# Patient Record
Sex: FEMALE | Race: WHITE | NOT HISPANIC OR LATINO | Employment: OTHER | URBAN - METROPOLITAN AREA
[De-identification: names, ages, dates, MRNs, and addresses within clinical notes are randomized per-mention and may not be internally consistent; named-entity substitution may affect disease eponyms.]

---

## 2018-10-18 ENCOUNTER — TELEPHONE (OUTPATIENT)
Dept: SURGERY | Facility: CLINIC | Age: 38
End: 2018-10-18

## 2018-10-18 NOTE — TELEPHONE ENCOUNTER
New patient referral for right breast mass,   Scheduled with Dr. Maddy Guillermo 11/5/18 8:30  Had to leave patient a message RE: appointment and reason for consult, Asked that she call back.     lml

## 2018-11-02 PROBLEM — N63.10 BREAST MASS, RIGHT: Status: ACTIVE | Noted: 2018-11-02

## 2018-11-05 ENCOUNTER — HOSPITAL ENCOUNTER (OUTPATIENT)
Dept: ULTRASOUND IMAGING | Facility: HOSPITAL | Age: 38
Discharge: HOME | End: 2018-11-05
Attending: SURGERY

## 2018-11-05 ENCOUNTER — OFFICE VISIT (OUTPATIENT)
Dept: SURGERY | Facility: CLINIC | Age: 38
End: 2018-11-05

## 2018-11-05 ENCOUNTER — HOSPITAL ENCOUNTER (OUTPATIENT)
Dept: MAMMOGRAPHY | Facility: HOSPITAL | Age: 38
Discharge: HOME | End: 2018-11-05
Attending: SURGERY | Admitting: SURGERY

## 2018-11-05 VITALS
HEIGHT: 64 IN | SYSTOLIC BLOOD PRESSURE: 129 MMHG | DIASTOLIC BLOOD PRESSURE: 81 MMHG | HEART RATE: 88 BPM | TEMPERATURE: 98.3 F | BODY MASS INDEX: 19.33 KG/M2 | WEIGHT: 113.2 LBS

## 2018-11-05 DIAGNOSIS — N60.81 SEBACEOUS CYST OF BREAST, RIGHT: ICD-10-CM

## 2018-11-05 DIAGNOSIS — N63.10 BREAST MASS, RIGHT: ICD-10-CM

## 2018-11-05 DIAGNOSIS — N63.10 BREAST MASS, RIGHT: Primary | ICD-10-CM

## 2018-11-05 PROCEDURE — 76642 ULTRASOUND BREAST LIMITED: CPT

## 2018-11-05 PROCEDURE — 77066 DX MAMMO INCL CAD BI: CPT

## 2018-11-05 PROCEDURE — 99204 OFFICE O/P NEW MOD 45 MIN: CPT | Performed by: SURGERY

## 2018-11-05 PROCEDURE — G0279 TOMOSYNTHESIS, MAMMO: HCPCS

## 2018-11-05 RX ORDER — LACOSAMIDE 200 MG/1
TABLET, FILM COATED ORAL
Refills: 5 | COMMUNITY
Start: 2018-10-04 | End: 2019-12-20

## 2018-11-06 PROBLEM — N60.81 SEBACEOUS CYST OF BREAST, RIGHT: Status: ACTIVE | Noted: 2018-11-06

## 2018-12-03 ENCOUNTER — TELEPHONE (OUTPATIENT)
Dept: SURGERY | Facility: CLINIC | Age: 38
End: 2018-12-03

## 2018-12-03 NOTE — TELEPHONE ENCOUNTER
F/u appt. With Dr. Guillermo on 2/5/2019 needs to be moved bc Dr. Guillermo will be in surgery that morning    Left message with pt to move appt.    Pt to call back to reschedule

## 2018-12-05 ENCOUNTER — TELEPHONE (OUTPATIENT)
Dept: SURGERY | Facility: CLINIC | Age: 38
End: 2018-12-05

## 2018-12-05 NOTE — TELEPHONE ENCOUNTER
Ms. Xiao's appointment has been moved from 2/5/2019 to arrive @ 10:15 to 2/6/2019 to arrive @ 10:15 due Dr. Guillermo being in surgery.    Left message with patient to inform her of this change.    Patient to call back and confirm.

## 2019-02-08 ENCOUNTER — TELEPHONE (OUTPATIENT)
Dept: SURGERY | Facility: CLINIC | Age: 39
End: 2019-02-08

## 2019-02-08 NOTE — TELEPHONE ENCOUNTER
Called and left message with patient, to see if she wants to reschedule 3 mo f/u with Dr. Guillermo.

## 2019-12-20 ENCOUNTER — OFFICE VISIT (OUTPATIENT)
Dept: NEUROLOGY | Facility: CLINIC | Age: 39
End: 2019-12-20

## 2019-12-20 VITALS
HEART RATE: 86 BPM | HEIGHT: 64 IN | SYSTOLIC BLOOD PRESSURE: 114 MMHG | WEIGHT: 107 LBS | BODY MASS INDEX: 18.27 KG/M2 | DIASTOLIC BLOOD PRESSURE: 72 MMHG | OXYGEN SATURATION: 98 %

## 2019-12-20 DIAGNOSIS — G40.309 GENERALIZED NONCONVULSIVE EPILEPSY (HCC): Primary | ICD-10-CM

## 2019-12-20 PROCEDURE — 95976 ALYS SMPL CN NPGT PRGRMG: CPT | Performed by: PSYCHIATRY & NEUROLOGY

## 2019-12-20 PROCEDURE — 99213 OFFICE O/P EST LOW 20 MIN: CPT | Performed by: PSYCHIATRY & NEUROLOGY

## 2019-12-20 RX ORDER — LACOSAMIDE 200 MG/1
200 TABLET ORAL EVERY 12 HOURS SCHEDULED
Qty: 60 TABLET | Refills: 3 | Status: SHIPPED | OUTPATIENT
Start: 2019-12-20 | End: 2020-06-17

## 2019-12-20 RX ORDER — NAPROXEN 500 MG/1
500 TABLET ORAL
COMMUNITY
End: 2021-02-24

## 2019-12-20 RX ORDER — LACOSAMIDE 100 MG/1
100 TABLET ORAL EVERY 12 HOURS SCHEDULED
COMMUNITY
End: 2019-12-20

## 2019-12-20 RX ORDER — LACOSAMIDE 50 MG/1
50 TABLET ORAL EVERY 12 HOURS SCHEDULED
COMMUNITY
End: 2019-12-20

## 2019-12-20 NOTE — PROGRESS NOTES
Procedure   Procedures      vagus nerve stimulator interrogation and diagnostics.    Normal mode setting is as follows: Output current is set to 1.5 mA, frequency is 30 Hz, pulse width is 250 µs, on x30 seconds and off time is 5 minutes.  Duty cycle is 10%.  Auto stimulation mode settings are as follows: Output current to set to 1.75 mA, pulse width is 250 µs and on time of 60 seconds.  Magnet mode settings are as follows: Output currently set to 2.75 mA, pulse width is 250 µs and on time is 60 seconds.    The setting changes today were 1.  We change the magnet output from 2.5 mA to 2.75 mA.  All other settings were remained unchanged.  Diagnostics were performed.  Battery life is 50 to 75%.  Lead impedance is okay at 2770 ohms.

## 2019-12-20 NOTE — PROGRESS NOTES
Chief Complaint   Patient presents with   • Seizures   • VNS       Patient ID: Alexandra Xiao is a 39 y.o. female.    HPI: I had the pleasure of seeing your patient today.  As you may know she is a 39-year-old female with a history of epilepsy.  She does have the vagus nerve stimulator.  She also takes Vimpat 150 mg twice daily.  She has not had any side effects of the Vimpat.  She did have 3 breakthrough seizures since last follow-up.  They were the typical generalized tonic-clonic seizure like spells.  She does turn blue.  They last anywhere from several seconds to 1 to 2 minutes.  She is postictal for several minutes afterwards with some confusion and lethargy.  During these 3 breakthrough seizures she acknowledges some excitement from the day.  She is not missing any doses of her Vimpat.  She is very compliant.  With respect to her vagus nerve stimulator the magnet does appear to pull her out of the seizures much quicker than prior to having this device.  The normal mode and auto mode stimulations have been turned down due to significant irritation.  We have tried titrating her very slowly however she is unable to tolerate it.  Even at the low levels she has currently she still will have significant irritation however is able to tolerate it better.    The following portions of the patient's history were reviewed and updated as appropriate: allergies, current medications, past family history, past medical history, past social history, past surgical history and problem list.    Review of Systems   Constitutional: Positive for appetite change and fatigue. Negative for activity change.   HENT: Positive for tinnitus. Negative for facial swelling and hearing loss.    Eyes: Positive for photophobia. Negative for redness and visual disturbance.   Musculoskeletal: Positive for back pain. Negative for gait problem and neck pain.   Neurological: Positive for dizziness, seizures (LAST SEIZURE WAS IN 10/06/2019), facial  asymmetry, weakness, light-headedness, numbness and headaches (PT STATES SHE GETS MIGRAINES FREQUENTLY. ). Negative for tremors, syncope and speech difficulty.   Hematological: Negative for adenopathy. Does not bruise/bleed easily.   Psychiatric/Behavioral: Negative for agitation, behavioral problems, confusion, decreased concentration, dysphoric mood, hallucinations, self-injury, sleep disturbance and suicidal ideas. The patient is not nervous/anxious and is not hyperactive.       I reviewed and agree with the above review of systems completed by the medical assistant.    Vitals:    19 1309   BP: 114/72   Pulse: 86   SpO2: 98%       Neurologic Exam     Mental Status   Oriented to person, place, and time.   Concentration: normal.   Level of consciousness: alert  Knowledge: consistent with education (No deficits found.).     Cranial Nerves     CN II   Visual fields full to confrontation.     CN III, IV, VI   Pupils are equal, round, and reactive to light.  Extraocular motions are normal.   CN III: no CN III palsy  CN VI: no CN VI palsy    CN V   Facial sensation intact.     CN VII   Facial expression full, symmetric.     CN VIII   CN VIII normal.     CN IX, X   CN IX normal.   CN X normal.     CN XI   CN XI normal.     CN XII   CN XII normal.     Motor Exam     Strength   Right neck flexion: 5/5  Left neck flexion: 5/5  Right neck extension: 5/5  Left neck extension: 5/5  Right deltoid: 5/5  Left deltoid: 5/5  Right biceps: 5/5  Left biceps: 5/5  Right triceps: 5/5  Left triceps: 5/5  Right wrist flexion: 5/5  Left wrist flexion: 5/5  Right wrist extension: 5/5  Left wrist extension: 5/5  Right interossei: 5/5  Left interossei: 5/5  Right abdominals: 5/5  Left abdominals: 5/5  Right iliopsoas: 5/5  Left iliopsoas: 5/5  Right quadriceps: 5/5  Left quadriceps: 5/5  Right hamstrin/5  Left hamstrin/5  Right glutei: 5/5  Left glutei: 5/5  Right anterior tibial: 5/5  Left anterior tibial: 5/5  Right posterior  tibial: 5/5  Left posterior tibial: 5/5  Right peroneal: 5/5  Left peroneal: 5/5  Right gastroc: 5/5  Left gastroc: 5/5    Sensory Exam   Light touch normal.   Vibration normal.     Gait, Coordination, and Reflexes     Gait  Gait: normal    Reflexes   Right brachioradialis: 2+  Left brachioradialis: 2+  Right biceps: 2+  Left biceps: 2+  Right triceps: 2+  Left triceps: 2+  Right patellar: 2+  Left patellar: 2+  Right achilles: 2+  Left achilles: 2+  Right : 2+  Left : 2+Station is normal.       Physical Exam   Constitutional: She is oriented to person, place, and time. She appears well-developed and well-nourished.   HENT:   Head: Normocephalic and atraumatic.   Eyes: Pupils are equal, round, and reactive to light. EOM are normal.   Cardiovascular: Normal rate and regular rhythm.   Pulmonary/Chest: Breath sounds normal.   Musculoskeletal: Normal range of motion.   Neurological: She is oriented to person, place, and time. Gait normal.   Reflex Scores:       Tricep reflexes are 2+ on the right side and 2+ on the left side.       Bicep reflexes are 2+ on the right side and 2+ on the left side.       Brachioradialis reflexes are 2+ on the right side and 2+ on the left side.       Patellar reflexes are 2+ on the right side and 2+ on the left side.       Achilles reflexes are 2+ on the right side and 2+ on the left side.  Skin: Skin is warm.   Vitals reviewed.      Procedures    Assessment/Plan: Vagus nerve stimulator interrogation and diagnostics were performed today.  We change the magnet mode from 2.5mA to 2.75 mA.  All other settings were kept the same.  We will increase her Vimpat to 200 mg twice daily.  She will see us back in 3 months.       Alexandra was seen today for seizures and vns.    Diagnoses and all orders for this visit:    Generalized nonconvulsive epilepsy (CMS/HCC)  -     lacosamide (VIMPAT) 200 MG tablet; Take 1 tablet by mouth Every 12 (Twelve) Hours for 30 days.           Cb James II,  MD

## 2020-03-19 ENCOUNTER — TELEPHONE (OUTPATIENT)
Dept: NEUROLOGY | Facility: CLINIC | Age: 40
End: 2020-03-19

## 2020-03-19 NOTE — TELEPHONE ENCOUNTER
Called pt to reschedule or confirm a telephone call apt. Unable to leave message due to the screeching sound when calling the number listed. The pt does not need to come in office due to COVID-19 pt can do a telephone call if they can not be available for that time on Monday the pt will have to reschedule sometime after 05/18/2020 due to protocol right now. Please advise if pt calls back. Thank you

## 2020-06-16 DIAGNOSIS — G40.309 GENERALIZED NONCONVULSIVE EPILEPSY (HCC): ICD-10-CM

## 2020-06-17 NOTE — TELEPHONE ENCOUNTER
Pt requesting refill on vimapt 200 mg take 1 tablet po bid. Pt was last seen on 12/20/2019 and has a f/u on 06/29/2020. josé miguel in chart.       I called in medication to pharmacy just need to sign for documentation in chart.    Please advise thank you

## 2020-06-22 RX ORDER — LACOSAMIDE 200 MG/1
TABLET, FILM COATED ORAL
Qty: 60 TABLET | Refills: 3 | OUTPATIENT
Start: 2020-06-22 | End: 2020-06-29

## 2020-06-29 ENCOUNTER — OFFICE VISIT (OUTPATIENT)
Dept: NEUROLOGY | Facility: CLINIC | Age: 40
End: 2020-06-29

## 2020-06-29 VITALS — HEART RATE: 103 BPM | OXYGEN SATURATION: 98 % | HEIGHT: 64 IN | BODY MASS INDEX: 18.78 KG/M2 | WEIGHT: 110 LBS

## 2020-06-29 DIAGNOSIS — G40.309 GENERALIZED NONCONVULSIVE EPILEPSY (HCC): Primary | ICD-10-CM

## 2020-06-29 PROCEDURE — 99214 OFFICE O/P EST MOD 30 MIN: CPT | Performed by: PSYCHIATRY & NEUROLOGY

## 2020-06-29 PROCEDURE — 95970 ALYS NPGT W/O PRGRMG: CPT | Performed by: PSYCHIATRY & NEUROLOGY

## 2020-06-29 RX ORDER — LACOSAMIDE 200 MG/1
200 TABLET ORAL EVERY 12 HOURS SCHEDULED
Qty: 60 TABLET | Refills: 4 | Status: SHIPPED | OUTPATIENT
Start: 2020-06-29 | End: 2020-07-16

## 2020-06-29 NOTE — PROGRESS NOTES
Procedure   Procedures        VNS interrogation and diagnostics.    Indication: History of seizures, breakthrough seizure activity.    Report: Vagus nerve stimulator was interrogated.    Normal mode setting is as follows: Output current is set to 1.5 mA, frequency is 30 Hz, pulse width is 250 µs, on x30 seconds and off time is 5 minutes.  Duty cycle is 10%.  Auto stimulation mode settings are as follows: Output current to set to 1.75 mA, pulse width is 250 µs and on time of 60 seconds.  Magnet mode settings are as follows: Output currently set to 2.75 mA, pulse width is 250 µs and on time is 60 seconds.    Diagnostics were performed.  Battery life is 50 to 75%, lead impedance is good.  No setting changes were made.

## 2020-06-29 NOTE — PROGRESS NOTES
Chief Complaint   Patient presents with   • Seizures   • VNS       Patient ID: Alexandra Xiao is a 39 y.o. female.    HPI: I had the pleasure of seeing your patient today.  As you may know she is a 39-year-old female with a history of seizures.  She states that she has had 5 seizures within the past 2 months.  The last one was approximately a week and a half ago.  She has had generalized tonic-clonic seizures.  She will usually have mild clonus prior to the onset.  She did not have loss of bowel or bladder with these seizures.  She does acknowledge a significant amount of stress in her life and states that that may be a trigger for her.  Seizures last anywhere from several seconds to a few minutes.  She will have postictal confusion for several minutes also.  She does have the vagus nerve stimulator.  She is also taking Vimpat 200 mg twice daily.  We have been unable to titrate the vagus nerve stimulator successfully due to significant discomfort.  We have titrated her magnet mode settings for abortive measures.  She does also try and swipe several times if she feels like a seizure could be coming on.  She denies any new auras.  No focal weakness or numbness of her arms or legs.  No vision changes.  No recent head injuries.  She does state compliance with her medication.    The following portions of the patient's history were reviewed and updated as appropriate: allergies, current medications, past family history, past medical history, past social history, past surgical history and problem list.    Review of Systems   Constitutional: Negative for activity change, appetite change and fatigue.   HENT: Negative for hearing loss, mouth sores and trouble swallowing.    Eyes: Negative for photophobia, redness and visual disturbance.   Respiratory: Negative for chest tightness, shortness of breath and wheezing.    Gastrointestinal: Negative for abdominal pain, nausea and vomiting.   Endocrine: Negative for cold intolerance,  heat intolerance and polydipsia.   Musculoskeletal: Negative for back pain, gait problem and neck pain.   Skin: Negative for color change, rash and wound.   Allergic/Immunologic: Negative for environmental allergies, food allergies and immunocompromised state.   Neurological: Positive for seizures (pt states 5 seizues in last 2 months. ). Negative for dizziness, tremors, syncope, facial asymmetry, speech difficulty, weakness, light-headedness, numbness and headaches.   Hematological: Negative for adenopathy. Does not bruise/bleed easily.   Psychiatric/Behavioral: Negative for agitation, behavioral problems, confusion, decreased concentration, dysphoric mood, hallucinations, self-injury, sleep disturbance and suicidal ideas. The patient is not nervous/anxious and is not hyperactive.       I have reviewed the review of systems above performed by my medical assistant.      Vitals:    06/29/20 1538   Pulse: 103   SpO2: 98%       Neurologic Exam     Mental Status   Oriented to person, place, and time.   Concentration: normal.   Level of consciousness: alert  Knowledge: consistent with education (No deficits found.).     Cranial Nerves     CN II   Visual fields full to confrontation.     CN III, IV, VI   Pupils are equal, round, and reactive to light.  Extraocular motions are normal.   CN III: no CN III palsy  CN VI: no CN VI palsy    CN V   Facial sensation intact.     CN VII   Facial expression full, symmetric.     CN VIII   CN VIII normal.     CN IX, X   CN IX normal.   CN X normal.     CN XI   CN XI normal.     CN XII   CN XII normal.     Motor Exam     Strength   Right neck flexion: 5/5  Left neck flexion: 5/5  Right neck extension: 5/5  Left neck extension: 5/5  Right deltoid: 5/5  Left deltoid: 5/5  Right biceps: 5/5  Left biceps: 5/5  Right triceps: 5/5  Left triceps: 5/5  Right wrist flexion: 5/5  Left wrist flexion: 5/5  Right wrist extension: 5/5  Left wrist extension: 5/5  Right interossei: 5/5  Left  interossei: 5/5  Right abdominals: 5/5  Left abdominals: 5/5  Right iliopsoas: 5/5  Left iliopsoas: 5/5  Right quadriceps: 5/5  Left quadriceps: 5/5  Right hamstrin/5  Left hamstrin/5  Right glutei: 5/5  Left glutei: 5/5  Right anterior tibial: 5/5  Left anterior tibial: 5/5  Right posterior tibial: 5/5  Left posterior tibial: 5/5  Right peroneal: 5/5  Left peroneal: 5/5  Right gastroc: 5/5  Left gastroc: 5/5    Sensory Exam   Light touch normal.   Vibration normal.     Gait, Coordination, and Reflexes     Gait  Gait: normal    Reflexes   Right brachioradialis: 2+  Left brachioradialis: 2+  Right biceps: 2+  Left biceps: 2+  Right triceps: 2+  Left triceps: 2+  Right patellar: 2+  Left patellar: 2+  Right achilles: 2+  Left achilles: 2+  Right : 2+  Left : 2+Station is normal.       Physical Exam   Constitutional: She is oriented to person, place, and time. She appears well-developed and well-nourished.   HENT:   Head: Normocephalic and atraumatic.   Eyes: Pupils are equal, round, and reactive to light. EOM are normal.   Cardiovascular: Normal rate and regular rhythm.   Pulmonary/Chest: Breath sounds normal.   Musculoskeletal: Normal range of motion.   Neurological: She is oriented to person, place, and time. Gait normal.   Reflex Scores:       Tricep reflexes are 2+ on the right side and 2+ on the left side.       Bicep reflexes are 2+ on the right side and 2+ on the left side.       Brachioradialis reflexes are 2+ on the right side and 2+ on the left side.       Patellar reflexes are 2+ on the right side and 2+ on the left side.       Achilles reflexes are 2+ on the right side and 2+ on the left side.  Skin: Skin is warm.   Vitals reviewed.      Procedures    Assessment/Plan: Plan is to start her on Aptiom 400 mg daily.  She will continue the Vimpat as is.  The vagus nerve stimulator was interrogated.  All settings appear as they were at previous visit.  May consider titration once again in the  near future if we are unable to get better control of her seizures with the Aptiom addition.       Alexandra was seen today for seizures and vns.    Diagnoses and all orders for this visit:    Generalized nonconvulsive epilepsy (CMS/HCC)  -     Discontinue: Eslicarbazepine Acetate (Aptiom) 600 MG tablet; Take 1 tablet by mouth Daily for 30 days.  -     lacosamide (VIMPAT) 200 MG tablet; Take 1 tablet by mouth Every 12 (Twelve) Hours for 30 days.  -     Eslicarbazepine Acetate 400 MG tablet; Take 1 tablet by mouth Daily for 30 days.           Cb James II, MD

## 2020-07-16 DIAGNOSIS — G40.309 GENERALIZED NONCONVULSIVE EPILEPSY (HCC): ICD-10-CM

## 2020-07-16 NOTE — TELEPHONE ENCOUNTER
Please advise and approve. Pt lst filled medication on 06/17/2020 pt was last seen on 06/29/2020 and has a f/u 10/02/2020. Please advise and approve mediation thank you josé miguel in chart.

## 2020-07-17 RX ORDER — LACOSAMIDE 200 MG/1
TABLET, FILM COATED ORAL
Qty: 60 TABLET | Refills: 5 | Status: SHIPPED | OUTPATIENT
Start: 2020-07-17 | End: 2020-12-28 | Stop reason: SDUPTHER

## 2020-11-23 DIAGNOSIS — G40.309 GENERALIZED NONCONVULSIVE EPILEPSY (HCC): ICD-10-CM

## 2020-11-24 RX ORDER — ESLICARBAZEPINE ACETATE 400 MG/1
TABLET ORAL
Qty: 30 TABLET | Refills: 0 | Status: SHIPPED | OUTPATIENT
Start: 2020-11-24 | End: 2020-12-28

## 2020-11-24 NOTE — TELEPHONE ENCOUNTER
No refills until pt has an apt scheduled. Please advise.     Please approve 30 day supply. Thank you.

## 2020-12-26 DIAGNOSIS — G40.309 GENERALIZED NONCONVULSIVE EPILEPSY (HCC): ICD-10-CM

## 2020-12-28 DIAGNOSIS — G40.309 GENERALIZED NONCONVULSIVE EPILEPSY (HCC): ICD-10-CM

## 2020-12-28 RX ORDER — ESLICARBAZEPINE ACETATE 400 MG/1
TABLET ORAL
Qty: 30 TABLET | Refills: 0 | OUTPATIENT
Start: 2020-12-28

## 2020-12-28 NOTE — TELEPHONE ENCOUNTER
Pt was given 30 days last month and told to schedule an apt. Pt needs an apt scheduled for refills. Once Pt is scheduled we can get her enough refills until apt. If pt no shows at that apts scheduled. No refills will be given until pt is seen in office.

## 2020-12-28 NOTE — TELEPHONE ENCOUNTER
PATIENT HAS BEEN SCHEDULED FOR F/U IN April. SHE WOULD LIKE TO SPEAK TO SOMEONE ABOUT THE RX APTIOM SINCE F/U HAS BEEN SCHEDULED.     PLEASE ADVISE.    PH: [phone]

## 2020-12-28 NOTE — TELEPHONE ENCOUNTER
Pt made an apt in April. Please advise. DR. James is out of office until Wednesday. I called in medication to pharmacy requested. Please advise and approve for documentation thank you.

## 2020-12-31 RX ORDER — ESLICARBAZEPINE ACETATE 400 MG/1
TABLET ORAL
Qty: 30 TABLET | Refills: 0 | OUTPATIENT
Start: 2020-12-31 | End: 2021-06-29

## 2020-12-31 RX ORDER — LACOSAMIDE 200 MG/1
1 TABLET, FILM COATED ORAL 2 TIMES DAILY
Qty: 60 TABLET | Refills: 5 | OUTPATIENT
Start: 2020-12-31 | End: 2021-01-18

## 2021-01-16 DIAGNOSIS — G40.309 GENERALIZED NONCONVULSIVE EPILEPSY (HCC): ICD-10-CM

## 2021-01-18 RX ORDER — LACOSAMIDE 200 MG/1
TABLET, FILM COATED ORAL
Qty: 60 TABLET | Refills: 5 | Status: SHIPPED | OUTPATIENT
Start: 2021-01-18 | End: 2021-08-11

## 2021-01-18 NOTE — TELEPHONE ENCOUNTER
Please advise. Pt requesting vimpat. josé miguel in chart. Pt has a f/u on 04/09/2021. Please advise and approve thank you.

## 2021-02-24 DIAGNOSIS — G40.309 GENERALIZED NONCONVULSIVE EPILEPSY (HCC): Primary | ICD-10-CM

## 2021-02-24 RX ORDER — NAPROXEN 500 MG/1
TABLET ORAL
Qty: 90 TABLET | Refills: 0 | Status: SHIPPED | OUTPATIENT
Start: 2021-02-24 | End: 2021-07-30

## 2021-03-24 DIAGNOSIS — G40.309 GENERALIZED NONCONVULSIVE EPILEPSY (HCC): ICD-10-CM

## 2021-03-24 RX ORDER — NAPROXEN 500 MG/1
TABLET ORAL
Qty: 90 TABLET | Refills: 0 | OUTPATIENT
Start: 2021-03-24

## 2021-04-14 NOTE — TELEPHONE ENCOUNTER
Please advise. Pt has an apt on 04/09/2021. Please advise and approve thank you.    Surgeon (Optional): Mohamud Medrano PA-C

## 2021-05-14 ENCOUNTER — OFFICE VISIT (OUTPATIENT)
Dept: NEUROLOGY | Facility: CLINIC | Age: 41
End: 2021-05-14

## 2021-05-14 VITALS
DIASTOLIC BLOOD PRESSURE: 70 MMHG | WEIGHT: 141 LBS | HEART RATE: 104 BPM | HEIGHT: 64 IN | OXYGEN SATURATION: 98 % | BODY MASS INDEX: 24.07 KG/M2 | SYSTOLIC BLOOD PRESSURE: 122 MMHG

## 2021-05-14 DIAGNOSIS — Z96.89 STATUS POST VNS (VAGUS NERVE STIMULATOR) PLACEMENT: ICD-10-CM

## 2021-05-14 DIAGNOSIS — G40.309 GENERALIZED NONCONVULSIVE EPILEPSY (HCC): Primary | ICD-10-CM

## 2021-05-14 PROCEDURE — 99213 OFFICE O/P EST LOW 20 MIN: CPT | Performed by: PSYCHIATRY & NEUROLOGY

## 2021-05-14 PROCEDURE — 95970 ALYS NPGT W/O PRGRMG: CPT | Performed by: PSYCHIATRY & NEUROLOGY

## 2021-05-14 NOTE — PROGRESS NOTES
Chief Complaint   Patient presents with   • Seizures       Patient ID: Alexandra Xiao is a 40 y.o. female.    HPI: I had the pleasure of seeing your patient today.  As you may know she is a 40-year-old female with a history of seizures.  She is currently taking Vimpat 200 mg twice daily as well as Aptiom 400 mg twice daily.  She does report one seizure since last follow-up.  It was a typical generalized tonic-clonic seizure.  She did lose her urine.  She is edentulous therefore no tongue issues.  She does have a vagus nerve stimulator.  The magnet was used and this has been very helpful for her and breaking seizures much sooner than before.  She is tolerating stimulation quite well.  She says that she was under significant amount of stress prior to the seizure as well she can sometimes have seizures around her menstrual cycle and that was about to start as well.    Vagus nerve stimulator interrogation  Indication: History of seizures  Procedure:  Normal mode settings are as follows: Output current is set to 1.5 mA, signal frequency is 30 Hz, pulse width is 250 µs, on time is 30 seconds and off time is 5 minutes.  Duty cycle is 10%.  Auto stimulation mode settings are as follows: Output current is set to 1.75 mA, pulse width is 250 µs and on time is 60 seconds.  Magnet mode settings are as follows: Output current is set to 2.75 mA, pulse width is 250 µs and on time is 60 seconds.    Diagnostics:  Generator battery is okay at 50 to 75%.  Lead impedance is 2824 ohms.    Setting changes: There were no setting changes today.                                                                                                            The following portions of the patient's history were reviewed and updated as appropriate: allergies, current medications, past family history, past medical history, past social history, past surgical history and problem list.    Review of Systems   Musculoskeletal: Negative for back pain, gait  problem and neck pain.   Allergic/Immunologic: Negative for environmental allergies, food allergies and immunocompromised state.   Neurological: Positive for seizures (last seizure 01/24/2021. ). Negative for dizziness, tremors, syncope, facial asymmetry, speech difficulty, weakness, light-headedness, numbness and headaches.   Hematological: Negative for adenopathy. Does not bruise/bleed easily.   Psychiatric/Behavioral: Negative for agitation, behavioral problems, confusion, decreased concentration, dysphoric mood, hallucinations, self-injury, sleep disturbance and suicidal ideas. The patient is not nervous/anxious and is not hyperactive.       I have reviewed the review of systems above performed by my medical assistant.      Vitals:    05/14/21 1048   BP: 122/70   Pulse: 104   SpO2: 98%       Neurologic Exam     Mental Status   Oriented to person, place, and time.   Concentration: normal.   Level of consciousness: alert  Knowledge: consistent with education (No deficits found.).     Cranial Nerves     CN II   Visual fields full to confrontation.     CN III, IV, VI   Pupils are equal, round, and reactive to light.  Extraocular motions are normal.   CN III: no CN III palsy  CN VI: no CN VI palsy    CN V   Facial sensation intact.     CN VII   Facial expression full, symmetric.     CN VIII   CN VIII normal.     CN IX, X   CN IX normal.   CN X normal.     CN XI   CN XI normal.     CN XII   CN XII normal.     Motor Exam     Strength   Right neck flexion: 5/5  Left neck flexion: 5/5  Right neck extension: 5/5  Left neck extension: 5/5  Right deltoid: 5/5  Left deltoid: 5/5  Right biceps: 5/5  Left biceps: 5/5  Right triceps: 5/5  Left triceps: 5/5  Right wrist flexion: 5/5  Left wrist flexion: 5/5  Right wrist extension: 5/5  Left wrist extension: 5/5  Right interossei: 5/5  Left interossei: 5/5  Right abdominals: 5/5  Left abdominals: 5/5  Right iliopsoas: 5/5  Left iliopsoas: 5/5  Right quadriceps: 5/5  Left  quadriceps: 5/5  Right hamstrin/5  Left hamstrin/5  Right glutei: 5/5  Left glutei: 5/5  Right anterior tibial: 5/5  Left anterior tibial: 5/5  Right posterior tibial: 5/5  Left posterior tibial: 5/5  Right peroneal: 5/5  Left peroneal: 5/5  Right gastroc: 5/5  Left gastroc: 5/5    Sensory Exam   Light touch normal.   Vibration normal.     Gait, Coordination, and Reflexes     Gait  Gait: normal    Reflexes   Right brachioradialis: 2+  Left brachioradialis: 2+  Right biceps: 2+  Left biceps: 2+  Right triceps: 2+  Left triceps: 2+  Right patellar: 2+  Left patellar: 2+  Right achilles: 2+  Left achilles: 2+  Right : 2+  Left : 2+Station is normal.       Physical Exam  Vitals reviewed.   Constitutional:       Appearance: She is well-developed.   HENT:      Head: Normocephalic and atraumatic.   Eyes:      Extraocular Movements: EOM normal.      Pupils: Pupils are equal, round, and reactive to light.   Cardiovascular:      Rate and Rhythm: Normal rate and regular rhythm.   Pulmonary:      Breath sounds: Normal breath sounds.   Musculoskeletal:         General: Normal range of motion.   Skin:     General: Skin is warm.   Neurological:      Mental Status: She is oriented to person, place, and time.      Gait: Gait is intact.      Deep Tendon Reflexes:      Reflex Scores:       Tricep reflexes are 2+ on the right side and 2+ on the left side.       Bicep reflexes are 2+ on the right side and 2+ on the left side.       Brachioradialis reflexes are 2+ on the right side and 2+ on the left side.       Patellar reflexes are 2+ on the right side and 2+ on the left side.       Achilles reflexes are 2+ on the right side and 2+ on the left side.        Procedures    Assessment/Plan: We have discussed titrating the vagus nerve stimulator some however she would like to hold off on that for now until her next visit.  We will continue her current dose of Vimpat and Aptiom.  We will see her back in 6 months or sooner if  needed.  Vagus nerve stimulator was interrogated however no setting changes were made today.  Diagnostics were also performed today.  A total of 25 minutes was spent face-to-face with the patient today.  Of that greater than 50% of this time was spent discussing signs and symptoms of seizures, patient education, plan of care and prognosis.       Diagnoses and all orders for this visit:    1. Generalized nonconvulsive epilepsy (CMS/HCC) (Primary)    2. Status post VNS (vagus nerve stimulator) placement           Cb James II, MD

## 2021-06-29 ENCOUNTER — TELEPHONE (OUTPATIENT)
Dept: NEUROLOGY | Facility: CLINIC | Age: 41
End: 2021-06-29

## 2021-06-29 DIAGNOSIS — G40.309 GENERALIZED NONCONVULSIVE EPILEPSY (HCC): ICD-10-CM

## 2021-06-29 RX ORDER — ESLICARBAZEPINE ACETATE 400 MG/1
TABLET ORAL
Qty: 30 TABLET | Refills: 5 | Status: SHIPPED | OUTPATIENT
Start: 2021-06-29 | End: 2021-12-23

## 2021-06-29 NOTE — TELEPHONE ENCOUNTER
Caller: Alexandra Xiao    Relationship: Self    Best call back number:[phone]    What medications are you currently taking:   Current Outpatient Medications on File Prior to Visit   Medication Sig Dispense Refill   • Aptiom 400 MG tablet TAKE 1 TABLET BY MOUTH DAILY 30 tablet 0   • naproxen (NAPROSYN) 500 MG tablet TAKE 1 TABLET BY MOUTH THREE TIMES DAILY AS NEEDED FOR HEADACHE 90 tablet 0   • Vimpat 200 MG tablet TAKE 1 TABLET BY MOUTH TWICE DAILY 60 tablet 5     No current facility-administered medications on file prior to visit.        When did you start taking these medications: NA    Which medication are you concerned about: APTIOM    Who prescribed you this medication:     What are your concerns: PATIENT STATES SHE IS OUT OF MEDICATION AND THERE WAS A REORDER PLACED TODAY. PLEASE ADVISE.     LAST FILLED: 12-31-20  LAST SEEN: 5-14-21  NEXT VISIT: 11-19-21    How long have you been taking these medications: NA    How long have you had these concerns: NA

## 2021-06-29 NOTE — TELEPHONE ENCOUNTER
Request have been sent to dr. Samuels. We have up to 72 hrs from the time we receive medication request to fill.

## 2021-06-29 NOTE — TELEPHONE ENCOUNTER
Please advise. Pt last seen on 5/14/2021 and has a f/u on 11/19/2021. Please advise and approve thank you.

## 2021-07-29 DIAGNOSIS — G40.309 GENERALIZED NONCONVULSIVE EPILEPSY (HCC): ICD-10-CM

## 2021-07-30 RX ORDER — NAPROXEN 500 MG/1
TABLET ORAL
Qty: 90 TABLET | Refills: 5 | Status: SHIPPED | OUTPATIENT
Start: 2021-07-30 | End: 2022-01-31

## 2021-08-11 DIAGNOSIS — G40.309 GENERALIZED NONCONVULSIVE EPILEPSY (HCC): ICD-10-CM

## 2021-08-11 RX ORDER — LACOSAMIDE 200 MG/1
TABLET, FILM COATED ORAL
Qty: 60 TABLET | Refills: 5 | Status: SHIPPED | OUTPATIENT
Start: 2021-08-11 | End: 2022-01-05 | Stop reason: SDUPTHER

## 2021-08-11 NOTE — TELEPHONE ENCOUNTER
Caller: Alexandra Xiao    Relationship: Self    Best call back number: [phone]    What was the call regarding: PT CALLED TO CHECK THAT REFILL REQUEST WAS RECEIVED FROM THE PHARMACY. INFORMED PT THAT WE DID RECEIVED, WE HAVE TO WAIT FOR DR. MORENO'S APPROVAL. PT STATES SHE TOOK HER LAST TABLET OF THE VIMPAT MEDICATION THIS MORNING AND HAS NONE FOR THIS EVENING.     LAST APPT: 5/14/21  FUTURE APPT: 11/19/21    PLEASE REVIEW AND ADVISE.

## 2021-08-11 NOTE — TELEPHONE ENCOUNTER
Please advise. Pt has an apt on 11/19/2021. Please adivse and approve thank you. josé miguel in chart.

## 2021-12-23 DIAGNOSIS — G40.309 GENERALIZED NONCONVULSIVE EPILEPSY (HCC): ICD-10-CM

## 2021-12-23 RX ORDER — ESLICARBAZEPINE ACETATE 400 MG/1
TABLET ORAL
Qty: 30 TABLET | Refills: 5 | Status: SHIPPED | OUTPATIENT
Start: 2021-12-23 | End: 2022-01-05 | Stop reason: SDUPTHER

## 2022-01-05 DIAGNOSIS — G40.309 GENERALIZED NONCONVULSIVE EPILEPSY: ICD-10-CM

## 2022-01-05 NOTE — TELEPHONE ENCOUNTER
Provider: DR MORENO ---SHARIFA TAVAREZ   Caller:  ELIE   Relationship to Patient:  PT     Phone Number: [phone]  Reason for Call:  PT CALLING IN TO RESCHED APPT FOR 01/06/2022 WITH DR MORENO  TESTED POSITIVE FOR COVID SHE IS IN QUARANTINE   ATTEMPTED TO RESCHED NO TEMPLATE  DAYSI WITH A PT .. PT NEEDS APPT FOR VNS  PLATE     PLEASE ADVISE

## 2022-01-05 NOTE — TELEPHONE ENCOUNTER
Caller: Lopez Xiaoy    Relationship: Self    Best call back number: [phone]    Requested Prescriptions:   Requested Prescriptions     Pending Prescriptions Disp Refills   • Eslicarbazepine Acetate (Aptiom) 400 MG tablet 30 tablet 5     Sig: Take 1 tablet by mouth Daily.   • lacosamide (Vimpat) 200 MG tablet 60 tablet 5     Sig: Take 1 tablet by mouth 2 (Two) Times a Day.        Pharmacy where request should be sent:  [facility] [id number] LISTED      Additional details provided by patient:  PT IS OUT OF MEDICATION      Does the patient have less than a 3 day supply:  [] Yes  [x] No    Anthony Ramos Rep   01/05/22 11:26 EST

## 2022-01-06 RX ORDER — LACOSAMIDE 200 MG/1
1 TABLET, FILM COATED ORAL 2 TIMES DAILY
Qty: 60 TABLET | Refills: 5 | Status: SHIPPED | OUTPATIENT
Start: 2022-01-06 | End: 2022-05-18

## 2022-01-06 RX ORDER — ESLICARBAZEPINE ACETATE 400 MG/1
1 TABLET ORAL DAILY
Qty: 30 TABLET | Refills: 5 | Status: SHIPPED | OUTPATIENT
Start: 2022-01-06 | End: 2022-06-30

## 2022-01-30 DIAGNOSIS — G40.309 GENERALIZED NONCONVULSIVE EPILEPSY: ICD-10-CM

## 2022-01-31 RX ORDER — NAPROXEN 500 MG/1
TABLET ORAL
Qty: 90 TABLET | Refills: 5 | Status: SHIPPED | OUTPATIENT
Start: 2022-01-31 | End: 2022-05-01 | Stop reason: HOSPADM

## 2022-02-23 ENCOUNTER — OFFICE VISIT (OUTPATIENT)
Dept: OBSTETRICS AND GYNECOLOGY | Facility: CLINIC | Age: 42
End: 2022-02-23

## 2022-02-23 VITALS
BODY MASS INDEX: 26.46 KG/M2 | DIASTOLIC BLOOD PRESSURE: 94 MMHG | SYSTOLIC BLOOD PRESSURE: 145 MMHG | WEIGHT: 155 LBS | HEIGHT: 64 IN | HEART RATE: 113 BPM

## 2022-02-23 DIAGNOSIS — R10.2 PELVIC PAIN: ICD-10-CM

## 2022-02-23 DIAGNOSIS — Z01.419 ENCOUNTER FOR GYNECOLOGICAL EXAMINATION WITHOUT ABNORMAL FINDING: Primary | ICD-10-CM

## 2022-02-23 DIAGNOSIS — N89.8 VAGINAL DISCHARGE: ICD-10-CM

## 2022-02-23 DIAGNOSIS — R53.83 OTHER FATIGUE: ICD-10-CM

## 2022-02-23 DIAGNOSIS — Z80.3 FAMILY HISTORY OF BREAST CANCER IN MOTHER: ICD-10-CM

## 2022-02-23 DIAGNOSIS — N89.8 OTHER SPECIFIED NONINFLAMMATORY DISORDERS OF VAGINA: ICD-10-CM

## 2022-02-23 DIAGNOSIS — R79.89 ELEVATED PROLACTIN LEVEL: ICD-10-CM

## 2022-02-23 DIAGNOSIS — N93.9 ABNORMAL UTERINE BLEEDING (AUB): ICD-10-CM

## 2022-02-23 PROCEDURE — 3015F CERV CANCER SCREEN DOCD: CPT | Performed by: OBSTETRICS & GYNECOLOGY

## 2022-02-23 PROCEDURE — G0101 CA SCREEN;PELVIC/BREAST EXAM: HCPCS | Performed by: OBSTETRICS & GYNECOLOGY

## 2022-02-23 PROCEDURE — 99213 OFFICE O/P EST LOW 20 MIN: CPT | Performed by: OBSTETRICS & GYNECOLOGY

## 2022-02-23 NOTE — PROGRESS NOTES
GYN Annual Exam     CC- Here for annual exam.     Alexandra Xiao is a 41 y.o. female who presents for annual well woman exam. Periods are regular every 28-30 days, lasting 4 days. Dysmenorrhea:severe, occurring premenstrually. Cyclic symptoms include none. No intermenstrual bleeding, spotting, or discharge.  Pt c.o heavy periods for the past 6 mths. Passing clots and using a pack of super plus pads on her first day. Pt dose having 1 day of cramping with her period, but is having cramping when she is not on her period.    Menarche 13 years of age   28-30 x 4-5 days of bleeding   Heavy recently   + clots, one pack of 30 pad a cycle   Can miss social functions due to this  On disability       OB History        2    Para   2    Term   2            AB        Living           SAB        IAB        Ectopic        Molar        Multiple        Live Births                    Current contraception: tubal ligation  History of abnormal Pap smear: no  Family history of uterine, colon or ovarian cancer: no  History of abnormal mammogram: no  Family history of breast cancer: yes - Mother with breast cancer   Last Pap : years ago  Last Mammo: 3 yrs ago    Past Medical History:   Diagnosis Date   • At high risk for seizures    • Depression    • Epilepsy (HCC)    • Headache, tension-type    • Memory loss    • Migraine        Past Surgical History:   Procedure Laterality Date   • BREAST BIOPSY     • FOOT SURGERY      pins in left foot    • OTHER SURGICAL HISTORY      VNS plate 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   • TUBAL ABDOMINAL LIGATION     • VAGUS NERVE STIMULATOR IMPLANTATION           Current Outpatient Medications:   •  Eslicarbazepine Acetate (Aptiom) 400 MG tablet, Take 1 tablet by mouth Daily., Disp: 30 tablet, Rfl: 5  •  lacosamide (Vimpat) 200 MG tablet, Take 1 tablet by mouth 2 (Two) Times a Day., Disp: 60 tablet, Rfl: 5  •  naproxen (NAPROSYN) 500 MG tablet, TAKE 1 TABLET BY MOUTH THREE TIMES DAILY  "AS NEEDED FOR HEADACHE, Disp: 90 tablet, Rfl: 5    No Known Allergies    Social History     Tobacco Use   • Smoking status: Current Every Day Smoker     Packs/day: 1.00     Types: Cigarettes   • Smokeless tobacco: Never Used   Vaping Use   • Vaping Use: Never used   Substance Use Topics   • Alcohol use: No     Comment: caffeine 3 8 oZ rebulls daily    • Drug use: No       Family History   Problem Relation Age of Onset   • Breast cancer Mother 50         age 60 METS   • Arthritis Mother    • Arthritis Father    • Diabetes Father    • Heart disease Father    • Ovarian cancer Neg Hx    • Uterine cancer Neg Hx    • Colon cancer Neg Hx    • Deep vein thrombosis Neg Hx    • Pulmonary embolism Neg Hx        Review of Systems   Constitutional: Negative for chills and fever.   Gastrointestinal: Negative for abdominal pain, constipation and diarrhea.   Genitourinary: Positive for menstrual problem, pelvic pain and vaginal discharge. Negative for vaginal bleeding.   All other systems reviewed and are negative.      /94   Pulse 113   Ht 162.6 cm (64\")   Wt 70.3 kg (155 lb)   LMP 2022 (Exact Date)   Breastfeeding No   BMI 26.61 kg/m²     Physical Exam  Constitutional:       General: She is not in acute distress.     Appearance: She is well-developed and normal weight.   Genitourinary:      Vulva normal.      Right Labia: No lesions or Bartholin's cyst.     Left Labia: No lesions or Bartholin's cyst.     No inguinal adenopathy present in the right or left side.     No vaginal discharge or bleeding.        Right Adnexa: not tender, not full and no mass present.     Left Adnexa: not tender, not full and no mass present.     No cervical motion tenderness or friability.      Uterus is irregular.      Uterus is not enlarged or tender.      No uterine mass detected.     Uterus is retroverted.   Breasts:      Right: No inverted nipple, mass or nipple discharge.      Left: No inverted nipple, mass or nipple " discharge.       HENT:      Head: Normocephalic and atraumatic.      Nose: Nose normal.   Eyes:      Conjunctiva/sclera: Conjunctivae normal.      Pupils: Pupils are equal, round, and reactive to light.   Neck:      Thyroid: No thyromegaly.   Cardiovascular:      Rate and Rhythm: Normal rate and regular rhythm.      Heart sounds: Normal heart sounds. No murmur heard.      Pulmonary:      Effort: Pulmonary effort is normal. No respiratory distress.      Breath sounds: Normal breath sounds.   Abdominal:      General: Abdomen is flat. There is no distension.      Palpations: Abdomen is soft.      Tenderness: There is no abdominal tenderness.   Musculoskeletal:         General: No deformity. Normal range of motion.      Cervical back: Normal range of motion and neck supple.      Right lower leg: No edema.      Left lower leg: No edema.   Lymphadenopathy:      Lower Body: No right inguinal adenopathy. No left inguinal adenopathy.   Neurological:      Mental Status: She is alert and oriented to person, place, and time.   Skin:     General: Skin is warm and dry.      Findings: No erythema.   Psychiatric:         Behavior: Behavior normal.         Thought Content: Thought content normal.         Judgment: Judgment normal.   Vitals reviewed. Exam conducted with a chaperone present.               Assessment     Diagnoses and all orders for this visit:    1. Encounter for gynecological examination without abnormal finding (Primary)  -     IGP, Rfx Aptima HPV ASCU    2. Abnormal uterine bleeding (AUB)  -     Chlamydia trachomatis, Neisseria gonorrhoeae, Trichomonas vaginalis, PCR - Swab, Vagina  -     Urine Culture - , Urine, Clean Catch  -     HCG, B-subunit, Quantitative  -     TSH Rfx On Abnormal To Free T4  -     Prolactin  -     CBC & Differential  -     US Non-ob Transvaginal    3. Family history of breast cancer in mother    4. Vaginal discharge  -     Chlamydia trachomatis, Neisseria gonorrhoeae, Trichomonas vaginalis,  PCR - Swab, Vagina    5. Pelvic pain  -     Chlamydia trachomatis, Neisseria gonorrhoeae, Trichomonas vaginalis, PCR - Swab, Vagina  -     Urine Culture - , Urine, Clean Catch  -     US Non-ob Transvaginal    6. Other specified noninflammatory disorders of vagina   -     HCG, B-subunit, Quantitative    7. Other fatigue   -     TSH Rfx On Abnormal To Free T4    1) GYN exam   Expectations reviewed,   Update pap, update MMG (mother with hx of cancer)  2) AUB/Pelvic pain   Work up for causes   Return with ultrasound to review work up and determine treatment plan   Options discussed  OCP  - can not do with smoker > 35 years of age  IUD - doubt Medicare with cover  Ablation - if reasonable?   Treat from there      Plan     1) Breast Health - Clinical breast exam yearly, Discussed American cancer society recommendations for breast cancer screening, and Self breast awareness monthly  2) Pap - updated today   3) Smoking status- cessation encouraged   4) Encouraged to be wary of information obtained via social media and internet based on source and search.  5) Follow up prn and one year.       Ernesto Mendosa MD   2/23/2022  15:04 EST

## 2022-02-24 ENCOUNTER — TELEPHONE (OUTPATIENT)
Dept: OBSTETRICS AND GYNECOLOGY | Facility: CLINIC | Age: 42
End: 2022-02-24

## 2022-02-24 LAB
BASOPHILS # BLD AUTO: 0.1 X10E3/UL (ref 0–0.2)
BASOPHILS NFR BLD AUTO: 0 %
EOSINOPHIL # BLD AUTO: 0.1 X10E3/UL (ref 0–0.4)
EOSINOPHIL NFR BLD AUTO: 0 %
ERYTHROCYTE [DISTWIDTH] IN BLOOD BY AUTOMATED COUNT: 13.1 % (ref 11.7–15.4)
HCG INTACT+B SERPL-ACNC: <1 MIU/ML
HCT VFR BLD AUTO: 34.4 % (ref 34–46.6)
HGB BLD-MCNC: 11.5 G/DL (ref 11.1–15.9)
IMM GRANULOCYTES # BLD AUTO: 0 X10E3/UL (ref 0–0.1)
IMM GRANULOCYTES NFR BLD AUTO: 0 %
LYMPHOCYTES # BLD AUTO: 2.7 X10E3/UL (ref 0.7–3.1)
LYMPHOCYTES NFR BLD AUTO: 16 %
MCH RBC QN AUTO: 28.3 PG (ref 26.6–33)
MCHC RBC AUTO-ENTMCNC: 33.4 G/DL (ref 31.5–35.7)
MCV RBC AUTO: 85 FL (ref 79–97)
MONOCYTES # BLD AUTO: 0.9 X10E3/UL (ref 0.1–0.9)
MONOCYTES NFR BLD AUTO: 5 %
NEUTROPHILS # BLD AUTO: 13 X10E3/UL (ref 1.4–7)
NEUTROPHILS NFR BLD AUTO: 79 %
PLATELET # BLD AUTO: 404 X10E3/UL (ref 150–450)
PROLACTIN SERPL-MCNC: 25.5 NG/ML (ref 4.8–23.3)
RBC # BLD AUTO: 4.06 X10E6/UL (ref 3.77–5.28)
TSH SERPL DL<=0.005 MIU/L-ACNC: 1.91 UIU/ML (ref 0.45–4.5)
WBC # BLD AUTO: 16.8 X10E3/UL (ref 3.4–10.8)

## 2022-02-24 NOTE — TELEPHONE ENCOUNTER
----- Message from Ernesto Mendosa MD sent at 2/24/2022 11:07 AM EST -----  Marilin, her blood work for abnormal uterine bleeding is negative. Prolacin level - very mildly elevated at 25.5 with normal being up to 23.3. Need to repeat fasting in AM to see if this is real. Otherwise Not anemic or pregnant, her thyroid function and platelets are normal. Please let her know. Thanks, Dr. Mendosa

## 2022-02-24 NOTE — PROGRESS NOTES
Marilin, her blood work for abnormal uterine bleeding is negative. Prolacin level - very mildly elevated at 25.5 with normal being up to 23.3. Need to repeat fasting in AM to see if this is real. Otherwise Not anemic or pregnant, her thyroid function and platelets are normal. Please let her know. Thanks, Dr. Mendosa

## 2022-02-25 ENCOUNTER — TELEPHONE (OUTPATIENT)
Dept: OBSTETRICS AND GYNECOLOGY | Facility: CLINIC | Age: 42
End: 2022-02-25

## 2022-02-25 LAB
BACTERIA UR CULT: NORMAL
BACTERIA UR CULT: NORMAL
C TRACH RRNA SPEC QL NAA+PROBE: NEGATIVE
N GONORRHOEA RRNA SPEC QL NAA+PROBE: NEGATIVE
T VAGINALIS DNA SPEC QL NAA+PROBE: NEGATIVE

## 2022-02-25 NOTE — TELEPHONE ENCOUNTER
----- Message from Ernesto Mendosa MD sent at 2/25/2022  9:40 AM EST -----  Marilin, please let her know the STD screen for Chlamydia, Gonorrhea and trichomoniasis is negative. Urine culture also negative. Thanks, Dr. Mendosa

## 2022-02-26 LAB — PROLACTIN SERPL-MCNC: 31.4 NG/ML (ref 4.8–23.3)

## 2022-02-28 LAB
CONV .: NORMAL
CYTOLOGIST CVX/VAG CYTO: NORMAL
CYTOLOGY CVX/VAG DOC CYTO: NORMAL
CYTOLOGY CVX/VAG DOC THIN PREP: NORMAL
DX ICD CODE: NORMAL
HIV 1 & 2 AB SER-IMP: NORMAL
OTHER STN SPEC: NORMAL
STAT OF ADQ CVX/VAG CYTO-IMP: NORMAL

## 2022-02-28 NOTE — PROGRESS NOTES
Marilin, message left, very mild persistent elevation in prolactin. Need to follow up about medications to see if those can do it and consider next steps (MRI of pituitary). Encouraged to call to review. Thanks, Dr. eMndosa

## 2022-03-01 ENCOUNTER — PROCEDURE VISIT (OUTPATIENT)
Dept: OBSTETRICS AND GYNECOLOGY | Facility: CLINIC | Age: 42
End: 2022-03-01

## 2022-03-01 ENCOUNTER — APPOINTMENT (OUTPATIENT)
Dept: WOMENS IMAGING | Facility: HOSPITAL | Age: 42
End: 2022-03-01

## 2022-03-01 ENCOUNTER — TELEPHONE (OUTPATIENT)
Dept: OBSTETRICS AND GYNECOLOGY | Facility: CLINIC | Age: 42
End: 2022-03-01

## 2022-03-01 DIAGNOSIS — Z12.31 VISIT FOR SCREENING MAMMOGRAM: Primary | ICD-10-CM

## 2022-03-01 PROCEDURE — 77067 SCR MAMMO BI INCL CAD: CPT | Performed by: OBSTETRICS & GYNECOLOGY

## 2022-03-01 PROCEDURE — 77063 BREAST TOMOSYNTHESIS BI: CPT | Performed by: OBSTETRICS & GYNECOLOGY

## 2022-03-01 PROCEDURE — 77063 BREAST TOMOSYNTHESIS BI: CPT | Performed by: RADIOLOGY

## 2022-03-01 PROCEDURE — 77067 SCR MAMMO BI INCL CAD: CPT | Performed by: RADIOLOGY

## 2022-03-01 NOTE — TELEPHONE ENCOUNTER
"Claudio Gaston,  Pt aware of the following message, but would like to speak to you or Dr. Mendosa regarding the note: \"Marilin, message left, very mild persistent elevation in prolactin. Need to follow up about medications to see if those can do it and consider next steps (MRI of pituitary). Encouraged to call to review. Thanks, Dr. Mendosa\"  "

## 2022-03-10 ENCOUNTER — PROCEDURE VISIT (OUTPATIENT)
Dept: NEUROLOGY | Facility: CLINIC | Age: 42
End: 2022-03-10

## 2022-03-10 DIAGNOSIS — G40.309 GENERALIZED NONCONVULSIVE EPILEPSY: Primary | ICD-10-CM

## 2022-03-10 DIAGNOSIS — E22.1 HYPERPROLACTINEMIA: ICD-10-CM

## 2022-03-10 PROCEDURE — 95970 ALYS NPGT W/O PRGRMG: CPT | Performed by: PSYCHIATRY & NEUROLOGY

## 2022-03-10 NOTE — PROGRESS NOTES
Procedure   Procedures: Vagus nerve stimulator interrogation and diagnostics.  Indication: History of seizures.  Interval history: Patient has been doing well.  No seizures since 2020.  She is still taking Aptiom and Vimpat.  This combination has worked well for her along with the VNS.  Recent labs indicate slight prolactin elevation.  Report:  Normal mode settings are as follows: Output current is set to 1.5 mA, frequency is 30 Hz, pulse width is 250 µs, on time is 30 seconds, off time is 5 minutes and duty cycle is 10%.  Auto stimulation mode settings are as follows: Output current is set to 1.75 mA, pulse width is 250 µs and on time is 60 seconds.  Magnet mode settings are as follows: Output current is set to 2.75 mA, pulse width is 250 µs and on time is 60 seconds.  Diagnostics: Output current is 1.5 mA, lead impedance is okay at 2540 ohms and the generator battery is okay at 25 to 50%.  Setting changes: There were no setting changes made today.

## 2022-03-23 ENCOUNTER — TELEPHONE (OUTPATIENT)
Dept: OBSTETRICS AND GYNECOLOGY | Facility: CLINIC | Age: 42
End: 2022-03-23

## 2022-03-23 ENCOUNTER — OFFICE VISIT (OUTPATIENT)
Dept: OBSTETRICS AND GYNECOLOGY | Facility: CLINIC | Age: 42
End: 2022-03-23

## 2022-03-23 VITALS
HEIGHT: 64 IN | SYSTOLIC BLOOD PRESSURE: 145 MMHG | BODY MASS INDEX: 26.8 KG/M2 | DIASTOLIC BLOOD PRESSURE: 90 MMHG | WEIGHT: 157 LBS | HEART RATE: 105 BPM

## 2022-03-23 DIAGNOSIS — N93.9 ABNORMAL UTERINE BLEEDING (AUB): Primary | ICD-10-CM

## 2022-03-23 DIAGNOSIS — E22.1 HYPERPROLACTINEMIA: ICD-10-CM

## 2022-03-23 PROCEDURE — 99213 OFFICE O/P EST LOW 20 MIN: CPT | Performed by: OBSTETRICS & GYNECOLOGY

## 2022-03-23 NOTE — PROGRESS NOTES
"Subjective    is a 41 y.o. female following up from [place] for AUB.   Pt saw her Neurologist, he does not want to change her medications. He does not believe these have caused an increase in her PRL. She is scheduled for an MRI on 2022.     Chief Complaint   Patient presents with   • Follow-up        HPI     41 y.o.    Menarche 13 years of age   28-30 x 4-5 days of bleeding   Heavy recently   + clots, one pack of 30 pad a cycle   Can miss social functions due to this  On disability   Work up done and here to discuss work up treatment options.     Review of Systems   Gastrointestinal: Negative for abdominal pain, constipation and diarrhea.   Genitourinary: Positive for menstrual problem, pelvic pain and vaginal discharge. Negative for vaginal bleeding.        Objective   /90   Pulse 105   Ht 162.6 cm (64\")   Wt 71.2 kg (157 lb)   Breastfeeding No   BMI 26.95 kg/m²   Physical Exam  Constitutional:       General: She is not in acute distress.     Appearance: Normal appearance. She is well-developed and normal weight.   Neck:      Thyroid: No thyromegaly.   Pulmonary:      Effort: No respiratory distress.   Abdominal:      General: Abdomen is flat. There is no distension.      Palpations: Abdomen is soft.      Tenderness: There is no abdominal tenderness.   Musculoskeletal:      Right lower leg: No edema.      Left lower leg: No edema.   Neurological:      Mental Status: She is alert and oriented to person, place, and time.   Skin:     General: Skin is warm and dry.   Psychiatric:         Behavior: Behavior normal.         Thought Content: Thought content normal.         Judgment: Judgment normal.   Vitals reviewed.        GYN ultrasound   Uterus 8 cm   EL 0.3 cm   No major cysts or masses.     Lab Results   Component Value Date    WBC 16.8 (H) 2022    HGB 11.5 2022    HCT 34.4 2022    MCV 85 2022     2022     PRL mild elevation   Thyroid normal  Not pregnant "   Urine and vaginal culture negative     Assessment/Plan   Diagnoses and all orders for this visit:    1. Abnormal uterine bleeding (AUB) (Primary)  -     Case Request; Standing  -     COVID PRE-OP / PRE-PROCEDURE SCREENING ORDER (NO ISOLATION) - Swab, Nasopharynx; Future  -     CBC and Differential; Future  -     Case Request    2. Hyperprolactinemia (HCC)    Other orders  -     Follow Anesthesia Guidelines / Standing Orders; Future  -     Chlorhexidine Skin Prep; Future    1) AUB - worsening   Overall no source seen   Treatment options reviewed.   - observation - not anemic, nothing life threatening  - OCP - not option in smoker, could do POP  - IUD - unlikely to get approved with medicare   - ablation   Wants to proceed with ablation     Management options discussed at length today including expectant, medical, and surgical. Patient elects for an endometrial ablation. Discussed high rates of satisfaction (around 90 %) but also the potential for failure and persistent heavy or painful periods with potential need for future surgery, including hysterectomy. Patient verbalized understanding. Discussed risks of surgery including bleeding, transfusion, infection, scaring, dyspareunia, irregular periods post op, post op pain, uterine perforation and potential need for surgical correction, inadvertent injury to GI/ structures and potential need for surgical correction, DVT, anesthesia complications, organ failure, nerve damage, and death. Discussed preop process and typicaly post op/recovery. Will plan hysteroscopy, D&C, ablation. Date planned 4/29/22. Patient uses tubal as her means of contraception.    2) Mild hyperprolactinemia   Per patient neurology does NOT think this is related to medication.   They ordered pituitary MRI for April   Will follow up from there.       Ernesto Mendosa MD   3/23/2022  15:45 EDT

## 2022-03-23 NOTE — TELEPHONE ENCOUNTER
----- Message from Ernesto Mendosa MD sent at 3/1/2022 12:40 PM EST -----  Marilin, she is aware of very mild prolactin elevation and recommendation to consider Pituitary MRI - On two seizure meds that are new to me and could be partially to blame as well. So she is checking with neurology to see about their thoughts on further evaluation and will get back with us. Thanks, Dr. Mendosa

## 2022-03-28 ENCOUNTER — TELEPHONE (OUTPATIENT)
Dept: NEUROLOGY | Facility: CLINIC | Age: 42
End: 2022-03-28

## 2022-03-29 ENCOUNTER — TELEPHONE (OUTPATIENT)
Dept: NEUROLOGY | Facility: CLINIC | Age: 42
End: 2022-03-29

## 2022-04-05 ENCOUNTER — TELEPHONE (OUTPATIENT)
Dept: NEUROLOGY | Facility: CLINIC | Age: 42
End: 2022-04-05

## 2022-04-05 NOTE — TELEPHONE ENCOUNTER
Caller: Alexandra Xiao    Relationship to patient: Self    Best call back number: [phone]    New or established patient?  [] New  [x] Established    Date of discharge: 04/01/22    Facility discharged from: [facility]    Diagnosis/Symptoms: STROKE    Length of stay (If applicable): 6 DAYS    Specialty Only: Did you see a [facility] provider?    [] Yes  [x] No  If so, who? PT WAS AT [facility]          PT STATES PER HER DISCHARGE FROM [facility], SHE IS TO F/U [facility]/ DR. MORENO IN 2-4 WEEKS.

## 2022-04-14 ENCOUNTER — TELEPHONE (OUTPATIENT)
Dept: NEUROLOGY | Facility: CLINIC | Age: 42
End: 2022-04-14

## 2022-04-14 NOTE — TELEPHONE ENCOUNTER
Caller: Alexandra Xiao    Relationship: Self    Best call back number: [phone]    What was the call regarding: PT CALLED STATING SHE WAS RECENTLY ADMITTED TO [facility] ON 3/26/22 FOR A STROKE. PT HAD MRI BRAIN SCAN COMPLETED DURING HER ADMISSION, HOWEVER, PT IS SCHEDULED FOR MRI BRAIN WITH [facility] ON 4/22/22.    PT IS WONDERING IF DR. MORENO WOULD STILL LIKE FOR HER TO HAVE MRI COMPLETED OR OKAY TO CANCEL? I WAS ABLE TO PULL HOSPITAL RECORDS VIA [employer]. PLEASE REVIEW.    Do you require a callback: YES, PLEASE.    PLEASE REVIEW AND ADVISE.

## 2022-04-19 ENCOUNTER — TELEPHONE (OUTPATIENT)
Dept: NEUROLOGY | Facility: CLINIC | Age: 42
End: 2022-04-19

## 2022-04-19 NOTE — TELEPHONE ENCOUNTER
Caller: ELIE     Relationship:     Best call back number: [phone]      What was the call regarding: PT WENT TO [facility] /TRANS TO  [facility] FOR SIGNS OF STROKE AND PT HAD VNS AND  THEY NEEDED TO DO MRI.  IF YOU LOOK AT CARE EVERYWHERE , IT LOOKS LIKEJUST HAD CT. BUT IF YOU SCROLL DOWN PAST THE 3 CT'S AND THE  MRI REPORT IS THERE  ELIE NEEDS TO KNOW IF DR MORENO STILL WANTS MRI   HE ORDERED OR NOT?     Do you require a callback: YES PLEASE CALL HER  TODAY REGARDING MRI DR MORENO ORDERED SINCE ALREADY HAD ONE SHE WANTS TO KNOW IF HE NEED ANOTHER.    SHE  NEEDS TO KNOW ASAP TO CANCEL OR KEEP     PLEASE ADVISE.

## 2022-04-22 ENCOUNTER — HOSPITAL ENCOUNTER (OUTPATIENT)
Dept: MRI IMAGING | Facility: HOSPITAL | Age: 42
Discharge: HOME | End: 2022-04-22
Admitting: PSYCHIATRY & NEUROLOGY

## 2022-04-22 DIAGNOSIS — E22.1 HYPERPROLACTINEMIA: ICD-10-CM

## 2022-04-22 DIAGNOSIS — G40.309 GENERALIZED NONCONVULSIVE EPILEPSY: ICD-10-CM

## 2022-04-22 PROCEDURE — 70553 MRI BRAIN STEM W/O & W/DYE: CPT

## 2022-04-22 PROCEDURE — A9577 INJ MULTIHANCE: HCPCS | Performed by: PSYCHIATRY & NEUROLOGY

## 2022-04-22 PROCEDURE — 0 GADOBENATE DIMEGLUMINE 529 MG/ML SOLUTION: Performed by: PSYCHIATRY & NEUROLOGY

## 2022-04-22 RX ADMIN — GADOBENATE DIMEGLUMINE 14 ML: 529 INJECTION, SOLUTION INTRAVENOUS at 16:36

## 2022-04-26 ENCOUNTER — HOSPITAL ENCOUNTER (INPATIENT)
Facility: HOSPITAL | Age: 42
LOS: 5 days | Discharge: HOME | End: 2022-05-01
Attending: EMERGENCY MEDICINE | Admitting: INTERNAL MEDICINE

## 2022-04-26 DIAGNOSIS — Z86.73 HISTORY OF RECENT STROKE: ICD-10-CM

## 2022-04-26 DIAGNOSIS — R93.0 ABNORMAL MRI OF HEAD: ICD-10-CM

## 2022-04-26 DIAGNOSIS — I63.9 ACUTE CVA (CEREBROVASCULAR ACCIDENT): Primary | ICD-10-CM

## 2022-04-26 LAB
ALBUMIN SERPL-MCNC: 4.1 G/DL (ref 3.5–5.2)
ALBUMIN/GLOB SERPL: 1.2 G/DL
ALP SERPL-CCNC: 170 U/L (ref 39–117)
ALT SERPL W P-5'-P-CCNC: 95 U/L (ref 1–33)
ANION GAP SERPL CALCULATED.3IONS-SCNC: 11.8 MMOL/L (ref 5–15)
APTT PPP: 27.4 SECONDS (ref 22.7–35.4)
AST SERPL-CCNC: 46 U/L (ref 1–32)
BASOPHILS # BLD AUTO: 0.07 10*3/MM3 (ref 0–0.2)
BASOPHILS NFR BLD AUTO: 0.5 % (ref 0–1.5)
BILIRUB SERPL-MCNC: 0.2 MG/DL (ref 0–1.2)
BUN SERPL-MCNC: 5 MG/DL (ref 6–20)
BUN/CREAT SERPL: 7 (ref 7–25)
CALCIUM SPEC-SCNC: 9.3 MG/DL (ref 8.6–10.5)
CHLORIDE SERPL-SCNC: 101 MMOL/L (ref 98–107)
CO2 SERPL-SCNC: 23.2 MMOL/L (ref 22–29)
CREAT SERPL-MCNC: 0.71 MG/DL (ref 0.57–1)
DEPRECATED RDW RBC AUTO: 38.1 FL (ref 37–54)
EGFRCR SERPLBLD CKD-EPI 2021: 109 ML/MIN/1.73
EOSINOPHIL # BLD AUTO: 0.3 10*3/MM3 (ref 0–0.4)
EOSINOPHIL NFR BLD AUTO: 2.3 % (ref 0.3–6.2)
ERYTHROCYTE [DISTWIDTH] IN BLOOD BY AUTOMATED COUNT: 13.8 % (ref 12.3–15.4)
GLOBULIN UR ELPH-MCNC: 3.4 GM/DL
GLUCOSE SERPL-MCNC: 111 MG/DL (ref 65–99)
HCG SERPL QL: NEGATIVE
HCT VFR BLD AUTO: 33.3 % (ref 34–46.6)
HGB BLD-MCNC: 10.9 G/DL (ref 12–15.9)
IMM GRANULOCYTES # BLD AUTO: 0.04 10*3/MM3 (ref 0–0.05)
IMM GRANULOCYTES NFR BLD AUTO: 0.3 % (ref 0–0.5)
INR PPP: 0.98 (ref 0.9–1.1)
LYMPHOCYTES # BLD AUTO: 2.58 10*3/MM3 (ref 0.7–3.1)
LYMPHOCYTES NFR BLD AUTO: 19.8 % (ref 19.6–45.3)
MCH RBC QN AUTO: 25.4 PG (ref 26.6–33)
MCHC RBC AUTO-ENTMCNC: 32.7 G/DL (ref 31.5–35.7)
MCV RBC AUTO: 77.6 FL (ref 79–97)
MONOCYTES # BLD AUTO: 0.72 10*3/MM3 (ref 0.1–0.9)
MONOCYTES NFR BLD AUTO: 5.5 % (ref 5–12)
NEUTROPHILS NFR BLD AUTO: 71.6 % (ref 42.7–76)
NEUTROPHILS NFR BLD AUTO: 9.29 10*3/MM3 (ref 1.7–7)
NRBC BLD AUTO-RTO: 0 /100 WBC (ref 0–0.2)
PLATELET # BLD AUTO: 324 10*3/MM3 (ref 140–450)
PMV BLD AUTO: 10.5 FL (ref 6–12)
POTASSIUM SERPL-SCNC: 3.7 MMOL/L (ref 3.5–5.2)
PROT SERPL-MCNC: 7.5 G/DL (ref 6–8.5)
PROTHROMBIN TIME: 12.9 SECONDS (ref 11.7–14.2)
QT INTERVAL: 342 MS
RBC # BLD AUTO: 4.29 10*6/MM3 (ref 3.77–5.28)
SARS-COV-2 ORF1AB RESP QL NAA+PROBE: NOT DETECTED
SODIUM SERPL-SCNC: 136 MMOL/L (ref 136–145)
TROPONIN T SERPL-MCNC: <0.01 NG/ML (ref 0–0.03)
WBC NRBC COR # BLD: 13 10*3/MM3 (ref 3.4–10.8)

## 2022-04-26 PROCEDURE — 84484 ASSAY OF TROPONIN QUANT: CPT | Performed by: EMERGENCY MEDICINE

## 2022-04-26 PROCEDURE — 93005 ELECTROCARDIOGRAM TRACING: CPT | Performed by: EMERGENCY MEDICINE

## 2022-04-26 PROCEDURE — U0005 INFEC AGEN DETEC AMPLI PROBE: HCPCS | Performed by: EMERGENCY MEDICINE

## 2022-04-26 PROCEDURE — 85610 PROTHROMBIN TIME: CPT | Performed by: EMERGENCY MEDICINE

## 2022-04-26 PROCEDURE — U0004 COV-19 TEST NON-CDC HGH THRU: HCPCS | Performed by: EMERGENCY MEDICINE

## 2022-04-26 PROCEDURE — 80053 COMPREHEN METABOLIC PANEL: CPT | Performed by: EMERGENCY MEDICINE

## 2022-04-26 PROCEDURE — 85025 COMPLETE CBC W/AUTO DIFF WBC: CPT | Performed by: EMERGENCY MEDICINE

## 2022-04-26 PROCEDURE — 93010 ELECTROCARDIOGRAM REPORT: CPT | Performed by: INTERNAL MEDICINE

## 2022-04-26 PROCEDURE — 84703 CHORIONIC GONADOTROPIN ASSAY: CPT | Performed by: EMERGENCY MEDICINE

## 2022-04-26 PROCEDURE — 85730 THROMBOPLASTIN TIME PARTIAL: CPT | Performed by: EMERGENCY MEDICINE

## 2022-04-26 PROCEDURE — 99284 EMERGENCY DEPT VISIT MOD MDM: CPT

## 2022-04-26 RX ORDER — ACETAMINOPHEN 650 MG/1
650 SUPPOSITORY RECTAL EVERY 4 HOURS PRN
Status: DISCONTINUED | OUTPATIENT
Start: 2022-04-26 | End: 2022-05-01 | Stop reason: HOSPADM

## 2022-04-26 RX ORDER — ATORVASTATIN CALCIUM 40 MG/1
40 TABLET, FILM COATED ORAL NIGHTLY
COMMUNITY
End: 2022-05-02 | Stop reason: SDUPTHER

## 2022-04-26 RX ORDER — ASPIRIN 81 MG/1
81 TABLET, CHEWABLE ORAL DAILY
Status: DISCONTINUED | OUTPATIENT
Start: 2022-04-27 | End: 2022-05-01

## 2022-04-26 RX ORDER — ACETAMINOPHEN 325 MG/1
650 TABLET ORAL EVERY 4 HOURS PRN
Status: DISCONTINUED | OUTPATIENT
Start: 2022-04-26 | End: 2022-05-01 | Stop reason: HOSPADM

## 2022-04-26 RX ORDER — ATORVASTATIN CALCIUM 20 MG/1
40 TABLET, FILM COATED ORAL NIGHTLY
Status: DISCONTINUED | OUTPATIENT
Start: 2022-04-26 | End: 2022-05-01 | Stop reason: HOSPADM

## 2022-04-26 RX ORDER — NITROGLYCERIN 0.4 MG/1
0.4 TABLET SUBLINGUAL
Status: DISCONTINUED | OUTPATIENT
Start: 2022-04-26 | End: 2022-05-01 | Stop reason: HOSPADM

## 2022-04-26 RX ORDER — ASPIRIN 325 MG
325 TABLET ORAL ONCE
Status: COMPLETED | OUTPATIENT
Start: 2022-04-26 | End: 2022-04-26

## 2022-04-26 RX ORDER — ASPIRIN 300 MG/1
300 SUPPOSITORY RECTAL DAILY
Status: DISCONTINUED | OUTPATIENT
Start: 2022-04-27 | End: 2022-05-01

## 2022-04-26 RX ORDER — ONDANSETRON 2 MG/ML
4 INJECTION INTRAMUSCULAR; INTRAVENOUS EVERY 6 HOURS PRN
Status: DISCONTINUED | OUTPATIENT
Start: 2022-04-26 | End: 2022-05-01 | Stop reason: HOSPADM

## 2022-04-26 RX ORDER — SODIUM CHLORIDE 0.9 % (FLUSH) 0.9 %
10 SYRINGE (ML) INJECTION AS NEEDED
Status: DISCONTINUED | OUTPATIENT
Start: 2022-04-26 | End: 2022-05-01 | Stop reason: HOSPADM

## 2022-04-26 RX ORDER — LACOSAMIDE 100 MG/1
200 TABLET ORAL EVERY 12 HOURS SCHEDULED
Status: DISCONTINUED | OUTPATIENT
Start: 2022-04-26 | End: 2022-05-01 | Stop reason: HOSPADM

## 2022-04-26 RX ORDER — SODIUM CHLORIDE, SODIUM LACTATE, POTASSIUM CHLORIDE, CALCIUM CHLORIDE 600; 310; 30; 20 MG/100ML; MG/100ML; MG/100ML; MG/100ML
75 INJECTION, SOLUTION INTRAVENOUS CONTINUOUS
Status: DISCONTINUED | OUTPATIENT
Start: 2022-04-26 | End: 2022-04-27

## 2022-04-26 RX ORDER — ONDANSETRON 4 MG/1
4 TABLET, FILM COATED ORAL EVERY 6 HOURS PRN
Status: DISCONTINUED | OUTPATIENT
Start: 2022-04-26 | End: 2022-05-01 | Stop reason: HOSPADM

## 2022-04-26 RX ORDER — HYDROCODONE BITARTRATE AND ACETAMINOPHEN 5; 325 MG/1; MG/1
1 TABLET ORAL EVERY 4 HOURS PRN
Status: DISCONTINUED | OUTPATIENT
Start: 2022-04-26 | End: 2022-05-01 | Stop reason: HOSPADM

## 2022-04-26 RX ADMIN — SODIUM CHLORIDE, POTASSIUM CHLORIDE, SODIUM LACTATE AND CALCIUM CHLORIDE 75 ML/HR: 600; 310; 30; 20 INJECTION, SOLUTION INTRAVENOUS at 21:47

## 2022-04-26 RX ADMIN — LACOSAMIDE 200 MG: 100 TABLET, FILM COATED ORAL at 22:57

## 2022-04-26 RX ADMIN — ATORVASTATIN CALCIUM 40 MG: 20 TABLET, FILM COATED ORAL at 22:57

## 2022-04-26 RX ADMIN — ASPIRIN 325 MG: 325 TABLET ORAL at 18:57

## 2022-04-27 PROBLEM — G40.909 SEIZURE DISORDER: Status: ACTIVE | Noted: 2022-04-27

## 2022-04-27 PROBLEM — Z72.0 TOBACCO ABUSE: Status: ACTIVE | Noted: 2022-04-27

## 2022-04-27 PROBLEM — R74.01 TRANSAMINITIS: Status: ACTIVE | Noted: 2022-04-27

## 2022-04-27 PROBLEM — D50.9 MICROCYTIC ANEMIA: Status: ACTIVE | Noted: 2022-04-27

## 2022-04-27 LAB
ANION GAP SERPL CALCULATED.3IONS-SCNC: 12.5 MMOL/L (ref 5–15)
BUN SERPL-MCNC: 4 MG/DL (ref 6–20)
BUN/CREAT SERPL: 7.1 (ref 7–25)
CALCIUM SPEC-SCNC: 8.9 MG/DL (ref 8.6–10.5)
CHLORIDE SERPL-SCNC: 103 MMOL/L (ref 98–107)
CHOLEST SERPL-MCNC: 110 MG/DL (ref 0–200)
CO2 SERPL-SCNC: 22.5 MMOL/L (ref 22–29)
CREAT SERPL-MCNC: 0.56 MG/DL (ref 0.57–1)
DEPRECATED RDW RBC AUTO: 40.9 FL (ref 37–54)
EGFRCR SERPLBLD CKD-EPI 2021: 117 ML/MIN/1.73
ERYTHROCYTE [DISTWIDTH] IN BLOOD BY AUTOMATED COUNT: 14.2 % (ref 12.3–15.4)
F5 GENE MUT ANL BLD/T: NORMAL
FACTOR II, DNA ANALYSIS: NORMAL
GLUCOSE BLDC GLUCOMTR-MCNC: 113 MG/DL (ref 70–130)
GLUCOSE BLDC GLUCOMTR-MCNC: 95 MG/DL (ref 70–130)
GLUCOSE SERPL-MCNC: 85 MG/DL (ref 65–99)
HAV IGM SERPL QL IA: NORMAL
HBA1C MFR BLD: 4.8 % (ref 4.8–5.6)
HBV CORE IGM SERPL QL IA: NORMAL
HBV SURFACE AG SERPL QL IA: NORMAL
HCT VFR BLD AUTO: 32.4 % (ref 34–46.6)
HCV AB SER DONR QL: NORMAL
HDLC SERPL-MCNC: 54 MG/DL (ref 40–60)
HGB BLD-MCNC: 10.4 G/DL (ref 12–15.9)
LDLC SERPL CALC-MCNC: 44 MG/DL (ref 0–100)
LDLC/HDLC SERPL: 0.86 {RATIO}
MAGNESIUM SERPL-MCNC: 2 MG/DL (ref 1.6–2.6)
MCH RBC QN AUTO: 25.4 PG (ref 26.6–33)
MCHC RBC AUTO-ENTMCNC: 32.1 G/DL (ref 31.5–35.7)
MCV RBC AUTO: 79.2 FL (ref 79–97)
PHOSPHATE SERPL-MCNC: 3.7 MG/DL (ref 2.5–4.5)
PLATELET # BLD AUTO: 330 10*3/MM3 (ref 140–450)
PMV BLD AUTO: 10.7 FL (ref 6–12)
POTASSIUM SERPL-SCNC: 3.8 MMOL/L (ref 3.5–5.2)
RBC # BLD AUTO: 4.09 10*6/MM3 (ref 3.77–5.28)
SODIUM SERPL-SCNC: 138 MMOL/L (ref 136–145)
TRIGL SERPL-MCNC: 48 MG/DL (ref 0–150)
TSH SERPL DL<=0.05 MIU/L-ACNC: 3.09 UIU/ML (ref 0.27–4.2)
VLDLC SERPL-MCNC: 12 MG/DL (ref 5–40)
WBC NRBC COR # BLD: 9.56 10*3/MM3 (ref 3.4–10.8)

## 2022-04-27 PROCEDURE — 80061 LIPID PANEL: CPT | Performed by: HOSPITALIST

## 2022-04-27 PROCEDURE — 36415 COLL VENOUS BLD VENIPUNCTURE: CPT | Performed by: HOSPITALIST

## 2022-04-27 PROCEDURE — 81241 F5 GENE: CPT | Performed by: HOSPITALIST

## 2022-04-27 PROCEDURE — 86147 CARDIOLIPIN ANTIBODY EA IG: CPT | Performed by: HOSPITALIST

## 2022-04-27 PROCEDURE — 80074 ACUTE HEPATITIS PANEL: CPT | Performed by: NURSE PRACTITIONER

## 2022-04-27 PROCEDURE — 82962 GLUCOSE BLOOD TEST: CPT

## 2022-04-27 PROCEDURE — 99222 1ST HOSP IP/OBS MODERATE 55: CPT | Performed by: NURSE PRACTITIONER

## 2022-04-27 PROCEDURE — 86038 ANTINUCLEAR ANTIBODIES: CPT | Performed by: HOSPITALIST

## 2022-04-27 PROCEDURE — 85670 THROMBIN TIME PLASMA: CPT | Performed by: HOSPITALIST

## 2022-04-27 PROCEDURE — 85730 THROMBOPLASTIN TIME PARTIAL: CPT | Performed by: HOSPITALIST

## 2022-04-27 PROCEDURE — 97110 THERAPEUTIC EXERCISES: CPT

## 2022-04-27 PROCEDURE — 81240 F2 GENE: CPT | Performed by: HOSPITALIST

## 2022-04-27 PROCEDURE — 85305 CLOT INHIBIT PROT S TOTAL: CPT | Performed by: HOSPITALIST

## 2022-04-27 PROCEDURE — 80048 BASIC METABOLIC PNL TOTAL CA: CPT | Performed by: HOSPITALIST

## 2022-04-27 PROCEDURE — 85598 HEXAGNAL PHOSPH PLTLT NEUTRL: CPT | Performed by: HOSPITALIST

## 2022-04-27 PROCEDURE — 86146 BETA-2 GLYCOPROTEIN ANTIBODY: CPT | Performed by: INTERNAL MEDICINE

## 2022-04-27 PROCEDURE — 85300 ANTITHROMBIN III ACTIVITY: CPT | Performed by: HOSPITALIST

## 2022-04-27 PROCEDURE — 97161 PT EVAL LOW COMPLEX 20 MIN: CPT

## 2022-04-27 PROCEDURE — 83036 HEMOGLOBIN GLYCOSYLATED A1C: CPT | Performed by: HOSPITALIST

## 2022-04-27 PROCEDURE — 92610 EVALUATE SWALLOWING FUNCTION: CPT

## 2022-04-27 PROCEDURE — 84100 ASSAY OF PHOSPHORUS: CPT | Performed by: HOSPITALIST

## 2022-04-27 PROCEDURE — 85303 CLOT INHIBIT PROT C ACTIVITY: CPT | Performed by: HOSPITALIST

## 2022-04-27 PROCEDURE — 85220 BLOOC CLOT FACTOR V TEST: CPT | Performed by: HOSPITALIST

## 2022-04-27 PROCEDURE — 83735 ASSAY OF MAGNESIUM: CPT | Performed by: HOSPITALIST

## 2022-04-27 PROCEDURE — 97165 OT EVAL LOW COMPLEX 30 MIN: CPT

## 2022-04-27 PROCEDURE — 85732 THROMBOPLASTIN TIME PARTIAL: CPT | Performed by: HOSPITALIST

## 2022-04-27 PROCEDURE — 85027 COMPLETE CBC AUTOMATED: CPT | Performed by: HOSPITALIST

## 2022-04-27 PROCEDURE — 85306 CLOT INHIBIT PROT S FREE: CPT | Performed by: HOSPITALIST

## 2022-04-27 PROCEDURE — 99222 1ST HOSP IP/OBS MODERATE 55: CPT | Performed by: INTERNAL MEDICINE

## 2022-04-27 PROCEDURE — 85613 RUSSELL VIPER VENOM DILUTED: CPT | Performed by: HOSPITALIST

## 2022-04-27 PROCEDURE — 84443 ASSAY THYROID STIM HORMONE: CPT | Performed by: NURSE PRACTITIONER

## 2022-04-27 PROCEDURE — 86146 BETA-2 GLYCOPROTEIN ANTIBODY: CPT | Performed by: HOSPITALIST

## 2022-04-27 PROCEDURE — 85302 CLOT INHIBIT PROT C ANTIGEN: CPT | Performed by: HOSPITALIST

## 2022-04-27 PROCEDURE — 85610 PROTHROMBIN TIME: CPT | Performed by: HOSPITALIST

## 2022-04-27 RX ORDER — NICOTINE 21 MG/24HR
1 PATCH, TRANSDERMAL 24 HOURS TRANSDERMAL
Status: DISCONTINUED | OUTPATIENT
Start: 2022-04-27 | End: 2022-05-01 | Stop reason: HOSPADM

## 2022-04-27 RX ADMIN — ATORVASTATIN CALCIUM 40 MG: 20 TABLET, FILM COATED ORAL at 21:03

## 2022-04-27 RX ADMIN — ESLICARBAZEPINE ACETATE 400 MG: 400 TABLET ORAL at 21:04

## 2022-04-27 RX ADMIN — LACOSAMIDE 200 MG: 100 TABLET, FILM COATED ORAL at 21:03

## 2022-04-27 RX ADMIN — ASPIRIN 81 MG: 81 TABLET, CHEWABLE ORAL at 08:39

## 2022-04-27 RX ADMIN — LACOSAMIDE 200 MG: 100 TABLET, FILM COATED ORAL at 08:39

## 2022-04-28 LAB
ALBUMIN SERPL-MCNC: 3.7 G/DL (ref 3.5–5.2)
ALBUMIN/GLOB SERPL: 1.2 G/DL
ALP SERPL-CCNC: 144 U/L (ref 39–117)
ALT SERPL W P-5'-P-CCNC: 69 U/L (ref 1–33)
ANA SER QL: NEGATIVE
ANION GAP SERPL CALCULATED.3IONS-SCNC: 12.1 MMOL/L (ref 5–15)
AST SERPL-CCNC: 31 U/L (ref 1–32)
BILIRUB SERPL-MCNC: <0.2 MG/DL (ref 0–1.2)
BUN SERPL-MCNC: 10 MG/DL (ref 6–20)
BUN/CREAT SERPL: 17.2 (ref 7–25)
CALCIUM SPEC-SCNC: 9 MG/DL (ref 8.6–10.5)
CHLORIDE SERPL-SCNC: 102 MMOL/L (ref 98–107)
CO2 SERPL-SCNC: 20.9 MMOL/L (ref 22–29)
CREAT SERPL-MCNC: 0.58 MG/DL (ref 0.57–1)
DEPRECATED RDW RBC AUTO: 40.4 FL (ref 37–54)
EGFRCR SERPLBLD CKD-EPI 2021: 116 ML/MIN/1.73
ERYTHROCYTE [DISTWIDTH] IN BLOOD BY AUTOMATED COUNT: 14 % (ref 12.3–15.4)
FACT V ACT/NOR PPP: 129 % (ref 70–150)
FERRITIN SERPL-MCNC: 7.74 NG/ML (ref 13–150)
FOLATE SERPL-MCNC: 4.54 NG/ML (ref 4.78–24.2)
GLOBULIN UR ELPH-MCNC: 3.1 GM/DL
GLUCOSE SERPL-MCNC: 83 MG/DL (ref 65–99)
HCT VFR BLD AUTO: 30.9 % (ref 34–46.6)
HGB BLD-MCNC: 9.9 G/DL (ref 12–15.9)
IRON 24H UR-MRATE: 18 MCG/DL (ref 37–145)
IRON SATN MFR SERPL: 4 % (ref 20–50)
MCH RBC QN AUTO: 25.4 PG (ref 26.6–33)
MCHC RBC AUTO-ENTMCNC: 32 G/DL (ref 31.5–35.7)
MCV RBC AUTO: 79.2 FL (ref 79–97)
PLATELET # BLD AUTO: 327 10*3/MM3 (ref 140–450)
PMV BLD AUTO: 10.8 FL (ref 6–12)
POTASSIUM SERPL-SCNC: 4.2 MMOL/L (ref 3.5–5.2)
PROT S AG ACT/NOR PPP IA: 89 % (ref 60–150)
PROT SERPL-MCNC: 6.8 G/DL (ref 6–8.5)
RBC # BLD AUTO: 3.9 10*6/MM3 (ref 3.77–5.28)
SODIUM SERPL-SCNC: 135 MMOL/L (ref 136–145)
TIBC SERPL-MCNC: 459 MCG/DL (ref 298–536)
TRANSFERRIN SERPL-MCNC: 308 MG/DL (ref 200–360)
VIT B12 BLD-MCNC: 864 PG/ML (ref 211–946)
WBC NRBC COR # BLD: 11.52 10*3/MM3 (ref 3.4–10.8)

## 2022-04-28 PROCEDURE — 85027 COMPLETE CBC AUTOMATED: CPT | Performed by: NURSE PRACTITIONER

## 2022-04-28 PROCEDURE — 82746 ASSAY OF FOLIC ACID SERUM: CPT | Performed by: NURSE PRACTITIONER

## 2022-04-28 PROCEDURE — 82607 VITAMIN B-12: CPT | Performed by: NURSE PRACTITIONER

## 2022-04-28 PROCEDURE — 99232 SBSQ HOSP IP/OBS MODERATE 35: CPT | Performed by: NURSE PRACTITIONER

## 2022-04-28 PROCEDURE — 83540 ASSAY OF IRON: CPT | Performed by: NURSE PRACTITIONER

## 2022-04-28 PROCEDURE — 80053 COMPREHEN METABOLIC PANEL: CPT | Performed by: NURSE PRACTITIONER

## 2022-04-28 PROCEDURE — 82728 ASSAY OF FERRITIN: CPT | Performed by: NURSE PRACTITIONER

## 2022-04-28 PROCEDURE — 84466 ASSAY OF TRANSFERRIN: CPT | Performed by: NURSE PRACTITIONER

## 2022-04-28 PROCEDURE — 99232 SBSQ HOSP IP/OBS MODERATE 35: CPT | Performed by: INTERNAL MEDICINE

## 2022-04-28 RX ADMIN — LACOSAMIDE 200 MG: 100 TABLET, FILM COATED ORAL at 21:09

## 2022-04-28 RX ADMIN — ASPIRIN 81 MG: 81 TABLET, CHEWABLE ORAL at 08:03

## 2022-04-28 RX ADMIN — LACOSAMIDE 200 MG: 100 TABLET, FILM COATED ORAL at 08:03

## 2022-04-28 RX ADMIN — ATORVASTATIN CALCIUM 40 MG: 20 TABLET, FILM COATED ORAL at 21:09

## 2022-04-28 RX ADMIN — ESLICARBAZEPINE ACETATE 400 MG: 400 TABLET ORAL at 21:09

## 2022-04-29 ENCOUNTER — APPOINTMENT (OUTPATIENT)
Dept: CARDIOLOGY | Facility: HOSPITAL | Age: 42
End: 2022-04-29

## 2022-04-29 LAB
ALBUMIN SERPL-MCNC: 4 G/DL (ref 3.5–5.2)
ALBUMIN/GLOB SERPL: 1.2 G/DL
ALP SERPL-CCNC: 145 U/L (ref 39–117)
ALT SERPL W P-5'-P-CCNC: 60 U/L (ref 1–33)
ANION GAP SERPL CALCULATED.3IONS-SCNC: 12.8 MMOL/L (ref 5–15)
AST SERPL-CCNC: 27 U/L (ref 1–32)
AT III PPP CHRO-ACNC: 125 % (ref 90–134)
B2 GLYCOPROT1 IGA SER-ACNC: <9 GPI IGA UNITS (ref 0–25)
B2 GLYCOPROT1 IGG SER-ACNC: <9 GPI IGG UNITS (ref 0–20)
B2 GLYCOPROT1 IGM SER-ACNC: <9 GPI IGM UNITS (ref 0–32)
BASOPHILS # BLD AUTO: 0.07 10*3/MM3 (ref 0–0.2)
BASOPHILS NFR BLD AUTO: 0.6 % (ref 0–1.5)
BILIRUB SERPL-MCNC: <0.2 MG/DL (ref 0–1.2)
BUN SERPL-MCNC: 11 MG/DL (ref 6–20)
BUN/CREAT SERPL: 19.6 (ref 7–25)
CALCIUM SPEC-SCNC: 9 MG/DL (ref 8.6–10.5)
CHLORIDE SERPL-SCNC: 102 MMOL/L (ref 98–107)
CO2 SERPL-SCNC: 21.2 MMOL/L (ref 22–29)
CREAT SERPL-MCNC: 0.56 MG/DL (ref 0.57–1)
CRP SERPL-MCNC: <0.3 MG/DL (ref 0–0.5)
DEPRECATED RDW RBC AUTO: 39.1 FL (ref 37–54)
EGFRCR SERPLBLD CKD-EPI 2021: 117 ML/MIN/1.73
EOSINOPHIL # BLD AUTO: 0.21 10*3/MM3 (ref 0–0.4)
EOSINOPHIL NFR BLD AUTO: 1.8 % (ref 0.3–6.2)
ERYTHROCYTE [DISTWIDTH] IN BLOOD BY AUTOMATED COUNT: 13.9 % (ref 12.3–15.4)
ERYTHROCYTE [SEDIMENTATION RATE] IN BLOOD: 26 MM/HR (ref 0–20)
GLOBULIN UR ELPH-MCNC: 3.3 GM/DL
GLUCOSE SERPL-MCNC: 86 MG/DL (ref 65–99)
HCT VFR BLD AUTO: 31.2 % (ref 34–46.6)
HGB BLD-MCNC: 10.1 G/DL (ref 12–15.9)
IMM GRANULOCYTES # BLD AUTO: 0.03 10*3/MM3 (ref 0–0.05)
IMM GRANULOCYTES NFR BLD AUTO: 0.3 % (ref 0–0.5)
LYMPHOCYTES # BLD AUTO: 3.18 10*3/MM3 (ref 0.7–3.1)
LYMPHOCYTES NFR BLD AUTO: 27.5 % (ref 19.6–45.3)
MCH RBC QN AUTO: 25.1 PG (ref 26.6–33)
MCHC RBC AUTO-ENTMCNC: 32.4 G/DL (ref 31.5–35.7)
MCV RBC AUTO: 77.6 FL (ref 79–97)
MONOCYTES # BLD AUTO: 0.77 10*3/MM3 (ref 0.1–0.9)
MONOCYTES NFR BLD AUTO: 6.7 % (ref 5–12)
NEUTROPHILS NFR BLD AUTO: 63.1 % (ref 42.7–76)
NEUTROPHILS NFR BLD AUTO: 7.3 10*3/MM3 (ref 1.7–7)
NRBC BLD AUTO-RTO: 0 /100 WBC (ref 0–0.2)
PLATELET # BLD AUTO: 349 10*3/MM3 (ref 140–450)
PMV BLD AUTO: 10.9 FL (ref 6–12)
POTASSIUM SERPL-SCNC: 3.9 MMOL/L (ref 3.5–5.2)
PROT C ACT/NOR PPP: 115 % (ref 86–163)
PROT C AG ACT/NOR PPP IA: 123 % (ref 60–150)
PROT S ACT/NOR PPP: 144 % (ref 70–127)
PROT S FREE PPP-ACNC: 119 % (ref 49–138)
PROT SERPL-MCNC: 7.3 G/DL (ref 6–8.5)
RBC # BLD AUTO: 4.02 10*6/MM3 (ref 3.77–5.28)
SODIUM SERPL-SCNC: 136 MMOL/L (ref 136–145)
WBC NRBC COR # BLD: 11.56 10*3/MM3 (ref 3.4–10.8)

## 2022-04-29 PROCEDURE — 93325 DOPPLER ECHO COLOR FLOW MAPG: CPT

## 2022-04-29 PROCEDURE — 76376 3D RENDER W/INTRP POSTPROCES: CPT

## 2022-04-29 PROCEDURE — 99232 SBSQ HOSP IP/OBS MODERATE 35: CPT | Performed by: INTERNAL MEDICINE

## 2022-04-29 PROCEDURE — 86140 C-REACTIVE PROTEIN: CPT | Performed by: NURSE PRACTITIONER

## 2022-04-29 PROCEDURE — 85652 RBC SED RATE AUTOMATED: CPT | Performed by: NURSE PRACTITIONER

## 2022-04-29 PROCEDURE — 93320 DOPPLER ECHO COMPLETE: CPT

## 2022-04-29 PROCEDURE — 85025 COMPLETE CBC W/AUTO DIFF WBC: CPT | Performed by: INTERNAL MEDICINE

## 2022-04-29 PROCEDURE — 99153 MOD SED SAME PHYS/QHP EA: CPT

## 2022-04-29 PROCEDURE — B246ZZ4 ULTRASONOGRAPHY OF RIGHT AND LEFT HEART, TRANSESOPHAGEAL: ICD-10-PCS | Performed by: INTERNAL MEDICINE

## 2022-04-29 PROCEDURE — 25010000002 FENTANYL CITRATE (PF) 50 MCG/ML SOLUTION: Performed by: INTERNAL MEDICINE

## 2022-04-29 PROCEDURE — 99233 SBSQ HOSP IP/OBS HIGH 50: CPT | Performed by: NURSE PRACTITIONER

## 2022-04-29 PROCEDURE — 25010000002 MIDAZOLAM PER 1 MG: Performed by: INTERNAL MEDICINE

## 2022-04-29 PROCEDURE — 93312 ECHO TRANSESOPHAGEAL: CPT

## 2022-04-29 PROCEDURE — 80053 COMPREHEN METABOLIC PANEL: CPT | Performed by: INTERNAL MEDICINE

## 2022-04-29 PROCEDURE — 99152 MOD SED SAME PHYS/QHP 5/>YRS: CPT

## 2022-04-29 RX ORDER — SODIUM CHLORIDE 9 MG/ML
INJECTION, SOLUTION INTRAVENOUS
Status: COMPLETED | OUTPATIENT
Start: 2022-04-29 | End: 2022-04-29

## 2022-04-29 RX ORDER — LIDOCAINE HYDROCHLORIDE 20 MG/ML
SOLUTION OROPHARYNGEAL
Status: COMPLETED | OUTPATIENT
Start: 2022-04-29 | End: 2022-04-29

## 2022-04-29 RX ORDER — FENTANYL CITRATE 50 UG/ML
INJECTION, SOLUTION INTRAMUSCULAR; INTRAVENOUS
Status: COMPLETED | OUTPATIENT
Start: 2022-04-29 | End: 2022-04-29

## 2022-04-29 RX ORDER — FOLIC ACID 1 MG/1
1 TABLET ORAL DAILY
Status: DISCONTINUED | OUTPATIENT
Start: 2022-04-29 | End: 2022-05-01 | Stop reason: HOSPADM

## 2022-04-29 RX ORDER — MIDAZOLAM HYDROCHLORIDE 1 MG/ML
INJECTION INTRAMUSCULAR; INTRAVENOUS
Status: COMPLETED | OUTPATIENT
Start: 2022-04-29 | End: 2022-04-29

## 2022-04-29 RX ORDER — FERROUS SULFATE 325(65) MG
325 TABLET ORAL
Status: DISCONTINUED | OUTPATIENT
Start: 2022-04-29 | End: 2022-05-01 | Stop reason: HOSPADM

## 2022-04-29 RX ADMIN — ESLICARBAZEPINE ACETATE 400 MG: 400 TABLET ORAL at 20:04

## 2022-04-29 RX ADMIN — SODIUM CHLORIDE 50 ML/HR: 9 INJECTION, SOLUTION INTRAVENOUS at 09:11

## 2022-04-29 RX ADMIN — BENZOCAINE, BUTAMBEN, AND TETRACAINE HYDROCHLORIDE 1 SPRAY: .028; .004; .004 AEROSOL, SPRAY TOPICAL at 09:07

## 2022-04-29 RX ADMIN — FOLIC ACID 1 MG: 1 TABLET ORAL at 13:22

## 2022-04-29 RX ADMIN — SODIUM CHLORIDE 50 ML/HR: 9 INJECTION, SOLUTION INTRAVENOUS at 10:06

## 2022-04-29 RX ADMIN — MIDAZOLAM 2 MG: 1 INJECTION INTRAMUSCULAR; INTRAVENOUS at 09:25

## 2022-04-29 RX ADMIN — MIDAZOLAM 2 MG: 1 INJECTION INTRAMUSCULAR; INTRAVENOUS at 09:19

## 2022-04-29 RX ADMIN — ATORVASTATIN CALCIUM 40 MG: 20 TABLET, FILM COATED ORAL at 20:09

## 2022-04-29 RX ADMIN — MIDAZOLAM 2 MG: 1 INJECTION INTRAMUSCULAR; INTRAVENOUS at 09:20

## 2022-04-29 RX ADMIN — LIDOCAINE HYDROCHLORIDE 10 ML: 20 SOLUTION ORAL; TOPICAL at 08:59

## 2022-04-29 RX ADMIN — ASPIRIN 81 MG: 81 TABLET, CHEWABLE ORAL at 10:43

## 2022-04-29 RX ADMIN — FERROUS SULFATE TAB 325 MG (65 MG ELEMENTAL FE) 325 MG: 325 (65 FE) TAB at 20:05

## 2022-04-29 RX ADMIN — FENTANYL CITRATE 50 MCG: 50 INJECTION INTRAMUSCULAR; INTRAVENOUS at 09:36

## 2022-04-29 RX ADMIN — FENTANYL CITRATE 50 MCG: 50 INJECTION INTRAMUSCULAR; INTRAVENOUS at 09:19

## 2022-04-29 RX ADMIN — FERROUS SULFATE TAB 325 MG (65 MG ELEMENTAL FE) 325 MG: 325 (65 FE) TAB at 13:22

## 2022-04-29 RX ADMIN — LACOSAMIDE 200 MG: 100 TABLET, FILM COATED ORAL at 10:43

## 2022-04-29 RX ADMIN — LACOSAMIDE 200 MG: 100 TABLET, FILM COATED ORAL at 20:09

## 2022-04-30 ENCOUNTER — APPOINTMENT (OUTPATIENT)
Dept: CT IMAGING | Facility: HOSPITAL | Age: 42
End: 2022-04-30

## 2022-04-30 LAB
ALBUMIN SERPL-MCNC: 4 G/DL (ref 3.5–5.2)
ALBUMIN/GLOB SERPL: 1.4 G/DL
ALP SERPL-CCNC: 138 U/L (ref 39–117)
ALT SERPL W P-5'-P-CCNC: 53 U/L (ref 1–33)
ANION GAP SERPL CALCULATED.3IONS-SCNC: 12 MMOL/L (ref 5–15)
AST SERPL-CCNC: 24 U/L (ref 1–32)
BASOPHILS # BLD AUTO: 0.07 10*3/MM3 (ref 0–0.2)
BASOPHILS NFR BLD AUTO: 0.6 % (ref 0–1.5)
BH CV ECHO MEAS - AO ROOT DIAM: 2.7 CM
BH CV ECHO MEAS - LA DIMENSION: 4.4 CM
BH CV ECHO MEAS - MR MAX PG: 95.1 MMHG
BH CV ECHO MEAS - MR MAX VEL: 487.7 CM/SEC
BH CV ECHO MEAS - MV A MAX VEL: 102.9 CM/SEC
BH CV ECHO MEAS - MV DEC SLOPE: 1221 CM/SEC2
BH CV ECHO MEAS - MV DEC TIME: 0.14 MSEC
BH CV ECHO MEAS - MV E MAX VEL: 98 CM/SEC
BH CV ECHO MEAS - MV E/A: 0.95
BH CV ECHO MEAS - MV MAX PG: 4.9 MMHG
BH CV ECHO MEAS - MV MEAN PG: 3 MMHG
BH CV ECHO MEAS - MV P1/2T: 27.4 MSEC
BH CV ECHO MEAS - MV V2 VTI: 28 CM
BH CV ECHO MEAS - MVA(P1/2T): 8 CM2
BILIRUB SERPL-MCNC: <0.2 MG/DL (ref 0–1.2)
BUN SERPL-MCNC: 10 MG/DL (ref 6–20)
BUN/CREAT SERPL: 19.2 (ref 7–25)
CALCIUM SPEC-SCNC: 8.8 MG/DL (ref 8.6–10.5)
CHLORIDE SERPL-SCNC: 107 MMOL/L (ref 98–107)
CO2 SERPL-SCNC: 20 MMOL/L (ref 22–29)
CREAT SERPL-MCNC: 0.52 MG/DL (ref 0.57–1)
DEPRECATED RDW RBC AUTO: 41.8 FL (ref 37–54)
EGFRCR SERPLBLD CKD-EPI 2021: 119.1 ML/MIN/1.73
EOSINOPHIL # BLD AUTO: 0.18 10*3/MM3 (ref 0–0.4)
EOSINOPHIL NFR BLD AUTO: 1.6 % (ref 0.3–6.2)
ERYTHROCYTE [DISTWIDTH] IN BLOOD BY AUTOMATED COUNT: 14.2 % (ref 12.3–15.4)
GLOBULIN UR ELPH-MCNC: 2.9 GM/DL
GLUCOSE SERPL-MCNC: 89 MG/DL (ref 65–99)
HCT VFR BLD AUTO: 33 % (ref 34–46.6)
HGB BLD-MCNC: 10.3 G/DL (ref 12–15.9)
IMM GRANULOCYTES # BLD AUTO: 0.04 10*3/MM3 (ref 0–0.05)
IMM GRANULOCYTES NFR BLD AUTO: 0.4 % (ref 0–0.5)
LV EF 2D ECHO EST: 60 %
LYMPHOCYTES # BLD AUTO: 3.07 10*3/MM3 (ref 0.7–3.1)
LYMPHOCYTES NFR BLD AUTO: 27.8 % (ref 19.6–45.3)
MAXIMAL PREDICTED HEART RATE: 178 BPM
MCH RBC QN AUTO: 25.3 PG (ref 26.6–33)
MCHC RBC AUTO-ENTMCNC: 31.2 G/DL (ref 31.5–35.7)
MCV RBC AUTO: 81.1 FL (ref 79–97)
MONOCYTES # BLD AUTO: 0.84 10*3/MM3 (ref 0.1–0.9)
MONOCYTES NFR BLD AUTO: 7.6 % (ref 5–12)
NEUTROPHILS NFR BLD AUTO: 6.86 10*3/MM3 (ref 1.7–7)
NEUTROPHILS NFR BLD AUTO: 62 % (ref 42.7–76)
NRBC BLD AUTO-RTO: 0 /100 WBC (ref 0–0.2)
PLATELET # BLD AUTO: 364 10*3/MM3 (ref 140–450)
PMV BLD AUTO: 10.6 FL (ref 6–12)
POTASSIUM SERPL-SCNC: 4 MMOL/L (ref 3.5–5.2)
PROT SERPL-MCNC: 6.9 G/DL (ref 6–8.5)
RBC # BLD AUTO: 4.07 10*6/MM3 (ref 3.77–5.28)
SODIUM SERPL-SCNC: 139 MMOL/L (ref 136–145)
STRESS TARGET HR: 151 BPM
WBC NRBC COR # BLD: 11.06 10*3/MM3 (ref 3.4–10.8)

## 2022-04-30 PROCEDURE — 86037 ANCA TITER EACH ANTIBODY: CPT | Performed by: PSYCHIATRY & NEUROLOGY

## 2022-04-30 PROCEDURE — 99232 SBSQ HOSP IP/OBS MODERATE 35: CPT | Performed by: INTERNAL MEDICINE

## 2022-04-30 PROCEDURE — 83520 IMMUNOASSAY QUANT NOS NONAB: CPT | Performed by: PSYCHIATRY & NEUROLOGY

## 2022-04-30 PROCEDURE — 85025 COMPLETE CBC W/AUTO DIFF WBC: CPT | Performed by: INTERNAL MEDICINE

## 2022-04-30 PROCEDURE — 0 IOPAMIDOL PER 1 ML: Performed by: INTERNAL MEDICINE

## 2022-04-30 PROCEDURE — 99233 SBSQ HOSP IP/OBS HIGH 50: CPT | Performed by: NURSE PRACTITIONER

## 2022-04-30 PROCEDURE — 80053 COMPREHEN METABOLIC PANEL: CPT | Performed by: INTERNAL MEDICINE

## 2022-04-30 PROCEDURE — 71275 CT ANGIOGRAPHY CHEST: CPT

## 2022-04-30 RX ADMIN — FERROUS SULFATE TAB 325 MG (65 MG ELEMENTAL FE) 325 MG: 325 (65 FE) TAB at 16:07

## 2022-04-30 RX ADMIN — LACOSAMIDE 200 MG: 100 TABLET, FILM COATED ORAL at 09:24

## 2022-04-30 RX ADMIN — FOLIC ACID 1 MG: 1 TABLET ORAL at 09:24

## 2022-04-30 RX ADMIN — ATORVASTATIN CALCIUM 40 MG: 20 TABLET, FILM COATED ORAL at 20:20

## 2022-04-30 RX ADMIN — ASPIRIN 81 MG: 81 TABLET, CHEWABLE ORAL at 09:24

## 2022-04-30 RX ADMIN — IOPAMIDOL 95 ML: 755 INJECTION, SOLUTION INTRAVENOUS at 09:52

## 2022-04-30 RX ADMIN — LACOSAMIDE 200 MG: 100 TABLET, FILM COATED ORAL at 20:20

## 2022-04-30 RX ADMIN — ESLICARBAZEPINE ACETATE 400 MG: 400 TABLET ORAL at 20:20

## 2022-04-30 RX ADMIN — ACETAMINOPHEN 650 MG: 325 TABLET ORAL at 09:33

## 2022-04-30 RX ADMIN — FERROUS SULFATE TAB 325 MG (65 MG ELEMENTAL FE) 325 MG: 325 (65 FE) TAB at 09:24

## 2022-04-30 RX ADMIN — ACETAMINOPHEN 650 MG: 325 TABLET ORAL at 16:07

## 2022-05-01 ENCOUNTER — READMISSION MANAGEMENT (OUTPATIENT)
Dept: CALL CENTER | Facility: HOSPITAL | Age: 42
End: 2022-05-01

## 2022-05-01 VITALS
RESPIRATION RATE: 18 BRPM | TEMPERATURE: 98.5 F | DIASTOLIC BLOOD PRESSURE: 81 MMHG | BODY MASS INDEX: 26.64 KG/M2 | SYSTOLIC BLOOD PRESSURE: 134 MMHG | HEIGHT: 64 IN | OXYGEN SATURATION: 92 % | WEIGHT: 156.02 LBS | HEART RATE: 110 BPM

## 2022-05-01 LAB
ALBUMIN SERPL-MCNC: 3.9 G/DL (ref 3.5–5.2)
ALBUMIN/GLOB SERPL: 1.4 G/DL
ALP SERPL-CCNC: 125 U/L (ref 39–117)
ALT SERPL W P-5'-P-CCNC: 50 U/L (ref 1–33)
ANION GAP SERPL CALCULATED.3IONS-SCNC: 10 MMOL/L (ref 5–15)
AST SERPL-CCNC: 28 U/L (ref 1–32)
BASOPHILS # BLD AUTO: 0.06 10*3/MM3 (ref 0–0.2)
BASOPHILS NFR BLD AUTO: 0.6 % (ref 0–1.5)
BILIRUB SERPL-MCNC: <0.2 MG/DL (ref 0–1.2)
BUN SERPL-MCNC: 7 MG/DL (ref 6–20)
BUN/CREAT SERPL: 12.7 (ref 7–25)
CALCIUM SPEC-SCNC: 8.7 MG/DL (ref 8.6–10.5)
CHLORIDE SERPL-SCNC: 102 MMOL/L (ref 98–107)
CO2 SERPL-SCNC: 22 MMOL/L (ref 22–29)
CREAT SERPL-MCNC: 0.55 MG/DL (ref 0.57–1)
DEPRECATED RDW RBC AUTO: 42.5 FL (ref 37–54)
EGFRCR SERPLBLD CKD-EPI 2021: 117.5 ML/MIN/1.73
EOSINOPHIL # BLD AUTO: 0.2 10*3/MM3 (ref 0–0.4)
EOSINOPHIL NFR BLD AUTO: 2 % (ref 0.3–6.2)
ERYTHROCYTE [DISTWIDTH] IN BLOOD BY AUTOMATED COUNT: 14.3 % (ref 12.3–15.4)
GLOBULIN UR ELPH-MCNC: 2.8 GM/DL
GLUCOSE SERPL-MCNC: 90 MG/DL (ref 65–99)
HCT VFR BLD AUTO: 31.3 % (ref 34–46.6)
HGB BLD-MCNC: 9.8 G/DL (ref 12–15.9)
IMM GRANULOCYTES # BLD AUTO: 0.04 10*3/MM3 (ref 0–0.05)
IMM GRANULOCYTES NFR BLD AUTO: 0.4 % (ref 0–0.5)
LYMPHOCYTES # BLD AUTO: 2.79 10*3/MM3 (ref 0.7–3.1)
LYMPHOCYTES NFR BLD AUTO: 27.5 % (ref 19.6–45.3)
MCH RBC QN AUTO: 25.6 PG (ref 26.6–33)
MCHC RBC AUTO-ENTMCNC: 31.3 G/DL (ref 31.5–35.7)
MCV RBC AUTO: 81.7 FL (ref 79–97)
MONOCYTES # BLD AUTO: 0.75 10*3/MM3 (ref 0.1–0.9)
MONOCYTES NFR BLD AUTO: 7.4 % (ref 5–12)
NEUTROPHILS NFR BLD AUTO: 6.32 10*3/MM3 (ref 1.7–7)
NEUTROPHILS NFR BLD AUTO: 62.1 % (ref 42.7–76)
NRBC BLD AUTO-RTO: 0 /100 WBC (ref 0–0.2)
PLATELET # BLD AUTO: 347 10*3/MM3 (ref 140–450)
PMV BLD AUTO: 10.6 FL (ref 6–12)
POTASSIUM SERPL-SCNC: 3.9 MMOL/L (ref 3.5–5.2)
PROT SERPL-MCNC: 6.7 G/DL (ref 6–8.5)
RBC # BLD AUTO: 3.83 10*6/MM3 (ref 3.77–5.28)
SODIUM SERPL-SCNC: 134 MMOL/L (ref 136–145)
WBC NRBC COR # BLD: 10.16 10*3/MM3 (ref 3.4–10.8)

## 2022-05-01 PROCEDURE — 99232 SBSQ HOSP IP/OBS MODERATE 35: CPT | Performed by: NURSE PRACTITIONER

## 2022-05-01 PROCEDURE — 80053 COMPREHEN METABOLIC PANEL: CPT | Performed by: INTERNAL MEDICINE

## 2022-05-01 PROCEDURE — 85025 COMPLETE CBC W/AUTO DIFF WBC: CPT | Performed by: INTERNAL MEDICINE

## 2022-05-01 RX ORDER — ASPIRIN 325 MG
325 TABLET ORAL DAILY
Status: DISCONTINUED | OUTPATIENT
Start: 2022-05-02 | End: 2022-05-01 | Stop reason: HOSPADM

## 2022-05-01 RX ORDER — ASPIRIN 325 MG
325 TABLET ORAL DAILY
Qty: 30 TABLET | Refills: 0 | Status: SHIPPED | OUTPATIENT
Start: 2022-05-02 | End: 2022-06-14 | Stop reason: SDUPTHER

## 2022-05-01 RX ADMIN — ACETAMINOPHEN 650 MG: 325 TABLET ORAL at 03:00

## 2022-05-01 RX ADMIN — ACETAMINOPHEN 650 MG: 325 TABLET ORAL at 08:21

## 2022-05-01 RX ADMIN — MAGNESIUM OXIDE 400 MG (241.3 MG MAGNESIUM) TABLET 400 MG: TABLET at 13:57

## 2022-05-01 RX ADMIN — ASPIRIN 81 MG: 81 TABLET, CHEWABLE ORAL at 08:16

## 2022-05-01 RX ADMIN — LACOSAMIDE 200 MG: 100 TABLET, FILM COATED ORAL at 08:16

## 2022-05-01 RX ADMIN — FERROUS SULFATE TAB 325 MG (65 MG ELEMENTAL FE) 325 MG: 325 (65 FE) TAB at 08:16

## 2022-05-01 RX ADMIN — FOLIC ACID 1 MG: 1 TABLET ORAL at 08:16

## 2022-05-01 RX ADMIN — Medication 100 MG: at 13:57

## 2022-05-01 NOTE — PROGRESS NOTES
"DOS: 2022  NAME: Alexandra Xiao   : 1980  PCP: sEau Veloz MD  Chief Complaint   Patient presents with   • Abnormal Lab     Stroke    Subjective: She still has some numbness in her right fingertips.  No new symptoms overnight, no further episodes of loss of vision.  Her  is at the bedside.    Objective:  Vital signs: /79 (BP Location: Left arm, Patient Position: Lying)   Pulse 89   Temp 98.2 °F (36.8 °C) (Oral)   Resp 16   Ht 162.6 cm (64\")   Wt 70.8 kg (156 lb 0.3 oz)   LMP 2022   SpO2 92%   BMI 26.78 kg/m²       General appearance: Well developed, well nourished,alert and cooperative.   HEENT: Normocephalic.    Neck: Supple.  Cardiac: Regular rate and rhythm.   Peripheral Vasculature: Radial pulses are equal and symmetric.  Chest Exam: Clear to auscultation bilaterally, no wheezes, no rhonchi.  Extremities: Normal, no edema.   Skin: No rashes or birthmarks.      Higher integrative function: Oriented to time, place, person, normal language. Spontaneous speech, fund of vocabulary are normal.  No neglect.  CN II: Visual fields intact.  CN III IV VI: Extraocular movements are full without nystagmus. Pupils are equal, round, and reactive to light.   CN V: Normal facial sensation.  CN VII: Facial movements are symmetric, no weakness.   CN VIII: Auditory acuity is normal.   CN IX & X: Symmetric palatal movement.   CN XI: Sternocleidomastoid and trapezius are normal. No weakness.   CN XII: The tongue is midline. No atrophy or fasciculations.   Motor: Normal muscle strength, bulk, and tone in upper and lower extremities. No fasciculations, rigidity, spasticity or abnormal movements.   Sensation: Normal light touch in arms and legs, subjective numbness in the fingertips on her right hand  Station and gait: Deferred.  Coordination: Finger to nose test showed no dysmetria.   Patient was reexamined, changes noted.    Scheduled Meds:[START ON 2022] aspirin, 325 mg, Oral, " Daily  atorvastatin, 40 mg, Oral, Nightly  Eslicarbazepine Acetate, 400 mg, Oral, Daily  ferrous sulfate, 325 mg, Oral, TID With Meals  folic acid, 1 mg, Oral, Daily  lacosamide, 200 mg, Oral, Q12H  magnesium oxide, 400 mg, Oral, Daily  nicotine, 1 patch, Transdermal, Q24H  Vitamin B-2, 100 mg, Oral, Daily      Continuous Infusions:   PRN Meds:.•  acetaminophen **OR** acetaminophen  •  HYDROcodone-acetaminophen  •  nitroglycerin  •  ondansetron **OR** ondansetron  •  [COMPLETED] Insert peripheral IV **AND** sodium chloride    Laboratory results:  Lab Results   Component Value Date    GLUCOSE 90 05/01/2022    CALCIUM 8.7 05/01/2022     (L) 05/01/2022    K 3.9 05/01/2022    CO2 22.0 05/01/2022     05/01/2022    BUN 7 05/01/2022    CREATININE 0.55 (L) 05/01/2022    BCR 12.7 05/01/2022    ANIONGAP 10.0 05/01/2022     Lab Results   Component Value Date    WBC 10.16 05/01/2022    HGB 9.8 (L) 05/01/2022    HCT 31.3 (L) 05/01/2022    MCV 81.7 05/01/2022     05/01/2022     Lab Results   Component Value Date    CHOL 110 04/27/2022     Lab Results   Component Value Date    HDL 54 04/27/2022     Lab Results   Component Value Date    LDL 44 04/27/2022     Lab Results   Component Value Date    TRIG 48 04/27/2022         Lab 04/27/22  0748   HEMOGLOBIN A1C 4.80      Review and interpretation of imaging:  CT Angiogram Chest    Result Date: 4/30/2022  CT ANGIOGRAM CHEST-  Radiation dose reduction techniques were utilized, including automated exposure control and exposure modulation based on body size.  CLINICAL INFORMATION: Takayasu's arteritis, questionable aortic thickening on NOÉ.  TECHNIQUE: Angiogram protocol CT of the chest performed with coronal, sagittal and three-dimensional reconstruction.  FINDINGS: Along the lateral aspect of the proximal third of the descending thoracic aorta there is a suggestion of nominal, eccentric wall thickening measuring 6 cm long and 3 mm wide. This could also represent subtle  "contour abnormality. The ascending thoracic aorta is normal in diameter measuring 2.7 cm, no wall thickening seen. The arch is normal in diameter measuring 1.9 cm, no wall thickening is demonstrated. The descending thoracic aorta is normal in diameter measuring 1.9 cm.  No pericardial abnormality is seen. Cardiac size within normal limits. The esophagus is satisfactory in course and caliber. Subcentimeter-sized hypodense right thyroid nodule. Small mediastinal and hilar lymph nodes, no adenopathy. Implanted electrical device within the left chest with leads extending into the left neck.  Apical bleb formation. No pleural effusion, acute airspace disease or suspicious pulmonary lesion is demonstrated.  CONCLUSION: There appears to be perhaps subtle eccentric wall thickening of the descending thoracic aorta as described above. This could also represent nominal contour abnormality. The remainder of the aorta is satisfactory in appearance. No acute pulmonary process identified.     This report was finalized on 4/30/2022 2:48 PM by Dr. Juwan Adames M.D.        Impression:  41-year-old female, former smoker, with history of epilepsy status post VNS, on Vimpat and Aptiom, migraine, significant menorrhagia, and depression who was admitted for a stroke work-up.  She had a recent admission to [facility] after she presented with complaints of right-sided numbness.  At that time she was found to have subacute left caudate infarct so she was transferred to [facility] for further work-up where she underwent a CTA head and neck which was normal and NOÉ which was \"negative for cardioembolic source.\"  She was started on aspirin 81 mg and Lipitor for secondary stroke prevention.  Following discharge she had a loop recorder placed.  She quit smoking at the time of her stroke but is vaping.  She was not on any birth control/estrogen.     She is followed by Dr. Cb James in our office for epilepsy who ordered " MRI of the pituitary to assess for pituitary mass as she recently had elevated prolactin levels.  this did not show any evidence of pituitary abnormality but did show new strokes.  Based on MRI findings she was admitted for further evaluation.  Upon presentation here she reported several episodes of vision loss in the right upper quadrant of her vision.  Due to her menorrhagia she had previously been scheduled for an ablation which was to be completed4/29 however this has been put on hold due to her strokes.     Work-up:  CTA head/neck 3/27/2022: No hemodynamically significant stenosis, focal aneurysm, or discrete vascular cutoff.  MRI brain with and without contrast 4/22/2022: 245 mm acute to subacute infarcts in the right frontal lobe within the right MCA distribution  NOÉ 4/30: EF 60%, mildly dilated left atrial cavity, no left atrial thrombus or spontaneous echo contrast, moderate mitral valve prolapse, moderate to severe mitral valve regurgitation, thickened aorta which could be related to atherosclerosis but vasculitis should also be excluded.  No mention of PFO.  Loop interrogation 4/29: No significant events  CTA chest 4/30:Perhaps subtle eccentric wall thickening in the descending thoracic aorta.  Labs: Hemoglobin A1c4.80%, TSH 3.0, folate 4.5, B12 864, hCG negative, YOUSIF panel negative.  Sedimentation rate 26, CRP less than 0.30.     Diagnoses:  Recurrent/cryptogenic strokes  Aortic thickening  Seizure disorder s/p VNS  Tobacco abuse  Menorrhagia  History of migraine  The above impression statement reviewed and changes noted.    Plan:  Increase aspirin to 325 mg daily  Follow-up hypercoagulable panel including lupus anticoagulant, and ANCA panel.  Hematology following.  Lipitor 40 mg daily continued  Continue home AEDs, Vimpat and Aptiom  Recommend no smoking or vaping, nicotine patch  Neurochecks  She does report chronic headaches even prior to her strokes.  She was taking naproxen.  Recommend no naproxen  due to risk of rebound headache/medication overuse headache.  We will start magnesium oxide and riboflavin for prophylaxis.  Discussed with Dr. Dickson today.  Neurology will sign off but please call if further questions or concerns.  I will arrange outpatient follow-up with Dr. Dickson in 4 to 6 weeks.  Please call with questions.

## 2022-05-01 NOTE — PLAN OF CARE
Problem: Adult Inpatient Plan of Care  Goal: Plan of Care Review  Outcome: Ongoing, Progressing  Flowsheets (Taken 5/1/2022 0416)  Plan of Care Reviewed With: patient  Goal: Patient-Specific Goal (Individualized)  Outcome: Ongoing, Progressing  Goal: Absence of Hospital-Acquired Illness or Injury  Outcome: Ongoing, Progressing  Intervention: Identify and Manage Fall Risk  Recent Flowsheet Documentation  Taken 5/1/2022 0400 by Ciara Wise RN  Safety Promotion/Fall Prevention: safety round/check completed  Taken 5/1/2022 0200 by Ciara Wise RN  Safety Promotion/Fall Prevention: safety round/check completed  Taken 5/1/2022 0000 by Ciara Wise RN  Safety Promotion/Fall Prevention: safety round/check completed  Taken 4/30/2022 2200 by Ciara Wise RN  Safety Promotion/Fall Prevention: safety round/check completed  Taken 4/30/2022 2000 by Ciara Wise RN  Safety Promotion/Fall Prevention: safety round/check completed  Intervention: Prevent Skin Injury  Recent Flowsheet Documentation  Taken 5/1/2022 0400 by Ciara Wise RN  Body Position: position changed independently  Taken 5/1/2022 0200 by Ciara Wise RN  Body Position: position changed independently  Taken 5/1/2022 0000 by Ciara Wise RN  Body Position: position changed independently  Taken 4/30/2022 2200 by Ciara Wise RN  Body Position: position changed independently  Taken 4/30/2022 2000 by Ciara Wise RN  Body Position: position changed independently  Skin Protection: adhesive use limited  Intervention: Prevent and Manage VTE (Venous Thromboembolism) Risk  Recent Flowsheet Documentation  Taken 4/30/2022 2000 by Ciara Wise RN  VTE Prevention/Management:   bilateral   dorsiflexion/plantar flexion performed   sequential compression devices on  Range of Motion: active ROM (range of motion) encouraged  Intervention: Prevent Infection  Recent Flowsheet Documentation  Taken 4/30/2022 2000 by Ciara Wise RN  Infection  Prevention: single patient room provided  Goal: Optimal Comfort and Wellbeing  Outcome: Ongoing, Progressing  Intervention: Provide Person-Centered Care  Recent Flowsheet Documentation  Taken 4/30/2022 2000 by Ciara Wise, RN  Trust Relationship/Rapport:   choices provided   care explained  Goal: Readiness for Transition of Care  Outcome: Ongoing, Progressing     Problem: Adjustment to Illness (Stroke, Ischemic/Transient Ischemic Attack)  Goal: Optimal Coping  Outcome: Ongoing, Progressing  Intervention: Support Psychosocial Response to Stroke  Recent Flowsheet Documentation  Taken 4/30/2022 2000 by Ciara Wise, RN  Family/Support System Care: self-care encouraged   Goal Outcome Evaluation:  Plan of Care Reviewed With: patient

## 2022-05-01 NOTE — DISCHARGE SUMMARY
[facility]    Date of Discharge:  5/1/2022    PCP: Esau Veloz MD    Discharge Diagnosis:   Active Hospital Problems    Diagnosis  POA   • **Acute CVA (cerebrovascular accident) (HCC) [I63.9]  Yes   • Microcytic anemia [D50.9]  Yes   • Transaminitis [R74.01]  Yes   • Seizure disorder (HCC) [G40.909]  Yes   • Tobacco abuse [Z72.0]  Yes      Resolved Hospital Problems   No resolved problems to display.       04/29 0946 TRANSESOPHAGEAL ECHOCARDIOGRAM (NOÉ)  Consults     Date and Time Order Name Status Description    4/27/2022 12:34 AM Inpatient Cardiology Consult      4/26/2022  9:21 PM Inpatient Hematology & Oncology Consult Completed     4/26/2022  9:21 PM Inpatient Cardiology Consult      4/26/2022  9:21 PM Inpatient Neurology Consult Stroke Completed     4/26/2022  6:18 PM [facility] (on-call MD unless specified) Details          Hospital Course  41 y.o. female with past medical history significant for seizure disorder, presented to the hospital, she has been diagnosed to have acute CVA.  These findings were confirmed on MRI of the brain.  Neurology had been following the patient.  Previous CTA of the neck, showed unremarkable bilateral carotids.  Patient already has a loop recorder, and she will follow-up with PCP and cardiology on outpatient basis.  She did undergo NOÉ study, it was done by cardiology service.  Patient also had CTA of the chest, it shows mild aortic thickening.  Again patient will follow-up with PCP for further evaluation.  Patient does have history of seizure disorder, we continued her home antiseizure medication regimen.  She also has history of anemia, her hemoglobin has remained stable during lab review here in the hospital.  Otherwise patient is clinically stable and cleared by neurology for discharge.  Total time spent on discharge management is more than 30 minutes.          Condition on Discharge: Improved.     Temp:  [97.3 °F  (36.3 °C)-98.5 °F (36.9 °C)] 98.5 °F (36.9 °C)  Heart Rate:  [] 110  Resp:  [16-18] 18  BP: (111-142)/(63-94) 134/81  Body mass index is 26.78 kg/m².    Physical Exam   General, awake and alert.  Head and ENT, normocephalic and atraumatic.  Lungs, symmetric expansion, equal air entry bilaterally.  Heart, regular rate and rhythm.  Abdomen, soft and nontender.  Extremities, no clubbing or cyanosis.  Neuro, no focal deficits.  Skin: Warm and no rash.  Psych, normal mood and affect.  Musculoskeletal, joint examination is grossly normal.     Disposition: Home or Self Care       Discharge Medications      New Medications      Instructions Start Date   aspirin 325 MG tablet   325 mg, Oral, Daily   Start Date: May 2, 2022        Continue These Medications      Instructions Start Date   Aptiom 400 MG tablet  Generic drug: Eslicarbazepine Acetate   400 mg, Oral, Daily      atorvastatin 40 MG tablet  Commonly known as: LIPITOR   40 mg, Oral, Nightly      Vimpat 200 MG tablet  Generic drug: lacosamide   200 mg, Oral, 2 Times Daily         Stop These Medications    naproxen 500 MG tablet  Commonly known as: NAPROSYN             Additional Instructions for the Follow-ups that You Need to Schedule     Discharge Follow-up with PCP   As directed       Currently Documented PCP:    Esau Veloz MD    PCP Phone Number:    [phone]     Follow Up Details: Follow-up with PCP upon discharge.         Discharge Follow-up with Specialty: Follow-up with neurology and cardiology, hematology/oncology in 2 to 3 weeks.   As directed      Specialty: Follow-up with neurology and cardiology, hematology/oncology in 2 to 3 weeks.            Follow-up Information     Esau Veloz MD .    Specialty: Family Medicine  Why: Follow-up with PCP upon discharge.  Contact information:  [address] [address]  [phone]             Priscilla Grissom MD Follow up in 3 week(s).    Specialties: Hematology and  Oncology, Internal Medicine, Oncology  Contact information:  [address] [place] [address]  [phone]                        Pending Labs     Order Current Status    ANCA Panel In process    Lupus Anticoag / Cardiolipin Ab In process         Amor Sneed MD  05/01/22  15:03 EDT    Discharge time spent greater than 30 minutes.

## 2022-05-02 RX ORDER — ATORVASTATIN CALCIUM 40 MG/1
40 TABLET, FILM COATED ORAL NIGHTLY
Qty: 30 TABLET | Refills: 4 | Status: SHIPPED | OUTPATIENT
Start: 2022-05-02 | End: 2022-06-01

## 2022-05-02 NOTE — CASE MANAGEMENT/SOCIAL WORK
Case Management Discharge Note      Final Note: home         Selected Continued Care - Discharged on 5/1/2022 Admission date: 4/26/2022 - Discharge disposition: Home or Self Care    Destination    No services have been selected for the patient.              Durable Medical Equipment    No services have been selected for the patient.              Dialysis/Infusion    No services have been selected for the patient.              Home Medical Care    No services have been selected for the patient.              Therapy    No services have been selected for the patient.              Community Resources    No services have been selected for the patient.              Community & DME    No services have been selected for the patient.                  Transportation Services  Private: Car    Final Discharge Disposition Code: 01 - home or self-care

## 2022-05-02 NOTE — OUTREACH NOTE
Prep Survey    Flowsheet Row Responses   [facility] facility patient discharged from? [facility]   Is LACE score < 7 ? No   Emergency Room discharge w/ pulse ox? No   Eligibility Readm Mgmt   Discharge diagnosis Acute CVA    Does the patient have one of the following disease processes/diagnoses(primary or secondary)? Stroke (TIA)   Does the patient have Home health ordered? No   Is there a DME ordered? No   Prep survey completed? Yes          ABHIJEET JENSEN - Registered Nurse

## 2022-05-04 ENCOUNTER — READMISSION MANAGEMENT (OUTPATIENT)
Dept: CALL CENTER | Facility: HOSPITAL | Age: 42
End: 2022-05-04

## 2022-05-04 LAB
C-ANCA TITR SER IF: NORMAL TITER
MYELOPEROXIDASE AB SER IA-ACNC: <9 U/ML (ref 0–9)
P-ANCA ATYPICAL TITR SER IF: NORMAL TITER
P-ANCA TITR SER IF: NORMAL TITER
PROTEINASE3 AB SER IA-ACNC: <3.5 U/ML (ref 0–3.5)

## 2022-05-06 ENCOUNTER — OFFICE VISIT (OUTPATIENT)
Dept: NEUROLOGY | Facility: CLINIC | Age: 42
End: 2022-05-06

## 2022-05-06 VITALS
HEART RATE: 107 BPM | HEIGHT: 64 IN | WEIGHT: 161 LBS | DIASTOLIC BLOOD PRESSURE: 96 MMHG | SYSTOLIC BLOOD PRESSURE: 146 MMHG | OXYGEN SATURATION: 93 % | BODY MASS INDEX: 27.49 KG/M2

## 2022-05-06 DIAGNOSIS — Z96.89 STATUS POST VNS (VAGUS NERVE STIMULATOR) PLACEMENT: ICD-10-CM

## 2022-05-06 DIAGNOSIS — I63.9 ACUTE CVA (CEREBROVASCULAR ACCIDENT): Primary | ICD-10-CM

## 2022-05-06 DIAGNOSIS — G40.309 GENERALIZED NONCONVULSIVE EPILEPSY: ICD-10-CM

## 2022-05-06 PROCEDURE — 99215 OFFICE O/P EST HI 40 MIN: CPT | Performed by: PSYCHIATRY & NEUROLOGY

## 2022-05-06 RX ORDER — ASPIRIN 81 MG/1
81 TABLET, COATED ORAL DAILY
COMMUNITY
Start: 2022-04-01 | End: 2022-06-08

## 2022-05-06 RX ORDER — ATORVASTATIN CALCIUM 40 MG/1
40 TABLET, FILM COATED ORAL DAILY
COMMUNITY
Start: 2022-03-28 | End: 2022-09-23

## 2022-05-06 RX ORDER — ASPIRIN 325 MG
325 TABLET, DELAYED RELEASE (ENTERIC COATED) ORAL DAILY
COMMUNITY
Start: 2022-05-01 | End: 2022-06-08

## 2022-05-06 NOTE — PROGRESS NOTES
Chief Complaint   Patient presents with   • Stroke       Patient ID: Alexandra Xiao is a 41 y.o. female.    HPI: I have had the pleasure of seeing your patient today.  As you may know she is a 41-year-old female here for the management of stroke and seizures.  She does have the vagus nerve stimulator.  She is also taking Vimpat and Aptiom.  She has not had any breakthrough seizures.  She is status post discharge from [facility] for acute stroke.  I had actually received a phone call from the neuroradiologist about her outpatient MRI image showing acute infarcts.  I did refer her for admission.  I have reviewed all reports from the hospitalization.  No particular positives with respect to her neurodiagnostic work-up.  Thus far the hyper coag labs have been negative.  She has slight transaminitis.  She is also being worked up for anemia.  No new stroke symptoms since discharge.  Her last seizure was in January 2021.    The following portions of the patient's history were reviewed and updated as appropriate: allergies, current medications, past family history, past medical history, past social history, past surgical history and problem list.    Review of Systems   Constitutional: Positive for fatigue.   HENT: Negative for dental problem, facial swelling, sinus pressure and sinus pain.    Respiratory: Negative for chest tightness and shortness of breath.    Cardiovascular: Negative for chest pain, palpitations and leg swelling.   Gastrointestinal: Negative for nausea.   Genitourinary: Positive for frequency, vaginal bleeding and vaginal pain. Negative for urgency and vaginal discharge.   Musculoskeletal: Positive for back pain and joint swelling. Negative for gait problem, neck pain and neck stiffness.   Neurological: Positive for seizures, weakness, numbness and headaches. Negative for dizziness, tremors, syncope, speech difficulty and light-headedness.   Hematological: Does not bruise/bleed easily.    Psychiatric/Behavioral: Positive for confusion. Negative for agitation, behavioral problems, decreased concentration, hallucinations, self-injury and suicidal ideas. The patient is nervous/anxious.       I have reviewed the review of systems above performed by my medical assistant.      Vitals:    22 1234   BP: 146/96   Pulse: 107   SpO2: 93%       Neurologic Exam     Mental Status   Oriented to person, place, and time.   Concentration: normal.   Level of consciousness: alert  Knowledge: consistent with education (No deficits found.).     Cranial Nerves     CN II   Visual fields full to confrontation.     CN III, IV, VI   Pupils are equal, round, and reactive to light.  Extraocular motions are normal.   CN III: no CN III palsy  CN VI: no CN VI palsy    CN V   Facial sensation intact.     CN VII   Facial expression full, symmetric.     CN VIII   CN VIII normal.     CN IX, X   CN IX normal.   CN X normal.     CN XI   CN XI normal.     CN XII   CN XII normal.     Motor Exam     Strength   Right neck flexion: 5/5  Left neck flexion: 5/5  Right neck extension: 5/5  Left neck extension: 5/5  Right deltoid: 5/5  Left deltoid: 5/5  Right biceps: 5/5  Left biceps: 5/5  Right triceps: 5/5  Left triceps: 5/5  Right wrist flexion: 5/5  Left wrist flexion: 5/5  Right wrist extension: 5/5  Left wrist extension: 5/5  Right interossei: 5/5  Left interossei: 5/5  Right abdominals: 5/5  Left abdominals: 5/5  Right iliopsoas: 5/5  Left iliopsoas: 5/5  Right quadriceps: 5/5  Left quadriceps: 5/5  Right hamstrin/5  Left hamstrin/5  Right glutei: 5/5  Left glutei: 5/5  Right anterior tibial: 5/5  Left anterior tibial: 5/5  Right posterior tibial: 5/5  Left posterior tibial: 5/5  Right peroneal: 5/5  Left peroneal: 5/5  Right gastroc: 5/5  Left gastroc: 5/5    Sensory Exam   Light touch normal.   Vibration normal.     Gait, Coordination, and Reflexes     Gait  Gait: normal    Reflexes   Right brachioradialis: 2+  Left  brachioradialis: 2+  Right biceps: 2+  Left biceps: 2+  Right triceps: 2+  Left triceps: 2+  Right patellar: 2+  Left patellar: 2+  Right achilles: 2+  Left achilles: 2+  Right : 2+  Left : 2+Station is normal.       Physical Exam  Vitals reviewed.   Constitutional:       Appearance: She is well-developed.   HENT:      Head: Normocephalic and atraumatic.   Eyes:      Extraocular Movements: EOM normal.      Pupils: Pupils are equal, round, and reactive to light.   Cardiovascular:      Rate and Rhythm: Normal rate and regular rhythm.   Pulmonary:      Breath sounds: Normal breath sounds.   Musculoskeletal:         General: Normal range of motion.   Skin:     General: Skin is warm.   Neurological:      Mental Status: She is oriented to person, place, and time.      Gait: Gait is intact.      Deep Tendon Reflexes:      Reflex Scores:       Tricep reflexes are 2+ on the right side and 2+ on the left side.       Bicep reflexes are 2+ on the right side and 2+ on the left side.       Brachioradialis reflexes are 2+ on the right side and 2+ on the left side.       Patellar reflexes are 2+ on the right side and 2+ on the left side.       Achilles reflexes are 2+ on the right side and 2+ on the left side.        Procedures    Assessment/Plan: We will continue her current antiepileptic drug therapy.  She will continue with aspirin 325 mg daily.  Continue the follow-up with cardiology for the loop recorder that has been placed.  Continue atorvastatin daily as prescribed.  We will see her back in 4 months or sooner if needed.  A total of 60 minutes was spent face-to-face with patient today.  Of that greater than 50% of this time was spent discussing signs and symptoms of stroke, seizures, patient education, plan of care and prognosis.       Diagnoses and all orders for this visit:    1. Acute CVA (cerebrovascular accident) (HCC) (Primary)    2. Generalized nonconvulsive epilepsy (HCC)    3. Status post VNS (vagus nerve  stimulator) placement           Cb James II, MD

## 2022-05-11 ENCOUNTER — TELEPHONE (OUTPATIENT)
Dept: NEUROLOGY | Facility: CLINIC | Age: 42
End: 2022-05-11

## 2022-05-11 ENCOUNTER — READMISSION MANAGEMENT (OUTPATIENT)
Dept: CALL CENTER | Facility: HOSPITAL | Age: 42
End: 2022-05-11

## 2022-05-11 NOTE — TELEPHONE ENCOUNTER
Per Dr. Dickson, this patient still needs to follow up with him for a hospital follow up for stroke.  Attempted to reach patient to schedule.  No answer.

## 2022-05-17 ENCOUNTER — TELEPHONE (OUTPATIENT)
Dept: NEUROLOGY | Facility: CLINIC | Age: 42
End: 2022-05-17

## 2022-05-17 DIAGNOSIS — G40.309 GENERALIZED NONCONVULSIVE EPILEPSY: ICD-10-CM

## 2022-05-17 NOTE — TELEPHONE ENCOUNTER
Provider: JOSH  Caller: PATIENT  Relationship to Patient: SELF  Pharmacy: [id number]  Phone Number: [phone]  Reason for Call: PATIENT TELEPHONED; SAID SHE TRIED TO GET HER VIMPAT REFILLED & PHARMACY ADVISES THAT HER INSURANCE WILL ONLY PAY FOR THE GENERIC.    PATIENT IS NOT WANTING THE GENERIC.    CAN PROVIDER RE-WRITE RX TO STATE MEDICATION IS TO BE SPECIFICALLY FOR VIMPAT & NOT GENERIC?    PATIENT ONLY HAS 2 DAYS OF MEDICATION LEFT.    PLEASE ADVISE.    THANK YOU.

## 2022-05-18 RX ORDER — LACOSAMIDE 200 MG/1
200 TABLET, FILM COATED ORAL EVERY 12 HOURS SCHEDULED
Qty: 60 TABLET | Refills: 1 | Status: SHIPPED | OUTPATIENT
Start: 2022-05-18 | End: 2022-08-15

## 2022-05-18 NOTE — TELEPHONE ENCOUNTER
Reached out to patient today.  Handly message.  Mention that I would send a prescription to her pharmacy per her request for brand-name Vimpat.

## 2022-05-19 LAB
APTT HEX PL PPP: 0 SEC
APTT IMM NP PPP: NORMAL S
APTT PPP 1:1 SALINE: NORMAL S
APTT PPP: 24.8 SEC
B2 GLYCOPROT1 IGA SER-ACNC: <10 SAU
B2 GLYCOPROT1 IGG SER-ACNC: <10 SGU
B2 GLYCOPROT1 IGM SER-ACNC: <10 SMU
CARDIOLIPIN IGG SER IA-ACNC: <10 GPL
CARDIOLIPIN IGM SER IA-ACNC: <10 MPL
CONFIRM DRVVT: NORMAL S
DRVVT SCREEN TO CONFIRM RATIO: NORMAL {RATIO}
INR PPP: 1 RATIO
LABORATORY COMMENT REPORT: NORMAL
PROTHROMBIN TIME: 10.5 SEC
SCREEN DRVVT: 34.3 SEC
THROMBIN TIME: 15.6 SEC

## 2022-05-25 ENCOUNTER — HOSPITAL ENCOUNTER (EMERGENCY)
Facility: HOSPITAL | Age: 42
Discharge: HOME | End: 2022-05-26
Attending: EMERGENCY MEDICINE | Admitting: EMERGENCY MEDICINE

## 2022-05-25 ENCOUNTER — APPOINTMENT (OUTPATIENT)
Dept: GENERAL RADIOLOGY | Facility: HOSPITAL | Age: 42
End: 2022-05-25

## 2022-05-25 DIAGNOSIS — R07.89 ATYPICAL CHEST PAIN: Primary | ICD-10-CM

## 2022-05-25 DIAGNOSIS — Z86.73 HISTORY OF CVA (CEREBROVASCULAR ACCIDENT): ICD-10-CM

## 2022-05-25 DIAGNOSIS — Z72.0 TOBACCO ABUSE: ICD-10-CM

## 2022-05-25 LAB
ALBUMIN SERPL-MCNC: 3.8 G/DL (ref 3.5–5.2)
ALBUMIN/GLOB SERPL: 1.1 G/DL
ALP SERPL-CCNC: 132 U/L (ref 39–117)
ALT SERPL W P-5'-P-CCNC: 30 U/L (ref 1–33)
ANION GAP SERPL CALCULATED.3IONS-SCNC: 8.9 MMOL/L (ref 5–15)
AST SERPL-CCNC: 21 U/L (ref 1–32)
BASOPHILS # BLD AUTO: 0.05 10*3/MM3 (ref 0–0.2)
BASOPHILS NFR BLD AUTO: 0.4 % (ref 0–1.5)
BILIRUB SERPL-MCNC: <0.2 MG/DL (ref 0–1.2)
BUN SERPL-MCNC: 5 MG/DL (ref 6–20)
BUN/CREAT SERPL: 8.8 (ref 7–25)
CALCIUM SPEC-SCNC: 9 MG/DL (ref 8.6–10.5)
CHLORIDE SERPL-SCNC: 106 MMOL/L (ref 98–107)
CO2 SERPL-SCNC: 22.1 MMOL/L (ref 22–29)
CREAT SERPL-MCNC: 0.57 MG/DL (ref 0.57–1)
D DIMER PPP FEU-MCNC: 0.43 MCGFEU/ML (ref 0–0.49)
DEPRECATED RDW RBC AUTO: 41.2 FL (ref 37–54)
EGFRCR SERPLBLD CKD-EPI 2021: 116.5 ML/MIN/1.73
EOSINOPHIL # BLD AUTO: 0.11 10*3/MM3 (ref 0–0.4)
EOSINOPHIL NFR BLD AUTO: 0.8 % (ref 0.3–6.2)
ERYTHROCYTE [DISTWIDTH] IN BLOOD BY AUTOMATED COUNT: 15.1 % (ref 12.3–15.4)
GLOBULIN UR ELPH-MCNC: 3.5 GM/DL
GLUCOSE SERPL-MCNC: 109 MG/DL (ref 65–99)
HCG SERPL QL: NEGATIVE
HCT VFR BLD AUTO: 30.6 % (ref 34–46.6)
HGB BLD-MCNC: 10 G/DL (ref 12–15.9)
IMM GRANULOCYTES # BLD AUTO: 0.04 10*3/MM3 (ref 0–0.05)
IMM GRANULOCYTES NFR BLD AUTO: 0.3 % (ref 0–0.5)
LYMPHOCYTES # BLD AUTO: 3.27 10*3/MM3 (ref 0.7–3.1)
LYMPHOCYTES NFR BLD AUTO: 24.7 % (ref 19.6–45.3)
MCH RBC QN AUTO: 24.8 PG (ref 26.6–33)
MCHC RBC AUTO-ENTMCNC: 32.7 G/DL (ref 31.5–35.7)
MCV RBC AUTO: 75.9 FL (ref 79–97)
MONOCYTES # BLD AUTO: 1 10*3/MM3 (ref 0.1–0.9)
MONOCYTES NFR BLD AUTO: 7.6 % (ref 5–12)
NEUTROPHILS NFR BLD AUTO: 66.2 % (ref 42.7–76)
NEUTROPHILS NFR BLD AUTO: 8.76 10*3/MM3 (ref 1.7–7)
NRBC BLD AUTO-RTO: 0 /100 WBC (ref 0–0.2)
NT-PROBNP SERPL-MCNC: 25.9 PG/ML (ref 0–450)
PLATELET # BLD AUTO: 399 10*3/MM3 (ref 140–450)
PMV BLD AUTO: 10.1 FL (ref 6–12)
POTASSIUM SERPL-SCNC: 3.5 MMOL/L (ref 3.5–5.2)
PROT SERPL-MCNC: 7.3 G/DL (ref 6–8.5)
RBC # BLD AUTO: 4.03 10*6/MM3 (ref 3.77–5.28)
SODIUM SERPL-SCNC: 137 MMOL/L (ref 136–145)
TROPONIN T SERPL-MCNC: <0.01 NG/ML (ref 0–0.03)
WBC NRBC COR # BLD: 13.23 10*3/MM3 (ref 3.4–10.8)

## 2022-05-25 PROCEDURE — 25010000002 KETOROLAC TROMETHAMINE PER 15 MG: Performed by: EMERGENCY MEDICINE

## 2022-05-25 PROCEDURE — 93005 ELECTROCARDIOGRAM TRACING: CPT | Performed by: EMERGENCY MEDICINE

## 2022-05-25 PROCEDURE — 71046 X-RAY EXAM CHEST 2 VIEWS: CPT

## 2022-05-25 PROCEDURE — 36415 COLL VENOUS BLD VENIPUNCTURE: CPT

## 2022-05-25 PROCEDURE — 85379 FIBRIN DEGRADATION QUANT: CPT | Performed by: EMERGENCY MEDICINE

## 2022-05-25 PROCEDURE — 93005 ELECTROCARDIOGRAM TRACING: CPT

## 2022-05-25 PROCEDURE — 93010 ELECTROCARDIOGRAM REPORT: CPT | Performed by: INTERNAL MEDICINE

## 2022-05-25 PROCEDURE — 96374 THER/PROPH/DIAG INJ IV PUSH: CPT

## 2022-05-25 PROCEDURE — 84703 CHORIONIC GONADOTROPIN ASSAY: CPT | Performed by: EMERGENCY MEDICINE

## 2022-05-25 PROCEDURE — 99284 EMERGENCY DEPT VISIT MOD MDM: CPT

## 2022-05-25 PROCEDURE — 83880 ASSAY OF NATRIURETIC PEPTIDE: CPT | Performed by: EMERGENCY MEDICINE

## 2022-05-25 PROCEDURE — 85025 COMPLETE CBC W/AUTO DIFF WBC: CPT | Performed by: EMERGENCY MEDICINE

## 2022-05-25 PROCEDURE — 84484 ASSAY OF TROPONIN QUANT: CPT | Performed by: EMERGENCY MEDICINE

## 2022-05-25 PROCEDURE — 80053 COMPREHEN METABOLIC PANEL: CPT | Performed by: EMERGENCY MEDICINE

## 2022-05-25 RX ORDER — SODIUM CHLORIDE 0.9 % (FLUSH) 0.9 %
10 SYRINGE (ML) INJECTION AS NEEDED
Status: DISCONTINUED | OUTPATIENT
Start: 2022-05-25 | End: 2022-05-26 | Stop reason: HOSPADM

## 2022-05-25 RX ORDER — KETOROLAC TROMETHAMINE 15 MG/ML
15 INJECTION, SOLUTION INTRAMUSCULAR; INTRAVENOUS ONCE
Status: COMPLETED | OUTPATIENT
Start: 2022-05-25 | End: 2022-05-25

## 2022-05-25 RX ADMIN — SODIUM CHLORIDE, POTASSIUM CHLORIDE, SODIUM LACTATE AND CALCIUM CHLORIDE 500 ML: 600; 310; 30; 20 INJECTION, SOLUTION INTRAVENOUS at 22:34

## 2022-05-25 RX ADMIN — KETOROLAC TROMETHAMINE 15 MG: 15 INJECTION, SOLUTION INTRAMUSCULAR; INTRAVENOUS at 22:26

## 2022-05-26 VITALS
DIASTOLIC BLOOD PRESSURE: 87 MMHG | OXYGEN SATURATION: 97 % | HEART RATE: 87 BPM | BODY MASS INDEX: 26.16 KG/M2 | RESPIRATION RATE: 18 BRPM | SYSTOLIC BLOOD PRESSURE: 144 MMHG | WEIGHT: 157 LBS | HEIGHT: 65 IN | TEMPERATURE: 98.7 F

## 2022-05-26 LAB
QT INTERVAL: 333 MS
TROPONIN T SERPL-MCNC: <0.01 NG/ML (ref 0–0.03)

## 2022-05-26 NOTE — ED TRIAGE NOTES
Pt to ED from home with c/o R sided CP starting this AM.  Pt also reports SOA.  Family reports hx of 2 CVAs recently, with loop recorder placed.    Pt wearing mask, staff wearing appropriate PPE.

## 2022-05-26 NOTE — ED PROVIDER NOTES
EMERGENCY DEPARTMENT ENCOUNTER    Room Number:  17/17  Date of encounter:  5/26/2022  PCP: Esau Veloz MD  Historian: Patient     I used full protective equipment while examining this patient.  This includes face mask, gloves and protective eyewear.  I washed my hands before entering the room and immediately upon leaving the room.  Patient was wearing a surgical mask.      HPI:  Chief Complaint: Chest pain  A complete HPI/ROS/PMH/PSH/SH/FH are unobtainable due to: None    Context: Alexandra Xiao is a 41 y.o. female who presents to the ED c/o constant right-sided chest pain since she woke up at 9 AM today.  Pain is described as pressure-like.  It does not radiate.  Nothing makes it better or worse.  Pain is currently moderate in intensity.  She reports mild shortness of breath and sweating.  Denies nausea, vomiting, palpitations, cough, dizziness, syncope, leg pain, leg swelling, abdominal pain, or fever.  Patient has a history of CVA and family history of CAD.      PAST MEDICAL HISTORY  Active Ambulatory Problems     Diagnosis Date Noted   • Sebaceous cyst of breast, right 11/06/2018   • Abnormal uterine bleeding 03/23/2022   • Acute CVA (cerebrovascular accident) (HCC) 04/26/2022   • Microcytic anemia 04/27/2022   • Transaminitis 04/27/2022   • Epilepsy ([facility]) 04/27/2022   • Tobacco abuse 04/27/2022   • Major depressive disorder 01/01/2014     Resolved Ambulatory Problems     Diagnosis Date Noted   • No Resolved Ambulatory Problems     Past Medical History:   Diagnosis Date   • At high risk for seizures    • Depression    • Headache, tension-type    • Memory loss    • Migraine          PAST SURGICAL HISTORY  Past Surgical History:   Procedure Laterality Date   • BREAST BIOPSY     • FOOT SURGERY      pins in left foot    • OTHER SURGICAL HISTORY      VNS plate 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   • NOÉ      04/2022   • TUBAL ABDOMINAL LIGATION     • VAGUS NERVE STIMULATOR IMPLANTATION            FAMILY HISTORY  Family History   Problem Relation Age of Onset   • Breast cancer Mother 50         age 60 METS   • Arthritis Mother    • Arthritis Father    • Diabetes Father    • Heart disease Father    • Ovarian cancer Neg Hx    • Uterine cancer Neg Hx    • Colon cancer Neg Hx    • Deep vein thrombosis Neg Hx    • Pulmonary embolism Neg Hx          SOCIAL HISTORY  Social History     Socioeconomic History   • Marital status: Single   • Number of children: 2   Tobacco Use   • Smoking status: Former Smoker     Packs/day: 1.00     Types: Cigarettes   • Smokeless tobacco: Never Used   • Tobacco comment: Quit 2022   Vaping Use   • Vaping Use: Every day   • Substances: Nicotine   • Devices: Disposable   Substance and Sexual Activity   • Alcohol use: No     Comment: caffeine 3 8 oZ rebulls daily    • Drug use: No   • Sexual activity: Yes     Partners: Male     Birth control/protection: Tubal ligation         ALLERGIES  Patient has no known allergies.       REVIEW OF SYSTEMS  Review of Systems      All systems have been reviewed and are negative except as as discussed in the HPI    PHYSICAL EXAM    I have reviewed the triage vital signs and nursing notes.    ED Triage Vitals   Temp Heart Rate Resp BP SpO2   22   98.7 °F (37.1 °C) (!) 135 18 141/85 98 %      Temp src Heart Rate Source Patient Position BP Location FiO2 (%)   22 -- 22 --   Tympanic  Sitting Right arm        Physical Exam  GENERAL: Awake, alert, oriented x3.  Well-developed, well-nourished female who appears older than stated age.  Resting comfortably no acute distress.  HENT: NCAT, nares patent, moist mucous membranes  NECK: supple  EYES: Extraocular muscles intact, no scleral icterus  CV: regular rhythm, tachycardic, equal radial pulses bilaterally  RESPIRATORY: normal effort, clear to auscultation bilaterally  ABDOMEN: soft,  nontender  MUSCULOSKELETAL: Chest is nontender.  Extremities are nontender and without obvious deformity.  There is no calf tenderness or pedal edema  NEURO: Speech is normal.  No facial droop.  Follows commands.  SKIN: warm, dry, no rash  PSYCH: Normal mood and affect      LAB RESULTS  Recent Results (from the past 24 hour(s))   ECG 12 Lead    Collection Time: 05/25/22  9:13 PM   Result Value Ref Range    QT Interval 333 ms   Comprehensive Metabolic Panel    Collection Time: 05/25/22 10:03 PM    Specimen: Blood   Result Value Ref Range    Glucose 109 (H) 65 - 99 mg/dL    BUN 5 (L) 6 - 20 mg/dL    Creatinine 0.57 0.57 - 1.00 mg/dL    Sodium 137 136 - 145 mmol/L    Potassium 3.5 3.5 - 5.2 mmol/L    Chloride 106 98 - 107 mmol/L    CO2 22.1 22.0 - 29.0 mmol/L    Calcium 9.0 8.6 - 10.5 mg/dL    Total Protein 7.3 6.0 - 8.5 g/dL    Albumin 3.80 3.50 - 5.20 g/dL    ALT (SGPT) 30 1 - 33 U/L    AST (SGOT) 21 1 - 32 U/L    Alkaline Phosphatase 132 (H) 39 - 117 U/L    Total Bilirubin <0.2 0.0 - 1.2 mg/dL    Globulin 3.5 gm/dL    A/G Ratio 1.1 g/dL    BUN/Creatinine Ratio 8.8 7.0 - 25.0    Anion Gap 8.9 5.0 - 15.0 mmol/L    eGFR 116.5 >60.0 mL/min/1.73   hCG, Serum, Qualitative    Collection Time: 05/25/22 10:03 PM    Specimen: Blood   Result Value Ref Range    HCG Qualitative Negative Negative   BNP    Collection Time: 05/25/22 10:03 PM    Specimen: Blood   Result Value Ref Range    proBNP 25.9 0.0 - 450.0 pg/mL   D-dimer, Quantitative    Collection Time: 05/25/22 10:03 PM    Specimen: Blood   Result Value Ref Range    D-Dimer, Quantitative 0.43 0.00 - 0.49 MCGFEU/mL   Troponin    Collection Time: 05/25/22 10:03 PM    Specimen: Blood   Result Value Ref Range    Troponin T <0.010 0.000 - 0.030 ng/mL   CBC Auto Differential    Collection Time: 05/25/22 10:03 PM    Specimen: Blood   Result Value Ref Range    WBC 13.23 (H) 3.40 - 10.80 10*3/mm3    RBC 4.03 3.77 - 5.28 10*6/mm3    Hemoglobin 10.0 (L) 12.0 - 15.9 g/dL    Hematocrit  30.6 (L) 34.0 - 46.6 %    MCV 75.9 (L) 79.0 - 97.0 fL    MCH 24.8 (L) 26.6 - 33.0 pg    MCHC 32.7 31.5 - 35.7 g/dL    RDW 15.1 12.3 - 15.4 %    RDW-SD 41.2 37.0 - 54.0 fl    MPV 10.1 6.0 - 12.0 fL    Platelets 399 140 - 450 10*3/mm3    Neutrophil % 66.2 42.7 - 76.0 %    Lymphocyte % 24.7 19.6 - 45.3 %    Monocyte % 7.6 5.0 - 12.0 %    Eosinophil % 0.8 0.3 - 6.2 %    Basophil % 0.4 0.0 - 1.5 %    Immature Grans % 0.3 0.0 - 0.5 %    Neutrophils, Absolute 8.76 (H) 1.70 - 7.00 10*3/mm3    Lymphocytes, Absolute 3.27 (H) 0.70 - 3.10 10*3/mm3    Monocytes, Absolute 1.00 (H) 0.10 - 0.90 10*3/mm3    Eosinophils, Absolute 0.11 0.00 - 0.40 10*3/mm3    Basophils, Absolute 0.05 0.00 - 0.20 10*3/mm3    Immature Grans, Absolute 0.04 0.00 - 0.05 10*3/mm3    nRBC 0.0 0.0 - 0.2 /100 WBC   Troponin    Collection Time: 05/25/22 11:58 PM    Specimen: Blood   Result Value Ref Range    Troponin T <0.010 0.000 - 0.030 ng/mL       Ordered the above labs and independently reviewed the results.      RADIOLOGY  XR Chest 2 View    Result Date: 5/25/2022  PA AND LATERAL CHEST  CLINICAL HISTORY: Chest pain  The lungs are well-expanded and appear free of infiltrates. There are no pleural effusions. There is no pneumothorax. The cardiomediastinal silhouette is unremarkable.  IMPRESSIONS: No evidence of active disease within the chest.  This report was finalized on 5/25/2022 10:24 PM by Dr. Abebe Foreman M.D.        I ordered the above noted radiological studies. Reviewed by me and discussed with radiologist.  See dictation for official radiology interpretation.      PROCEDURES  Procedures      MEDICATIONS GIVEN IN ER    Medications   sodium chloride 0.9 % flush 10 mL (has no administration in time range)   ketorolac (TORADOL) injection 15 mg (15 mg Intravenous Given 5/25/22 2226)   lactated ringers bolus 500 mL (0 mL Intravenous Stopped 5/25/22 2330)         PROGRESS, DATA ANALYSIS, CONSULTS, AND MEDICAL DECISION MAKING    All labs have been  independently reviewed by me.  All radiology studies have been reviewed by me and discussed with radiologist dictating the report.   EKG's independently viewed and interpreted by me.  I have reviewed the nurse's notes, vital signs, past medical history, and medication list.  Discussion below represents my analysis of pertinent findings related to patient's condition, differential diagnosis, treatment plan and final disposition.      ED Course as of 05/26/22 0158   Wed May 25, 2022   2144 Old records reviewed.  Patient was admitted here earlier this month for acute CVA.  CTA of the chest showed mild aortic thickening.  She has a loop recorder.  NOÉ showed an LVEF of 60%.  There was moderate mitral valve prolapse and severe mitral valve regurgitation.  Patient was discharged on aspirin 325 mg daily. [WH]   2150 EKG          EKG time: 2113  Rhythm/Rate: Sinus tachycardia, rate 111  P waves and CO: LAE, normal  QRS, axis: Normal  ST and T waves: Normal    Interpreted Contemporaneously by me at 2117, independently viewed  EKG is not significantly changed compared to prior EKG done on 4/26/2022   [WH]   2151 HEART SCORE:    History #1  (Highly suspicious 2, Moderately suspicious 1, Slightly or non-suspicious 0)    ECG #0  (Significant ST depression 2,  Nonspecific repol disturbance 1, Normal 0)    Age #0  (> or = 65 2, 46-65 1,  < or = 45 0)    Risk factors #2  (hypercholesterolemia, HTN, DM, smoking, pos fam hx, obesity)  (> or = to 3 RF 2, 1 or 2 1, No risk factors 0)    Troponin #0  (> or = 3x normal limit 2, 1-3x normal limit 1, < or = Normal limit 0)    HEART Score is 3 (assuming troponin is negative) [WH]   2152 Patient's chest pain is somewhat atypical.  EKG is unchanged.  Will obtain labs and chest x-ray for further evaluation.    DDx includes but is not limited to acute coronary syndrome, pulmonary embolism, thoracic aortic dissection, pneumonia, pneumothorax, musculoskeletal pain, GERD or esophageal spasm,  anxiety, myocarditis/pericarditis, esophageal rupture, pancreatitis.  []   2235 Hemoglobin(!): 10.0  Stable [WH]   2312 D-Dimer, Quant: 0.43 [WH]   2312 Troponin T: <0.010 [WH]   2312 Chest x-ray is negative acute. []   Thu May 26, 2022   0031 Troponin T: <0.010  Repeat troponin is negative []   0041 Test results were discussed with the patient.  She is resting comfortably and reports that her chest pain has improved.  Heart rate is in the 80s.  O2 sats are 97% on room air.  Patient's work-up is unremarkable.  Troponin is negative x2.  Heart score is 3.  EKG is unchanged.  Chest pain is atypical.  Dimer is negative.  Patient will be discharged.  Return precautions were discussed. []      ED Course User Index  [] eDnnis Rahman MD       AS OF 01:58 EDT VITALS:    BP - 144/87  HR - 87  TEMP - 98.7 °F (37.1 °C) (Tympanic)  O2 SATS - 97%      DIAGNOSIS  Final diagnoses:   Atypical chest pain   Tobacco abuse   History of CVA (cerebrovascular accident)         DISPOSITION  DISCHARGE    Patient discharged in stable condition.    Reviewed implications of results, diagnosis, meds, responsibility to follow up, warning signs and symptoms of possible worsening, potential complications and reasons to return to ER, including worsening or persistent pain, shortness of breath, pain radiating to neck/jaw/arm, sweating, palpitations, dizziness, fever, or other concern..    Patient/Family voiced understanding of above instructions.    Discussed plan for discharge, as there is no emergent indication for admission. Patient referred to primary care provider for BP management due to today's BP. Pt/family is agreeable and understands need for follow up and repeat testing.  Pt is aware that discharge does not mean that nothing is wrong but it indicates no emergency is present that requires admission and they must continue care with follow-up as given below or physician of their choice.     FOLLOW-UP  Esau Veloz.,  MD  [address] [id number]  [phone]    Schedule an appointment as soon as possible for a visit            Medication List      No changes were made to your prescriptions during this visit.           Dictated utilizing Dragon dictation     Dennis Rahman MD  05/26/22 0159

## 2022-05-26 NOTE — DISCHARGE INSTRUCTIONS
Follow-up with your primary care doctor soon as possible.  Return to emergency department for worsening or persistent pain, shortness of breath, nausea, vomiting, sweating, dizziness, fainting, or other concern.

## 2022-06-08 ENCOUNTER — PRE-ADMISSION TESTING (OUTPATIENT)
Dept: PREADMISSION TESTING | Facility: HOSPITAL | Age: 42
End: 2022-06-08

## 2022-06-08 VITALS
HEIGHT: 64 IN | HEART RATE: 106 BPM | SYSTOLIC BLOOD PRESSURE: 140 MMHG | TEMPERATURE: 98 F | RESPIRATION RATE: 18 BRPM | OXYGEN SATURATION: 99 % | BODY MASS INDEX: 27.37 KG/M2 | DIASTOLIC BLOOD PRESSURE: 94 MMHG | WEIGHT: 160.3 LBS

## 2022-06-08 DIAGNOSIS — N93.9 ABNORMAL UTERINE BLEEDING (AUB): ICD-10-CM

## 2022-06-08 LAB
HCG SERPL QL: NEGATIVE
SARS-COV-2 ORF1AB RESP QL NAA+PROBE: NOT DETECTED

## 2022-06-08 PROCEDURE — U0004 COV-19 TEST NON-CDC HGH THRU: HCPCS

## 2022-06-08 PROCEDURE — C9803 HOPD COVID-19 SPEC COLLECT: HCPCS

## 2022-06-08 PROCEDURE — 36415 COLL VENOUS BLD VENIPUNCTURE: CPT

## 2022-06-08 PROCEDURE — 84703 CHORIONIC GONADOTROPIN ASSAY: CPT

## 2022-06-08 PROCEDURE — U0005 INFEC AGEN DETEC AMPLI PROBE: HCPCS

## 2022-06-08 NOTE — DISCHARGE INSTRUCTIONS
Arrive to hospital on your day of surgery at 11AM    Take the following medications the morning of surgery:  VIMPAT AND APTIOM      If you are on prescription narcotic pain medication to control your pain you may also take that medication the morning of surgery.    General Instructions:  Do not eat solid food after midnight the night before surgery.  You may drink clear liquids day of surgery but must stop at least one hour before your hospital arrival time.  It is beneficial for you to have a clear drink that contains carbohydrates the day of surgery.  We suggest a 12 to 20 ounce bottle of Gatorade or Powerade for non-diabetic patients or a 12 to 20 ounce bottle of G2 or Powerade Zero for diabetic patients. (Pediatric patients, are not advised to drink a 12 to 20 ounce carbohydrate drink)    Clear liquids are liquids you can see through.  Nothing red in color.     Plain water                               Sports drinks  Sodas                                   Gelatin (Jell-O)  Fruit juices without pulp such as white grape juice and apple juice  Popsicles that contain no fruit or yogurt  Tea or coffee (no cream or milk added)  Gatorade / Powerade  G2 / Powerade Zero    Infants may have breast milk up to four hours before surgery.  Infants drinking formula may drink formula up to six hours before surgery.   Patients who avoid smoking, chewing tobacco and alcohol for 4 weeks prior to surgery have a reduced risk of post-operative complications.  Quit smoking as many days before surgery as you can.  Do not smoke, use chewing tobacco or drink alcohol the day of surgery.   If applicable bring your C-PAP/ BI-PAP machine.  Bring any papers given to you in the doctor’s office.  Wear clean comfortable clothes.  Do not wear contact lenses, false eyelashes or make-up.  Bring a case for your glasses.   Bring crutches or walker if applicable.  Remove all piercings.  Leave jewelry and any other valuables at home.  Hair extensions  with metal clips must be removed prior to surgery.  The Pre-Admission Testing nurse will instruct you to bring medications if unable to obtain an accurate list in Pre-Admission Testing.        If you were given a blood bank ID arm band remember to bring it with you the day of surgery.    Preventing a Surgical Site Infection:  For 2 to 3 days before surgery, avoid shaving with a razor because the razor can irritate skin and make it easier to develop an infection.    Any areas of open skin can increase the risk of a post-operative wound infection by allowing bacteria to enter and travel throughout the body.  Notify your surgeon if you have any skin wounds / rashes even if it is not near the expected surgical site.  The area will need assessed to determine if surgery should be delayed until it is healed.  The night prior to surgery shower using a fresh bar of anti-bacterial soap (such as Dial) and clean washcloth.  Sleep in a clean bed with clean clothing.  Do not allow pets to sleep with you.  Shower on the morning of surgery using a fresh bar of anti-bacterial soap (such as Dial) and clean washcloth.  Dry with a clean towel and dress in clean clothing.  Ask your surgeon if you will be receiving antibiotics prior to surgery.  Make sure you, your family, and all healthcare providers clean their hands with soap and water or an alcohol based hand  before caring for you or your wound.    Day of surgery:  Your arrival time is approximately two hours before your scheduled surgery time.  Upon arrival, a Pre-op nurse and Anesthesiologist will review your health history, obtain vital signs, and answer questions you may have.  The only belongings needed at this time will be a list of your home medications and if applicable your C-PAP/BI-PAP machine.  A Pre-op nurse will start an IV and you may receive medication in preparation for surgery, including something to help you relax.     Please be aware that surgery does  come with discomfort.  We want to make every effort to control your discomfort so please discuss any uncontrolled symptoms with your nurse.   Your doctor will most likely have prescribed pain medications.      If you are going home after surgery you will receive individualized written care instructions before being discharged.  A responsible adult must drive you to and from the hospital on the day of your surgery and stay with you for 24 hours.  Discharge prescriptions can be filled by the hospital pharmacy during regular pharmacy hours.  If you are having surgery late in the day/evening your prescription may be e-prescribed to your pharmacy.  Please verify your pharmacy hours or chose a 24 hour pharmacy to avoid not having access to your prescription because your pharmacy has closed for the day.    If you are staying overnight following surgery, you will be transported to your hospital room following the recovery period.  [facility] has all private rooms.    If you have any questions please call Pre-Admission Testing at [phone].  Deductibles and co-payments are collected on the day of service. Please be prepared to pay the required co-pay, deductible or deposit on the day of service as defined by your plan.    Patient Education for Self-Quarantine Process    Following your COVID testing, we strongly recommend that you wear a mask when you are with other people and practice social distancing.   Limit your activities to only required outings.  Wash your hands with soap and water frequently for at least 20 seconds.   Avoid touching your eyes, nose and mouth with unwashed hands.  Do not share anything - utensils, drinking glasses, food from the same bowl.   Sanitize household surfaces daily. Include all high touch areas (door handles, light switches, phones, countertops, etc.)    Call your surgeon immediately if you experience any of the following symptoms:  Sore Throat  Shortness of Breath or  difficulty breathing  Cough  Chills  Body soreness or muscle pain  Headache  Fever  New loss of taste or smell  Do not arrive for your surgery ill.  Your procedure will need to be rescheduled to another time.  You will need to call your physician before the day of surgery to avoid any unnecessary exposure to hospital staff as well as other patients.     CHLORHEXIDINE CLOTH INSTRUCTIONS  The morning of surgery follow these instructions using the Chlorhexidine cloths you've been given.  These steps reduce bacteria on the body.  Do not use the cloths near your eyes, ears mouth, genitalia or on open wounds.  Throw the cloths away after use but do not try to flush them down a toilet.      Open and remove one cloth at a time from the package.    Leave the cloth unfolded and begin the bathing.  Massage the skin with the cloths using gentle pressure to remove bacteria.  Do not scrub harshly.   Follow the steps below with one 2% CHG cloth per area (6 total cloths).  One cloth for neck, shoulders and chest.  One cloth for both arms, hands, fingers and underarms (do underarms last).  One cloth for the abdomen followed by groin.  One cloth for right leg and foot including between the toes.  One cloth for left leg and foot including between the toes.  The last cloth is to be used for the back of the neck, back and buttocks.    Allow the CHG to air dry 3 minutes on the skin which will give it time to work and decrease the chance of irritation.  The skin may feel sticky until it is dry.  Do not rinse with water or any other liquid or you will lose the beneficial effects of the CHG.  If mild skin irritation occurs, do rinse the skin to remove the CHG.  Report this to the nurse at time of admission.  Do not apply lotions, creams, ointments, deodorants or perfumes after using the clothes. Dress in clean clothes before coming to the hospital.

## 2022-06-10 ENCOUNTER — ANESTHESIA EVENT (OUTPATIENT)
Dept: PERIOP | Facility: HOSPITAL | Age: 42
End: 2022-06-10

## 2022-06-10 ENCOUNTER — ANESTHESIA (OUTPATIENT)
Dept: PERIOP | Facility: HOSPITAL | Age: 42
End: 2022-06-10

## 2022-06-10 ENCOUNTER — HOSPITAL ENCOUNTER (OUTPATIENT)
Facility: HOSPITAL | Age: 42
Setting detail: HOSPITAL OUTPATIENT SURGERY
Discharge: HOME | End: 2022-06-10
Attending: OBSTETRICS & GYNECOLOGY | Admitting: OBSTETRICS & GYNECOLOGY

## 2022-06-10 VITALS
HEART RATE: 89 BPM | HEIGHT: 65 IN | OXYGEN SATURATION: 97 % | BODY MASS INDEX: 27.03 KG/M2 | RESPIRATION RATE: 16 BRPM | WEIGHT: 162.26 LBS | DIASTOLIC BLOOD PRESSURE: 81 MMHG | TEMPERATURE: 98 F | SYSTOLIC BLOOD PRESSURE: 134 MMHG

## 2022-06-10 DIAGNOSIS — N93.9 ABNORMAL UTERINE BLEEDING (AUB): ICD-10-CM

## 2022-06-10 PROCEDURE — 25010000002 FENTANYL CITRATE (PF) 50 MCG/ML SOLUTION: Performed by: NURSE ANESTHETIST, CERTIFIED REGISTERED

## 2022-06-10 PROCEDURE — 58563 HYSTEROSCOPY ABLATION: CPT | Performed by: OBSTETRICS & GYNECOLOGY

## 2022-06-10 PROCEDURE — 25010000002 MIDAZOLAM PER 1 MG: Performed by: ANESTHESIOLOGY

## 2022-06-10 PROCEDURE — 25010000002 ONDANSETRON PER 1 MG: Performed by: NURSE ANESTHETIST, CERTIFIED REGISTERED

## 2022-06-10 PROCEDURE — 25010000002 PROPOFOL 10 MG/ML EMULSION: Performed by: NURSE ANESTHETIST, CERTIFIED REGISTERED

## 2022-06-10 PROCEDURE — 25010000002 DEXAMETHASONE PER 1 MG: Performed by: NURSE ANESTHETIST, CERTIFIED REGISTERED

## 2022-06-10 PROCEDURE — 88305 TISSUE EXAM BY PATHOLOGIST: CPT | Performed by: OBSTETRICS & GYNECOLOGY

## 2022-06-10 RX ORDER — DIPHENHYDRAMINE HYDROCHLORIDE 50 MG/ML
12.5 INJECTION INTRAMUSCULAR; INTRAVENOUS
Status: DISCONTINUED | OUTPATIENT
Start: 2022-06-10 | End: 2022-06-10 | Stop reason: HOSPADM

## 2022-06-10 RX ORDER — SODIUM CHLORIDE 9 MG/ML
INJECTION, SOLUTION INTRAVENOUS AS NEEDED
Status: DISCONTINUED | OUTPATIENT
Start: 2022-06-10 | End: 2022-06-10 | Stop reason: HOSPADM

## 2022-06-10 RX ORDER — MAGNESIUM HYDROXIDE 1200 MG/15ML
LIQUID ORAL AS NEEDED
Status: DISCONTINUED | OUTPATIENT
Start: 2022-06-10 | End: 2022-06-10 | Stop reason: HOSPADM

## 2022-06-10 RX ORDER — FENTANYL CITRATE 50 UG/ML
INJECTION, SOLUTION INTRAMUSCULAR; INTRAVENOUS AS NEEDED
Status: DISCONTINUED | OUTPATIENT
Start: 2022-06-10 | End: 2022-06-10 | Stop reason: SURG

## 2022-06-10 RX ORDER — NALOXONE HCL 0.4 MG/ML
0.2 VIAL (ML) INJECTION AS NEEDED
Status: DISCONTINUED | OUTPATIENT
Start: 2022-06-10 | End: 2022-06-10 | Stop reason: HOSPADM

## 2022-06-10 RX ORDER — OXYCODONE HYDROCHLORIDE AND ACETAMINOPHEN 5; 325 MG/1; MG/1
1 TABLET ORAL EVERY 6 HOURS PRN
Qty: 6 TABLET | Refills: 0 | Status: SHIPPED | OUTPATIENT
Start: 2022-06-10 | End: 2022-07-06

## 2022-06-10 RX ORDER — EPHEDRINE SULFATE 50 MG/ML
5 INJECTION, SOLUTION INTRAVENOUS ONCE AS NEEDED
Status: DISCONTINUED | OUTPATIENT
Start: 2022-06-10 | End: 2022-06-10 | Stop reason: HOSPADM

## 2022-06-10 RX ORDER — PROMETHAZINE HYDROCHLORIDE 25 MG/1
25 SUPPOSITORY RECTAL ONCE AS NEEDED
Status: DISCONTINUED | OUTPATIENT
Start: 2022-06-10 | End: 2022-06-10 | Stop reason: HOSPADM

## 2022-06-10 RX ORDER — MIDAZOLAM HYDROCHLORIDE 1 MG/ML
1 INJECTION INTRAMUSCULAR; INTRAVENOUS
Status: DISCONTINUED | OUTPATIENT
Start: 2022-06-10 | End: 2022-06-10 | Stop reason: HOSPADM

## 2022-06-10 RX ORDER — SODIUM CHLORIDE 0.9 % (FLUSH) 0.9 %
10 SYRINGE (ML) INJECTION AS NEEDED
Status: DISCONTINUED | OUTPATIENT
Start: 2022-06-10 | End: 2022-06-10 | Stop reason: HOSPADM

## 2022-06-10 RX ORDER — SODIUM CHLORIDE 0.9 % (FLUSH) 0.9 %
10 SYRINGE (ML) INJECTION EVERY 12 HOURS SCHEDULED
Status: DISCONTINUED | OUTPATIENT
Start: 2022-06-10 | End: 2022-06-10 | Stop reason: HOSPADM

## 2022-06-10 RX ORDER — HYDRALAZINE HYDROCHLORIDE 20 MG/ML
5 INJECTION INTRAMUSCULAR; INTRAVENOUS
Status: DISCONTINUED | OUTPATIENT
Start: 2022-06-10 | End: 2022-06-10 | Stop reason: HOSPADM

## 2022-06-10 RX ORDER — DIPHENHYDRAMINE HCL 25 MG
25 CAPSULE ORAL
Status: DISCONTINUED | OUTPATIENT
Start: 2022-06-10 | End: 2022-06-10 | Stop reason: HOSPADM

## 2022-06-10 RX ORDER — FLUMAZENIL 0.1 MG/ML
0.2 INJECTION INTRAVENOUS AS NEEDED
Status: DISCONTINUED | OUTPATIENT
Start: 2022-06-10 | End: 2022-06-10 | Stop reason: HOSPADM

## 2022-06-10 RX ORDER — DEXAMETHASONE SODIUM PHOSPHATE 10 MG/ML
INJECTION INTRAMUSCULAR; INTRAVENOUS AS NEEDED
Status: DISCONTINUED | OUTPATIENT
Start: 2022-06-10 | End: 2022-06-10 | Stop reason: SURG

## 2022-06-10 RX ORDER — FAMOTIDINE 10 MG/ML
20 INJECTION, SOLUTION INTRAVENOUS
Status: COMPLETED | OUTPATIENT
Start: 2022-06-10 | End: 2022-06-10

## 2022-06-10 RX ORDER — OXYCODONE AND ACETAMINOPHEN 7.5; 325 MG/1; MG/1
1 TABLET ORAL EVERY 4 HOURS PRN
Status: DISCONTINUED | OUTPATIENT
Start: 2022-06-10 | End: 2022-06-10 | Stop reason: HOSPADM

## 2022-06-10 RX ORDER — ONDANSETRON 2 MG/ML
INJECTION INTRAMUSCULAR; INTRAVENOUS AS NEEDED
Status: DISCONTINUED | OUTPATIENT
Start: 2022-06-10 | End: 2022-06-10 | Stop reason: SURG

## 2022-06-10 RX ORDER — PROMETHAZINE HYDROCHLORIDE 25 MG/1
25 TABLET ORAL ONCE AS NEEDED
Status: DISCONTINUED | OUTPATIENT
Start: 2022-06-10 | End: 2022-06-10 | Stop reason: HOSPADM

## 2022-06-10 RX ORDER — LABETALOL HYDROCHLORIDE 5 MG/ML
5 INJECTION, SOLUTION INTRAVENOUS
Status: DISCONTINUED | OUTPATIENT
Start: 2022-06-10 | End: 2022-06-10 | Stop reason: HOSPADM

## 2022-06-10 RX ORDER — SODIUM CHLORIDE, SODIUM LACTATE, POTASSIUM CHLORIDE, CALCIUM CHLORIDE 600; 310; 30; 20 MG/100ML; MG/100ML; MG/100ML; MG/100ML
9 INJECTION, SOLUTION INTRAVENOUS CONTINUOUS PRN
Status: DISCONTINUED | OUTPATIENT
Start: 2022-06-10 | End: 2022-06-10 | Stop reason: HOSPADM

## 2022-06-10 RX ORDER — PROPOFOL 10 MG/ML
VIAL (ML) INTRAVENOUS AS NEEDED
Status: DISCONTINUED | OUTPATIENT
Start: 2022-06-10 | End: 2022-06-10 | Stop reason: SURG

## 2022-06-10 RX ORDER — IBUPROFEN 600 MG/1
600 TABLET ORAL ONCE AS NEEDED
Status: DISCONTINUED | OUTPATIENT
Start: 2022-06-10 | End: 2022-06-10 | Stop reason: HOSPADM

## 2022-06-10 RX ORDER — ONDANSETRON 2 MG/ML
4 INJECTION INTRAMUSCULAR; INTRAVENOUS ONCE AS NEEDED
Status: DISCONTINUED | OUTPATIENT
Start: 2022-06-10 | End: 2022-06-10 | Stop reason: HOSPADM

## 2022-06-10 RX ORDER — FENTANYL CITRATE 50 UG/ML
50 INJECTION, SOLUTION INTRAMUSCULAR; INTRAVENOUS
Status: DISCONTINUED | OUTPATIENT
Start: 2022-06-10 | End: 2022-06-10 | Stop reason: HOSPADM

## 2022-06-10 RX ORDER — HYDROCODONE BITARTRATE AND ACETAMINOPHEN 7.5; 325 MG/1; MG/1
1 TABLET ORAL ONCE AS NEEDED
Status: DISCONTINUED | OUTPATIENT
Start: 2022-06-10 | End: 2022-06-10 | Stop reason: HOSPADM

## 2022-06-10 RX ORDER — LIDOCAINE HYDROCHLORIDE 20 MG/ML
INJECTION, SOLUTION INFILTRATION; PERINEURAL AS NEEDED
Status: DISCONTINUED | OUTPATIENT
Start: 2022-06-10 | End: 2022-06-10 | Stop reason: SURG

## 2022-06-10 RX ADMIN — ONDANSETRON 4 MG: 2 INJECTION INTRAMUSCULAR; INTRAVENOUS at 13:20

## 2022-06-10 RX ADMIN — DEXAMETHASONE SODIUM PHOSPHATE 8 MG: 10 INJECTION INTRAMUSCULAR; INTRAVENOUS at 13:20

## 2022-06-10 RX ADMIN — FENTANYL CITRATE 50 MCG: 50 INJECTION INTRAMUSCULAR; INTRAVENOUS at 14:05

## 2022-06-10 RX ADMIN — FENTANYL CITRATE 25 MCG: 0.05 INJECTION, SOLUTION INTRAMUSCULAR; INTRAVENOUS at 13:20

## 2022-06-10 RX ADMIN — FENTANYL CITRATE 50 MCG: 0.05 INJECTION, SOLUTION INTRAMUSCULAR; INTRAVENOUS at 13:36

## 2022-06-10 RX ADMIN — FAMOTIDINE 20 MG: 10 INJECTION INTRAVENOUS at 12:52

## 2022-06-10 RX ADMIN — MIDAZOLAM 1 MG: 1 INJECTION INTRAMUSCULAR; INTRAVENOUS at 12:53

## 2022-06-10 RX ADMIN — OXYCODONE HYDROCHLORIDE AND ACETAMINOPHEN 1 TABLET: 7.5; 325 TABLET ORAL at 14:33

## 2022-06-10 RX ADMIN — PROPOFOL 150 MG: 10 INJECTION, EMULSION INTRAVENOUS at 13:13

## 2022-06-10 RX ADMIN — LIDOCAINE HYDROCHLORIDE 60 MG: 20 INJECTION, SOLUTION INFILTRATION; PERINEURAL at 13:13

## 2022-06-10 RX ADMIN — FENTANYL CITRATE 25 MCG: 0.05 INJECTION, SOLUTION INTRAMUSCULAR; INTRAVENOUS at 13:25

## 2022-06-10 RX ADMIN — SODIUM CHLORIDE, POTASSIUM CHLORIDE, SODIUM LACTATE AND CALCIUM CHLORIDE 9 ML/HR: 600; 310; 30; 20 INJECTION, SOLUTION INTRAVENOUS at 12:30

## 2022-06-10 RX ADMIN — PROPOFOL 50 MG: 10 INJECTION, EMULSION INTRAVENOUS at 13:17

## 2022-06-10 NOTE — OP NOTE
OP Note    Hysteroscopic Ablation     Date of Service:  06/10/22  Time of Service:  13:41 EDT    Surgical Staff: Surgeon(s) and Role:     * Ernesto Mendosa MD - Primary   Additional Staff: none     Pre-operative diagnosis(es):   Pre-Op Diagnosis Codes:     * Abnormal uterine bleeding (AUB) [N93.9]     Post-operative diagnosis(es):   Post-Op Diagnosis Codes:     * Abnormal uterine bleeding (AUB) [N93.9]   Procedure(s): Procedure(s):  DILATATION AND CURETTAGE HYSTEROSCOPY NOVASURE ENDOMETRIAL ABLATION           Anesthesia: Type: General  ASA:  III          Operative findings: Normal uterine cavity   Length 7.5 cm overall  Cavity length - 4.5 cm, width 4.9 cm  Power 121 tovar, burn 62 seconds  Global charring noted.      Specimens removed: ID Type Source Tests Collected by Time   A (Not marked as sent) : Endometrial curettings - fixed in formalin Tissue Endometrial Curettings TISSUE PATHOLOGY EXAM Ernesto Mendosa MD 6/10/2022 1332                  Drains: * No LDAs found *       Complications: None        Condition: stable   Disposition: to PACU and then home            Patient was taken to operating room.  She was placed under general anesthesia with LMA and placed in the dorsal supine lithotomy position in candy cane stirrups.  Time out was performed and she was prepped and draped in normal sterile fashion with in and out catheterization of her bladder.     I then placed a weighted speculum in her vagina and grasped the anterior cervix.  A paracervical block was then done with 5 cc of 0.5% Naropin injected at the reflection of her cervix and fornix at 4 and 8 o'clock.  The uterus and cervix were sounded with the following measurements - Cx 3.0 cm and full uterine length 7.5 cm.   The cervix was then opened with graduated Green[place]ic cervical dilators to approximately 22.  The hysteroscope was then inserted into her uterine cavity with no abnormalities noted.  Both tubal ostia were seen an the cavity was noted  to have not projections or defects.   At this point curettings were obtained from all four quadrants and sent for permanent study.     The Novasure device was then placed into the cavity with a length of 4.5 cm and determined width of 4.9 cm and power of 121 tovar.  Cavity assessment was passed on first attempt the device was then enabled and the ablation performed.  It lasted for 62 seconds.       After removal of the novasure device, I reinserted the hysteroscope and noted good global charring throughout the cavity.      The tenaculum was removed and ensured that this site was also hemostatic.      The patient tolerated the procedure well, is on her way to the recovery room and will make be discharged later today with follow up in my office.              Ernesto Mendosa MD  06/10/22  13:41 EDT

## 2022-06-10 NOTE — ANESTHESIA PROCEDURE NOTES
Airway  Urgency: elective    Date/Time: 6/10/2022 1:14 PM  Airway not difficult    General Information and Staff    Patient location during procedure: OR  CRNA/CAA: Nyla Reed CRNA    Indications and Patient Condition  Indications for airway management: airway protection    Preoxygenated: yes  Mask difficulty assessment: 1 - vent by mask    Final Airway Details  Final airway type: supraglottic airway      Successful airway: classic  Size 4    Number of attempts at approach: 1  Assessment: lips, teeth, and gum same as pre-op    Additional Comments  Smooth IV induction. LMA inserted with ease. Cuff up. LMA secured BEBS

## 2022-06-10 NOTE — H&P
[facility]   HISTORY AND PHYSICAL    Patient Name:Alexandra Xiao  : 1980  MRN: [id number]  Primary Care Physician: Esau Veloz MD  Date of admission: 6/10/2022    Subjective   Subjective     Chief Complaint: bad periods     History of Present Illness   Alexandra Xiao is a 41 y.o. female   Menarche 13 years of age   28-30 x 4-5 days of bleeding   Heavy recently   + clots, one pack of 30 pad a cycle   Can miss social functions due to this  On disability   Wants to use ablation to treat her AUB     Review of Systems   Constitutional: Negative for chills and fever.   Gastrointestinal: Negative for abdominal pain.   Genitourinary: Positive for menstrual problem. Negative for dysuria, pelvic pain, vaginal bleeding and vaginal discharge.   All other systems reviewed and are negative.        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   • Headache, tension-type    • Heavy menstrual bleeding     WITH CLOTS   • Memory loss    • Migraine    • Mitral valve regurgitation    • Status post placement of implantable loop recorder    • Stroke (HCC)     MARCH AND        Past Surgical History:   Procedure Laterality Date   • BREAST BIOPSY     • FOOT SURGERY      pins in left foot    • OTHER SURGICAL HISTORY      VNS plate 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   • NOÉ      2022   • TUBAL ABDOMINAL LIGATION     • VAGUS NERVE STIMULATOR IMPLANTATION         Family History: Her family history includes Arthritis in her father and mother; Breast cancer (age of onset: 50) in her mother; Diabetes in her father; Heart disease in her father.     Social History: She  reports that she has quit smoking. Her smoking use included cigarettes. She has a 29.00 pack-year smoking history. She has never used  smokeless tobacco. She reports that she does not drink alcohol and does not use drugs.    Home Medications:  Chlorhexidine Gluconate, Eslicarbazepine Acetate, aspirin, atorvastatin, and lacosamide    Allergies:  She has No Known Allergies.    Objective    Objective     Vitals:    Temp:  [98.6 °F (37 °C)] 98.6 °F (37 °C)  Heart Rate:  [93] 93  Resp:  [18] 18  BP: (132)/(87) 132/87    Physical Exam  Constitutional:       General: She is not in acute distress.     Appearance: She is well-developed.   HENT:      Head: Normocephalic and atraumatic.   Eyes:      General:         Right eye: No discharge.         Left eye: No discharge.      Conjunctiva/sclera: Conjunctivae normal.   Neck:      Thyroid: No thyromegaly.   Cardiovascular:      Rate and Rhythm: Normal rate and regular rhythm.      Heart sounds: Normal heart sounds. No murmur heard.  Pulmonary:      Effort: Pulmonary effort is normal. No respiratory distress.      Breath sounds: Normal breath sounds.   Abdominal:      General: Bowel sounds are normal. There is no distension.      Palpations: Abdomen is soft.      Tenderness: There is no abdominal tenderness.   Musculoskeletal:         General: Normal range of motion.      Cervical back: Normal range of motion and neck supple.   Lymphadenopathy:      Cervical: No cervical adenopathy.   Skin:     General: Skin is warm and dry.      Findings: No rash.   Neurological:      Mental Status: She is alert and oriented to person, place, and time.   Psychiatric:         Behavior: Behavior normal.         Thought Content: Thought content normal.         Judgment: Judgment normal.          Result Review    Result Review:    Lab Results   Component Value Date    WBC 10.26 06/08/2022    HGB 10.2 (L) 06/08/2022    HCT 33.0 (L) 06/08/2022    MCV 74.8 (L) 06/08/2022     (H) 06/08/2022     HCG and COVID ND on 6/8/22      Assessment & Plan   Assessment / Plan     Brief Patient Summary:  Alexandra Xiao is a 41 y.o. female    1) AUB     Active Hospital Problems:  Active Hospital Problems    Diagnosis    • **Abnormal uterine bleeding      Added automatically from request for surgery [id number]       Plan:   HSC, D&C, Novasure endometrial ablation   Management options discussed at length today including expectant, medical, and surgical. Patient elects for an endometrial ablation. Discussed high rates of satisfaction (around 90 %) but also the potential for failure and persistent heavy or painful periods with potential need for future surgery, including hysterectomy. Patient verbalized understanding. Discussed risks of surgery including bleeding, transfusion, infection, scaring, dyspareunia, irregular periods post op, post op pain, uterine perforation and potential need for surgical correction, inadvertent injury to GI/ structures and potential need for surgical correction, DVT, anesthesia complications, organ failure, nerve damage, and death. Discussed preop process and typicaly post op/recovery. Will plan hysteroscopy, D&C, ablation.  Patient uses tubal as her means of contraception.      DVT prophylaxis:  No DVT prophylaxis order currently exists.    Ernesto Mendosa MD  6/10/2022  12:58 EDT

## 2022-06-10 NOTE — DISCHARGE INSTRUCTIONS
Pelvic rest 2 weeks         Dilatation and Curettage, Care After  Refer to this sheet for the next few weeks. These instructions provide you with information on caring for yourself after your procedure. Your health care provider may also give you more specific instructions.  Your treatment has been planned according to current medical practices, but problems sometimes occur.  Call your health care provider if you have any problems or questions after your care.  HOME CARE INSTRUCTIONS  It is normal to have vaginal bleeding/abdominal cramping for the next 2 weeks.  Wait 1 week before returning to strenuous activity.  Drink enough fluids to keep your urine clear or pale yellow.  You may shower tomorrow.  Do not take a bath, go swimming or use a hot tub until your health care provider approves.  Only take over-the-counter or prescription medicines as directed by your health care provider.  Follow up with your provider as directed.    YOU WILL BE ON PELVIC REST FOR THE NEXT 2 WEEKS OR UNTIL SPECIFIED BY YOUR PHYSICIAN. PELVIC REST INCLUDES:  Avoiding long periods of standing.  Avoiding heavy lifting, pushing or pulling.  DO NOT lift anything heavier than 10 pounds (4.5 kg)  DO NOT douche, use tampons, or have sex (intercourse) for 2 weeks after the procedure.    SEEK MEDICAL CARE IF:    You have increasing cramps or pain that is not relieved with medicine.  You have bad smelling vaginal discharge.  You are having problems with any medicine.  You have bleeding that is heavier than a normal menstrual period (greater than 1 pad/hour) or you notice large clots.  You have a fever > 101.  You have chest pain or shortness of breath.                HOW DO I REST MY PELVIS?  For as long as told by your health care provider:  Do not have sex, sexual stimulation, or an orgasm.  Do not use tampons. Do not douche. Do not put anything in your vagina.  Avoid activities that take a lot of effort (are strenuous).  Avoid any activity in  which your pelvic muscles could become strained.

## 2022-06-10 NOTE — ANESTHESIA POSTPROCEDURE EVALUATION
"Patient: Alexandra Xiao    Procedure Summary     Date: 06/10/22 Room / Location: [facility] OR [address] [place] [address] MAIN OR    Anesthesia Start: 1301 Anesthesia Stop: 1353    Procedure: DILATATION AND CURETTAGE HYSTEROSCOPY NOVASURE ENDOMETRIAL ABLATION (N/A Vagina) Diagnosis:       Abnormal uterine bleeding (AUB)      (Abnormal uterine bleeding (AUB) [N93.9])    Surgeons: Ernesto Mendosa MD Provider: Gabriel James MD    Anesthesia Type: general ASA Status: 3          Anesthesia Type: general    Vitals  Vitals Value Taken Time   /75 06/10/22 1446   Temp 36.7 °C (98 °F) 06/10/22 1445   Pulse 80 06/10/22 1449   Resp 16 06/10/22 1445   SpO2 96 % 06/10/22 1449   Vitals shown include unvalidated device data.        Post Anesthesia Care and Evaluation    Patient location during evaluation: bedside  Patient participation: complete - patient participated  Level of consciousness: awake and alert  Pain management: adequate    Airway patency: patent  Anesthetic complications: No anesthetic complications    Cardiovascular status: acceptable  Respiratory status: acceptable  Hydration status: acceptable    Comments: /83 (BP Location: Left arm, Patient Position: Lying)   Pulse 89   Temp 36.7 °C (98 °F) (Oral)   Resp 16   Ht 165.1 cm (65\")   Wt 73.6 kg (162 lb 4.1 oz)   LMP 06/04/2022 (Exact Date)   SpO2 97%   BMI 27.00 kg/m²       "

## 2022-06-13 LAB
LAB AP CASE REPORT: NORMAL
LAB AP CLINICAL INFORMATION: NORMAL
PATH REPORT.FINAL DX SPEC: NORMAL
PATH REPORT.GROSS SPEC: NORMAL

## 2022-06-14 ENCOUNTER — TELEPHONE (OUTPATIENT)
Dept: NEUROLOGY | Facility: CLINIC | Age: 42
End: 2022-06-14

## 2022-06-14 NOTE — TELEPHONE ENCOUNTER
Please advise- pt requesting ASA refill. Saw the ok per Dr. Lu last note, however there was a hard stop d/t patient holding it. Any help is appreciated

## 2022-06-14 NOTE — TELEPHONE ENCOUNTER
Patient called back and stated per her surgeon she has been cleared to take this again. Please advise.

## 2022-06-14 NOTE — TELEPHONE ENCOUNTER
Provider: DR. MORENO   Caller: ELIE  Relationship to Patient: PT   Phone Number: [phone]  Reason for Call: PT CALLING TO SEE ABOUT GETTING A REFILL ON HER ASPRIN, THIS WAS PRESCRIBED IN HOSP FOR STROKE. PLEASE ADVISE PT ON THIS.     JOANN CONFIRMED.

## 2022-06-15 RX ORDER — ASPIRIN 325 MG
325 TABLET ORAL DAILY
Qty: 30 TABLET | Refills: 0 | Status: SHIPPED | OUTPATIENT
Start: 2022-06-15

## 2022-06-30 DIAGNOSIS — G40.309 GENERALIZED NONCONVULSIVE EPILEPSY: ICD-10-CM

## 2022-06-30 RX ORDER — ESLICARBAZEPINE ACETATE 400 MG/1
1 TABLET ORAL DAILY
Qty: 30 TABLET | Refills: 5 | Status: SHIPPED | OUTPATIENT
Start: 2022-06-30 | End: 2022-12-28

## 2022-07-06 ENCOUNTER — OFFICE VISIT (OUTPATIENT)
Dept: OBSTETRICS AND GYNECOLOGY | Facility: CLINIC | Age: 42
End: 2022-07-06

## 2022-07-06 VITALS
DIASTOLIC BLOOD PRESSURE: 91 MMHG | HEIGHT: 65 IN | BODY MASS INDEX: 27.16 KG/M2 | HEART RATE: 98 BPM | WEIGHT: 163 LBS | SYSTOLIC BLOOD PRESSURE: 139 MMHG

## 2022-07-06 DIAGNOSIS — N89.8 VAGINAL DISCHARGE: ICD-10-CM

## 2022-07-06 DIAGNOSIS — N93.9 ABNORMAL UTERINE BLEEDING: Primary | ICD-10-CM

## 2022-07-06 DIAGNOSIS — Z98.890 POST-OPERATIVE STATE: ICD-10-CM

## 2022-07-06 PROCEDURE — 99213 OFFICE O/P EST LOW 20 MIN: CPT | Performed by: OBSTETRICS & GYNECOLOGY

## 2022-07-06 RX ORDER — METRONIDAZOLE 500 MG/1
500 TABLET ORAL 2 TIMES DAILY
Qty: 14 TABLET | Refills: 0 | Status: SHIPPED | OUTPATIENT
Start: 2022-07-06 | End: 2022-07-13

## 2022-07-06 NOTE — PROGRESS NOTES
"Subjective    is a 41 y.o. female here for Post op exam. Pt is s/p Novasure with D&C on 06/10/2022. Pt c/o vaginal odor.     Chief Complaint   Patient presents with   • Post-op        HPI     41 y.o.   Doing well after Novasure for AUB.   No complaints.       Review of Systems   Constitutional: Negative for chills and fever.   Gastrointestinal: Negative for abdominal pain, constipation and diarrhea.   Genitourinary: Positive for vaginal discharge. Negative for pelvic pain and vaginal bleeding.        Objective   /91   Pulse 98   Ht 165.1 cm (65\")   Wt 73.9 kg (163 lb)   BMI 27.12 kg/m²   Physical Exam  Constitutional:       General: She is not in acute distress.     Appearance: Normal appearance. She is well-developed and normal weight.   Genitourinary:      Right Labia: No lesions or Bartholin's cyst.     Left Labia: No lesions or Bartholin's cyst.     No inguinal adenopathy present in the right or left side.     Vaginal discharge (yellow/thin ) present.      No vaginal bleeding.        Right Adnexa: not tender, not full and no mass present.     Left Adnexa: not tender, not full and no mass present.     No cervical motion tenderness or friability.      Uterus is not enlarged or tender.      No uterine mass detected.     Uterus is retroverted.   Abdominal:      General: Abdomen is flat. There is no distension.      Palpations: Abdomen is soft.      Tenderness: There is no abdominal tenderness.   Lymphadenopathy:      Lower Body: No right inguinal adenopathy. No left inguinal adenopathy.   Neurological:      Mental Status: She is alert.   Vitals reviewed. Exam conducted with a chaperone present.          Assessment/Plan   Diagnoses and all orders for this visit:    1. Abnormal uterine bleeding (Primary)    2. Post-operative state    3. Vaginal discharge    Other orders  -     metroNIDAZOLE (Flagyl) 500 MG tablet; Take 1 tablet by mouth 2 (Two) Times a Day for 7 days.  Dispense: 14 tablet; Refill: 0    1) " AUB  Treated with ablation   Expectations and timeline reviewed.    2) Post op  Released from restrictions     3) Discharge from endometrial damage.   Flagyl to counter for now.       Ernesto Mendosa MD   7/6/2022  14:59 EDT

## 2022-07-13 ENCOUNTER — TELEPHONE (OUTPATIENT)
Dept: NEUROLOGY | Facility: CLINIC | Age: 42
End: 2022-07-13

## 2022-07-13 NOTE — TELEPHONE ENCOUNTER
Spoke to patient put on wait list and informed her that if need Dr James will call, if not will discuss at next office visit

## 2022-07-13 NOTE — TELEPHONE ENCOUNTER
Caller: Alexandra Xiao     Relationship: PATIENT    Best call back number: [phone]    What is your medical concern? PATIENT HAD FOLLOW UP WITH CARDIOLOGIST YESTERDAY, PER THE CARDIOLOGIST IT DOES NOT LOOK LIKE THE STROKE WAS CAUSED BY HER HEART, SHE IS WANTING TO KNOW IF SHE CAN GET A SOONER APPT THEN September    PLEASE CALL AND ADVISE    THANK YOU    How long has this issue been going on? N/A    Is your provider already aware of this issue? N/A    Have you been treated for this issue? N/A

## 2022-08-11 ENCOUNTER — TELEPHONE (OUTPATIENT)
Dept: NEUROLOGY | Facility: CLINIC | Age: 42
End: 2022-08-11

## 2022-08-11 DIAGNOSIS — G40.309 GENERALIZED NONCONVULSIVE EPILEPSY: ICD-10-CM

## 2022-08-13 DIAGNOSIS — G40.309 GENERALIZED NONCONVULSIVE EPILEPSY: ICD-10-CM

## 2022-08-13 RX ORDER — LACOSAMIDE 200 MG/1
TABLET, FILM COATED ORAL
Qty: 60 TABLET | Status: CANCELLED | OUTPATIENT
Start: 2022-08-13

## 2022-08-15 NOTE — TELEPHONE ENCOUNTER
PT HAS TWO DAYS LEFT ON HER RX VIMPAT , SHE HAS TO TAKE THE BRAND NAME . CAN DR MORENO SEND RX OVER ASAP FOR     BRAND NAME VIMPAT 200 MG   2X DAY     NO SUBSTITUTIONS       PLEASE CALL PT TO LET HER KNOW WHEN CALLED [phone]    [id number] [place], [place] - [address] AT [facility] - [phone] PH - [phone] FX  [phone]    PLEASE ADVISE

## 2022-08-16 RX ORDER — LACOSAMIDE 200 MG/1
TABLET ORAL
Qty: 60 TABLET | Refills: 3 | Status: SHIPPED | OUTPATIENT
Start: 2022-08-16 | End: 2022-09-06

## 2022-09-06 ENCOUNTER — OFFICE VISIT (OUTPATIENT)
Dept: NEUROLOGY | Facility: CLINIC | Age: 42
End: 2022-09-06

## 2022-09-06 VITALS
SYSTOLIC BLOOD PRESSURE: 142 MMHG | OXYGEN SATURATION: 97 % | HEART RATE: 102 BPM | DIASTOLIC BLOOD PRESSURE: 98 MMHG | WEIGHT: 164 LBS | HEIGHT: 64 IN | BODY MASS INDEX: 28 KG/M2

## 2022-09-06 DIAGNOSIS — Z96.89 STATUS POST VNS (VAGUS NERVE STIMULATOR) PLACEMENT: ICD-10-CM

## 2022-09-06 DIAGNOSIS — G40.309 GENERALIZED NONCONVULSIVE EPILEPSY: Primary | ICD-10-CM

## 2022-09-06 DIAGNOSIS — Z86.73 HISTORY OF RECENT STROKE: ICD-10-CM

## 2022-09-06 PROCEDURE — 99215 OFFICE O/P EST HI 40 MIN: CPT | Performed by: PSYCHIATRY & NEUROLOGY

## 2022-09-06 RX ORDER — NAPROXEN 500 MG/1
500 TABLET ORAL
Status: ON HOLD | COMMUNITY
End: 2023-03-05

## 2022-09-06 RX ORDER — ASPIRIN 325 MG
325 TABLET, DELAYED RELEASE (ENTERIC COATED) ORAL DAILY
COMMUNITY
End: 2023-03-07 | Stop reason: HOSPADM

## 2022-09-06 RX ORDER — LACOSAMIDE 200 MG/1
200 TABLET, FILM COATED ORAL EVERY 12 HOURS SCHEDULED
Qty: 60 TABLET | Refills: 3 | Status: SHIPPED | OUTPATIENT
Start: 2022-09-06 | End: 2022-09-07 | Stop reason: SDUPTHER

## 2022-09-06 NOTE — PROGRESS NOTES
Chief Complaint   Patient presents with   • Seizures   • Stroke       Patient ID: Alexandra Xiao is a 41 y.o. female.    HPI: I had the pleasure of seeing your patient today.  As you may know she is a 41-year-old female seen for the management of seizures and stroke.  She has not had any new strokelike symptoms since last follow-up.  She does mention having 2 breakthrough seizures since last visit.  Both of them occurring within the same day.  Essentially they occur several minutes apart.  The first 1 lasted 1 to 2 minutes.  The second lasted about.  Both of them were typical generalized tonic type seizures.  She does acknowledge experiencing social stressors and around the time of these breakthrough seizures.  Her family members who are there were able to use the magnet for her VNS which did help abort the seizure.  Her  acknowledges that she typically has magnet to her chest keeping stimulation of the vagus nerve stimulator off.  She does mention being switched from brand name Vimpat to generic just prior to the breakthrough seizures.    The following portions of the patient's history were reviewed and updated as appropriate: allergies, current medications, past family history, past medical history, past social history, past surgical history and problem list.    Review of Systems   Neurological: Positive for seizures, weakness, numbness and headaches. Negative for dizziness, tremors, syncope, speech difficulty and light-headedness.   Hematological: Does not bruise/bleed easily.   Psychiatric/Behavioral: Positive for confusion and decreased concentration. Negative for agitation, behavioral problems, hallucinations, self-injury, sleep disturbance and suicidal ideas. The patient is not nervous/anxious.       I have reviewed the review of systems above performed by my medical assistant.      Vitals:    09/06/22 1502   BP: 142/98   Pulse: 102   SpO2: 97%       Neurologic Exam     Mental Status   Oriented to  person, place, and time.   Concentration: normal.   Level of consciousness: alert  Knowledge: consistent with education (No deficits found.).     Cranial Nerves     CN II   Visual fields full to confrontation.     CN III, IV, VI   Pupils are equal, round, and reactive to light.  Extraocular motions are normal.   CN III: no CN III palsy  CN VI: no CN VI palsy    CN V   Facial sensation intact.     CN VII   Facial expression full, symmetric.     CN VIII   CN VIII normal.     CN IX, X   CN IX normal.   CN X normal.     CN XI   CN XI normal.     CN XII   CN XII normal.     Motor Exam     Strength   Right neck flexion: 5/5  Left neck flexion: 5/5  Right neck extension: 5/5  Left neck extension: 5/5  Right deltoid: 5/5  Left deltoid: 5/5  Right biceps: 5/5  Left biceps: 5/5  Right triceps: 5/5  Left triceps: 5/5  Right wrist flexion: 5/5  Left wrist flexion: 5/5  Right wrist extension: 5/5  Left wrist extension: 5/5  Right interossei: 5/5  Left interossei: 5/5  Right abdominals: 5/5  Left abdominals: 5/5  Right iliopsoas: 5/5  Left iliopsoas: 5/5  Right quadriceps: 5/5  Left quadriceps: 5/5  Right hamstrin/5  Left hamstrin/5  Right glutei: 5/5  Left glutei: 5/5  Right anterior tibial: 5/5  Left anterior tibial: 5/5  Right posterior tibial: 5/5  Left posterior tibial: 5/5  Right peroneal: 5/5  Left peroneal: 5/5  Right gastroc: 5/5  Left gastroc: 5/5    Sensory Exam   Light touch normal.   Vibration normal.     Gait, Coordination, and Reflexes     Gait  Gait: normal    Reflexes   Right brachioradialis: 2+  Left brachioradialis: 2+  Right biceps: 2+  Left biceps: 2+  Right triceps: 2+  Left triceps: 2+  Right patellar: 2+  Left patellar: 2+  Right achilles: 2+  Left achilles: 2+  Right : 2+  Left : 2+Station is normal.       Physical Exam  Vitals reviewed.   Constitutional:       Appearance: She is well-developed.   HENT:      Head: Normocephalic and atraumatic.   Eyes:      Extraocular Movements: EOM  normal.      Pupils: Pupils are equal, round, and reactive to light.   Cardiovascular:      Rate and Rhythm: Normal rate and regular rhythm.   Pulmonary:      Breath sounds: Normal breath sounds.   Musculoskeletal:         General: Normal range of motion.   Skin:     General: Skin is warm.   Neurological:      Mental Status: She is oriented to person, place, and time.      Gait: Gait is intact.      Deep Tendon Reflexes:      Reflex Scores:       Tricep reflexes are 2+ on the right side and 2+ on the left side.       Bicep reflexes are 2+ on the right side and 2+ on the left side.       Brachioradialis reflexes are 2+ on the right side and 2+ on the left side.       Patellar reflexes are 2+ on the right side and 2+ on the left side.       Achilles reflexes are 2+ on the right side and 2+ on the left side.        Procedures:     Assessment/Plan: We did interrogate the vagus nerve stimulator today.  No setting changes were made.  She will try to keep the magnet off of the generator to allow normal auto stimulation mode settings function to prevent seizure activity.  We will continue the Vimpat and Aptiom as scheduled.  It is my opinion that she will require brand-name only with respect to Vimpat at this time.  We will see her back in 4 months or sooner if needed.  A total of 45 minutes was spent face-to-face with the patient today.  Of that greater than 50% of this time was spent discussing signs and symptoms of seizures, patient education, plan of care and prognosis.       Diagnoses and all orders for this visit:    1. Generalized nonconvulsive epilepsy (HCC) (Primary)  -     Discontinue: Vimpat 200 MG tablet; Take 1 tablet by mouth Every 12 (Twelve) Hours for 30 days. BRAND NAME ONLY NO SUBSTITUITONS     DISPENSE AS WRITTEN  Dispense: 60 tablet; Refill: 3    2. Status post VNS (vagus nerve stimulator) placement    3. History of recent stroke           Cb James II, MD

## 2022-09-07 DIAGNOSIS — G40.309 GENERALIZED NONCONVULSIVE EPILEPSY: ICD-10-CM

## 2022-09-07 RX ORDER — LACOSAMIDE 200 MG/1
200 TABLET, FILM COATED ORAL EVERY 12 HOURS SCHEDULED
Qty: 60 TABLET | Refills: 3 | Status: SHIPPED | OUTPATIENT
Start: 2022-09-07 | End: 2022-09-07

## 2022-09-07 RX ORDER — LACOSAMIDE 200 MG/1
200 TABLET, FILM COATED ORAL EVERY 12 HOURS SCHEDULED
Qty: 60 TABLET | Refills: 2 | Status: SHIPPED | OUTPATIENT
Start: 2022-09-07 | End: 2023-02-10 | Stop reason: SDUPTHER

## 2022-09-12 RX ORDER — LACOSAMIDE 200 MG/1
200 TABLET, FILM COATED ORAL EVERY 12 HOURS SCHEDULED
Qty: 60 TABLET | Refills: 1 | Status: SHIPPED | OUTPATIENT
Start: 2022-09-12 | End: 2023-02-10 | Stop reason: SDUPTHER

## 2022-09-23 RX ORDER — ATORVASTATIN CALCIUM 40 MG/1
TABLET, FILM COATED ORAL
Qty: 30 TABLET | Refills: 5 | Status: SHIPPED | OUTPATIENT
Start: 2022-09-23 | End: 2022-12-27

## 2022-12-16 ENCOUNTER — OFFICE VISIT (OUTPATIENT)
Dept: NEUROLOGY | Facility: CLINIC | Age: 42
End: 2022-12-16

## 2022-12-16 VITALS
SYSTOLIC BLOOD PRESSURE: 120 MMHG | BODY MASS INDEX: 28.68 KG/M2 | WEIGHT: 168 LBS | HEIGHT: 64 IN | HEART RATE: 119 BPM | OXYGEN SATURATION: 96 % | DIASTOLIC BLOOD PRESSURE: 84 MMHG

## 2022-12-16 DIAGNOSIS — G40.309 GENERALIZED NONCONVULSIVE EPILEPSY: Primary | ICD-10-CM

## 2022-12-16 DIAGNOSIS — R51.9 CHRONIC DAILY HEADACHE: ICD-10-CM

## 2022-12-16 DIAGNOSIS — Z96.89 STATUS POST VNS (VAGUS NERVE STIMULATOR) PLACEMENT: ICD-10-CM

## 2022-12-16 PROCEDURE — 95970 ALYS NPGT W/O PRGRMG: CPT | Performed by: PSYCHIATRY & NEUROLOGY

## 2022-12-16 PROCEDURE — 99214 OFFICE O/P EST MOD 30 MIN: CPT | Performed by: PSYCHIATRY & NEUROLOGY

## 2022-12-16 RX ORDER — AMITRIPTYLINE HYDROCHLORIDE 25 MG/1
25 TABLET, FILM COATED ORAL NIGHTLY
Qty: 30 TABLET | Refills: 5 | Status: SHIPPED | OUTPATIENT
Start: 2022-12-16 | End: 2023-01-15

## 2022-12-16 NOTE — PROGRESS NOTES
Chief Complaint   Patient presents with   • Seizures       Patient ID: Alexandra Xiao is a 42 y.o. female.    HPI: I have had the pleasure of seeing your patient again today.  As you may know she is a 42-year-old female here for the management of epilepsy.  She has also been having migraines.  She says that she has had at least 15 days of headaches within the last 30 days.  Typically these headaches are not associated with significant sensitivity to light or sound.  Perhaps she will experience some mild nausea.  She denies any new onset focal weakness or numbness with her headaches.  No double vision or loss of vision.  She does report 1 breakthrough seizure since last follow-up.  She is almost certain that it is due to a missed dose of Vimpat.  She is also taking Aptiom.  No significant side effects.  She has the vagus nerve stimulator.  She is tolerating the stimulation well.  The magnet typically does work in pulling her out or breaking the seizure.    The following portions of the patient's history were reviewed and updated as appropriate: allergies, current medications, past family history, past medical history, past social history, past surgical history and problem list.    Review of Systems   Musculoskeletal: Positive for gait problem.   Neurological: Positive for seizures and headaches. Negative for dizziness, tremors, syncope, speech difficulty, weakness, light-headedness and numbness.   Hematological: Does not bruise/bleed easily.   Psychiatric/Behavioral: Negative for agitation, behavioral problems, confusion, decreased concentration, hallucinations, self-injury, sleep disturbance and suicidal ideas. The patient is not nervous/anxious.       I have reviewed the review of systems above performed by my medical assistant.      Vitals:    12/16/22 1624   BP: 120/84   Pulse: 119   SpO2: 96%       Neurologic Exam     Mental Status   Oriented to person, place, and time.   Concentration: normal.   Level of  consciousness: alert  Knowledge: consistent with education (No deficits found.).     Cranial Nerves     CN II   Visual fields full to confrontation.     CN III, IV, VI   Pupils are equal, round, and reactive to light.  Extraocular motions are normal.   CN III: no CN III palsy  CN VI: no CN VI palsy    CN V   Facial sensation intact.     CN VII   Facial expression full, symmetric.     CN VIII   CN VIII normal.     CN IX, X   CN IX normal.   CN X normal.     CN XI   CN XI normal.     CN XII   CN XII normal.     Motor Exam     Strength   Right neck flexion: 5/5  Left neck flexion: 5/5  Right neck extension: 5/5  Left neck extension: 5/5  Right deltoid: 5/5  Left deltoid: 5/5  Right biceps: 5/5  Left biceps: 5/5  Right triceps: 5/5  Left triceps: 5/5  Right wrist flexion: 5/5  Left wrist flexion: 5/5  Right wrist extension: 5/5  Left wrist extension: 5/5  Right interossei: 5/5  Left interossei: 5/5  Right abdominals: 5/5  Left abdominals: 5/5  Right iliopsoas: 5/5  Left iliopsoas: 5/5  Right quadriceps: 5/5  Left quadriceps: 5/5  Right hamstrin/5  Left hamstrin/5  Right glutei: 5/5  Left glutei: 5/5  Right anterior tibial: 5/5  Left anterior tibial: 5/5  Right posterior tibial: 5/5  Left posterior tibial: 5/5  Right peroneal: 5/5  Left peroneal: 5/5  Right gastroc: 5/5  Left gastroc: 5/5    Sensory Exam   Light touch normal.   Vibration normal.     Gait, Coordination, and Reflexes     Gait  Gait: normal    Reflexes   Right brachioradialis: 2+  Left brachioradialis: 2+  Right biceps: 2+  Left biceps: 2+  Right triceps: 2+  Left triceps: 2+  Right patellar: 2+  Left patellar: 2+  Right achilles: 2+  Left achilles: 2+  Right : 2+  Left : 2+Station is normal.       Physical Exam  Vitals reviewed.   Constitutional:       Appearance: She is well-developed.   HENT:      Head: Normocephalic and atraumatic.   Eyes:      Extraocular Movements: EOM normal.      Pupils: Pupils are equal, round, and reactive to  light.   Cardiovascular:      Rate and Rhythm: Normal rate and regular rhythm.   Pulmonary:      Breath sounds: Normal breath sounds.   Musculoskeletal:         General: Normal range of motion.   Skin:     General: Skin is warm.   Neurological:      Mental Status: She is oriented to person, place, and time.      Gait: Gait is intact.      Deep Tendon Reflexes:      Reflex Scores:       Tricep reflexes are 2+ on the right side and 2+ on the left side.       Bicep reflexes are 2+ on the right side and 2+ on the left side.       Brachioradialis reflexes are 2+ on the right side and 2+ on the left side.       Patellar reflexes are 2+ on the right side and 2+ on the left side.       Achilles reflexes are 2+ on the right side and 2+ on the left side.        Procedures: Vagus nerve stimulator interrogation and diagnostics  Indication: History of seizures  Report:  Normal mode settings are as follows: Output current is set to 1.5 mA, frequency is 30 Hz, pulse width is 250 µs, on time is 30 seconds, off time is 5 minutes and duty cycle is 10%.  Auto stimulation mode settings are as follows: Output current is set to 1.75 mA, pulse width is 250 µs and on time is 60 seconds.  Magnet mode settings are as follows: Output current set to 2.75 mA, pulse width is 250 µs and on time is 60 seconds.  Setting changes: Output current and normal mode, auto stimulation mode and magnet mode were increased to 1.65, 1.875 and 2.75 mA respectively.  Diagnostics: Output current is 1.5 mA, lead impedance is okay at 2570 mA, generator is okay at 25 to 50%.    Assessment/Plan: We did increase the output of the normal and auto stimulation mode settings today.  We will continue with the Aptiom and Vimpat as scheduled.  For her headaches we will start her on amitriptyline 25 mg nightly.  We will see her back in 4 months or sooner if needed.  A total of 30 minutes was spent face-to-face with patient today.  Of that greater than 50% of this time was  spent discussing signs and symptoms of seizures, headaches, patient education, plan of care and prognosis.       Diagnoses and all orders for this visit:    1. Generalized nonconvulsive epilepsy (HCC) (Primary)    2. Status post VNS (vagus nerve stimulator) placement    3. Chronic daily headache  -     amitriptyline (ELAVIL) 25 MG tablet; Take 1 tablet by mouth Every Night for 30 days.  Dispense: 30 tablet; Refill: 5           Cb James II, MD

## 2022-12-27 RX ORDER — ATORVASTATIN CALCIUM 40 MG/1
TABLET, FILM COATED ORAL
Qty: 30 TABLET | Refills: 3 | Status: SHIPPED | OUTPATIENT
Start: 2022-12-27

## 2022-12-28 DIAGNOSIS — G40.309 GENERALIZED NONCONVULSIVE EPILEPSY: ICD-10-CM

## 2022-12-28 RX ORDER — ESLICARBAZEPINE ACETATE 400 MG/1
1 TABLET ORAL DAILY
Qty: 30 TABLET | Refills: 5 | Status: SHIPPED | OUTPATIENT
Start: 2022-12-28 | End: 2023-01-17

## 2023-01-17 ENCOUNTER — TELEPHONE (OUTPATIENT)
Dept: NEUROLOGY | Facility: CLINIC | Age: 43
End: 2023-01-17
Payer: MEDICARE

## 2023-01-17 DIAGNOSIS — G40.309 GENERALIZED NONCONVULSIVE EPILEPSY: Primary | ICD-10-CM

## 2023-01-17 RX ORDER — ESLICARBAZEPINE ACETATE 800 MG/1
800 TABLET ORAL DAILY
Qty: 30 TABLET | Refills: 4 | Status: ON HOLD | OUTPATIENT
Start: 2023-01-17 | End: 2023-03-05

## 2023-01-17 NOTE — TELEPHONE ENCOUNTER
Caller: Dameon Alexandra M    Relationship: Self    Best call back number: [phone]    What was the call regarding: PT CALLED STATING SHE HAS HAD 3 SEIZURES WITHIN THE LAST WEEK; EACH LASTING APPROXIMATELY 1-2 MINUTES. PT STATES THESE SEIZURES HAVE COME ON MUCH QUICKER THAN HER USUAL SEIZURES. PT STATES SHE DID FALL FOLLOWING HER 1ST SEIZURE AND HIT HER ELBOW- MODERATE BRUISING ON HER ELBOW AND SHINS. PT DOES ALSO REPORTS INCONTINENCE DURING THIS SEIZURE AS WELL.    1ST SEIZURE ON 1/9/23; 2ND & 3RD SEIZURE OCCURRED BACK TO BACK LAST NIGHT, 1/17/23.    PT DENIES ANY MISSED MEDICATION DOSAGES.    ATTEMPTED TO WARM-TRANSFER CALL TO CLINICAL STAFF. PER DAVID ANDRES ROOMING AT THE TIME OF PT'S CALL.    PLEASE REVIEW AND ADVISE.

## 2023-02-10 DIAGNOSIS — G40.309 GENERALIZED NONCONVULSIVE EPILEPSY: ICD-10-CM

## 2023-02-10 RX ORDER — LACOSAMIDE 200 MG/1
200 TABLET, FILM COATED ORAL EVERY 12 HOURS SCHEDULED
Qty: 60 TABLET | Refills: 1 | Status: SHIPPED | OUTPATIENT
Start: 2023-02-10 | End: 2023-03-14

## 2023-02-10 RX ORDER — LACOSAMIDE 200 MG/1
200 TABLET, FILM COATED ORAL EVERY 12 HOURS SCHEDULED
Qty: 60 TABLET | Refills: 2 | Status: SHIPPED | OUTPATIENT
Start: 2023-02-10 | End: 2023-03-07 | Stop reason: HOSPADM

## 2023-02-10 NOTE — TELEPHONE ENCOUNTER
Caller: ELIE    Relationship: PATIENT    Best call back number: [phone]    Requested Prescriptions:   Requested Prescriptions     Pending Prescriptions Disp Refills   • Vimpat 200 MG tablet 60 tablet 1     Sig: Take 1 tablet by mouth Every 12 (Twelve) Hours for 30 days. Brand-name only please   • Vimpat 200 MG tablet 60 tablet 2     Sig: Take 1 tablet by mouth Every 12 (Twelve) Hours for 30 days. BRAND NAME ONLY NO SUBSTITUITONS DISPENSE AS WRITTEN        Pharmacy where request should be sent: [id number] [address] AT [facility] & [address] - [phone] [facility] [phone] FX     Additional details provided by patient: PHARMACY TOLD PATIENT THAT A PA IS NEEDED. PATIENT HAS A 5 DAY SUPPLY ON HAND.    Does the patient have less than a 3 day supply:  [] Yes  [x] No    Would you like a call back once the refill request has been completed: [] Yes [x] No    If the office needs to give you a call back, can they leave a voicemail: [] Yes [x] No    Anthony Gonzales Rep   02/10/23 13:55 EST

## 2023-02-20 ENCOUNTER — HOSPITAL ENCOUNTER (EMERGENCY)
Facility: HOSPITAL | Age: 43
Discharge: HOME | End: 2023-02-20
Attending: EMERGENCY MEDICINE | Admitting: EMERGENCY MEDICINE
Payer: MEDICARE

## 2023-02-20 ENCOUNTER — APPOINTMENT (OUTPATIENT)
Dept: GENERAL RADIOLOGY | Facility: HOSPITAL | Age: 43
End: 2023-02-20
Payer: MEDICARE

## 2023-02-20 ENCOUNTER — TELEPHONE (OUTPATIENT)
Dept: NEUROLOGY | Facility: CLINIC | Age: 43
End: 2023-02-20
Payer: MEDICARE

## 2023-02-20 ENCOUNTER — APPOINTMENT (OUTPATIENT)
Dept: CT IMAGING | Facility: HOSPITAL | Age: 43
End: 2023-02-20
Payer: MEDICARE

## 2023-02-20 VITALS
DIASTOLIC BLOOD PRESSURE: 89 MMHG | OXYGEN SATURATION: 99 % | HEIGHT: 64 IN | BODY MASS INDEX: 26.98 KG/M2 | TEMPERATURE: 97 F | HEART RATE: 103 BPM | WEIGHT: 158 LBS | SYSTOLIC BLOOD PRESSURE: 148 MMHG | RESPIRATION RATE: 18 BRPM

## 2023-02-20 DIAGNOSIS — S06.0XAA CONCUSSION WITH UNKNOWN LOSS OF CONSCIOUSNESS STATUS, INITIAL ENCOUNTER: Primary | ICD-10-CM

## 2023-02-20 DIAGNOSIS — G40.909 SEIZURE DISORDER: ICD-10-CM

## 2023-02-20 LAB
ALBUMIN SERPL-MCNC: 3.9 G/DL (ref 3.5–5.2)
ALBUMIN/GLOB SERPL: 1.3 G/DL
ALP SERPL-CCNC: 147 U/L (ref 39–117)
ALT SERPL W P-5'-P-CCNC: 22 U/L (ref 1–33)
ANION GAP SERPL CALCULATED.3IONS-SCNC: 10 MMOL/L (ref 5–15)
AST SERPL-CCNC: 18 U/L (ref 1–32)
BACTERIA UR QL AUTO: ABNORMAL /HPF
BASOPHILS # BLD AUTO: 0.02 10*3/MM3 (ref 0–0.2)
BASOPHILS NFR BLD AUTO: 0.2 % (ref 0–1.5)
BILIRUB SERPL-MCNC: 0.2 MG/DL (ref 0–1.2)
BILIRUB UR QL STRIP: NEGATIVE
BUN SERPL-MCNC: 4 MG/DL (ref 6–20)
BUN/CREAT SERPL: 7 (ref 7–25)
CALCIUM SPEC-SCNC: 9.1 MG/DL (ref 8.6–10.5)
CHLORIDE SERPL-SCNC: 103 MMOL/L (ref 98–107)
CLARITY UR: ABNORMAL
CO2 SERPL-SCNC: 23 MMOL/L (ref 22–29)
COLOR UR: YELLOW
CREAT SERPL-MCNC: 0.57 MG/DL (ref 0.57–1)
DEPRECATED RDW RBC AUTO: 42.6 FL (ref 37–54)
EGFRCR SERPLBLD CKD-EPI 2021: 115.8 ML/MIN/1.73
EOSINOPHIL # BLD AUTO: 0.04 10*3/MM3 (ref 0–0.4)
EOSINOPHIL NFR BLD AUTO: 0.3 % (ref 0.3–6.2)
ERYTHROCYTE [DISTWIDTH] IN BLOOD BY AUTOMATED COUNT: 15.3 % (ref 12.3–15.4)
GLOBULIN UR ELPH-MCNC: 2.9 GM/DL
GLUCOSE SERPL-MCNC: 105 MG/DL (ref 65–99)
GLUCOSE UR STRIP-MCNC: NEGATIVE MG/DL
HCT VFR BLD AUTO: 34.5 % (ref 34–46.6)
HGB BLD-MCNC: 11.3 G/DL (ref 12–15.9)
HGB UR QL STRIP.AUTO: NEGATIVE
HOLD SPECIMEN: NORMAL
HOLD SPECIMEN: NORMAL
HYALINE CASTS UR QL AUTO: ABNORMAL /LPF
IMM GRANULOCYTES # BLD AUTO: 0.03 10*3/MM3 (ref 0–0.05)
IMM GRANULOCYTES NFR BLD AUTO: 0.3 % (ref 0–0.5)
KETONES UR QL STRIP: NEGATIVE
LEUKOCYTE ESTERASE UR QL STRIP.AUTO: ABNORMAL
LYMPHOCYTES # BLD AUTO: 1.91 10*3/MM3 (ref 0.7–3.1)
LYMPHOCYTES NFR BLD AUTO: 15.9 % (ref 19.6–45.3)
MAGNESIUM SERPL-MCNC: 1.9 MG/DL (ref 1.6–2.6)
MCH RBC QN AUTO: 25.4 PG (ref 26.6–33)
MCHC RBC AUTO-ENTMCNC: 32.8 G/DL (ref 31.5–35.7)
MCV RBC AUTO: 77.5 FL (ref 79–97)
MONOCYTES # BLD AUTO: 0.59 10*3/MM3 (ref 0.1–0.9)
MONOCYTES NFR BLD AUTO: 4.9 % (ref 5–12)
NEUTROPHILS NFR BLD AUTO: 78.4 % (ref 42.7–76)
NEUTROPHILS NFR BLD AUTO: 9.39 10*3/MM3 (ref 1.7–7)
NITRITE UR QL STRIP: NEGATIVE
NRBC BLD AUTO-RTO: 0 /100 WBC (ref 0–0.2)
PH UR STRIP.AUTO: 6 [PH] (ref 5–8)
PLATELET # BLD AUTO: 298 10*3/MM3 (ref 140–450)
PMV BLD AUTO: 10.4 FL (ref 6–12)
POTASSIUM SERPL-SCNC: 4 MMOL/L (ref 3.5–5.2)
PROT SERPL-MCNC: 6.8 G/DL (ref 6–8.5)
PROT UR QL STRIP: NEGATIVE
QT INTERVAL: 362 MS
RBC # BLD AUTO: 4.45 10*6/MM3 (ref 3.77–5.28)
RBC # UR STRIP: ABNORMAL /HPF
REF LAB TEST METHOD: ABNORMAL
SODIUM SERPL-SCNC: 136 MMOL/L (ref 136–145)
SP GR UR STRIP: 1.02 (ref 1–1.03)
SQUAMOUS #/AREA URNS HPF: ABNORMAL /HPF
TROPONIN T SERPL HS-MCNC: <6 NG/L
UROBILINOGEN UR QL STRIP: ABNORMAL
WBC # UR STRIP: ABNORMAL /HPF
WBC NRBC COR # BLD: 11.98 10*3/MM3 (ref 3.4–10.8)
WHOLE BLOOD HOLD SPECIMEN: NORMAL

## 2023-02-20 PROCEDURE — 96375 TX/PRO/DX INJ NEW DRUG ADDON: CPT

## 2023-02-20 PROCEDURE — 93010 ELECTROCARDIOGRAM REPORT: CPT | Performed by: INTERNAL MEDICINE

## 2023-02-20 PROCEDURE — 25010000002 ONDANSETRON PER 1 MG: Performed by: EMERGENCY MEDICINE

## 2023-02-20 PROCEDURE — 80053 COMPREHEN METABOLIC PANEL: CPT | Performed by: EMERGENCY MEDICINE

## 2023-02-20 PROCEDURE — 93005 ELECTROCARDIOGRAM TRACING: CPT

## 2023-02-20 PROCEDURE — 83735 ASSAY OF MAGNESIUM: CPT | Performed by: EMERGENCY MEDICINE

## 2023-02-20 PROCEDURE — 71045 X-RAY EXAM CHEST 1 VIEW: CPT

## 2023-02-20 PROCEDURE — 99284 EMERGENCY DEPT VISIT MOD MDM: CPT

## 2023-02-20 PROCEDURE — 93005 ELECTROCARDIOGRAM TRACING: CPT | Performed by: EMERGENCY MEDICINE

## 2023-02-20 PROCEDURE — 25010000002 KETOROLAC TROMETHAMINE PER 15 MG: Performed by: EMERGENCY MEDICINE

## 2023-02-20 PROCEDURE — 96374 THER/PROPH/DIAG INJ IV PUSH: CPT

## 2023-02-20 PROCEDURE — 84484 ASSAY OF TROPONIN QUANT: CPT | Performed by: EMERGENCY MEDICINE

## 2023-02-20 PROCEDURE — 81001 URINALYSIS AUTO W/SCOPE: CPT | Performed by: EMERGENCY MEDICINE

## 2023-02-20 PROCEDURE — 70450 CT HEAD/BRAIN W/O DYE: CPT

## 2023-02-20 PROCEDURE — 85025 COMPLETE CBC W/AUTO DIFF WBC: CPT | Performed by: EMERGENCY MEDICINE

## 2023-02-20 RX ORDER — SODIUM CHLORIDE 0.9 % (FLUSH) 0.9 %
10 SYRINGE (ML) INJECTION AS NEEDED
Status: DISCONTINUED | OUTPATIENT
Start: 2023-02-20 | End: 2023-02-20 | Stop reason: HOSPADM

## 2023-02-20 RX ORDER — ONDANSETRON 2 MG/ML
4 INJECTION INTRAMUSCULAR; INTRAVENOUS ONCE
Status: COMPLETED | OUTPATIENT
Start: 2023-02-20 | End: 2023-02-20

## 2023-02-20 RX ORDER — KETOROLAC TROMETHAMINE 30 MG/ML
30 INJECTION, SOLUTION INTRAMUSCULAR; INTRAVENOUS ONCE
Status: COMPLETED | OUTPATIENT
Start: 2023-02-20 | End: 2023-02-20

## 2023-02-20 RX ADMIN — KETOROLAC TROMETHAMINE 30 MG: 30 INJECTION, SOLUTION INTRAMUSCULAR; INTRAVENOUS at 19:39

## 2023-02-20 RX ADMIN — ONDANSETRON 4 MG: 2 INJECTION INTRAMUSCULAR; INTRAVENOUS at 19:39

## 2023-02-20 NOTE — ED TRIAGE NOTES
Patient reports having a possible seizure yesterday, she states that she fell down the steps. She states she thinks she might of hit her head. She states that she has nausea now, she states that her whole body hurts from the fall also. Patient states that the left side of her body hurts. Patient has on mask, nurse has on proper ppe.

## 2023-02-20 NOTE — ED TRIAGE NOTES
Patient had a fall yesterday when she had a seizures. She states that she has history of seizures. Patient here because she hit her head when she fell and states that she would like to be seen.

## 2023-02-20 NOTE — TELEPHONE ENCOUNTER
Provider: JOSH  Caller: PATIENT  Relationship to Patient: SELF  Pharmacy: [facility]  Phone Number: [phone]  Reason for Call: PATIENT HAS HAD TWO SEIZURES SINCE THE LAST TIME SHE CALLED (1/17/23) AND YESTERDAY HAD AN EPISODE SHE IS NOT SURE IF IT WAS A SEIZURE OR NOT.  STATES IT WAS NOT LIKE HER USUAL GRAND MAL SEIZURES, - STATES SHE DID LOSE CONSCIOUSNESS AND FELL OFF HER PORCH.  PATIENT STATES TODAY SHE HAS A HEADACHE, FEELS NAUSEOUS, LOOPY, AND DOES NOT FEEL RIGHT. PATIENT DID TRY CALLING THE OFFICE YESTERDAY BUT GOT A RECORDING THAT THE # WAS NOT IN SERVICE.  PATIENT WOULD LIKE A CALL BACK PLEASE TO ADVISE     THANK YOU

## 2023-02-20 NOTE — TELEPHONE ENCOUNTER
PT CALLED IN TO GET STATUS UPDATE AND IF IT WAS SENT TO THE DR. ADVISE IT WAS SENT AND RECEIVED. ADVISED PT TO ALLOW 48 HOURS FOR THE  CLINIC TO RESPOND. PT JUST WANTS TO KNOW IF SHE SHOULD GO TO THE ER. ADVISED TO GO TO THE NEAREST ER TO  MAKE SURE THAT ITS NOT A SEIZURE. PT WOULD LIKE A CALL BACK ASAP.     PLEASE REVIEW AND ADVISE

## 2023-02-21 NOTE — ED PROVIDER NOTES
EMERGENCY DEPARTMENT ENCOUNTER    Room Number:  40/40  Date seen:  2/20/2023  PCP: Provider, No Known  Historian: Patient      HPI:  Chief Complaint: Seizure, headache  A complete HPI/ROS/PMH/PSH/SH/FH are unobtainable due to: Nothing  Context: Alexandra Xiao is a 42 y.o. female who presents to the ED c/o seizure yesterday.  Patient reports that she fell down a couple steps and landed on grass.  She does have a history of seizures.  She has had 8 seizures in the last month.  She has already been in touch with her neurologist regarding the increased seizure frequency.  She reports compliance with her home medications.  She told her neurologist that she was having a really bad headache today and she had also gotten sick a couple of times they recommended she come be evaluated.  She denies fever or chills.  She denies neck pain.            PAST MEDICAL HISTORY  Active Ambulatory Problems     Diagnosis Date Noted   • Sebaceous cyst of breast, right 11/06/2018   • Abnormal uterine bleeding 03/23/2022   • Acute CVA (cerebrovascular accident) (HCC) 04/26/2022   • Microcytic anemia 04/27/2022   • Transaminitis 04/27/2022   • Epilepsy ([facility]) 04/27/2022   • Tobacco abuse 04/27/2022   • Major depressive disorder 01/01/2014     Resolved Ambulatory Problems     Diagnosis Date Noted   • No Resolved Ambulatory Problems     Past Medical History:   Diagnosis Date   • Abnormal bleeding in menstrual cycle    • Atrial fibrillation (HCC)    • Chest pain    • Depression    • Headache, tension-type    • Heavy menstrual bleeding    • Memory loss    • Migraine    • Mitral valve regurgitation    • Status post placement of implantable loop recorder    • Stroke (HCC)          PAST SURGICAL HISTORY  Past Surgical History:   Procedure Laterality Date   • BREAST BIOPSY     • D & C HYSTEROSCOPY ENDOMETRIAL ABLATION N/A 6/10/2022    Procedure: DILATATION AND CURETTAGE HYSTEROSCOPY NOVASURE ENDOMETRIAL ABLATION;  Surgeon: Ernesto Mendosa MD;   Location: [facility] MAIN OR;  Service: Obstetrics/Gynecology;  Laterality: N/A;   • FOOT SURGERY      pins in left foot    • OTHER SURGICAL HISTORY      VNS plate 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   • NOÉ      2022   • TUBAL ABDOMINAL LIGATION     • VAGUS NERVE STIMULATOR IMPLANTATION           FAMILY HISTORY  Family History   Problem Relation Age of Onset   • Breast cancer Mother 50         age 60 METS   • Arthritis Mother    • Arthritis Father    • Diabetes Father    • Heart disease Father    • Ovarian cancer Neg Hx    • Uterine cancer Neg Hx    • Colon cancer Neg Hx    • Deep vein thrombosis Neg Hx    • Pulmonary embolism Neg Hx    • Malig Hyperthermia Neg Hx          SOCIAL HISTORY  Social History     Socioeconomic History   • Marital status: Single   • Number of children: 2   Tobacco Use   • Smoking status: Former     Packs/day: 1.00     Years: 29.00     Pack years: 29.00     Types: Cigarettes   • Smokeless tobacco: Never   • Tobacco comments:     Quit 2022   Vaping Use   • Vaping Use: Every day   • Substances: Nicotine, Flavoring   • Devices: Disposable   Substance and Sexual Activity   • Alcohol use: Not Currently     Comment: caffeine 3 8 oZ rebulls daily    • Drug use: No   • Sexual activity: Yes     Partners: Male     Birth control/protection: Tubal ligation         ALLERGIES  Patient has no known allergies.        REVIEW OF SYSTEMS  Review of Systems   Review of all 14 systems is negative other than stated in the HPI above.      PHYSICAL EXAM  ED Triage Vitals   Temp Heart Rate Resp BP SpO2   23 1632 23 1611 23 1611 23 1630 23 1611   97 °F (36.1 °C) 120 18 159/97 99 %      Temp src Heart Rate Source Patient Position BP Location FiO2 (%)   -- -- -- -- --              Physical Exam      GENERAL: Awake and alert, no acute distress  HENT: nares patent, no tongue trauma, no scalp hematoma  EYES: no scleral icterus, pupils 3 mm reactive bilaterally  CV:  regular rhythm, normal rate  RESPIRATORY: normal effort  ABDOMEN: soft, nondistended, nontender throughout  MUSCULOSKELETAL: no deformity  NEURO: alert, moves all extremities, follows commands, cranial nerves II through XII are grossly intact, speech fluent and clear  PSYCH:  calm, cooperative  SKIN: warm, dry    Vital signs and nursing notes reviewed.          LAB RESULTS  Recent Results (from the past 24 hour(s))   ECG 12 Lead ED Triage Standing Order; Weak / Dizzy / AMS    Collection Time: 02/20/23  5:16 PM   Result Value Ref Range    QT Interval 362 ms   Comprehensive Metabolic Panel    Collection Time: 02/20/23  6:06 PM    Specimen: Blood   Result Value Ref Range    Glucose 105 (H) 65 - 99 mg/dL    BUN 4 (L) 6 - 20 mg/dL    Creatinine 0.57 0.57 - 1.00 mg/dL    Sodium 136 136 - 145 mmol/L    Potassium 4.0 3.5 - 5.2 mmol/L    Chloride 103 98 - 107 mmol/L    CO2 23.0 22.0 - 29.0 mmol/L    Calcium 9.1 8.6 - 10.5 mg/dL    Total Protein 6.8 6.0 - 8.5 g/dL    Albumin 3.9 3.5 - 5.2 g/dL    ALT (SGPT) 22 1 - 33 U/L    AST (SGOT) 18 1 - 32 U/L    Alkaline Phosphatase 147 (H) 39 - 117 U/L    Total Bilirubin 0.2 0.0 - 1.2 mg/dL    Globulin 2.9 gm/dL    A/G Ratio 1.3 g/dL    BUN/Creatinine Ratio 7.0 7.0 - 25.0    Anion Gap 10.0 5.0 - 15.0 mmol/L    eGFR 115.8 >60.0 mL/min/1.73   High Sensitivity Troponin T    Collection Time: 02/20/23  6:06 PM    Specimen: Blood   Result Value Ref Range    HS Troponin T <6 <10 ng/L   Magnesium    Collection Time: 02/20/23  6:06 PM    Specimen: Blood   Result Value Ref Range    Magnesium 1.9 1.6 - 2.6 mg/dL   Green Top (Gel)    Collection Time: 02/20/23  6:06 PM   Result Value Ref Range    Extra Tube Hold for add-ons.    Lavender Top    Collection Time: 02/20/23  6:06 PM   Result Value Ref Range    Extra Tube hold for add-on    Gold Top - SST    Collection Time: 02/20/23  6:06 PM   Result Value Ref Range    Extra Tube Hold for add-ons.    CBC Auto Differential    Collection Time: 02/20/23   6:06 PM    Specimen: Blood   Result Value Ref Range    WBC 11.98 (H) 3.40 - 10.80 10*3/mm3    RBC 4.45 3.77 - 5.28 10*6/mm3    Hemoglobin 11.3 (L) 12.0 - 15.9 g/dL    Hematocrit 34.5 34.0 - 46.6 %    MCV 77.5 (L) 79.0 - 97.0 fL    MCH 25.4 (L) 26.6 - 33.0 pg    MCHC 32.8 31.5 - 35.7 g/dL    RDW 15.3 12.3 - 15.4 %    RDW-SD 42.6 37.0 - 54.0 fl    MPV 10.4 6.0 - 12.0 fL    Platelets 298 140 - 450 10*3/mm3    Neutrophil % 78.4 (H) 42.7 - 76.0 %    Lymphocyte % 15.9 (L) 19.6 - 45.3 %    Monocyte % 4.9 (L) 5.0 - 12.0 %    Eosinophil % 0.3 0.3 - 6.2 %    Basophil % 0.2 0.0 - 1.5 %    Immature Grans % 0.3 0.0 - 0.5 %    Neutrophils, Absolute 9.39 (H) 1.70 - 7.00 10*3/mm3    Lymphocytes, Absolute 1.91 0.70 - 3.10 10*3/mm3    Monocytes, Absolute 0.59 0.10 - 0.90 10*3/mm3    Eosinophils, Absolute 0.04 0.00 - 0.40 10*3/mm3    Basophils, Absolute 0.02 0.00 - 0.20 10*3/mm3    Immature Grans, Absolute 0.03 0.00 - 0.05 10*3/mm3    nRBC 0.0 0.0 - 0.2 /100 WBC   Urinalysis With Microscopic If Indicated (No Culture) - Urine, Clean Catch    Collection Time: 02/20/23  7:43 PM    Specimen: Urine, Clean Catch   Result Value Ref Range    Color, UA Yellow Yellow, Straw    Appearance, UA Cloudy (A) Clear    pH, UA 6.0 5.0 - 8.0    Specific Gravity, UA 1.016 1.005 - 1.030    Glucose, UA Negative Negative    Ketones, UA Negative Negative    Bilirubin, UA Negative Negative    Blood, UA Negative Negative    Protein, UA Negative Negative    Leuk Esterase, UA Small (1+) (A) Negative    Nitrite, UA Negative Negative    Urobilinogen, UA 0.2 E.U./dL 0.2 - 1.0 E.U./dL   Urinalysis, Microscopic Only - Urine, Clean Catch    Collection Time: 02/20/23  7:43 PM    Specimen: Urine, Clean Catch   Result Value Ref Range    RBC, UA None Seen None Seen, 0-2 /HPF    WBC, UA 0-2 None Seen, 0-2 /HPF    Bacteria, UA Trace (A) None Seen /HPF    Squamous Epithelial Cells, UA 3-6 (A) None Seen, 0-2 /HPF    Hyaline Casts, UA None Seen None Seen /LPF    Methodology  Manual Light Microscopy        Ordered the above labs and reviewed the results.        RADIOLOGY  CT Head Without Contrast    Result Date: 2/20/2023  CT HEAD WO CONTRAST-  INDICATIONS: Seizure, headache  TECHNIQUE: Radiation dose reduction techniques were utilized, including automated exposure control and exposure modulation based on body size. Noncontrast head CT  COMPARISON: None available  FINDINGS:    No acute intracranial hemorrhage, midline shift or mass effect. No acute territorial infarct is identified.  Ventricles, cisterns, cerebral sulci are unremarkable for patient age.  The visualized paranasal sinuses, orbits, mastoid air cells are unremarkable.        No acute intracranial hemorrhage or hydrocephalus. If there is further clinical concern, MRI could be considered for further evaluation.  This report was finalized on 2/20/2023 7:11 PM by Dr. Cb Butts M.D.      XR Chest 1 View    Result Date: 2/20/2023  CHEST SINGLE VIEW  HISTORY: Fall, weakness, dizziness  COMPARISON: Two-view chest 05/25/2022, CT angiogram chest 04/30/2022  FINDINGS: The heart and the mediastinal structures are normal. There is a vagal nerve stimulator device overlying the left chest with lead extending to the left neck. Lungs appear clear and there is no evidence for pulmonary or pleural effusion or infiltrate      No interval change or evidence for active disease in the chest.  This report was finalized on 2/20/2023 6:12 PM by Dr. Alcides Thornton M.D.        Ordered the above noted radiological studies. Reviewed by me in PACS.            PROCEDURES  Procedures            MEDICATIONS GIVEN IN ER  Medications   ketorolac (TORADOL) injection 30 mg (30 mg Intravenous Given 2/20/23 1939)   ondansetron (ZOFRAN) injection 4 mg (4 mg Intravenous Given 2/20/23 1939)                   MEDICAL DECISION MAKING, PROGRESS, and CONSULTS    All labs have been independently reviewed by me.  All radiology studies have been reviewed by me  and I have also reviewed the radiology report.   EKG's independently viewed and interpreted by me.  Discussion below represents my analysis of pertinent findings related to patient's condition, differential diagnosis, treatment plan and final disposition.      Additional sources:      - External (non-ED) record review: I reviewed neurology office visit note from 12/16/2022 with Dr. Cb James.  They had planned to increase her vagus nerve stimulator settings at that time and continue her antiepileptics.  They also started on amitriptyline for headaches.          Orders placed during this visit:  Orders Placed This Encounter   Procedures   • XR Chest 1 View   • CT Head Without Contrast   • Comprehensive Metabolic Panel   • High Sensitivity Troponin T   • Magnesium   • Urinalysis With Microscopic If Indicated (No Culture) - Urine, Clean Catch   • CBC Auto Differential   • Urinalysis, Microscopic Only - Urine, Clean Catch   • Undress & Gown   • Continuous Pulse Oximetry   • Vital Signs   • Orthostatic Blood Pressure   • POC Glucose Once   • ECG 12 Lead ED Triage Standing Order; Weak / Dizzy / AMS   • CBC & Differential   • Green Top (Gel)   • Lavender Top   • Gold Top - SST             Differential diagnosis:    Concussion  Traumatic intracranial hemorrhage        Independent interpretation of labs, radiology studies, and discussions with consultants:  ED Course as of 02/21/23 0038   Mon Feb 20, 2023 1928 HS Troponin T: <6 [JR]   1928 CT brain without contrast independently interpreted in PACS.  There is no evidence of intracranial hemorrhage or hydrocephalus. [JR]   1929 Patient has been having more frequent seizures recently.  She had one yesterday with possible secondary head trauma and having acute headache and vomiting today.  Fortunately there is no evidence of intracranial hemorrhage however she does likely have a concussion.  She will need to follow-up closely with her neurologist, Dr. James. [JR]      ED  Course User Index  [JR] Rogerio Roberts MD             I wore an N95 mask, face shield, and gloves during this patient encounter.  Patient also wearing a surgical mask.  Hand hygeine performed before and after seeing the patient.    DIAGNOSIS  Final diagnoses:   Concussion with unknown loss of consciousness status, initial encounter   Seizure disorder (HCC)         DISPOSITION  DISCHARGE    Patient discharged in stable condition.    Reviewed implications of results, diagnosis, meds, responsibility to follow up, warning signs and symptoms of possible worsening, potential complications and reasons to return to ER.    Patient/Family voiced understanding of above instructions.    Discussed plan for discharge, as there is no emergent indication for admission. Patient referred to primary care provider for BP management due to today's BP. Pt/family is agreeable and understands need for follow up and repeat testing.  Pt is aware that discharge does not mean that nothing is wrong but it indicates no emergency is present that requires admission and they must continue care with follow-up as given below or physician of their choice.     FOLLOW-UP  Cb James II, MD  [address] [address]  [phone]    Call in 1 day           Medication List      No changes were made to your prescriptions during this visit.                   Latest Documented Vital Signs:  As of 00:38 EST  BP- 148/89 HR- 103 Temp- 97 °F (36.1 °C) O2 sat- 99%              --    Please note that portions of this were completed with a voice recognition program.       Note Disclaimer: At [facility], we believe that sharing information builds trust and better relationships. You are receiving this note because you are receiving care at [facility] or recently visited. It is possible you will see health information before a provider has talked with you about it. This kind of information can be easy to misunderstand. To  help you fully understand what it means for your health, we urge you to discuss this note with your provider.             Rogerio Roberts MD  02/21/23 0042

## 2023-03-05 ENCOUNTER — APPOINTMENT (OUTPATIENT)
Dept: GENERAL RADIOLOGY | Facility: HOSPITAL | Age: 43
DRG: 101 | End: 2023-03-05
Payer: MEDICARE

## 2023-03-05 ENCOUNTER — APPOINTMENT (OUTPATIENT)
Dept: CT IMAGING | Facility: HOSPITAL | Age: 43
DRG: 101 | End: 2023-03-05
Payer: MEDICARE

## 2023-03-05 ENCOUNTER — HOSPITAL ENCOUNTER (INPATIENT)
Facility: HOSPITAL | Age: 43
LOS: 2 days | Discharge: HOME | DRG: 101 | End: 2023-03-07
Attending: EMERGENCY MEDICINE | Admitting: INTERNAL MEDICINE
Payer: MEDICARE

## 2023-03-05 DIAGNOSIS — G40.909 NONINTRACTABLE EPILEPSY WITHOUT STATUS EPILEPTICUS, UNSPECIFIED EPILEPSY TYPE: ICD-10-CM

## 2023-03-05 DIAGNOSIS — E87.1 HYPONATREMIA: ICD-10-CM

## 2023-03-05 DIAGNOSIS — R53.1 LEFT-SIDED WEAKNESS: Primary | ICD-10-CM

## 2023-03-05 LAB
ABO GROUP BLD: NORMAL
ALBUMIN SERPL-MCNC: 3.3 G/DL (ref 3.5–5.2)
ALBUMIN/GLOB SERPL: 1.1 G/DL
ALP SERPL-CCNC: 135 U/L (ref 39–117)
ALT SERPL W P-5'-P-CCNC: 8 U/L (ref 1–33)
ANION GAP SERPL CALCULATED.3IONS-SCNC: 9 MMOL/L (ref 5–15)
APTT PPP: 26 SECONDS (ref 22.7–35.4)
AST SERPL-CCNC: 11 U/L (ref 1–32)
BASOPHILS # BLD AUTO: 0.03 10*3/MM3 (ref 0–0.2)
BASOPHILS NFR BLD AUTO: 0.4 % (ref 0–1.5)
BILIRUB SERPL-MCNC: <0.2 MG/DL (ref 0–1.2)
BLD GP AB SCN SERPL QL: NEGATIVE
BUN SERPL-MCNC: 9 MG/DL (ref 6–20)
BUN/CREAT SERPL: 14.8 (ref 7–25)
CALCIUM SPEC-SCNC: 8.3 MG/DL (ref 8.6–10.5)
CHLORIDE SERPL-SCNC: 95 MMOL/L (ref 98–107)
CO2 SERPL-SCNC: 24 MMOL/L (ref 22–29)
CREAT SERPL-MCNC: 0.61 MG/DL (ref 0.57–1)
DEPRECATED RDW RBC AUTO: 43.1 FL (ref 37–54)
EGFRCR SERPLBLD CKD-EPI 2021: 113.9 ML/MIN/1.73
EOSINOPHIL # BLD AUTO: 0.02 10*3/MM3 (ref 0–0.4)
EOSINOPHIL NFR BLD AUTO: 0.3 % (ref 0.3–6.2)
ERYTHROCYTE [DISTWIDTH] IN BLOOD BY AUTOMATED COUNT: 15 % (ref 12.3–15.4)
GLOBULIN UR ELPH-MCNC: 2.9 GM/DL
GLUCOSE BLDC GLUCOMTR-MCNC: 94 MG/DL (ref 70–130)
GLUCOSE SERPL-MCNC: 83 MG/DL (ref 65–99)
HCT VFR BLD AUTO: 31.1 % (ref 34–46.6)
HGB BLD-MCNC: 9.9 G/DL (ref 12–15.9)
HOLD SPECIMEN: NORMAL
HOLD SPECIMEN: NORMAL
IMM GRANULOCYTES # BLD AUTO: 0.02 10*3/MM3 (ref 0–0.05)
IMM GRANULOCYTES NFR BLD AUTO: 0.3 % (ref 0–0.5)
INR PPP: 1.03 (ref 0.9–1.1)
LYMPHOCYTES # BLD AUTO: 1.55 10*3/MM3 (ref 0.7–3.1)
LYMPHOCYTES NFR BLD AUTO: 19.9 % (ref 19.6–45.3)
MCH RBC QN AUTO: 25.3 PG (ref 26.6–33)
MCHC RBC AUTO-ENTMCNC: 31.8 G/DL (ref 31.5–35.7)
MCV RBC AUTO: 79.5 FL (ref 79–97)
MONOCYTES # BLD AUTO: 0.5 10*3/MM3 (ref 0.1–0.9)
MONOCYTES NFR BLD AUTO: 6.4 % (ref 5–12)
NEUTROPHILS NFR BLD AUTO: 5.68 10*3/MM3 (ref 1.7–7)
NEUTROPHILS NFR BLD AUTO: 72.7 % (ref 42.7–76)
NRBC BLD AUTO-RTO: 0 /100 WBC (ref 0–0.2)
PLATELET # BLD AUTO: 331 10*3/MM3 (ref 140–450)
PMV BLD AUTO: 9.9 FL (ref 6–12)
POTASSIUM SERPL-SCNC: 3.8 MMOL/L (ref 3.5–5.2)
PROT SERPL-MCNC: 6.2 G/DL (ref 6–8.5)
PROTHROMBIN TIME: 13.6 SECONDS (ref 11.7–14.2)
QT INTERVAL: 375 MS
RBC # BLD AUTO: 3.91 10*6/MM3 (ref 3.77–5.28)
RH BLD: POSITIVE
SODIUM SERPL-SCNC: 128 MMOL/L (ref 136–145)
T&S EXPIRATION DATE: NORMAL
TROPONIN T SERPL HS-MCNC: <6 NG/L
WBC NRBC COR # BLD: 7.8 10*3/MM3 (ref 3.4–10.8)
WHOLE BLOOD HOLD COAG: NORMAL
WHOLE BLOOD HOLD SPECIMEN: NORMAL

## 2023-03-05 PROCEDURE — 71045 X-RAY EXAM CHEST 1 VIEW: CPT

## 2023-03-05 PROCEDURE — 85730 THROMBOPLASTIN TIME PARTIAL: CPT | Performed by: EMERGENCY MEDICINE

## 2023-03-05 PROCEDURE — 82962 GLUCOSE BLOOD TEST: CPT

## 2023-03-05 PROCEDURE — 80053 COMPREHEN METABOLIC PANEL: CPT | Performed by: EMERGENCY MEDICINE

## 2023-03-05 PROCEDURE — 86900 BLOOD TYPING SEROLOGIC ABO: CPT | Performed by: EMERGENCY MEDICINE

## 2023-03-05 PROCEDURE — 93005 ELECTROCARDIOGRAM TRACING: CPT | Performed by: EMERGENCY MEDICINE

## 2023-03-05 PROCEDURE — 84484 ASSAY OF TROPONIN QUANT: CPT | Performed by: EMERGENCY MEDICINE

## 2023-03-05 PROCEDURE — 85610 PROTHROMBIN TIME: CPT | Performed by: EMERGENCY MEDICINE

## 2023-03-05 PROCEDURE — 93010 ELECTROCARDIOGRAM REPORT: CPT | Performed by: INTERNAL MEDICINE

## 2023-03-05 PROCEDURE — 70496 CT ANGIOGRAPHY HEAD: CPT

## 2023-03-05 PROCEDURE — 99223 1ST HOSP IP/OBS HIGH 75: CPT | Performed by: PSYCHIATRY & NEUROLOGY

## 2023-03-05 PROCEDURE — 25510000001 IOPAMIDOL PER 1 ML: Performed by: EMERGENCY MEDICINE

## 2023-03-05 PROCEDURE — 0042T HC CT CEREBRAL PERFUSION W/WO CONTRAST: CPT

## 2023-03-05 PROCEDURE — 70498 CT ANGIOGRAPHY NECK: CPT

## 2023-03-05 PROCEDURE — 99285 EMERGENCY DEPT VISIT HI MDM: CPT

## 2023-03-05 PROCEDURE — 86901 BLOOD TYPING SEROLOGIC RH(D): CPT | Performed by: EMERGENCY MEDICINE

## 2023-03-05 PROCEDURE — 85025 COMPLETE CBC W/AUTO DIFF WBC: CPT | Performed by: EMERGENCY MEDICINE

## 2023-03-05 PROCEDURE — 86850 RBC ANTIBODY SCREEN: CPT | Performed by: EMERGENCY MEDICINE

## 2023-03-05 RX ORDER — SODIUM CHLORIDE 9 MG/ML
40 INJECTION, SOLUTION INTRAVENOUS AS NEEDED
Status: DISCONTINUED | OUTPATIENT
Start: 2023-03-05 | End: 2023-03-07 | Stop reason: HOSPADM

## 2023-03-05 RX ORDER — LACOSAMIDE 100 MG/1
200 TABLET ORAL EVERY 12 HOURS SCHEDULED
Status: DISCONTINUED | OUTPATIENT
Start: 2023-03-05 | End: 2023-03-07 | Stop reason: HOSPADM

## 2023-03-05 RX ORDER — SODIUM CHLORIDE 0.9 % (FLUSH) 0.9 %
10 SYRINGE (ML) INJECTION AS NEEDED
Status: DISCONTINUED | OUTPATIENT
Start: 2023-03-05 | End: 2023-03-07 | Stop reason: HOSPADM

## 2023-03-05 RX ORDER — AMITRIPTYLINE HYDROCHLORIDE 25 MG/1
25 TABLET, FILM COATED ORAL NIGHTLY
COMMUNITY

## 2023-03-05 RX ORDER — ASPIRIN 325 MG
325 TABLET, DELAYED RELEASE (ENTERIC COATED) ORAL DAILY
Status: DISCONTINUED | OUTPATIENT
Start: 2023-03-05 | End: 2023-03-05 | Stop reason: SDUPTHER

## 2023-03-05 RX ORDER — ACETAMINOPHEN 500 MG
1000 TABLET ORAL ONCE
Status: COMPLETED | OUTPATIENT
Start: 2023-03-05 | End: 2023-03-05

## 2023-03-05 RX ORDER — ATORVASTATIN CALCIUM 20 MG/1
40 TABLET, FILM COATED ORAL NIGHTLY
Status: DISCONTINUED | OUTPATIENT
Start: 2023-03-05 | End: 2023-03-07 | Stop reason: HOSPADM

## 2023-03-05 RX ORDER — ONDANSETRON 2 MG/ML
4 INJECTION INTRAMUSCULAR; INTRAVENOUS EVERY 6 HOURS PRN
Status: DISCONTINUED | OUTPATIENT
Start: 2023-03-05 | End: 2023-03-07 | Stop reason: HOSPADM

## 2023-03-05 RX ORDER — SODIUM CHLORIDE 0.9 % (FLUSH) 0.9 %
10 SYRINGE (ML) INJECTION EVERY 12 HOURS SCHEDULED
Status: DISCONTINUED | OUTPATIENT
Start: 2023-03-05 | End: 2023-03-07 | Stop reason: HOSPADM

## 2023-03-05 RX ORDER — AMITRIPTYLINE HYDROCHLORIDE 25 MG/1
25 TABLET, FILM COATED ORAL NIGHTLY
Status: DISCONTINUED | OUTPATIENT
Start: 2023-03-05 | End: 2023-03-07 | Stop reason: HOSPADM

## 2023-03-05 RX ORDER — ASPIRIN 325 MG
325 TABLET ORAL DAILY
Status: DISCONTINUED | OUTPATIENT
Start: 2023-03-05 | End: 2023-03-07 | Stop reason: HOSPADM

## 2023-03-05 RX ORDER — SODIUM CHLORIDE 9 MG/ML
75 INJECTION, SOLUTION INTRAVENOUS CONTINUOUS
Status: DISCONTINUED | OUTPATIENT
Start: 2023-03-05 | End: 2023-03-07

## 2023-03-05 RX ORDER — LACOSAMIDE 100 MG/1
200 TABLET ORAL EVERY 12 HOURS SCHEDULED
Status: DISCONTINUED | OUTPATIENT
Start: 2023-03-05 | End: 2023-03-05 | Stop reason: SDUPTHER

## 2023-03-05 RX ADMIN — ASPIRIN 325 MG: 325 TABLET ORAL at 17:07

## 2023-03-05 RX ADMIN — ATORVASTATIN CALCIUM 40 MG: 20 TABLET, FILM COATED ORAL at 20:53

## 2023-03-05 RX ADMIN — ACETAMINOPHEN 1000 MG: 500 TABLET ORAL at 13:47

## 2023-03-05 RX ADMIN — AMITRIPTYLINE HYDROCHLORIDE 25 MG: 25 TABLET, FILM COATED ORAL at 20:53

## 2023-03-05 RX ADMIN — ESLICARBAZEPINE ACETATE 400 MG: 400 TABLET ORAL at 21:48

## 2023-03-05 RX ADMIN — ESLICARBAZEPINE ACETATE 800 MG: 800 TABLET ORAL at 21:48

## 2023-03-05 RX ADMIN — Medication 10 ML: at 20:54

## 2023-03-05 RX ADMIN — SODIUM CHLORIDE 75 ML/HR: 9 INJECTION, SOLUTION INTRAVENOUS at 17:08

## 2023-03-05 RX ADMIN — LACOSAMIDE 200 MG: 100 TABLET, FILM COATED ORAL at 20:53

## 2023-03-05 RX ADMIN — IOPAMIDOL 150 ML: 755 INJECTION, SOLUTION INTRAVENOUS at 12:06

## 2023-03-05 NOTE — CONSULTS
Stroke Consult Note    Patient Name: Alexandra Xiao   MRN: [id number]  Age: 42 y.o.  Sex: female  : 1980    Primary Care Physician: Provider, No Known  Referring Physician:  No ref. provider found    Handedness: Right  Race: White    Chief Complaint/Reason for Consultation: Seizure and left-sided numbness    Subjective .  HPI: 42-year-old right-handed white female with known diagnosis of embolic strokes, epilepsy, status post VNS placement, on Vimpat and Aptiom, chronic daily headaches, migraines, who was getting ready to go to [employer] when she noticed that she had trouble using her left leg and then this was followed by numbness in the left leg/face/arm, and had trouble speaking.  Her symptoms had started to get better by the time she came to the ER, she still has some left-sided numbness.  She has had these kind of symptoms and has been diagnosed with focal seizures, for which her Aptiom was recently increased.  In 2022, she had been admitted with seizures, and was noted to have small acute to subacute embolic strokes, and had loop recorder placed, which has been negative for any A-fib.  Patient is on aspirin and statins, and is compliant with her medications.  She follows up with Dr. James for her migraines and seizures.    Last Known Normal Date/Time: 8:30 AM EST     Review of Systems   Constitutional: Positive for fatigue.   Neurological: Positive for speech difficulty and numbness.   All other systems reviewed and are negative.     Past Medical History:   Diagnosis Date   • Abnormal bleeding in menstrual cycle    • Atrial fibrillation (HCC)    • Chest pain     ER VISIT MAY, 2022   • Depression    • Epilepsy (HCC)     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,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   • Headache, tension-type    • Heavy menstrual bleeding     WITH CLOTS   • Memory loss    • Migraine    • Mitral valve regurgitation    • Status post placement of implantable loop recorder    • Stroke (HCC)     MARCH AND      Past  Surgical History:   Procedure Laterality Date   • BREAST BIOPSY     • D & C HYSTEROSCOPY ENDOMETRIAL ABLATION N/A 6/10/2022    Procedure: DILATATION AND CURETTAGE HYSTEROSCOPY NOVASURE ENDOMETRIAL ABLATION;  Surgeon: Ernesto Mendosa MD;  Location: [facility] OR;  Service: Obstetrics/Gynecology;  Laterality: N/A;   • FOOT SURGERY      pins in left foot    • OTHER SURGICAL HISTORY      VNS plate 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   • NOÉ      2022   • TUBAL ABDOMINAL LIGATION     • VAGUS NERVE STIMULATOR IMPLANTATION       Family History   Problem Relation Age of Onset   • Breast cancer Mother 50         age 60 METS   • Arthritis Mother    • Arthritis Father    • Diabetes Father    • Heart disease Father    • Ovarian cancer Neg Hx    • Uterine cancer Neg Hx    • Colon cancer Neg Hx    • Deep vein thrombosis Neg Hx    • Pulmonary embolism Neg Hx    • Malig Hyperthermia Neg Hx      Social History     Socioeconomic History   • Marital status: Single   • Number of children: 2   Tobacco Use   • Smoking status: Former     Packs/day: 1.00     Years: 29.00     Pack years: 29.00     Types: Cigarettes   • Smokeless tobacco: Never   • Tobacco comments:     Quit 2022   Vaping Use   • Vaping Use: Every day   • Substances: Nicotine, Flavoring   • Devices: Disposable   Substance and Sexual Activity   • Alcohol use: Not Currently     Comment: caffeine 3 8 oZ rebulls daily    • Drug use: No   • Sexual activity: Yes     Partners: Male     Birth control/protection: Tubal ligation     No Known Allergies  Prior to Admission medications    Medication Sig Start Date End Date Taking? Authorizing Provider   aspirin 325 MG tablet Take 1 tablet by mouth Daily. 6/15/22  Yes Derrek Coffmana AKIRSTIE   Vimpat 200 MG tablet Take 1 tablet by mouth Every 12 (Twelve) Hours for 30 days. BRAND NAME ONLY NO SUBSTITUITONS DISPENSE AS WRITTEN 2/10/23 3/12/23 Yes Cb James II, MD   amitriptyline (ELAVIL) 25 MG tablet  Take 1 tablet by mouth Every Night.    ProviderRosy MD   aspirin  MG tablet Take 325 mg by mouth Daily.    ProviderRosy MD   atorvastatin (LIPITOR) 40 MG tablet TAKE 1 TABLET BY MOUTH EVERY NIGHT 12/27/22   Cb James II, MD   Eslicarbazepine Acetate (Aptiom) 400 MG tablet Take 1 tablet by mouth Daily for 30 days. 2/24/23 3/26/23  Cb James II, MD   Eslicarbazepine Acetate (Aptiom) 800 MG tablet tablet Take 1 tablet by mouth Daily for 30 days. 1/17/23 2/16/23  Cb James II, MD   naproxen (EC NAPROSYN) 500 MG EC tablet Take 500 mg by mouth.    ProviderRosy MD   Vimpat 200 MG tablet Take 1 tablet by mouth Every 12 (Twelve) Hours for 30 days. Brand-name only please 2/10/23 3/12/23  Cb James II, MD             Objective     Temp:  [98.4 °F (36.9 °C)-98.9 °F (37.2 °C)] 98.4 °F (36.9 °C)  Heart Rate:  [] 101  Resp:  [16] 16  BP: (154-166)/(106-109) 166/106  Neurological Exam  Mental Status  Awake, alert and oriented to person, place and time.Alert. Mild dysarthria present. Language is fluent with no aphasia.    Cranial Nerves  CN II: Visual fields full to confrontation.  CN III, IV, VI: Extraocular movements intact bilaterally.  CN V: Facial sensation is normal.  CN VII: Full and symmetric facial movement.  CN IX, X: Palate elevates symmetrically  CN XI: Shoulder shrug strength is normal.  CN XII: Tongue midline without atrophy or fasciculations.    Motor  Normal muscle bulk throughout. No fasciculations present. Normal muscle tone.  There is some effort related weakness on the left side, fluctuating when I examine her.  She is at least 4/5 on left upper and lower extremity.    Sensory  Decreased to light touch in left upper and lower extremity.     Coordination    No dysmetria.    Gait    Not assessed.      Physical Exam  Vitals and nursing note reviewed.   Constitutional:       Appearance: Normal appearance.   HENT:      Head: Normocephalic and atraumatic.    Eyes:      Extraocular Movements: Extraocular movements intact.   Cardiovascular:      Rate and Rhythm: Normal rate and regular rhythm.   Pulmonary:      Effort: Pulmonary effort is normal. No respiratory distress.   Musculoskeletal:      Cervical back: Normal range of motion and neck supple.   Neurological:      Mental Status: She is alert.      Cranial Nerves: Dysarthria present.   Psychiatric:         Mood and Affect: Mood normal.         Behavior: Behavior normal.         Acute Stroke Data    IV Thrombolytic (TPA/Tenecteplase) Inclusion / Exclusion Criteria    Time: 13:20 EST  Person Administering Scale: Harsha Mason MD    Inclusion Criteria  [x]   18 years of age or greater   []   Onset of symptoms < 4.5 hours before beginning treatment (stroke onset = time patient was last seen well or without symptoms).   []   Diagnosis of acute ischemic stroke causing measurable disabling deficit (Complete Hemianopia, Any Aphasia, Visual or Sensory Extinction, Any weakness limiting sustained effort against gravity)   []   Any remaining deficit considered potentially disabling in view of patient and practitioner   Exclusion criteria (Do not proceed with Alteplase if any are checked under exclusion criteria)  [x]   Onset unknown or GREATER than 4.5 hours   []   ICH on CT/MRI   []   CT demonstrates hypodensity representing acute or subacute infarct   []   Significant head trauma or prior stroke in the previous 3 months   []   Symptoms suggestive of subarachnoid hemorrhage   []   History of un-ruptured intracranial aneurysm GREATER than 10 mm   []   Recent intracranial or intraspinal surgery within the last 3 months   []   Arterial puncture at a non-compressible site in the previous 7 days   []   Active internal bleeding   []   Acute bleeding tendency   []   Platelet count LESS than 100,000 for known hematological diseases such as leukemia, thrombocytopenia or chronic cirrhosis   []   Current use of anticoagulant with INR  GREATER than 1.7 or PT GREATER than 15 seconds, aPTT GREATER than 40 seconds   []   Heparin received within 48 hours, resulting in abnormally elevated aPTT GREATER than upper limit of normal   []   Current use of direct thrombin inhibitors or direct factor Xa inhibitors in the past 48 hours   []   Elevated blood pressure refractory to treatment (systolic GREATER than 185 mm/Hg or diastolic  GREATER than 110 mm/Hg   []   Suspected infective endocarditis and aortic arch dissection   []   Current use of therapeutic treatment dose of low-molecular-weight heparin (LMWH) within the previous 24 hours   []   Structural GI malignancy or bleed   Relative exclusion for all patients  []   Only minor nondisabling symptoms   []   Pregnancy   []   Seizure at onset with postictal residual neurological impairments   []   Major surgery or previous trauma within past 14 days   []   History of previous spontaneous ICH, intracranial neoplasm, or AV malformation   []   Postpartum (within previous 14 days)   []   Recent GI or urinary tract hemorrhage (within previous 21 days)   []   Recent acute MI (within previous 3 months)   []   History of unruptured intracranial aneurysm LESS than 10 mm   []   History of ruptured intracranial aneurysm   []   Blood glucose LESS than 50 mg/dL (2.7 mmol/L)   []   Dural puncture within the last 7 days   []   Known GREATER than 10 cerebral microbleeds   Additional exclusions for patients with symptoms onset between 3 and 4.5 hours.  []   Age > 80.   []   On any anticoagulants regardless of INR  >>> Warfarin (Coumadin), Heparin, Enoxaparin (Lovenox), fondaparinux (Arixtra), bivalirudin (Angiomax), Argatroban, dabigatran (Pradaxa), rivaroxaban (Xarelto), or apixaban (Eliquis)   []   Severe stroke (NIHSS > 25).   []   History of BOTH diabetes and previous ischemic stroke.   []   The risks and benefits have been discussed with the patient or family related to the administration of IV alteplase for stroke  symptoms.   []   I have discussed and reviewed the patient's case and imaging with the attending prior to IV Thrombolytic (TPA/Tenecteplase).    Time Thrombolytic administered       Hospital Meds:  Scheduled-    Infusions-     PRNs- •  sodium chloride    Functional Status Prior to Current Stroke/Lower Brule Score: 0    NIH Stroke Scale  Time: 13:20 EST  Person Administering Scale: Harsha Mason MD    1a  Level of consciousness: 0=alert; keenly responsive   1b. LOC questions:  0=Performs both tasks correctly   1c. LOC commands: 0=Performs both tasks correctly   2.  Best Gaze: 0=normal   3.  Visual: 0=No visual loss   4. Facial Palsy: 0=Normal symmetric movement   5a.  Motor left arm: 0=No drift, limb holds 90 (or 45) degrees for full 10 seconds   5b.  Motor right arm: 0=No drift, limb holds 90 (or 45) degrees for full 10 seconds   6a. motor left le=No drift, limb holds 90 (or 45) degrees for full 10 seconds   6b  Motor right le=No drift, limb holds 90 (or 45) degrees for full 10 seconds   7. Limb Ataxia: 0=Absent   8.  Sensory: 1=Mild to moderate sensory loss; patient feels pinprick is less sharp or is dull on the affected side; there is a loss of superficial pain with pinprick but patient is aware She is being touched   9. Best Language:  0=No aphasia, normal   10. Dysarthria: 1=Mild to moderate, patient slurs at least some words and at worst, can be understood with some difficulty   11. Extinction and Inattention: 0=No abnormality    Total:   2       Results Reviewed:  I have personally reviewed current lab, radiology, and data  CT head shows no acute changes, no hemorrhage  CT angiogram of head and neck shows no significant stenosis/occlusion.Questionable left cervical segment left ICA aneurysm or pseudoaneurysm.    Results for orders placed during the hospital encounter of 22    Adult Transesophageal Echo 3D (NOÉ) W/ Cont If Necessary Per Protocol    Interpretation Summary  · Estimated left  ventricular EF = 60% Left ventricular ejection fraction appears to be 61 - 65%. Left ventricular systolic function is normal.  · There is moderate, holosystolic mitral valve prolapse located in the central (A2) scallop(s) of the anterior mitral leaflet. There is moderate, anterior mitral leaflet thickening present.  · Moderate to severe mitral valve regurgitation is present with an eccentric jet noted.            Assessment/Plan:      1. Left-sided numbness.  This could very well be secondary to her seizures.  She does have history of acute to subacute embolic looking strokes, for which we will recommend to keep her in the hospital.  Get MRI brain without contrast, routine EEG.  Continue her seizure medications including her Vimpat and Aptiom at home dose.  Further management based on the work-up above.  2. Epilepsy.  Management as above.  3. Questionable left cervical segment ICA aneurysm.  Recommend repeat CT angiogram of the neck in about 6 months.  4. Increase activity as tolerated.    Case was discussed with patient, her , nursing and the ER physician.  Thank you for the consult.          Harsha Mason MD  March 5, 2023  13:20 EST

## 2023-03-05 NOTE — ED PROVIDER NOTES
EMERGENCY DEPARTMENT ENCOUNTER    Room Number:  12/12  Date seen:  3/5/2023  PCP: Provider, No Known  Historian: Patient, EMS,  at bedside      HPI:  Chief Complaint: Seizure, left-sided weakness  A complete HPI/ROS/PMH/PSH/SH/FH are unobtainable due to:   Context: Alexandra Xiao is a 42 y.o. female who presents to the ED c/o seizure, left-sided weakness.  Patient has a history of epilepsy and has had increasing seizures over the last month.  Her outpatient neurologist, Dr. James increased her Aptiom recently.  She woke up this morning with headache and not feeling very well and was up and moving around.  Around 10:00 she had a noted seizure according to her  where she looked off to the right and was unresponsive for several minutes.  Subsequent to this patient had weakness and numbness to the left side of her body including the face arm and leg.  EMS was called and brought her to our facility.      MEDICAL RECORD REVIEW (non ED)  I reviewed prior medical records including recent admission from last year where she was hospitalized with likely embolic stroke with 2 small strokes in a right MCA distribution.  Patient was discharged home on aspirin 325 mg.  She is also been continue to followed by Dr. Cb James from neurology.  She takes both Aptiom and Vimpat for seizures.    PAST MEDICAL HISTORY  Active Ambulatory Problems     Diagnosis Date Noted   • Sebaceous cyst of breast, right 11/06/2018   • Abnormal uterine bleeding 03/23/2022   • Acute CVA (cerebrovascular accident) (HCC) 04/26/2022   • Microcytic anemia 04/27/2022   • Transaminitis 04/27/2022   • Epilepsy ([facility]) 04/27/2022   • Tobacco abuse 04/27/2022   • Major depressive disorder 01/01/2014     Resolved Ambulatory Problems     Diagnosis Date Noted   • No Resolved Ambulatory Problems     Past Medical History:   Diagnosis Date   • Abnormal bleeding in menstrual cycle    • Atrial fibrillation (HCC)    • Chest pain    • Depression    •  Headache, tension-type    • Heavy menstrual bleeding    • Memory loss    • Migraine    • Mitral valve regurgitation    • Status post placement of implantable loop recorder    • Stroke (HCC)          PAST SURGICAL HISTORY  Past Surgical History:   Procedure Laterality Date   • BREAST BIOPSY     • D & C HYSTEROSCOPY ENDOMETRIAL ABLATION N/A 6/10/2022    Procedure: DILATATION AND CURETTAGE HYSTEROSCOPY NOVASURE ENDOMETRIAL ABLATION;  Surgeon: Ernesto Mendosa MD;  Location: [facility] OR;  Service: Obstetrics/Gynecology;  Laterality: N/A;   • FOOT SURGERY      pins in left foot    • OTHER SURGICAL HISTORY      VNS plate 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   • NOÉ      2022   • TUBAL ABDOMINAL LIGATION     • VAGUS NERVE STIMULATOR IMPLANTATION           FAMILY HISTORY  Family History   Problem Relation Age of Onset   • Breast cancer Mother 50         age 60 METS   • Arthritis Mother    • Arthritis Father    • Diabetes Father    • Heart disease Father    • Ovarian cancer Neg Hx    • Uterine cancer Neg Hx    • Colon cancer Neg Hx    • Deep vein thrombosis Neg Hx    • Pulmonary embolism Neg Hx    • Malig Hyperthermia Neg Hx          SOCIAL HISTORY  Social History     Socioeconomic History   • Marital status: Single   • Number of children: 2   Tobacco Use   • Smoking status: Former     Packs/day: 1.00     Years: 29.00     Pack years: 29.00     Types: Cigarettes   • Smokeless tobacco: Never   • Tobacco comments:     Quit 2022   Vaping Use   • Vaping Use: Every day   • Substances: Nicotine, Flavoring   • Devices: Disposable   Substance and Sexual Activity   • Alcohol use: Not Currently     Comment: caffeine 3 8 oZ rebulls daily    • Drug use: No   • Sexual activity: Yes     Partners: Male     Birth control/protection: Tubal ligation         ALLERGIES  Patient has no known allergies.        REVIEW OF SYSTEMS  Review of Systems   Constitutional: Negative for fever.   Respiratory: Negative for  shortness of breath.    Cardiovascular: Negative for chest pain.   Neurological: Positive for weakness, numbness and headaches.   All other systems reviewed and are negative.           PHYSICAL EXAM  ED Triage Vitals [03/05/23 1120]   Temp Heart Rate Resp BP SpO2   -- (!) 122 16 -- 96 %      Temp src Heart Rate Source Patient Position BP Location FiO2 (%)   -- Monitor -- -- --       Physical Exam    GENERAL: Alert female in no obvious distress.  Triage vitals reviewed and notable for initial heart rate of 122.  O2 sats are benign.  Initial blood pressure 154/109  HENT: nares patent, atraumatic  EYES: no scleral icterus  CV: regular rhythm, regular rate-no murmur  RESPIRATORY: normal effort, clear to auscultation bilaterally  ABDOMEN: soft, obese-nontender to palpation  MUSCULOSKELETAL: no deformity  NEURO:   Patient is awake alert and oriented x3.  She answers questions appropriately.  She follows commands without difficulty.  Strength- moderate left facial weakness, mild left upper extremity weakness, moderate left lower extremity weakness with drift.  Sensation-diffuse decree sensation about the left face left leg and left arm.  Vision- extraocular muscles and visual fields intact although patient is having some diplopia.  Speech- moderate dysarthria, no significant aphasia  Cerebellar testing is grossly intact although she does report quadruple vision.  SKIN: warm, dry      Vital signs and nursing notes reviewed.          LAB RESULTS  Recent Results (from the past 24 hour(s))   POC Glucose Once    Collection Time: 03/05/23 11:23 AM    Specimen: Blood   Result Value Ref Range    Glucose 94 70 - 130 mg/dL   Comprehensive Metabolic Panel    Collection Time: 03/05/23 12:17 PM    Specimen: Blood   Result Value Ref Range    Glucose 83 65 - 99 mg/dL    BUN 9 6 - 20 mg/dL    Creatinine 0.61 0.57 - 1.00 mg/dL    Sodium 128 (L) 136 - 145 mmol/L    Potassium 3.8 3.5 - 5.2 mmol/L    Chloride 95 (L) 98 - 107 mmol/L    CO2  24.0 22.0 - 29.0 mmol/L    Calcium 8.3 (L) 8.6 - 10.5 mg/dL    Total Protein 6.2 6.0 - 8.5 g/dL    Albumin 3.3 (L) 3.5 - 5.2 g/dL    ALT (SGPT) 8 1 - 33 U/L    AST (SGOT) 11 1 - 32 U/L    Alkaline Phosphatase 135 (H) 39 - 117 U/L    Total Bilirubin <0.2 0.0 - 1.2 mg/dL    Globulin 2.9 gm/dL    A/G Ratio 1.1 g/dL    BUN/Creatinine Ratio 14.8 7.0 - 25.0    Anion Gap 9.0 5.0 - 15.0 mmol/L    eGFR 113.9 >60.0 mL/min/1.73   Protime-INR    Collection Time: 03/05/23 12:17 PM    Specimen: Blood   Result Value Ref Range    Protime 13.6 11.7 - 14.2 Seconds    INR 1.03 0.90 - 1.10   aPTT    Collection Time: 03/05/23 12:17 PM    Specimen: Blood   Result Value Ref Range    PTT 26.0 22.7 - 35.4 seconds   Single High Sensitivity Troponin T    Collection Time: 03/05/23 12:17 PM    Specimen: Blood   Result Value Ref Range    HS Troponin T <6 <10 ng/L   Type & Screen    Collection Time: 03/05/23 12:17 PM    Specimen: Blood   Result Value Ref Range    ABO Type O     RH type Positive     Antibody Screen Negative     T&S Expiration Date 3/8/2023 11:59:59 PM    Green Top (Gel)    Collection Time: 03/05/23 12:17 PM   Result Value Ref Range    Extra Tube Hold for add-ons.    Lavender Top    Collection Time: 03/05/23 12:17 PM   Result Value Ref Range    Extra Tube hold for add-on    Gold Top - SST    Collection Time: 03/05/23 12:17 PM   Result Value Ref Range    Extra Tube Hold for add-ons.    Light Blue Top    Collection Time: 03/05/23 12:17 PM   Result Value Ref Range    Extra Tube Hold for add-ons.    CBC Auto Differential    Collection Time: 03/05/23 12:17 PM    Specimen: Blood   Result Value Ref Range    WBC 7.80 3.40 - 10.80 10*3/mm3    RBC 3.91 3.77 - 5.28 10*6/mm3    Hemoglobin 9.9 (L) 12.0 - 15.9 g/dL    Hematocrit 31.1 (L) 34.0 - 46.6 %    MCV 79.5 79.0 - 97.0 fL    MCH 25.3 (L) 26.6 - 33.0 pg    MCHC 31.8 31.5 - 35.7 g/dL    RDW 15.0 12.3 - 15.4 %    RDW-SD 43.1 37.0 - 54.0 fl    MPV 9.9 6.0 - 12.0 fL    Platelets 331 140 - 450  10*3/mm3    Neutrophil % 72.7 42.7 - 76.0 %    Lymphocyte % 19.9 19.6 - 45.3 %    Monocyte % 6.4 5.0 - 12.0 %    Eosinophil % 0.3 0.3 - 6.2 %    Basophil % 0.4 0.0 - 1.5 %    Immature Grans % 0.3 0.0 - 0.5 %    Neutrophils, Absolute 5.68 1.70 - 7.00 10*3/mm3    Lymphocytes, Absolute 1.55 0.70 - 3.10 10*3/mm3    Monocytes, Absolute 0.50 0.10 - 0.90 10*3/mm3    Eosinophils, Absolute 0.02 0.00 - 0.40 10*3/mm3    Basophils, Absolute 0.03 0.00 - 0.20 10*3/mm3    Immature Grans, Absolute 0.02 0.00 - 0.05 10*3/mm3    nRBC 0.0 0.0 - 0.2 /100 WBC   ECG 12 Lead Stroke Evaluation    Collection Time: 03/05/23 12:19 PM   Result Value Ref Range    QT Interval 375 ms       Ordered the above labs and independently interpreted results. My findings will be discussed in the medical decision making section below        RADIOLOGY  XR Chest 1 View    Result Date: 3/5/2023  Emergency portable view of the chest on 03/05/2023  CLINICAL HISTORY: Acute stroke protocol  This is correlated to a portable view of the chest on 02/20/2023.  FINDINGS: There is a loop recorder device projecting over the anteromedial soft tissues the left mid hemithorax at the horizontal level of the T6-7 thoracic level. There is also a vagus nerve stimulator pack projecting over the lateral left chest with stimulator lead coursing into left-sided neck. The cardiomediastinal silhouette and the pulmonary vasculature are within normal limits. The lungs are clear. The costophrenic angles are sharp.      1. No active disease is seen in the chest with no significant change when compared to a recent chest x-ray on 02/20/2023 just 13 days ago. 2. There is a loop recorder device projecting over the anteromedial soft tissues of the left chest at the T6-7 thoracic level and there is a vagus nerve stimulator pack over the lateral aspect of the left mid hemithorax with a lead coursing into the soft tissues of the left side of the neck.  This report was finalized on 3/5/2023 12:44  PM by Dr. Jay Cid M.D.      CT Angiogram Head w AI Analysis of LVO, CT Angiogram Neck, CT CEREBRAL PERFUSION WITH & WITHOUT CONTRAST    Result Date: 3/5/2023  EMERGENCY CONTRAST-ENHANCED CT ANGIOGRAM OF THE HEAD AND NECK AND CT PERFUSION STUDY OF THE BRAIN ON 03/05/2023  CLINICAL HISTORY: Team D, acute stroke. The patient has left-sided weakness, double vision, slurred speech.  NONCONTRAST HEAD CT TECHNIQUE: Spiral CT images were obtained from the base of the skull to the vertex without intravenous contrast. The images were reformatted and are submitted in 3 mm thick axial CT sections with brain algorithm and 2 mm thick sagittal and coronal reconstructions were performed and submitted in brain algorithm.  COMPARISON: This is correlated to a prior MRI of the brain from [facility] on 04/22/2022 and a noncontrast head CT on 02/20/2023.  FINDINGS: On the prior MRI of the brain on 04/22/2023 there were 2 acute infarcts measuring 4-5 mm in size, 1 involving the posterior superior right frontal subcortical white matter and 1 involving the posterior superior lateral right frontal cortex. These were in the right middle cerebral artery territory. No discernible chronic infarct is seen at these sites. Furthermore the prior MRI demonstrated tiny crescentic chronic infarcts in the left cerebral hemisphere with a 6 x 2 mm chronic infarct in the superior lateral left frontal cortex and a 3 mm chronic infarct in the posterior superior left frontal cortex in the left MCA territory that are unable to be appreciated on the current exam. Overall the brain parenchyma is normal in attenuation. The ventricles are normal in size. I see no focal mass effect. There is no midline shift. No extra-axial fluid collections are identified and there is no evidence of acute intracranial hemorrhage. The calvarium and the skull base are normal in appearance. The paranasal sinuses, mastoid air cells and middle ear cavities are  clear.      1. No acute intracranial abnormality is identified with no discernible acute infarct or intracranial hemorrhage seen. The etiology of the patient's left-sided weakness, blurred vision and slurred speech is not established on this exam. 2. The prior MRI of the brain from [facility] on 04/22/2022 demonstrates 2 separate tiny 4-5 mm acute infarcts in the right cerebral hemisphere, 1 involving the posterior superior right frontal subcortical white matter and another involving the posterior superior right frontal cortex both in the right MCA territory and demonstrated a tiny 6 x 2 mm chronic superior lateral left frontal cortical infarct and a 3 mm old posterior superior left frontal cortical infarct in the left MCA territory.  These chronic bilateral MCA territory infarcts are unable to be appreciated on this head CT but suggest that the patient may have a central or cardiac embolic source. The results of this study were communicated to Dr. Mason from stroke neurology while the patient was still on our scanner on 03/05/2023 at 11:45 AM and he requested a CT angiogram of the head and neck and CT perfusion study of the head.   CT ANGIOGRAM OF THE HEAD AND NECK AND CT PERFUSION STUDY OF THE BRAIN TECHNIQUE: Spiral CT images were obtained from the mid cerebellum up through the cerebral hemispheres for a 10 cm vertical slab of brain imaging during the arterial phase of contrast and images were reformatted and analyzed with rapid software analysis for CT perfusion study of the brain. Subsequently spiral CT angiogram images were obtained from the top of the aortic arch up through the great vessels of the head and neck during the arterial phase of contrast and images were reformatted and submitted in 1 mm thick axial, sagittal and coronal CT sections with soft tissue algorithm and 3D reconstructions were performed to complete the CT angiogram of the head and neck and finally spiral CT images were  obtained from the base of the skull to the vertex delayed following intravenous contrast and these images were reformatted and submitted in 3 mm thick axial CT sections with brain algorithm and 2 mm thick sagittal and coronal reconstructions were performed and submitted in brain algorithm.  FINDINGS: CT PERFUSION STUDY OF THE BRAIN: The CT perfusion study of the brain consists of a 10 cm vertical slab of brain imaging from the mid cerebellum up through the cerebral hemispheres and excludes the inferior half of the cerebellum and the medulla which are not assessed. Within the 10 cm vertical slab of brain imaging I see no areas of reduced cerebral blood flow to less than 30% of normal and thus no acute completed infarct and no areas of delayed time to maximal enhancement of greater than 6 seconds and thus no himanshu hypoperfused brain.  CT ANGIOGRAM OF THE NECK: The nasopharynx, oropharynx, hypopharynx, true cords and subglottic airway are normal in appearance. The thyroid gland is mildly enlarged and there are several small thyroid nodules. The lung apices are clear. The parotid, , parapharyngeal and submandibular spaces are symmetric and are normal in appearance. The cervical spine is unremarkable. There is anatomic origin of the great vessels off the aortic arch. Noncalcified plaque mildly narrows the left subclavian artery origin. Beyond its origin the left subclavian artery is widely patent without stenosis. The left vertebral artery origin is normal in appearance. No stenosis is seen in the left vertebral artery from its origin to the vertebrobasilar junction. The left common carotid origin is normal in appearance and no stenosis is seen in the left common carotid artery and its bifurcation into the left internal and external carotid arteries is normal in appearance and no stenosis is seen in the cervical segment of the left internal carotid artery. Just below the left petrous carotid canal is a tiny 2 x  1 mm area of luminal contrast that extends into the left lateral wall of the upper cervical left internal carotid artery. It may be an ulcerated noncalcified plaque. This is best seen on coronal reconstructed image 111 and axial image 181 sequence 8. The brachiocephalic artery origin is normal in appearance and no stenosis is seen in the brachiocephalic artery. There is noncalcified plaque that circumscribes and mild to moderately narrows the origin and proximal 1 cm of the right subclavian artery beyond which the mid and distal right subclavian artery is normal in caliber. The right vertebral artery origin is normal in appearance. There is contrast densely opacifying the adjacent veins that streaks through the proximal right vertebral artery and limits evaluation of the right vertebral artery to the C7 cervical level and above this the cervical segment of the right vertebral artery is normal in appearance as is its intracranial segment to the vertebrobasilar junction. The right common carotid origin is normal in appearance and no stenosis is seen in the right common carotid artery and its bifurcation into the right internal and external carotid arteries is normal in appearance and no stenosis is seen in the cervical segment of the right internal carotid artery using the NASCET criteria.  CT ANGIOGRAM OF THE HEAD: The intracranial segments of the distal vertebral arteries are widely to the vertebrobasilar junction. The basilar artery and the basilar tip are normal in appearance. The posterior cerebral and the superior cerebellar arteries are normal in appearance. The petrous, cavernous and supracavernous segments of the internal carotid arteries are within normal limits. The A1 segments of the anterior cerebral arteries, the anterior communicating artery origin and the A2 segments of the anterior cerebral arteries are within normal limits. The M1 segments of the middle cerebral arteries and the middle cerebral  artery bifurcations are within normal limits. The visualized M2 branches and the sylvian fissure appear to be patent.  IMPRESSION: 1. The noncontrast head CT demonstrates no acute intracranial abnormality with no discernible acute completed infarct or intracranial hemorrhage identified. On the prior MRI of the brain on 04/22/2022 there were 2 separate 4-5 mm acute infarcts in the right cerebral hemisphere, 1 involving the posterior superior right frontal subcortical white matter and 1 involving the posterior superior lateral right frontal vertex both in the right MCA territory. There were 2 separate tiny old infarcts in the left MCA territory, 1 measuring 6 x 2 mm in the posterior superior lateral left frontal cortex and 1 measuring 3 mm in the posterior superior left frontal cortex. These chronic infarcts are unable to be appreciated on the noncontrast head CT but the bilateral MCA territories suggest the patient has chronic central or cardiac embolic source. Correlation with clinical history is suggested. The results of the noncontrast head CT were communicated to Dr. Mason from stroke neurology while the patient was still on our scanner on 03/05/2023 at 11:45 AM and he requested a CT angiogram of the head and neck and CT perfusion study of the brain. 2. CT perfusion study of the brain consists of a 10 cm vertical slab of brain imaging from the mid cerebellum up through the cerebral hemispheres and excludes the inferior third of the cerebellum and the medulla which are not assessed. Within the 10 cm vertical slab of brain imaging I see no areas of reduced cerebral blood flow to less than 30% of normal and thus no acute completed infarct and no areas of delayed time to maximal enhancement of greater than 6 seconds and thus no himanshu hypoperfused brain. 3. On the CT angiogram of the neck there is a focal subtle 2 x 1 mm nodular outpouching off the left lateral wall of the upper cervical left internal carotid artery  just below the petrous carotid canal. The finding is nonspecific It could be contrast extending into an ulcerated atherosclerotic plaque or a very subtle tiny aneurysm or pseudoaneurysm at this site. There is also noncalcified plaque circumscribing and mild to moderately narrowing the origin of the proximal 1 cm of the right subclavian artery. There are portions of the proximal right vertebral artery obscured by beam hardening artifact and not evaluated. Otherwise the remainder of the CT angiogram of the head and neck is within normal limits. The results and final dictated study were communicated to Dr. Mason from stroke neurology on 03/05/2023 at 12:25 PM.  AI analysis of LVO was utilized.  Radiation dose reduction techniques were utilized, including automated exposure control and exposure modulation based on body size.          I ordered and independently reviewed the above noted radiographic studies.              PROCEDURES  Critical Care  Performed by: Abebe Rodriguez MD  Authorized by: Abebe Rodriguez MD     Critical care provider statement:     Critical care time (minutes):  40    Critical care was necessary to treat or prevent imminent or life-threatening deterioration of the following conditions:  CNS failure or compromise    Critical care was time spent personally by me on the following activities:  Re-evaluation of patient's condition, pulse oximetry, ordering and review of radiographic studies, ordering and review of laboratory studies, review of old charts, discussions with consultants, development of treatment plan with patient or surrogate, obtaining history from patient or surrogate and examination of patient              MEDICATIONS GIVEN IN ER  Medications   sodium chloride 0.9 % flush 10 mL (has no administration in time range)   iopamidol (ISOVUE-370) 76 % injection 150 mL (150 mL Intravenous Given by Other 3/5/23 1206)               MEDICAL DECISION MAKING, PROGRESS, and CONSULTS    All labs  have been independently reviewed by me.  All radiology studies have been reviewed by me and I have also reviewed the radiology report.   EKG's independently viewed and interpreted by me.  Discussion below represents my analysis of pertinent findings related to patient's condition, differential diagnosis, treatment plan and final disposition.      Additional sources:  - Discussed/ obtained information from independent historians: EMS,  at bedside    - External (non-ED) record review: Please see documented above    - Chronic or social conditions impacting care: Prior stroke, seizure disorder, chronic headache    - Shared decision making: I discussed ED evaluation and treatment plan with patient who is in agreement.  Complicated patient with history of prior stroke, seizure and chronic headaches presents after seizure this morning.  After the seizure she has had some left-sided weakness and numbness.  Team stroke was called and I discussed management with a stroke neurologist.  Patient went over for CT angiogram and perfusion which did not show any obvious stroke hemorrhage or large vessel occlusion.  Symptoms have somewhat improved but still remain.  Plan admission to the medical service for further work-up and likely MRI.      Orders placed during this visit:  Orders Placed This Encounter   Procedures   • Critical Care   • CT Angiogram Head w AI Analysis of LVO   • CT Angiogram Neck   • CT CEREBRAL PERFUSION WITH & WITHOUT CONTRAST   • XR Chest 1 View   • [place] Draw   • Comprehensive Metabolic Panel   • Protime-INR   • aPTT   • Single High Sensitivity Troponin T   • CBC Auto Differential   • NPO Diet NPO Type: Strict NPO   • Initiate Department's Acute Stroke Process (Team D, Code 19, etc.)   • Perform NIH Stroke Scale   • Measure Actual Weight   • Notify MD for SBP < 80 or > 200   • Notify Provider for SBP greater than 140 if hemorrhagic stroke   • Head of bed 30 Degrees or Less   • Undress and Gown   •  Cardiac Monitoring   • Continuous Pulse Oximetry   • Vital Signs   • Neuro Checks   • No Hypotonic Fluids   • Nursing Swallow Assessment   • Inpatient Neurology Consult Stroke   • Inpatient Neurology Consult Stroke   • [facility] (on-call MD unless specified) Details   • Oxygen Therapy- Nasal Cannula; 2 LPM; Titrate for SPO2: equal to or greater than, 94%   • POC Glucose Once   • POC Glucose Once   • ECG 12 Lead Stroke Evaluation   • Type & Screen   • Insert Large-Bore Peripheral IV - RIGHT AC Preferred   • Inpatient Admission   • CBC & Differential   • Green Top (Gel)   • Lavender Top   • Gold Top - SST   • [place]           Differential diagnosis:    Please see as documented below in ED course      Independent interpretation of labs, radiology studies, and discussions with consultants:  ED Course as of 03/05/23 1338   Sun Mar 05, 2023   1140 MDM-complicated 42-year-old female presents after seizure and then left-sided neurologic symptoms.  She does have history of prior stroke from April of last year with likely embolic source.    On exam patient has fairly moderate left-sided weakness involving the face arm and leg.  There is also some left sensory deficit.  There is moderate dysarthria and quadruple vision.    I did call a team D and spoke with Dr. Medel who is on-call for stroke neurology.    Patient may well not be a candidate for tPA given her recent seizure and possibility of Godfrey's paralysis.  We will go ahead and get imaging studies to include noncontrasted and contrasted images of the brain and vessels.    It is unclear whether we are dealing with Godfrey's paralysis or actual stroke with large vessel occlusion.    I spent at least 20 minutes in initial evaluation and assessment of this patient including old record review, patient evaluation, documentation, test ordering and discussion with stroke neurologist. [DB]   1287 I discussed results of CT imaging with Dr. Cid.  No obvious acute stroke or hemorrhage.   Angiogram does not show obvious thrombosis.  [DB]   1304 On repeat evaluation patient has still some persistent left-sided weakness and numbness but is improving from prior admission.  This may represent Godfrey's neurolysis or small vessel stroke.  Will admit to the hospital service for further evaluation treatment. [DB]   1308 EKG independently interpreted    Time 12:19 PM  Sinus 99  P waves-normal P waves, slightly prolonged ND interval  QRS-normal axis, normal QRS  ST-T wave, unremarkable    Not significant change compared to 2/20/2023 [DB]   1328 I discussed treatment evaluation this patient with Dr. Jean Baptiste will admit on behalf of [facility]. [DB]   1329 Labs are reviewed and are fairly unremarkable.  CBC shows hemoglobin 9.9.  Chemistries show moderate hyponatremia with sodium of 128.  This is slightly worsened when compared to prior in certain clinical significance. [DB]   1330 I discussed again evaluation with Dr. Medel from neurology service and he feels patient should be admitted to the medical service for further work-up and treatment.  Continue home medications. [DB]      ED Course User Index  [DB] Abebe Rodriguez MD             I used full protective equipment while examining this patient.  This includes face mask, gloves and protective eyewear.  I washed my hands before entering the room and immediately upon leaving the room    DIAGNOSIS  Final diagnoses:   Left-sided weakness   Nonintractable epilepsy without status epilepticus, unspecified epilepsy type (HCC)   Hyponatremia         DISPOSITION  Admission            Latest Documented Vital Signs:  As of 13:38 EST  BP- 141/99 HR- 98 Temp- 98.4 °F (36.9 °C) (Oral) O2 sat- 94%              --    Please note that portions of this were completed with a voice recognition program.       Note Disclaimer: At [facility], we believe that sharing information builds trust and better relationships. You are receiving this note because you are receiving care at [facility] or recently visited. It is possible you will see health information before a provider has talked with you about it. This kind of information can be easy to misunderstand. To help you fully understand what it means for your health, we urge you to discuss this note with your provider.           Abebe Rodriguez MD  03/05/23 1788       Abebe Rodriguez MD  03/05/23 8682

## 2023-03-05 NOTE — ED TRIAGE NOTES
Pt to ed from home via PV.      Pt c/o left sided numbness. LKN 1040. Pt hx seizure. Pt had one today and since then has not been able use left side.

## 2023-03-05 NOTE — H&P
Internal medicine history and physical  INTERNAL MEDICINE   [facility]       Patient Identification:  Name: Alexandra Xiao  Age: 42 y.o.  Sex: female  :  1980  MRN: [id number]                   Primary Care Physician: Provider, No Known                               Date of admission:3/5/2023    Chief Complaint: Left-sided numbness and weakness since 10:00 this morning.    History of Present Illness:   Patient is a 42-year-old female who has complicated past medical history including history of recalcitrant seizures for which she is on multiple seizure medications and has a VNS in place and usually her seizures are grand mal seizures.  In this background patient recalls having a fall following a seizure on 2023 for which she came to the emergency room and was evaluated for headache following her fall.  Work-up was unremarkable in terms of acute injury and had a negative CT scan of the head and patient was discharged with seizure precautions and follow-up with the neurology service.  In this background patient felt overall better since until last night when she felt tired and weak but took her medications and went to bed.  According to her the plan today was to go to some discussion and then subsequently discharged.  When she woke up this morning she was unsteady and not feeling well and all over the place since and it was decided that they will skip the  school.  She went to bed after taking her medication and woke up again later due to her first charge and could not put her clothes on and was very weak on the left side.  Because of this and she could remember everything that happened in sequence she thought everything was on and patient was brought to the emergency room by her .  Evidently she was thought to have a seizure type activity where her  described her looking off to the right and unresponsive for several minutes.  Patient remembers all of that.  Patient  has been having increased frequency of seizures lately.  Work-up in the emergency room did not show any acute intracranial process or hemorrhage and CT angiogram did not show any acute thrombosis.  Because of persistent residual left-sided weakness and numbness which is dramatically improved compared to when she come into the hospital neurology service was consulted and patient is being admitted for further care and neurology evaluation.  Patient endorses compliance to her medications.  Metabolic work-up did reveal hyponatremia.      Past Medical History:  Past Medical History:   Diagnosis Date   • Abnormal bleeding in menstrual cycle    • Atrial fibrillation (HCC)    • Chest pain     ER VISIT MAY, 2022   • Depression    • Epilepsy (HCC)     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,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   • Headache, tension-type    • Heavy menstrual bleeding     WITH CLOTS   • Memory loss    • Migraine    • Mitral valve regurgitation    • Status post placement of implantable loop recorder    • Stroke (HCC)     MARCH AND APRIL, 2022     Past Surgical History:  Past Surgical History:   Procedure Laterality Date   • BREAST BIOPSY     • D & C HYSTEROSCOPY ENDOMETRIAL ABLATION N/A 6/10/2022    Procedure: DILATATION AND CURETTAGE HYSTEROSCOPY NOVASURE ENDOMETRIAL ABLATION;  Surgeon: Ernesto Mendosa MD;  [facility];  Service: Obstetrics/Gynecology;  Laterality: N/A;   • FOOT SURGERY      pins in left foot    • OTHER SURGICAL HISTORY      VNS plate 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   • NÉO      04/2022   • TUBAL ABDOMINAL LIGATION     • VAGUS NERVE STIMULATOR IMPLANTATION        Home Meds:  Medications Prior to Admission   Medication Sig Dispense Refill Last Dose   • amitriptyline (ELAVIL) 25 MG tablet Take 1 tablet by mouth Every Night.   3/4/2023   • aspirin 325 MG tablet Take 1 tablet by mouth Daily. 30 tablet 0 3/5/2023   • aspirin  MG tablet Take 1 tablet by mouth Daily.   3/5/2023   • atorvastatin (LIPITOR)  "40 MG tablet TAKE 1 TABLET BY MOUTH EVERY NIGHT 30 tablet 3 3/4/2023   • ESLICARBAZEPINE ACETATE PO Take 1,200 mg by mouth Every Night.   3/4/2023   • Vimpat 200 MG tablet Take 1 tablet by mouth Every 12 (Twelve) Hours for 30 days. Brand-name only please 60 tablet 1 3/5/2023   • Vimpat 200 MG tablet Take 1 tablet by mouth Every 12 (Twelve) Hours for 30 days. BRAND NAME ONLY NO SUBSTITUITONS DISPENSE AS WRITTEN 60 tablet 2 3/5/2023     Current Meds:     Current Facility-Administered Medications:   •  sodium chloride 0.9 % flush 10 mL, 10 mL, Intravenous, PRN, Jennifer, Abebe NUÑEZ MD  Allergies:  No Known Allergies  Social History:   Social History     Tobacco Use   • Smoking status: Former     Packs/day: 1.00     Years: 29.00     Pack years: 29.00     Types: Cigarettes   • Smokeless tobacco: Never   • Tobacco comments:     Quit 2022   Substance Use Topics   • Alcohol use: Not Currently     Comment: caffeine 3 8 oZ rebulls daily       Family History:  Family History   Problem Relation Age of Onset   • Breast cancer Mother 50         age 60 METS   • Arthritis Mother    • Arthritis Father    • Diabetes Father    • Heart disease Father    • Ovarian cancer Neg Hx    • Uterine cancer Neg Hx    • Colon cancer Neg Hx    • Deep vein thrombosis Neg Hx    • Pulmonary embolism Neg Hx    • Malig Hyperthermia Neg Hx           Review of Systems  See history of present illness and past medical history.    Constitutional: Negative for fever.   Respiratory: Negative for shortness of breath.    Cardiovascular: Negative for chest pain.   Neurological: Positive for weakness, numbness and headaches.   All other systems reviewed and are negative.    Vitals:   BP (!) 175/101 (BP Location: Right arm, Patient Position: Lying)   Pulse 98   Temp 98.3 °F (36.8 °C) (Oral)   Resp 18   Ht 193 cm (76\")   Wt 80.3 kg (177 lb)   SpO2 96%   BMI 21.55 kg/m²   I/O: No intake or output data in the 24 hours ending 23 1614  Exam:  Patient " is examined using the personal protective equipment as per guidelines from infection control for this particular patient as enacted.  Hand washing was performed before and after patient interaction.  General Appearance:   Alert cooperative sitting up in her bed and able to recall events and proper future treatment problem with his speech or movement of arm or legs.  From the onset of her symptoms till she arrived on the floor she is still have some residual left-sided deficit but was able to ambulate to the bathroom without assistance and feels like almost back to baseline.  According to her the whole left-sided weakness and unsteadiness lasted from 10 AM to 7 PM.   Head:    Normocephalic, without obvious abnormality, atraumatic   Eyes:    PERRL, conjunctiva/corneas clear, EOM's intact, both eyes   Ears:    Normal external ear canals, both ears   Nose:   Nares normal, septum midline, mucosa normal, no drainage    or sinus tenderness   Throat:   Lips, tongue, gums normal; oral mucosa pink and moist   Neck:   Supple, symmetrical, trachea midline, no adenopathy;     thyroid:  no enlargement/tenderness/nodules; no carotid    bruit or JVD   Back:     Symmetric, no curvature, ROM normal, no CVA tenderness   Lungs:     Clear to auscultation bilaterally, respirations unlabored   Chest Wall:   Venous device in place    Heart:   S1-S2 regular   Abdomen:    Obese soft nontender   Extremities:   Extremities normal, atraumatic, no cyanosis or edema   Pulses:   Pulses palpable in all extremities; symmetric all extremities   Skin:   Skin color normal, Skin is warm and dry,  no rashes or palpable lesions   Neurologic:  At present grossly nonfocal       Data Review:      I reviewed the patient's new clinical results.  Results from last 7 days   Lab Units 03/05/23  1217   WBC 10*3/mm3 7.80   HEMOGLOBIN g/dL 9.9*   PLATELETS 10*3/mm3 331     Results from last 7 days   Lab Units 03/05/23  1217   SODIUM mmol/L 128*   POTASSIUM mmol/L  3.8   CHLORIDE mmol/L 95*   CO2 mmol/L 24.0   BUN mg/dL 9   CREATININE mg/dL 0.61   CALCIUM mg/dL 8.3*   GLUCOSE mg/dL 83     CT Head Without Contrast    Result Date: 2/20/2023   No acute intracranial hemorrhage or hydrocephalus. If there is further clinical concern, MRI could be considered for further evaluation.  This report was finalized on 2/20/2023 7:11 PM by Dr. Cb Butts M.D.      CT Angiogram Neck    Result Date: 3/5/2023  1. No acute intracranial abnormality is identified with no discernible acute infarct or intracranial hemorrhage seen. The etiology of the patient's left-sided weakness, blurred vision and slurred speech is not established on this exam. 2. The prior MRI of the brain [facility] on 04/22/2022 demonstrates 2 separate tiny 4-5 mm acute infarcts in the right cerebral hemisphere, 1 involving the posterior superior right frontal subcortical white matter and another involving the posterior superior right frontal cortex both in the right MCA territory and demonstrated a tiny 6 x 2 mm chronic superior lateral left frontal cortical infarct and a 3 mm old posterior superior left frontal cortical infarct in the left MCA territory.  These chronic bilateral MCA territory infarcts are unable to be appreciated on this head CT but suggest that the patient may have a central or cardiac embolic source. The results of this study were communicated to Dr. Mason from stroke neurology while the patient was still on our scanner on 03/05/2023 at 11:45 AM and he requested a CT angiogram of the head and neck and CT perfusion study of the head.   CT ANGIOGRAM OF THE HEAD AND NECK AND CT PERFUSION STUDY OF THE BRAIN TECHNIQUE: Spiral CT images were obtained from the mid cerebellum up through the cerebral hemispheres for a 10 cm vertical slab of brain imaging during the arterial phase of contrast and images were reformatted and analyzed with rapid software analysis for CT perfusion study of the  brain. Subsequently spiral CT angiogram images were obtained from the top of the aortic arch up through the great vessels of the head and neck during the arterial phase of contrast and images were reformatted and submitted in 1 mm thick axial, sagittal and coronal CT sections with soft tissue algorithm and 3D reconstructions were performed to complete the CT angiogram of the head and neck and finally spiral CT images were obtained from the base of the skull to the vertex delayed following intravenous contrast and these images were reformatted and submitted in 3 mm thick axial CT sections with brain algorithm and 2 mm thick sagittal and coronal reconstructions were performed and submitted in brain algorithm.  FINDINGS: CT PERFUSION STUDY OF THE BRAIN: The CT perfusion study of the brain consists of a 10 cm vertical slab of brain imaging from the mid cerebellum up through the cerebral hemispheres and excludes the inferior half of the cerebellum and the medulla which are not assessed. Within the 10 cm vertical slab of brain imaging I see no areas of reduced cerebral blood flow to less than 30% of normal and thus no acute completed infarct and no areas of delayed time to maximal enhancement of greater than 6 seconds and thus no himanshu hypoperfused brain.  CT ANGIOGRAM OF THE NECK: The nasopharynx, oropharynx, hypopharynx, true cords and subglottic airway are normal in appearance. The thyroid gland is mildly enlarged and there are several small thyroid nodules. The lung apices are clear. The parotid, , parapharyngeal and submandibular spaces are symmetric and are normal in appearance. The cervical spine is unremarkable. There is anatomic origin of the great vessels off the aortic arch. Noncalcified plaque mildly narrows the left subclavian artery origin. Beyond its origin the left subclavian artery is widely patent without stenosis. The left vertebral artery origin is normal in appearance. No stenosis is seen in  the left vertebral artery from its origin to the vertebrobasilar junction. The left common carotid origin is normal in appearance and no stenosis is seen in the left common carotid artery and its bifurcation into the left internal and external carotid arteries is normal in appearance and no stenosis is seen in the cervical segment of the left internal carotid artery. Just below the left petrous carotid canal is a tiny 2 x 1 mm area of luminal contrast that extends into the left lateral wall of the upper cervical left internal carotid artery. It may be an ulcerated noncalcified plaque. This is best seen on coronal reconstructed image 111 and axial image 181 sequence 8. The brachiocephalic artery origin is normal in appearance and no stenosis is seen in the brachiocephalic artery. There is noncalcified plaque that circumscribes and mild to moderately narrows the origin and proximal 1 cm of the right subclavian artery beyond which the mid and distal right subclavian artery is normal in caliber. The right vertebral artery origin is normal in appearance. There is contrast densely opacifying the adjacent veins that streaks through the proximal right vertebral artery and limits evaluation of the right vertebral artery to the C7 cervical level and above this the cervical segment of the right vertebral artery is normal in appearance as is its intracranial segment to the vertebrobasilar junction. The right common carotid origin is normal in appearance and no stenosis is seen in the right common carotid artery and its bifurcation into the right internal and external carotid arteries is normal in appearance and no stenosis is seen in the cervical segment of the right internal carotid artery using the NASCET criteria.  CT ANGIOGRAM OF THE HEAD: The intracranial segments of the distal vertebral arteries are widely to the vertebrobasilar junction. The basilar artery and the basilar tip are normal in appearance. The posterior  cerebral and the superior cerebellar arteries are normal in appearance. The petrous, cavernous and supracavernous segments of the internal carotid arteries are within normal limits. The A1 segments of the anterior cerebral arteries, the anterior communicating artery origin and the A2 segments of the anterior cerebral arteries are within normal limits. The M1 segments of the middle cerebral arteries and the middle cerebral artery bifurcations are within normal limits. The visualized M2 branches and the sylvian fissure appear to be patent.  IMPRESSION: 1. The noncontrast head CT demonstrates no acute intracranial abnormality with no discernible acute completed infarct or intracranial hemorrhage identified. On the prior MRI of the brain on 04/22/2022 there were 2 separate 4-5 mm acute infarcts in the right cerebral hemisphere, 1 involving the posterior superior right frontal subcortical white matter and 1 involving the posterior superior lateral right frontal vertex both in the right MCA territory. There were 2 separate tiny old infarcts in the left MCA territory, 1 measuring 6 x 2 mm in the posterior superior lateral left frontal cortex and 1 measuring 3 mm in the posterior superior left frontal cortex. These chronic infarcts are unable to be appreciated on the noncontrast head CT but the bilateral MCA territories suggest the patient has chronic central or cardiac embolic source. Correlation with clinical history is suggested. The results of the noncontrast head CT were communicated to Dr. Mason from stroke neurology while the patient was still on our scanner on 03/05/2023 at 11:45 AM and he requested a CT angiogram of the head and neck and CT perfusion study of the brain. 2. CT perfusion study of the brain consists of a 10 cm vertical slab of brain imaging from the mid cerebellum up through the cerebral hemispheres and excludes the inferior third of the cerebellum and the medulla which are not assessed. Within the 10  cm vertical slab of brain imaging I see no areas of reduced cerebral blood flow to less than 30% of normal and thus no acute completed infarct and no areas of delayed time to maximal enhancement of greater than 6 seconds and thus no himanshu hypoperfused brain. 3. On the CT angiogram of the neck there is a focal subtle 2 x 1 mm nodular outpouching off the left lateral wall of the upper cervical left internal carotid artery just below the petrous carotid canal. The finding is nonspecific It could be contrast extending into an ulcerated atherosclerotic plaque or a very subtle tiny aneurysm or pseudoaneurysm at this site. There is also noncalcified plaque circumscribing and mild to moderately narrowing the origin of the proximal 1 cm of the right subclavian artery. There are portions of the proximal right vertebral artery obscured by beam hardening artifact and not evaluated. Otherwise, the remainder of the CT angiogram of the head and neck is within normal limits. The results and final dictated study were communicated to Dr. Mason from stroke neurology on 03/05/2023 at 12:25 PM.  AI analysis of LVO was utilized.  Radiation dose reduction techniques were utilized, including automated exposure control and exposure modulation based on body size.  This report was finalized on 3/5/2023 7:38 PM by Dr. Jay Cid M.D.      XR Chest 1 View    Result Date: 3/5/2023  1. No active disease is seen in the chest with no significant change when compared to a recent chest x-ray on 02/20/2023 just 13 days ago. 2. There is a loop recorder device projecting over the anteromedial soft tissues of the left chest at the T6-7 thoracic level and there is a vagus nerve stimulator pack over the lateral aspect of the left mid hemithorax with a lead coursing into the soft tissues of the left side of the neck.  This report was finalized on 3/5/2023 12:44 PM by Dr. Jay Cid M.D.      XR Chest 1 View    Result Date: 2/20/2023  No interval change  or evidence for active disease in the chest.  This report was finalized on 2/20/2023 6:12 PM by Dr. Alcides Thornton M.D.      CT Angiogram Head w AI Analysis of LVO    Result Date: 3/5/2023  1. No acute intracranial abnormality is identified with no discernible acute infarct or intracranial hemorrhage seen. The etiology of the patient's left-sided weakness, blurred vision and slurred speech is not established on this exam. 2. The prior MRI of the brain from [facility] on 04/22/2022 demonstrates 2 separate tiny 4-5 mm acute infarcts in the right cerebral hemisphere, 1 involving the posterior superior right frontal subcortical white matter and another involving the posterior superior right frontal cortex both in the right MCA territory and demonstrated a tiny 6 x 2 mm chronic superior lateral left frontal cortical infarct and a 3 mm old posterior superior left frontal cortical infarct in the left MCA territory.  These chronic bilateral MCA territory infarcts are unable to be appreciated on this head CT but suggest that the patient may have a central or cardiac embolic source. The results of this study were communicated to Dr. Mason from stroke neurology while the patient was still on our scanner on 03/05/2023 at 11:45 AM and he requested a CT angiogram of the head and neck and CT perfusion study of the head.   CT ANGIOGRAM OF THE HEAD AND NECK AND CT PERFUSION STUDY OF THE BRAIN TECHNIQUE: Spiral CT images were obtained from the mid cerebellum up through the cerebral hemispheres for a 10 cm vertical slab of brain imaging during the arterial phase of contrast and images were reformatted and analyzed with rapid software analysis for CT perfusion study of the brain. Subsequently spiral CT angiogram images were obtained from the top of the aortic arch up through the great vessels of the head and neck during the arterial phase of contrast and images were reformatted and submitted in 1 mm thick axial,  sagittal and coronal CT sections with soft tissue algorithm and 3D reconstructions were performed to complete the CT angiogram of the head and neck and finally spiral CT images were obtained from the base of the skull to the vertex delayed following intravenous contrast and these images were reformatted and submitted in 3 mm thick axial CT sections with brain algorithm and 2 mm thick sagittal and coronal reconstructions were performed and submitted in brain algorithm.  FINDINGS: CT PERFUSION STUDY OF THE BRAIN: The CT perfusion study of the brain consists of a 10 cm vertical slab of brain imaging from the mid cerebellum up through the cerebral hemispheres and excludes the inferior half of the cerebellum and the medulla which are not assessed. Within the 10 cm vertical slab of brain imaging I see no areas of reduced cerebral blood flow to less than 30% of normal and thus no acute completed infarct and no areas of delayed time to maximal enhancement of greater than 6 seconds and thus no himanshu hypoperfused brain.  CT ANGIOGRAM OF THE NECK: The nasopharynx, oropharynx, hypopharynx, true cords and subglottic airway are normal in appearance. The thyroid gland is mildly enlarged and there are several small thyroid nodules. The lung apices are clear. The parotid, , parapharyngeal and submandibular spaces are symmetric and are normal in appearance. The cervical spine is unremarkable. There is anatomic origin of the great vessels off the aortic arch. Noncalcified plaque mildly narrows the left subclavian artery origin. Beyond its origin the left subclavian artery is widely patent without stenosis. The left vertebral artery origin is normal in appearance. No stenosis is seen in the left vertebral artery from its origin to the vertebrobasilar junction. The left common carotid origin is normal in appearance and no stenosis is seen in the left common carotid artery and its bifurcation into the left internal and external  carotid arteries is normal in appearance and no stenosis is seen in the cervical segment of the left internal carotid artery. Just below the left petrous carotid canal is a tiny 2 x 1 mm area of luminal contrast that extends into the left lateral wall of the upper cervical left internal carotid artery. It may be an ulcerated noncalcified plaque. This is best seen on coronal reconstructed image 111 and axial image 181 sequence 8. The brachiocephalic artery origin is normal in appearance and no stenosis is seen in the brachiocephalic artery. There is noncalcified plaque that circumscribes and mild to moderately narrows the origin and proximal 1 cm of the right subclavian artery beyond which the mid and distal right subclavian artery is normal in caliber. The right vertebral artery origin is normal in appearance. There is contrast densely opacifying the adjacent veins that streaks through the proximal right vertebral artery and limits evaluation of the right vertebral artery to the C7 cervical level and above this the cervical segment of the right vertebral artery is normal in appearance as is its intracranial segment to the vertebrobasilar junction. The right common carotid origin is normal in appearance and no stenosis is seen in the right common carotid artery and its bifurcation into the right internal and external carotid arteries is normal in appearance and no stenosis is seen in the cervical segment of the right internal carotid artery using the NASCET criteria.  CT ANGIOGRAM OF THE HEAD: The intracranial segments of the distal vertebral arteries are widely to the vertebrobasilar junction. The basilar artery and the basilar tip are normal in appearance. The posterior cerebral and the superior cerebellar arteries are normal in appearance. The petrous, cavernous and supracavernous segments of the internal carotid arteries are within normal limits. The A1 segments of the anterior cerebral arteries, the anterior  communicating artery origin and the A2 segments of the anterior cerebral arteries are within normal limits. The M1 segments of the middle cerebral arteries and the middle cerebral artery bifurcations are within normal limits. The visualized M2 branches and the sylvian fissure appear to be patent.  IMPRESSION: 1. The noncontrast head CT demonstrates no acute intracranial abnormality with no discernible acute completed infarct or intracranial hemorrhage identified. On the prior MRI of the brain on 04/22/2022 there were 2 separate 4-5 mm acute infarcts in the right cerebral hemisphere, 1 involving the posterior superior right frontal subcortical white matter and 1 involving the posterior superior lateral right frontal vertex both in the right MCA territory. There were 2 separate tiny old infarcts in the left MCA territory, 1 measuring 6 x 2 mm in the posterior superior lateral left frontal cortex and 1 measuring 3 mm in the posterior superior left frontal cortex. These chronic infarcts are unable to be appreciated on the noncontrast head CT but the bilateral MCA territories suggest the patient has chronic central or cardiac embolic source. Correlation with clinical history is suggested. The results of the noncontrast head CT were communicated to Dr. Mason from stroke neurology while the patient was still on our scanner on 03/05/2023 at 11:45 AM and he requested a CT angiogram of the head and neck and CT perfusion study of the brain. 2. CT perfusion study of the brain consists of a 10 cm vertical slab of brain imaging from the mid cerebellum up through the cerebral hemispheres and excludes the inferior third of the cerebellum and the medulla which are not assessed. Within the 10 cm vertical slab of brain imaging I see no areas of reduced cerebral blood flow to less than 30% of normal and thus no acute completed infarct and no areas of delayed time to maximal enhancement of greater than 6 seconds and thus no himanshu  hypoperfused brain. 3. On the CT angiogram of the neck there is a focal subtle 2 x 1 mm nodular outpouching off the left lateral wall of the upper cervical left internal carotid artery just below the petrous carotid canal. The finding is nonspecific It could be contrast extending into an ulcerated atherosclerotic plaque or a very subtle tiny aneurysm or pseudoaneurysm at this site. There is also noncalcified plaque circumscribing and mild to moderately narrowing the origin of the proximal 1 cm of the right subclavian artery. There are portions of the proximal right vertebral artery obscured by beam hardening artifact and not evaluated. Otherwise, the remainder of the CT angiogram of the head and neck is within normal limits. The results and final dictated study were communicated to Dr. Mason from stroke neurology on 03/05/2023 at 12:25 PM.  AI analysis of LVO was utilized.  Radiation dose reduction techniques were utilized, including automated exposure control and exposure modulation based on body size.  This report was finalized on 3/5/2023 7:38 PM by Dr. Jay Cid M.D.      CT CEREBRAL PERFUSION WITH & WITHOUT CONTRAST    Result Date: 3/5/2023  1. No acute intracranial abnormality is identified with no discernible acute infarct or intracranial hemorrhage seen. The etiology of the patient's left-sided weakness, blurred vision and slurred speech is not established on this exam. 2. The prior MRI of the brain [facility] on 04/22/2022 demonstrates 2 separate tiny 4-5 mm acute infarcts in the right cerebral hemisphere, 1 involving the posterior superior right frontal subcortical white matter and another involving the posterior superior right frontal cortex both in the right MCA territory and demonstrated a tiny 6 x 2 mm chronic superior lateral left frontal cortical infarct and a 3 mm old posterior superior left frontal cortical infarct in the left MCA territory.  These chronic bilateral MCA  territory infarcts are unable to be appreciated on this head CT but suggest that the patient may have a central or cardiac embolic source. The results of this study were communicated to Dr. Mason from stroke neurology while the patient was still on our scanner on 03/05/2023 at 11:45 AM and he requested a CT angiogram of the head and neck and CT perfusion study of the head.   CT ANGIOGRAM OF THE HEAD AND NECK AND CT PERFUSION STUDY OF THE BRAIN TECHNIQUE: Spiral CT images were obtained from the mid cerebellum up through the cerebral hemispheres for a 10 cm vertical slab of brain imaging during the arterial phase of contrast and images were reformatted and analyzed with rapid software analysis for CT perfusion study of the brain. Subsequently spiral CT angiogram images were obtained from the top of the aortic arch up through the great vessels of the head and neck during the arterial phase of contrast and images were reformatted and submitted in 1 mm thick axial, sagittal and coronal CT sections with soft tissue algorithm and 3D reconstructions were performed to complete the CT angiogram of the head and neck and finally spiral CT images were obtained from the base of the skull to the vertex delayed following intravenous contrast and these images were reformatted and submitted in 3 mm thick axial CT sections with brain algorithm and 2 mm thick sagittal and coronal reconstructions were performed and submitted in brain algorithm.  FINDINGS: CT PERFUSION STUDY OF THE BRAIN: The CT perfusion study of the brain consists of a 10 cm vertical slab of brain imaging from the mid cerebellum up through the cerebral hemispheres and excludes the inferior half of the cerebellum and the medulla which are not assessed. Within the 10 cm vertical slab of brain imaging I see no areas of reduced cerebral blood flow to less than 30% of normal and thus no acute completed infarct and no areas of delayed time to maximal enhancement of greater  than 6 seconds and thus no himanshu hypoperfused brain.  CT ANGIOGRAM OF THE NECK: The nasopharynx, oropharynx, hypopharynx, true cords and subglottic airway are normal in appearance. The thyroid gland is mildly enlarged and there are several small thyroid nodules. The lung apices are clear. The parotid, , parapharyngeal and submandibular spaces are symmetric and are normal in appearance. The cervical spine is unremarkable. There is anatomic origin of the great vessels off the aortic arch. Noncalcified plaque mildly narrows the left subclavian artery origin. Beyond its origin the left subclavian artery is widely patent without stenosis. The left vertebral artery origin is normal in appearance. No stenosis is seen in the left vertebral artery from its origin to the vertebrobasilar junction. The left common carotid origin is normal in appearance and no stenosis is seen in the left common carotid artery and its bifurcation into the left internal and external carotid arteries is normal in appearance and no stenosis is seen in the cervical segment of the left internal carotid artery. Just below the left petrous carotid canal is a tiny 2 x 1 mm area of luminal contrast that extends into the left lateral wall of the upper cervical left internal carotid artery. It may be an ulcerated noncalcified plaque. This is best seen on coronal reconstructed image 111 and axial image 181 sequence 8. The brachiocephalic artery origin is normal in appearance and no stenosis is seen in the brachiocephalic artery. There is noncalcified plaque that circumscribes and mild to moderately narrows the origin and proximal 1 cm of the right subclavian artery beyond which the mid and distal right subclavian artery is normal in caliber. The right vertebral artery origin is normal in appearance. There is contrast densely opacifying the adjacent veins that streaks through the proximal right vertebral artery and limits evaluation of the right  vertebral artery to the C7 cervical level and above this the cervical segment of the right vertebral artery is normal in appearance as is its intracranial segment to the vertebrobasilar junction. The right common carotid origin is normal in appearance and no stenosis is seen in the right common carotid artery and its bifurcation into the right internal and external carotid arteries is normal in appearance and no stenosis is seen in the cervical segment of the right internal carotid artery using the NASCET criteria.  CT ANGIOGRAM OF THE HEAD: The intracranial segments of the distal vertebral arteries are widely to the vertebrobasilar junction. The basilar artery and the basilar tip are normal in appearance. The posterior cerebral and the superior cerebellar arteries are normal in appearance. The petrous, cavernous and supracavernous segments of the internal carotid arteries are within normal limits. The A1 segments of the anterior cerebral arteries, the anterior communicating artery origin and the A2 segments of the anterior cerebral arteries are within normal limits. The M1 segments of the middle cerebral arteries and the middle cerebral artery bifurcations are within normal limits. The visualized M2 branches and the sylvian fissure appear to be patent.  IMPRESSION: 1. The noncontrast head CT demonstrates no acute intracranial abnormality with no discernible acute completed infarct or intracranial hemorrhage identified. On the prior MRI of the brain on 04/22/2022 there were 2 separate 4-5 mm acute infarcts in the right cerebral hemisphere, 1 involving the posterior superior right frontal subcortical white matter and 1 involving the posterior superior lateral right frontal vertex both in the right MCA territory. There were 2 separate tiny old infarcts in the left MCA territory, 1 measuring 6 x 2 mm in the posterior superior lateral left frontal cortex and 1 measuring 3 mm in the posterior superior left frontal  cortex. These chronic infarcts are unable to be appreciated on the noncontrast head CT but the bilateral MCA territories suggest the patient has chronic central or cardiac embolic source. Correlation with clinical history is suggested. The results of the noncontrast head CT were communicated to Dr. Mason from stroke neurology while the patient was still on our scanner on 03/05/2023 at 11:45 AM and he requested a CT angiogram of the head and neck and CT perfusion study of the brain. 2. CT perfusion study of the brain consists of a 10 cm vertical slab of brain imaging from the mid cerebellum up through the cerebral hemispheres and excludes the inferior third of the cerebellum and the medulla which are not assessed. Within the 10 cm vertical slab of brain imaging I see no areas of reduced cerebral blood flow to less than 30% of normal and thus no acute completed infarct and no areas of delayed time to maximal enhancement of greater than 6 seconds and thus no himanshu hypoperfused brain. 3. On the CT angiogram of the neck there is a focal subtle 2 x 1 mm nodular outpouching off the left lateral wall of the upper cervical left internal carotid artery just below the petrous carotid canal. The finding is nonspecific It could be contrast extending into an ulcerated atherosclerotic plaque or a very subtle tiny aneurysm or pseudoaneurysm at this site. There is also noncalcified plaque circumscribing and mild to moderately narrowing the origin of the proximal 1 cm of the right subclavian artery. There are portions of the proximal right vertebral artery obscured by beam hardening artifact and not evaluated. Otherwise, the remainder of the CT angiogram of the head and neck is within normal limits. The results and final dictated study were communicated to Dr. Mason from stroke neurology on 03/05/2023 at 12:25 PM.  AI analysis of LVO was utilized.  Radiation dose reduction techniques were utilized, including automated exposure control  and exposure modulation based on body size.  This report was finalized on 3/5/2023 7:38 PM by Dr. Jay Cid M.D.      ECG 12 Lead Stroke Evaluation   Final Result   HEART RATE= 99  bpm   RR Interval= 606  ms   AL Interval= 197  ms   P Horizontal Axis= -45  deg   P Front Axis= 36  deg   QRSD Interval= 81  ms   QT Interval= 375  ms   QRS Axis= 21  deg   T Wave Axis= 27  deg   - BORDERLINE ECG -   Sinus rhythm   Borderline prolonged AL interval   Probable left atrial enlargement   RSR' in V1 or V2, right VCD or RVH   Rate has improved   Electronically Signed By: Nancy Swartz ([facility]) 05-Mar-2023 16:17:51   Date and Time of Study: 2023-03-05 12:19:02        Brief Urine Lab Results  (Last result in the past 365 days)      Color   Clarity   Blood   Leuk Est   Nitrite   Protein   CREAT   Urine HCG        02/20/23 1943 Yellow   Cloudy   Negative   Small (1+)   Negative   Negative               Microbiology Results (last 10 days)     ** No results found for the last 240 hours. **            Assessment:  Active Hospital Problems    Diagnosis  POA   • **Left-sided weakness [R53.1]  Yes   • Anemia [D64.9]  Unknown   • Hyponatremia [E87.1]  Unknown   • HTN (hypertension) [I10]  Unknown   • Epilepsy (HCC) [G40.909]  Yes       Medical decision making/care plan: See admitting orders  · Left-sided weakness and numbness in the setting of prior strokes and recalcitrant CVA-etiology multifactorial including focal seizure versus Godfrey's paralysis versus acute CVA.  Since initial imaging studies are negative for acute process plan is to admit the patient neurology consultation and consider MRI of her brain in the setting of VNS which would require turning it off.  Provided with seizure precautions and continue her home regimen including aspirin and statins.  Work-up for underlying cause of atrial fibrillation was negative this patient has had a loop recorder in place.  · History of seizure disorder-continue with her current regimen and  provide her with seizure precautions and neurology consultation.  · Hyponatremia-multifactorial and could be due to seizure activity earlier and associated decreased intake-plan is to monitor her serum sodium level while receiving IV fluids and consider further work-up if her hyponatremia persists.  · Anemia-again multifactorial patient denies any symptoms of GI bleeding or hemoptysis or hematemesis or abdominal discomfort.  Plan is to repeat hemoglobin in the morning shows declining trend consider further work-up.  Since May 2022 patient hemoglobin has fluctuated from 9.8-9.9 today with slight rise up to 11.3 in February.  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    Emeterio Jean Baptiste MD   3/5/2023  16:14 EST    Parts of this note may be an electronic transcription/translation of spoken language to printed text using the Dragon dictation system.

## 2023-03-05 NOTE — ED NOTES
Pt was able to sit up in the bed independently. Pt used both arms to pull up. Pt able to move LE to assist  in putting socks on. Pt was observed laughing and smile was symmetrical. Pt states she does have a HA that she woke up with. MD notified and oral tylenol was approved.

## 2023-03-05 NOTE — ED NOTES
Pts left 20 g IV in upper AC infiltrated during CT scan with contrast. RN marked the site, IV was removed and warm compress applied.

## 2023-03-06 ENCOUNTER — APPOINTMENT (OUTPATIENT)
Dept: MRI IMAGING | Facility: HOSPITAL | Age: 43
DRG: 101 | End: 2023-03-06
Payer: MEDICARE

## 2023-03-06 ENCOUNTER — APPOINTMENT (OUTPATIENT)
Dept: NEUROLOGY | Facility: HOSPITAL | Age: 43
DRG: 101 | End: 2023-03-06
Payer: MEDICARE

## 2023-03-06 ENCOUNTER — APPOINTMENT (OUTPATIENT)
Dept: CARDIOLOGY | Facility: HOSPITAL | Age: 43
DRG: 101 | End: 2023-03-06
Payer: MEDICARE

## 2023-03-06 PROBLEM — Z86.73 HISTORY OF STROKE: Status: ACTIVE | Noted: 2023-03-06

## 2023-03-06 LAB
ALBUMIN SERPL-MCNC: 3.8 G/DL (ref 3.5–5.2)
ALBUMIN/GLOB SERPL: 1.2 G/DL
ALP SERPL-CCNC: 140 U/L (ref 39–117)
ALT SERPL W P-5'-P-CCNC: 10 U/L (ref 1–33)
ANION GAP SERPL CALCULATED.3IONS-SCNC: 10.2 MMOL/L (ref 5–15)
ASCENDING AORTA: 2.2 CM
AST SERPL-CCNC: 11 U/L (ref 1–32)
BH CV ECHO MEAS - ACS: 1.72 CM
BH CV ECHO MEAS - AO MAX PG: 6.3 MMHG
BH CV ECHO MEAS - AO MEAN PG: 3.3 MMHG
BH CV ECHO MEAS - AO ROOT DIAM: 2.8 CM
BH CV ECHO MEAS - AO V2 MAX: 126 CM/SEC
BH CV ECHO MEAS - AO V2 VTI: 21.1 CM
BH CV ECHO MEAS - AVA(I,D): 3.3 CM2
BH CV ECHO MEAS - EDV(CUBED): 83.6 ML
BH CV ECHO MEAS - EDV(MOD-SP2): 51 ML
BH CV ECHO MEAS - EDV(MOD-SP4): 68 ML
BH CV ECHO MEAS - EF(MOD-BP): 59.2 %
BH CV ECHO MEAS - EF(MOD-SP2): 58.8 %
BH CV ECHO MEAS - EF(MOD-SP4): 63.2 %
BH CV ECHO MEAS - EF_3D-VOL: 56 %
BH CV ECHO MEAS - ESV(CUBED): 35.2 ML
BH CV ECHO MEAS - ESV(MOD-SP2): 21 ML
BH CV ECHO MEAS - ESV(MOD-SP4): 25 ML
BH CV ECHO MEAS - FS: 25 %
BH CV ECHO MEAS - IVS/LVPW: 1.05 CM
BH CV ECHO MEAS - IVSD: 0.83 CM
BH CV ECHO MEAS - LAT PEAK E' VEL: 7.8 CM/SEC
BH CV ECHO MEAS - LV DIASTOLIC VOL/BSA (35-75): 36.8 CM2
BH CV ECHO MEAS - LV MASS(C)D: 110.6 GRAMS
BH CV ECHO MEAS - LV MAX PG: 3.8 MMHG
BH CV ECHO MEAS - LV MEAN PG: 2.1 MMHG
BH CV ECHO MEAS - LV SYSTOLIC VOL/BSA (12-30): 13.5 CM2
BH CV ECHO MEAS - LV V1 MAX: 97.6 CM/SEC
BH CV ECHO MEAS - LV V1 VTI: 22.7 CM
BH CV ECHO MEAS - LVIDD: 4.4 CM
BH CV ECHO MEAS - LVIDS: 3.3 CM
BH CV ECHO MEAS - LVOT AREA: 3.1 CM2
BH CV ECHO MEAS - LVOT DIAM: 1.98 CM
BH CV ECHO MEAS - LVPWD: 0.79 CM
BH CV ECHO MEAS - MED PEAK E' VEL: 7.4 CM/SEC
BH CV ECHO MEAS - MV A DUR: 0.12 SEC
BH CV ECHO MEAS - MV A MAX VEL: 177.8 CM/SEC
BH CV ECHO MEAS - MV DEC SLOPE: 588.1 CM/SEC2
BH CV ECHO MEAS - MV DEC TIME: 0.2 MSEC
BH CV ECHO MEAS - MV E MAX VEL: 116 CM/SEC
BH CV ECHO MEAS - MV E/A: 0.65
BH CV ECHO MEAS - MV MAX PG: 17.6 MMHG
BH CV ECHO MEAS - MV MEAN PG: 5.6 MMHG
BH CV ECHO MEAS - MV P1/2T: 69.8 MSEC
BH CV ECHO MEAS - MV V2 VTI: 28 CM
BH CV ECHO MEAS - MVA(P1/2T): 3.2 CM2
BH CV ECHO MEAS - MVA(VTI): 2.49 CM2
BH CV ECHO MEAS - PA ACC TIME: 0.13 SEC
BH CV ECHO MEAS - PA PR(ACCEL): 18.4 MMHG
BH CV ECHO MEAS - PA V2 MAX: 104.5 CM/SEC
BH CV ECHO MEAS - PULM A REVS DUR: 0.12 SEC
BH CV ECHO MEAS - PULM A REVS VEL: 40.8 CM/SEC
BH CV ECHO MEAS - PULM DIAS VEL: 41.8 CM/SEC
BH CV ECHO MEAS - PULM S/D: 1.07
BH CV ECHO MEAS - PULM SYS VEL: 44.9 CM/SEC
BH CV ECHO MEAS - QP/QS: 0.43
BH CV ECHO MEAS - RV MAX PG: 2.5 MMHG
BH CV ECHO MEAS - RV V1 MAX: 79.3 CM/SEC
BH CV ECHO MEAS - RV V1 VTI: 15.1 CM
BH CV ECHO MEAS - RVOT DIAM: 1.59 CM
BH CV ECHO MEAS - SI(MOD-SP2): 16.2 ML/M2
BH CV ECHO MEAS - SI(MOD-SP4): 23.3 ML/M2
BH CV ECHO MEAS - SUP REN AO DIAM: 1.8 CM
BH CV ECHO MEAS - SV(LVOT): 69.5 ML
BH CV ECHO MEAS - SV(MOD-SP2): 30 ML
BH CV ECHO MEAS - SV(MOD-SP4): 43 ML
BH CV ECHO MEAS - SV(RVOT): 30 ML
BH CV ECHO MEAS - TAPSE (>1.6): 1.91 CM
BH CV ECHO MEASUREMENTS AVERAGE E/E' RATIO: 15.26
BH CV XLRA - RV BASE: 2.6 CM
BH CV XLRA - RV LENGTH: 6.7 CM
BH CV XLRA - RV MID: 2.4 CM
BH CV XLRA - TDI S': 9.9 CM/SEC
BILIRUB SERPL-MCNC: <0.2 MG/DL (ref 0–1.2)
BUN SERPL-MCNC: 9 MG/DL (ref 6–20)
BUN/CREAT SERPL: 12.7 (ref 7–25)
CALCIUM SPEC-SCNC: 8.6 MG/DL (ref 8.6–10.5)
CHLORIDE SERPL-SCNC: 102 MMOL/L (ref 98–107)
CHOLEST SERPL-MCNC: 135 MG/DL (ref 0–200)
CO2 SERPL-SCNC: 22.8 MMOL/L (ref 22–29)
CREAT SERPL-MCNC: 0.71 MG/DL (ref 0.57–1)
DEPRECATED RDW RBC AUTO: 41.3 FL (ref 37–54)
EGFRCR SERPLBLD CKD-EPI 2021: 108.3 ML/MIN/1.73
ERYTHROCYTE [DISTWIDTH] IN BLOOD BY AUTOMATED COUNT: 14.7 % (ref 12.3–15.4)
GLOBULIN UR ELPH-MCNC: 3.1 GM/DL
GLUCOSE BLDC GLUCOMTR-MCNC: 121 MG/DL (ref 70–130)
GLUCOSE BLDC GLUCOMTR-MCNC: 92 MG/DL (ref 70–130)
GLUCOSE SERPL-MCNC: 102 MG/DL (ref 65–99)
HBA1C MFR BLD: 5.4 % (ref 4.8–5.6)
HCT VFR BLD AUTO: 33.3 % (ref 34–46.6)
HDLC SERPL-MCNC: 66 MG/DL (ref 40–60)
HGB BLD-MCNC: 11 G/DL (ref 12–15.9)
LDLC SERPL CALC-MCNC: 55 MG/DL (ref 0–100)
LDLC/HDLC SERPL: 0.85 {RATIO}
LEFT ATRIUM VOLUME INDEX: 16.1 ML/M2
MAXIMAL PREDICTED HEART RATE: 177 BPM
MCH RBC QN AUTO: 25.6 PG (ref 26.6–33)
MCHC RBC AUTO-ENTMCNC: 33 G/DL (ref 31.5–35.7)
MCV RBC AUTO: 77.6 FL (ref 79–97)
PLATELET # BLD AUTO: 368 10*3/MM3 (ref 140–450)
PMV BLD AUTO: 9.8 FL (ref 6–12)
POTASSIUM SERPL-SCNC: 3.7 MMOL/L (ref 3.5–5.2)
PROT SERPL-MCNC: 6.9 G/DL (ref 6–8.5)
RBC # BLD AUTO: 4.29 10*6/MM3 (ref 3.77–5.28)
SINUS: 2.44 CM
SODIUM SERPL-SCNC: 135 MMOL/L (ref 136–145)
STJ: 2.2 CM
STRESS TARGET HR: 150 BPM
TRIGL SERPL-MCNC: 66 MG/DL (ref 0–150)
VLDLC SERPL-MCNC: 14 MG/DL (ref 5–40)
WBC NRBC COR # BLD: 8.61 10*3/MM3 (ref 3.4–10.8)

## 2023-03-06 PROCEDURE — 97530 THERAPEUTIC ACTIVITIES: CPT

## 2023-03-06 PROCEDURE — 95816 EEG AWAKE AND DROWSY: CPT | Performed by: PSYCHIATRY & NEUROLOGY

## 2023-03-06 PROCEDURE — 82962 GLUCOSE BLOOD TEST: CPT

## 2023-03-06 PROCEDURE — 93306 TTE W/DOPPLER COMPLETE: CPT | Performed by: INTERNAL MEDICINE

## 2023-03-06 PROCEDURE — 85027 COMPLETE CBC AUTOMATED: CPT | Performed by: INTERNAL MEDICINE

## 2023-03-06 PROCEDURE — 93306 TTE W/DOPPLER COMPLETE: CPT

## 2023-03-06 PROCEDURE — 95819 EEG AWAKE AND ASLEEP: CPT

## 2023-03-06 PROCEDURE — 80053 COMPREHEN METABOLIC PANEL: CPT | Performed by: INTERNAL MEDICINE

## 2023-03-06 PROCEDURE — 83036 HEMOGLOBIN GLYCOSYLATED A1C: CPT | Performed by: INTERNAL MEDICINE

## 2023-03-06 PROCEDURE — 80061 LIPID PANEL: CPT | Performed by: INTERNAL MEDICINE

## 2023-03-06 PROCEDURE — 97161 PT EVAL LOW COMPLEX 20 MIN: CPT

## 2023-03-06 PROCEDURE — 70551 MRI BRAIN STEM W/O DYE: CPT

## 2023-03-06 PROCEDURE — 99233 SBSQ HOSP IP/OBS HIGH 50: CPT | Performed by: NURSE PRACTITIONER

## 2023-03-06 PROCEDURE — 97165 OT EVAL LOW COMPLEX 30 MIN: CPT

## 2023-03-06 RX ORDER — LORAZEPAM 2 MG/ML
2 INJECTION INTRAMUSCULAR
Status: DISCONTINUED | OUTPATIENT
Start: 2023-03-06 | End: 2023-03-07 | Stop reason: HOSPADM

## 2023-03-06 RX ADMIN — ATORVASTATIN CALCIUM 40 MG: 20 TABLET, FILM COATED ORAL at 21:24

## 2023-03-06 RX ADMIN — ESLICARBAZEPINE ACETATE 800 MG: 800 TABLET ORAL at 21:24

## 2023-03-06 RX ADMIN — LACOSAMIDE 200 MG: 100 TABLET, FILM COATED ORAL at 09:07

## 2023-03-06 RX ADMIN — LACOSAMIDE 200 MG: 100 TABLET, FILM COATED ORAL at 21:24

## 2023-03-06 RX ADMIN — Medication 10 ML: at 09:07

## 2023-03-06 RX ADMIN — ASPIRIN 325 MG: 325 TABLET ORAL at 09:07

## 2023-03-06 RX ADMIN — SODIUM CHLORIDE 75 ML/HR: 9 INJECTION, SOLUTION INTRAVENOUS at 06:35

## 2023-03-06 RX ADMIN — ESLICARBAZEPINE ACETATE 400 MG: 400 TABLET ORAL at 21:25

## 2023-03-06 RX ADMIN — AMITRIPTYLINE HYDROCHLORIDE 25 MG: 25 TABLET, FILM COATED ORAL at 21:24

## 2023-03-06 RX ADMIN — Medication 10 ML: at 21:27

## 2023-03-06 NOTE — NURSING NOTE
Notified by pharmacy that hospital does not carry Eslicarbazepine Acetate 1200mg,  Patient however has her home medication.   Sent down to pharmacy to be checked and verified.   Medication labeled by pharmacy and returned to unit.

## 2023-03-06 NOTE — CASE MANAGEMENT/SOCIAL WORK
Discharge Planning Assessment  [facility]     Patient Name: Alexandra Xiao  MRN: [id number]  Today's Date: 3/6/2023    Admit Date: 3/5/2023    Plan: Home   Discharge Needs Assessment     Row Name 03/06/23 1122       Living Environment    People in Home child(coco), adult;spouse    Current Living Arrangements home    Primary Care Provided by self    Provides Primary Care For no one    Family Caregiver if Needed child(coco), adult;spouse    Quality of Family Relationships helpful;involved;supportive    Able to Return to Prior Arrangements yes       Resource/Environmental Concerns    Resource/Environmental Concerns none    Transportation Concerns none       Transition Planning    Patient/Family Anticipates Transition to home with family    Patient/Family Anticipated Services at Transition none    Transportation Anticipated family or friend will provide       Discharge Needs Assessment    Readmission Within the Last 30 Days current reason for admission unrelated to previous admission    Equipment Currently Used at Home none    Concerns to be Addressed no discharge needs identified;denies needs/concerns at this time    Anticipated Changes Related to Illness none    Equipment Needed After Discharge none    Provided Post Acute Provider List? N/A    Provided Post Acute Provider Quality & Resource List? N/A               Discharge Plan     Row Name 03/06/23 1123       Plan    Plan Home    Patient/Family in Agreement with Plan yes    Plan Comments CCP spoke with walter via phone to complete screen/discharge planning. Patient confirmed information on face sheet is accurate. Patient states that she lives at home with her  and their 20 year old independent daughter. Patient said she does not have a PCP but she sees Dr. James her neurologist regularly. Patient denies a history of HH/SNF. Patient denies using any DME. Patient states she has 5 steps to enter her home with a handrail on the right side. Patient denies any  steps inside the home. Patient denies needs and states her  Yadiel can transport her home at discharge. CCP to follow.              Continued Care and Services - Admitted Since 3/5/2023    Coordination has not been started for this encounter.          Demographic Summary     Row Name 03/06/23 1121       General Information    Admission Type inpatient    Arrived From emergency department    Required Notices Provided Important Message from Medicare    Reason for Consult discharge planning    Preferred Language English               Functional Status     Row Name 03/06/23 1122       Functional Status    Usual Activity Tolerance good    Current Activity Tolerance moderate       Functional Status, IADL    Medications independent    Meal Preparation independent    Housekeeping independent    Laundry independent    Shopping independent       Mental Status    General Appearance WDL WDL       Mental Status Summary    Recent Changes in Mental Status/Cognitive Functioning no changes       Employment/    Employment Status disabled               Psychosocial    No documentation.                Abuse/Neglect    No documentation.                Legal    No documentation.                Substance Abuse    No documentation.                Patient Forms    No documentation.

## 2023-03-06 NOTE — PROGRESS NOTES
[facility] Acute [facility]pt [facility]    Referral received via stroke order set.  Please note this is screening only.  Rehab Admissions will not actively be evaluating this pt.  If felt pt is appropriate for our services once therapies start, please call our office at [phone] to initiate full referral.     Thank you,    Bijal Gilbert RN  Acute Rehab Admission Nurse

## 2023-03-06 NOTE — PLAN OF CARE
Goal Outcome Evaluation:  Plan of Care Reviewed With: patient        Progress: no change  Outcome Evaluation: Pt is 42 y.o. female admitted with L sided weakness. Hx seizure disorder, prior stroke (Apriln 2022). Pt seen for OT eval this date, A&Ox4, reports lives with her  and daughter, uses no AD, (I) ADLs/IADLs, and reports x1 fall within past 6 months. Today, pt mod (I) bed mobility, able to don shoes/socks with s/up, STS and mobility with SPV and no AD, completing functional mobility around entire unit without cues for safety or LOBs noted. Pt's UE minimally weaker on LUE, still 4/5 strength. She presents at her baseline this date, will not require further skilled OT during acute stay. Plans home with spouse and daughter, will sign off.

## 2023-03-06 NOTE — PROGRESS NOTES
"DOS: 3/6/2023  NAME: Alexandra Xiao   : 1980  PCP: Provider, No Known  Chief Complaint   Patient presents with   • Numbness   Patient seen in follow-up today; new to me      Stroke    Subjective: No acute events overnight specifically no seizure-like activity reported.  Patient denies any new complaints or concerns on my exam.      at bedside     Objective:  Vital signs: /89   Pulse 100   Temp 97.9 °F (36.6 °C) (Oral)   Resp 18   Ht 193 cm (76\")   Wt 79.8 kg (175 lb 14.8 oz)   SpO2 95%   BMI 21.41 kg/m²       HEENT: Normocephalic, atraumatic   COR: RRR  Resp: Even and unlabored  Extremities: Equal pulses, nondistal embolization    Neurological:   MS: AO. Language normal. No neglect. Higher integrative function normal  CN: II-XII normal except left facial weakness. ? Mild dysarthria   Motor: 5/5, normal tone  Reflexes:toes down going   Sensory: Intact- to light touch   Coordination: Normal- finger to nose     Laboratory results:  Lab Results   Component Value Date    GLUCOSE 102 (H) 2023    CALCIUM 8.6 2023     (L) 2023    K 3.7 2023    CO2 22.8 2023     2023    BUN 9 2023    CREATININE 0.71 2023    BCR 12.7 2023    ANIONGAP 10.2 2023     Lab Results   Component Value Date    WBC 8.61 2023    HGB 11.0 (L) 2023    HCT 33.3 (L) 2023    MCV 77.6 (L) 2023     2023     Lab Results   Component Value Date    CHOL 135 2023    CHOL 110 2022     Lab Results   Component Value Date    HDL 66 (H) 2023    HDL 54 2022     Lab Results   Component Value Date    LDL 55 2023    LDL 44 2022     Lab Results   Component Value Date    TRIG 66 2023    TRIG 48 2022         Lab 23  0500   HEMOGLOBIN A1C 5.40      Review and interpretation of imaging:  EMERGENCY CONTRAST-ENHANCED CT ANGIOGRAM OF THE HEAD AND NECK AND CT  PERFUSION STUDY OF THE BRAIN ON " 03/05/2023     CLINICAL HISTORY: Team D, acute stroke. The patient has left-sided  weakness, double vision, slurred speech.     NONCONTRAST HEAD CT TECHNIQUE: Spiral CT images were obtained from the  base of the skull to the vertex without intravenous contrast. The images  were reformatted and are submitted in 3 mm thick axial CT sections with  brain algorithm and 2 mm thick sagittal and coronal reconstructions were  performed and submitted in brain algorithm.     COMPARISON: This is correlated to a prior MRI of the brain from [facility] on 04/22/2022 and a noncontrast head CT on 02/20/2023.     FINDINGS: On the prior MRI of the brain on 04/22/2023 there were 2 acute  infarcts measuring 4-5 mm in size, 1 involving the posterior superior  right frontal subcortical white matter and 1 involving the posterior  superior lateral right frontal cortex. These were in the right middle  cerebral artery territory. No discernible chronic infarct is seen at  these sites. Furthermore the prior MRI demonstrated tiny crescentic  chronic infarcts in the left cerebral hemisphere with a 6 x 2 mm chronic  infarct in the superior lateral left frontal cortex and a 3 mm chronic  infarct in the posterior superior left frontal cortex in the left MCA  territory that are unable to be appreciated on the current exam. Overall  the brain parenchyma is normal in attenuation. The ventricles are normal  in size. I see no focal mass effect. There is no midline shift. No  extra-axial fluid collections are identified and there is no evidence of  acute intracranial hemorrhage. The calvarium and the skull base are  normal in appearance. The paranasal sinuses, mastoid air cells and  middle ear cavities are clear.     IMPRESSION:  1. No acute intracranial abnormality is identified with no discernible  acute infarct or intracranial hemorrhage seen. The etiology of the  patient's left-sided weakness, blurred vision and slurred speech is  not  established on this exam.   2. The prior MRI of the brain from [facility] on  04/22/2022 demonstrates 2 separate tiny 4-5 mm acute infarcts in the  right cerebral hemisphere, 1 involving the posterior superior right  frontal subcortical white matter and another involving the posterior  superior right frontal cortex both in the right MCA territory and  demonstrated a tiny 6 x 2 mm chronic superior lateral left frontal  cortical infarct and a 3 mm old posterior superior left frontal cortical  infarct in the left MCA territory.  These chronic bilateral MCA  territory infarcts are unable to be appreciated on this head CT but  suggest that the patient may have a central or cardiac embolic source.  The results of this study were communicated to Dr. Mason from stroke  neurology while the patient was still on our scanner on 03/05/2023 at  11:45 AM and he requested a CT angiogram of the head and neck and CT  perfusion study of the head.         CT ANGIOGRAM OF THE HEAD AND NECK AND CT PERFUSION STUDY OF THE BRAIN  TECHNIQUE: Spiral CT images were obtained from the mid cerebellum up  through the cerebral hemispheres for a 10 cm vertical slab of brain  imaging during the arterial phase of contrast and images were  reformatted and analyzed with rapid software analysis for CT perfusion  study of the brain. Subsequently spiral CT angiogram images were  obtained from the top of the aortic arch up through the great vessels of  the head and neck during the arterial phase of contrast and images were  reformatted and submitted in 1 mm thick axial, sagittal and coronal CT  sections with soft tissue algorithm and 3D reconstructions were  performed to complete the CT angiogram of the head and neck and finally  spiral CT images were obtained from the base of the skull to the vertex  delayed following intravenous contrast and these images were reformatted  and submitted in 3 mm thick axial CT sections with brain  algorithm and 2  mm thick sagittal and coronal reconstructions were performed and  submitted in brain algorithm.     FINDINGS:  CT PERFUSION STUDY OF THE BRAIN: The CT perfusion study of the brain  consists of a 10 cm vertical slab of brain imaging from the mid  cerebellum up through the cerebral hemispheres and excludes the inferior  half of the cerebellum and the medulla which are not assessed. Within  the 10 cm vertical slab of brain imaging I see no areas of reduced  cerebral blood flow to less than 30% of normal and thus no acute  completed infarct and no areas of delayed time to maximal enhancement of  greater than 6 seconds and thus no himanshu hypoperfused brain.     CT ANGIOGRAM OF THE NECK: The nasopharynx, oropharynx, hypopharynx, true  cords and subglottic airway are normal in appearance. The thyroid gland  is mildly enlarged and there are several small thyroid nodules. The lung  apices are clear. The parotid, , parapharyngeal and  submandibular spaces are symmetric and are normal in appearance. The  cervical spine is unremarkable. There is anatomic origin of the great  vessels off the aortic arch. Noncalcified plaque mildly narrows the left  subclavian artery origin. Beyond its origin the left subclavian artery  is widely patent without stenosis. The left vertebral artery origin is  normal in appearance. No stenosis is seen in the left vertebral artery  from its origin to the vertebrobasilar junction. The left common carotid  origin is normal in appearance and no stenosis is seen in the left  common carotid artery and its bifurcation into the left internal and  external carotid arteries is normal in appearance and no stenosis is  seen in the cervical segment of the left internal carotid artery. Just  below the left petrous carotid canal is a tiny 2 x 1 mm area of luminal  contrast that extends into the left lateral wall of the upper cervical  left internal carotid artery. It may be an ulcerated  noncalcified  plaque. This is best seen on coronal reconstructed image 111 and axial  image 181 sequence 8. The brachiocephalic artery origin is normal in  appearance and no stenosis is seen in the brachiocephalic artery. There  is noncalcified plaque that circumscribes and mild to moderately narrows  the origin and proximal 1 cm of the right subclavian artery beyond which  the mid and distal right subclavian artery is normal in caliber. The  right vertebral artery origin is normal in appearance. There is contrast  densely opacifying the adjacent veins that streaks through the proximal  right vertebral artery and limits evaluation of the right vertebral  artery to the C7 cervical level and above this the cervical segment of  the right vertebral artery is normal in appearance as is its  intracranial segment to the vertebrobasilar junction. The right common  carotid origin is normal in appearance and no stenosis is seen in the  right common carotid artery and its bifurcation into the right internal  and external carotid arteries is normal in appearance and no stenosis is  seen in the cervical segment of the right internal carotid artery using  the NASCET criteria.     CT ANGIOGRAM OF THE HEAD: The intracranial segments of the distal  vertebral arteries are widely to the vertebrobasilar junction. The  basilar artery and the basilar tip are normal in appearance. The  posterior cerebral and the superior cerebellar arteries are normal in  appearance. The petrous, cavernous and supracavernous segments of the  internal carotid arteries are within normal limits. The A1 segments of  the anterior cerebral arteries, the anterior communicating artery origin  and the A2 segments of the anterior cerebral arteries are within normal  limits. The M1 segments of the middle cerebral arteries and the middle  cerebral artery bifurcations are within normal limits. The visualized M2  branches and the sylvian fissure appear to be patent.      IMPRESSION:  1. The noncontrast head CT demonstrates no acute intracranial  abnormality with no discernible acute completed infarct or intracranial  hemorrhage identified. On the prior MRI of the brain on 04/22/2022 there  were 2 separate 4-5 mm acute infarcts in the right cerebral hemisphere,  1 involving the posterior superior right frontal subcortical white  matter and 1 involving the posterior superior lateral right frontal  vertex both in the right MCA territory. There were 2 separate tiny old  infarcts in the left MCA territory, 1 measuring 6 x 2 mm in the  posterior superior lateral left frontal cortex and 1 measuring 3 mm in  the posterior superior left frontal cortex. These chronic infarcts are  unable to be appreciated on the noncontrast head CT but the bilateral  MCA territories suggest the patient has chronic central or cardiac  embolic source. Correlation with clinical history is suggested. The  results of the noncontrast head CT were communicated to Dr. Mason from  stroke neurology while the patient was still on our scanner on  03/05/2023 at 11:45 AM and he requested a CT angiogram of the head and  neck and CT perfusion study of the brain.  2. CT perfusion study of the brain consists of a 10 cm vertical slab of  brain imaging from the mid cerebellum up through the cerebral  hemispheres and excludes the inferior third of the cerebellum and the  medulla which are not assessed. Within the 10 cm vertical slab of brain  imaging I see no areas of reduced cerebral blood flow to less than 30%  of normal and thus no acute completed infarct and no areas of delayed  time to maximal enhancement of greater than 6 seconds and thus no himanshu  hypoperfused brain.  3. On the CT angiogram of the neck there is a focal subtle 2 x 1 mm  nodular outpouching off the left lateral wall of the upper cervical left  internal carotid artery just below the petrous carotid canal. The  finding is nonspecific It could be contrast  extending into an ulcerated  atherosclerotic plaque or a very subtle tiny aneurysm or pseudoaneurysm  at this site. There is also noncalcified plaque circumscribing and mild  to moderately narrowing the origin of the proximal 1 cm of the right  subclavian artery. There are portions of the proximal right vertebral  artery obscured by beam hardening artifact and not evaluated. Otherwise,  the remainder of the CT angiogram of the head and neck is within normal  limits. The results and final dictated study were communicated to Dr. Mason from stroke neurology on 03/05/2023 at 12:25 PM.     AI analysis of LVO was utilized.     Radiation dose reduction techniques were utilized, including automated  exposure control and exposure modulation based on body size.     This report was finalized on 3/5/2023 7:38 PM by Dr. Jay Cid M.D.     Emergency portable view of the chest on 03/05/2023     CLINICAL HISTORY: Acute stroke protocol     This is correlated to a portable view of the chest on 02/20/2023.     FINDINGS: There is a loop recorder device projecting over the  anteromedial soft tissues the left mid hemithorax at the horizontal  level of the T6-7 thoracic level. There is also a vagus nerve stimulator  pack projecting over the lateral left chest with stimulator lead  coursing into left-sided neck. The cardiomediastinal silhouette and the  pulmonary vasculature are within normal limits. The lungs are clear. The  costophrenic angles are sharp.     IMPRESSION:  1. No active disease is seen in the chest with no significant change  when compared to a recent chest x-ray on 02/20/2023 just 13 days ago.  2. There is a loop recorder device projecting over the anteromedial soft  tissues of the left chest at the T6-7 thoracic level and there is a  vagus nerve stimulator pack over the lateral aspect of the left mid  hemithorax with a lead coursing into the soft tissues of the left side  of the neck.     This report was finalized on  3/5/2023 12:44 PM by Dr. Jay Cid M.D.       Impression: This is a 42 old female with known diagnosis of prior embolic strokes, epilepsy, status post VNS placement on Vimpat and Aptiom prior to arrival, chronic daily headaches (on amitriptyline)/migraine who presented left leg face service was consulted.  Her symptoms started to improve prior to still has left-sided numbness weakness.  Most recent admission in April 2022 patient was admitted for seizure and subsequently found to have which is thus far specifically no atrial fibrillation prior to arrival patient on aspirin and statins for secondary reports compliance.  Initial CT of the head negative.  CTA of the head and neck unremarkable although there is questionable left cervical segment reason versus pseudoaneurysm.  MRI/TTE/EEG pending.    Neurologically stable.  Although with persistent left-sided facial weakness still present.  Recommendations below. PT/OT/ST. CCP to assist with discharge planning. Call RRT for any acute neurological changes and/or concerns. We will continue to follow and advise.       Diagnosis:  1.  Acute left sided symptoms with numbness and persistent left facial weakness still present etiology unclear although CVA should be excluded therefore MRI/work-up EEG to further evaluate for seizure-like activity although no witnessed seizures  2.  Epilepsy; well maintained on Aptiom and Vimpat with VNS in place seen as an outpatient by Dr. Cb James  3.  Questionable left cervical segment left ICA aneurysm-recommend repeat imaging in 6 months to further evaluate      Plan:  · MRI/TTE/EEG- pending  · Aspirin 325 mg daily  · Atorvastatin 40 mg daily   · Continue Vimpat 200 mg twice daily and Aptiom 800 mg nightly per home dose      Case reviewed with attending neurologist Dr. Patricio Kat he agrees with treatment plan above.     KIRSTIE Beckett

## 2023-03-06 NOTE — THERAPY DISCHARGE NOTE
Patient Name: Alexandra Xiao  : 1980    MRN: [id number]                              Today's Date: 3/6/2023       Admit Date: 3/5/2023    Visit Dx:     ICD-10-CM ICD-9-CM   1. Left-sided weakness  R53.1 728.87   2. Nonintractable epilepsy without status epilepticus, unspecified epilepsy type ([facility])  G40.909 345.90   3. Hyponatremia  E87.1 276.1     Patient Active Problem List   Diagnosis   • Sebaceous cyst of breast, right   • Abnormal uterine bleeding   • Acute CVA (cerebrovascular accident) ([facility])   • Microcytic anemia   • Transaminitis   • Epilepsy ([facility])   • Tobacco abuse   • Major depressive disorder   • Left-sided weakness   • Anemia   • Hyponatremia   • HTN (hypertension)   • History of stroke     Past Medical History:   Diagnosis Date   • Abnormal bleeding in menstrual cycle    • Atrial fibrillation ([facility])    • Chest pain     ER VISIT MAY, 2022   • Depression    • Epilepsy ([facility])     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,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   • Headache, tension-type    • Heavy menstrual bleeding     WITH CLOTS   • Memory loss    • Migraine    • Mitral valve regurgitation    • Status post placement of implantable loop recorder    • Stroke ([facility])     MARCH AND      Past Surgical History:   Procedure Laterality Date   • BREAST BIOPSY     • D & C HYSTEROSCOPY ENDOMETRIAL ABLATION N/A 6/10/2022    Procedure: DILATATION AND CURETTAGE HYSTEROSCOPY NOVASURE ENDOMETRIAL ABLATION;  Surgeon: Ernesto Mendosa MD;  Location: [facility];  Service: Obstetrics/Gynecology;  Laterality: N/A;   • FOOT SURGERY      pins in left foot    • OTHER SURGICAL HISTORY      VNS plate 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   • NOÉ      2022   • TUBAL ABDOMINAL LIGATION     • VAGUS NERVE STIMULATOR IMPLANTATION        General Information     Row Name 23 1327          Physical Therapy Time and Intention    Document Type evaluation  -CW     Mode of Treatment individual therapy;physical therapy  -CW     Row Name 23 0920           General Information    Patient Profile Reviewed yes  -CW     Prior Level of Function independent:;transfer;community mobility;all household mobility;bed mobility  -CW     Existing Precautions/Restrictions seizures;fall  -CW     Barriers to Rehab medically complex  -CW     Row Name 03/06/23 1327          Living Environment    People in Home child(coco), adult;spouse  -CW     Row Name 03/06/23 1327          Home Main Entrance    Number of Stairs, Main Entrance five  -CW     Stair Railings, Main Entrance railings safe and in good condition  -CW     Row Name 03/06/23 1327          Stairs Within Home, Primary    Number of Stairs, Within Home, Primary none  -CW     Row Name 03/06/23 1327          Cognition    Orientation Status (Cognition) oriented x 4  -CW     Row Name 03/06/23 1327          Safety Issues, Functional Mobility    Impairments Affecting Function (Mobility) endurance/activity tolerance;strength  -CW           User Key  (r) = Recorded By, (t) = Taken By, (c) = Cosigned By    Initials Name Provider Type    CW Deirdre Louis, PT Physical Therapist               Mobility     Row Name 03/06/23 1327          Bed Mobility    Bed Mobility supine-sit  -CW     Supine-Sit [place] (Bed Mobility) modified independence  -CW     Assistive Device (Bed Mobility) head of bed elevated;bed rails  -CW     Row Name 03/06/23 1327          Bed-Chair Transfer    Bed-Chair [place] (Transfers) verbal cues  -CW     Row Name 03/06/23 1327          Sit-Stand Transfer    Sit-Stand [place] (Transfers) supervision  -CW     Comment, (Sit-Stand Transfer) none  -CW     Row Name 03/06/23 1327          Gait/Stairs (Locomotion)    [place] Level (Gait) standby assist  -CW     Assistive Device (Gait) walker, front-wheeled  -CW     Deviations/Abnormal Patterns (Gait) ryan decreased;gait speed decreased;stride length decreased  -CW     [place] Level (Stairs) contact guard;verbal cues  -CW     Handrail Location  (Stairs) left side (ascending)  -CW     Number of Steps (Stairs) 4  -CW     Ascending Technique (Stairs) step-to-step  -CW     Descending Technique (Stairs) step-to-step  -CW     Comment, (Gait/Stairs) No overt loss of balance, guarded pace. Cues to relax UE  -CW           User Key  (r) = Recorded By, (t) = Taken By, (c) = Cosigned By    Initials Name Provider Type    Deirdre Hair PT Physical Therapist               Obj/Interventions     Row Name 03/06/23 1330          Strength Comprehensive (MMT)    General Manual Muscle Testing (MMT) Assessment lower extremity strength deficits identified  -CW     Comment, General Manual Muscle Testing (MMT) Assessment RLE 5/5, LLE grossly 3+/5  -CW     Row Name 03/06/23 1330          Balance    Balance Assessment standing dynamic balance;standing static balance  -CW     Static Standing Balance supervision  -CW     Dynamic Standing Balance supervision  -CW     Position/Device Used, Standing Balance unsupported  -CW     Comment, Balance no overt LOB  -CW     Row Name 03/06/23 1330          Sensory Assessment (Somatosensory)    Sensory Assessment (Somatosensory) sensation intact  -CW           User Key  (r) = Recorded By, (t) = Taken By, (c) = Cosigned By    Initials Name Provider Type    Deirdre Hair PT Physical Therapist               Goals/Plan    No documentation.                Clinical Impression     Row Name 03/06/23 1333          Pain    Pretreatment Pain Rating 0/10 - no pain  -CW     Posttreatment Pain Rating 0/10 - no pain  -CW     Row Name 03/06/23 1333          Plan of Care Review    Plan of Care Reviewed With patient  -CW     Outcome Evaluation Pt is a 41 yo female. PHx includes seizure disorder, prior stroke (April 2022). Pt lives with her  and daughter, uses no AD, independent with all functional mobility and reports x1 fall within past 6 months. Pt demo weakness L > R, balance WFL but has guarded pace due to recent fall. She completed bed  mobility independently, transfers with supervision and ambulation 400' supervision without AD. Pt completed stairs without safety concerns. No indication for acute PT services at this time, OK to mobilize with nursing staff or family as appropriate. Plans to d/c home with assist once medically cleared.  -CW     Row Name 03/06/23 1333          Therapy Assessment/Plan (PT)    Criteria for Skilled Interventions Met (PT) no problems identified which require skilled intervention;no  -CW     Therapy Frequency (PT) evaluation only  -CW     Row Name 03/06/23 1333          Vital Signs    O2 Delivery Pre Treatment room air  -CW     Row Name 03/06/23 1333          Positioning and Restraints    Pre-Treatment Position in bed  -CW     Post Treatment Position chair  -CW     In Chair reclined;call light within reach;encouraged to call for assist;exit alarm on;notified nsg;legs elevated  -CW           User Key  (r) = Recorded By, (t) = Taken By, (c) = Cosigned By    Initials Name Provider Type    CW Deirdre Louis, PT Physical Therapist               Outcome Measures     Row Name 03/06/23 1338 03/06/23 0908       How much help from another person do you currently need...    Turning from your back to your side while in flat bed without using bedrails? 4  -CW 4  -GK    Moving from lying on back to sitting on the side of a flat bed without bedrails? 4  -CW 4  -GK    Moving to and from a bed to a chair (including a wheelchair)? 4  -CW 4  -GK    Standing up from a chair using your arms (e.g., wheelchair, bedside chair)? 4  -CW 4  -GK    Climbing 3-5 steps with a railing? 3  -CW 3  -GK    To walk in hospital room? 3  -CW 4  -GK    AM-PAC 6 Clicks Score (PT) 22  -CW 23  -GK    Highest level of mobility 7 --> Walked 25 feet or more  -CW 7 --> Walked 25 feet or more  -GK    Row Name 03/06/23 1338 03/06/23 1115       Modified Stutsman Scale    Modified Stutsman Scale 2 - Slight disability.  Unable to carry out all previous activities but able  to look after own affairs without assistance.  - 1 - No significant disability despite symptoms.  Able to carry out all usual duties and activities.  -    Row Name 03/06/23 1338 03/06/23 1115       Functional Assessment    Outcome Measure Options AM-PAC 6 Clicks Basic Mobility (PT);Modified [place]  -CW AM-PAC 6 Clicks Daily Activity (OT);Modified [place]  -MW          User Key  (r) = Recorded By, (t) = Taken By, (c) = Cosigned By    Initials Name Provider Type    CW Deirdre Louis, PT Physical Therapist    Cheryl Stearns, OT Occupational Therapist    Corrina Andrews RN Registered Nurse              Physical Therapy Education     Title: PT OT SLP Therapies (In Progress)     Topic: Physical Therapy (In Progress)     Point: Mobility training (Done)     Learning Progress Summary           Patient Acceptance, E, VU by CANDY at 3/6/2023 1339                   Point: Home exercise program (Not Started)     Learner Progress:  Not documented in this visit.          Point: Body mechanics (Not Started)     Learner Progress:  Not documented in this visit.          Point: Precautions (Not Started)     Learner Progress:  Not documented in this visit.                      User Key     Initials Effective Dates Name Provider Type Discipline     12/13/22 -  Deirdre Louis PT Physical Therapist PT              PT Recommendation and Plan     Plan of Care Reviewed With: patient  Outcome Evaluation: Pt is a 41 yo female. PHx includes seizure disorder, prior stroke (April 2022). Pt lives with her  and daughter, uses no AD, independent with all functional mobility and reports x1 fall within past 6 months. Pt demo weakness L > R, balance WFL but has guarded pace due to recent fall. She completed bed mobility independently, transfers with supervision and ambulation 400' supervision without AD. Pt completed stairs without safety concerns. No indication for acute PT services at this time, OK to mobilize with nursing staff  or family as appropriate. Plans to d/c home with assist once medically cleared.     Time Calculation:    PT Charges     Row Name 03/06/23 1325             Time Calculation    Start Time 0824  -CW      Stop Time 0848  -CW      Time Calculation (min) 24 min  -CW      PT Received On 03/06/23  -CW         Time Calculation- PT    Total Timed Code Minutes- PT 18 minute(s)  -CW         Timed Charges    56238 - PT Therapeutic Activity Minutes 18  -CW         Total Minutes    Timed Charges Total Minutes 18  -CW       Total Minutes 18  -CW            User Key  (r) = Recorded By, (t) = Taken By, (c) = Cosigned By    Initials Name Provider Type    CW Deirdre Louis, PT Physical Therapist              Therapy Charges for Today     Code Description Service Date Service Provider Modifiers Qty    [id number]  PT EVAL LOW COMPLEXITY 3 3/6/2023 Deirdre Louis, PT GP 1    [id number]  PT THERAPEUTIC ACT EA 15 MIN 3/6/2023 Deirdre Louis, PT GP 1          PT G-Codes  Outcome Measure Options: AM-PAC 6 Clicks Basic Mobility (PT), Modified [place]  AM-PAC 6 Clicks Score (PT): 22  AM-PAC 6 Clicks Score (OT): 24  Modified [place] Scale: 2 - Slight disability.  Unable to carry out all previous activities but able to look after own affairs without assistance.    PT Discharge Summary  Anticipated Discharge Disposition (PT): home, home with assist    Deirdre Louis PT  3/6/2023

## 2023-03-06 NOTE — PLAN OF CARE
Goal Outcome Evaluation:  Plan of Care Reviewed With: patient           Outcome Evaluation: Pt is a 41 yo female. PHx includes seizure disorder, prior stroke (April 2022). Pt lives with her  and daughter, uses no AD, independent with all functional mobility and reports x1 fall within past 6 months. Pt demo weakness L > R, balance WFL but has guarded pace due to recent fall. She completed bed mobility independently, transfers with supervision and ambulation 400' supervision without AD. Pt completed stairs without safety concerns. No indication for acute PT services at this time, OK to mobilize with nursing staff or family as appropriate. Plans to d/c home with assist once medically cleared.

## 2023-03-06 NOTE — PLAN OF CARE
Goal Outcome Evaluation:  Plan of Care Reviewed With: patient, spouse, family        Progress: no change  Outcome Evaluation: SLP orders generated via stroke order set. Pt has passed swallow screen in ED, placed on regular diet per MD orders. Spoke with family, RN who report no difficulties. Skilled bedside swallow evaluation deferred at this time.       L sided weakness at admit. CT head negative for acute CVA. Family reports speech returned to baseline and no acute concerns re: cognition. Speech/language/cognitive evaluation deferred at this time.     SLP will sign off at this time. Please re-consult via new order if difficulties arise or with changes in condition.

## 2023-03-06 NOTE — NURSING NOTE
Message left for [facility] office, MRI requesting make and model of pt vagal nerve stimulator, pt has had a MRI since stimulator has been placed

## 2023-03-06 NOTE — PROGRESS NOTES
Name: Alexandra Xiao ADMIT: 3/5/2023   : 1980  PCP: Provider, No Known    MRN: [id number] LOS: 1 days   AGE/SEX: 42 y.o. female  ROOM: [facility]     Subjective   Subjective   ambulating in [place] with PT. chronic left sided weakness since previous stroke.     Objective   Objective   Vital Signs  Temp:  [97.5 °F (36.4 °C)-98.9 °F (37.2 °C)] 97.9 °F (36.6 °C)  Heart Rate:  [] 100  Resp:  [16-18] 18  BP: (141-175)/() 154/89  SpO2:  [93 %-98 %] 95 %  on   ;   Device (Oxygen Therapy): room air  Body mass index is 21.41 kg/m².    Physical Exam  Constitutional:       General: She is not in acute distress.     Appearance: Normal appearance. She is not toxic-appearing.   HENT:      Head: Normocephalic and atraumatic.   Cardiovascular:      Rate and Rhythm: Normal rate and regular rhythm.   Pulmonary:      Effort: Pulmonary effort is normal. No respiratory distress.   Skin:     General: Skin is warm and dry.   Neurological:      Mental Status: She is alert and oriented to person, place, and time.   Psychiatric:         Mood and Affect: Mood normal.         Behavior: Behavior normal.         Thought Content: Thought content normal.     Results Review  I reviewed the patient's new clinical results.  Results from last 7 days   Lab Units 23  0500 23  1217   WBC 10*3/mm3 8.61 7.80   HEMOGLOBIN g/dL 11.0* 9.9*   PLATELETS 10*3/mm3 368 331     Results from last 7 days   Lab Units 23  0500 23  1217   SODIUM mmol/L 135* 128*   POTASSIUM mmol/L 3.7 3.8   CHLORIDE mmol/L 102 95*   CO2 mmol/L 22.8 24.0   BUN mg/dL 9 9   CREATININE mg/dL 0.71 0.61   GLUCOSE mg/dL 102* 83     Lab Results   Component Value Date    ANIONGAP 10.2 2023     Estimated Creatinine Clearance: 128.7 mL/min (by C-G formula based on SCr of 0.71 mg/dL).    Results from last 7 days   Lab Units 23  0500 23  1217   ALBUMIN g/dL 3.8 3.3*   BILIRUBIN mg/dL <0.2 <0.2   ALK PHOS U/L 140* 135*   AST (SGOT) U/L 11 11    ALT (SGPT) U/L 10 8     Results from last 7 days   Lab Units 03/06/23  0500 03/05/23  1217   CALCIUM mg/dL 8.6 8.3*   ALBUMIN g/dL 3.8 3.3*       Hemoglobin A1C   Date/Time Value Ref Range Status   03/06/2023 0500 5.40 4.80 - 5.60 % Final     Glucose   Date/Time Value Ref Range Status   03/06/2023 0552 121 70 - 130 mg/dL Final     Comment:     Meter: [id number] : marjan Lyon RN   03/06/2023 0013 92 70 - 130 mg/dL Final     Comment:     Meter: [id number] : marjan Lyon RN   03/05/2023 1123 94 70 - 130 mg/dL Final     Comment:     Meter: [id number] : [id number] Glenys Good     Scheduled Meds  amitriptyline, 25 mg, Oral, Nightly  aspirin, 325 mg, Oral, Daily  atorvastatin, 40 mg, Oral, Nightly  Eslicarbazepine Acetate, 800 mg, Oral, Nightly  Eslicarbazepine Acetate, 400 mg, Oral, Nightly  lacosamide, 200 mg, Oral, Q12H  sodium chloride, 10 mL, Intravenous, Q12H    Continuous Infusions  sodium chloride, 75 mL/hr, Last Rate: 75 mL/hr (03/06/23 0635)    PRN Meds  •  ondansetron  •  sodium chloride  •  sodium chloride  •  sodium chloride    sodium chloride, 75 mL/hr, Last Rate: 75 mL/hr (03/06/23 0635)    Diet  Diet: Regular/House Diet; Texture: Regular Texture (IDDSI 7); Fluid Consistency: Thin (IDDSI 0)    I have personally reviewed:  [x]  Medications  [x]  Laboratory   []  Microbiology   [x]  Radiology   [x]  EKG/Telemetry   []  Cardiology/Vascular   []  Pathology   []  Records     Assessment/Plan     Active Hospital Problems    Diagnosis  POA   • **Left-sided weakness [R53.1]  Yes   • History of stroke [Z86.73]  Not Applicable   • Anemia [D64.9]  Unknown   • Hyponatremia [E87.1]  Unknown   • HTN (hypertension) [I10]  Unknown   • Epilepsy (HCC) [G40.909]  Yes      Resolved Hospital Problems   No resolved problems to display.     42 y.o. female with a history of seizure disorder (VNS in place) and prior stroke admitted with left-sided weakness and  numbness    Left-sided numbness/weakness  -Neurology feels could be secondary to seizures   -History of stroke MRI is pending  -EEG  -Continue seizure medications     Questionable left cervical ICA aneurysm  -Repeat CT angiogram of neck in 6 months    Hyponatremia  -Improved/nearly resolved    Anemia  -repeat improved    SCDs for DVT prophylaxis    Discussed with patient and nursing staff    Discharge: JILL Patino MD  [facility]  03/06/23

## 2023-03-06 NOTE — THERAPY EVALUATION
Patient Name: Alexandra Xiao  : 1980    MRN: [id number]                              Today's Date: 3/6/2023       Admit Date: 3/5/2023    Visit Dx:     ICD-10-CM ICD-9-CM   1. Left-sided weakness  R53.1 728.87   2. Nonintractable epilepsy without status epilepticus, unspecified epilepsy type ([facility])  G40.909 345.90   3. Hyponatremia  E87.1 276.1     Patient Active Problem List   Diagnosis   • Sebaceous cyst of breast, right   • Abnormal uterine bleeding   • Acute CVA (cerebrovascular accident) ([facility])   • Microcytic anemia   • Transaminitis   • Epilepsy ([facility])   • Tobacco abuse   • Major depressive disorder   • Left-sided weakness   • Anemia   • Hyponatremia   • HTN (hypertension)   • History of stroke     Past Medical History:   Diagnosis Date   • Abnormal bleeding in menstrual cycle    • Atrial fibrillation ([facility])    • Chest pain     ER VISIT MAY, 2022   • Depression    • Epilepsy ([facility])     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,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   • Headache, tension-type    • Heavy menstrual bleeding     WITH CLOTS   • Memory loss    • Migraine    • Mitral valve regurgitation    • Status post placement of implantable loop recorder    • Stroke ([facility])     MARCH AND      Past Surgical History:   Procedure Laterality Date   • BREAST BIOPSY     • D & C HYSTEROSCOPY ENDOMETRIAL ABLATION N/A 6/10/2022    Procedure: DILATATION AND CURETTAGE HYSTEROSCOPY NOVASURE ENDOMETRIAL ABLATION;  Surgeon: Ernesto Mendosa MD;  Location: [facility];  Service: Obstetrics/Gynecology;  Laterality: N/A;   • FOOT SURGERY      pins in left foot    • OTHER SURGICAL HISTORY      VNS plate 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   • NOÉ      2022   • TUBAL ABDOMINAL LIGATION     • VAGUS NERVE STIMULATOR IMPLANTATION        General Information     Row Name 23 1105          OT Time and Intention    Document Type evaluation  -MW     Mode of Treatment occupational therapy  -MW     Row Name 23 1105          General Information     Patient Profile Reviewed yes  -MW     Prior Level of Function independent:  -MW     Existing Precautions/Restrictions seizures  -MW     Barriers to Rehab medically complex  hx CVA  -MW     Row Name 03/06/23 1105          Living Environment    People in Home spouse;child(coco), adult  -MW     Row Name 03/06/23 1105          Home Main Entrance    Number of Stairs, Main Entrance five  -MW     Row Name 03/06/23 1105          Cognition    Orientation Status (Cognition) oriented x 4  -MW           User Key  (r) = Recorded By, (t) = Taken By, (c) = Cosigned By    Initials Name Provider Type    MW Cheryl Tucker OT Occupational Therapist                 Mobility/ADL's     Row Name 03/06/23 1106          Bed Mobility    Bed Mobility supine-sit  -MW     Supine-Sit [place] (Bed Mobility) modified independence  -     Row Name 03/06/23 1106          Transfers    Transfers sit-stand transfer;stand-sit transfer  -     Row Name 03/06/23 1106          Sit-Stand Transfer    Sit-Stand [place] (Transfers) supervision  -     Assistive Device (Sit-Stand Transfers) other (see comments)  -     Comment, (Sit-Stand Transfer) no AD  -MW     Row Name 03/06/23 1106          Stand-Sit Transfer    Stand-Sit [place] (Transfers) supervision  -     Row Name 03/06/23 1106          Functional Mobility    Functional Mobility- Ind. Level supervision required  -     Functional Mobility- Device other (see comments)  no AD  -MW     Functional Mobility- Comment demo'd functional mobility and household distances with no AD, SPV, no cues for safety  -     Row Name 03/06/23 1106          Activities of Daily Living    BADL Assessment/Intervention lower body dressing;feeding;toileting  -     Row Name 03/06/23 1106          Lower Body Dressing Assessment/Training    [place] Level (Lower Body Dressing) don;shoes/slippers;socks;set up  -     Position (Lower Body Dressing) edge of bed sitting  -     Row Name 03/06/23  1106          Self-Feeding Assessment/Training    [place] Level (Feeding) feeding skills;independent  -     Row Name 03/06/23 1106          Toileting Assessment/Training    Comment, (Toileting) reports up to BR multiple times prior to session, no concerns  -           User Key  (r) = Recorded By, (t) = Taken By, (c) = Cosigned By    Initials Name Provider Type    Cheryl Stearns OT Occupational Therapist               Obj/Interventions     Row Name 03/06/23 1109          Sensory Assessment (Somatosensory)    Sensory Assessment (Somatosensory) UE sensation intact  -     Row Name 03/06/23 1109          Vision Assessment/Intervention    Visual Impairment/Limitations WFL  -     Row Name 03/06/23 1109          Range of Motion Comprehensive    General Range of Motion bilateral upper extremity ROM WNL  -     Row Name 03/06/23 1109          Strength Comprehensive (MMT)    General Manual Muscle Testing (MMT) Assessment upper extremity strength deficits identified  -     Comment, General Manual Muscle Testing (MMT) Assessment RUE 4+/5 LUE 4/5  -     Row Name 03/06/23 1109          Motor Skills    Motor Skills functional endurance  -     Functional Endurance good  -     Row Name 03/06/23 1109          Balance    Balance Assessment sitting static balance;sitting dynamic balance;sit to stand dynamic balance;standing static balance;standing dynamic balance  -     Static Sitting Balance independent  -MW     Dynamic Sitting Balance independent  -MW     Position, Sitting Balance sitting edge of bed  -     Sit to Stand Dynamic Balance supervision  -MW     Static Standing Balance supervision  -MW     Dynamic Standing Balance supervision  -MW     Position/Device Used, Standing Balance unsupported  -MW     Comment, Balance no LOBs  -           User Key  (r) = Recorded By, (t) = Taken By, (c) = Cosigned By    Initials Name Provider Type    Cheryl Stearns OT Occupational Therapist                Goals/Plan     Row Name 03/06/23 1115          Transfer Goal 1 (OT)    Activity/Assistive Device (Transfer Goal 1, OT) sit-to-stand/stand-to-sit  -MW     [place] Level/Cues Needed (Transfer Goal 1, OT) supervision required  -MW     Time Frame (Transfer Goal 1, OT) short term goal (STG);1 day  -MW     Progress/Outcome (Transfer Goal 1, OT) goal met  -MW           User Key  (r) = Recorded By, (t) = Taken By, (c) = Cosigned By    Initials Name Provider Type    Cheryl Stearns, OT Occupational Therapist               Clinical Impression     Row Name 03/06/23 1111          Pain Assessment    Pretreatment Pain Rating 0/10 - no pain  -MW     Posttreatment Pain Rating 0/10 - no pain  -MW     Row Name 03/06/23 1111          Plan of Care Review    Plan of Care Reviewed With patient  -MW     Progress no change  -MW     Outcome Evaluation Pt is 42 y.o. female admitted with L sided weakness. Hx seizure disorder, prior stroke (Apriln 2022). Pt seen for OT eval this date, A&Ox4, reports lives with her  and daughter, uses no AD, (I) ADLs/IADLs, and reports x1 fall within past 6 months. Today, pt mod (I) bed mobility, able to don shoes/socks with s/up, STS and mobility with SPV and no AD, completing functional mobility around entire unit without cues for safety or LOBs noted. Pt's UE minimally weaker on LUE, still 4/5 strength. She presents at her baseline this date, will not require further skilled OT during acute stay. Plans home with spouse and daughter, will sign off.  -     Row Name 03/06/23 1111          Therapy Assessment/Plan (OT)    Criteria for Skilled Therapeutic Interventions Met (OT) no problems identified which require skilled intervention  -     Row Name 03/06/23 1111          Therapy Plan Review/Discharge Plan (OT)    Anticipated Discharge Disposition (OT) home  -     Row Name 03/06/23 1111          Vital Signs    O2 Delivery Pre Treatment room air  -MW     Pre Patient Position Supine  -MW      Intra Patient Position Standing  -MW     Post Patient Position Sitting  -MW     Row Name 03/06/23 1111          Positioning and Restraints    Pre-Treatment Position in bed  -MW     Post Treatment Position chair  -MW     In Chair notified nsg;reclined;call light within reach;encouraged to call for assist;exit alarm on  -MW           User Key  (r) = Recorded By, (t) = Taken By, (c) = Cosigned By    Initials Name Provider Type    MW Cheryl Tucker OT Occupational Therapist               Outcome Measures     Row Name 03/06/23 1115          How much help from another is currently needed...    Putting on and taking off regular lower body clothing? 4  -MW     Bathing (including washing, rinsing, and drying) 4  -MW     Toileting (which includes using toilet bed pan or urinal) 4  -MW     Putting on and taking off regular upper body clothing 4  -MW     Taking care of personal grooming (such as brushing teeth) 4  -MW     Eating meals 4  -MW     AM-PAC 6 Clicks Score (OT) 24  -MW     Row Name 03/06/23 0908          How much help from another person do you currently need...    Turning from your back to your side while in flat bed without using bedrails? 4  -GK     Moving from lying on back to sitting on the side of a flat bed without bedrails? 4  -GK     Moving to and from a bed to a chair (including a wheelchair)? 4  -GK     Standing up from a chair using your arms (e.g., wheelchair, bedside chair)? 4  -GK     Climbing 3-5 steps with a railing? 3  -GK     To walk in hospital room? 4  -GK     AM-PAC 6 Clicks Score (PT) 23  -GK     Highest level of mobility 7 --> Walked 25 feet or more  -GK     Row Name 03/06/23 1115          Modified [place]adou Scale    Modified Dodge Scale 1 - No significant disability despite symptoms.  Able to carry out all usual duties and activities.  -     Row Name 03/06/23 1115          Functional Assessment    Outcome Measure Options AM-PAC 6 Clicks Daily Activity (OT);Modified Dodge  -            User Key  (r) = Recorded By, (t) = Taken By, (c) = Cosigned By    Initials Name Provider Type     Cheryl Tucker OT Occupational Therapist    Corrina Andrews RN Registered Nurse                Occupational Therapy Education     Title: PT OT SLP Therapies (Done)     Topic: Occupational Therapy (Done)     Point: ADL training (Done)     Description:   Instruct learner(s) on proper safety adaptation and remediation techniques during self care or transfers.   Instruct in proper use of assistive devices.              Learning Progress Summary           Patient Acceptance, E, VU by  at 3/6/2023 1115    Comment: role of OT, d/c rec                   Point: Precautions (Done)     Description:   Instruct learner(s) on prescribed precautions during self-care and functional transfers.              Learning Progress Summary           Patient Acceptance, E, VU by  at 3/6/2023 1115    Comment: role of OT, d/c rec                   Point: Body mechanics (Done)     Description:   Instruct learner(s) on proper positioning and spine alignment during self-care, functional mobility activities and/or exercises.              Learning Progress Summary           Patient Acceptance, E, VU by  at 3/6/2023 1115    Comment: role of OT, d/c rec                               User Key     Initials Effective Dates Name Provider Type Discipline     08/20/21 -  Cheryl Tucker OT Occupational Therapist OT              OT Recommendation and Plan     Plan of Care Review  Plan of Care Reviewed With: patient  Progress: no change  Outcome Evaluation: Pt is 42 y.o. female admitted with L sided weakness. Hx seizure disorder, prior stroke (Apriln 2022). Pt seen for OT eval this date, A&Ox4, reports lives with her  and daughter, uses no AD, (I) ADLs/IADLs, and reports x1 fall within past 6 months. Today, pt mod (I) bed mobility, able to don shoes/socks with s/up, STS and mobility with SPV and no AD, completing functional mobility  around entire unit without cues for safety or LOBs noted. Pt's UE minimally weaker on LUE, still 4/5 strength. She presents at her baseline this date, will not require further skilled OT during acute stay. Plans home with spouse and daughter, will sign off.     Time Calculation:    Time Calculation- OT     Row Name 03/06/23 1116             Time Calculation- OT    OT Start Time 0820  -MW      OT Stop Time 0846  -MW      OT Time Calculation (min) 26 min  -MW      Total Timed Code Minutes- OT 20 minute(s)  -MW      OT Received On 03/06/23  -MW         Timed Charges    97448 - OT Therapeutic Activity Minutes 20  -MW         Untimed Charges    OT Eval/Re-eval Minutes 6  -MW         Total Minutes    Timed Charges Total Minutes 20  -MW      Untimed Charges Total Minutes 6  -MW       Total Minutes 26  -MW            User Key  (r) = Recorded By, (t) = Taken By, (c) = Cosigned By    Initials Name Provider Type     Cheryl Tucker OT Occupational Therapist              Therapy Charges for Today     Code Description Service Date Service Provider Modifiers Qty    [id number]  OT THERAPEUTIC ACT EA 15 MIN 3/6/2023 Cheryl Tucker OT GO 1    [id number]  OT EVAL LOW COMPLEXITY 2 3/6/2023 Cheryl Tucker OT GO 1               Cheryl Tucker OT  3/6/2023

## 2023-03-06 NOTE — PLAN OF CARE
Problem: Adult Inpatient Plan of Care  Goal: Plan of Care Review  Outcome: Ongoing, Progressing  Flowsheets (Taken 3/5/2023 2318)  Progress: improving  Plan of Care Reviewed With: patient  Goal: Patient-Specific Goal (Individualized)  Outcome: Ongoing, Progressing  Goal: Absence of Hospital-Acquired Illness or Injury  Outcome: Ongoing, Progressing  Intervention: Identify and Manage Fall Risk  Recent Flowsheet Documentation  Taken 3/5/2023 2255 by Sonali Castellanos RN  Safety Promotion/Fall Prevention:   activity supervised   assistive device/personal items within reach   clutter free environment maintained   safety round/check completed  Taken 3/5/2023 2055 by Sonali Castellanos RN  Safety Promotion/Fall Prevention:   activity supervised   assistive device/personal items within reach   clutter free environment maintained   nonskid shoes/slippers when out of bed   safety round/check completed  Intervention: Prevent Skin Injury  Recent Flowsheet Documentation  Taken 3/5/2023 2255 by Sonali Castellanos RN  Body Position: position changed independently  Taken 3/5/2023 2055 by Sonali Castellanos RN  Body Position: sitting up in bed  Intervention: Prevent and Manage VTE (Venous Thromboembolism) Risk  Recent Flowsheet Documentation  Taken 3/5/2023 2255 by Sonali Castellanos RN  Activity Management: activity adjusted per tolerance  Taken 3/5/2023 2055 by Sonali Castellanos RN  Activity Management: activity adjusted per tolerance  Intervention: Prevent Infection  Recent Flowsheet Documentation  Taken 3/5/2023 2255 by Sonali Castellanos RN  Infection Prevention:   single patient room provided   rest/sleep promoted   hand hygiene promoted  Taken 3/5/2023 2055 by Sonali Castellanos RN  Infection Prevention:   cohorting utilized   environmental surveillance performed   rest/sleep promoted   single patient room provided  Goal: Optimal Comfort and Wellbeing  Outcome: Ongoing, Progressing  Intervention: Provide  Person-Centered Care  Recent Flowsheet Documentation  Taken 3/5/2023 2055 by Sonali Castellanos RN  Trust Relationship/Rapport:   care explained   choices provided   questions encouraged  Goal: Readiness for Transition of Care  Outcome: Ongoing, Progressing     Problem: Fall Injury Risk  Goal: Absence of Fall and Fall-Related Injury  Outcome: Ongoing, Progressing  Intervention: Identify and Manage Contributors  Recent Flowsheet Documentation  Taken 3/5/2023 2255 by Sonali Castellanos RN  Medication Review/Management: medications reviewed  Taken 3/5/2023 2055 by Sonali Castellanos RN  Medication Review/Management: medications reviewed  Intervention: Promote Injury-Free Environment  Recent Flowsheet Documentation  Taken 3/5/2023 2255 by Sonali Castellanos RN  Safety Promotion/Fall Prevention:   activity supervised   assistive device/personal items within reach   clutter free environment maintained   safety round/check completed  Taken 3/5/2023 2055 by Sonali Castellanos RN  Safety Promotion/Fall Prevention:   activity supervised   assistive device/personal items within reach   clutter free environment maintained   nonskid shoes/slippers when out of bed   safety round/check completed   Goal Outcome Evaluation:  Plan of Care Reviewed With: patient        Progress: improving

## 2023-03-07 ENCOUNTER — READMISSION MANAGEMENT (OUTPATIENT)
Dept: CALL CENTER | Facility: HOSPITAL | Age: 43
End: 2023-03-07
Payer: MEDICARE

## 2023-03-07 VITALS
HEIGHT: 64 IN | RESPIRATION RATE: 20 BRPM | SYSTOLIC BLOOD PRESSURE: 155 MMHG | BODY MASS INDEX: 29.88 KG/M2 | WEIGHT: 175 LBS | TEMPERATURE: 98.5 F | HEART RATE: 100 BPM | OXYGEN SATURATION: 96 % | DIASTOLIC BLOOD PRESSURE: 85 MMHG

## 2023-03-07 PROCEDURE — 99233 SBSQ HOSP IP/OBS HIGH 50: CPT | Performed by: NURSE PRACTITIONER

## 2023-03-07 RX ORDER — LISINOPRIL 5 MG/1
5 TABLET ORAL
Status: DISCONTINUED | OUTPATIENT
Start: 2023-03-07 | End: 2023-03-07 | Stop reason: HOSPADM

## 2023-03-07 RX ORDER — LISINOPRIL 5 MG/1
5 TABLET ORAL
Qty: 30 TABLET | Refills: 0 | Status: SHIPPED | OUTPATIENT
Start: 2023-03-07 | End: 2023-03-08 | Stop reason: SINTOL

## 2023-03-07 RX ADMIN — LISINOPRIL 5 MG: 5 TABLET ORAL at 10:11

## 2023-03-07 RX ADMIN — SODIUM CHLORIDE 75 ML/HR: 9 INJECTION, SOLUTION INTRAVENOUS at 05:21

## 2023-03-07 RX ADMIN — ASPIRIN 325 MG: 325 TABLET ORAL at 08:28

## 2023-03-07 RX ADMIN — LACOSAMIDE 200 MG: 100 TABLET, FILM COATED ORAL at 08:28

## 2023-03-07 RX ADMIN — Medication 10 ML: at 08:29

## 2023-03-07 NOTE — PROGRESS NOTES
"DOS: 3/7/2023  NAME: Alexandra Xiao   : 1980  PCP: Provider, No Known  Chief Complaint   Patient presents with   • Numbness        Stroke    Subjective: No acute events overnight specifically no seizure-like activity reported.  VNS was turned off yesterday afternoon in order to complete MRI-patient with no seizure-like activity and no indication for Ativan.  Today, patient denies any new complaints or concerns on my exam.  He is anxious to discharge to home     and another family member at bedside.     Objective:  Vital signs: /85 (BP Location: Right arm, Patient Position: Sitting)   Pulse 100   Temp 98.5 °F (36.9 °C) (Oral)   Resp 20   Ht 162.6 cm (64\")   Wt 79.4 kg (175 lb)   SpO2 96%   BMI 30.04 kg/m²       HEENT: Normocephalic, atraumatic   COR: RRR  Resp: Even and unlabored  Extremities: Equal pulses, nondistal embolization    Neurological:   MS: AO. Language normal. No neglect. Higher integrative function normal  CN: II-XII normal except left facial weakness.   Motor: 5/5, normal tone  Reflexes:toes down going   Sensory: Intact- to light touch   Coordination: Normal- finger to nose     Laboratory results:  Lab Results   Component Value Date    GLUCOSE 102 (H) 2023    CALCIUM 8.6 2023     (L) 2023    K 3.7 2023    CO2 22.8 2023     2023    BUN 9 2023    CREATININE 0.71 2023    BCR 12.7 2023    ANIONGAP 10.2 2023     Lab Results   Component Value Date    WBC 8.61 2023    HGB 11.0 (L) 2023    HCT 33.3 (L) 2023    MCV 77.6 (L) 2023     2023     Lab Results   Component Value Date    CHOL 135 2023    CHOL 110 2022     Lab Results   Component Value Date    HDL 66 (H) 2023    HDL 54 2022     Lab Results   Component Value Date    LDL 55 2023    LDL 44 2022     Lab Results   Component Value Date    TRIG 66 2023    TRIG 48 2022       "   Lab 03/06/23  0500   HEMOGLOBIN A1C 5.40      Review and interpretation of imaging:  EMERGENCY CONTRAST-ENHANCED CT ANGIOGRAM OF THE HEAD AND NECK AND CT  PERFUSION STUDY OF THE BRAIN ON 03/05/2023     CLINICAL HISTORY: Team D, acute stroke. The patient has left-sided  weakness, double vision, slurred speech.     NONCONTRAST HEAD CT TECHNIQUE: Spiral CT images were obtained from the  base of the skull to the vertex without intravenous contrast. The images  were reformatted and are submitted in 3 mm thick axial CT sections with  brain algorithm and 2 mm thick sagittal and coronal reconstructions were  performed and submitted in brain algorithm.     COMPARISON: This is correlated to a prior MRI of the brain from [facility] on 04/22/2022 and a noncontrast head CT on 02/20/2023.     FINDINGS: On the prior MRI of the brain on 04/22/2023 there were 2 acute  infarcts measuring 4-5 mm in size, 1 involving the posterior superior  right frontal subcortical white matter and 1 involving the posterior  superior lateral right frontal cortex. These were in the right middle  cerebral artery territory. No discernible chronic infarct is seen at  these sites. Furthermore the prior MRI demonstrated tiny crescentic  chronic infarcts in the left cerebral hemisphere with a 6 x 2 mm chronic  infarct in the superior lateral left frontal cortex and a 3 mm chronic  infarct in the posterior superior left frontal cortex in the left MCA  territory that are unable to be appreciated on the current exam. Overall  the brain parenchyma is normal in attenuation. The ventricles are normal  in size. I see no focal mass effect. There is no midline shift. No  extra-axial fluid collections are identified and there is no evidence of  acute intracranial hemorrhage. The calvarium and the skull base are  normal in appearance. The paranasal sinuses, mastoid air cells and  middle ear cavities are clear.     IMPRESSION:  1. No acute intracranial  abnormality is identified with no discernible  acute infarct or intracranial hemorrhage seen. The etiology of the  patient's left-sided weakness, blurred vision and slurred speech is not  established on this exam.   2. The prior MRI of the brain from [facility] on  04/22/2022 demonstrates 2 separate tiny 4-5 mm acute infarcts in the  right cerebral hemisphere, 1 involving the posterior superior right  frontal subcortical white matter and another involving the posterior  superior right frontal cortex both in the right MCA territory and  demonstrated a tiny 6 x 2 mm chronic superior lateral left frontal  cortical infarct and a 3 mm old posterior superior left frontal cortical  infarct in the left MCA territory.  These chronic bilateral MCA  territory infarcts are unable to be appreciated on this head CT but  suggest that the patient may have a central or cardiac embolic source.  The results of this study were communicated to Dr. Mason from stroke  neurology while the patient was still on our scanner on 03/05/2023 at  11:45 AM and he requested a CT angiogram of the head and neck and CT  perfusion study of the head.         CT ANGIOGRAM OF THE HEAD AND NECK AND CT PERFUSION STUDY OF THE BRAIN  TECHNIQUE: Spiral CT images were obtained from the mid cerebellum up  through the cerebral hemispheres for a 10 cm vertical slab of brain  imaging during the arterial phase of contrast and images were  reformatted and analyzed with rapid software analysis for CT perfusion  study of the brain. Subsequently spiral CT angiogram images were  obtained from the top of the aortic arch up through the great vessels of  the head and neck during the arterial phase of contrast and images were  reformatted and submitted in 1 mm thick axial, sagittal and coronal CT  sections with soft tissue algorithm and 3D reconstructions were  performed to complete the CT angiogram of the head and neck and finally  spiral CT images were  obtained from the base of the skull to the vertex  delayed following intravenous contrast and these images were reformatted  and submitted in 3 mm thick axial CT sections with brain algorithm and 2  mm thick sagittal and coronal reconstructions were performed and  submitted in brain algorithm.     FINDINGS:  CT PERFUSION STUDY OF THE BRAIN: The CT perfusion study of the brain  consists of a 10 cm vertical slab of brain imaging from the mid  cerebellum up through the cerebral hemispheres and excludes the inferior  half of the cerebellum and the medulla which are not assessed. Within  the 10 cm vertical slab of brain imaging I see no areas of reduced  cerebral blood flow to less than 30% of normal and thus no acute  completed infarct and no areas of delayed time to maximal enhancement of  greater than 6 seconds and thus no himanshu hypoperfused brain.     CT ANGIOGRAM OF THE NECK: The nasopharynx, oropharynx, hypopharynx, true  cords and subglottic airway are normal in appearance. The thyroid gland  is mildly enlarged and there are several small thyroid nodules. The lung  apices are clear. The parotid, , parapharyngeal and  submandibular spaces are symmetric and are normal in appearance. The  cervical spine is unremarkable. There is anatomic origin of the great  vessels off the aortic arch. Noncalcified plaque mildly narrows the left  subclavian artery origin. Beyond its origin the left subclavian artery  is widely patent without stenosis. The left vertebral artery origin is  normal in appearance. No stenosis is seen in the left vertebral artery  from its origin to the vertebrobasilar junction. The left common carotid  origin is normal in appearance and no stenosis is seen in the left  common carotid artery and its bifurcation into the left internal and  external carotid arteries is normal in appearance and no stenosis is  seen in the cervical segment of the left internal carotid artery. Just  below the left  petrous carotid canal is a tiny 2 x 1 mm area of luminal  contrast that extends into the left lateral wall of the upper cervical  left internal carotid artery. It may be an ulcerated noncalcified  plaque. This is best seen on coronal reconstructed image 111 and axial  image 181 sequence 8. The brachiocephalic artery origin is normal in  appearance and no stenosis is seen in the brachiocephalic artery. There  is noncalcified plaque that circumscribes and mild to moderately narrows  the origin and proximal 1 cm of the right subclavian artery beyond which  the mid and distal right subclavian artery is normal in caliber. The  right vertebral artery origin is normal in appearance. There is contrast  densely opacifying the adjacent veins that streaks through the proximal  right vertebral artery and limits evaluation of the right vertebral  artery to the C7 cervical level and above this the cervical segment of  the right vertebral artery is normal in appearance as is its  intracranial segment to the vertebrobasilar junction. The right common  carotid origin is normal in appearance and no stenosis is seen in the  right common carotid artery and its bifurcation into the right internal  and external carotid arteries is normal in appearance and no stenosis is  seen in the cervical segment of the right internal carotid artery using  the NASCET criteria.     CT ANGIOGRAM OF THE HEAD: The intracranial segments of the distal  vertebral arteries are widely to the vertebrobasilar junction. The  basilar artery and the basilar tip are normal in appearance. The  posterior cerebral and the superior cerebellar arteries are normal in  appearance. The petrous, cavernous and supracavernous segments of the  internal carotid arteries are within normal limits. The A1 segments of  the anterior cerebral arteries, the anterior communicating artery origin  and the A2 segments of the anterior cerebral arteries are within normal  limits. The M1  segments of the middle cerebral arteries and the middle  cerebral artery bifurcations are within normal limits. The visualized M2  branches and the sylvian fissure appear to be patent.     IMPRESSION:  1. The noncontrast head CT demonstrates no acute intracranial  abnormality with no discernible acute completed infarct or intracranial  hemorrhage identified. On the prior MRI of the brain on 04/22/2022 there  were 2 separate 4-5 mm acute infarcts in the right cerebral hemisphere,  1 involving the posterior superior right frontal subcortical white  matter and 1 involving the posterior superior lateral right frontal  vertex both in the right MCA territory. There were 2 separate tiny old  infarcts in the left MCA territory, 1 measuring 6 x 2 mm in the  posterior superior lateral left frontal cortex and 1 measuring 3 mm in  the posterior superior left frontal cortex. These chronic infarcts are  unable to be appreciated on the noncontrast head CT but the bilateral  MCA territories suggest the patient has chronic central or cardiac  embolic source. Correlation with clinical history is suggested. The  results of the noncontrast head CT were communicated to Dr. Mason from  stroke neurology while the patient was still on our scanner on  03/05/2023 at 11:45 AM and he requested a CT angiogram of the head and  neck and CT perfusion study of the brain.  2. CT perfusion study of the brain consists of a 10 cm vertical slab of  brain imaging from the mid cerebellum up through the cerebral  hemispheres and excludes the inferior third of the cerebellum and the  medulla which are not assessed. Within the 10 cm vertical slab of brain  imaging I see no areas of reduced cerebral blood flow to less than 30%  of normal and thus no acute completed infarct and no areas of delayed  time to maximal enhancement of greater than 6 seconds and thus no himanshu  hypoperfused brain.  3. On the CT angiogram of the neck there is a focal subtle 2 x 1  mm  nodular outpouching off the left lateral wall of the upper cervical left  internal carotid artery just below the petrous carotid canal. The  finding is nonspecific It could be contrast extending into an ulcerated  atherosclerotic plaque or a very subtle tiny aneurysm or pseudoaneurysm  at this site. There is also noncalcified plaque circumscribing and mild  to moderately narrowing the origin of the proximal 1 cm of the right  subclavian artery. There are portions of the proximal right vertebral  artery obscured by beam hardening artifact and not evaluated. Otherwise,  the remainder of the CT angiogram of the head and neck is within normal  limits. The results and final dictated study were communicated to Dr. Mason from stroke neurology on 03/05/2023 at 12:25 PM.     AI analysis of LVO was utilized.     Radiation dose reduction techniques were utilized, including automated  exposure control and exposure modulation based on body size.     This report was finalized on 3/5/2023 7:38 PM by Dr. Jay Cid M.D.     Emergency portable view of the chest on 03/05/2023     CLINICAL HISTORY: Acute stroke protocol     This is correlated to a portable view of the chest on 02/20/2023.     FINDINGS: There is a loop recorder device projecting over the  anteromedial soft tissues the left mid hemithorax at the horizontal  level of the T6-7 thoracic level. There is also a vagus nerve stimulator  pack projecting over the lateral left chest with stimulator lead  coursing into left-sided neck. The cardiomediastinal silhouette and the  pulmonary vasculature are within normal limits. The lungs are clear. The  costophrenic angles are sharp.     IMPRESSION:  1. No active disease is seen in the chest with no significant change  when compared to a recent chest x-ray on 02/20/2023 just 13 days ago.  2. There is a loop recorder device projecting over the anteromedial soft  tissues of the left chest at the T6-7 thoracic level and there is  a  vagus nerve stimulator pack over the lateral aspect of the left mid  hemithorax with a lead coursing into the soft tissues of the left side  of the neck.     This report was finalized on 3/5/2023 12:44 PM by Dr. Jay Cid M.D.       Impression: This is a 42 old female with known diagnosis of prior embolic strokes, epilepsy, status post VNS placement on Vimpat and Aptiom prior to arrival, chronic daily headaches (on amitriptyline)/migraine who presented left leg face service was consulted.  Her symptoms started to improve prior to still has left-sided numbness weakness.  Most recent admission in April 2022 patient was admitted for seizure and subsequently found to have which is thus far specifically no atrial fibrillation prior to arrival patient on aspirin and statins for secondary reports compliance.  Initial CT of the head negative.  CTA of the head and neck unremarkable although there is questionable left cervical segment reason versus pseudoaneurysm.     Neurologically stable.  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  EEG showed left frontal interictal epileptiform discharges-no electrographic seizures noted.    VNS settings restored this a.m. with attending neurologist Dr. Patricio Kat      Procedures: Vagus nerve stimulator interrogation and diagnostics  Indication: History of seizures- need for MRI   Report:  Normal mode settings are as follows: Output current is set to 1.625 mA, frequency is 30 Hz, pulse width is 250 µs, on time is 30 seconds, off time is 5 minutes and duty cycle is 10%.  Auto stimulation mode settings are as follows: Output current is set to 1.875 mA, pulse width is 250 µs and on time is 60 seconds.  Magnet mode settings are as follows: Output current set to 2.75 mA, pulse width is 250 µs and on time is 60 seconds.  Setting changes: No setting changes-  restored prior setting; turned off to complete MRI  Diagnostics: Output current is 1.625 mA, lead impedance is okay at 2405 mA, generator is okay at 25 to 50%.    Slow titration up to setting above. Pt. w/ mild coughing otherwise tolerated  w/o any difficulty.      Diagnosis:  1.  Acute left sided symptoms with numbness and persistent left facial weakness still present etiology unclear although CVA since excluded   2.  Epilepsy; well maintained on Aptiom and Vimpat with VNS in place seen as an outpatient by Dr. Cb James  3.  Questionable left cervical segment left ICA aneurysm-recommend repeat imaging in 6 months to further evaluate      Plan:  · Aspirin 325 mg daily  · Atorvastatin 40 mg daily   · Continue Vimpat 200 mg twice daily and Aptiom 1200 mg nightly per home dosing   · Keep planned follow-up w/ Dr. James as scheduled       Case reviewed with attending neurologist Dr. Patricio Dickson 03/7/23 he agrees with treatment plan above.     KIRSTIE Beckett

## 2023-03-07 NOTE — NURSING NOTE
Patient education for discharge instructions and follow-up appointments, patient stated understanding, no other questions or concerns at this time, patient and spouse gathered all belongings

## 2023-03-07 NOTE — PROGRESS NOTES
[facility] Acute Inpt Rehab Note     Chart reviewed.  Noted plans for discharge.  Will sign off at this time.    Thanks,   Debbie Kat RN  Rehab Admission Nurse   [phone]

## 2023-03-07 NOTE — DISCHARGE SUMMARY
[facility]    Date of Discharge:  3/7/2023    PCP: Provider, No Known    Discharge Diagnosis:   Active Hospital Problems    Diagnosis  POA   • **Left-sided weakness [R53.1]  Yes   • History of stroke [Z86.73]  Not Applicable   • Anemia [D64.9]  Unknown   • Hyponatremia [E87.1]  Unknown   • HTN (hypertension) [I10]  Unknown   • Epilepsy (HCC) [G40.909]  Yes      Resolved Hospital Problems   No resolved problems to display.          Consults     Date and Time Order Name Status Description    3/5/2023  1:05 PM [facility] (on-call MD unless specified) Details      3/5/2023 11:35 AM Inpatient Neurology Consult Stroke Completed     3/5/2023 11:35 AM Inpatient Neurology Consult Stroke Completed         Hospital Course  42 y.o. female with a known diagnosis of prior embolic strokes (has had previous work-up including loop recorder followed by a cardiology), epilepsy, VNS placement on Vimpat and Aptiom, chronic daily headaches/migraine on amitriptyline presented with left-sided facial weakness and numbness and seizure-like activity. She was seen by neurology and they felt her symptoms could be secondary to seizures. She returned to neurologic baseline. MRI was negative for acute stroke (it did show old infarcts). EEG showed presence of left frontal interictal epileptiform discharges. Echocardiogram as below. She did have elevated blood pressure and will be started on lisinopril and will need to be followed up closely with a PCP for BP and lab check. She was noted to have a questionable left cervical ICA aneurysm and neurology recommended a CT angiogram of the neck in 6 months. This was discussed with the patient and she agrees. She currently does not have a PCP and agrees to referral to [facility]    Results for orders placed during the hospital encounter of 03/05/23    Adult transthoracic echo complete    Interpretation Summary  •  Left ventricular systolic  function is normal. Calculated left ventricular EF = 59.2% Normal left ventricular cavity size and wall thickness noted. All left ventricular wall segments contract normally. Left ventricular diastolic function is consistent with (grade Ia w/high LAP) impaired relaxation.  •  The left atrial cavity is borderline dilated.  •  Moderate mitral valve regurgitation is present with an eccentric jet noted. Mild mitral valve stenosis is present. The mitral valve leaflets have a mild hockey-stick deformity consistent with prior rheumatic heart disease.     I discussed the patient's findings and my recommendations with patient and nursing staff. She would like to go home today.     Temp:  [97.3 °F (36.3 °C)-98.2 °F (36.8 °C)] 98.2 °F (36.8 °C)  Heart Rate:  [103-115] 108  Resp:  [16-18] 18  BP: (142-172)/(81-99) 161/96  Body mass index is 30.04 kg/m².    Physical Exam  Constitutional:       General: She is not in acute distress.     Appearance: Normal appearance. She is not toxic-appearing.   HENT:      Head: Normocephalic and atraumatic.   Eyes:      Conjunctiva/sclera: Conjunctivae normal.   Cardiovascular:      Rate and Rhythm: Normal rate and regular rhythm.   Pulmonary:      Effort: Pulmonary effort is normal. No respiratory distress.      Breath sounds: Normal breath sounds. No wheezing or rhonchi.   Abdominal:      General: Bowel sounds are normal.      Palpations: Abdomen is soft.      Tenderness: There is no abdominal tenderness. There is no guarding or rebound.   Musculoskeletal:         General: No swelling.      Cervical back: Normal range of motion.   Skin:     General: Skin is warm and dry.   Neurological:      Mental Status: She is alert and oriented to person, place, and time.   Psychiatric:         Mood and Affect: Mood normal.         Behavior: Behavior normal.         Thought Content: Thought content normal.       Disposition: Home or Self Care       Discharge Medications      New Medications       Instructions Start Date   lisinopril 5 MG tablet  Commonly known as: PRINIVIL,ZESTRIL   5 mg, Oral, Every 24 Hours Scheduled         Changes to Medications      Instructions Start Date   aspirin 325 MG tablet  What changed: Another medication with the same name was removed. Continue taking this medication, and follow the directions you see here.   325 mg, Oral, Daily      Vimpat 200 MG tablet  Generic drug: lacosamide  What changed: Another medication with the same name was removed. Continue taking this medication, and follow the directions you see here.   200 mg, Oral, Every 12 Hours Scheduled, Brand-name only please         Continue These Medications      Instructions Start Date   amitriptyline 25 MG tablet  Commonly known as: ELAVIL   25 mg, Oral, Nightly      atorvastatin 40 MG tablet  Commonly known as: LIPITOR   TAKE 1 TABLET BY MOUTH EVERY NIGHT      ESLICARBAZEPINE ACETATE PO   1,200 mg, Oral, Nightly            Diet Instructions     Diet: Regular, Cardiac      Discharge Diet:  Regular  Cardiac            Activity Instructions     Activity as Tolerated           Additional Instructions for the Follow-ups that You Need to Schedule     Call MD for problems / concerns.   As directed         Follow-up Information     [facility]FERR[facility] Follow up in 1 week(s).    Why: new PCP, Lab check (BMP), BP check  Contact information:  [place] [address]  [phone]           Cb James II, MD Follow up.    Specialty: Neurology  Why: needs CTA neck 6 months to follow-up questionable left cervical ICA aneurysm  Contact information:  [address]  [facility] [address]  [place] [id number] [phone]                           Jhonatan Patino MD  [facility]  03/07/23    Discharge time spent greater than 30 minutes.

## 2023-03-07 NOTE — PLAN OF CARE
No acute events this shift.    Problem: Adult Inpatient Plan of Care  Goal: Plan of Care Review  3/7/2023 0618 by Jayant Wang RN  Outcome: Ongoing, Progressing  3/7/2023 0618 by Jayant Wang RN  Outcome: Ongoing, Progressing  Goal: Patient-Specific Goal (Individualized)  3/7/2023 0618 by Jayant Wang RN  Outcome: Ongoing, Progressing  3/7/2023 0618 by Jayant Wang RN  Outcome: Ongoing, Progressing  Goal: Absence of Hospital-Acquired Illness or Injury  3/7/2023 0618 by Jayant Wang RN  Outcome: Ongoing, Progressing  3/7/2023 0618 by Jayant Wang RN  Outcome: Ongoing, Progressing  Intervention: Identify and Manage Fall Risk  Recent Flowsheet Documentation  Taken 3/7/2023 0615 by Jayant Wang RN  Safety Promotion/Fall Prevention: safety round/check completed  Taken 3/7/2023 0400 by Jayant Wang RN  Safety Promotion/Fall Prevention: safety round/check completed  Taken 3/7/2023 0200 by Jayant Wang RN  Safety Promotion/Fall Prevention: safety round/check completed  Taken 3/7/2023 0000 by Jayant Wang RN  Safety Promotion/Fall Prevention: safety round/check completed  Taken 3/6/2023 2200 by Jayant Wang RN  Safety Promotion/Fall Prevention: safety round/check completed  Taken 3/6/2023 2000 by Jayant Wang RN  Safety Promotion/Fall Prevention: safety round/check completed  Intervention: Prevent Skin Injury  Recent Flowsheet Documentation  Taken 3/7/2023 0615 by Jayant Wang RN  Body Position: position changed independently  Taken 3/7/2023 0400 by Jayant Wang RN  Body Position: position changed independently  Taken 3/7/2023 0200 by Jayant Wang RN  Body Position: position changed independently  Taken 3/7/2023 0000 by Jayant Wang RN  Body Position: position changed independently  Taken 3/6/2023 2200 by Jayant Wang RN  Body Position: position changed independently  Taken 3/6/2023 2000 by Jayant Wang RN  Body Position: position changed independently  Intervention: Prevent and Manage VTE  (Venous Thromboembolism) Risk  Recent Flowsheet Documentation  Taken 3/7/2023 0615 by Jayant Wang RN  Activity Management: up ad abundio  Taken 3/7/2023 0400 by Jayant Wang RN  Activity Management: up ad abundio  Taken 3/7/2023 0200 by Jayant Wang RN  Activity Management: up ad abundio  Taken 3/7/2023 0000 by Jayant Wang RN  Activity Management: up ad abundio  Taken 3/6/2023 2200 by Jayant Wang RN  Activity Management: up ad abundio  Taken 3/6/2023 2127 by Jayant Wang RN  VTE Prevention/Management: patient refused intervention  Range of Motion: active ROM (range of motion) encouraged  Taken 3/6/2023 2000 by Jayant Wang RN  Activity Management:   ambulated in room   ambulated to bathroom   up ad abundio  Goal: Optimal Comfort and Wellbeing  3/7/2023 0618 by Jayant Wang RN  Outcome: Ongoing, Progressing  3/7/2023 0618 by Jayant Wang RN  Outcome: Ongoing, Progressing  Intervention: Provide Person-Centered Care  Recent Flowsheet Documentation  Taken 3/6/2023 2127 by Jayant Wang RN  Trust Relationship/Rapport: care explained  Goal: Readiness for Transition of Care  3/7/2023 0618 by Jayant Wang RN  Outcome: Ongoing, Progressing  3/7/2023 0618 by Jayant Wang RN  Outcome: Ongoing, Progressing     Problem: Fall Injury Risk  Goal: Absence of Fall and Fall-Related Injury  3/7/2023 0618 by Jayant Wang RN  Outcome: Ongoing, Progressing  3/7/2023 0618 by Jayant Wang RN  Outcome: Ongoing, Progressing  Intervention: Promote Injury-Free Environment  Recent Flowsheet Documentation  Taken 3/7/2023 0615 by Jayant Wang RN  Safety Promotion/Fall Prevention: safety round/check completed  Taken 3/7/2023 0400 by Jayant Wang RN  Safety Promotion/Fall Prevention: safety round/check completed  Taken 3/7/2023 0200 by Jayant Wang RN  Safety Promotion/Fall Prevention: safety round/check completed  Taken 3/7/2023 0000 by Jayant Wang RN  Safety Promotion/Fall Prevention: safety round/check completed  Taken 3/6/2023  2200 by Jayant Wang RN  Safety Promotion/Fall Prevention: safety round/check completed  Taken 3/6/2023 2000 by Jayant Wang RN  Safety Promotion/Fall Prevention: safety round/check completed     Problem: Electrolyte Imbalance  Goal: Electrolyte Balance  3/7/2023 0618 by Jayant Wang RN  Outcome: Ongoing, Progressing  3/7/2023 0618 by Jayant Wang RN  Outcome: Ongoing, Progressing     Problem: Seizure, Active Management  Goal: Absence of Seizure/Seizure-Related Injury  3/7/2023 0618 by Jayant Wang RN  Outcome: Ongoing, Progressing  3/7/2023 0618 by Jayant Wang RN  Outcome: Ongoing, Progressing     Problem: Hypertension Acute  Goal: Blood Pressure Within Desired Range  3/7/2023 0618 by Jayant Wang RN  Outcome: Ongoing, Progressing  3/7/2023 0618 by Jayant Wang RN  Outcome: Ongoing, Progressing     Problem: Adjustment to Illness (Stroke, Ischemic/Transient Ischemic Attack)  Goal: Optimal Coping  3/7/2023 0618 by Jayant Wang RN  Outcome: Ongoing, Progressing  3/7/2023 0618 by Jayant Wang RN  Outcome: Ongoing, Progressing  Intervention: Support Psychosocial Response to Stroke  Recent Flowsheet Documentation  Taken 3/6/2023 2127 by Jayant Wang RN  Family/Support System Care:   self-care encouraged   support provided     Problem: Bowel Elimination Impaired (Stroke, Ischemic/Transient Ischemic Attack)  Goal: Effective Bowel Elimination  3/7/2023 0618 by Jayant Wang RN  Outcome: Ongoing, Progressing  3/7/2023 0618 by Jayant Wang RN  Outcome: Ongoing, Progressing     Problem: Cerebral Tissue Perfusion (Stroke, Ischemic/Transient Ischemic Attack)  Goal: Optimal Cerebral Tissue Perfusion  3/7/2023 0618 by Jayant Wang RN  Outcome: Ongoing, Progressing  3/7/2023 0618 by Jayant Wang RN  Outcome: Ongoing, Progressing     Problem: Cognitive Impairment (Stroke, Ischemic/Transient Ischemic Attack)  Goal: Optimal Cognitive Function  3/7/2023 0618 by Jayant Wang RN  Outcome: Ongoing,  Progressing  3/7/2023 0618 by Jayant Wang RN  Outcome: Ongoing, Progressing     Problem: Communication Impairment (Stroke, Ischemic/Transient Ischemic Attack)  Goal: Improved Communication Skills  3/7/2023 0618 by Jayant Wang RN  Outcome: Ongoing, Progressing  3/7/2023 0618 by Jayant Wang RN  Outcome: Ongoing, Progressing     Problem: Functional Ability Impaired (Stroke, Ischemic/Transient Ischemic Attack)  Goal: Optimal Functional Ability  3/7/2023 0618 by Jayant Wang RN  Outcome: Ongoing, Progressing  3/7/2023 0618 by Jayant Wang RN  Outcome: Ongoing, Progressing  Intervention: Optimize Functional Ability  Recent Flowsheet Documentation  Taken 3/7/2023 0615 by Jayant Wang RN  Activity Management: up ad abundio  Taken 3/7/2023 0400 by Jayant Wang RN  Activity Management: up ad abundio  Taken 3/7/2023 0200 by Jayant Wang RN  Activity Management: up ad abundio  Taken 3/7/2023 0000 by Jayant Wang RN  Activity Management: up ad abundio  Taken 3/6/2023 2200 by Jayant Wang RN  Activity Management: up ad abundio  Taken 3/6/2023 2000 by Jayant Wang RN  Activity Management:   ambulated in room   ambulated to bathroom   up ad abundio     Problem: Respiratory Compromise (Stroke, Ischemic/Transient Ischemic Attack)  Goal: Effective Oxygenation and Ventilation  3/7/2023 0618 by Jayant Wang RN  Outcome: Ongoing, Progressing  3/7/2023 0618 by Jayant Wang RN  Outcome: Ongoing, Progressing  Intervention: Optimize Oxygenation and Ventilation  Recent Flowsheet Documentation  Taken 3/7/2023 0615 by Jayant Wang RN  Head of Bed (HOB) Positioning: HOB at 30 degrees  Taken 3/7/2023 0200 by Jayant Wang RN  Head of Bed (HOB) Positioning: HOB at 30 degrees  Taken 3/7/2023 0000 by Jayant Wang RN  Head of Bed (HOB) Positioning: HOB at 30 degrees  Taken 3/6/2023 2200 by Jayant Wang RN  Head of Bed (HOB) Positioning: HOB at 30-45 degrees     Problem: Sensorimotor Impairment (Stroke, Ischemic/Transient Ischemic  Attack)  Goal: Improved Sensorimotor Function  3/7/2023 0618 by Jayant Wang RN  Outcome: Ongoing, Progressing  3/7/2023 0618 by Jayant Wang RN  Outcome: Ongoing, Progressing  Intervention: Optimize Range of Motion, Motor Control and Function  Recent Flowsheet Documentation  Taken 3/7/2023 0615 by Jayant Wang RN  Positioning/Transfer Devices:   pillows   in use  Taken 3/7/2023 0200 by Jayant Wang RN  Positioning/Transfer Devices:   pillows   in use  Taken 3/7/2023 0000 by Jayant Wang RN  Positioning/Transfer Devices:   pillows   in use  Taken 3/6/2023 2200 by Jayant Wang RN  Positioning/Transfer Devices:   pillows   in use  Taken 3/6/2023 2127 by Jayant Wang RN  Range of Motion: active ROM (range of motion) encouraged     Problem: Swallowing Impairment (Stroke, Ischemic/Transient Ischemic Attack)  Goal: Optimal Eating and Swallowing without Aspiration  3/7/2023 0618 by Jayant Wang RN  Outcome: Ongoing, Progressing  3/7/2023 0618 by Jayant Wang RN  Outcome: Ongoing, Progressing     Problem: Urinary Elimination Impaired (Stroke, Ischemic/Transient Ischemic Attack)  Goal: Effective Urinary Elimination  3/7/2023 0618 by Jayant Wang RN  Outcome: Ongoing, Progressing  3/7/2023 0618 by Jayant Wang RN  Outcome: Ongoing, Progressing   Goal Outcome Evaluation:

## 2023-03-07 NOTE — CASE MANAGEMENT/SOCIAL WORK
Case Management Discharge Note      Final Note: Discharge to home with family support. Gave patient phone number to call to find new PCP, instructed her to do this today and to include an appointment in 1-2 weeks. Patient acknowledged understanding. ALICIA SNYDER CCP    Provided Post Acute Provider List?: N/A  Provided Post Acute Provider Quality & Resource List?: N/A    Selected Continued Care - Admitted Since 3/5/2023     Destination    No services have been selected for the patient.              Durable Medical Equipment    No services have been selected for the patient.              Dialysis/Infusion    No services have been selected for the patient.              Home Medical Care    No services have been selected for the patient.              Therapy    No services have been selected for the patient.              Community Resources    No services have been selected for the patient.              Community & DME    No services have been selected for the patient.                  Transportation Services  Private: Car    Final Discharge Disposition Code: 01 - home or self-care

## 2023-03-08 ENCOUNTER — OFFICE VISIT (OUTPATIENT)
Dept: INTERNAL MEDICINE | Age: 43
End: 2023-03-08
Payer: MEDICARE

## 2023-03-08 ENCOUNTER — TRANSITIONAL CARE MANAGEMENT TELEPHONE ENCOUNTER (OUTPATIENT)
Dept: CALL CENTER | Facility: HOSPITAL | Age: 43
End: 2023-03-08
Payer: MEDICARE

## 2023-03-08 VITALS
HEIGHT: 64 IN | TEMPERATURE: 98.2 F | SYSTOLIC BLOOD PRESSURE: 124 MMHG | BODY MASS INDEX: 29.19 KG/M2 | OXYGEN SATURATION: 99 % | WEIGHT: 171 LBS | DIASTOLIC BLOOD PRESSURE: 80 MMHG | HEART RATE: 111 BPM

## 2023-03-08 DIAGNOSIS — I10 PRIMARY HYPERTENSION: Primary | ICD-10-CM

## 2023-03-08 DIAGNOSIS — R74.8 ELEVATED ALKALINE PHOSPHATASE LEVEL: ICD-10-CM

## 2023-03-08 DIAGNOSIS — L02.412 ABSCESS OF AXILLA, LEFT: ICD-10-CM

## 2023-03-08 DIAGNOSIS — E55.9 VITAMIN D DEFICIENCY, UNSPECIFIED: ICD-10-CM

## 2023-03-08 DIAGNOSIS — D64.9 ANEMIA, UNSPECIFIED TYPE: ICD-10-CM

## 2023-03-08 LAB
25(OH)D3+25(OH)D2 SERPL-MCNC: 8.2 NG/ML (ref 30–100)
ALBUMIN SERPL-MCNC: 4.1 G/DL (ref 3.5–5.2)
ALBUMIN/GLOB SERPL: 1.3 G/DL
ALP SERPL-CCNC: 143 U/L (ref 39–117)
ALT SERPL-CCNC: 7 U/L (ref 1–33)
AST SERPL-CCNC: 9 U/L (ref 1–32)
BASOPHILS # BLD AUTO: 0.02 10*3/MM3 (ref 0–0.2)
BASOPHILS NFR BLD AUTO: 0.2 % (ref 0–1.5)
BILIRUB SERPL-MCNC: <0.2 MG/DL (ref 0–1.2)
BUN SERPL-MCNC: 9 MG/DL (ref 6–20)
BUN/CREAT SERPL: 14.1 (ref 7–25)
CALCIUM SERPL-MCNC: 9.3 MG/DL (ref 8.6–10.5)
CHLORIDE SERPL-SCNC: 103 MMOL/L (ref 98–107)
CO2 SERPL-SCNC: 23.5 MMOL/L (ref 22–29)
CREAT SERPL-MCNC: 0.64 MG/DL (ref 0.57–1)
EGFRCR SERPLBLD CKD-EPI 2021: 112.6 ML/MIN/1.73
EOSINOPHIL # BLD AUTO: 0.06 10*3/MM3 (ref 0–0.4)
EOSINOPHIL NFR BLD AUTO: 0.6 % (ref 0.3–6.2)
ERYTHROCYTE [DISTWIDTH] IN BLOOD BY AUTOMATED COUNT: 15 % (ref 12.3–15.4)
FERRITIN SERPL-MCNC: 13.9 NG/ML (ref 13–150)
GLOBULIN SER CALC-MCNC: 3.1 GM/DL
GLUCOSE SERPL-MCNC: 84 MG/DL (ref 65–99)
HCT VFR BLD AUTO: 36.7 % (ref 34–46.6)
HGB BLD-MCNC: 12.1 G/DL (ref 12–15.9)
IMM GRANULOCYTES # BLD AUTO: 0.04 10*3/MM3 (ref 0–0.05)
IMM GRANULOCYTES NFR BLD AUTO: 0.4 % (ref 0–0.5)
IRON SATN MFR SERPL: 5 % (ref 20–50)
IRON SERPL-MCNC: 27 MCG/DL (ref 37–145)
LYMPHOCYTES # BLD AUTO: 1.83 10*3/MM3 (ref 0.7–3.1)
LYMPHOCYTES NFR BLD AUTO: 18.4 % (ref 19.6–45.3)
MCH RBC QN AUTO: 25.9 PG (ref 26.6–33)
MCHC RBC AUTO-ENTMCNC: 33 G/DL (ref 31.5–35.7)
MCV RBC AUTO: 78.4 FL (ref 79–97)
MONOCYTES # BLD AUTO: 0.56 10*3/MM3 (ref 0.1–0.9)
MONOCYTES NFR BLD AUTO: 5.6 % (ref 5–12)
NEUTROPHILS # BLD AUTO: 7.42 10*3/MM3 (ref 1.7–7)
NEUTROPHILS NFR BLD AUTO: 74.8 % (ref 42.7–76)
NRBC BLD AUTO-RTO: 0 /100 WBC (ref 0–0.2)
PLATELET # BLD AUTO: 410 10*3/MM3 (ref 140–450)
POTASSIUM SERPL-SCNC: 4.4 MMOL/L (ref 3.5–5.2)
PROT SERPL-MCNC: 7.2 G/DL (ref 6–8.5)
RBC # BLD AUTO: 4.68 10*6/MM3 (ref 3.77–5.28)
SODIUM SERPL-SCNC: 137 MMOL/L (ref 136–145)
TIBC SERPL-MCNC: 502 MCG/DL
UIBC SERPL-MCNC: 475 MCG/DL (ref 112–346)
VIT B12 SERPL-MCNC: 376 PG/ML (ref 211–946)
WBC # BLD AUTO: 9.93 10*3/MM3 (ref 3.4–10.8)

## 2023-03-08 PROCEDURE — 1111F DSCHRG MED/CURRENT MED MERGE: CPT

## 2023-03-08 PROCEDURE — 99213 OFFICE O/P EST LOW 20 MIN: CPT

## 2023-03-08 PROCEDURE — 99495 TRANSJ CARE MGMT MOD F2F 14D: CPT

## 2023-03-08 RX ORDER — DOXYCYCLINE HYCLATE 100 MG/1
100 CAPSULE ORAL 2 TIMES DAILY
Qty: 20 CAPSULE | Refills: 0 | Status: SHIPPED | OUTPATIENT
Start: 2023-03-08 | End: 2023-03-18

## 2023-03-08 RX ORDER — HYDROCHLOROTHIAZIDE 12.5 MG/1
12.5 TABLET ORAL DAILY
Qty: 30 TABLET | Refills: 0 | Status: SHIPPED | OUTPATIENT
Start: 2023-03-08 | End: 2023-04-03

## 2023-03-08 NOTE — OUTREACH NOTE
Call Center TCM Note    Flowsheet Row Responses   [facility], [place] by [facility] patient discharged from? [place]   Does the patient have one of the following disease processes/diagnoses(primary or secondary)? Other   TCM attempt successful? Yes   Call start time 1652   Call end time 1652   Discharge diagnosis Left-sided weakness   TCM call completed? Yes   Wrap up additional comments Has a documented f/u appt with PCP in chart   Call end time 1652   Would this patient benefit from a Referral to Amb Social Work? No   Is the patient interested in additional calls from an ambulatory ?  NOTE:  applies to high risk patients requiring additional follow-up. No          Dorina Brooks RN    3/8/2023, 16:53 EST

## 2023-03-08 NOTE — PROGRESS NOTES
"    I N T E R N A L  M E D I C I N E  Annalee Huddleston, APRN       ENCOUNTER DATE:  03/08/2023    Alexandra Xiao / 42 y.o. / female        CC:   (Transitional Care Follow Up Visit)  Hospital Follow Up Visit        Within 48 business hours after discharge our office contacted him via telephone to coordinate his care and needs.      I reviewed and discussed the details of that call along with the discharge summary, hospital problems, inpatient lab results, inpatient diagnostic studies, and consultation reports with the patient.     Date of TCM Phone Call 3/7/2023   [facility]   Date of Admission 3/5/2023   Date of Discharge 3/7/2023   Discharge Disposition Home or Self Care       Risk for Readmission (LACE) Score: 7 (3/7/2023  6:00 AM)            VITALS    Visit Vitals  /80   Pulse 111   Temp 98.2 °F (36.8 °C)   Ht 162.6 cm (64.02\")   Wt 77.6 kg (171 lb)   SpO2 99%   BMI 29.34 kg/m²       BP Readings from Last 3 Encounters:   03/08/23 124/80   03/07/23 155/85   02/20/23 148/89     Wt Readings from Last 3 Encounters:   03/08/23 77.6 kg (171 lb)   03/06/23 79.4 kg (175 lb)   02/20/23 71.7 kg (158 lb)      Body mass index is 29.34 kg/m².    HPI:     Date of admission/discharge: As noted above in CC  Hospital: [facility]   Principle Dx: Left-sided weakness  Secondary Dx: History of stroke, Anemia, hyponatremia, HTN, epilepsy  History prior to hospitalization: Presented to the ER with acute left-sided weakness and numbness with slurred speech.    Evaluation/Treatment: Neurology attributed patient's symptoms secondary to seizures.  MRI was negative for acute strokes, but did show old infarcts.  EEG showed presence of left frontal interictal epileptiform discharges.    Course:   Accompanied by her mother in law to today's appointment.    Here to establish care and review recent hospital admission for acute left-sided facial weakness and numbness with slurred speech.  She had a history of " seizures, diagnosed in her late 20s, with VNS placement.  Followed by neurology, Dr. Cb James, and prescribed Vimpat, Aptiom and Elavil.  Also has a prior history of embolic strokes, followed by cardiology, Dr. Godoy, with loop recorder.  Taking aspirin 325 mg daily.  ECHO showed normal left ventricular systolic function, with EF of 59.2%.  Left ventricular diastolic function is consistent with impaired relaxation.  Left atrial cavity is borderline dilated.  Moderate mitral valve regurgitation. Mild mitral valve stenosis, and signs of prior rheumatic heart disease.  She was started on lisinopril 5 mg daily for elevated blood pressure.  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      She feels well today.  She has been taking lisinopril for two days and presents today with new onset facial flushing without dyspnea, lip, throat, tongue swelling.  She is scheduled to follow up with neurology, Dr. James on April 18, 2023.  She follows with neurology every 3 months.  Next cardiology appointment is on July 18, 2023 with Dr. Chamorro.      HLD: Atorvastatin 40 mg daily with March 2023 lipid panel with LDL of 55; triglycerides 66.    Followed by GYN, Dr. Mendosa, for history of menorrhagia.  Labs show microcytic anemia.  She has had prior ablation, and denies any recent heavy menstrual cycles.  Denies any blood in stool.  She reports she needs to schedule appointment to update mammogram and pap.  Medical history significant for breast cancer in mom.      In passing, she mentions a left underarm abscess, that has been ongoing for a couple of weeks.  She reports this has been a recurrent problem for her.  Some pain, yellow drainage.  Denies fever, chills, warmth.  No recent antibiotic use.      She is a former smoker, quit approximately one year ago.  She does currently vape.      Patient Care Team:  Annalee Huddleston APRN as PCP -  General (Family Medicine)  Cristofer Avila MD as Consulting Physician (Cardiology)  Cb James II, MD as Consulting Physician (Neurology)  Ernesto Mendosa MD as Consulting Physician (Obstetrics and Gynecology)  ____________________________________________________________________    ASSESSMENT & PLAN:    1. Primary hypertension    2. Abscess of axilla, left    3. Anemia, unspecified type    4. Elevated alkaline phosphatase level    5. Vitamin D deficiency, unspecified      Orders Placed This Encounter   Procedures   • Comprehensive Metabolic Panel   • Vitamin D,25-Hydroxy   • Ferritin   • Vitamin B12   • Iron Profile   • Ambulatory Referral to General Surgery   • CBC & Differential       Summary/Discussion:  • STOP lisinopril due to facial flushing.  Start HCTZ 12.5 mg daily and agreeable to send BP readings in 1 week for review.  Agreeable to recheck labs today, [facility]  • She is agreeable to work towards vaping cessation.  • Will check iron studies for pt's anemia.  • Follow up with neurology and cardiology as scheduled.    • She was instructed on importance of taking antibiotic as prescribed and following up with general surgery for I&D.  Visit ER for any worsening symptoms, fever, chills.  She acknowledged understanding.    Return in about 1 month (around 4/8/2023) for Annual physical + recheck HTN.    ____________________________________________________________________    REVIEW OF SYSTEMS    Review of Systems   Constitutional: Negative for chills, fever and unexpected weight change.   Respiratory: Negative for cough, chest tightness and shortness of breath.    Cardiovascular: Negative for chest pain, palpitations and leg swelling.   Skin: Positive for color change.   Neurological: Positive for seizures and headaches. Negative for dizziness, weakness and light-headedness.   Psychiatric/Behavioral: The patient is not nervous/anxious.          PHYSICAL EXAMINATION    Physical  Exam  Vitals reviewed.   Constitutional:       General: She is not in acute distress.     Appearance: Normal appearance. She is not ill-appearing, toxic-appearing or diaphoretic.   HENT:      Head: Normocephalic and atraumatic.   Cardiovascular:      Rate and Rhythm: Regular rhythm. Tachycardia present.      Heart sounds: Normal heart sounds.   Pulmonary:      Effort: Pulmonary effort is normal.      Breath sounds: Normal breath sounds.   Musculoskeletal:      Right lower leg: No edema.      Left lower leg: No edema.   Skin:     General: Skin is warm and dry.      Comments: Quarter sized, tender abscess under left axilla with small amount of yellow drainage   Neurological:      Mental Status: She is alert and oriented to person, place, and time. Mental status is at baseline.   Psychiatric:         Mood and Affect: Mood normal.         Behavior: Behavior normal.         Thought Content: Thought content normal.         Judgment: Judgment normal.           REVIEWED DATA:    Labs:   Lab Results   Component Value Date     (L) 03/06/2023    K 3.7 03/06/2023    CALCIUM 8.6 03/06/2023    AST 11 03/06/2023    ALT 10 03/06/2023    BUN 9 03/06/2023    CREATININE 0.71 03/06/2023    CREATININE 0.61 03/05/2023    CREATININE 0.57 02/20/2023       Lab Results   Component Value Date    WBC 8.61 03/06/2023    HGB 11.0 (L) 03/06/2023    HGB 9.9 (L) 03/05/2023    HGB 11.3 (L) 02/20/2023     03/06/2023       Lab Results   Component Value Date    GLUCOSEU Negative 02/20/2023    BLOODU Negative 02/20/2023    NITRITEU Negative 02/20/2023    LEUKOCYTESUR Small (1+) (A) 02/20/2023       Imaging:   Adult transthoracic echo complete    Result Date: 3/6/2023  Narrative: •  Left ventricular systolic function is normal. Calculated left ventricular EF = 59.2% Normal left ventricular cavity size and wall thickness noted. All left ventricular wall segments contract normally. Left ventricular diastolic function is consistent with (grade  Ia w/high LAP) impaired relaxation. •  The left atrial cavity is borderline dilated. •  Moderate mitral valve regurgitation is present with an eccentric jet noted. Mild mitral valve stenosis is present. The mitral valve leaflets have a mild hockey-stick deformity consistent with prior rheumatic heart disease.     EEG    Result Date: 3/6/2023  Narrative: Indication: seizure Date of onset: 3/6/23 at 1102 Date of offset: 3/6/23 at 1150 Technical description:  This is a 21-channel digital EEG recording with time-locked video and single-channel electrocardiogram. Electrodes are placed according to the 10 to 20 International System. EEG Description:  Up to 9 Hz alpha activity is present over the posterior head regions that symmetric, well formed and reactive to eye closure.  The patient enters the drowsy state, but does not sleep during the record.  Hyperventilation and photic stimulation were not performed.  There are intermittent left frontal interictal epileptiform discharges.  No electrographic seizures are present. The EKG monitor shows a heart rate that varies between 100 and 105 beats per minute. Impression/Clinical Correlation:  This routine EEG recording is abnormal due to the presence of left frontal interictal epileptiform discharges.  The results of this study likely indicate focal hypersynchrony and a predilection to focal onset seizures originating in the left frontal head region.  Alternatively, these discharges may be fragmented generalized discharges due to the patient's history of primary generalized epilepsy with generalized discharges seen on previous EEGs.  No electrographic seizures are present during this recording.  Careful clinical and radiographic correlation is advised.      CT Head Without Contrast    Result Date: 2/20/2023  Narrative: CT HEAD WO CONTRAST-  INDICATIONS: Seizure, headache  TECHNIQUE: Radiation dose reduction techniques were utilized, including automated exposure control and  exposure modulation based on body size. Noncontrast head CT  COMPARISON: None available  FINDINGS:    No acute intracranial hemorrhage, midline shift or mass effect. No acute territorial infarct is identified.  Ventricles, cisterns, cerebral sulci are unremarkable for patient age.  The visualized paranasal sinuses, orbits, mastoid air cells are unremarkable.       Impression:  No acute intracranial hemorrhage or hydrocephalus. If there is further clinical concern, MRI could be considered for further evaluation.  This report was finalized on 2/20/2023 7:11 PM by Dr. Cb Butts M.D.      MRI Brain Without Contrast    Result Date: 3/6/2023  Narrative: MRI OF THE BRAIN WITHOUT CONTRAST 03/06/2023  CLINICAL HISTORY: Patient complains of episode of bilateral leg weakness, slurred speech and bilateral double vision on 03/05/2023 at approximately 10 to 11 AM.  TIA versus stroke  TECHNIQUE: Axial T1, FLAIR, fat-suppressed T2, axial diffusion and gradient echo T2 and sagittal T1 weighted images were obtained in the entire head. In addition the coronal fat-suppressed T2-weighted images were obtained through the temporal lobes.  COMPARISON: This is correlated to a prior MRI of the brain on 04/22/2022 as well as a CT angiogram of the head and neck and CT perfusion study of the brain yesterday 03/05/2023 at 11:42 AM.  FINDINGS: There are tiny areas of encephalomalacia compatible with multiple tiny old infarcts including a 7 x 3 mm old superior left frontal cortical infarct and an additional 5 mm old posterior superior left frontal cortical infarct and an additional 8 x 2 mm old posterior lateral left frontal cortical infarct and these tiny old subcentimeter left frontal cortical infarcts are in the left middle cerebral artery territory and were present on prior MRI of the brain 04/22/2022. Since prior MRI of the brain on on 04/22/2022 the tiny acute 5 mm infarct in the posterior superior lateral right frontal cortex has  evolved to a tiny chronic cortical infarct. There is also a tiny 4 mm old posterior superior right frontal subcortical white matter infarct and this occurred back in April 2022. There are multiple tiny 3 to 6 mm old posterior lateral and inferior lateral right cerebellar infarcts and old posterior medial and inferior lateral left cerebellar infarcts and these are in the PICA territories and are unchanged. The remainder of the brain parenchyma is normal in signal intensity. Specifically no diffusion weighted abnormality is seen with no acute infarct identified. The ventricles are normal in size. I see no mass effect and no midline shift and no extra-axial fluid collections are identified. The paranasal sinuses and the mastoid air cells and middle ear cavities are clear. The calvarium and skull base demonstrate normal marrow signal intensity. The orbits are unremarkable.      Impression: 1. No acute intracranial abnormality is identified. 2. The patient has multiple tiny old infarcts in multiple different vascular territories including 3 separate tiny 5 to 8 mm old posterior superior left frontal cortical infarcts in the left middle cerebral artery territory and two 5 mm old infarcts in the right hemisphere 1 involving the posterior superior right frontal subcortical white matter and one involving the posterior superior right frontal cortex and these old infarcts in the right middle cerebral artery territory occurred back in April of 2022. Furthermore there are multiple tiny 3 to 6 mm old posterior lateral and inferior lateral cerebellar infarcts bilaterally in the PICA territories. The multiple different vascular territories of these chronic infarcts suggest the patient may have a central cardiac or embolic source 3. The remainder of the MRI of the brain is normal specifically no acute infarct is seen.  This report was finalized on 3/6/2023 11:51 PM by Dr. Jay Cid M.D.      XR Chest 1 View    Result Date:  3/5/2023  Narrative: Emergency portable view of the chest on 03/05/2023  CLINICAL HISTORY: Acute stroke protocol  This is correlated to a portable view of the chest on 02/20/2023.  FINDINGS: There is a loop recorder device projecting over the anteromedial soft tissues the left mid hemithorax at the horizontal level of the T6-7 thoracic level. There is also a vagus nerve stimulator pack projecting over the lateral left chest with stimulator lead coursing into left-sided neck. The cardiomediastinal silhouette and the pulmonary vasculature are within normal limits. The lungs are clear. The costophrenic angles are sharp.      Impression: 1. No active disease is seen in the chest with no significant change when compared to a recent chest x-ray on 02/20/2023 just 13 days ago. 2. There is a loop recorder device projecting over the anteromedial soft tissues of the left chest at the T6-7 thoracic level and there is a vagus nerve stimulator pack over the lateral aspect of the left mid hemithorax with a lead coursing into the soft tissues of the left side of the neck.  This report was finalized on 3/5/2023 12:44 PM by Dr. Jay Cid M.D.      XR Chest 1 View    Result Date: 2/20/2023  Narrative: CHEST SINGLE VIEW  HISTORY: Fall, weakness, dizziness  COMPARISON: Two-view chest 05/25/2022, CT angiogram chest 04/30/2022  FINDINGS: The heart and the mediastinal structures are normal. There is a vagal nerve stimulator device overlying the left chest with lead extending to the left neck. Lungs appear clear and there is no evidence for pulmonary or pleural effusion or infiltrate      Impression: No interval change or evidence for active disease in the chest.  This report was finalized on 2/20/2023 6:12 PM by Dr. Alcides Thornton M.D.      CT Angiogram Head w AI Analysis of LVO, CT Angiogram Neck, CT CEREBRAL PERFUSION WITH & WITHOUT CONTRAST    Result Date: 3/5/2023  Narrative: EMERGENCY CONTRAST-ENHANCED CT ANGIOGRAM OF THE HEAD  AND NECK AND CT PERFUSION STUDY OF THE BRAIN ON 03/05/2023  CLINICAL HISTORY: Team D, acute stroke. The patient has left-sided weakness, double vision, slurred speech.  NONCONTRAST HEAD CT TECHNIQUE: Spiral CT images were obtained from the base of the skull to the vertex without intravenous contrast. The images were reformatted and are submitted in 3 mm thick axial CT sections with brain algorithm and 2 mm thick sagittal and coronal reconstructions were performed and submitted in brain algorithm.  COMPARISON: This is correlated to a prior MRI of the brain from [facility] on 04/22/2022 and a noncontrast head CT on 02/20/2023.  FINDINGS: On the prior MRI of the brain on 04/22/2023 there were 2 acute infarcts measuring 4-5 mm in size, 1 involving the posterior superior right frontal subcortical white matter and 1 involving the posterior superior lateral right frontal cortex. These were in the right middle cerebral artery territory. No discernible chronic infarct is seen at these sites. Furthermore the prior MRI demonstrated tiny crescentic chronic infarcts in the left cerebral hemisphere with a 6 x 2 mm chronic infarct in the superior lateral left frontal cortex and a 3 mm chronic infarct in the posterior superior left frontal cortex in the left MCA territory that are unable to be appreciated on the current exam. Overall the brain parenchyma is normal in attenuation. The ventricles are normal in size. I see no focal mass effect. There is no midline shift. No extra-axial fluid collections are identified and there is no evidence of acute intracranial hemorrhage. The calvarium and the skull base are normal in appearance. The paranasal sinuses, mastoid air cells and middle ear cavities are clear.      Impression: 1. No acute intracranial abnormality is identified with no discernible acute infarct or intracranial hemorrhage seen. The etiology of the patient's left-sided weakness, blurred vision and slurred  speech is not established on this exam. 2. The prior MRI of the brain from [facility] on 04/22/2022 demonstrates 2 separate tiny 4-5 mm acute infarcts in the right cerebral hemisphere, 1 involving the posterior superior right frontal subcortical white matter and another involving the posterior superior right frontal cortex both in the right MCA territory and demonstrated a tiny 6 x 2 mm chronic superior lateral left frontal cortical infarct and a 3 mm old posterior superior left frontal cortical infarct in the left MCA territory.  These chronic bilateral MCA territory infarcts are unable to be appreciated on this head CT but suggest that the patient may have a central or cardiac embolic source. The results of this study were communicated to Dr. Mason from stroke neurology while the patient was still on our scanner on 03/05/2023 at 11:45 AM and he requested a CT angiogram of the head and neck and CT perfusion study of the head.   CT ANGIOGRAM OF THE HEAD AND NECK AND CT PERFUSION STUDY OF THE BRAIN TECHNIQUE: Spiral CT images were obtained from the mid cerebellum up through the cerebral hemispheres for a 10 cm vertical slab of brain imaging during the arterial phase of contrast and images were reformatted and analyzed with rapid software analysis for CT perfusion study of the brain. Subsequently spiral CT angiogram images were obtained from the top of the aortic arch up through the great vessels of the head and neck during the arterial phase of contrast and images were reformatted and submitted in 1 mm thick axial, sagittal and coronal CT sections with soft tissue algorithm and 3D reconstructions were performed to complete the CT angiogram of the head and neck and finally spiral CT images were obtained from the base of the skull to the vertex delayed following intravenous contrast and these images were reformatted and submitted in 3 mm thick axial CT sections with brain algorithm and 2 mm thick sagittal  and coronal reconstructions were performed and submitted in brain algorithm.  FINDINGS: CT PERFUSION STUDY OF THE BRAIN: The CT perfusion study of the brain consists of a 10 cm vertical slab of brain imaging from the mid cerebellum up through the cerebral hemispheres and excludes the inferior half of the cerebellum and the medulla which are not assessed. Within the 10 cm vertical slab of brain imaging I see no areas of reduced cerebral blood flow to less than 30% of normal and thus no acute completed infarct and no areas of delayed time to maximal enhancement of greater than 6 seconds and thus no himanshu hypoperfused brain.  CT ANGIOGRAM OF THE NECK: The nasopharynx, oropharynx, hypopharynx, true cords and subglottic airway are normal in appearance. The thyroid gland is mildly enlarged and there are several small thyroid nodules. The lung apices are clear. The parotid, , parapharyngeal and submandibular spaces are symmetric and are normal in appearance. The cervical spine is unremarkable. There is anatomic origin of the great vessels off the aortic arch. Noncalcified plaque mildly narrows the left subclavian artery origin. Beyond its origin the left subclavian artery is widely patent without stenosis. The left vertebral artery origin is normal in appearance. No stenosis is seen in the left vertebral artery from its origin to the vertebrobasilar junction. The left common carotid origin is normal in appearance and no stenosis is seen in the left common carotid artery and its bifurcation into the left internal and external carotid arteries is normal in appearance and no stenosis is seen in the cervical segment of the left internal carotid artery. Just below the left petrous carotid canal is a tiny 2 x 1 mm area of luminal contrast that extends into the left lateral wall of the upper cervical left internal carotid artery. It may be an ulcerated noncalcified plaque. This is best seen on coronal reconstructed  image 111 and axial image 181 sequence 8. The brachiocephalic artery origin is normal in appearance and no stenosis is seen in the brachiocephalic artery. There is noncalcified plaque that circumscribes and mild to moderately narrows the origin and proximal 1 cm of the right subclavian artery beyond which the mid and distal right subclavian artery is normal in caliber. The right vertebral artery origin is normal in appearance. There is contrast densely opacifying the adjacent veins that streaks through the proximal right vertebral artery and limits evaluation of the right vertebral artery to the C7 cervical level and above this the cervical segment of the right vertebral artery is normal in appearance as is its intracranial segment to the vertebrobasilar junction. The right common carotid origin is normal in appearance and no stenosis is seen in the right common carotid artery and its bifurcation into the right internal and external carotid arteries is normal in appearance and no stenosis is seen in the cervical segment of the right internal carotid artery using the NASCET criteria.  CT ANGIOGRAM OF THE HEAD: The intracranial segments of the distal vertebral arteries are widely to the vertebrobasilar junction. The basilar artery and the basilar tip are normal in appearance. The posterior cerebral and the superior cerebellar arteries are normal in appearance. The petrous, cavernous and supracavernous segments of the internal carotid arteries are within normal limits. The A1 segments of the anterior cerebral arteries, the anterior communicating artery origin and the A2 segments of the anterior cerebral arteries are within normal limits. The M1 segments of the middle cerebral arteries and the middle cerebral artery bifurcations are within normal limits. The visualized M2 branches and the sylvian fissure appear to be patent.  IMPRESSION: 1. The noncontrast head CT demonstrates no acute intracranial abnormality with no  discernible acute completed infarct or intracranial hemorrhage identified. On the prior MRI of the brain on 04/22/2022 there were 2 separate 4-5 mm acute infarcts in the right cerebral hemisphere, 1 involving the posterior superior right frontal subcortical white matter and 1 involving the posterior superior lateral right frontal vertex both in the right MCA territory. There were 2 separate tiny old infarcts in the left MCA territory, 1 measuring 6 x 2 mm in the posterior superior lateral left frontal cortex and 1 measuring 3 mm in the posterior superior left frontal cortex. These chronic infarcts are unable to be appreciated on the noncontrast head CT but the bilateral MCA territories suggest the patient has chronic central or cardiac embolic source. Correlation with clinical history is suggested. The results of the noncontrast head CT were communicated to Dr. Mason from stroke neurology while the patient was still on our scanner on 03/05/2023 at 11:45 AM and he requested a CT angiogram of the head and neck and CT perfusion study of the brain. 2. CT perfusion study of the brain consists of a 10 cm vertical slab of brain imaging from the mid cerebellum up through the cerebral hemispheres and excludes the inferior third of the cerebellum and the medulla which are not assessed. Within the 10 cm vertical slab of brain imaging I see no areas of reduced cerebral blood flow to less than 30% of normal and thus no acute completed infarct and no areas of delayed time to maximal enhancement of greater than 6 seconds and thus no himanshu hypoperfused brain. 3. On the CT angiogram of the neck there is a focal subtle 2 x 1 mm nodular outpouching off the left lateral wall of the upper cervical left internal carotid artery just below the petrous carotid canal. The finding is nonspecific It could be contrast extending into an ulcerated atherosclerotic plaque or a very subtle tiny aneurysm or pseudoaneurysm at this site. There is also  noncalcified plaque circumscribing and mild to moderately narrowing the origin of the proximal 1 cm of the right subclavian artery. There are portions of the proximal right vertebral artery obscured by beam hardening artifact and not evaluated. Otherwise, the remainder of the CT angiogram of the head and neck is within normal limits. The results and final dictated study were communicated to Dr. Mason from stroke neurology on 03/05/2023 at 12:25 PM.  AI analysis of LVO was utilized.  Radiation dose reduction techniques were utilized, including automated exposure control and exposure modulation based on body size.  This report was finalized on 3/5/2023 7:38 PM by Dr. Jay Cid M.D.         Medical Tests:        Summary of old records / correspondence / consultant report:   DC summary re: issues addressed on HPI    Request outside records:         MEDICATIONS   Current Outpatient Medications   Medication Sig Dispense Refill   • amitriptyline (ELAVIL) 25 MG tablet Take 1 tablet by mouth Every Night.     • aspirin 325 MG tablet Take 1 tablet by mouth Daily. 30 tablet 0   • atorvastatin (LIPITOR) 40 MG tablet TAKE 1 TABLET BY MOUTH EVERY NIGHT 30 tablet 3   • ESLICARBAZEPINE ACETATE PO Take 1,200 mg by mouth Every Night.     • Vimpat 200 MG tablet Take 1 tablet by mouth Every 12 (Twelve) Hours for 30 days. Brand-name only please 60 tablet 1   • doxycycline (VIBRAMYCIN) 100 MG capsule Take 1 capsule by mouth 2 (Two) Times a Day for 10 days. 20 capsule 0   • hydroCHLOROthiazide (HYDRODIURIL) 12.5 MG tablet Take 1 tablet by mouth Daily. 30 tablet 0     No current facility-administered medications for this visit.       Current outpatient and discharge medications have been reconciled for the patient.  Reviewed by: KIRSTIE Pan         @MSK@

## 2023-03-08 NOTE — OUTREACH NOTE
Prep Survey    Flowsheet Row Responses   [facility] patient discharged from? [place]   Is LACE score < 7 ? No   Eligibility [facility]   Date of Admission 03/05/23   Date of Discharge 03/07/23   Discharge Disposition Home or Self Care   Discharge diagnosis Left-sided weakness   Does the patient have one of the following disease processes/diagnoses(primary or secondary)? Other   Does the patient have Home health ordered? No   Is there a DME ordered? No   Comments regarding appointments new patient appt   Prep survey completed? Yes          Nay KRUSE - Registered Nurse

## 2023-03-09 ENCOUNTER — TELEPHONE (OUTPATIENT)
Dept: INTERNAL MEDICINE | Age: 43
End: 2023-03-09

## 2023-03-09 ENCOUNTER — TELEPHONE (OUTPATIENT)
Dept: NEUROLOGY | Facility: CLINIC | Age: 43
End: 2023-03-09
Payer: MEDICARE

## 2023-03-09 DIAGNOSIS — E55.9 VITAMIN D DEFICIENCY, UNSPECIFIED: Primary | ICD-10-CM

## 2023-03-09 DIAGNOSIS — G40.309 GENERALIZED NONCONVULSIVE EPILEPSY: Primary | ICD-10-CM

## 2023-03-09 PROBLEM — E53.8 B12 DEFICIENCY: Status: ACTIVE | Noted: 2023-03-09

## 2023-03-09 RX ORDER — ERGOCALCIFEROL 1.25 MG/1
50000 CAPSULE ORAL
Qty: 8 CAPSULE | Refills: 0 | Status: SHIPPED | OUTPATIENT
Start: 2023-03-09

## 2023-03-09 NOTE — TELEPHONE ENCOUNTER
Caller: Alexandra Xiao    Relationship: Self    Best call back number: [phone]    Who are you requesting to speak with (clinical staff, provider,  specific staff member):NURSE    What was the call regarding: POSSIBLY LABS    Do you require a callback:YES

## 2023-03-09 NOTE — TELEPHONE ENCOUNTER
Caller: ELIE Small call back number: [phone] CAN LEAVE A DETAILED MESS ON VM       What was the call regarding: PT HAD TO GO TO E.R AGAIN , SHE WANTS DR MORENO TO GO OVER NOTES AS THE FOUND A CLOT IN LEFT ARTERY. SHE IS VERY CONCERNED WANT'S  TO COME IN SOONER . SHE WOULD ALSO LIKE TO  TALK TO DR MORENO VIA PHONE     Do you require a callback: YES TO TALK TO  AS SHE WAS TOLD WILL NEED ANOTHER CT? WITHIN 6 MONTHS  WOULD LIKE  SOONER APPT.       PLEASE ADVISE

## 2023-03-13 ENCOUNTER — HOSPITAL ENCOUNTER (EMERGENCY)
Facility: HOSPITAL | Age: 43
Discharge: HOME | End: 2023-03-13
Attending: EMERGENCY MEDICINE | Admitting: EMERGENCY MEDICINE
Payer: MEDICARE

## 2023-03-13 VITALS
RESPIRATION RATE: 16 BRPM | BODY MASS INDEX: 29.19 KG/M2 | WEIGHT: 171 LBS | OXYGEN SATURATION: 97 % | SYSTOLIC BLOOD PRESSURE: 145 MMHG | HEIGHT: 64 IN | TEMPERATURE: 98 F | DIASTOLIC BLOOD PRESSURE: 87 MMHG | HEART RATE: 92 BPM

## 2023-03-13 DIAGNOSIS — R51.9 ACUTE NONINTRACTABLE HEADACHE, UNSPECIFIED HEADACHE TYPE: ICD-10-CM

## 2023-03-13 DIAGNOSIS — R42 DIZZINESS: ICD-10-CM

## 2023-03-13 DIAGNOSIS — R11.2 NAUSEA AND VOMITING, UNSPECIFIED VOMITING TYPE: Primary | ICD-10-CM

## 2023-03-13 DIAGNOSIS — G40.909 NONINTRACTABLE EPILEPSY WITHOUT STATUS EPILEPTICUS, UNSPECIFIED EPILEPSY TYPE: ICD-10-CM

## 2023-03-13 LAB
ALBUMIN SERPL-MCNC: 4.1 G/DL (ref 3.5–5.2)
ALBUMIN/GLOB SERPL: 1.2 G/DL
ALP SERPL-CCNC: 152 U/L (ref 39–117)
ALT SERPL W P-5'-P-CCNC: 10 U/L (ref 1–33)
ANION GAP SERPL CALCULATED.3IONS-SCNC: 11 MMOL/L (ref 5–15)
AST SERPL-CCNC: 16 U/L (ref 1–32)
BASOPHILS # BLD AUTO: 0.03 10*3/MM3 (ref 0–0.2)
BASOPHILS NFR BLD AUTO: 0.3 % (ref 0–1.5)
BILIRUB SERPL-MCNC: <0.2 MG/DL (ref 0–1.2)
BUN SERPL-MCNC: 12 MG/DL (ref 6–20)
BUN/CREAT SERPL: 20.3 (ref 7–25)
CALCIUM SPEC-SCNC: 8.8 MG/DL (ref 8.6–10.5)
CHLORIDE SERPL-SCNC: 98 MMOL/L (ref 98–107)
CO2 SERPL-SCNC: 24 MMOL/L (ref 22–29)
CREAT SERPL-MCNC: 0.59 MG/DL (ref 0.57–1)
DEPRECATED RDW RBC AUTO: 40.9 FL (ref 37–54)
EGFRCR SERPLBLD CKD-EPI 2021: 114.8 ML/MIN/1.73
EOSINOPHIL # BLD AUTO: 0.01 10*3/MM3 (ref 0–0.4)
EOSINOPHIL NFR BLD AUTO: 0.1 % (ref 0.3–6.2)
ERYTHROCYTE [DISTWIDTH] IN BLOOD BY AUTOMATED COUNT: 14.6 % (ref 12.3–15.4)
GLOBULIN UR ELPH-MCNC: 3.5 GM/DL
GLUCOSE SERPL-MCNC: 86 MG/DL (ref 65–99)
HCT VFR BLD AUTO: 37.5 % (ref 34–46.6)
HGB BLD-MCNC: 12.6 G/DL (ref 12–15.9)
IMM GRANULOCYTES # BLD AUTO: 0.06 10*3/MM3 (ref 0–0.05)
IMM GRANULOCYTES NFR BLD AUTO: 0.5 % (ref 0–0.5)
LYMPHOCYTES # BLD AUTO: 1.51 10*3/MM3 (ref 0.7–3.1)
LYMPHOCYTES NFR BLD AUTO: 13.3 % (ref 19.6–45.3)
MCH RBC QN AUTO: 26 PG (ref 26.6–33)
MCHC RBC AUTO-ENTMCNC: 33.6 G/DL (ref 31.5–35.7)
MCV RBC AUTO: 77.3 FL (ref 79–97)
MONOCYTES # BLD AUTO: 0.56 10*3/MM3 (ref 0.1–0.9)
MONOCYTES NFR BLD AUTO: 4.9 % (ref 5–12)
NEUTROPHILS NFR BLD AUTO: 80.9 % (ref 42.7–76)
NEUTROPHILS NFR BLD AUTO: 9.22 10*3/MM3 (ref 1.7–7)
NRBC BLD AUTO-RTO: 0 /100 WBC (ref 0–0.2)
PLATELET # BLD AUTO: 438 10*3/MM3 (ref 140–450)
PMV BLD AUTO: 9.9 FL (ref 6–12)
POTASSIUM SERPL-SCNC: 3.8 MMOL/L (ref 3.5–5.2)
PROT SERPL-MCNC: 7.6 G/DL (ref 6–8.5)
QT INTERVAL: 366 MS
RBC # BLD AUTO: 4.85 10*6/MM3 (ref 3.77–5.28)
SODIUM SERPL-SCNC: 133 MMOL/L (ref 136–145)
TROPONIN T SERPL HS-MCNC: <6 NG/L
WBC NRBC COR # BLD: 11.39 10*3/MM3 (ref 3.4–10.8)

## 2023-03-13 PROCEDURE — 84484 ASSAY OF TROPONIN QUANT: CPT | Performed by: EMERGENCY MEDICINE

## 2023-03-13 PROCEDURE — 99284 EMERGENCY DEPT VISIT MOD MDM: CPT

## 2023-03-13 PROCEDURE — 85025 COMPLETE CBC W/AUTO DIFF WBC: CPT | Performed by: EMERGENCY MEDICINE

## 2023-03-13 PROCEDURE — 96375 TX/PRO/DX INJ NEW DRUG ADDON: CPT

## 2023-03-13 PROCEDURE — 93010 ELECTROCARDIOGRAM REPORT: CPT | Performed by: INTERNAL MEDICINE

## 2023-03-13 PROCEDURE — 25010000002 ONDANSETRON PER 1 MG: Performed by: EMERGENCY MEDICINE

## 2023-03-13 PROCEDURE — 25010000002 KETOROLAC TROMETHAMINE PER 15 MG: Performed by: EMERGENCY MEDICINE

## 2023-03-13 PROCEDURE — 93005 ELECTROCARDIOGRAM TRACING: CPT | Performed by: EMERGENCY MEDICINE

## 2023-03-13 PROCEDURE — 80053 COMPREHEN METABOLIC PANEL: CPT | Performed by: EMERGENCY MEDICINE

## 2023-03-13 PROCEDURE — 96374 THER/PROPH/DIAG INJ IV PUSH: CPT

## 2023-03-13 RX ORDER — SODIUM CHLORIDE 0.9 % (FLUSH) 0.9 %
10 SYRINGE (ML) INJECTION AS NEEDED
Status: DISCONTINUED | OUTPATIENT
Start: 2023-03-13 | End: 2023-03-13 | Stop reason: HOSPADM

## 2023-03-13 RX ORDER — ONDANSETRON 2 MG/ML
4 INJECTION INTRAMUSCULAR; INTRAVENOUS ONCE
Status: COMPLETED | OUTPATIENT
Start: 2023-03-13 | End: 2023-03-13

## 2023-03-13 RX ORDER — ONDANSETRON 4 MG/1
4 TABLET, ORALLY DISINTEGRATING ORAL EVERY 6 HOURS PRN
Qty: 20 TABLET | Refills: 0 | Status: SHIPPED | OUTPATIENT
Start: 2023-03-13

## 2023-03-13 RX ORDER — KETOROLAC TROMETHAMINE 15 MG/ML
15 INJECTION, SOLUTION INTRAMUSCULAR; INTRAVENOUS ONCE
Status: COMPLETED | OUTPATIENT
Start: 2023-03-13 | End: 2023-03-13

## 2023-03-13 RX ORDER — ACETAMINOPHEN 500 MG
1000 TABLET ORAL ONCE
Status: COMPLETED | OUTPATIENT
Start: 2023-03-13 | End: 2023-03-13

## 2023-03-13 RX ADMIN — SODIUM CHLORIDE, POTASSIUM CHLORIDE, SODIUM LACTATE AND CALCIUM CHLORIDE 1000 ML: 600; 310; 30; 20 INJECTION, SOLUTION INTRAVENOUS at 13:11

## 2023-03-13 RX ADMIN — KETOROLAC TROMETHAMINE 15 MG: 15 INJECTION, SOLUTION INTRAMUSCULAR; INTRAVENOUS at 13:13

## 2023-03-13 RX ADMIN — ONDANSETRON 4 MG: 2 INJECTION INTRAMUSCULAR; INTRAVENOUS at 13:12

## 2023-03-13 RX ADMIN — ACETAMINOPHEN 1000 MG: 500 TABLET, FILM COATED ORAL at 15:11

## 2023-03-13 NOTE — ED PROVIDER NOTES
EMERGENCY DEPARTMENT ENCOUNTER    Room Number:  23/23  Date seen:  3/13/2023  PCP: Annalee Huddleston APRN  Historian: Patient      HPI:  Chief Complaint: Nausea, vomiting, dizziness  A complete HPI/ROS/PMH/PSH/SH/FH are unobtainable due to: Nothing  Context: Alexandra Xiao is a 42 y.o. female who presents to the ED c/o dizziness, nausea, and vomiting.  Patient took her morning medicines around 9 AM.  About 30 minutes later, she began to feel dizzy and nauseated.  She felt like the room was spinning.  She felt weak all over and did not feel like she could stand up.  She had trouble controlling both arms.  She then vomited multiple times over the course of about 15 minutes.  She felt somewhat short of breath.  Her blood pressure was 150s/110s at that time.  Denies slurred speech, numbness in her extremities, facial droop, chest pain, or abdominal pain.  Symptoms are are better now.  She currently has a mild headache.  She has a history of migraines and grand mal seizures.  She recently began having focal seizures.  The symptoms she had this morning are similar to when she has a focal seizure.            PAST MEDICAL HISTORY  Active Ambulatory Problems     Diagnosis Date Noted   • Sebaceous cyst of breast, right 11/06/2018   • Abnormal uterine bleeding 03/23/2022   • Acute CVA (cerebrovascular accident) (HCC) 04/26/2022   • Microcytic anemia 04/27/2022   • Transaminitis 04/27/2022   • Epilepsy ([facility]) 04/27/2022   • Tobacco abuse 04/27/2022   • Major depressive disorder 01/01/2014   • Left-sided weakness 03/05/2023   • Anemia    • Hyponatremia    • HTN (hypertension)    • History of stroke 03/06/2023   • Vitamin D deficiency 03/09/2023   • B12 deficiency 03/09/2023     Resolved Ambulatory Problems     Diagnosis Date Noted   • No Resolved Ambulatory Problems     Past Medical History:   Diagnosis Date   • Abnormal bleeding in menstrual cycle    • Atrial fibrillation (HCC)    • Chest pain    • COPD (chronic obstructive  pulmonary disease) (HCC)    • Depression    • Headache, tension-type    • Heavy menstrual bleeding    • Memory loss    • Migraine    • Mitral valve regurgitation    • Seizures (HCC)    • Status post placement of implantable loop recorder    • Stroke (HCC)          PAST SURGICAL HISTORY  Past Surgical History:   Procedure Laterality Date   • BREAST BIOPSY     • D & C HYSTEROSCOPY ENDOMETRIAL ABLATION N/A 06/10/2022    Procedure: DILATATION AND CURETTAGE HYSTEROSCOPY NOVASURE ENDOMETRIAL ABLATION;  Surgeon: Ernesto Mendosa MD;  Location: [facility];  Service: Obstetrics/Gynecology;  Laterality: N/A;   • FOOT SURGERY      pins in left foot    • INSERT / REPLACE / REMOVE PACEMAKER  2022    Dr. Cristofer Chamorro   • OTHER SURGICAL HISTORY      VNS plate 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   • NOÉ      2022   • TUBAL ABDOMINAL LIGATION     • VAGUS NERVE STIMULATOR IMPLANTATION           FAMILY HISTORY  Family History   Problem Relation Age of Onset   • Breast cancer Mother 50         age 60 METS   • Arthritis Mother    • Arthritis Father    • Diabetes Father    • Heart disease Father    • Hypertension Father    • Heart attack Father    • Hypertension Brother    • Heart attack Paternal Grandfather    • Ovarian cancer Neg Hx    • Uterine cancer Neg Hx    • Colon cancer Neg Hx    • Deep vein thrombosis Neg Hx    • Pulmonary embolism Neg Hx    • Malig Hyperthermia Neg Hx          SOCIAL HISTORY  Social History     Socioeconomic History   • Marital status: Single   • Number of children: 2   Tobacco Use   • Smoking status: Former     Packs/day: 1.00     Years: 29.00     Pack years: 29.00     Types: Cigarettes   • Smokeless tobacco: Never   • Tobacco comments:     Quit 2022   Vaping Use   • Vaping Use: Every day   • Substances: Nicotine, Flavoring   • Devices: Disposable   Substance and Sexual Activity   • Alcohol use: Not Currently     Comment: caffeine 3 8 oZ rebulls daily    • Drug use: No    • Sexual activity: Yes     Partners: Male     Birth control/protection: Tubal ligation         ALLERGIES  Patient has no known allergies.        REVIEW OF SYSTEMS  Review of Systems     All systems have been reviewed and are negative except as as discussed in the HPI    PHYSICAL EXAM  ED Triage Vitals [03/13/23 1209]   Temp Heart Rate Resp BP SpO2   98 °F (36.7 °C) 108 16 -- 97 %      Temp src Heart Rate Source Patient Position BP Location FiO2 (%)   Tympanic Monitor -- -- --       Physical Exam      GENERAL: Awake, alert, oriented x3.  Well-developed, well-nourished female.  Appears older than stated age.  No acute distress  HENT: NCAT, nares patent  EYES: PERRL, EOMI, no nystagmus  CV: regular rhythm, normal rate  RESPIRATORY: normal effort, clear to auscultation bilaterally  ABDOMEN: soft, nontender  MUSCULOSKELETAL: Extremities are nontender with full range of motion  NEURO: Speech is clear and fluent.  No aphasia.  No facial droop.  Normal strength and light touch sensation in all extremities.  Normal finger-nose and heel-to-shin testing bilaterally.  PSYCH:  calm, cooperative  SKIN: warm, dry    Vital signs and nursing notes reviewed.          LAB RESULTS  Recent Results (from the past 24 hour(s))   ECG 12 Lead Other; Dizziness, nausea    Collection Time: 03/13/23 12:30 PM   Result Value Ref Range    QT Interval 366 ms   Comprehensive Metabolic Panel    Collection Time: 03/13/23  1:10 PM    Specimen: Blood   Result Value Ref Range    Glucose 86 65 - 99 mg/dL    BUN 12 6 - 20 mg/dL    Creatinine 0.59 0.57 - 1.00 mg/dL    Sodium 133 (L) 136 - 145 mmol/L    Potassium 3.8 3.5 - 5.2 mmol/L    Chloride 98 98 - 107 mmol/L    CO2 24.0 22.0 - 29.0 mmol/L    Calcium 8.8 8.6 - 10.5 mg/dL    Total Protein 7.6 6.0 - 8.5 g/dL    Albumin 4.1 3.5 - 5.2 g/dL    ALT (SGPT) 10 1 - 33 U/L    AST (SGOT) 16 1 - 32 U/L    Alkaline Phosphatase 152 (H) 39 - 117 U/L    Total Bilirubin <0.2 0.0 - 1.2 mg/dL    Globulin 3.5 gm/dL    A/G  Ratio 1.2 g/dL    BUN/Creatinine Ratio 20.3 7.0 - 25.0    Anion Gap 11.0 5.0 - 15.0 mmol/L    eGFR 114.8 >60.0 mL/min/1.73   Single High Sensitivity Troponin T    Collection Time: 03/13/23  1:10 PM    Specimen: Blood   Result Value Ref Range    HS Troponin T <6 <10 ng/L   CBC Auto Differential    Collection Time: 03/13/23  1:10 PM    Specimen: Blood   Result Value Ref Range    WBC 11.39 (H) 3.40 - 10.80 10*3/mm3    RBC 4.85 3.77 - 5.28 10*6/mm3    Hemoglobin 12.6 12.0 - 15.9 g/dL    Hematocrit 37.5 34.0 - 46.6 %    MCV 77.3 (L) 79.0 - 97.0 fL    MCH 26.0 (L) 26.6 - 33.0 pg    MCHC 33.6 31.5 - 35.7 g/dL    RDW 14.6 12.3 - 15.4 %    RDW-SD 40.9 37.0 - 54.0 fl    MPV 9.9 6.0 - 12.0 fL    Platelets 438 140 - 450 10*3/mm3    Neutrophil % 80.9 (H) 42.7 - 76.0 %    Lymphocyte % 13.3 (L) 19.6 - 45.3 %    Monocyte % 4.9 (L) 5.0 - 12.0 %    Eosinophil % 0.1 (L) 0.3 - 6.2 %    Basophil % 0.3 0.0 - 1.5 %    Immature Grans % 0.5 0.0 - 0.5 %    Neutrophils, Absolute 9.22 (H) 1.70 - 7.00 10*3/mm3    Lymphocytes, Absolute 1.51 0.70 - 3.10 10*3/mm3    Monocytes, Absolute 0.56 0.10 - 0.90 10*3/mm3    Eosinophils, Absolute 0.01 0.00 - 0.40 10*3/mm3    Basophils, Absolute 0.03 0.00 - 0.20 10*3/mm3    Immature Grans, Absolute 0.06 (H) 0.00 - 0.05 10*3/mm3    nRBC 0.0 0.0 - 0.2 /100 WBC       Ordered the above labs and reviewed the results.        RADIOLOGY  No Radiology Exams Resulted Within Past 24 Hours    Ordered the above noted radiological studies. Reviewed by me in PACS.            PROCEDURES  Procedures              MEDICATIONS GIVEN IN ER  Medications   ondansetron (ZOFRAN) injection 4 mg (4 mg Intravenous Given 3/13/23 1312)   lactated ringers bolus 1,000 mL (0 mL Intravenous Stopped 3/13/23 1432)   ketorolac (TORADOL) injection 15 mg (15 mg Intravenous Given 3/13/23 1313)   acetaminophen (TYLENOL) tablet 1,000 mg (1,000 mg Oral Given 3/13/23 1511)                   MEDICAL DECISION MAKING, PROGRESS, and CONSULTS    All labs  have been independently reviewed by me.  All radiology studies have been reviewed by me and I have also reviewed the radiology report.   EKG's independently viewed and interpreted by me.  Discussion below represents my analysis of pertinent findings related to patient's condition, differential diagnosis, treatment plan and final disposition.      Additional sources:  - Discussed/ obtained information from independent historians: N/A    - External (non-ED) record review: Patient was admitted here 3/5 through 3/7/2023 for left facial weakness/numbness and seizure-like activity.  She was seen by neurology and her symptoms were felt to be secondary to seizures.  MRI was negative for acute stroke.  EEG showed left frontal epileptiform discharges.  She was noted to have a questionable left cervical ICA aneurysm and follow-up CTA of the neck was recommended to be done in 6 months.  Echocardiogram showed an EF of 59%.  There was moderate mitral valve regurgitation.    - Chronic or social conditions impacting care: N/A          Orders placed during this visit:  Orders Placed This Encounter   Procedures   • Comprehensive Metabolic Panel   • Single High Sensitivity Troponin T   • CBC Auto Differential   • Monitor Blood Pressure   • Pulse Oximetry, Continuous   • ECG 12 Lead Other; Dizziness, nausea   • CBC & Differential         Additional orders considered but not ordered:  Admission was considered.  However, patient was admitted here earlier this month and had an extensive work-up.        Differential diagnosis:    Migraine, partial seizure, gastroenteritis, dehydration, acute coronary syndrome, TIA      Independent interpretation of labs, radiology studies, and discussions with consultants:  ED Course as of 03/13/23 1817   Mon Mar 13, 2023   1310 BP: 140/95 [WH]   1312 EKG personally interpreted by me.  My personal interpretation is:         EKG time: 12:30 PM  Rhythm/Rate: Sinus tachycardia, rate 100  P waves and AZ:  LAE  QRS, axis: Normal axis, RSR prime in V1 and V2  ST and T waves: Nonspecific ST/T wave changes in the inferior lead    Interpreted Contemporaneously by me at 1234, independently viewed  EKG is not significantly changed compared to prior EKG done on 3/5/2023   [WH]   1354 HS Troponin T: <6 [WH]   1354 WBC(!): 11.39  9.9 five days ago [WH]   1354 Hemoglobin: 12.6 [WH]   1354 Sodium(!): 133 [WH]   1354 Glucose: 86 [WH]   1511 BP: 158/97 [WH]   1530 Patient is resting comfortably.  Test results were discussed with her and her family.  She is mildly hypertensive.  Headache has improved.  She has an appoint with Dr. James, her neurologist, next month.  Patient will be discharged.  She is requesting a referral to [facility] cardiology.  She has previously been seen at [facility] but all her other doctors are here at [facility].  I will give her the office number for local cardiology.  Return precautions were discussed. [WH]   0970 Patient presented to the ED complaining of nausea, vomiting, dizziness, and headache.  Her neuro exam was normal.  Labs were unremarkable.  Symptoms improved with IV fluids and IV Toradol.  Patient was admitted here 8 days ago for left facial weakness and seizure-like activity.  She had a very thorough work-up then including brain MRI, EEG, CTA head/neck, and echocardiogram.  EEG did show some left frontal epileptiform discharges.  Patient was thought to be having focal seizures.  Her symptoms today may be related to focal seizures as well.  She did not have any seizure-like activity while in the ED.  Given her recent extensive work-up, patient did not need to be readmitted.  She was advised to follow-up with her neurologist. [WH]      ED Course User Index  [WH] Dennis Rahman MD               DIAGNOSIS  Final diagnoses:   Nausea and vomiting, unspecified vomiting type   Dizziness   Acute nonintractable headache, unspecified headache type   Nonintractable epilepsy without status epilepticus,  unspecified epilepsy type (HCC)         DISPOSITION  DISCHARGE    Patient discharged in stable condition.    Reviewed implications of results, diagnosis, meds, responsibility to follow up, warning signs and symptoms of possible worsening, potential complications and reasons to return to ER, including worsening or persistent symptoms, chest pain, shortness of breath, dizziness, slurred speech, numbness/tingling/weakness in extremities, generalized seizure, or other concern.    Patient/Family voiced understanding of above instructions.    Discussed plan for discharge, as there is no emergent indication for admission. Patient referred to primary care provider for BP management due to today's BP. Pt/family is agreeable and understands need for follow up and repeat testing.  Pt is aware that discharge does not mean that nothing is wrong but it indicates no emergency is present that requires admission and they must continue care with follow-up as given below or physician of their choice.     FOLLOW-UP  Annalee Huddleston, KIRSTIE  [address] [address]  [phone]    Schedule an appointment as soon as possible for a visit       [facility]  [address] [facility] [address] [phone]  Schedule an appointment as soon as possible for a visit            Medication List      New Prescriptions    ondansetron ODT 4 MG disintegrating tablet  Commonly known as: ZOFRAN-ODT  Place 1 tablet on the tongue Every 6 (Six) Hours As Needed for Nausea or Vomiting.           Where to Get Your Medications      These medications were sent to "[id number] - [place], [place] - [address] AT [facility] - [phone] [facility] [phone]   [address], [facility] [address]    Phone: [phone]   · ondansetron ODT 4 MG disintegrating tablet                   Latest Documented Vital Signs:  As of 18:17 EDT  BP- 145/87  HR- 92 Temp- 98 °F (36.7 °C) (Tympanic) O2 sat- 97%              --    Please note that portions of this were completed with a voice recognition program.       Note Disclaimer: At [facility], we believe that sharing information builds trust and better relationships. You are receiving this note because you are receiving care at [facility] or recently visited. It is possible you will see health information before a provider has talked with you about it. This kind of information can be easy to misunderstand. To help you fully understand what it means for your health, we urge you to discuss this note with your provider.           Dennis Rahman MD  03/13/23 8634

## 2023-03-13 NOTE — DISCHARGE INSTRUCTIONS
Take medication as prescribed.  Follow-up with your primary care provider and your neurologist.  Return to the emergency department for worsening/persistent symptoms, chest pain, palpitations, fainting, shortness of breath, numbness/tingling/weakness in extremities, or other concern.

## 2023-03-13 NOTE — TELEPHONE ENCOUNTER
Spoke to patient.  Could have been a medication side effect that occurred today.  However also wanting to refer her to the epilepsy clinic at the [facility], Dr. Lainez

## 2023-03-13 NOTE — ED TRIAGE NOTES
Vomit this am after taking her morning meds.  Her legs are weak.  She has a HA    Patient was placed in face mask during first look triage.  Patient was wearing a face mask throughout encounter.  I wore personal protective equipment throughout the encounter.  Hand hygiene was performed before and after patient encounter.

## 2023-03-14 ENCOUNTER — OFFICE VISIT (OUTPATIENT)
Dept: SURGERY | Facility: CLINIC | Age: 43
End: 2023-03-14
Payer: MEDICARE

## 2023-03-14 VITALS — BODY MASS INDEX: 29.37 KG/M2 | HEIGHT: 64 IN | WEIGHT: 172 LBS

## 2023-03-14 DIAGNOSIS — L02.419 AXILLARY ABSCESS: Primary | ICD-10-CM

## 2023-03-14 PROCEDURE — 10060 I&D ABSCESS SIMPLE/SINGLE: CPT | Performed by: SURGERY

## 2023-03-14 PROCEDURE — 88304 TISSUE EXAM BY PATHOLOGIST: CPT | Performed by: SURGERY

## 2023-03-14 NOTE — PROGRESS NOTES
"Incision & Drainage    Date/Time: 3/14/2023 10:11 AM  Performed by: Ashley Duarte MD  Authorized by: Ashley Duarte MD   Consent: Verbal consent obtained. Written consent obtained.  Risks and benefits: risks, benefits and alternatives were discussed  Consent given by: patient  Patient understanding: patient states understanding of the procedure being performed  Patient consent: the patient's understanding of the procedure matches consent given  Procedure consent: procedure consent matches procedure scheduled  Site marked: the operative site was marked  Patient identity confirmed: verbally with patient  Time out: Immediately prior to procedure a \"time out\" was called to verify the correct patient, procedure, equipment, support staff and site/side marked as required.  Type: cyst  Body area: upper extremity  Location details: left arm  Anesthesia: local infiltration    Anesthesia:  Local Anesthetic: lidocaine 1% with epinephrine  Anesthetic total: 10 mL    Sedation:  Patient sedated: no    Scalpel size: 11  Incision type: elliptical  Drainage characteristics: epidermal cyst material, pus.  Drainage amount: scant  Wound treatment: wound left open  Packing material: 1/4 in iodoform gauze  Patient tolerance: patient tolerated the procedure well with no immediate complications  Comments: Left axilla was anesthetized circumferentially around the cyst.  The roof of the cyst was excised using 11 blade scalpel with an elliptical incision.  The cyst contents were removed.  The cavity which measured 2 cm in diameter was irrigated then packed with quarter inch iodoform gauze and covered with a Band-Aid.  Instructions were provided to the patient to change packing daily after showering.      Follow-up in 1 to 2 weeks if area is not healing.  Follow-up in 1 month or after skin has completely healed for complete epidermal inclusion cyst excision.  "

## 2023-03-16 ENCOUNTER — READMISSION MANAGEMENT (OUTPATIENT)
Dept: CALL CENTER | Facility: HOSPITAL | Age: 43
End: 2023-03-16
Payer: MEDICARE

## 2023-03-16 LAB
LAB AP CASE REPORT: NORMAL
PATH REPORT.FINAL DX SPEC: NORMAL
PATH REPORT.GROSS SPEC: NORMAL

## 2023-03-16 NOTE — OUTREACH NOTE
Medical Week 2 Survey    Flowsheet Row Responses   [facility] [place] patient discharged from? [place]   Does the patient have one of the following disease processes/diagnoses(primary or secondary)? Other   Week 2 attempt successful? No   Unsuccessful attempts Attempt 1          Rupali KRUSE - Registered Nurse

## 2023-03-21 ENCOUNTER — READMISSION MANAGEMENT (OUTPATIENT)
Dept: CALL CENTER | Facility: HOSPITAL | Age: 43
End: 2023-03-21
Payer: MEDICARE

## 2023-03-21 NOTE — OUTREACH NOTE
Medical Week 2 Survey    Flowsheet Row Responses   [facility] patient discharged from? [place]   Does the patient have one of the following disease processes/diagnoses(primary or secondary)? Other   Week 2 attempt successful? No   Unsuccessful attempts Attempt 2   Revoke Decline to participate          Rupali ALCARAZ - Registered Nurse

## 2023-03-23 ENCOUNTER — PATIENT OUTREACH (OUTPATIENT)
Dept: CASE MANAGEMENT | Facility: OTHER | Age: 43
End: 2023-03-23
Payer: MEDICARE

## 2023-03-23 ENCOUNTER — TELEPHONE (OUTPATIENT)
Dept: NEUROLOGY | Facility: CLINIC | Age: 43
End: 2023-03-23
Payer: MEDICARE

## 2023-03-23 NOTE — TELEPHONE ENCOUNTER
Caller: Alexandra Xiao    Relationship: Self    Best call back number: [phone]    Who are you requesting to speak with (clinical staff, provider,  specific staff member):   DR MORENO    What was the call regarding:   GRAN MAL SEIZURE  LAST NIGHT AROUND 9 PM THE PT HAD A GRAND MAL SEIZURE THAT LASTED ABOUT 2 MINS.    PT HAS A FU WITH /[facility] APPT ON 4-18-23. PT NEEDS LA PAPERWORK BEFORE THIS APPT TIME. PT'S  WILL BRING THE PAPERWORK TO THE OFFICE.

## 2023-03-23 NOTE — OUTREACH NOTE
AMBULATORY CASE MANAGEMENT NOTE    Name and Relationship of Patient/Support Person: Alexandra Xiao M - Self    Patient Outreach    Outreach x 4 to patient and left contact information for ACM RN.     Lizzy MAY  Ambulatory Case Management    3/23/2023, 12:11 EDT

## 2023-03-27 NOTE — TELEPHONE ENCOUNTER
PT IS CALLING AGAIN REGARDING SEIZURES  SHE THINKS HAD A FOCAL SEIZURE YESTERDAY MORNING  03.26.23 ? , SAID THIS IS ALL NEW TO HER AND WOULD LIKE TO TALK TO THE DOCTOR REGARDING THEM  ALSO TO CHECK ON THE P.W.    ELIE [phone]    PLEASE CALL AND ADVISE

## 2023-03-31 ENCOUNTER — TELEPHONE (OUTPATIENT)
Dept: NEUROLOGY | Facility: CLINIC | Age: 43
End: 2023-03-31

## 2023-03-31 NOTE — TELEPHONE ENCOUNTER
Caller: ELIE     Geovanny call back number: [phone]    What was the call regarding: PT IS CALLING TO FIND OUT ABOUT THE P.W SHE DROPPED OFF AT OFFICE , THEY ARE ON A TIME LIMIT AND NEED ASAP.     Do you require a callback: YES TO LET THEM KNOW IF YOU HAVE FAXED ? AND THEY WILL  ORIGINALS    PLEASE ADVISE WHEN THEY CAN ?    PLEASE ADVISE

## 2023-03-31 NOTE — TELEPHONE ENCOUNTER
Forms are ready and filled out. Dr. James still needs to sign waiting on signature. These will be faxed Monday. Dr. James is not in office this afternoon.

## 2023-04-02 DIAGNOSIS — I10 PRIMARY HYPERTENSION: ICD-10-CM

## 2023-04-03 RX ORDER — HYDROCHLOROTHIAZIDE 12.5 MG/1
12.5 TABLET ORAL DAILY
Qty: 90 TABLET | Refills: 1 | Status: SHIPPED | OUTPATIENT
Start: 2023-04-03 | End: 2023-04-11 | Stop reason: SINTOL

## 2023-04-07 RX ORDER — ATORVASTATIN CALCIUM 40 MG/1
TABLET, FILM COATED ORAL
Qty: 30 TABLET | Refills: 3 | Status: SHIPPED | OUTPATIENT
Start: 2023-04-07

## 2023-04-11 ENCOUNTER — OFFICE VISIT (OUTPATIENT)
Dept: INTERNAL MEDICINE | Age: 43
End: 2023-04-11
Payer: MEDICARE

## 2023-04-11 VITALS
DIASTOLIC BLOOD PRESSURE: 64 MMHG | OXYGEN SATURATION: 97 % | BODY MASS INDEX: 29.71 KG/M2 | WEIGHT: 174 LBS | HEART RATE: 101 BPM | TEMPERATURE: 97.5 F | SYSTOLIC BLOOD PRESSURE: 128 MMHG | HEIGHT: 64 IN

## 2023-04-11 DIAGNOSIS — Z00.00 ENCOUNTER FOR MEDICARE ANNUAL WELLNESS EXAM: Primary | ICD-10-CM

## 2023-04-11 DIAGNOSIS — I10 PRIMARY HYPERTENSION: ICD-10-CM

## 2023-04-11 LAB
T4 FREE SERPL-MCNC: 0.89 NG/DL (ref 0.93–1.7)
TSH SERPL DL<=0.005 MIU/L-ACNC: 1.79 UIU/ML (ref 0.27–4.2)

## 2023-04-11 RX ORDER — AMLODIPINE BESYLATE 5 MG/1
5 TABLET ORAL DAILY
Qty: 30 TABLET | Refills: 0 | Status: SHIPPED | OUTPATIENT
Start: 2023-04-11

## 2023-04-11 NOTE — PROGRESS NOTES
I N T E R N A L  M E D I C I N E  KIRSTIE RODRIGUEZ      ENCOUNTER DATE:  04/11/2023    Alexandra Xiao / 42 y.o. / female        MEDICARE ANNUAL WELLNESS VISIT       Chief Complaint: Medicare Wellness-subsequent       Patient's general assessment of her health since a year ago:     - Compared to one year ago, she feels her physical health is slightly worse.    - Compared to one year ago, she feels her mental health is about the same without significant change.      HPI for other active medical problems:     HTN: She reports daily side effects of dizziness, slurred speech which started 30 minutes after taking HCTZ.  She stopped taking HCTZ for the last 4 days and symptoms have completely resolved.  BP at home is averaging 130-140s/80-90s.    She is scheduled to follow up with neurology, Dr. James on April 18, 2023.  She will discuss low B12/ possible injections with him, because she was unable to tolerate oral B12 supplementation due to nausea.      She was also unable to tolerate oral iron supplementation, but is focusing on incorporating iron rich foods into diet.  March 2023 CBC with H&H of 12.6/37.5.    Next cardiology appointment is on July 18, 2023 with Dr. Chamorro.       HLD: Atorvastatin 40 mg daily with March 2023 lipid panel with LDL of 55; triglycerides 66.     Followed by GYN, Dr. Mendosa, for history of menorrhagia.  She has plans to call and schedule to update pap/ mammogram.      She is a former smoker, quit approximately one year ago.  She continues to vape but has decreased dosage of nicotine.        HISTORY       Recent Hospitalizations:    Recent hospitalization?: YES    If YES, location, date, and diagnoses:     · Location: [facility]  · Date: 03/05/2023 - 03/07/2023  · Principle Discharge Dx: Left-sided weakness  · Secondary Dx: History of stoke, epilesy      Patient Care Team:    Patient Care Team:  Annalee Huddleston APRN as PCP - General (Family Medicine)  Cristofer Chamorro MD  as Consulting Physician (Cardiology)  Cb James II, MD as Consulting Physician (Neurology)  Ernesto Mendosa MD as Consulting Physician (Obstetrics and Gynecology)      Allergies:  Patient has no known allergies.    Medications:  Current Outpatient Medications on File Prior to Visit   Medication Sig Dispense Refill   • amitriptyline (ELAVIL) 25 MG tablet Take 1 tablet by mouth Every Night.     • aspirin 325 MG tablet Take 1 tablet by mouth Daily. 30 tablet 0   • atorvastatin (LIPITOR) 40 MG tablet TAKE 1 TABLET BY MOUTH EVERY NIGHT 30 tablet 3   • ESLICARBAZEPINE ACETATE PO Take 1,200 mg by mouth Every Night.     • ondansetron ODT (ZOFRAN-ODT) 4 MG disintegrating tablet Place 1 tablet on the tongue Every 6 (Six) Hours As Needed for Nausea or Vomiting. 20 tablet 0   • vitamin D (ERGOCALCIFEROL) 1.25 MG (75117 UT) capsule capsule Take 1 capsule by mouth Every 7 (Seven) Days. 8 capsule 0   • [DISCONTINUED] hydroCHLOROthiazide (HYDRODIURIL) 12.5 MG tablet TAKE 1 TABLET BY MOUTH DAILY 90 tablet 1   • Vimpat 200 MG tablet Take 1 tablet by mouth Every 12 (Twelve) Hours for 30 days. Brand-name only please 60 tablet 1     No current facility-administered medications on file prior to visit.        PFSH:     The following portions of the patient's history were reviewed and updated as appropriate: Allergies / Current Medications / Past Medical History / Surgical History / Social History / Family History    Problem List:  Patient Active Problem List   Diagnosis   • Sebaceous cyst of breast, right   • Abnormal uterine bleeding   • Acute CVA (cerebrovascular accident)   • Microcytic anemia   • Transaminitis   • Epilepsy   • Tobacco abuse   • Major depressive disorder   • Left-sided weakness   • Anemia   • Hyponatremia   • HTN (hypertension)   • History of stroke   • Vitamin D deficiency   • B12 deficiency       Past Medical History:  Past Medical History:   Diagnosis Date   • Abnormal bleeding in menstrual cycle    •  Atrial fibrillation    • Chest pain     ER VISIT MAY, 2022   • COPD (chronic obstructive pulmonary disease)    • Depression    • Epilepsy     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,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   • Headache, tension-type    • Heavy menstrual bleeding     WITH CLOTS   • Memory loss    • Migraine    • Mitral valve regurgitation    • Seizures    • Status post placement of implantable loop recorder    • Stroke     MARCH AND        Past Surgical History:  Past Surgical History:   Procedure Laterality Date   • BREAST BIOPSY     • D & C HYSTEROSCOPY ENDOMETRIAL ABLATION N/A 06/10/2022    Procedure: DILATATION AND CURETTAGE HYSTEROSCOPY NOVASURE ENDOMETRIAL ABLATION;  Surgeon: Ernesto Mendosa MD;  Location: [facility] OR;  Service: Obstetrics/Gynecology;  Laterality: N/A;   • FOOT SURGERY      pins in left foot    • INSERT / REPLACE / REMOVE PACEMAKER  2022    Dr. Cristofer Chamorro   • OTHER SURGICAL HISTORY      VNS plate 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   • NOÉ      2022   • TUBAL ABDOMINAL LIGATION     • VAGUS NERVE STIMULATOR IMPLANTATION         Social History:  Social History     Socioeconomic History   • Marital status: Single   • Number of children: 2   Tobacco Use   • Smoking status: Former     Packs/day: 1.00     Years: 29.00     Pack years: 29.00     Types: Cigarettes   • Smokeless tobacco: Never   • Tobacco comments:     Quit 2022   Vaping Use   • Vaping Use: Every day   • Substances: Nicotine, Flavoring   • Devices: Disposable   Substance and Sexual Activity   • Alcohol use: Not Currently     Comment: caffeine 3 8 oZ rebulls daily    • Drug use: No   • Sexual activity: Yes     Partners: Male     Birth control/protection: Tubal ligation       Family History:  Family History   Problem Relation Age of Onset   • Breast cancer Mother 50         age 60 METS   • Arthritis Mother    • Arthritis Father    • Diabetes Father    • Heart disease Father    • Hypertension Father    • Heart attack  "Father    • Hypertension Brother    • Heart attack Paternal Grandfather    • Ovarian cancer Neg Hx    • Uterine cancer Neg Hx    • Colon cancer Neg Hx    • Deep vein thrombosis Neg Hx    • Pulmonary embolism Neg Hx    • Malig Hyperthermia Neg Hx          PATIENT ASSESSMENT     Vitals:  Vitals:    04/11/23 0950   BP: 128/64   Pulse: 101   Temp: 97.5 °F (36.4 °C)   SpO2: 97%   Weight: 78.9 kg (174 lb)   Height: 162.6 cm (64.02\")       BP Readings from Last 3 Encounters:   04/11/23 128/64   03/13/23 145/87   03/08/23 124/80     Wt Readings from Last 3 Encounters:   04/11/23 78.9 kg (174 lb)   03/14/23 78 kg (172 lb)   03/13/23 77.6 kg (171 lb)      Body mass index is 29.85 kg/m².    [unfilled]        Review of Systems:    Review of Systems   Constitutional: Negative for chills, fever and unexpected weight change.   Respiratory: Negative for cough, chest tightness and shortness of breath.    Cardiovascular: Negative for chest pain, palpitations and leg swelling.   Neurological: Negative for dizziness, weakness, light-headedness and headaches.   Psychiatric/Behavioral: The patient is not nervous/anxious.          Physical Exam:    Physical Exam  Vitals reviewed.   Constitutional:       General: She is not in acute distress.     Appearance: Normal appearance. She is not ill-appearing, toxic-appearing or diaphoretic.   HENT:      Head: Normocephalic and atraumatic.      Right Ear: Tympanic membrane, ear canal and external ear normal. There is no impacted cerumen.      Left Ear: Tympanic membrane, ear canal and external ear normal. There is no impacted cerumen.      Nose: Nose normal. No congestion or rhinorrhea.      Mouth/Throat:      Mouth: Mucous membranes are moist.      Pharynx: Oropharynx is clear. No oropharyngeal exudate or posterior oropharyngeal erythema.   Eyes:      Extraocular Movements: Extraocular movements intact.      Conjunctiva/sclera: Conjunctivae normal.      Pupils: Pupils are equal, round, and reactive " to light.   Cardiovascular:      Rate and Rhythm: Normal rate and regular rhythm.      Heart sounds: Normal heart sounds.   Pulmonary:      Effort: Pulmonary effort is normal. No respiratory distress.      Breath sounds: Normal breath sounds.   Abdominal:      General: Bowel sounds are normal.      Palpations: Abdomen is soft.      Tenderness: There is no abdominal tenderness.   Musculoskeletal:         General: Normal range of motion.      Cervical back: Normal range of motion and neck supple.      Right lower leg: No edema.      Left lower leg: No edema.   Lymphadenopathy:      Cervical: No cervical adenopathy.   Skin:     General: Skin is warm and dry.   Neurological:      General: No focal deficit present.      Mental Status: She is alert and oriented to person, place, and time. Mental status is at baseline.   Psychiatric:         Mood and Affect: Mood normal.         Behavior: Behavior normal.         Thought Content: Thought content normal.         Judgment: Judgment normal.           Reviewed Data:    Labs:   Lab Results   Component Value Date     (L) 03/13/2023    K 3.8 03/13/2023    CALCIUM 8.8 03/13/2023    AST 16 03/13/2023    ALT 10 03/13/2023    BUN 12 03/13/2023    CREATININE 0.59 03/13/2023    CREATININE 0.64 03/08/2023    CREATININE 0.71 03/06/2023       Lab Results   Component Value Date    HGBA1C 5.40 03/06/2023    HGBA1C 4.80 04/27/2022       Lab Results   Component Value Date    LDL 55 03/06/2023    LDL 44 04/27/2022    HDL 66 (H) 03/06/2023    TRIG 66 03/06/2023       Lab Results   Component Value Date    TSH 3.090 04/27/2022          Lab Results   Component Value Date    WBC 11.39 (H) 03/13/2023    HGB 12.6 03/13/2023    HGB 12.1 03/08/2023    HGB 11.0 (L) 03/06/2023     03/13/2023                 No results found for: PSA    Imaging:          Medical Tests:          Screening for Glaucoma:  Previous screening for glaucoma?: No, she plans to schedule      Hearing Loss  Screen:  Finger Rub Hearing Test (right ear): passed  Finger Rub Hearing Test (left ear): passed      Urinary Incontinence Screen:  Episodes of urinary incontinence? : No      Depression Screen:      4/11/2023     9:52 AM   PHQ-2/PHQ-9 Depression Screening   Little Interest or Pleasure in Doing Things 0-->not at all   Feeling Down, Depressed or Hopeless 0-->not at all   PHQ-9: Brief Depression Severity Measure Score 0        PHQ-2: 0 (Not depressed)    PHQ-9: 0 (Negative screening for depression)       FUNCTIONAL, FALL RISK, & COGNITIVE SCREENING (Components below):    DATA:        4/11/2023     9:52 AM   Functional & Cognitive Status   Do you have difficulty preparing food and eating? No   Do you have difficulty bathing yourself, getting dressed or grooming yourself? No   Do you have difficulty using the toilet? No   Do you have difficulty moving around from place to place? No   Do you have trouble with steps or getting out of a bed or a chair? No   Current Diet Well Balanced Diet   Dental Exam Up to date   Eye Exam Up to date   Exercise (times per week) 0 times per week   Current Exercises Include No Regular Exercise   Do you need help using the phone?  No   Are you deaf or do you have serious difficulty hearing?  No   Do you need help with transportation? No   Do you need help shopping? No   Do you need help preparing meals?  No   Do you need help with housework?  No   Do you need help with laundry? No   Do you need help taking your medications? No   Do you need help managing money? No   Do you ever drive or ride in a car without wearing a seat belt? No   Do you have difficulty concentrating, remembering or making decisions? No         A) Assessment of Functional Ability:  (Assessment of ability to perform ADL's (showering/bathing, using toilet, dressing, feeding self, moving self around) and IADL's (use telephone, shop, prepare food, housekeep, do laundry, transport independently, take medications independently,  and handle finances)    Degree of functional impairment: MILD (based on assessment noted above) - She does not drive due to history of seizures      B) Assessment of Fall Risk:  Fall Risk Assessment was completed, and patient is at low risk for falls.       Need for further evaluation of gait, strength, and balance? : No    Timed Up and Go (TUG):   (>= 12 seconds indicates high risk for falling)    Observable abnormalities included: Normal gait pattern       C. Assessment of Cognitive Function:    Mini-Cog Test:     1) Registration (3 objects): Yes   2) Number of objects recalled: 3   3) Clock Draw: Passed? : N/A       Further evaluation required? : No        COUNSELING       A. Identification of Health Risk Factors:    Risk factors include: cardiovascular risk factors and tobacco use      B. Age-Appropriate Screening Schedule:  (Refer to the list below for future screening recommendations based on patient's age, sex and/or medical conditions. Orders for these recommended tests are listed in the plan section. The patient has been provided with a written plan)    Health Maintenance Topics  Health Maintenance   Topic Date Due   • COVID-19 Vaccine (1) 04/13/2023 (Originally 4/3/1981)   • INFLUENZA VACCINE  08/01/2023   • ANNUAL WELLNESS VISIT  04/11/2024   • PAP SMEAR  02/23/2025   • TDAP/TD VACCINES (3 - Td or Tdap) 09/11/2026   • HEPATITIS C SCREENING  Completed   • Pneumococcal Vaccine 0-64  Aged Out       Health Maintenance Topics Due or Over-Due  There are no preventive care reminders to display for this patient.      C. Advanced Care Planning:    Advance Care Planning   ACP discussion was held with the patient during this visit. Patient has an advance directive (not in EMR), copy requested.       D. Patient Self-Management and Personalized Health Advice:    She has been provided with personalized counseling/information (including brochures/handouts) about:     -- optimizing diet/nutrition plans, improving  exercise / conditioning, weight management, tobacco cessation, reducing risk for cardiovascular disease (heart, stroke, vascular) and judicious use of supplements      She has been recommended for the following preventative services which has been performed today, will be ordered today or ordered/performed on upcoming follow-up visit:     -- BREAST CANCER screening DISCUSSED, CERVICAL CANCER screening  (pelvic/pap smear with gyne recommended), COVID-19 vaccination (and/or booster) recommendations provided      E. Miscellaneous Items:    -Aspirin use counseling: Taking ASA appropriately as indicated    -Discussed BMI with her. The BMI is above average; BMI management plan is completed (discussed plans for weight loss)    -Reviewed use of high risk medication in the elderly: YES    -Reviewed for potential of harmful drug interactions in the elderly: YES        WRAP UP       Assessment & Plan:    1) MEDICARE ANNUAL WELLNESS VISIT    2) OTHER MEDICAL CONDITIONS ADDRESSED TODAY:            Problem List Items Addressed This Visit        Cardiac and Vasculature    HTN (hypertension)    Relevant Medications    amLODIPine (NORVASC) 5 MG tablet    Other Relevant Orders    TSH+Free T4   Other Visit Diagnoses     Encounter for Medicare annual wellness exam    -  Primary                    Orders Placed This Encounter   Procedures   • TSH+Free T4       Discussion / Summary:    BP is not at goal.  She was unable to tolerate lisinopril and HCTZ due to side effects.  Start amlodipine 5 mg daily and send me blood pressure readings for review in 1 week.  She acknowledges understanding.     Follow up with neurology as scheduled.  She will discuss possible B12 injections.    She will schedule eye exam.    Medications as of TODAY:              Current Outpatient Medications   Medication Sig Dispense Refill   • amitriptyline (ELAVIL) 25 MG tablet Take 1 tablet by mouth Every Night.     • aspirin 325 MG tablet Take 1 tablet by mouth  Daily. 30 tablet 0   • atorvastatin (LIPITOR) 40 MG tablet TAKE 1 TABLET BY MOUTH EVERY NIGHT 30 tablet 3   • ESLICARBAZEPINE ACETATE PO Take 1,200 mg by mouth Every Night.     • ondansetron ODT (ZOFRAN-ODT) 4 MG disintegrating tablet Place 1 tablet on the tongue Every 6 (Six) Hours As Needed for Nausea or Vomiting. 20 tablet 0   • vitamin D (ERGOCALCIFEROL) 1.25 MG (55120 UT) capsule capsule Take 1 capsule by mouth Every 7 (Seven) Days. 8 capsule 0   • amLODIPine (NORVASC) 5 MG tablet Take 1 tablet by mouth Daily. 30 tablet 0   • Vimpat 200 MG tablet Take 1 tablet by mouth Every 12 (Twelve) Hours for 30 days. Brand-name only please 60 tablet 1     No current facility-administered medications for this visit.         FOLLOW-UP:            Return for 4 month chronic care, then 1 year AWV.                 Future Appointments   Date Time Provider Department Center   4/18/2023  3:40 PM Cb James II, MD [facility]   4/19/2023 11:30 AM Ashley Duarte MD [facility]   7/12/2023  2:45 PM Ernesto Mendosa MD [facility]   8/7/2023  3:00 PM Annalee Huddleston APRN [facility]   4/12/2024  1:30 PM Annalee Huddleston APRN [facility]           After Visit Summary (AVS) including the Personalized Prevention  Plan Services (PPPS) was either printed and given to the patient at check-out today and/or sent to [facility] for review.       [unfilled]

## 2023-04-13 ENCOUNTER — TELEPHONE (OUTPATIENT)
Dept: NEUROLOGY | Facility: CLINIC | Age: 43
End: 2023-04-13
Payer: MEDICARE

## 2023-04-18 ENCOUNTER — OFFICE VISIT (OUTPATIENT)
Dept: NEUROLOGY | Facility: CLINIC | Age: 43
End: 2023-04-18
Payer: MEDICARE

## 2023-04-18 VITALS
DIASTOLIC BLOOD PRESSURE: 80 MMHG | OXYGEN SATURATION: 91 % | HEART RATE: 110 BPM | BODY MASS INDEX: 29.85 KG/M2 | HEIGHT: 64 IN | SYSTOLIC BLOOD PRESSURE: 122 MMHG

## 2023-04-18 DIAGNOSIS — M79.18 MYALGIA, LOWER LEG: ICD-10-CM

## 2023-04-18 DIAGNOSIS — G40.309 GENERALIZED NONCONVULSIVE EPILEPSY: Primary | ICD-10-CM

## 2023-04-18 DIAGNOSIS — E53.8 LOW SERUM VITAMIN B12: ICD-10-CM

## 2023-04-18 DIAGNOSIS — R29.898 WEAKNESS OF BOTH LOWER EXTREMITIES: ICD-10-CM

## 2023-04-18 DIAGNOSIS — Z96.89 STATUS POST VNS (VAGUS NERVE STIMULATOR) PLACEMENT: ICD-10-CM

## 2023-04-18 PROCEDURE — 3079F DIAST BP 80-89 MM HG: CPT | Performed by: PSYCHIATRY & NEUROLOGY

## 2023-04-18 PROCEDURE — 1160F RVW MEDS BY RX/DR IN RCRD: CPT | Performed by: PSYCHIATRY & NEUROLOGY

## 2023-04-18 PROCEDURE — 99213 OFFICE O/P EST LOW 20 MIN: CPT | Performed by: PSYCHIATRY & NEUROLOGY

## 2023-04-18 PROCEDURE — 95970 ALYS NPGT W/O PRGRMG: CPT | Performed by: PSYCHIATRY & NEUROLOGY

## 2023-04-18 PROCEDURE — 1159F MED LIST DOCD IN RCRD: CPT | Performed by: PSYCHIATRY & NEUROLOGY

## 2023-04-18 PROCEDURE — 3074F SYST BP LT 130 MM HG: CPT | Performed by: PSYCHIATRY & NEUROLOGY

## 2023-04-18 RX ORDER — ESLICARBAZEPINE ACETATE 400 MG/1
1 TABLET ORAL DAILY
COMMUNITY
Start: 2023-04-10

## 2023-04-18 RX ORDER — ESLICARBAZEPINE ACETATE 800 MG/1
1 TABLET ORAL DAILY
COMMUNITY
Start: 2023-03-21

## 2023-04-18 NOTE — PROGRESS NOTES
"Chief Complaint   Patient presents with   • Seizures       Patient ID: Alexandra Xiao is a 42 y.o. female.    HPI: I had the pleasure of seeing your patient today.  As you may know she is a 42-year-old female here for the management of seizures.  She is currently taking Aptiom as well as Vimpat.  Her last grand mal seizure was a few weeks ago.  We did make some medication adjustment with the Vimpat.  She is now taking 1000 mg daily.  As well she continues with the Vimpat 200 twice daily.  She has had some issues with her legs.  She says that occasionally will feel as if her legs are going to \"give out on her\".  They also may stiffen.  This typically is a positional type situation for her and last for a few minutes and resolves.  There is no tonic-clonic seizure-like activity.  This is not a focal symptoms she says that it is both legs when it occurs.  She does not have a history of severe chronic lower back pain.  The symptoms are intermittent.  No significant pain is associated with her symptoms in her legs.  She recently had some labs drawn showing a vitamin B12 level of 376.  She is not currently on vitamin B12 replacement.  She does have the vagus nerve stimulator and thus far tolerates stimulation.  We previously had issues where she was using the magnet to turn the device off too frequently.  She has done much better with that as of late.    The following portions of the patient's history were reviewed and updated as appropriate: allergies, current medications, past family history, past medical history, past social history, past surgical history and problem list.    Review of Systems   Allergic/Immunologic: Negative for environmental allergies, food allergies and immunocompromised state.   Neurological: Positive for seizures. Negative for dizziness, tremors, syncope, facial asymmetry, speech difficulty, weakness, light-headedness, numbness and headaches.   Hematological: Negative for adenopathy. Does not " bruise/bleed easily.      I have reviewed the review of systems above performed by my medical assistant.      Vitals:    23 1543   BP: 122/80   Pulse: 110   SpO2: 91%       Neurologic Exam     Mental Status   Oriented to person, place, and time.   Concentration: normal.   Level of consciousness: alert  Knowledge: consistent with education (No deficits found.).     Cranial Nerves     CN II   Visual fields full to confrontation.     CN III, IV, VI   Pupils are equal, round, and reactive to light.  Extraocular motions are normal.   CN III: no CN III palsy  CN VI: no CN VI palsy    CN V   Facial sensation intact.     CN VII   Facial expression full, symmetric.     CN VIII   CN VIII normal.     CN IX, X   CN IX normal.   CN X normal.     CN XI   CN XI normal.     CN XII   CN XII normal.     Motor Exam     Strength   Right neck flexion: 5/5  Left neck flexion: 5/5  Right neck extension: 5/5  Left neck extension: 5/5  Right deltoid: 5/5  Left deltoid: 5/5  Right biceps: 5/5  Left biceps: 5/5  Right triceps: 5/5  Left triceps: 5/5  Right wrist flexion: 5/5  Left wrist flexion: 5/5  Right wrist extension: 5/5  Left wrist extension: 5/5  Right interossei: 5/5  Left interossei: 5/5  Right abdominals: 5/5  Left abdominals: 5/5  Right iliopsoas: 5/5  Left iliopsoas: 5/5  Right quadriceps: 5/5  Left quadriceps: 5/5  Right hamstrin/5  Left hamstrin/5  Right glutei: 5/5  Left glutei: 5/5  Right anterior tibial: 5/5  Left anterior tibial: 5/5  Right posterior tibial: 5/5  Left posterior tibial: 5/5  Right peroneal: 5/5  Left peroneal: 5/5  Right gastroc: 5/5  Left gastroc: 5/5    Sensory Exam   Light touch normal.   Vibration normal.     Gait, Coordination, and Reflexes     Gait  Gait: normal    Reflexes   Right brachioradialis: 2+  Left brachioradialis: 2+  Right biceps: 2+  Left biceps: 2+  Right triceps: 2+  Left triceps: 2+  Right patellar: 2+  Left patellar: 2+  Right achilles: 2+  Left achilles: 2+  Right :  2+  Left : 2+Station is normal.       Physical Exam  Vitals reviewed.   Constitutional:       Appearance: She is well-developed.   HENT:      Head: Normocephalic and atraumatic.   Eyes:      Extraocular Movements: EOM normal.      Pupils: Pupils are equal, round, and reactive to light.   Cardiovascular:      Rate and Rhythm: Normal rate and regular rhythm.   Pulmonary:      Breath sounds: Normal breath sounds.   Musculoskeletal:         General: Normal range of motion.   Skin:     General: Skin is warm.   Neurological:      Mental Status: She is oriented to person, place, and time.      Gait: Gait is intact.      Deep Tendon Reflexes:      Reflex Scores:       Tricep reflexes are 2+ on the right side and 2+ on the left side.       Bicep reflexes are 2+ on the right side and 2+ on the left side.       Brachioradialis reflexes are 2+ on the right side and 2+ on the left side.       Patellar reflexes are 2+ on the right side and 2+ on the left side.       Achilles reflexes are 2+ on the right side and 2+ on the left side.        Procedures: Vagus nerve stimulator interrogation and diagnostics  Indication: History of seizures  Report:  Normal mode settings are as follows: Output current is set to 1.625 mA, frequency is 30 Hz, pulse width is 250 µs, on x30 seconds, off time is 5 minutes and duty cycle is 10%.  Auto stimulation mode settings are as follows: Output current is set to 1.875 mA, pulse width is 250 µs and on time 60 seconds.  Magnet mode settings are as follows: Output current is set to 2.75 mA, pulse width is 250 µs and on time is 60 seconds.  Setting changes: No setting changes were made today.    Assessment/Plan: I would like to start her on vitamin B12 injections.  We will send those to the pharmacy for her to do at home today.  We will schedule an MRI of the lumbosacral spine given these issues with bilateral lower extremity weakness.  I do not feel that they are seizures given the bilateral nature  and frequency.  Continue Vimpat.  We will have her increase her Aptiom to the 1200 mg dosing daily dosing.  We will see her back in 5 months or sooner if needed.  A total of 25 minutes was spent face-to-face with the patient today.  Of that greater than 50% of this time was spent discussing signs and symptoms of seizures, bilateral lower extremity weakness, B12 deficiency, patient education, plan of care and prognosis.       Diagnoses and all orders for this visit:    1. Generalized nonconvulsive epilepsy (Primary)    2. Status post VNS (vagus nerve stimulator) placement    3. Weakness of both lower extremities  -     MRI lumbar spine w wo contrast; Future    4. Myalgia, lower leg  -     MRI lumbar spine w wo contrast; Future    5. Low serum vitamin B12  -     cyanocobalamin 1000 MCG/ML injection; Inject 1 mL into the appropriate muscle as directed by prescriber Daily. 1 mL q week x 4 weeks, 1 mL qow x 4 mos  Dispense: 12 mL; Refill: 0           Cb James II, MD

## 2023-04-18 NOTE — LETTER
"April 18, 2023       No Recipients    Patient: Alexandra Xiao   YOB: 1980   Date of Visit: 4/18/2023       Dear Dr. Guerra Recipients:    Thank you for referring Alexandra Xiao to me for evaluation. Below are the relevant portions of my assessment and plan of care.    If you have questions, please do not hesitate to call me. I look forward to following Alexandra along with you.         Sincerely,        Cb James II, MD        CC:   No Recipients    Cb James II, MD  04/20/23 0834  Signed  Chief Complaint   Patient presents with   • Seizures       Patient ID: Alexandra Xiao is a 42 y.o. female.    HPI: I had the pleasure of seeing your patient today.  As you may know she is a 42-year-old female here for the management of seizures.  She is currently taking Aptiom as well as Vimpat.  Her last grand mal seizure was a few weeks ago.  We did make some medication adjustment with the Vimpat.  She is now taking 1000 mg daily.  As well she continues with the Vimpat 200 twice daily.  She has had some issues with her legs.  She says that occasionally will feel as if her legs are going to \"give out on her\".  They also may stiffen.  This typically is a positional type situation for her and last for a few minutes and resolves.  There is no tonic-clonic seizure-like activity.  This is not a focal symptoms she says that it is both legs when it occurs.  She does not have a history of severe chronic lower back pain.  The symptoms are intermittent.  No significant pain is associated with her symptoms in her legs.  She recently had some labs drawn showing a vitamin B12 level of 376.  She is not currently on vitamin B12 replacement.  She does have the vagus nerve stimulator and thus far tolerates stimulation.  We previously had issues where she was using the magnet to turn the device off too frequently.  She has done much better with that as of late.    The following portions of the patient's history were " reviewed and updated as appropriate: allergies, current medications, past family history, past medical history, past social history, past surgical history and problem list.    Review of Systems   Allergic/Immunologic: Negative for environmental allergies, food allergies and immunocompromised state.   Neurological: Positive for seizures. Negative for dizziness, tremors, syncope, facial asymmetry, speech difficulty, weakness, light-headedness, numbness and headaches.   Hematological: Negative for adenopathy. Does not bruise/bleed easily.      I have reviewed the review of systems above performed by my medical assistant.      Vitals:    23 1543   BP: 122/80   Pulse: 110   SpO2: 91%       Neurologic Exam     Mental Status   Oriented to person, place, and time.   Concentration: normal.   Level of consciousness: alert  Knowledge: consistent with education (No deficits found.).     Cranial Nerves     CN II   Visual fields full to confrontation.     CN III, IV, VI   Pupils are equal, round, and reactive to light.  Extraocular motions are normal.   CN III: no CN III palsy  CN VI: no CN VI palsy    CN V   Facial sensation intact.     CN VII   Facial expression full, symmetric.     CN VIII   CN VIII normal.     CN IX, X   CN IX normal.   CN X normal.     CN XI   CN XI normal.     CN XII   CN XII normal.     Motor Exam     Strength   Right neck flexion: 5/5  Left neck flexion: 5/5  Right neck extension: 5/5  Left neck extension: 5/5  Right deltoid: 5/5  Left deltoid: 5/5  Right biceps: 5/5  Left biceps: 5/5  Right triceps: 5/5  Left triceps: 5/5  Right wrist flexion: 5/5  Left wrist flexion: 5/5  Right wrist extension: 5/5  Left wrist extension: 5/5  Right interossei: 5/5  Left interossei: 5/5  Right abdominals: 5/5  Left abdominals: 5/5  Right iliopsoas: 5/5  Left iliopsoas: 5/5  Right quadriceps: 5/5  Left quadriceps: 5/5  Right hamstrin/5  Left hamstrin/5  Right glutei: 5/5  Left glutei: 5/5  Right anterior  tibial: 5/5  Left anterior tibial: 5/5  Right posterior tibial: 5/5  Left posterior tibial: 5/5  Right peroneal: 5/5  Left peroneal: 5/5  Right gastroc: 5/5  Left gastroc: 5/5    Sensory Exam   Light touch normal.   Vibration normal.     Gait, Coordination, and Reflexes     Gait  Gait: normal    Reflexes   Right brachioradialis: 2+  Left brachioradialis: 2+  Right biceps: 2+  Left biceps: 2+  Right triceps: 2+  Left triceps: 2+  Right patellar: 2+  Left patellar: 2+  Right achilles: 2+  Left achilles: 2+  Right : 2+  Left : 2+Station is normal.       Physical Exam  Vitals reviewed.   Constitutional:       Appearance: She is well-developed.   HENT:      Head: Normocephalic and atraumatic.   Eyes:      Extraocular Movements: EOM normal.      Pupils: Pupils are equal, round, and reactive to light.   Cardiovascular:      Rate and Rhythm: Normal rate and regular rhythm.   Pulmonary:      Breath sounds: Normal breath sounds.   Musculoskeletal:         General: Normal range of motion.   Skin:     General: Skin is warm.   Neurological:      Mental Status: She is oriented to person, place, and time.      Gait: Gait is intact.      Deep Tendon Reflexes:      Reflex Scores:       Tricep reflexes are 2+ on the right side and 2+ on the left side.       Bicep reflexes are 2+ on the right side and 2+ on the left side.       Brachioradialis reflexes are 2+ on the right side and 2+ on the left side.       Patellar reflexes are 2+ on the right side and 2+ on the left side.       Achilles reflexes are 2+ on the right side and 2+ on the left side.        Procedures: Vagus nerve stimulator interrogation and diagnostics  Indication: History of seizures  Report:  Normal mode settings are as follows: Output current is set to 1.625 mA, frequency is 30 Hz, pulse width is 250 µs, on x30 seconds, off time is 5 minutes and duty cycle is 10%.  Auto stimulation mode settings are as follows: Output current is set to 1.875 mA, pulse width  is 250 µs and on time 60 seconds.  Magnet mode settings are as follows: Output current is set to 2.75 mA, pulse width is 250 µs and on time is 60 seconds.  Setting changes: No setting changes were made today.    Assessment/Plan: I would like to start her on vitamin B12 injections.  We will send those to the pharmacy for her to do at home today.  We will schedule an MRI of the lumbosacral spine given these issues with bilateral lower extremity weakness.  I do not feel that they are seizures given the bilateral nature and frequency.  Continue Vimpat.  We will have her increase her Aptiom to the 1200 mg dosing daily dosing.  We will see her back in 5 months or sooner if needed.  A total of 25 minutes was spent face-to-face with the patient today.  Of that greater than 50% of this time was spent discussing signs and symptoms of seizures, bilateral lower extremity weakness, B12 deficiency, patient education, plan of care and prognosis.      Diagnoses and all orders for this visit:    1. Generalized nonconvulsive epilepsy (Primary)    2. Status post VNS (vagus nerve stimulator) placement    3. Weakness of both lower extremities  -     MRI lumbar spine w wo contrast; Future    4. Myalgia, lower leg  -     MRI lumbar spine w wo contrast; Future    5. Low serum vitamin B12  -     cyanocobalamin 1000 MCG/ML injection; Inject 1 mL into the appropriate muscle as directed by prescriber Daily. 1 mL q week x 4 weeks, 1 mL qow x 4 mos  Dispense: 12 mL; Refill: 0          Cb James II, MD

## 2023-04-19 ENCOUNTER — PROCEDURE VISIT (OUTPATIENT)
Dept: SURGERY | Facility: CLINIC | Age: 43
End: 2023-04-19
Payer: MEDICARE

## 2023-04-19 DIAGNOSIS — L72.3 SEBACEOUS CYST: Primary | ICD-10-CM

## 2023-04-19 PROCEDURE — 88304 TISSUE EXAM BY PATHOLOGIST: CPT | Performed by: SURGERY

## 2023-04-20 RX ORDER — CYANOCOBALAMIN 1000 UG/ML
1000 INJECTION, SOLUTION INTRAMUSCULAR; SUBCUTANEOUS DAILY
Qty: 12 ML | Refills: 0 | Status: SHIPPED | OUTPATIENT
Start: 2023-04-20

## 2023-04-20 NOTE — PROGRESS NOTES
"Excision left axilla sebaceous cyst    Date/Time: 4/19/2023 10:56 AM  Performed by: Ashley Duarte MD  Authorized by: Ashley Duarte MD   Consent: Verbal consent obtained. Written consent obtained.  Risks and benefits: risks, benefits and alternatives were discussed  Consent given by: patient  Patient understanding: patient states understanding of the procedure being performed  Patient consent: the patient's understanding of the procedure matches consent given  Procedure consent: procedure consent matches procedure scheduled  Patient identity confirmed: verbally with patient  Time out: Immediately prior to procedure a \"time out\" was called to verify the correct patient, procedure, equipment, support staff and site/side marked as required.  Preparation: Patient was prepped and draped in the usual sterile fashion.  Local anesthesia used: yes  Anesthesia: local infiltration    Anesthesia:  Local anesthesia used: yes  Local Anesthetic: lidocaine 1% with epinephrine  Anesthetic total: 10 mL    Sedation:  Patient sedated: no    Patient tolerance: patient tolerated the procedure well with no immediate complications  Comments: Area prepped, draped and anesthetized. Elliptical incision was made around previous scar. Skin, subcutaneous fat and remaining cyst was excised sharply, measuring 1.5 cm by 1 cm. The area was irrigated and closed with 3-0 vicryl. Skin glue was used as dressing.          "

## 2023-04-21 LAB
LAB AP CASE REPORT: NORMAL
PATH REPORT.FINAL DX SPEC: NORMAL
PATH REPORT.GROSS SPEC: NORMAL

## 2023-05-04 ENCOUNTER — TELEPHONE (OUTPATIENT)
Dept: NEUROLOGY | Facility: CLINIC | Age: 43
End: 2023-05-04
Payer: MEDICARE

## 2023-05-04 NOTE — TELEPHONE ENCOUNTER
"  Caller: ELIE  Relationship to Patient:   Phone Number: [phone]    Reason for Call: PT STATES WHEN GOING TO [facility] THE MRI, [facility]AL SCHEDULING ADVISED THEY NEED TO KNOW THE \"MAKE, MODEL, AND SERIAL NUMBER\" FOR HER VNS. SHE STATES SHE IS NOT SURE OF THAT INFORMATION, PLEASE REVIEW AND ADVISE SO SHE CAN GET THE MRI [facility] SCHEDULED. THANK YOU.   "

## 2023-05-05 NOTE — TELEPHONE ENCOUNTER
Called and spoke with patient. She is coming to the office on Monday, May 8th at 3:00pm for a VNS interrogation to obtain the information on her device.

## 2023-05-06 DIAGNOSIS — I10 PRIMARY HYPERTENSION: ICD-10-CM

## 2023-05-08 ENCOUNTER — DOCUMENTATION (OUTPATIENT)
Dept: NEUROLOGY | Facility: CLINIC | Age: 43
End: 2023-05-08
Payer: MEDICARE

## 2023-05-08 RX ORDER — AMLODIPINE BESYLATE 5 MG/1
5 TABLET ORAL DAILY
Qty: 30 TABLET | Refills: 11 | Status: SHIPPED | OUTPATIENT
Start: 2023-05-08

## 2023-05-08 NOTE — PROGRESS NOTES
Patient came in for VNS device interrogation to obtain device information prior to MRI.     VNS device is as follows:  Make: [id number], Model: M106, Serial Number: [id number]. Implant date 3/29/2016

## 2023-05-12 NOTE — PROGRESS NOTES
Completed prescription form with Dr. James's signature for Embrace watch per patient request. Form has been scanned into patient chart.

## 2023-06-02 ENCOUNTER — HOSPITAL ENCOUNTER (OUTPATIENT)
Dept: CT IMAGING | Facility: HOSPITAL | Age: 43
Discharge: HOME | End: 2023-06-02

## 2023-06-02 DIAGNOSIS — R29.898 WEAKNESS OF BOTH LOWER EXTREMITIES: ICD-10-CM

## 2023-06-02 PROCEDURE — 72131 CT LUMBAR SPINE W/O DYE: CPT

## 2023-06-04 RX ORDER — AMITRIPTYLINE HYDROCHLORIDE 25 MG/1
25 TABLET, FILM COATED ORAL NIGHTLY
Qty: 30 TABLET | OUTPATIENT
Start: 2023-06-04

## 2023-06-06 DIAGNOSIS — R51.9 CHRONIC DAILY HEADACHE: ICD-10-CM

## 2023-06-06 DIAGNOSIS — G40.309 GENERALIZED NONCONVULSIVE EPILEPSY: Primary | ICD-10-CM

## 2023-06-06 RX ORDER — CENOBAMATE 12.5-25MG
1 KIT ORAL TAKE AS DIRECTED
Qty: 1 EACH | Refills: 0 | Status: SHIPPED | OUTPATIENT
Start: 2023-06-06 | End: 2023-07-06

## 2023-06-06 RX ORDER — AMITRIPTYLINE HYDROCHLORIDE 25 MG/1
25 TABLET, FILM COATED ORAL NIGHTLY
Qty: 30 TABLET | Refills: 4 | Status: SHIPPED | OUTPATIENT
Start: 2023-06-06 | End: 2023-07-06

## 2023-06-07 ENCOUNTER — OFFICE VISIT (OUTPATIENT)
Dept: INTERNAL MEDICINE | Age: 43
End: 2023-06-07
Payer: MEDICARE

## 2023-06-07 VITALS
HEART RATE: 100 BPM | DIASTOLIC BLOOD PRESSURE: 80 MMHG | WEIGHT: 179 LBS | OXYGEN SATURATION: 97 % | HEIGHT: 64 IN | SYSTOLIC BLOOD PRESSURE: 134 MMHG | TEMPERATURE: 97.5 F | BODY MASS INDEX: 30.56 KG/M2

## 2023-06-07 DIAGNOSIS — M25.512 ACUTE PAIN OF LEFT SHOULDER: Primary | ICD-10-CM

## 2023-06-07 DIAGNOSIS — Z98.890 HISTORY OF LOOP RECORDER: ICD-10-CM

## 2023-06-07 DIAGNOSIS — Z86.73 HISTORY OF STROKE: ICD-10-CM

## 2023-06-07 NOTE — PROGRESS NOTES
"    I N T E R N A L  M E D I C I N E  Annalee Huddleston, KIRSTIE    ENCOUNTER DATE:  06/07/2023    Alexandra JENSEN Dameon / 42 y.o. / female      CHIEF COMPLAINT / REASON FOR OFFICE VISIT     Hospital Follow Up Visit      ASSESSMENT & PLAN     Diagnoses and all orders for this visit:    1. Acute pain of left shoulder (Primary)  -     Ambulatory Referral to Orthopedic Surgery  -     Ambulatory Referral to Physical Therapy Evaluate and treat  -     CT upper extremity left wo contrast; Future    2. History of stroke  -     Ambulatory Referral to Cardiology    3. History of loop recorder  -     Ambulatory Referral to Cardiology         SUMMARY/DISCUSSION  Acute shoulder pain: Recommend conservative treatment at this time, with rest, ice, heat as needed.  Declines need for pain medication.  Will refer to PT, investigate further with shoulder CT, and refer to ortho.  Discussed signs/ symptoms for which she would need to seek ER evaluation.  Loop recorder history: Referral to cardiology placed per patient's request.  Seizure history: Follow up with neurology as scheduled.  Continue to abstain from driving.        Next Appointment with me: 8/7/2023    Return for Next scheduled follow up.      VITAL SIGNS     Visit Vitals  /80   Pulse 100   Temp 97.5 °F (36.4 °C)   Ht 162.6 cm (64.02\")   Wt 81.2 kg (179 lb)   SpO2 97%   BMI 30.71 kg/m²             Wt Readings from Last 3 Encounters:   06/07/23 81.2 kg (179 lb)   04/11/23 78.9 kg (174 lb)   03/14/23 78 kg (172 lb)     Body mass index is 30.71 kg/m².        MEDICATIONS AT THE TIME OF OFFICE VISIT     Current Outpatient Medications on File Prior to Visit   Medication Sig Dispense Refill    amitriptyline (ELAVIL) 25 MG tablet Take 1 tablet by mouth Every Night for 30 days. 30 tablet 4    amLODIPine (NORVASC) 5 MG tablet TAKE 1 TABLET BY MOUTH DAILY 30 tablet 11    Aptiom 400 MG tablet Take 1 tablet by mouth Daily.      Aptiom 800 MG tablet tablet Take 1 tablet by mouth Daily.      " "aspirin 325 MG tablet Take 1 tablet by mouth Daily. 30 tablet 0    atorvastatin (LIPITOR) 40 MG tablet TAKE 1 TABLET BY MOUTH EVERY NIGHT 30 tablet 3    Cenobamate (Xcopri) 14 x 12.5 MG & 14 x 25 MG tablet therapy pack Take 1 tablet by mouth Take As Directed for 30 days. 1 each 0    cyanocobalamin 1000 MCG/ML injection Inject 1 mL into the appropriate muscle as directed by prescriber Daily. 1 mL q week x 4 weeks, 1 mL qow x 4 mos 12 mL 0    ESLICARBAZEPINE ACETATE PO Take 1,200 mg by mouth Every Night.      ondansetron ODT (ZOFRAN-ODT) 4 MG disintegrating tablet Place 1 tablet on the tongue Every 6 (Six) Hours As Needed for Nausea or Vomiting. 20 tablet 0    vitamin D (ERGOCALCIFEROL) 1.25 MG (14985 UT) capsule capsule Take 1 capsule by mouth Every 7 (Seven) Days. 8 capsule 0    Vimpat 200 MG tablet Take 1 tablet by mouth Every 12 (Twelve) Hours for 30 days. Brand-name only please 60 tablet 1     No current facility-administered medications on file prior to visit.        HISTORY OF PRESENT ILLNESS     Pt is accompanied to today's appointment by her .      Pt was evaluated by [facility] ER on June 4, 2023 for acute left shoulder pain.  The evening prior, pt has a witnessed seizure without head injury, LOC.  Pt's  reports pt fells on her left side on the bed.  ER performed shoulder XR which showed no acute fracture or dislocation, no soft tissue swelling.  She was diagnosed with joint dislocation.  Denies any neck pain, numbness, tingling, weakness.  She has been wearing a sling since ER discharge.  She reports reduced mobility of left shoulder along with \"popping\" sensation.  Overall, pain is mild.      Since seizure, she has been in contact with neurologist, Dr. James, who reportedly added an additional medication - cenobamate to her regimen.  She has not yet obtained from pharmacy.  She continues to avoid driving.      She is unable to receive MRI due to implants.     She is requesting " referral to [facility] cardiology.  Currently seeing [facility] cardiology for presence of loop recorder.    Patient Care Team:  Annalee Huddleston APRN as PCP - General (Family Medicine)  Cristofer Chamorro MD as Consulting Physician (Cardiology)  Cb James II, MD as Consulting Physician (Neurology)  Ernesto Mendosa MD as Consulting Physician (Obstetrics and Gynecology)    REVIEW OF SYSTEMS     Review of Systems   Constitutional:  Negative for chills, fever and unexpected weight change.   Respiratory:  Negative for cough, chest tightness and shortness of breath.    Cardiovascular:  Negative for chest pain, palpitations and leg swelling.   Musculoskeletal:  Positive for arthralgias (Left shoulder).   Neurological:  Positive for seizures. Negative for dizziness, weakness, light-headedness, numbness and headaches.   Psychiatric/Behavioral:  The patient is not nervous/anxious.         PHYSICAL EXAMINATION     Physical Exam  Vitals reviewed.   Constitutional:       General: She is not in acute distress.     Appearance: Normal appearance. She is not ill-appearing, toxic-appearing or diaphoretic.   HENT:      Head: Normocephalic and atraumatic.   Cardiovascular:      Rate and Rhythm: Normal rate and regular rhythm.      Pulses: Normal pulses.      Heart sounds: Normal heart sounds.   Pulmonary:      Effort: Pulmonary effort is normal.      Breath sounds: Normal breath sounds.   Musculoskeletal:         General: No deformity.      Left shoulder: Tenderness (Left AC) present. No swelling or deformity. Decreased range of motion. Decreased strength. Normal pulse.      Right lower leg: No edema.      Left lower leg: No edema.   Skin:     General: Skin is warm and dry.      Findings: No erythema.   Neurological:      Mental Status: She is alert and oriented to person, place, and time. Mental status is at baseline.   Psychiatric:         Mood and Affect: Mood normal.         Behavior: Behavior normal.         Thought Content:  Thought content normal.         Judgment: Judgment normal.         REVIEWED DATA     Labs:           Imaging:            Medical Tests:           Summary of old records / correspondence / consultant report:           Request outside records:

## 2023-06-19 RX ORDER — ESLICARBAZEPINE ACETATE 800 MG/1
1 TABLET ORAL DAILY
Qty: 30 TABLET | Refills: 4 | Status: SHIPPED | OUTPATIENT
Start: 2023-06-19

## 2023-07-24 ENCOUNTER — PATIENT MESSAGE (OUTPATIENT)
Dept: NEUROLOGY | Facility: CLINIC | Age: 43
End: 2023-07-24
Payer: MEDICARE

## 2023-07-24 DIAGNOSIS — G40.309 GENERALIZED NONCONVULSIVE EPILEPSY: Primary | ICD-10-CM

## 2023-07-24 DIAGNOSIS — G40.309 GENERALIZED NONCONVULSIVE EPILEPSY: ICD-10-CM

## 2023-07-24 RX ORDER — CENOBAMATE 50 MG/1
1 TABLET, FILM COATED ORAL DAILY
Qty: 30 TABLET | Refills: 3 | Status: SHIPPED | OUTPATIENT
Start: 2023-07-24 | End: 2023-08-23

## 2023-07-24 RX ORDER — ESLICARBAZEPINE ACETATE 800 MG/1
800 TABLET ORAL DAILY
Qty: 30 TABLET | Refills: 3 | Status: SHIPPED | OUTPATIENT
Start: 2023-07-24 | End: 2023-08-23

## 2023-07-24 RX ORDER — ESLICARBAZEPINE ACETATE 400 MG/1
1 TABLET ORAL DAILY
Qty: 30 TABLET | Refills: 1 | OUTPATIENT
Start: 2023-07-24

## 2023-07-24 RX ORDER — CENOBAMATE 12.5-25MG
1 KIT ORAL TAKE AS DIRECTED
Qty: 1 EACH | Refills: 0 | OUTPATIENT
Start: 2023-07-24 | End: 2023-08-23

## 2023-07-24 NOTE — TELEPHONE ENCOUNTER
Caller: Dameon Alexandra M    Relationship: Self    Best call back number: [phone]    Requested Prescriptions:   Requested Prescriptions     Pending Prescriptions Disp Refills    Aptiom 400 MG tablet 30 tablet      Sig: Take 1 tablet by mouth Daily.    Cenobamate (Xcopri) 14 x 12.5 MG & 14 x 25 MG tablet therapy pack 1 each 0     Sig: Take 1 tablet by mouth Take As Directed for 30 days.        Pharmacy where request should be sent: [facility] #[id number] [facility] [id number] AT [facility] & [facility] [phone] [facility] [phone] FX     Last office visit with prescribing clinician: 4/18/2023   Last telemedicine visit with prescribing clinician: Visit date not found   Next office visit with prescribing clinician: 11/10/2023     Additional details provided by patient: PATIENT IS COMPLETELY OUT OF THE APTIOM & IS NEEDING TO SPEAK TO MD RE: THE XCOPRI (BLISTER PACK) AS SHE IS NEEDING TO CLARIFY THE DOSAGE FOR THE RX (NOT REFILL THE BLISTER PACK)    PLEASE CALL & ADVISE PRIOR TO SENDING REFILL REQUEST TO [place] [phone]    Does the patient have less than a 3 day supply:  [x] Yes  [] No    Would you like a call back once the refill request has been completed: [x] Yes [] No    If the office needs to give you a call back, can they leave a voicemail: [x] Yes [] No    Anthony Bhat   07/24/23 10:04 EDT

## 2023-08-07 ENCOUNTER — OFFICE VISIT (OUTPATIENT)
Dept: INTERNAL MEDICINE | Age: 43
End: 2023-08-07
Payer: MEDICARE

## 2023-08-07 VITALS
TEMPERATURE: 97.8 F | HEIGHT: 64 IN | WEIGHT: 183 LBS | BODY MASS INDEX: 31.24 KG/M2 | DIASTOLIC BLOOD PRESSURE: 82 MMHG | SYSTOLIC BLOOD PRESSURE: 130 MMHG | OXYGEN SATURATION: 97 % | HEART RATE: 118 BPM

## 2023-08-07 DIAGNOSIS — R53.83 OTHER FATIGUE: ICD-10-CM

## 2023-08-07 DIAGNOSIS — I10 PRIMARY HYPERTENSION: Primary | ICD-10-CM

## 2023-08-07 DIAGNOSIS — G47.9 SLEEP DISTURBANCE: ICD-10-CM

## 2023-08-07 PROCEDURE — 3079F DIAST BP 80-89 MM HG: CPT

## 2023-08-07 PROCEDURE — 3075F SYST BP GE 130 - 139MM HG: CPT

## 2023-08-07 PROCEDURE — 1159F MED LIST DOCD IN RCRD: CPT

## 2023-08-07 PROCEDURE — 1160F RVW MEDS BY RX/DR IN RCRD: CPT

## 2023-08-07 PROCEDURE — 99214 OFFICE O/P EST MOD 30 MIN: CPT

## 2023-08-07 NOTE — PROGRESS NOTES
"    I N T E R N A L  M E D I C I N E  Annalee Huddleston, APRN    ENCOUNTER DATE:  08/07/2023    Alexandradavid Xiao / 42 y.o. / female      CHIEF COMPLAINT / REASON FOR OFFICE VISIT     Hypertension      ASSESSMENT & PLAN     Diagnoses and all orders for this visit:    1. Primary hypertension (Primary)  -     Basic Metabolic Panel    2. Other fatigue  -     CBC & Differential    3. Sleep disturbance  -     Ambulatory Referral to Sleep Medicine         SUMMARY/DISCUSSION  Will update labs to evaluate pt's fatigue.  Recommend that she contact neurology office to schedule B12 injections.  Establish with sleep medicine to evaluate for TULIO.    Monitor BP at home to ensure it is averaging < 130/80.      Next Appointment with me: Visit date not found    Return for Next scheduled follow up.      VITAL SIGNS     Visit Vitals  /82   Pulse 118   Temp 97.8 øF (36.6 øC)   Ht 162.6 cm (64.02\")   Wt 83 kg (183 lb)   SpO2 97%   BMI 31.40 kg/mý             Wt Readings from Last 3 Encounters:   08/07/23 83 kg (183 lb)   07/12/23 81.2 kg (179 lb)   07/06/23 81.3 kg (179 lb 3.2 oz)     Body mass index is 31.4 kg/mý.        MEDICATIONS AT THE TIME OF OFFICE VISIT     Current Outpatient Medications on File Prior to Visit   Medication Sig Dispense Refill    amLODIPine (NORVASC) 5 MG tablet TAKE 1 TABLET BY MOUTH DAILY (Patient taking differently: Take 1 tablet by mouth Every Night.) 30 tablet 11    Aptiom 400 MG tablet Take 1 tablet by mouth Daily.      aspirin 325 MG tablet Take 1 tablet by mouth Daily. 30 tablet 0    atorvastatin (LIPITOR) 40 MG tablet TAKE 1 TABLET BY MOUTH EVERY NIGHT 30 tablet 3    Cenobamate (Xcopri) 50 MG tablet Take 1 tablet by mouth Daily for 30 days. 30 tablet 3    Eslicarbazepine Acetate (Aptiom) 800 MG tablet tablet Take 1 tablet by mouth Daily for 30 days. 30 tablet 3    Vimpat 200 MG tablet Take 1 tablet by mouth Every 12 (Twelve) Hours for 30 days. Brand-name only please 60 tablet 1    vitamin D " (ERGOCALCIFEROL) 1.25 MG (77214 UT) capsule capsule Take 1 capsule by mouth Every 7 (Seven) Days. 8 capsule 0    [DISCONTINUED] ondansetron ODT (ZOFRAN-ODT) 4 MG disintegrating tablet Place 1 tablet on the tongue Every 6 (Six) Hours As Needed for Nausea or Vomiting. (Patient not taking: Reported on 8/7/2023) 20 tablet 0    [DISCONTINUED] Vimpat 200 MG tablet TAKE 1 TABLET BY MOUTH EVERY 12 HOURS 60 tablet 1     No current facility-administered medications on file prior to visit.        HISTORY OF PRESENT ILLNESS     Pt established with cardiology, Dr. Lester in July 2023.  Dr. Lester reached out to neurology to discuss possible anticoagulation in view of stroke history.  Pt remains on aspirin 325 mg daily.  No signs/ symptoms of bleeding.  She is scheduled to follow up with neurology, Dr. James on November 10, 2023.  B12 injections were recommended but she has not yet scheduled with neurology.  Does report ongoing fatigue, and comments that he watch has noted she frequently tosses/ turns in the middle of the night.  No prior sleep study.  March 2023 CBC with H&H of 12.6/37.5.  April 2023 TSH of 1.790.    HTN: Unable to tolerate HCTZ and started amlodipine 5 mg daily in April 2023.  She is not checking BP at home.  BP at today's visit is 130/82.      HLD: Atorvastatin 40 mg daily with March 2023 lipid panel with LDL of 55; triglycerides 66.      Patient Care Team:  Annalee Huddleston APRN as PCP - General (Family Medicine)  Cristofer Chamorro MD as Consulting Physician (Cardiology)  Cb James II, MD as Consulting Physician (Neurology)  Ernesto Mendosa MD as Consulting Physician (Obstetrics and Gynecology)  Reza Lester MD as Consulting Physician (Cardiology)    REVIEW OF SYSTEMS     Review of Systems   Constitutional:  Positive for fatigue. Negative for chills, fever and unexpected weight change.   Respiratory:  Negative for cough, chest tightness and shortness of breath.    Cardiovascular:  Negative for chest  pain, palpitations and leg swelling.   Neurological:  Negative for dizziness, weakness, light-headedness and headaches.   Psychiatric/Behavioral:  Positive for sleep disturbance. The patient is not nervous/anxious.         PHYSICAL EXAMINATION     Physical Exam  Vitals reviewed.   Constitutional:       General: She is not in acute distress.     Appearance: Normal appearance. She is not ill-appearing, toxic-appearing or diaphoretic.   HENT:      Head: Normocephalic and atraumatic.   Cardiovascular:      Rate and Rhythm: Normal rate and regular rhythm.      Heart sounds: Normal heart sounds.   Pulmonary:      Effort: Pulmonary effort is normal.      Breath sounds: Normal breath sounds.   Musculoskeletal:      Right lower leg: No edema.      Left lower leg: No edema.   Neurological:      Mental Status: She is alert and oriented to person, place, and time. Mental status is at baseline.   Psychiatric:         Mood and Affect: Mood normal.         Behavior: Behavior normal.         Thought Content: Thought content normal.         Judgment: Judgment normal.         REVIEWED DATA     Labs:           Imaging:            Medical Tests:           Summary of old records / correspondence / consultant report:           Request outside records:

## 2023-08-08 LAB
BASOPHILS # BLD AUTO: 0 X10E3/UL (ref 0–0.2)
BASOPHILS NFR BLD AUTO: 0 %
BUN SERPL-MCNC: 6 MG/DL (ref 6–24)
BUN/CREAT SERPL: 13 (ref 9–23)
CALCIUM SERPL-MCNC: 9.2 MG/DL (ref 8.7–10.2)
CHLORIDE SERPL-SCNC: 98 MMOL/L (ref 96–106)
CO2 SERPL-SCNC: 19 MMOL/L (ref 20–29)
CREAT SERPL-MCNC: 0.48 MG/DL (ref 0.57–1)
EGFRCR SERPLBLD CKD-EPI 2021: 120 ML/MIN/1.73
EOSINOPHIL # BLD AUTO: 0.1 X10E3/UL (ref 0–0.4)
EOSINOPHIL NFR BLD AUTO: 1 %
ERYTHROCYTE [DISTWIDTH] IN BLOOD BY AUTOMATED COUNT: 14.2 % (ref 11.7–15.4)
GLUCOSE SERPL-MCNC: 94 MG/DL (ref 70–99)
HCT VFR BLD AUTO: 35.7 % (ref 34–46.6)
HGB BLD-MCNC: 12.1 G/DL (ref 11.1–15.9)
IMM GRANULOCYTES # BLD AUTO: 0 X10E3/UL (ref 0–0.1)
IMM GRANULOCYTES NFR BLD AUTO: 0 %
LYMPHOCYTES # BLD AUTO: 2.1 X10E3/UL (ref 0.7–3.1)
LYMPHOCYTES NFR BLD AUTO: 24 %
MCH RBC QN AUTO: 26.8 PG (ref 26.6–33)
MCHC RBC AUTO-ENTMCNC: 33.9 G/DL (ref 31.5–35.7)
MCV RBC AUTO: 79 FL (ref 79–97)
MONOCYTES # BLD AUTO: 0.5 X10E3/UL (ref 0.1–0.9)
MONOCYTES NFR BLD AUTO: 6 %
NEUTROPHILS # BLD AUTO: 6.1 X10E3/UL (ref 1.4–7)
NEUTROPHILS NFR BLD AUTO: 69 %
PLATELET # BLD AUTO: 401 X10E3/UL (ref 150–450)
POTASSIUM SERPL-SCNC: 4 MMOL/L (ref 3.5–5.2)
RBC # BLD AUTO: 4.51 X10E6/UL (ref 3.77–5.28)
SODIUM SERPL-SCNC: 134 MMOL/L (ref 134–144)
WBC # BLD AUTO: 8.8 X10E3/UL (ref 3.4–10.8)

## 2023-08-08 RX ORDER — ATORVASTATIN CALCIUM 40 MG/1
TABLET, FILM COATED ORAL
Qty: 30 TABLET | Refills: 3 | Status: SHIPPED | OUTPATIENT
Start: 2023-08-08

## 2023-08-29 NOTE — OUTREACH NOTE
Stroke Week 2 Survey    Flowsheet Row Responses   [facility] patient discharged from? [facility]   Does the patient have one of the following disease processes/diagnoses(primary or secondary)? Stroke (TIA)   Week 2 attempt successful? Yes   Call start time 1602   Call end time 1604   Discharge diagnosis Acute CVA    Meds reviewed with patient/caregiver? Yes   Is the patient taking all medications as directed (includes completed medication regime)? Yes   Has the patient kept scheduled appointments due by today? Yes   Comments 5/6 had an appt   Psychosocial issues? No   Does the patient require any assistance with activities of daily living such as eating, bathing, dressing, walking, etc.? No   Does the patient have any residual symptoms from stroke/TIA? No   What is the patient's perception of their health status since discharge? Improving   Is the patient able to teach back FAST for Stroke? Yes   Is the patient/caregiver able to teach back the risk factors for a stroke? High blood pressure-goal below 120/80, Smoking, High Cholesterol   If the patient is a current smoker, are they able to teach back resources for cessation? Smoking cessation medications   Is the patient/caregiver able to teach back the hierarchy of who to call/visit for symptoms/problems? PCP, Specialist, Home health nurse, Urgent Care, ED, 911 Yes   Additional teach back comments Pt doing well and will be seen in July with cards--has loop recorder   Week 2 call completed? Yes          ORLANDO OLIVARES - Registered Nurse   [Negative] : Heme/Lymph

## 2023-09-05 RX ORDER — ESLICARBAZEPINE ACETATE 400 MG/1
1 TABLET ORAL DAILY
Qty: 30 TABLET | Refills: 5 | Status: SHIPPED | OUTPATIENT
Start: 2023-09-05

## 2023-09-11 DIAGNOSIS — G40.309 GENERALIZED NONCONVULSIVE EPILEPSY: ICD-10-CM

## 2023-09-11 RX ORDER — LACOSAMIDE 200 MG/1
TABLET, FILM COATED ORAL
Qty: 60 TABLET | Refills: 1 | Status: SHIPPED | OUTPATIENT
Start: 2023-09-11

## 2023-09-29 ENCOUNTER — TELEPHONE (OUTPATIENT)
Dept: NEUROLOGY | Facility: CLINIC | Age: 43
End: 2023-09-29

## 2023-09-29 NOTE — TELEPHONE ENCOUNTER
Caller: Alexandra Xiao    Relationship: Self    Best call back number: [phone]    What is the best time to reach you: ANYTIME    Who are you requesting to speak with (clinical staff, provider,  specific staff member): PROVIDER    Do you know the name of the person who called: DR. MORENO    What was the call regarding: PATIENT IS RETURNING PROVIDER CALL IN REGARDS TO PATIENT WRITTEN PRESCRIPTION.     PLEASE REVIEW    THANK YOU

## 2023-10-24 DIAGNOSIS — G40.309 GENERALIZED NONCONVULSIVE EPILEPSY: Primary | ICD-10-CM

## 2023-10-24 RX ORDER — LACOSAMIDE 200 MG/1
200 TABLET ORAL EVERY 12 HOURS SCHEDULED
Qty: 60 TABLET | Refills: 5 | Status: SHIPPED | OUTPATIENT
Start: 2023-10-24

## 2023-11-06 DIAGNOSIS — G40.309 GENERALIZED NONCONVULSIVE EPILEPSY: ICD-10-CM

## 2023-11-08 RX ORDER — LACOSAMIDE 200 MG/1
TABLET, FILM COATED ORAL
Qty: 60 TABLET | Refills: 1 | Status: SHIPPED | OUTPATIENT
Start: 2023-11-08

## 2023-11-10 ENCOUNTER — OFFICE VISIT (OUTPATIENT)
Dept: NEUROLOGY | Facility: CLINIC | Age: 43
End: 2023-11-10
Payer: MEDICARE

## 2023-11-10 VITALS
SYSTOLIC BLOOD PRESSURE: 140 MMHG | HEART RATE: 115 BPM | HEIGHT: 64 IN | DIASTOLIC BLOOD PRESSURE: 88 MMHG | WEIGHT: 183 LBS | BODY MASS INDEX: 31.24 KG/M2 | RESPIRATION RATE: 18 BRPM | OXYGEN SATURATION: 97 %

## 2023-11-10 DIAGNOSIS — G40.309 GENERALIZED NONCONVULSIVE EPILEPSY: ICD-10-CM

## 2023-11-10 DIAGNOSIS — Z96.89 STATUS POST VNS (VAGUS NERVE STIMULATOR) PLACEMENT: Primary | ICD-10-CM

## 2023-11-10 RX ORDER — AMITRIPTYLINE HYDROCHLORIDE 25 MG/1
25 TABLET, FILM COATED ORAL NIGHTLY
COMMUNITY
Start: 2023-10-06

## 2023-11-10 RX ORDER — CENOBAMATE 50 MG/1
1 TABLET, FILM COATED ORAL DAILY
COMMUNITY
Start: 2023-10-25

## 2023-11-10 NOTE — PROGRESS NOTES
Chief Complaint   Patient presents with    Seizures       Patient ID: Alexandar Xiao is a 43 y.o. female.    HPI: I had the pleasure of seeing your patient today.  As you may know she is a 43-year-old female here for the management of seizures.  She continues taking Aptiom as well as Vimpat.  She is also now on Xcopri as well status post breakthrough seizure a couple of months ago.  She also has the vagus nerve stimulator.  She has been doing well recently.  No new symptoms or issues neurologically.    The following portions of the patient's history were reviewed and updated as appropriate: allergies, current medications, past family history, past medical history, past social history, past surgical history and problem list.    Review of Systems   Eyes:  Positive for visual disturbance.   Neurological:  Positive for dizziness, seizures, light-headedness and headaches. Negative for tremors, syncope, facial asymmetry, speech difficulty, weakness and numbness.   Psychiatric/Behavioral:  Negative for agitation, behavioral problems, confusion, decreased concentration, dysphoric mood, hallucinations, self-injury, sleep disturbance and suicidal ideas. The patient is not nervous/anxious and is not hyperactive.       I have reviewed the review of systems above performed by my medical assistant.      Vitals:    11/10/23 1358   BP: 140/88   Pulse: 115   Resp: 18   SpO2: 97%       Neurologic Exam     Mental Status   Oriented to person, place, and time.   Concentration: normal.   Level of consciousness: alert  Knowledge: consistent with education (No deficits found.).     Cranial Nerves     CN II   Visual fields full to confrontation.     CN III, IV, VI   Pupils are equal, round, and reactive to light.  Extraocular motions are normal.   CN III: no CN III palsy  CN VI: no CN VI palsy    CN V   Facial sensation intact.     CN VII   Facial expression full, symmetric.     CN VIII   CN VIII normal.     CN IX, X   CN IX normal.   CN X  normal.     CN XI   CN XI normal.     CN XII   CN XII normal.     Motor Exam     Strength   Right neck flexion: 5/5  Left neck flexion: 5/5  Right neck extension: 5/5  Left neck extension: 5/5  Right deltoid: 5/5  Left deltoid: 5/5  Right biceps: 5/5  Left biceps: 5/5  Right triceps: 5/5  Left triceps: 5/5  Right wrist flexion: 5/5  Left wrist flexion: 5/5  Right wrist extension: 5/5  Left wrist extension: 5/5  Right interossei: 5/5  Left interossei: 5/5  Right abdominals: 5/5  Left abdominals: 5/5  Right iliopsoas: 5/5  Left iliopsoas: 5/5  Right quadriceps: 5/5  Left quadriceps: 5/5  Right hamstrin/5  Left hamstrin/5  Right glutei: 5/5  Left glutei: 5/5  Right anterior tibial: 5/5  Left anterior tibial: 5/5  Right posterior tibial: 5/5  Left posterior tibial: 5/5  Right peroneal: 5/5  Left peroneal: 5/5  Right gastroc: 5/5  Left gastroc: 5/5    Sensory Exam   Light touch normal.   Vibration normal.     Gait, Coordination, and Reflexes     Gait  Gait: normal    Reflexes   Right brachioradialis: 2+  Left brachioradialis: 2+  Right biceps: 2+  Left biceps: 2+  Right triceps: 2+  Left triceps: 2+  Right patellar: 2+  Left patellar: 2+  Right achilles: 2+  Left achilles: 2+  Right : 2+  Left : 2+Station is normal.       Physical Exam  Vitals reviewed.   Constitutional:       Appearance: She is well-developed.   HENT:      Head: Normocephalic and atraumatic.   Eyes:      Extraocular Movements: EOM normal.      Pupils: Pupils are equal, round, and reactive to light.   Cardiovascular:      Rate and Rhythm: Normal rate and regular rhythm.   Pulmonary:      Breath sounds: Normal breath sounds.   Musculoskeletal:         General: Normal range of motion.   Skin:     General: Skin is warm.   Neurological:      Mental Status: She is oriented to person, place, and time.      Gait: Gait is intact.      Deep Tendon Reflexes:      Reflex Scores:       Tricep reflexes are 2+ on the right side and 2+ on the left  side.       Bicep reflexes are 2+ on the right side and 2+ on the left side.       Brachioradialis reflexes are 2+ on the right side and 2+ on the left side.       Patellar reflexes are 2+ on the right side and 2+ on the left side.       Achilles reflexes are 2+ on the right side and 2+ on the left side.        Procedures: Vagus nerve stimulator interrogation and diagnostics  Indication: History of seizures  Report:  Normal mode settings are as follows: Output current is set to 1.625 mA, frequency is 30 Hz, pulse width is 250 µs, on x30 seconds, off time is 5 minutes and duty cycle is 10%.  Auto stimulation mode settings are as follows: Output current is set to 1.875 mA, pulse width is 250 µs and on time 60 seconds.  Magnet mode settings are as follows: Output current is set to 2.75 mA, pulse width is 250 µs and on time is 60 seconds.  Setting changes: No setting changes were made today.     Assessment/Plan: We will continue with the current medication regimen.  No setting changes were made to the vagus nerve stimulator today.  We will see her back in 4 months or sooner if needed.  A total of 25 minutes was spent face-to-face with the patient today.  Of that greater than 50% of this time was spent discussing signs and symptoms of seizures, patient education, plan of care and prognosis.         Diagnoses and all orders for this visit:    1. Status post VNS (vagus nerve stimulator) placement (Primary)    2. Generalized nonconvulsive epilepsy           Cb James II, MD

## 2023-11-16 RX ORDER — AMITRIPTYLINE HYDROCHLORIDE 25 MG/1
25 TABLET, FILM COATED ORAL NIGHTLY
Qty: 30 TABLET | Refills: 3 | Status: SHIPPED | OUTPATIENT
Start: 2023-11-16

## 2023-11-21 DIAGNOSIS — G40.309 GENERALIZED NONCONVULSIVE EPILEPSY: ICD-10-CM

## 2023-11-21 NOTE — TELEPHONE ENCOUNTER
Caller: Alexandra Xiao    Relationship: Self    Best call back number: [phone]    Requested Prescriptions:   Requested Prescriptions     Pending Prescriptions Disp Refills    Eslicarbazepine Acetate (Aptiom) 800 MG tablet tablet 30 tablet 3     Sig: Take 1 tablet by mouth Daily for 30 days.    Xcopri 50 MG tablet 30 tablet      Sig: Take 1 tablet by mouth Daily.        Pharmacy where request should be sent: [facility] #[id number] [facility] [address] [facility] [facility] [phone] [facility] [phone] FX     Last office visit with prescribing clinician: 11/10/2023   Last telemedicine visit with prescribing clinician: Visit date not found   Next office visit with prescribing clinician: 3/11/2024     Additional details provided by patient: PATIENT HAS 3 DAYS SUPPLY OF EACH.    Does the patient have less than a 3 day supply:  [x] Yes  [] No    Would you like a call back once the refill request has been completed: [] Yes [x] No    If the office needs to give you a call back, can they leave a voicemail: [] Yes [x] No    Anthony Brown Rep   11/21/23 10:44 EST

## 2023-11-22 RX ORDER — ESLICARBAZEPINE ACETATE 800 MG/1
800 TABLET ORAL DAILY
Qty: 30 TABLET | Refills: 3 | Status: SHIPPED | OUTPATIENT
Start: 2023-11-22 | End: 2023-12-22

## 2023-11-22 RX ORDER — CENOBAMATE 50 MG/1
1 TABLET, FILM COATED ORAL DAILY
Qty: 30 TABLET | Refills: 0 | Status: SHIPPED | OUTPATIENT
Start: 2023-11-22

## 2023-11-30 ENCOUNTER — OFFICE VISIT (OUTPATIENT)
Dept: SLEEP MEDICINE | Facility: HOSPITAL | Age: 43
End: 2023-11-30
Payer: MEDICARE

## 2023-11-30 VITALS
DIASTOLIC BLOOD PRESSURE: 76 MMHG | HEART RATE: 116 BPM | SYSTOLIC BLOOD PRESSURE: 135 MMHG | BODY MASS INDEX: 31.89 KG/M2 | WEIGHT: 186.8 LBS | HEIGHT: 64 IN | OXYGEN SATURATION: 99 %

## 2023-11-30 DIAGNOSIS — R06.83 SNORING: ICD-10-CM

## 2023-11-30 DIAGNOSIS — I10 PRIMARY HYPERTENSION: ICD-10-CM

## 2023-11-30 DIAGNOSIS — G40.909 NONINTRACTABLE EPILEPSY WITHOUT STATUS EPILEPTICUS, UNSPECIFIED EPILEPSY TYPE: ICD-10-CM

## 2023-11-30 DIAGNOSIS — I63.9 ACUTE CVA (CEREBROVASCULAR ACCIDENT): ICD-10-CM

## 2023-11-30 DIAGNOSIS — G47.30 OBSERVED SLEEP APNEA: Primary | ICD-10-CM

## 2023-11-30 DIAGNOSIS — G47.19 EXCESSIVE DAYTIME SLEEPINESS: ICD-10-CM

## 2023-11-30 PROBLEM — E66.9 CLASS 1 OBESITY: Status: ACTIVE | Noted: 2023-11-30

## 2023-11-30 PROBLEM — E66.811 CLASS 1 OBESITY: Status: ACTIVE | Noted: 2023-11-30

## 2023-11-30 PROCEDURE — G0463 HOSPITAL OUTPT CLINIC VISIT: HCPCS

## 2023-11-30 NOTE — PROGRESS NOTES
[facility]  [address]  [address]  [place] [address]  Phone   Fax       Alexandra Xiao  [id number]   1980  43 y.o.  female      PCP:Annalee Huddleston APRN    Type of service: Initial New Patient Office Visit  Date of service: 11/30/2023    Chief Complaint   Patient presents with    Snoring    Witnessed Apnea    Fatigue    Non-restorative Sleep    Daytime Sleepiness    Dry Mouth    Obesity       History of present illness;  Alexandra Xiao 43 y.o.  is a new patient for me and was seen today for sleep related problems of snoring, non-restorative sleep and witnessed apneas. The symptoms are present for many years and they are persistent in nature.  The snoring is present in all positions and it is loud.  Patient has no prior surgery namely tonsillectomy, nasal surgery and UPPP.     Patient is here with significant other who does me that she stops breathing and also snoring.  She has multiple medical problems which include seizures and stroke.  They are also looking for that she has any arrhythmias.  Her last seizure was 2 weeks ago and the medications was increased    Patient gives the following sleep history.  Sleep schedule:  Bedtime: 10 PM  Wake time: 10 AM   Normally takes about 30 minutes to fall asleep  Average hours of sleep 9  Number of naps per day 1-2  Symptoms  In addition to snoring, nonrestorative sleep and witnessed apneas patient gives the following associated symptoms.  Have you ever awakened gasping for breath, coughing, choking: Yes   Change in weight,  Yes gained about 80 pounds  Morning headaches  Yes   Awaken with a sore throat or dry mouth  Yes   Leg jerking at night:  No   Crawly feeling/urge sensation to move in the legs: No   Teeth grinding:No   Have you ever awakened at night with a sour taste or burning sensation in your chest:  Yes   Do you have muscle weakness with laughing or anger or sleep paralysis:  No   Have you ever felt  "paralyzed while going to sleep or waking up:  No   Sleepwalking, nightmares, No   Nocturia (urination at night): 5 times per night  Memory Problem:Yes     Past medical history: (Relevant to sleep medicine)  History of seizures  History of stroke  Hypertension  Vitamin D deficiency  Microcytic anemia with low iron    Medications are reviewed by me and documented in the encounter  Allergies reviewed and documented in encounter    Social history:  Do you drive a commercial vehicle:  No   Shift work:  No   Tobacco use:  No   Alcohol use:  0 per week  Caffeinated drinks: 6    FAMILY HISTORY (Your mother, father, brothers and sisters) (relevant to sleep medicine)  Sleep apnea, father  Stroke father and grandfather  Hypertension    REVIEW OF SYSTEMS.  Full review of systems available on the intake form which is scanned in the media tab.  The relevant positive are noted below  Daytime excessive sleepiness with [place] Sleepiness Scale :Total score: 12   Snoring  History of seizures  Frequent urination  Memory problems      Physical exam:  Vitals:    11/30/23 0940   BP: 135/76   Pulse: 116   SpO2: 99%   Weight: 84.7 kg (186 lb 12.8 oz)   Height: 162.6 cm (64\")    Body mass index is 32.06 kg/m². Neck Circumference: 13.5 inches  Nose: no nasal septal defects or deviation and the nasal passages are clear, no nasal polyps,  Throat: tonsils are nonenlarged, tongue normal, oral airway Mallampati class 3  NECK:Neck Circumference: 13.5 inches, trachea is in the midline, thyroid not enlarged  RESPIRATORY SYSTEM: Breath sounds are equal on both sides, there are no wheezes   CARDIOVASULAR SYSTEM: Heart sounds are regular rhythm and anna rate, no edema  EXTREMITES: No cyanosis, clubbing  NEUROLOGICAL SYSTEM: Oriented x 3, no gross motor defects, gait normal      Labs reviewed.  TSH Results:  TSH          4/11/2023    10:37   TSH   TSH 1.790       Most Recent A1C          3/6/2023    05:00   HGBA1C Most Recent   Hemoglobin A1C 5.40     "     Assessment and plan:  Witnessed apnea (R06.81) patient's symptoms and examination is consistent with sleep apnea (G47.30)  I have talked to the patient about the signs and symptoms of sleep apnea. In addition, I have also discussed pathophysiology of sleep apnea.  I also discussed the complications of untreated sleep apnea including effects on hypertension, diabetes mellitus and nonrestorative sleep with hypersomnia which can increase risk for motor vehicle accidents.  Untreated sleep apnea is also a risk factor for development of atrial fibrillation, pulmonary hypertension, insulin resistance and stroke.  Discussed in detail of various testing methods including home-based and lab based sleep studies.  Based on history and physical examination the most appropriate study is split-night study with an extended EEG due to recent seizure 2 weeks ago.  The order for the sleep study is placed in [facility].  The test will be scheduled after approval from insurance. Treatment and management will be discussed after the test is completed.  Patient was given opportunity to ask questions and all the questions were answered.   Snoring (R06.83) snoring is the sound created by turbulent airflow vibrating upper airway soft tissue.  I have also discussed factors affecting snoring including sleep deprivation, sleeping on the back and alcohol ingestion. To minimize snoring, patient is advised to have adequate sleep, sleep on the side and avoid alcohol and sedative medications before bedtime  Daytime excessive sleepiness .  It was assessed with [place] Sleepiness Scale of Total score: 12.  There are many causes for daytime excessive sleepiness including sleep depression, shiftwork syndrome, depression and other medical disorders including heart, kidney and liver failure.  The most serious cause of excessive sleepiness is due to neurological conditions like narcolepsy/cataplexy.  But the most common cause of excessive sleepiness is due to  sleep apnea with frequent awakenings during sleep time.  I have discussed safety of driving and to remain vigilant while driving.  Obesity 1, with BMI Body mass index is 32.06 kg/m².. I have discussed the relationship between weight and sleep apnea.There is direct correlation between weight and severity of sleep apnea.  Weight reduction is encouraged, as it is going to reduce the severity of sleep apnea. I have also discussed with the patient diet and exercise to achieve ideal body weight  History of stroke,   History of seizures last seizure was about 2 weeks ago and she is on medical management.  Hypertension    I have also discussed with the patient the following  Sleep hygiene: Maintaining a regular bedtime and wake time, not to watch television or work in bed, limit caffeine-containing beverages before bed time and avoid naps during the day  Adequate amount of sleep.  Generally most people needs about 7 to 8 hours of sleep.  No follow-ups on file..  Patient's questions were answered.      11/30/2023  Marcello Pleitez MD  Sleep Medicine  Medical Director  [facility]: [facility]

## 2023-12-04 ENCOUNTER — HOSPITAL ENCOUNTER (OUTPATIENT)
Dept: SLEEP MEDICINE | Facility: HOSPITAL | Age: 43
End: 2023-12-04
Payer: MEDICARE

## 2023-12-04 DIAGNOSIS — G40.909 NONINTRACTABLE EPILEPSY WITHOUT STATUS EPILEPTICUS, UNSPECIFIED EPILEPSY TYPE: ICD-10-CM

## 2023-12-04 DIAGNOSIS — R06.83 SNORING: ICD-10-CM

## 2023-12-04 DIAGNOSIS — G47.30 OBSERVED SLEEP APNEA: ICD-10-CM

## 2023-12-04 DIAGNOSIS — G47.19 EXCESSIVE DAYTIME SLEEPINESS: ICD-10-CM

## 2023-12-04 DIAGNOSIS — I10 PRIMARY HYPERTENSION: ICD-10-CM

## 2023-12-04 DIAGNOSIS — I63.9 ACUTE CVA (CEREBROVASCULAR ACCIDENT): ICD-10-CM

## 2023-12-04 PROCEDURE — 95810 POLYSOM 6/> YRS 4/> PARAM: CPT

## 2023-12-11 RX ORDER — ATORVASTATIN CALCIUM 40 MG/1
TABLET, FILM COATED ORAL
Qty: 30 TABLET | Refills: 3 | Status: SHIPPED | OUTPATIENT
Start: 2023-12-11

## 2023-12-20 DIAGNOSIS — G40.309 GENERALIZED NONCONVULSIVE EPILEPSY: ICD-10-CM

## 2023-12-21 DIAGNOSIS — I63.9 ACUTE CVA (CEREBROVASCULAR ACCIDENT): ICD-10-CM

## 2023-12-21 DIAGNOSIS — G40.909 NONINTRACTABLE EPILEPSY WITHOUT STATUS EPILEPTICUS, UNSPECIFIED EPILEPSY TYPE: ICD-10-CM

## 2023-12-21 DIAGNOSIS — G47.33 OSA (OBSTRUCTIVE SLEEP APNEA): Primary | ICD-10-CM

## 2023-12-21 DIAGNOSIS — I10 PRIMARY HYPERTENSION: ICD-10-CM

## 2023-12-21 DIAGNOSIS — R06.83 SNORING: ICD-10-CM

## 2023-12-21 RX ORDER — CENOBAMATE 50 MG/1
1 TABLET, FILM COATED ORAL DAILY
Qty: 30 TABLET | OUTPATIENT
Start: 2023-12-21

## 2023-12-21 RX ORDER — CENOBAMATE 50 MG/1
1 TABLET, FILM COATED ORAL DAILY
Qty: 30 TABLET | Refills: 0 | Status: SHIPPED | OUTPATIENT
Start: 2023-12-21

## 2023-12-22 ENCOUNTER — TELEPHONE (OUTPATIENT)
Dept: SLEEP MEDICINE | Facility: HOSPITAL | Age: 43
End: 2023-12-22
Payer: MEDICARE

## 2023-12-22 NOTE — TELEPHONE ENCOUNTER
Spoke with pt regarding results and faxed order to [facility]. Pt will cb and schedule a f/u for compliance.

## 2024-01-03 ENCOUNTER — TELEPHONE (OUTPATIENT)
Dept: NEUROLOGY | Facility: CLINIC | Age: 44
End: 2024-01-03
Payer: MEDICARE

## 2024-01-03 NOTE — TELEPHONE ENCOUNTER
Spoke w/ pt regarding moving appt time on Feb. 26th to 20 min earlier. Pt agreed & moved to 3 pm time slot.

## 2024-01-12 DIAGNOSIS — G40.309 GENERALIZED NONCONVULSIVE EPILEPSY: ICD-10-CM

## 2024-01-12 RX ORDER — LACOSAMIDE 200 MG/1
TABLET, FILM COATED ORAL
Qty: 60 TABLET | Refills: 1 | Status: SHIPPED | OUTPATIENT
Start: 2024-01-12

## 2024-01-12 RX ORDER — LACOSAMIDE 200 MG/1
1 TABLET, FILM COATED ORAL EVERY 12 HOURS
Qty: 60 TABLET | Refills: 1 | Status: SHIPPED | OUTPATIENT
Start: 2024-01-12

## 2024-01-12 NOTE — TELEPHONE ENCOUNTER
Caller: ELIE Small call back number:[phone]    Requested Prescriptions:   Requested Prescriptions     Pending Prescriptions Disp Refills    Vimpat 200 MG tablet 60 tablet 1     Sig: Take 1 tablet by mouth Every 12 (Twelve) Hours.        Pharmacy where request should be sent:  [id number]     Last office visit with prescribing clinician: 11/10/2023   Last telemedicine visit with prescribing clinician: Visit date not found   Next office visit with prescribing clinician: 3/11/2024     Additional details provided by patient: PT WILL BE OUT OF MEDICATION ON SUNDAY    Does the patient have less than a 3 day supply:  [x] Yes  [] No        Anthony Tavarez Rep   01/12/24 15:41 EST

## 2024-01-23 DIAGNOSIS — G40.309 GENERALIZED NONCONVULSIVE EPILEPSY: ICD-10-CM

## 2024-01-24 RX ORDER — CENOBAMATE 50 MG/1
1 TABLET, FILM COATED ORAL DAILY
Qty: 30 TABLET | Refills: 1 | Status: SHIPPED | OUTPATIENT
Start: 2024-01-24

## 2024-02-07 DIAGNOSIS — G40.309 GENERALIZED NONCONVULSIVE EPILEPSY: ICD-10-CM

## 2024-02-07 RX ORDER — LACOSAMIDE 200 MG/1
1 TABLET, FILM COATED ORAL EVERY 12 HOURS
Qty: 60 TABLET | Refills: 1 | Status: SHIPPED | OUTPATIENT
Start: 2024-02-07

## 2024-02-13 ENCOUNTER — TELEPHONE (OUTPATIENT)
Dept: NEUROLOGY | Facility: CLINIC | Age: 44
End: 2024-02-13
Payer: MEDICARE

## 2024-02-13 DIAGNOSIS — G40.309 GENERALIZED NONCONVULSIVE EPILEPSY: ICD-10-CM

## 2024-02-14 DIAGNOSIS — G40.309 GENERALIZED NONCONVULSIVE EPILEPSY: ICD-10-CM

## 2024-02-14 RX ORDER — LACOSAMIDE 200 MG/1
200 TABLET ORAL EVERY 12 HOURS SCHEDULED
Qty: 60 TABLET | Refills: 5 | Status: SHIPPED | OUTPATIENT
Start: 2024-02-14

## 2024-02-15 RX ORDER — LACOSAMIDE 200 MG/1
1 TABLET, FILM COATED ORAL EVERY 12 HOURS
Qty: 60 TABLET | Refills: 1 | Status: SHIPPED | OUTPATIENT
Start: 2024-02-15

## 2024-03-11 ENCOUNTER — OFFICE VISIT (OUTPATIENT)
Dept: NEUROLOGY | Facility: CLINIC | Age: 44
End: 2024-03-11
Payer: MEDICARE

## 2024-03-11 VITALS
DIASTOLIC BLOOD PRESSURE: 108 MMHG | HEIGHT: 64 IN | BODY MASS INDEX: 31.58 KG/M2 | OXYGEN SATURATION: 98 % | SYSTOLIC BLOOD PRESSURE: 142 MMHG | HEART RATE: 101 BPM | WEIGHT: 185 LBS

## 2024-03-11 DIAGNOSIS — Z96.89 STATUS POST VNS (VAGUS NERVE STIMULATOR) PLACEMENT: ICD-10-CM

## 2024-03-11 DIAGNOSIS — G40.309 GENERALIZED NONCONVULSIVE EPILEPSY: Primary | ICD-10-CM

## 2024-03-11 PROCEDURE — 3077F SYST BP >= 140 MM HG: CPT | Performed by: PSYCHIATRY & NEUROLOGY

## 2024-03-11 PROCEDURE — 3080F DIAST BP >= 90 MM HG: CPT | Performed by: PSYCHIATRY & NEUROLOGY

## 2024-03-11 PROCEDURE — 1159F MED LIST DOCD IN RCRD: CPT | Performed by: PSYCHIATRY & NEUROLOGY

## 2024-03-11 PROCEDURE — 1160F RVW MEDS BY RX/DR IN RCRD: CPT | Performed by: PSYCHIATRY & NEUROLOGY

## 2024-03-11 PROCEDURE — 99214 OFFICE O/P EST MOD 30 MIN: CPT | Performed by: PSYCHIATRY & NEUROLOGY

## 2024-03-11 NOTE — PROGRESS NOTES
Chief Complaint   Patient presents with    VNS    Seizures       Patient ID: Alexandra Xiao is a 43 y.o. female.    HPI: I had the pleasure of seeing your patient again today.  As you may know she is a 43-year-old female with a history of seizures.  Her last seizure was in December.  She says that she was experiencing a lot of stress around that time.  She has not had any since.  She is taking Xcopri, Aptiom and Vimpat.  She has the vagus nerve stimulator also.  She questions whether the Vimpat may be causing issues with her mood.  She has had issues with mood in the past.  She admits to returning her vagus nerve stimulator off for much of the time.  That is been a chronic issue with her in this process.    The following portions of the patient's history were reviewed and updated as appropriate: allergies, current medications, past family history, past medical history, past social history, past surgical history and problem list.    Review of Systems   Constitutional:  Positive for fatigue.   Gastrointestinal:  Positive for nausea (possibly from change of medication brands).   Neurological:  Positive for dizziness, seizures and light-headedness. Negative for tremors, syncope, facial asymmetry, speech difficulty, weakness, numbness and headaches.   Psychiatric/Behavioral:  Positive for decreased concentration and dysphoric mood. Negative for agitation, behavioral problems, confusion, hallucinations, self-injury, sleep disturbance and suicidal ideas. The patient is nervous/anxious. The patient is not hyperactive.       I have reviewed the review of systems above performed by my medical assistant.      Vitals:    03/11/24 1559   BP: (!) 142/108   Pulse: 101   SpO2: 98%       Neurologic Exam     Mental Status   Oriented to person, place, and time.   Concentration: normal.   Level of consciousness: alert  Knowledge: consistent with education (No deficits found.).     Cranial Nerves     CN II   Visual fields full to  confrontation.     CN III, IV, VI   Pupils are equal, round, and reactive to light.  Extraocular motions are normal.   CN III: no CN III palsy  CN VI: no CN VI palsy    CN V   Facial sensation intact.     CN VII   Facial expression full, symmetric.     CN VIII   CN VIII normal.     CN IX, X   CN IX normal.   CN X normal.     CN XI   CN XI normal.     CN XII   CN XII normal.     Motor Exam     Strength   Right neck flexion: 5/5  Left neck flexion: 5/5  Right neck extension: 5/5  Left neck extension: 5/5  Right deltoid: 5/5  Left deltoid: 5/5  Right biceps: 5/5  Left biceps: 5/5  Right triceps: 5/5  Left triceps: 5/5  Right wrist flexion: 5/5  Left wrist flexion: 5/5  Right wrist extension: 5/5  Left wrist extension: 5/5  Right interossei: 5/5  Left interossei: 5/5  Right abdominals: 5/5  Left abdominals: 5/5  Right iliopsoas: 5/5  Left iliopsoas: 5/5  Right quadriceps: 5/5  Left quadriceps: 5/5  Right hamstrin/5  Left hamstrin/5  Right glutei: 5/5  Left glutei: 5/5  Right anterior tibial: 5/5  Left anterior tibial: 5/5  Right posterior tibial: 5/5  Left posterior tibial: 5/5  Right peroneal: 5/5  Left peroneal: 5/5  Right gastroc: 5/5  Left gastroc: 5/5    Sensory Exam   Light touch normal.   Vibration normal.     Gait, Coordination, and Reflexes     Gait  Gait: normal    Reflexes   Right brachioradialis: 2+  Left brachioradialis: 2+  Right biceps: 2+  Left biceps: 2+  Right triceps: 2+  Left triceps: 2+  Right patellar: 2+  Left patellar: 2+  Right achilles: 2+  Left achilles: 2+  Right : 2+  Left : 2+Station is normal.       Physical Exam  Vitals reviewed.   Constitutional:       Appearance: She is well-developed.   HENT:      Head: Normocephalic and atraumatic.   Eyes:      Extraocular Movements: EOM normal.      Pupils: Pupils are equal, round, and reactive to light.   Cardiovascular:      Rate and Rhythm: Normal rate and regular rhythm.   Pulmonary:      Breath sounds: Normal breath sounds.    Musculoskeletal:         General: Normal range of motion.   Skin:     General: Skin is warm.   Neurological:      Mental Status: She is oriented to person, place, and time.      Gait: Gait is intact.      Deep Tendon Reflexes:      Reflex Scores:       Tricep reflexes are 2+ on the right side and 2+ on the left side.       Bicep reflexes are 2+ on the right side and 2+ on the left side.       Brachioradialis reflexes are 2+ on the right side and 2+ on the left side.       Patellar reflexes are 2+ on the right side and 2+ on the left side.       Achilles reflexes are 2+ on the right side and 2+ on the left side.        Procedures : Vagus nerve stimulator interrogation and diagnostics  Indication: History of seizures  Report:  Normal mode settings are as follows: Output current is set to 1.625 mA, frequency is 30 Hz, pulse width is 250 µs, on x30 seconds, off time is 5 minutes and duty cycle is 10%.  Auto stimulation mode settings are as follows: Output current is set to 1.875 mA, pulse width is 250 µs and on time 60 seconds.  Magnet mode settings are as follows: Output current is set to 2.75 mA, pulse width is 250 µs and on time is 60 seconds.  Setting changes: No setting changes were made today.  Diagnostics: Battery is at 11 to 25%.  Output is 1.625 mA.  Impedance looks good.      Assessment/Plan: She will need the generator replaced so we will make that referral for her.  We also will be referring her for mental health counseling given the chronic issues with mood change.  I feel that that would be beneficial for her.  Continue current antiepileptic drug therapy and we will see her back after her VNS generator has been replaced. A total of 30 minutes was spent face-to-face with the patient today.  Of that greater than 50% of this time was spent discussing signs and symptoms of seizure disorder, patient education, plan of care and prognosis.         Diagnoses and all orders for this visit:    1. Generalized  nonconvulsive epilepsy (Primary)  -     Ambulatory Referral to Neurosurgery    2. Status post VNS (vagus nerve stimulator) placement  -     Ambulatory Referral to Neurosurgery           Cb James II, MD

## 2024-03-13 DIAGNOSIS — G40.309 GENERALIZED NONCONVULSIVE EPILEPSY: ICD-10-CM

## 2024-03-14 DIAGNOSIS — G40.309 GENERALIZED NONCONVULSIVE EPILEPSY: ICD-10-CM

## 2024-03-15 RX ORDER — LACOSAMIDE 200 MG/1
1 TABLET, FILM COATED ORAL EVERY 12 HOURS
Qty: 60 TABLET | Refills: 0 | Status: SHIPPED | OUTPATIENT
Start: 2024-03-15

## 2024-03-15 RX ORDER — ESLICARBAZEPINE ACETATE 400 MG/1
1 TABLET ORAL DAILY
Qty: 30 TABLET | Refills: 5 | Status: SHIPPED | OUTPATIENT
Start: 2024-03-15

## 2024-03-15 RX ORDER — CENOBAMATE 50 MG/1
1 TABLET, FILM COATED ORAL DAILY
Qty: 30 TABLET | Refills: 0 | Status: SHIPPED | OUTPATIENT
Start: 2024-03-15

## 2024-03-20 ENCOUNTER — TELEPHONE (OUTPATIENT)
Dept: NEUROLOGY | Facility: CLINIC | Age: 44
End: 2024-03-20
Payer: MEDICARE

## 2024-03-20 RX ORDER — AMITRIPTYLINE HYDROCHLORIDE 25 MG/1
25 TABLET, FILM COATED ORAL NIGHTLY
Qty: 30 TABLET | Refills: 3 | Status: SHIPPED | OUTPATIENT
Start: 2024-03-20

## 2024-03-20 NOTE — TELEPHONE ENCOUNTER
PATIENT CALLING, SHE STATES SHE HAS BEEN CONTACTED BY TWO DIFFERENT DOCTORS REGARDING REFERRAL FROM DR MORENO ON HER VNS.    PLEASE ADVISE PATIENT

## 2024-03-21 NOTE — TELEPHONE ENCOUNTER
Pt was contacted by a provider regarding her VNS battery change. Pt scheduled consultation. Later same day. Another provider reached out to schedule her battery change. Pt did not catch the name of the second person that called her. Pt was unsure who she should be scheduled with. Pt was told that the referral was for Dr Rogerio Guzman from [facility], however she may be scheduled with a different surgeon from the same office. Pt was advised to call the first provider, whom she has the consultation with, and ask to see if perhaps it was someone in their office that called the second time. Pt understood and states that she will give them a call.

## 2024-03-28 DIAGNOSIS — G40.309 GENERALIZED NONCONVULSIVE EPILEPSY: ICD-10-CM

## 2024-03-28 RX ORDER — ESLICARBAZEPINE ACETATE 800 MG/1
1 TABLET ORAL DAILY
Qty: 30 TABLET | Refills: 3 | OUTPATIENT
Start: 2024-03-28

## 2024-03-29 RX ORDER — ESLICARBAZEPINE ACETATE 800 MG/1
800 TABLET ORAL DAILY
Qty: 30 TABLET | Refills: 3 | Status: SHIPPED | OUTPATIENT
Start: 2024-03-29

## 2024-04-08 ENCOUNTER — APPOINTMENT (OUTPATIENT)
Dept: CT IMAGING | Facility: HOSPITAL | Age: 44
End: 2024-04-08
Payer: MEDICARE

## 2024-04-08 ENCOUNTER — TELEPHONE (OUTPATIENT)
Dept: NEUROLOGY | Facility: CLINIC | Age: 44
End: 2024-04-08

## 2024-04-08 ENCOUNTER — HOSPITAL ENCOUNTER (INPATIENT)
Facility: HOSPITAL | Age: 44
LOS: 2 days | Discharge: HOME | End: 2024-04-10
Attending: STUDENT IN AN ORGANIZED HEALTH CARE EDUCATION/TRAINING PROGRAM | Admitting: INTERNAL MEDICINE
Payer: MEDICARE

## 2024-04-08 DIAGNOSIS — I63.9 CEREBROVASCULAR ACCIDENT (CVA), UNSPECIFIED MECHANISM: Primary | ICD-10-CM

## 2024-04-08 DIAGNOSIS — G40.309 GENERALIZED NONCONVULSIVE EPILEPSY: ICD-10-CM

## 2024-04-08 DIAGNOSIS — R47.9 SPEECH DISTURBANCE, UNSPECIFIED TYPE: ICD-10-CM

## 2024-04-08 PROBLEM — R82.90 ABNORMAL URINALYSIS: Status: ACTIVE | Noted: 2024-04-08

## 2024-04-08 PROBLEM — Z96.89 S/P PLACEMENT OF VNS (VAGUS NERVE STIMULATION) DEVICE: Status: ACTIVE | Noted: 2024-04-08

## 2024-04-08 LAB
ALBUMIN SERPL-MCNC: 4 G/DL (ref 3.5–5.2)
ALBUMIN/GLOB SERPL: 1.3 G/DL
ALP SERPL-CCNC: 204 U/L (ref 39–117)
ALT SERPL W P-5'-P-CCNC: 21 U/L (ref 1–33)
AMPHET+METHAMPHET UR QL: NEGATIVE
ANION GAP SERPL CALCULATED.3IONS-SCNC: 12 MMOL/L (ref 5–15)
APAP SERPL-MCNC: <5 MCG/ML (ref 0–30)
APTT PPP: 25.1 SECONDS (ref 22.7–35.4)
AST SERPL-CCNC: 18 U/L (ref 1–32)
BACTERIA UR QL AUTO: ABNORMAL /HPF
BARBITURATES UR QL SCN: NEGATIVE
BASOPHILS # BLD AUTO: 0.04 10*3/MM3 (ref 0–0.2)
BASOPHILS NFR BLD AUTO: 0.3 % (ref 0–1.5)
BENZODIAZ UR QL SCN: NEGATIVE
BILIRUB SERPL-MCNC: 0.2 MG/DL (ref 0–1.2)
BILIRUB UR QL STRIP: NEGATIVE
BUN SERPL-MCNC: 5 MG/DL (ref 6–20)
BUN/CREAT SERPL: 8.9 (ref 7–25)
CALCIUM SPEC-SCNC: 9 MG/DL (ref 8.6–10.5)
CANNABINOIDS SERPL QL: NEGATIVE
CHLORIDE SERPL-SCNC: 101 MMOL/L (ref 98–107)
CK SERPL-CCNC: 30 U/L (ref 20–180)
CLARITY UR: CLEAR
CO2 SERPL-SCNC: 23 MMOL/L (ref 22–29)
COCAINE UR QL: NEGATIVE
COLOR UR: YELLOW
CREAT SERPL-MCNC: 0.56 MG/DL (ref 0.57–1)
DEPRECATED RDW RBC AUTO: 40.3 FL (ref 37–54)
EGFRCR SERPLBLD CKD-EPI 2021: 115.6 ML/MIN/1.73
EOSINOPHIL # BLD AUTO: 0.04 10*3/MM3 (ref 0–0.4)
EOSINOPHIL NFR BLD AUTO: 0.3 % (ref 0.3–6.2)
ERYTHROCYTE [DISTWIDTH] IN BLOOD BY AUTOMATED COUNT: 13.1 % (ref 12.3–15.4)
ETHANOL BLD-MCNC: <10 MG/DL (ref 0–10)
ETHANOL UR QL: <0.01 %
FENTANYL UR-MCNC: NEGATIVE NG/ML
GLOBULIN UR ELPH-MCNC: 3.1 GM/DL
GLUCOSE BLDC GLUCOMTR-MCNC: 91 MG/DL (ref 70–130)
GLUCOSE BLDC GLUCOMTR-MCNC: 95 MG/DL (ref 70–130)
GLUCOSE SERPL-MCNC: 91 MG/DL (ref 65–99)
GLUCOSE UR STRIP-MCNC: NEGATIVE MG/DL
HCT VFR BLD AUTO: 44.1 % (ref 34–46.6)
HGB BLD-MCNC: 14.6 G/DL (ref 12–15.9)
HGB UR QL STRIP.AUTO: ABNORMAL
HYALINE CASTS UR QL AUTO: ABNORMAL /LPF
IMM GRANULOCYTES # BLD AUTO: 0.03 10*3/MM3 (ref 0–0.05)
IMM GRANULOCYTES NFR BLD AUTO: 0.3 % (ref 0–0.5)
INR PPP: 1.05 (ref 0.9–1.1)
KETONES UR QL STRIP: NEGATIVE
LEUKOCYTE ESTERASE UR QL STRIP.AUTO: ABNORMAL
LYMPHOCYTES # BLD AUTO: 1.82 10*3/MM3 (ref 0.7–3.1)
LYMPHOCYTES NFR BLD AUTO: 15.6 % (ref 19.6–45.3)
MCH RBC QN AUTO: 27.9 PG (ref 26.6–33)
MCHC RBC AUTO-ENTMCNC: 33.1 G/DL (ref 31.5–35.7)
MCV RBC AUTO: 84.2 FL (ref 79–97)
METHADONE UR QL SCN: NEGATIVE
MONOCYTES # BLD AUTO: 0.71 10*3/MM3 (ref 0.1–0.9)
MONOCYTES NFR BLD AUTO: 6.1 % (ref 5–12)
NEUTROPHILS NFR BLD AUTO: 77.4 % (ref 42.7–76)
NEUTROPHILS NFR BLD AUTO: 9.03 10*3/MM3 (ref 1.7–7)
NITRITE UR QL STRIP: NEGATIVE
NRBC BLD AUTO-RTO: 0 /100 WBC (ref 0–0.2)
OPIATES UR QL: NEGATIVE
OXYCODONE UR QL SCN: NEGATIVE
PH UR STRIP.AUTO: 7 [PH] (ref 5–8)
PLATELET # BLD AUTO: 332 10*3/MM3 (ref 140–450)
PMV BLD AUTO: 10.1 FL (ref 6–12)
POTASSIUM SERPL-SCNC: 3.6 MMOL/L (ref 3.5–5.2)
PROT SERPL-MCNC: 7.1 G/DL (ref 6–8.5)
PROT UR QL STRIP: NEGATIVE
PROTHROMBIN TIME: 13.9 SECONDS (ref 11.7–14.2)
QT INTERVAL: 341 MS
QTC INTERVAL: 451 MS
RBC # BLD AUTO: 5.24 10*6/MM3 (ref 3.77–5.28)
RBC # UR STRIP: ABNORMAL /HPF
REF LAB TEST METHOD: ABNORMAL
SALICYLATES SERPL-MCNC: <0.3 MG/DL
SODIUM SERPL-SCNC: 136 MMOL/L (ref 136–145)
SP GR UR STRIP: <1.005 (ref 1–1.03)
SQUAMOUS #/AREA URNS HPF: ABNORMAL /HPF
UROBILINOGEN UR QL STRIP: ABNORMAL
WBC # UR STRIP: ABNORMAL /HPF
WBC NRBC COR # BLD AUTO: 11.67 10*3/MM3 (ref 3.4–10.8)

## 2024-04-08 PROCEDURE — 80307 DRUG TEST PRSMV CHEM ANLYZR: CPT | Performed by: STUDENT IN AN ORGANIZED HEALTH CARE EDUCATION/TRAINING PROGRAM

## 2024-04-08 PROCEDURE — 25010000002 METOCLOPRAMIDE PER 10 MG: Performed by: STUDENT IN AN ORGANIZED HEALTH CARE EDUCATION/TRAINING PROGRAM

## 2024-04-08 PROCEDURE — 82550 ASSAY OF CK (CPK): CPT | Performed by: STUDENT IN AN ORGANIZED HEALTH CARE EDUCATION/TRAINING PROGRAM

## 2024-04-08 PROCEDURE — 81001 URINALYSIS AUTO W/SCOPE: CPT | Performed by: STUDENT IN AN ORGANIZED HEALTH CARE EDUCATION/TRAINING PROGRAM

## 2024-04-08 PROCEDURE — 80053 COMPREHEN METABOLIC PANEL: CPT | Performed by: STUDENT IN AN ORGANIZED HEALTH CARE EDUCATION/TRAINING PROGRAM

## 2024-04-08 PROCEDURE — 99285 EMERGENCY DEPT VISIT HI MDM: CPT

## 2024-04-08 PROCEDURE — 93010 ELECTROCARDIOGRAM REPORT: CPT | Performed by: INTERNAL MEDICINE

## 2024-04-08 PROCEDURE — 80179 DRUG ASSAY SALICYLATE: CPT | Performed by: STUDENT IN AN ORGANIZED HEALTH CARE EDUCATION/TRAINING PROGRAM

## 2024-04-08 PROCEDURE — 85025 COMPLETE CBC W/AUTO DIFF WBC: CPT | Performed by: STUDENT IN AN ORGANIZED HEALTH CARE EDUCATION/TRAINING PROGRAM

## 2024-04-08 PROCEDURE — 82077 ASSAY SPEC XCP UR&BREATH IA: CPT | Performed by: STUDENT IN AN ORGANIZED HEALTH CARE EDUCATION/TRAINING PROGRAM

## 2024-04-08 PROCEDURE — 70450 CT HEAD/BRAIN W/O DYE: CPT

## 2024-04-08 PROCEDURE — 70496 CT ANGIOGRAPHY HEAD: CPT

## 2024-04-08 PROCEDURE — 93005 ELECTROCARDIOGRAM TRACING: CPT | Performed by: STUDENT IN AN ORGANIZED HEALTH CARE EDUCATION/TRAINING PROGRAM

## 2024-04-08 PROCEDURE — 80143 DRUG ASSAY ACETAMINOPHEN: CPT | Performed by: STUDENT IN AN ORGANIZED HEALTH CARE EDUCATION/TRAINING PROGRAM

## 2024-04-08 PROCEDURE — 25010000002 MIDAZOLAM PER 1 MG: Performed by: STUDENT IN AN ORGANIZED HEALTH CARE EDUCATION/TRAINING PROGRAM

## 2024-04-08 PROCEDURE — 85610 PROTHROMBIN TIME: CPT | Performed by: STUDENT IN AN ORGANIZED HEALTH CARE EDUCATION/TRAINING PROGRAM

## 2024-04-08 PROCEDURE — 70498 CT ANGIOGRAPHY NECK: CPT

## 2024-04-08 PROCEDURE — 25510000001 IOPAMIDOL PER 1 ML: Performed by: INTERNAL MEDICINE

## 2024-04-08 PROCEDURE — 25810000003 SODIUM CHLORIDE 0.9 % SOLUTION: Performed by: INTERNAL MEDICINE

## 2024-04-08 PROCEDURE — 85730 THROMBOPLASTIN TIME PARTIAL: CPT | Performed by: STUDENT IN AN ORGANIZED HEALTH CARE EDUCATION/TRAINING PROGRAM

## 2024-04-08 PROCEDURE — 82948 REAGENT STRIP/BLOOD GLUCOSE: CPT

## 2024-04-08 PROCEDURE — 36415 COLL VENOUS BLD VENIPUNCTURE: CPT

## 2024-04-08 RX ORDER — ASPIRIN 300 MG/1
300 SUPPOSITORY RECTAL DAILY
Status: DISCONTINUED | OUTPATIENT
Start: 2024-04-08 | End: 2024-04-10 | Stop reason: HOSPADM

## 2024-04-08 RX ORDER — MIDAZOLAM HYDROCHLORIDE 1 MG/ML
2 INJECTION INTRAMUSCULAR; INTRAVENOUS ONCE
Status: COMPLETED | OUTPATIENT
Start: 2024-04-08 | End: 2024-04-08

## 2024-04-08 RX ORDER — METOCLOPRAMIDE HYDROCHLORIDE 5 MG/ML
10 INJECTION INTRAMUSCULAR; INTRAVENOUS ONCE
Status: COMPLETED | OUTPATIENT
Start: 2024-04-08 | End: 2024-04-08

## 2024-04-08 RX ORDER — TRAMADOL HYDROCHLORIDE 50 MG/1
50 TABLET ORAL EVERY 4 HOURS PRN
COMMUNITY
End: 2024-04-19

## 2024-04-08 RX ORDER — AMLODIPINE BESYLATE 5 MG/1
5 TABLET ORAL NIGHTLY
Status: DISCONTINUED | OUTPATIENT
Start: 2024-04-08 | End: 2024-04-10 | Stop reason: HOSPADM

## 2024-04-08 RX ORDER — ASPIRIN 325 MG
325 TABLET ORAL DAILY
Status: DISCONTINUED | OUTPATIENT
Start: 2024-04-08 | End: 2024-04-10 | Stop reason: HOSPADM

## 2024-04-08 RX ORDER — AMITRIPTYLINE HYDROCHLORIDE 25 MG/1
25 TABLET, FILM COATED ORAL NIGHTLY
Status: DISCONTINUED | OUTPATIENT
Start: 2024-04-08 | End: 2024-04-10 | Stop reason: HOSPADM

## 2024-04-08 RX ORDER — LACOSAMIDE 100 MG/1
200 TABLET ORAL EVERY 12 HOURS SCHEDULED
Status: DISCONTINUED | OUTPATIENT
Start: 2024-04-08 | End: 2024-04-10 | Stop reason: HOSPADM

## 2024-04-08 RX ORDER — SODIUM CHLORIDE 9 MG/ML
40 INJECTION, SOLUTION INTRAVENOUS AS NEEDED
Status: DISCONTINUED | OUTPATIENT
Start: 2024-04-08 | End: 2024-04-09

## 2024-04-08 RX ORDER — ASPIRIN 325 MG
325 TABLET ORAL DAILY
Status: DISCONTINUED | OUTPATIENT
Start: 2024-04-08 | End: 2024-04-08 | Stop reason: SDUPTHER

## 2024-04-08 RX ORDER — SODIUM CHLORIDE 9 MG/ML
75 INJECTION, SOLUTION INTRAVENOUS CONTINUOUS
Status: DISCONTINUED | OUTPATIENT
Start: 2024-04-08 | End: 2024-04-10

## 2024-04-08 RX ORDER — LACOSAMIDE 50 MG/1
200 TABLET ORAL ONCE
Status: COMPLETED | OUTPATIENT
Start: 2024-04-08 | End: 2024-04-08

## 2024-04-08 RX ORDER — ATORVASTATIN CALCIUM 20 MG/1
40 TABLET, FILM COATED ORAL NIGHTLY
Status: DISCONTINUED | OUTPATIENT
Start: 2024-04-08 | End: 2024-04-10 | Stop reason: HOSPADM

## 2024-04-08 RX ORDER — SODIUM CHLORIDE 0.9 % (FLUSH) 0.9 %
10 SYRINGE (ML) INJECTION AS NEEDED
Status: DISCONTINUED | OUTPATIENT
Start: 2024-04-08 | End: 2024-04-09

## 2024-04-08 RX ORDER — ONDANSETRON 2 MG/ML
4 INJECTION INTRAMUSCULAR; INTRAVENOUS EVERY 6 HOURS PRN
Status: DISCONTINUED | OUTPATIENT
Start: 2024-04-08 | End: 2024-04-10 | Stop reason: HOSPADM

## 2024-04-08 RX ORDER — SODIUM CHLORIDE 0.9 % (FLUSH) 0.9 %
10 SYRINGE (ML) INJECTION EVERY 12 HOURS SCHEDULED
Status: DISCONTINUED | OUTPATIENT
Start: 2024-04-08 | End: 2024-04-09

## 2024-04-08 RX ADMIN — IOPAMIDOL 95 ML: 755 INJECTION, SOLUTION INTRAVENOUS at 15:31

## 2024-04-08 RX ADMIN — LACOSAMIDE 200 MG: 50 TABLET, FILM COATED ORAL at 16:55

## 2024-04-08 RX ADMIN — MIDAZOLAM 2 MG: 1 INJECTION INTRAMUSCULAR; INTRAVENOUS at 12:48

## 2024-04-08 RX ADMIN — Medication 10 ML: at 21:53

## 2024-04-08 RX ADMIN — AMLODIPINE BESYLATE 5 MG: 5 TABLET ORAL at 21:52

## 2024-04-08 RX ADMIN — LACOSAMIDE 200 MG: 100 TABLET, FILM COATED ORAL at 21:52

## 2024-04-08 RX ADMIN — AMITRIPTYLINE HYDROCHLORIDE 25 MG: 25 TABLET, FILM COATED ORAL at 21:52

## 2024-04-08 RX ADMIN — METOCLOPRAMIDE 10 MG: 5 INJECTION, SOLUTION INTRAMUSCULAR; INTRAVENOUS at 15:45

## 2024-04-08 RX ADMIN — ASPIRIN 325 MG: 325 TABLET ORAL at 21:52

## 2024-04-08 RX ADMIN — SODIUM CHLORIDE 75 ML/HR: 9 INJECTION, SOLUTION INTRAVENOUS at 21:52

## 2024-04-08 RX ADMIN — ATORVASTATIN CALCIUM 40 MG: 20 TABLET, FILM COATED ORAL at 21:52

## 2024-04-08 NOTE — ED NOTES
Nursing report ED to floor  Alexandra Xiao  43 y.o.  female    HPI :  HPI (Adult)  Stated Reason for Visit: Garbled speech since 2300 last  night.  History Obtained From: patient, family  Precipitating Event(s): unknown    Chief Complaint  Chief Complaint   Patient presents with    Stroke       Admitting doctor:   Kumar Perdomo MD    Admitting diagnosis:   The primary encounter diagnosis was Cerebrovascular accident (CVA), unspecified mechanism. A diagnosis of Speech disturbance, unspecified type was also pertinent to this visit.    Code status:   Current Code Status       Date Active Code Status Order ID Comments User Context       Prior            Allergies:   Patient has no known allergies.    Isolation:   No active isolations    Intake and Output  No intake or output data in the 24 hours ending 04/08/24 1632    Weight:       04/08/24  1222   Weight: 84.5 kg (186 lb 4.6 oz)       Most recent vitals:   Vitals:    04/08/24 1400 04/08/24 1433 04/08/24 1503 04/08/24 1601   BP: (!) 144/107 146/95 152/92 141/86   BP Location:       Patient Position:       Pulse: 103 98 101 105   Resp: 20 20 18 18   Temp:       TempSrc:       SpO2: 95% 96% 96% 95%   Weight:       Height:           Active LDAs/IV Access:   Lines, Drains & Airways       Active LDAs       Name Placement date Placement time Site Days    Peripheral IV 04/08/24 1223 Anterior;Right Forearm 04/08/24  1223  Forearm  less than 1                    Labs (abnormal labs have a star):   Labs Reviewed   COMPREHENSIVE METABOLIC PANEL - Abnormal; Notable for the following components:       Result Value    BUN 5 (*)     Creatinine 0.56 (*)     Alkaline Phosphatase 204 (*)     All other components within normal limits    Narrative:     GFR Normal >60  Chronic Kidney Disease <60  Kidney Failure <15     URINALYSIS W/ MICROSCOPIC IF INDICATED (NO CULTURE) - Abnormal; Notable for the following components:    Specific Gravity, UA <1.005 (*)     Blood, UA Trace (*)     Leuk  Esterase, UA Moderate (2+) (*)     All other components within normal limits   CBC WITH AUTO DIFFERENTIAL - Abnormal; Notable for the following components:    WBC 11.67 (*)     Neutrophil % 77.4 (*)     Lymphocyte % 15.6 (*)     Neutrophils, Absolute 9.03 (*)     All other components within normal limits   URINALYSIS, MICROSCOPIC ONLY - Abnormal; Notable for the following components:    RBC, UA 3-5 (*)     WBC, UA 3-5 (*)     Bacteria, UA 4+ (*)     Squamous Epithelial Cells, UA 3-6 (*)     All other components within normal limits   PROTIME-INR - Normal   APTT - Normal   ACETAMINOPHEN LEVEL - Normal   URINE DRUG SCREEN - Normal    Narrative:     Negative Thresholds Per Drugs Screened:    Amphetamines                 500 ng/ml  Barbiturates                 200 ng/ml  Benzodiazepines              100 ng/ml  Cocaine                      300 ng/ml  Methadone                    300 ng/ml  Opiates                      300 ng/ml  Oxycodone                    100 ng/ml  THC                           50 ng/ml  Fentanyl                       5 ng/ml      The Normal Value for all drugs tested is negative. This report includes final unconfirmed screening results to be used for medical treatment purposes only. Unconfirmed results must not be used for non-medical purposes such as employment or legal testing. Clinical consideration should be applied to any drug of abuse test, particularly when unconfirmed results are used.           SALICYLATE LEVEL - Normal    Narrative:     Therapeutic range for Salicylates:  3.0 - 10.0 mg/dL for antipyretic/analgesic conditions  15.0 - 30.0 mg/dL for anti-inflammatory conditions   CK - Normal   POCT GLUCOSE FINGERSTICK - Normal   ETHANOL   CBC AND DIFFERENTIAL    Narrative:     The following orders were created for panel order CBC & Differential.  Procedure                               Abnormality         Status                     ---------                               -----------          ------                     CBC Auto Differential[[id number]]        Abnormal            Final result                 Please view results for these tests on the individual orders.       EKG:   ECG 12 Lead Drug Monitoring   Preliminary Result   HEART RATE= 105  bpm   RR Interval= 571  ms   DE Interval= 168  ms   P Horizontal Axis= -31  deg   P Front Axis= 32  deg   QRSD Interval= 86  ms   QT Interval= 341  ms   QTcB= 451  ms   QRS Axis= 1  deg   T Wave Axis= 48  deg   - BORDERLINE ECG -   Sinus tachycardia   Probable left atrial enlargement   Borderline repolarization abnormality   Electronically Signed By:    Date and Time of Study: 2024-04-08 12:43:05          Meds given in ED:   Medications   midazolam (VERSED) injection 2 mg (2 mg Intravenous Given 4/8/24 1248)   metoclopramide (REGLAN) injection 10 mg (10 mg Intravenous Given 4/8/24 1545)   iopamidol (ISOVUE-370) 76 % injection 100 mL (95 mL Intravenous Given 4/8/24 1531)       Imaging results:  CT Angiogram Head    Result Date: 4/8/2024  1. No hemodynamically significant focal stenosis, aneurysm, or dissection in the cervical carotid or vertebral arteries or in the arteries at the base of the brain.  2. Redemonstration of evidence of acute to subacute infarct at the left frontal cortex. Encephalomalacia left occipital lobe. No acute intracranial hemorrhage or hydrocephalus. No enhancing lesion in brain. If there is further clinical concern, MRI could be considered for further evaluation.  3. Thyroid nodularity.  This report was finalized on 4/8/2024 4:05 PM by Dr. Cb Butts M.D on Workstation: [id number]      CT Angiogram Neck    Result Date: 4/8/2024  1. No hemodynamically significant focal stenosis, aneurysm, or dissection in the cervical carotid or vertebral arteries or in the arteries at the base of the brain.  2. Redemonstration of evidence of acute to subacute infarct at the left frontal cortex. Encephalomalacia left occipital lobe. No acute  intracranial hemorrhage or hydrocephalus. No enhancing lesion in brain. If there is further clinical concern, MRI could be considered for further evaluation.  3. Thyroid nodularity.  This report was finalized on 4/8/2024 4:05 PM by Dr. Cb Butts M.D on Workstation: [id number]      CT Head Without Contrast    Result Date: 4/8/2024  1. This patient has a very subtle area of loss of gray-white matter differentiation in the lateral left frontal lobe compatible with an acute to subacute infarct in the distribution lateral frontal branch left MCA territory measures up to 2.5 x 1.6 x 2.7 cm in size and just anterior inferior to this is an additional subcentimeter acute to subacute infarct in the lateral frontal branch of the left MCA territory. This obviously is new when compared to prior MRI of the brain on 03/06/2023. Furthermore there is a focal area of encephalomalacia in the inferior left occipital lobe compatible with a late subacute to chronic inferior left occipital infarct in left PCA territory that measures 1.9 x 2.3 x 1 cm in size and this late subacute or chronic left PCA territory infarct is new when compared to prior MRI of the brain 03/06/2023. Different vascular territories suggest the patient may have a central or cardiac embolic source and young patient consider PFO and performing echocardiography to evaluate for underlying PFO or other cardiac source. 2. There is multiple chronic subcentimeter infarcts in the superior left frontal parietal cortex in the left MCA territory and superior medial right parietal lobe in the right ALEXYS territory and these are subcentimeter in size and well demonstrated on prior MRI of the brain 03/06/2023 but are unable be appreciated on this head CT. There is an additional 5 mm old posterior lateral right cerebellar infarct in right PICA territory unchanged since 03/06/2023. The remainder of the head CT is normal. I recommend an MRI of the brain to further date the left  frontal lobe infarcts.  Radiation dose reduction techniques were utilized, including automated exposure control and exposure modulation based on body size.   This report was finalized on 2024 3:14 PM by Dr. Jay Cid M.D on Workstation: [id number]       Ambulatory status:   - ambulatory.    Social issues:   Social History     Socioeconomic History    Marital status: Single    Number of children: 2   Tobacco Use    Smoking status: Former     Current packs/day: 0.00     Average packs/day: 1 pack/day for 29.0 years (29.0 ttl pk-yrs)     Types: Cigarettes     Start date:      Quit date:      Years since quittin.2    Smokeless tobacco: Never    Tobacco comments:     Quit 2022   Vaping Use    Vaping status: Every Day    Substances: Nicotine, Flavoring    Devices: Disposable, 6% NICOTINE   Substance and Sexual Activity    Alcohol use: Not Currently    Drug use: No    Sexual activity: Yes     Partners: Male     Birth control/protection: Tubal ligation       Peripheral Neurovascular  Peripheral Neurovascular (Adult)  Peripheral Neurovascular WDL: WDL    Neuro Cognitive  Neuro Cognitive (Adult)  Cognitive/Neuro/Behavioral WDL: WDL  Last Known Well Date: 24  Last Known Well Time: 2300  [place] Coma Scale  Best Eye Response: 4-->(E4) spontaneous  Best Motor Response: 6-->(M6) obeys commands  Best Verbal Response: 5-->(V5) oriented  [place] Coma Scale Score: 15  NIH Stroke Scale  Interval: baseline  1a. Level of Consciousness: 0-->Alert, keenly responsive  1b. LOC Questions: 0-->Answers both questions correctly  1c. LOC Commands: 0-->Performs both tasks correctly  2. Best Gaze: 0-->Normal  3. Visual: 0-->No visual loss  4. Facial Palsy: 0-->Normal symmetrical movements  5a. Motor Arm, Left: 0-->No drift, limb holds 90 (or 45) degrees for full 10 secs  5b. Motor Arm, Right: 0-->No drift, limb holds 90 (or 45) degrees for full 10 secs  6a. Motor Leg, Left: 0-->No drift, leg holds 30 degree position for  full 5 secs  6b. Motor Leg, Right: 0-->No drift, leg holds 30 degree position for full 5 secs  7. Limb Ataxia: 0-->Absent  8. Sensory: 0-->Normal, no sensory loss  9. Best Language: 1-->Mild-to-moderate aphasia, some obvious loss of fluency or facility of comprehension, without significant limitation on ideas expressed or form of expression. Reduction of speech and/or comprehension, however, makes conversation. . . (see row details)  10. Dysarthria: 1-->Mild-to-moderate dysarthria, patient slurs at least some words and, at worst, can be understood with some difficulty  11. Extinction and Inattention (formerly Neglect): 0-->No abnormality  Total (NIH Stroke Scale): 2    Learning  Learning Assessment (Adult)  Learning Readiness and Ability: no barriers identified    Respiratory  Respiratory WDL  Respiratory WDL: WDL    Abdominal Pain       Pain Assessments  Pain (Adult)  (0-10) Pain Rating: Rest: 0  (0-10) Pain Rating: Activity: 0    NIH Stroke Scale  NIH Stroke Scale  Interval: baseline  1a. Level of Consciousness: 0-->Alert, keenly responsive  1b. LOC Questions: 0-->Answers both questions correctly  1c. LOC Commands: 0-->Performs both tasks correctly  2. Best Gaze: 0-->Normal  3. Visual: 0-->No visual loss  4. Facial Palsy: 0-->Normal symmetrical movements  5a. Motor Arm, Left: 0-->No drift, limb holds 90 (or 45) degrees for full 10 secs  5b. Motor Arm, Right: 0-->No drift, limb holds 90 (or 45) degrees for full 10 secs  6a. Motor Leg, Left: 0-->No drift, leg holds 30 degree position for full 5 secs  6b. Motor Leg, Right: 0-->No drift, leg holds 30 degree position for full 5 secs  7. Limb Ataxia: 0-->Absent  8. Sensory: 0-->Normal, no sensory loss  9. Best Language: 1-->Mild-to-moderate aphasia, some obvious loss of fluency or facility of comprehension, without significant limitation on ideas expressed or form of expression. Reduction of speech and/or comprehension, however, makes conversation. . . (see row  details)  10. Dysarthria: 1-->Mild-to-moderate dysarthria, patient slurs at least some words and, at worst, can be understood with some difficulty  11. Extinction and Inattention (formerly Neglect): 0-->No abnormality  Total (NIH Stroke Scale): 2    Stacey Deshpande RN  04/08/24 16:32 EDT

## 2024-04-08 NOTE — PROGRESS NOTES
Clinical Pharmacy Services: Medication History    Alexandra Xiao is a 43 y.o. female presenting to [facility] for   Chief Complaint   Patient presents with    Stroke       She  has a past medical history of Abnormal bleeding in menstrual cycle, COPD (chronic obstructive pulmonary disease), Depression, Epilepsy, Headache, tension-type, Heavy menstrual bleeding, Hyperlipidemia, Hypertension, Memory loss, Migraine, Seizures, Status post placement of implantable loop recorder, and Stroke.    Allergies as of 04/08/2024    (No Known Allergies)       Medication information was obtained from: Pharmacy   Pharmacy and Phone Number: [facility] [phone]    Prior to Admission Medications       Prescriptions Last Dose Informant Patient Reported? Taking?    amitriptyline (ELAVIL) 25 MG tablet  Pharmacy No Yes    TAKE 1 TABLET BY MOUTH EVERY NIGHT    amLODIPine (NORVASC) 5 MG tablet  Pharmacy No Yes    TAKE 1 TABLET BY MOUTH DAILY    Patient taking differently:  Take 1 tablet by mouth Every Night.    aspirin 325 MG tablet  Other No Yes    Take 1 tablet by mouth Daily.    atorvastatin (LIPITOR) 40 MG tablet  Pharmacy No Yes    TAKE 1 TABLET BY MOUTH EVERY NIGHT    Patient taking differently:  Take 1 tablet by mouth Every Night.    Eslicarbazepine Acetate (Aptiom) 400 MG tablet  Pharmacy No Yes    Take 1 tablet by mouth Daily.    Patient taking differently:  Take 1 tablet by mouth Daily. Take with 800mg for total dose 1200mg daily    Eslicarbazepine Acetate (Aptiom) 800 MG tablet tablet  Pharmacy No Yes    Take 1 tablet by mouth Daily.    Patient taking differently:  Take 1 tablet by mouth Daily. Take with 400mg for total dose 1200mg daily.    lacosamide (VIMPAT) 200 MG tablet  Pharmacy No Yes    Take 1 tablet by mouth Every 12 (Twelve) Hours.    traMADol (ULTRAM) 50 MG tablet  Pharmacy Yes Yes    Take 1 tablet by mouth Every 4 (Four) Hours As Needed for Moderate Pain.    Xcopri 50 MG tablet  Pharmacy No Yes    Take  1 tablet by mouth Daily.    Vimpat 200 MG tablet   No No    TAKE 1 TABLET BY MOUTH EVERY 12 HOURS    vitamin D (ERGOCALCIFEROL) 1.25 MG (50071 UT) capsule capsule   No No    Take 1 capsule by mouth Every 7 (Seven) Days.    Patient not taking:  Reported on 3/11/2024              Medication notes:     This medication list is complete to the best of my knowledge as of 4/8/2024    Please call if questions.    Gustavo Mooney  Medication History Technician   [phone]    4/8/2024 15:38 EDT

## 2024-04-08 NOTE — H&P
Internal medicine history and physical  INTERNAL MEDICINE   [facility]       Patient Identification:  Name: Alexandra Xiao  Age: 43 y.o.  Sex: female  :  1980  MRN: [id number]                   Primary Care Physician: Annalee Huddleston APRN                               Date of admission:2024    Chief Complaint: Altered speech and unable to get the words out last night.    History of Present Illness:   Source of information patient herself attempted to converse but unable to get the words out properly and appropriately frustrated because of the patient's  at the bedside and review of records.  Patient is a 43-year-old female with history of seizure disorder followed by Dr. James, has VNS for long time and last known seizure was in 2023.  Patient does not recall any recent changes in her medications.  In this background patient was talking to her daughter and was noted by the family members that she could not get the words right.  She was last known well about 11 PM.  Her symptom has persisted throughout the night and eventually was brought to the emergency room earlier today.  CT scan of the head showed subacute left MCA territory infarct which is considered to be new compared to MRI in 2023.  She is also noted to have infarct in the vascular territory as detailed in the CT scan report.  Patient is being admitted for expressive aphasia and altered speech due to subacute CVA in the multi vascular territory of likely cardiac embolic origin.  Patient denies any focal weakness of arm or legs or difficulty in thinking but unable to find words.  She is able to understand what other people are talking about.      Past Medical History:  Past Medical History:   Diagnosis Date    Abnormal bleeding in menstrual cycle     COPD (chronic obstructive pulmonary disease)     Depression     Epilepsy     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,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    Headache, tension-type     Heavy menstrual  bleeding     WITH CLOTS    Hyperlipidemia     Hypertension     Memory loss     Migraine     Seizures     Status post placement of implantable loop recorder     Stroke     MARCH AND APRIL, 2022     Past Surgical History:  Past Surgical History:   Procedure Laterality Date    BREAST BIOPSY      D & C HYSTEROSCOPY ENDOMETRIAL ABLATION N/A 06/10/2022    Procedure: DILATATION AND CURETTAGE HYSTEROSCOPY NOVASURE ENDOMETRIAL ABLATION;  Surgeon: Ernesto Mendosa MD;  Location: [facility] MAIN OR;  Service: Obstetrics/Gynecology;  Laterality: N/A;    FOOT SURGERY      pins in left foot     INSERT / REPLACE / REMOVE PACEMAKER  04/01/2022    Dr. Cristofer Chamorro    OTHER SURGICAL HISTORY      VNS plate 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    NOÉ      04/2022    TUBAL ABDOMINAL LIGATION      VAGUS NERVE STIMULATOR IMPLANTATION        Home Meds:  (Not in a hospital admission)    Current Meds:   No current facility-administered medications for this encounter.    Current Outpatient Medications:     amitriptyline (ELAVIL) 25 MG tablet, TAKE 1 TABLET BY MOUTH EVERY NIGHT, Disp: 30 tablet, Rfl: 3    amLODIPine (NORVASC) 5 MG tablet, TAKE 1 TABLET BY MOUTH DAILY (Patient taking differently: Take 1 tablet by mouth Every Night.), Disp: 30 tablet, Rfl: 11    aspirin 325 MG tablet, Take 1 tablet by mouth Daily., Disp: 30 tablet, Rfl: 0    atorvastatin (LIPITOR) 40 MG tablet, TAKE 1 TABLET BY MOUTH EVERY NIGHT (Patient taking differently: Take 1 tablet by mouth Every Night.), Disp: 30 tablet, Rfl: 3    Eslicarbazepine Acetate (Aptiom) 400 MG tablet, Take 1 tablet by mouth Daily. (Patient taking differently: Take 1 tablet by mouth Daily. Take with 800mg for total dose 1200mg daily), Disp: 30 tablet, Rfl: 5    Eslicarbazepine Acetate (Aptiom) 800 MG tablet tablet, Take 1 tablet by mouth Daily. (Patient taking differently: Take 1 tablet by mouth Daily. Take with 400mg for total dose 1200mg daily.), Disp: 30 tablet, Rfl: 3    lacosamide  "(VIMPAT) 200 MG tablet, Take 1 tablet by mouth Every 12 (Twelve) Hours., Disp: 60 tablet, Rfl: 5    traMADol (ULTRAM) 50 MG tablet, Take 1 tablet by mouth Every 4 (Four) Hours As Needed for Moderate Pain., Disp: , Rfl:     Xcopri 50 MG tablet, Take 1 tablet by mouth Daily., Disp: 30 tablet, Rfl: 0    Vimpat 200 MG tablet, TAKE 1 TABLET BY MOUTH EVERY 12 HOURS, Disp: 60 tablet, Rfl: 0    vitamin D (ERGOCALCIFEROL) 1.25 MG (37300 UT) capsule capsule, Take 1 capsule by mouth Every 7 (Seven) Days. (Patient not taking: Reported on 3/11/2024), Disp: 8 capsule, Rfl: 0  Allergies:  No Known Allergies  Social History:   Social History     Tobacco Use    Smoking status: Former     Current packs/day: 0.00     Average packs/day: 1 pack/day for 29.0 years (29.0 ttl pk-yrs)     Types: Cigarettes     Start date:      Quit date:      Years since quittin.2    Smokeless tobacco: Never    Tobacco comments:     Quit 2022   Substance Use Topics    Alcohol use: Not Currently      Family History:  Family History   Problem Relation Age of Onset    Breast cancer Mother 50         age 60 METS    Arthritis Mother     Arthritis Father     Diabetes Father     Heart disease Father     Hypertension Father     Heart attack Father     Hypertension Brother     Heart attack Paternal Grandfather     Ovarian cancer Neg Hx     Uterine cancer Neg Hx     Colon cancer Neg Hx     Deep vein thrombosis Neg Hx     Pulmonary embolism Neg Hx     Malig Hyperthermia Neg Hx           Review of Systems  See history of present illness and past medical history.  As described in history of presenting illness.      Vitals:   /86   Pulse 114   Temp 97.7 °F (36.5 °C) (Tympanic)   Resp 18   Ht 162.6 cm (64.02\")   Wt 84.5 kg (186 lb 4.6 oz)   SpO2 95%   BMI 31.96 kg/m²   I/O: No intake or output data in the 24 hours ending 24 5297  Exam:  Patient is examined using the personal protective equipment as per guidelines from infection " control for this particular patient as enacted.  Hand washing was performed before and after patient interaction.  General Appearance:  Awake alert cooperative attempts to converse but gets frustrated as she is unable to get the words right and has obvious dysarthria.   Head:    Normocephalic, without obvious abnormality, atraumatic   Eyes:    PERRL, conjunctiva/corneas clear, EOM's intact, both eyes   Ears:    Normal external ear canals, both ears   Nose:   Nares normal, septum midline, mucosa normal, no drainage    or sinus tenderness   Throat:   Lips, tongue, gums normal; oral mucosa pink and moist   Neck: Supple and no adenopathy.   Back:     Symmetric, no curvature, ROM normal, no CVA tenderness   Lungs:     Clear to auscultation bilaterally, respirations unlabored   Chest Wall:  VNS stimulator in place    Heart:  S1-S2 regular   Abdomen:   Obese soft nontender   Extremities:   Extremities normal, atraumatic, no cyanosis or edema   Pulses:   Pulses palpable in all extremities; symmetric all extremities   Skin: No skin rash noted   Neurologic: Dysarthria and expressive aphasia otherwise grossly nonfocal examination       Data Review:      I reviewed the patient's new clinical results.  Results from last 7 days   Lab Units 04/08/24  1228   WBC 10*3/mm3 11.67*   HEMOGLOBIN g/dL 14.6   PLATELETS 10*3/mm3 332     Results from last 7 days   Lab Units 04/08/24  1358   SODIUM mmol/L 136   POTASSIUM mmol/L 3.6   CHLORIDE mmol/L 101   CO2 mmol/L 23.0   BUN mg/dL 5*   CREATININE mg/dL 0.56*   CALCIUM mg/dL 9.0   GLUCOSE mg/dL 91     CT Angiogram Head    Result Date: 4/8/2024  1. No hemodynamically significant focal stenosis, aneurysm, or dissection in the cervical carotid or vertebral arteries or in the arteries at the base of the brain.  2. Redemonstration of evidence of acute to subacute infarct at the left frontal cortex. Encephalomalacia left occipital lobe. No acute intracranial hemorrhage or hydrocephalus. No  enhancing lesion in brain. If there is further clinical concern, MRI could be considered for further evaluation.  3. Thyroid nodularity.  This report was finalized on 4/8/2024 4:05 PM by Dr. Cb Butts M.D on Workstation: [id number]      CT Angiogram Neck    Result Date: 4/8/2024  1. No hemodynamically significant focal stenosis, aneurysm, or dissection in the cervical carotid or vertebral arteries or in the arteries at the base of the brain.  2. Redemonstration of evidence of acute to subacute infarct at the left frontal cortex. Encephalomalacia left occipital lobe. No acute intracranial hemorrhage or hydrocephalus. No enhancing lesion in brain. If there is further clinical concern, MRI could be considered for further evaluation.  3. Thyroid nodularity.  This report was finalized on 4/8/2024 4:05 PM by Dr. Cb Butts M.D on Workstation: [id number]      CT Head Without Contrast    Result Date: 4/8/2024  1. This patient has a very subtle area of loss of gray-white matter differentiation in the lateral left frontal lobe compatible with an acute to subacute infarct in the distribution lateral frontal branch left MCA territory measures up to 2.5 x 1.6 x 2.7 cm in size and just anterior inferior to this is an additional subcentimeter acute to subacute infarct in the lateral frontal branch of the left MCA territory. This obviously is new when compared to prior MRI of the brain on 03/06/2023. Furthermore there is a focal area of encephalomalacia in the inferior left occipital lobe compatible with a late subacute to chronic inferior left occipital infarct in left PCA territory that measures 1.9 x 2.3 x 1 cm in size and this late subacute or chronic left PCA territory infarct is new when compared to prior MRI of the brain 03/06/2023. Different vascular territories suggest the patient may have a central or cardiac embolic source and young patient consider PFO and performing echocardiography to evaluate for  underlying PFO or other cardiac source. 2. There is multiple chronic subcentimeter infarcts in the superior left frontal parietal cortex in the left MCA territory and superior medial right parietal lobe in the right ALEXYS territory and these are subcentimeter in size and well demonstrated on prior MRI of the brain 03/06/2023 but are unable be appreciated on this head CT. There is an additional 5 mm old posterior lateral right cerebellar infarct in right PICA territory unchanged since 03/06/2023. The remainder of the head CT is normal. I recommend an MRI of the brain to further date the left frontal lobe infarcts.  Radiation dose reduction techniques were utilized, including automated exposure control and exposure modulation based on body size.   This report was finalized on 4/8/2024 3:14 PM by Dr. Jay Cid M.D on Workstation: [id number]     Brief Urine Lab Results  (Last result in the past 365 days)        Color   Clarity   Blood   Leuk Est   Nitrite   Protein   CREAT   Urine HCG        04/08/24 1236 Yellow   Clear   Trace   Moderate (2+)   Negative   Negative                   Assessment:  Active Hospital Problems    Diagnosis  POA    **Speech disturbance [R47.9]  Yes    S/P placement of VNS (vagus nerve stimulation) device [Z96.89]  Not Applicable    Abnormal urinalysis [R82.90]  Unknown    History of stroke [Z86.73]  Not Applicable    HTN (hypertension) [I10]  Yes     Continue amlodipine 5 mg daily.      Epilepsy [G40.909]  Yes       Medical decision making/care plan: See admitting orders  Acute/subacute CVA presenting as dysarthria and expressive aphasia since 11 PM last night with abnormal CT scan of the head showing multi vascular territory subacute CVA likely of cardiac embolic origin-plan is to admit the patient, continue with aspirin and statins, neurology consultation, OT PT evaluation and echocardiogram.  Because of her VNS will defer to our neurologist about MRI and see if her VNS is MRI compatible and  need to be turned off.  History of seizure disorder-continue with seizure medications while keeping her n.p.o.  Provided with seizure precautions.  Hypertension-continue antihypertensive regimen and avoid hypotensive episodes.  Abnormal urinalysis-patient does not have any symptoms of urinary tract infection-plan is to monitor.      Emeterio Jean Baptiste MD   4/8/2024  17:53 EDT    Parts of this note may be an electronic transcription/translation of spoken language to printed text using the Dragon dictation system.

## 2024-04-08 NOTE — ED PROVIDER NOTES
EMERGENCY DEPARTMENT ENCOUNTER    Room Number:  14/14  PCP: Annalee Huddleston APRN  History obtained from: Patient, family       HPI:  Chief Complaint: Speech disturbance  A complete HPI/ROS/PMH/PSH/SH/FH are unobtainable due to:   Context: Alexandra Xiao is a 43 y.o. female who presents to the ED c/o speech disturbance.  Last known well was at 11 PM last night when she went to bed.  Woke up with the symptoms this morning.  Not improving.  No other recent illness, fever, chills.  Did just have vagal nerve stimulator updated and took a tramadol yesterday.  No nausea or vomiting.  No limb weakness or numbness.            PAST MEDICAL HISTORY  Active Ambulatory Problems     Diagnosis Date Noted    Sebaceous cyst of breast, right 11/06/2018    Abnormal uterine bleeding 03/23/2022    Acute CVA (cerebrovascular accident) 04/26/2022    Microcytic anemia 04/27/2022    Transaminitis 04/27/2022    Epilepsy 04/27/2022    Tobacco abuse 04/27/2022    Major depressive disorder 01/01/2014    Left-sided weakness 03/05/2023    Anemia     Hyponatremia     HTN (hypertension)     History of stroke 03/06/2023    Vitamin D deficiency 03/09/2023    B12 deficiency 03/09/2023    Observed sleep apnea 11/30/2023    Snoring 11/30/2023    Excessive daytime sleepiness 11/30/2023    Class 1 obesity 11/30/2023     Resolved Ambulatory Problems     Diagnosis Date Noted    No Resolved Ambulatory Problems     Past Medical History:   Diagnosis Date    Abnormal bleeding in menstrual cycle     COPD (chronic obstructive pulmonary disease)     Depression     Headache, tension-type     Heavy menstrual bleeding     Hyperlipidemia     Hypertension     Memory loss     Migraine     Seizures     Status post placement of implantable loop recorder     Stroke          PAST SURGICAL HISTORY  Past Surgical History:   Procedure Laterality Date    BREAST BIOPSY      D & C HYSTEROSCOPY ENDOMETRIAL ABLATION N/A 06/10/2022    Procedure: DILATATION AND CURETTAGE  HYSTEROSCOPY NOVASURE ENDOMETRIAL ABLATION;  Surgeon: Ernesto Mendosa MD;  Location: [facility] MAIN OR;  Service: Obstetrics/Gynecology;  Laterality: N/A;    FOOT SURGERY      pins in left foot     INSERT / REPLACE / REMOVE PACEMAKER  2022    Dr. Cristofer Chamorro    OTHER SURGICAL HISTORY      VNS plate 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    NOÉ      2022    TUBAL ABDOMINAL LIGATION      VAGUS NERVE STIMULATOR IMPLANTATION           FAMILY HISTORY  Family History   Problem Relation Age of Onset    Breast cancer Mother 50         age 60 METS    Arthritis Mother     Arthritis Father     Diabetes Father     Heart disease Father     Hypertension Father     Heart attack Father     Hypertension Brother     Heart attack Paternal Grandfather     Ovarian cancer Neg Hx     Uterine cancer Neg Hx     Colon cancer Neg Hx     Deep vein thrombosis Neg Hx     Pulmonary embolism Neg Hx     Malig Hyperthermia Neg Hx          SOCIAL HISTORY  Social History     Socioeconomic History    Marital status: Single    Number of children: 2   Tobacco Use    Smoking status: Former     Current packs/day: 0.00     Average packs/day: 1 pack/day for 29.0 years (29.0 ttl pk-yrs)     Types: Cigarettes     Start date:      Quit date:      Years since quittin.2    Smokeless tobacco: Never    Tobacco comments:     Quit 2022   Vaping Use    Vaping status: Every Day    Substances: Nicotine, Flavoring    Devices: Disposable, 6% NICOTINE   Substance and Sexual Activity    Alcohol use: Not Currently    Drug use: No    Sexual activity: Yes     Partners: Male     Birth control/protection: Tubal ligation         ALLERGIES  Patient has no known allergies.        REVIEW OF SYSTEMS    As per HPI      PHYSICAL EXAM  ED Triage Vitals   Temp Heart Rate Resp BP SpO2   24 1216 24 1216 24 1216 24 1222 24 1216   97.7 °F (36.5 °C) (!) 121 16 (!) 133/105 98 %      Temp src Heart Rate Source Patient  Position BP Location FiO2 (%)   04/08/24 1216 04/08/24 1216 04/08/24 1222 04/08/24 1222 --   Tympanic Monitor Lying Left arm        Physical Exam  Constitutional:       General: She is not in acute distress.  HENT:      Head: Normocephalic and atraumatic.   Cardiovascular:      Rate and Rhythm: Regular rhythm. Tachycardia present.   Pulmonary:      Effort: Pulmonary effort is normal. No respiratory distress.   Abdominal:      General: There is no distension.      Palpations: Abdomen is soft.      Tenderness: There is no abdominal tenderness.   Musculoskeletal:         General: No swelling or deformity.   Skin:     General: Skin is warm and dry.   Neurological:      Mental Status: She is alert. Mental status is at baseline.      Comments: Anxious, slow speech with difficulty getting words out, no word finding difficulty, right arm with mild drift however no himanshu weakness or sensory change           Vital signs and nursing notes reviewed.          LAB RESULTS  Recent Results (from the past 24 hour(s))   POC Glucose Once    Collection Time: 04/08/24 12:19 PM    Specimen: Blood   Result Value Ref Range    Glucose 91 70 - 130 mg/dL   Protime-INR    Collection Time: 04/08/24 12:28 PM    Specimen: Blood   Result Value Ref Range    Protime 13.9 11.7 - 14.2 Seconds    INR 1.05 0.90 - 1.10   aPTT    Collection Time: 04/08/24 12:28 PM    Specimen: Blood   Result Value Ref Range    PTT 25.1 22.7 - 35.4 seconds   Acetaminophen Level    Collection Time: 04/08/24 12:28 PM    Specimen: Blood   Result Value Ref Range    Acetaminophen <5.0 0.0 - 30.0 mcg/mL   Ethanol    Collection Time: 04/08/24 12:28 PM    Specimen: Blood   Result Value Ref Range    Ethanol <10 0 - 10 mg/dL    Ethanol % <0.010 %   Salicylate Level    Collection Time: 04/08/24 12:28 PM    Specimen: Blood   Result Value Ref Range    Salicylate <0.3 <=30.0 mg/dL   CBC Auto Differential    Collection Time: 04/08/24 12:28 PM    Specimen: Blood   Result Value Ref Range     WBC 11.67 (H) 3.40 - 10.80 10*3/mm3    RBC 5.24 3.77 - 5.28 10*6/mm3    Hemoglobin 14.6 12.0 - 15.9 g/dL    Hematocrit 44.1 34.0 - 46.6 %    MCV 84.2 79.0 - 97.0 fL    MCH 27.9 26.6 - 33.0 pg    MCHC 33.1 31.5 - 35.7 g/dL    RDW 13.1 12.3 - 15.4 %    RDW-SD 40.3 37.0 - 54.0 fl    MPV 10.1 6.0 - 12.0 fL    Platelets 332 140 - 450 10*3/mm3    Neutrophil % 77.4 (H) 42.7 - 76.0 %    Lymphocyte % 15.6 (L) 19.6 - 45.3 %    Monocyte % 6.1 5.0 - 12.0 %    Eosinophil % 0.3 0.3 - 6.2 %    Basophil % 0.3 0.0 - 1.5 %    Immature Grans % 0.3 0.0 - 0.5 %    Neutrophils, Absolute 9.03 (H) 1.70 - 7.00 10*3/mm3    Lymphocytes, Absolute 1.82 0.70 - 3.10 10*3/mm3    Monocytes, Absolute 0.71 0.10 - 0.90 10*3/mm3    Eosinophils, Absolute 0.04 0.00 - 0.40 10*3/mm3    Basophils, Absolute 0.04 0.00 - 0.20 10*3/mm3    Immature Grans, Absolute 0.03 0.00 - 0.05 10*3/mm3    nRBC 0.0 0.0 - 0.2 /100 WBC   Urinalysis With Microscopic If Indicated (No Culture) - Urine, Clean Catch    Collection Time: 04/08/24 12:36 PM    Specimen: Urine, Clean Catch   Result Value Ref Range    Color, UA Yellow Yellow, Straw    Appearance, UA Clear Clear    pH, UA 7.0 5.0 - 8.0    Specific Gravity, UA <1.005 (L) 1.005 - 1.030    Glucose, UA Negative Negative    Ketones, UA Negative Negative    Bilirubin, UA Negative Negative    Blood, UA Trace (A) Negative    Protein, UA Negative Negative    Leuk Esterase, UA Moderate (2+) (A) Negative    Nitrite, UA Negative Negative    Urobilinogen, UA 0.2 E.U./dL 0.2 - 1.0 E.U./dL   Urine Drug Screen - Urine, Clean Catch    Collection Time: 04/08/24 12:36 PM    Specimen: Urine, Clean Catch   Result Value Ref Range    Amphet/Methamphet, Screen Negative Negative    Barbiturates Screen, Urine Negative Negative    Benzodiazepine Screen, Urine Negative Negative    Cocaine Screen, Urine Negative Negative    Opiate Screen Negative Negative    THC, Screen, Urine Negative Negative    Methadone Screen, Urine Negative Negative     Oxycodone Screen, Urine Negative Negative    Fentanyl, Urine Negative Negative   Urinalysis, Microscopic Only - Urine, Clean Catch    Collection Time: 04/08/24 12:36 PM    Specimen: Urine, Clean Catch   Result Value Ref Range    RBC, UA 3-5 (A) None Seen, 0-2 /HPF    WBC, UA 3-5 (A) None Seen, 0-2 /HPF    Bacteria, UA 4+ (A) None Seen /HPF    Squamous Epithelial Cells, UA 3-6 (A) None Seen, 0-2 /HPF    Hyaline Casts, UA None Seen None Seen /LPF    Methodology Automated Microscopy    ECG 12 Lead Drug Monitoring    Collection Time: 04/08/24 12:43 PM   Result Value Ref Range    QT Interval 341 ms    QTC Interval 451 ms   Comprehensive Metabolic Panel    Collection Time: 04/08/24  1:58 PM    Specimen: Hand, Right; Blood   Result Value Ref Range    Glucose 91 65 - 99 mg/dL    BUN 5 (L) 6 - 20 mg/dL    Creatinine 0.56 (L) 0.57 - 1.00 mg/dL    Sodium 136 136 - 145 mmol/L    Potassium 3.6 3.5 - 5.2 mmol/L    Chloride 101 98 - 107 mmol/L    CO2 23.0 22.0 - 29.0 mmol/L    Calcium 9.0 8.6 - 10.5 mg/dL    Total Protein 7.1 6.0 - 8.5 g/dL    Albumin 4.0 3.5 - 5.2 g/dL    ALT (SGPT) 21 1 - 33 U/L    AST (SGOT) 18 1 - 32 U/L    Alkaline Phosphatase 204 (H) 39 - 117 U/L    Total Bilirubin 0.2 0.0 - 1.2 mg/dL    Globulin 3.1 gm/dL    A/G Ratio 1.3 g/dL    BUN/Creatinine Ratio 8.9 7.0 - 25.0    Anion Gap 12.0 5.0 - 15.0 mmol/L    eGFR 115.6 >60.0 mL/min/1.73   CK    Collection Time: 04/08/24  1:58 PM    Specimen: Hand, Right; Blood   Result Value Ref Range    Creatine Kinase 30 20 - 180 U/L       Ordered the above labs and reviewed the results.        RADIOLOGY  CT Head Without Contrast    Result Date: 4/8/2024  EMERGENCY CT SCAN OF THE HEAD WITHOUT CONTRAST ON 04/08/2024  CLINICAL HISTORY: Slurred speech and headaches.  TECHNIQUE: Spiral CT images were obtained from the base of the skull to the vertex without intravenous contrast. Images were reformatted and submitted in 3 mm thick axial, sagittal and coronal CT sections with brain  algorithm.  This is correlated to prior MRI of the brain from [facility] on 03/06/2023 and also a prior CT angiogram of the head and neck and CT perfusion study of the brain on 03/05/2023 and a prior MRI of the brain on 04/22/2022 and an outside MRI of the brain on 03/30/2022.  FINDINGS: Patient has tiny subcentimeter superior left frontal parietal cortical infarcts on prior MRIs that are chronic and unable to be appreciated on the current head CT. The patient also has tiny superior medial right parietal chronic infarcts that are subcentimeter in size and were seen on prior MRIs and they're in the right ALEXYS territory and unable to be appreciated on the current head CT. There is loss of gray-white matter differentiation in the lateral left frontal lobe. The area measures up to the 2.5 x 1.6 x 2.7 cm in size compatible with an acute to subacute lateral left frontal infarct in left MCA territory. There is also a focal area of encephalomalacia in the inferior left occipital lobe. Given the volume loss I think this is a late subacute or chronic infarct in the distribution of inferior occipital branch of the left PCA territory and measures up to 1.9 x 2.3 x 1 cm in size and is in the left PCA territory is new when compared to the prior MRI 03/06/2023. There is a tiny 5 mm old posterior inferior lateral right cerebellar infarct in the right PICA territory unchanged. The remainder of the brain parenchyma is normal in attenuation. The ventricles are normal in size. I see no mass effect, no midline shift, no extra-axial fluid collections are identified and there is no evidence of acute intracranial hemorrhage. The calvarium and skull base are normal in appearance. The paranasal sinuses mastoid air cells and middle ear cavities are clear.      1. This patient has a very subtle area of loss of gray-white matter differentiation in the lateral left frontal lobe compatible with an acute to subacute infarct in the  distribution lateral frontal branch left MCA territory measures up to 2.5 x 1.6 x 2.7 cm in size and just anterior inferior to this is an additional subcentimeter acute to subacute infarct in the lateral frontal branch of the left MCA territory. This obviously is new when compared to prior MRI of the brain on 03/06/2023. Furthermore there is a focal area of encephalomalacia in the inferior left occipital lobe compatible with a late subacute to chronic inferior left occipital infarct in left PCA territory that measures 1.9 x 2.3 x 1 cm in size and this late subacute or chronic left PCA territory infarct is new when compared to prior MRI of the brain 03/06/2023. Different vascular territories suggest the patient may have a central or cardiac embolic source and young patient consider PFO and performing echocardiography to evaluate for underlying PFO or other cardiac source. 2. There is multiple chronic subcentimeter infarcts in the superior left frontal parietal cortex in the left MCA territory and superior medial right parietal lobe in the right ALEXYS territory and these are subcentimeter in size and well demonstrated on prior MRI of the brain 03/06/2023 but are unable be appreciated on this head CT. There is an additional 5 mm old posterior lateral right cerebellar infarct in right PICA territory unchanged since 03/06/2023. The remainder of the head CT is normal. I recommend an MRI of the brain to further date the left frontal lobe infarcts.  Radiation dose reduction techniques were utilized, including automated exposure control and exposure modulation based on body size.   This report was finalized on 4/8/2024 3:14 PM by Dr. Jay Cid M.D on Workstation: [id number]       Ordered the above noted radiological studies. Reviewed by me in PACS.              MEDICATIONS GIVEN IN ER  Medications   midazolam (VERSED) injection 2 mg (2 mg Intravenous Given 4/8/24 1248)   metoclopramide (REGLAN) injection 10 mg (10 mg  Intravenous Given 4/8/24 1545)   iopamidol (ISOVUE-370) 76 % injection 100 mL (95 mL Intravenous Given 4/8/24 1531)               MEDICAL DECISION MAKING, PROGRESS, and CONSULTS    MDM: Patient presented emergency department with concern for acute stroke, otherwise well-appearing, vitals otherwise stable.  Tachycardic and slightly hypertensive.  Labs otherwise reassuring, imaging demonstrating multiple small areas of chronic infarct with possible subacute component in the left anterior temporal lobe.  This could explain her speech symptoms.  Discussed case with stroke neurology, outside of the treatment window for tPA and there is not any evidence to suggest a large vessel occlusion.  Recommend obtaining CT angiograms and MRI for further evaluation.  Due to multiple vascular territories affected by her strokes I am concerned for cardioembolic source workup for this last year was unrevealing with thickening of the mitral valve and no evidence of PFO.  Discussed with inpatient team, will admit to [facility] for additional management evaluation of her symptoms.,     All labs have been independently reviewed by me.  All radiology studies have been reviewed by me and I have also reviewed the radiology report.   EKG's independently viewed and interpreted by me.  Discussion below represents my analysis of pertinent findings related to patient's condition, differential diagnosis, treatment plan and final disposition.      Additional sources:  - Discussed/ obtained information from independent historians:      - External (non-ED) record review:     - Chronic or social conditions impacting care: Prior stroke, epilepsy    - Shared decision making: Discussed plan for admission, patient and family agree      Orders placed during this visit:  Orders Placed This Encounter   Procedures    CT Head Without Contrast    CT Angiogram Head    CT Angiogram Neck    Comprehensive Metabolic Panel    Protime-INR    aPTT    Urinalysis With  Microscopic If Indicated (No Culture) - Urine, Clean Catch    Acetaminophen Level    Ethanol    Urine Drug Screen - Urine, Clean Catch    Salicylate Level    CK    CBC Auto Differential    Urinalysis, Microscopic Only - Urine, Clean Catch    [facility] (on-call MD unless specified) Details    POC Glucose Once    ECG 12 Lead Drug Monitoring    Inpatient Admission    CBC & Differential         Additional orders considered but not ordered:  Considered CT perfusion however no signs or symptoms to suggest acute large infarct        Differential diagnosis includes but is not limited to:    Stroke, metabolic encephalopathy, electrolyte abnormality, serotonin syndrome, medication side effect      Independent interpretation of labs, radiology studies, and discussions with consultants:  ED Course as of 04/08/24 1546   Mon Apr 08, 2024   1246 EKG interpreted myself:  1243, sinus tachycardia rate of 105, no acute ST segment changes or T wave inversions. [FS]   1247 Patient coming in with new speech difficulty on waking today, last known well was at 2300 last night.  Patient recently on tramadol status post chest wall surgery, chronically on amitriptyline.  Patient has some speech slowing however able to express herself, no limb deficits.  Ambulatory in the ER.  Brisk reflexes on exam.  Presentation most consistent with metabolic encephalopathy, will plan to obtain head CT and additional CNS imaging as indicated based on the rest of her workup. [FS]   1248 Heart Rate(!): 121 [FS]   1248 BP(!): 133/105 [FS]   1315 Bacteria, UA(!): 4+ [FS]   1315 WBC, UA(!): 3-5 [FS]   1315 WBC(!): 11.67 [FS]   1316 CT head interpreted myself:  No hemorrhage or midline shift. [FS]      ED Course User Index  [FS] Domenico Pandya MD           DIAGNOSIS  Final diagnoses:   Cerebrovascular accident (CVA), unspecified mechanism   Speech disturbance, unspecified type         DISPOSITION  Admitted to telemetry        Latest Documented Vital Signs:  As of 15:46  EDT  BP- 152/92 HR- 101 Temp- 97.7 °F (36.5 °C) (Tympanic) O2 sat- 96%              --    Please note that portions of this were completed with a voice recognition program.       Note Disclaimer: At [facility], we believe that sharing information builds trust and better relationships. You are receiving this note because you are receiving care at [facility] or recently visited. It is possible you will see health information before a provider has talked with you about it. This kind of information can be easy to misunderstand. To help you fully understand what it means for your health, we urge you to discuss this note with your provider.             Domenico Pandya MD  04/08/24 9499

## 2024-04-09 ENCOUNTER — APPOINTMENT (OUTPATIENT)
Dept: MRI IMAGING | Facility: HOSPITAL | Age: 44
End: 2024-04-09
Payer: MEDICARE

## 2024-04-09 ENCOUNTER — APPOINTMENT (OUTPATIENT)
Dept: CARDIOLOGY | Facility: HOSPITAL | Age: 44
End: 2024-04-09
Payer: MEDICARE

## 2024-04-09 PROBLEM — I63.412 CEREBROVASCULAR ACCIDENT (CVA) DUE TO EMBOLISM OF LEFT MIDDLE CEREBRAL ARTERY: Status: ACTIVE | Noted: 2024-04-08

## 2024-04-09 LAB
ALBUMIN SERPL-MCNC: 3.8 G/DL (ref 3.5–5.2)
ALBUMIN/GLOB SERPL: 1.1 G/DL
ALP SERPL-CCNC: 206 U/L (ref 39–117)
ALT SERPL W P-5'-P-CCNC: 20 U/L (ref 1–33)
ANION GAP SERPL CALCULATED.3IONS-SCNC: 13 MMOL/L (ref 5–15)
ASCENDING AORTA: 2.9 CM
AST SERPL-CCNC: 22 U/L (ref 1–32)
BH CV ECHO MEAS - ACS: 1.84 CM
BH CV ECHO MEAS - AO MEAN PG: NORMAL MMHG
BH CV ECHO MEAS - AO ROOT DIAM: 2.9 CM
BH CV ECHO MEAS - AO V2 MAX: 97 CM/SEC
BH CV ECHO MEAS - AO V2 VTI: NORMAL CM
BH CV ECHO MEAS - EDV(CUBED): 100.5 ML
BH CV ECHO MEAS - EDV(MOD-SP2): 61 ML
BH CV ECHO MEAS - EDV(MOD-SP4): 63 ML
BH CV ECHO MEAS - EF(MOD-BP): 64 %
BH CV ECHO MEAS - EF(MOD-SP2): 68.9 %
BH CV ECHO MEAS - EF(MOD-SP4): 55.6 %
BH CV ECHO MEAS - ESV(CUBED): 47.2 ML
BH CV ECHO MEAS - ESV(MOD-SP2): 19 ML
BH CV ECHO MEAS - ESV(MOD-SP4): 28 ML
BH CV ECHO MEAS - FS: 22.3 %
BH CV ECHO MEAS - IVS/LVPW: 1.03 CM
BH CV ECHO MEAS - IVSD: 0.91 CM
BH CV ECHO MEAS - LAT PEAK E' VEL: 6 CM/SEC
BH CV ECHO MEAS - LV MASS(C)D: 140.8 GRAMS
BH CV ECHO MEAS - LV MEAN PG: 1.63 MMHG
BH CV ECHO MEAS - LV V1 MAX: 85 CM/SEC
BH CV ECHO MEAS - LV V1 VTI: 14.6 CM
BH CV ECHO MEAS - LVIDD: 4.6 CM
BH CV ECHO MEAS - LVIDS: 3.6 CM
BH CV ECHO MEAS - LVOT AREA: 3.2 CM2
BH CV ECHO MEAS - LVOT DIAM: 2.02 CM
BH CV ECHO MEAS - LVPWD: 0.89 CM
BH CV ECHO MEAS - MED PEAK E' VEL: 8 CM/SEC
BH CV ECHO MEAS - MR MAX PG: 115.1 MMHG
BH CV ECHO MEAS - MR MAX VEL: 535 CM/SEC
BH CV ECHO MEAS - MV A DUR: 0.13 SEC
BH CV ECHO MEAS - MV A MAX VEL: 129.6 CM/SEC
BH CV ECHO MEAS - MV DEC SLOPE: 642.5 CM/SEC2
BH CV ECHO MEAS - MV DEC TIME: 0.15 SEC
BH CV ECHO MEAS - MV E MAX VEL: 84 CM/SEC
BH CV ECHO MEAS - MV E/A: 0.65
BH CV ECHO MEAS - MV MEAN PG: NORMAL MMHG
BH CV ECHO MEAS - MV P1/2T: 42.7 MSEC
BH CV ECHO MEAS - MV V2 VTI: NORMAL CM
BH CV ECHO MEAS - MVA(P1/2T): 5.2 CM2
BH CV ECHO MEAS - PA ACC SLOPE: 1031 CM/SEC2
BH CV ECHO MEAS - PA ACC TIME: 0.06 SEC
BH CV ECHO MEAS - PA V2 MAX: 93.9 CM/SEC
BH CV ECHO MEAS - PULM A REVS DUR: 0.11 SEC
BH CV ECHO MEAS - PULM A REVS VEL: 22.2 CM/SEC
BH CV ECHO MEAS - PULM DIAS VEL: 29.6 CM/SEC
BH CV ECHO MEAS - PULM S/D: 1.75
BH CV ECHO MEAS - PULM SYS VEL: 51.8 CM/SEC
BH CV ECHO MEAS - RAP SYSTOLE: 8 MMHG
BH CV ECHO MEAS - RV MAX PG: 1.47 MMHG
BH CV ECHO MEAS - RV V1 MAX: 60.7 CM/SEC
BH CV ECHO MEAS - RV V1 VTI: 10.2 CM
BH CV ECHO MEAS - SV(LVOT): 46.6 ML
BH CV ECHO MEAS - SV(MOD-SP2): 42 ML
BH CV ECHO MEAS - SV(MOD-SP4): 35 ML
BH CV ECHO MEAS - TAPSE (>1.6): 1.3 CM
BH CV ECHO MEASUREMENTS AVERAGE E/E' RATIO: 12
BH CV XLRA - RV BASE: 2.6 CM
BH CV XLRA - RV LENGTH: 5.2 CM
BH CV XLRA - TDI S': 7 CM/SEC
BILIRUB SERPL-MCNC: 0.3 MG/DL (ref 0–1.2)
BUN SERPL-MCNC: 7 MG/DL (ref 6–20)
BUN/CREAT SERPL: 8.8 (ref 7–25)
CALCIUM SPEC-SCNC: 8.9 MG/DL (ref 8.6–10.5)
CHLORIDE SERPL-SCNC: 99 MMOL/L (ref 98–107)
CHOLEST SERPL-MCNC: 134 MG/DL (ref 0–200)
CO2 SERPL-SCNC: 24 MMOL/L (ref 22–29)
CREAT SERPL-MCNC: 0.8 MG/DL (ref 0.57–1)
DEPRECATED RDW RBC AUTO: 38.9 FL (ref 37–54)
EGFRCR SERPLBLD CKD-EPI 2021: 93.3 ML/MIN/1.73
ERYTHROCYTE [DISTWIDTH] IN BLOOD BY AUTOMATED COUNT: 12.9 % (ref 12.3–15.4)
GLOBULIN UR ELPH-MCNC: 3.6 GM/DL
GLUCOSE BLDC GLUCOMTR-MCNC: 89 MG/DL (ref 70–130)
GLUCOSE SERPL-MCNC: 85 MG/DL (ref 65–99)
HBA1C MFR BLD: 5.6 % (ref 4.8–5.6)
HCT VFR BLD AUTO: 41.1 % (ref 34–46.6)
HDLC SERPL-MCNC: 59 MG/DL (ref 40–60)
HGB BLD-MCNC: 13.9 G/DL (ref 12–15.9)
LDLC SERPL CALC-MCNC: 58 MG/DL (ref 0–100)
LDLC/HDLC SERPL: 0.96 {RATIO}
LEFT ATRIUM VOLUME INDEX: 18.1 ML/M2
MCH RBC QN AUTO: 28.1 PG (ref 26.6–33)
MCHC RBC AUTO-ENTMCNC: 33.8 G/DL (ref 31.5–35.7)
MCV RBC AUTO: 83.2 FL (ref 79–97)
PLATELET # BLD AUTO: 342 10*3/MM3 (ref 140–450)
PMV BLD AUTO: 10.2 FL (ref 6–12)
POTASSIUM SERPL-SCNC: 3.7 MMOL/L (ref 3.5–5.2)
PROT SERPL-MCNC: 7.4 G/DL (ref 6–8.5)
RBC # BLD AUTO: 4.94 10*6/MM3 (ref 3.77–5.28)
SODIUM SERPL-SCNC: 136 MMOL/L (ref 136–145)
TRIGL SERPL-MCNC: 93 MG/DL (ref 0–150)
VLDLC SERPL-MCNC: 17 MG/DL (ref 5–40)
WBC NRBC COR # BLD AUTO: 10.37 10*3/MM3 (ref 3.4–10.8)

## 2024-04-09 PROCEDURE — 92610 EVALUATE SWALLOWING FUNCTION: CPT

## 2024-04-09 PROCEDURE — A9577 INJ MULTIHANCE: HCPCS | Performed by: HOSPITALIST

## 2024-04-09 PROCEDURE — 80061 LIPID PANEL: CPT | Performed by: INTERNAL MEDICINE

## 2024-04-09 PROCEDURE — 25510000001 PERFLUTREN (DEFINITY) 8.476 MG IN SODIUM CHLORIDE (PF) 0.9 % 10 ML INJECTION: Performed by: INTERNAL MEDICINE

## 2024-04-09 PROCEDURE — 99222 1ST HOSP IP/OBS MODERATE 55: CPT | Performed by: INTERNAL MEDICINE

## 2024-04-09 PROCEDURE — 97166 OT EVAL MOD COMPLEX 45 MIN: CPT

## 2024-04-09 PROCEDURE — 82948 REAGENT STRIP/BLOOD GLUCOSE: CPT

## 2024-04-09 PROCEDURE — 70553 MRI BRAIN STEM W/O & W/DYE: CPT

## 2024-04-09 PROCEDURE — 99222 1ST HOSP IP/OBS MODERATE 55: CPT

## 2024-04-09 PROCEDURE — 83036 HEMOGLOBIN GLYCOSYLATED A1C: CPT | Performed by: INTERNAL MEDICINE

## 2024-04-09 PROCEDURE — 93306 TTE W/DOPPLER COMPLETE: CPT | Performed by: INTERNAL MEDICINE

## 2024-04-09 PROCEDURE — 85027 COMPLETE CBC AUTOMATED: CPT | Performed by: INTERNAL MEDICINE

## 2024-04-09 PROCEDURE — 0 GADOBENATE DIMEGLUMINE 529 MG/ML SOLUTION: Performed by: HOSPITALIST

## 2024-04-09 PROCEDURE — 80053 COMPREHEN METABOLIC PANEL: CPT | Performed by: INTERNAL MEDICINE

## 2024-04-09 PROCEDURE — 93306 TTE W/DOPPLER COMPLETE: CPT

## 2024-04-09 RX ORDER — ACETAMINOPHEN 325 MG/1
650 TABLET ORAL EVERY 4 HOURS PRN
Status: DISCONTINUED | OUTPATIENT
Start: 2024-04-09 | End: 2024-04-10 | Stop reason: HOSPADM

## 2024-04-09 RX ORDER — LORAZEPAM 2 MG/ML
1 INJECTION INTRAMUSCULAR EVERY 4 HOURS PRN
Status: DISCONTINUED | OUTPATIENT
Start: 2024-04-09 | End: 2024-04-10 | Stop reason: HOSPADM

## 2024-04-09 RX ADMIN — LACOSAMIDE 200 MG: 100 TABLET, FILM COATED ORAL at 20:55

## 2024-04-09 RX ADMIN — ASPIRIN 325 MG: 325 TABLET ORAL at 12:00

## 2024-04-09 RX ADMIN — AMLODIPINE BESYLATE 5 MG: 5 TABLET ORAL at 20:50

## 2024-04-09 RX ADMIN — GADOBENATE DIMEGLUMINE 17 ML: 529 INJECTION, SOLUTION INTRAVENOUS at 12:54

## 2024-04-09 RX ADMIN — ATORVASTATIN CALCIUM 40 MG: 20 TABLET, FILM COATED ORAL at 20:50

## 2024-04-09 RX ADMIN — AMITRIPTYLINE HYDROCHLORIDE 25 MG: 25 TABLET, FILM COATED ORAL at 20:50

## 2024-04-09 RX ADMIN — LACOSAMIDE 200 MG: 100 TABLET, FILM COATED ORAL at 12:00

## 2024-04-09 RX ADMIN — PERFLUTREN 3 ML: 6.52 INJECTION, SUSPENSION INTRAVENOUS at 11:24

## 2024-04-09 RX ADMIN — Medication 10 ML: at 12:12

## 2024-04-09 NOTE — THERAPY EVALUATION
Patient Name: Alexandra Xiao  : 1980    MRN: [id number]                              Today's Date: 2024       Admit Date: 2024    Visit Dx:     ICD-10-CM ICD-9-CM   1. Cerebrovascular accident (CVA), unspecified mechanism  I63.9 434.91   2. Speech disturbance, unspecified type  R47.9 784.59     Patient Active Problem List   Diagnosis    Sebaceous cyst of breast, right    Abnormal uterine bleeding    Acute CVA (cerebrovascular accident)    Microcytic anemia    Transaminitis    Epilepsy    Tobacco abuse    Major depressive disorder    Left-sided weakness    Anemia    Hyponatremia    HTN (hypertension)    History of stroke    Vitamin D deficiency    B12 deficiency    Observed sleep apnea    Snoring    Excessive daytime sleepiness    Class 1 obesity    Cerebrovascular accident (CVA) due to embolism of left middle cerebral artery    S/P placement of VNS (vagus nerve stimulation) device    Abnormal urinalysis     Past Medical History:   Diagnosis Date    Abnormal bleeding in menstrual cycle     COPD (chronic obstructive pulmonary disease)     Depression     Epilepsy     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,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    Headache, tension-type     Heavy menstrual bleeding     WITH CLOTS    Hyperlipidemia     Hypertension     Memory loss     Migraine     Seizures     Status post placement of implantable loop recorder     Stroke     MARCH AND      Past Surgical History:   Procedure Laterality Date    BREAST BIOPSY      D & C HYSTEROSCOPY ENDOMETRIAL ABLATION N/A 06/10/2022    Procedure: DILATATION AND CURETTAGE HYSTEROSCOPY NOVASURE ENDOMETRIAL ABLATION;  Surgeon: Ernesto Mendosa MD;  Location: [facility];  Service: Obstetrics/Gynecology;  Laterality: N/A;    FOOT SURGERY      pins in left foot     INSERT / REPLACE / REMOVE PACEMAKER  2022    Dr. Cristofer Chamorro    OTHER SURGICAL HISTORY      VNS plate 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    NOÉ      2022    TUBAL ABDOMINAL LIGATION       VAGUS NERVE STIMULATOR IMPLANTATION        General Information       Row Name 04/09/24 1605          OT Time and Intention    Document Type evaluation  -PP     Mode of Treatment individual therapy;occupational therapy  -PP       Row Name 04/09/24 1605          General Information    Patient Profile Reviewed yes  -PP     Prior Level of Function independent:;ADL's;all household mobility;community mobility  -PP     Existing Precautions/Restrictions no known precautions/restrictions  -PP     Barriers to Rehab none identified  -PP       Row Name 04/09/24 1605          Living Environment    People in Home spouse;child(coco), adult  -PP       Row Name 04/09/24 1605          Cognition    Orientation Status (Cognition) oriented x 3  -PP       Row Name 04/09/24 1605          Safety Issues, Functional Mobility    Impairments Affecting Function (Mobility) strength;other (see comments)  speech disturbance  -PP     Comment, Safety Issues/Impairments (Mobility) gait belt and non skid socks worn for safety  -PP               User Key  (r) = Recorded By, (t) = Taken By, (c) = Cosigned By      Initials Name Provider Type    PP Dariana Fuentes OT Occupational Therapist                     Mobility/ADL's       Row Name 04/09/24 1607          Bed Mobility    Bed Mobility bed mobility (all) activities  -PP     All Activities, [place] (Bed Mobility) supervision  -PP       Row Name 04/09/24 1607          Transfers    Transfers sit-stand transfer;stand-sit transfer  -PP       Row Name 04/09/24 1607          Sit-Stand Transfer    Sit-Stand [place] (Transfers) supervision  -PP       Row Name 04/09/24 1607          Functional Mobility    Functional Mobility- Ind. Level contact guard assist;standby assist  -PP     Functional Mobility- Device other (see comments)  no AD  -PP       Row Name 04/09/24 1607          Activities of Daily Living    BADL Assessment/Intervention lower body dressing;grooming;feeding  -PP       Row Name  04/09/24 1607          Lower Body Dressing Assessment/Training    [place] Level (Lower Body Dressing) don;socks;supervision  -PP       Row Name 04/09/24 1607          Grooming Assessment/Training    [place] Level (Grooming) set up;grooming skills  -PP       [facility] Name 04/09/24 1607          Self-Feeding Assessment/Training    Comment, (Feeding) NPO d/t testing but able to feed self  -PP               User Key  (r) = Recorded By, (t) = Taken By, (c) = Cosigned By      Initials Name Provider Type    PP Dariana Fuentes, OT Occupational Therapist                   Obj/Interventions       Row Name 04/09/24 1608          Sensory Assessment (Somatosensory)    Sensory Assessment (Somatosensory) UE sensation intact  -PP       [facility] Name 04/09/24 1608          Vision Assessment/Intervention    Visual Impairment/Limitations WFL  -PP       Row Name 04/09/24 1608          Range of Motion Comprehensive    General Range of Motion no range of motion deficits identified  -PP       Row Name 04/09/24 1608          Strength Comprehensive (MMT)    General Manual Muscle Testing (MMT) Assessment other (see comments)  -PP     Comment, General Manual Muscle Testing (MMT) Assessment some  gen weakness but BUE grossly 4+/5  -PP       [facility] Name 04/09/24 1608          Balance    Balance Assessment sitting static balance;standing static balance  -PP     Static Sitting Balance independent  -PP     Static Standing Balance supervision  -PP     Position/Device Used, Standing Balance unsupported  -PP               User Key  (r) = Recorded By, (t) = Taken By, (c) = Cosigned By      Initials Name Provider Type    PP Dariana Fuentes, OT Occupational Therapist                   Goals/Plan       Row Name 04/09/24 1609          Problem Specific Goal 1 (OT)    Problem Specific Goal 1 (OT) Pt SV/SBA for standing bal and fxnl mob w/o AD during ADLs.  -PP     Time Frame (Problem Specific Goal 1, OT) short term goal (STG);1 day  -PP      Progress/Outcome (Problem Specific Goal 1, OT) goal met  -PP               User Key  (r) = Recorded By, (t) = Taken By, (c) = Cosigned By      Initials Name Provider Type    PP Dariana Fuentes OT Occupational Therapist                   Clinical Impression       Row Name 04/09/24 1612          Pain Assessment    Pretreatment Pain Rating 0/10 - no pain  -PP     Posttreatment Pain Rating 0/10 - no pain  -PP       Row Name 04/09/24 1612          Plan of Care Review    Plan of Care Reviewed With patient;parent  -PP     Progress improving  -PP     Outcome Evaluation Pt is a 43 y.o. female with PMHx of epilepsy with VNS, HTN, obstructive sleep apnea, multiple CVA's, mitral regurgitation presents to the ED on 4/8/24 with difficulty speaking after waking up yesterday morning. Pt reports being (I) w/ ADLs and mob at BL w/o AD. Today pt is pleasant and agreeable to OT Eval. Pt and family member present report pt is close to BL and does not have any additional concerns. Pt SV for bed mob, LBD, STS and fxnl mob w/o AD. Pt does not appear to need skilled OT at this time but does c/o speech issues and may benefit from SLP. OT will s/o at this time. Reorder if anything changes.  -PP       Row Name 04/09/24 1612          Therapy Assessment/Plan (OT)    Criteria for Skilled Therapeutic Interventions Met (OT) no;no problems identified which require skilled intervention  -PP       Row Name 04/09/24 1612          Therapy Plan Review/Discharge Plan (OT)    Anticipated Discharge Disposition (OT) home;home with assist  -PP       Row Name 04/09/24 1612          Vital Signs    O2 Delivery Pre Treatment room air  -PP     Pre Patient Position Supine  -PP     Intra Patient Position Standing  -PP     Post Patient Position Supine  -PP       Row Name 04/09/24 1612          Positioning and Restraints    Pre-Treatment Position in bed  -PP     Post Treatment Position bed  -PP     In Bed notified nsg;with nsg;sitting EOB;with family/caregiver   -PP               User Key  (r) = Recorded By, (t) = Taken By, (c) = Cosigned By      Initials Name Provider Type    PP Dariana Fuentes OT Occupational Therapist                   Outcome Measures       Row Name 04/09/24 1610          How much help from another is currently needed...    Putting on and taking off regular lower body clothing? 4  -PP     Bathing (including washing, rinsing, and drying) 3  -PP     Toileting (which includes using toilet bed pan or urinal) 3  -PP     Putting on and taking off regular upper body clothing 4  -PP     Taking care of personal grooming (such as brushing teeth) 4  -PP     Eating meals 4  -PP     AM-PAC 6 Clicks Score (OT) 22  -PP       Row Name 04/09/24 0800          How much help from another person do you currently need...    Turning from your back to your side while in flat bed without using bedrails? 4  -SS     Moving from lying on back to sitting on the side of a flat bed without bedrails? 4  -SS     Moving to and from a bed to a chair (including a wheelchair)? 4  -SS     Standing up from a chair using your arms (e.g., wheelchair, bedside chair)? 4  -SS     Climbing 3-5 steps with a railing? 4  -SS     To walk in hospital room? 4  -SS     AM-PAC 6 Clicks Score (PT) 24  -SS     Highest Level of Mobility Goal 8 --> Walked 250 feet or more  -SS       Row Name 04/09/24 1610          Modified [place] Scale    Pre-Stroke Modified [place] Scale 0 - No Symptoms at all.  -PP     Modified [place] Scale 1 - No significant disability despite symptoms.  Able to carry out all usual duties and activities.  -PP       Row Name 04/09/24 1610          Functional Assessment    Outcome Measure Options AM-PAC 6 Clicks Daily Activity (OT);Modified [place]  -PP               User Key  (r) = Recorded By, (t) = Taken By, (c) = Cosigned By      Initials Name Provider Type    PP Dariana Fuentes OT Occupational Therapist    SS Mily Tobar RN Registered Nurse                    Occupational  Therapy Education       Title: PT OT SLP Therapies (In Progress)       Topic: Occupational Therapy (Not Started)       Point: ADL training (Not Started)       Description:   Instruct learner(s) on proper safety adaptation and remediation techniques during self care or transfers.   Instruct in proper use of assistive devices.                  Learner Progress:  Not documented in this visit.              Point: Precautions (Not Started)       Description:   Instruct learner(s) on prescribed precautions during self-care and functional transfers.                  Learner Progress:  Not documented in this visit.              Point: Body mechanics (Not Started)       Description:   Instruct learner(s) on proper positioning and spine alignment during self-care, functional mobility activities and/or exercises.                  Learner Progress:  Not documented in this visit.                                  OT Recommendation and Plan     Plan of Care Review  Plan of Care Reviewed With: patient, parent  Progress: improving  Outcome Evaluation: Pt is a 43 y.o. female with PMHx of epilepsy with VNS, HTN, obstructive sleep apnea, multiple CVA's, mitral regurgitation presents to the ED on 4/8/24 with difficulty speaking after waking up yesterday morning. Pt reports being (I) w/ ADLs and mob at BL w/o AD. Today pt is pleasant and agreeable to OT Eval. Pt and family member present report pt is close to BL and does not have any additional concerns. Pt SV for bed mob, LBD, STS and fxnl mob w/o AD. Pt does not appear to need skilled OT at this time but does c/o speech issues and may benefit from SLP. OT will s/o at this time. Reorder if anything changes.     Time Calculation:   Evaluation Complexity (OT)  Review Occupational Profile/Medical/Therapy History Complexity: brief/low complexity  Assessment, Occupational Performance/Identification of Deficit Complexity: 1-3 performance deficits  Clinical Decision Making Complexity (OT):  problem focused assessment/low complexity  Overall Complexity of Evaluation (OT): low complexity     Time Calculation- OT       Row Name 04/09/24 1611             Time Calculation- OT    OT Start Time 1445  -PP      OT Stop Time 1500  -PP      OT Time Calculation (min) 15 min  -PP      OT Received On 04/09/24  -PP         Untimed Charges    OT Eval/Re-eval Minutes 15  -PP         Total Minutes    Untimed Charges Total Minutes 15  -PP       Total Minutes 15  -PP                User Key  (r) = Recorded By, (t) = Taken By, (c) = Cosigned By      Initials Name Provider Type    PP [place], Dariana, OT Occupational Therapist                  Therapy Charges for Today       Code Description Service Date Service Provider Modifiers Qty    93978274281 HC OT EVAL MOD COMPLEXITY 2 4/9/2024 Dariana Fuentes, OT GO 1                 Dariana Fuentes, OT  4/9/2024

## 2024-04-09 NOTE — PROGRESS NOTES
[facility] Acute Inpt Rehab Note     Referral received via stroke order set.  Please note this is a screening only, rehab admissions will not actively be evaluating this patient.  If felt patient is appropriate for our services once therapies begin, please call our office at [phone], to initiate a full referral.    Thank you,   Debbie Kat RN   Rehab Admission Nurse

## 2024-04-09 NOTE — THERAPY EVALUATION
Acute Care - Speech Language Pathology   Swallow Initial Evaluation [facility]     Patient Name: Alexandra Xiao  : 1980  MRN: [id number]  Today's Date: 2024               Admit Date: 2024    Visit Dx:     ICD-10-CM ICD-9-CM   1. Cerebrovascular accident (CVA), unspecified mechanism  I63.9 434.91   2. Speech disturbance, unspecified type  R47.9 784.59     Patient Active Problem List   Diagnosis    Sebaceous cyst of breast, right    Abnormal uterine bleeding    Acute CVA (cerebrovascular accident)    Microcytic anemia    Transaminitis    Epilepsy    Tobacco abuse    Major depressive disorder    Left-sided weakness    Anemia    Hyponatremia    HTN (hypertension)    History of stroke    Vitamin D deficiency    B12 deficiency    Observed sleep apnea    Snoring    Excessive daytime sleepiness    Class 1 obesity    Speech disturbance    S/P placement of VNS (vagus nerve stimulation) device    Abnormal urinalysis     Past Medical History:   Diagnosis Date    Abnormal bleeding in menstrual cycle     COPD (chronic obstructive pulmonary disease)     Depression     Epilepsy     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,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    Headache, tension-type     Heavy menstrual bleeding     WITH CLOTS    Hyperlipidemia     Hypertension     Memory loss     Migraine     Seizures     Status post placement of implantable loop recorder     Stroke     MARCH AND      Past Surgical History:   Procedure Laterality Date    BREAST BIOPSY      D & C HYSTEROSCOPY ENDOMETRIAL ABLATION N/A 06/10/2022    Procedure: DILATATION AND CURETTAGE HYSTEROSCOPY NOVASURE ENDOMETRIAL ABLATION;  Surgeon: Ernesto Mendosa MD;  Location: [facility];  Service: Obstetrics/Gynecology;  Laterality: N/A;    FOOT SURGERY      pins in left foot     INSERT / REPLACE / REMOVE PACEMAKER  2022    Dr. Cristofer Chamorro    OTHER SURGICAL HISTORY      VNS plate 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    NOÉ      2022    TUBAL ABDOMINAL  LIGATION      VAGUS NERVE STIMULATOR IMPLANTATION         SLP Recommendation and Plan  SLP Swallowing Diagnosis: functional oral phase, functional pharyngeal phase (04/09/24 1200)  SLP Diet Recommendation: regular textures, thin liquids (04/09/24 1200)  Recommended Precautions and Strategies: upright posture during/after eating, small bites of food and sips of liquid (04/09/24 1200)  SLP Rec. for Method of Medication Administration: meds whole, with thin liquids (04/09/24 1200)     Monitor for Signs of Aspiration: yes, notify SLP if any concerns (04/09/24 1200)  Recommended Diagnostics: SLE/Cog/Motor Speech Evaluation (04/09/24 1200)  Swallow Criteria for Skilled Therapeutic Interventions Met: no problems identified which require skilled intervention (04/09/24 1200)  Anticipated Discharge Disposition (SLP): home with OP services, home with home health (04/09/24 1200)     Therapy Frequency (Swallow): evaluation only (04/09/24 1200)     Oral Care Recommendations: Oral Care BID/PRN (04/09/24 1200)      Plan of Care Reviewed With: patient, family  Outcome Evaluation: Clinical swallow evaluation completed. Swallow appears WFL. Recommend a regular diet with thin liquids. Meds as tolerated. Upright for PO intake, small bites/sips. SLP to follow for full speech language evaluation.      SWALLOW EVALUATION (Last 72 Hours)       SLP Adult Swallow Evaluation       Row Name 04/09/24 1200                   Rehab Evaluation    Document Type evaluation  -HS        Subjective Information no complaints  -HS        Patient Observations alert;cooperative  -HS        Patient/Family/Caregiver Comments/Observations Family present during evaluation session.  -HS        Patient Effort excellent  -HS        Symptoms Noted During/After Treatment none  -HS           General Information    Patient Profile Reviewed yes  -HS        Pertinent History Of Current Problem Admitted with altered speech, trouble thinking of words. Does report some  improvement. CT: subacute left MCA infarct. MRI pending. Hx of seizure d/o with vagus nerve stimulator, hx CVA (04/2022, 03/2023). Patient reported previous speech deficits with prior strokes resolved. No hx of dysphagia per patient report.  -HS        Current Method of Nutrition NPO;other (see comments)  until SLP eval  -HS        Precautions/Limitations, Hearing WFL;for purposes of eval  -HS        Prior Level of Function-Communication WFL  -HS        Prior Level of Function-Swallowing no diet consistency restrictions;safe, efficient swallowing in all situations  -HS        Plans/Goals Discussed with patient and family  -HS        Barriers to Rehab none identified  -HS        Patient's Goals for Discharge return to PO diet  -HS        Family Goals for Discharge patient able to return to PO diet  -HS           Oral Motor Structure and Function    Secretion Management WNL/WFL  -HS        Mucosal Quality moist, healthy  -HS           Oral Musculature and Cranial Nerve Assessment    Oral Motor General Assessment other (see comments)  subtle right asymmetry but strength appears functional  -HS           General Eating/Swallowing Observations    Respiratory Support Currently in Use room air  -HS        Eating/Swallowing Skills self-fed;appropriate self-feeding skills observed  -HS        Positioning During Eating upright in bed  -HS        Utensils Used spoon;cup;straw  -HS        Consistencies Trialed thin liquids;pureed;soft to chew textures;regular textures  -HS           Respiratory    Respiratory Status WFL  -HS           Clinical Swallow Eval    Clinical Swallow Evaluation Summary Clinical swallow evaluation completed. Oral phase characterized by slow mastication, however functional. No overt s/s of aspiration with cup or straw sips of thin liquids, puree, soft to chew, or regular trials.  -HS           SLP Evaluation Clinical Impression    SLP Swallowing Diagnosis functional oral phase;functional pharyngeal phase   -HS        Functional Impact no impact on function  -HS        Swallow Criteria for Skilled Therapeutic Interventions Met no problems identified which require skilled intervention  -           Recommendations    Therapy Frequency (Swallow) evaluation only  -HS        SLP Diet Recommendation regular textures;thin liquids  -HS        Recommended Diagnostics SLE/Cog/Motor Speech Evaluation  -        Recommended Precautions and Strategies upright posture during/after eating;small bites of food and sips of liquid  -HS        Oral Care Recommendations Oral Care BID/PRN  -HS        SLP Rec. for Method of Medication Administration meds whole;with thin liquids  -HS        Monitor for Signs of Aspiration yes;notify SLP if any concerns  -HS        Anticipated Discharge Disposition (SLP) home with OP services;home with home health  -HS                  User Key  (r) = Recorded By, (t) = Taken By, (c) = Cosigned By      Initials Name Effective Dates    Yovana Nevarez SLP 01/05/24 -                     EDUCATION  The patient has been educated in the following areas:   Communication Impairment Dysphagia (Swallowing Impairment).        Time Calculation:    Time Calculation- SLP       Row Name 04/09/24 1358             Time Calculation- SLP    SLP Start Time 1115  -HS      SLP Received On 04/09/24  -HS         Untimed Charges    SLP Eval/Re-eval  ST Eval Oral Pharyng Swallow - [id number]  -HS      [id number] Eval Oral Pharyng Swallow Minutes 60  -HS         Total Minutes    Untimed Charges Total Minutes 60  -HS       Total Minutes 60  -HS                User Key  (r) = Recorded By, (t) = Taken By, (c) = Cosigned By      Initials Name Provider Type     Yovana Kramer SLP Speech and Language Pathologist                       GERARD Patricia  4/9/2024

## 2024-04-09 NOTE — CONSULTS
Neurology Consult Note    Consult Date: 4/9/2024    Referring MD: Kumar Perdomo MD    Reason for Consult I have been asked to see the patient in neurological consultation to render advice and opinion regarding speech disturbances.    Alexandra Xiao is a 43 y.o. female with PMH of epilepsy with VNS, HTN, obstructive sleep apnea, multiple CVA's, mitral regurgitation presents to the ED with difficulty speaking after waking up yesterday morning. Her last known well was 11pm Sunday night. Her symptoms are improving a bit today. She states she still has some trouble with naming objects. Patient denies any weakness, numbness or tingling, visual disturbances, headache, N/V, fevers, or chills. She recently had her vagus nerve stimulate. VS on arrival were 133/105, pulse 112, and temp 97.7 degrees F, and RR of 16.     History of strokes:  Patient first strokes was in March 2022 where MRI demonstrated two 4-5 mm areas of restricted diffusion, consistent with acute infarcts are identified involving the right frontal lobe within the right MCA vascular distribution. CT head at time showed no acute abnormality. CTA head and neck demonstrated no significant stenosis, focal aneurysm, or discrete vascular cut off. Hypercoaguable panel done on April 2022 following which was negative for YOUSIF, antithrombin II, factor 5 leiden, factor 5 activity, factor II DNA analysis, protein s, protein c, lupus, beta 2 glycoprotein, ANCA and CRP. NOÉ done which demonstrated EF of 60% and moderate to severe MVP/MVR.     In March 2023 MRI demonstrated:  multiple tiny old infarcts in multiple different vascular territories including 3 separate tiny 5 to 8 mm old posterior superior left frontal cortical infarcts in the left middle cerebral  artery territory and two 5 mm old infarcts in the right hemisphere 1  involving the posterior superior right frontal subcortical white matter  and one involving the posterior superior right frontal cortex and  these  old infarcts in the right middle cerebral artery territory occurred back  in April of 2022. Furthermore there are multiple tiny 3 to 6 mm old  posterior lateral and inferior lateral cerebellar infarcts bilaterally  in the PICA territories. The multiple different vascular territories of  these chronic infarcts suggest the patient may have a central cardiac or  embolic source TTE done demonstrating EF of 59%, left atrial cavity borderline dilated, moderate mitral valve regurgitation consistent with with prior rheumatic heart disease and mild mitral stenosis.    Past Medical/Surgical Hx:  Past Medical History:   Diagnosis Date    Abnormal bleeding in menstrual cycle     COPD (chronic obstructive pulmonary disease)     Depression     Epilepsy     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,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    Headache, tension-type     Heavy menstrual bleeding     WITH CLOTS    Hyperlipidemia     Hypertension     Memory loss     Migraine     Seizures     Status post placement of implantable loop recorder     Stroke     MARCH AND APRIL, 2022     Past Surgical History:   Procedure Laterality Date    BREAST BIOPSY      D & C HYSTEROSCOPY ENDOMETRIAL ABLATION N/A 06/10/2022    Procedure: DILATATION AND CURETTAGE HYSTEROSCOPY NOVASURE ENDOMETRIAL ABLATION;  Surgeon: Ernesto Mendosa MD;  Location: [facility];  Service: Obstetrics/Gynecology;  Laterality: N/A;    FOOT SURGERY      pins in left foot     INSERT / REPLACE / REMOVE PACEMAKER  04/01/2022    Dr. Cristofer Chamorro    OTHER SURGICAL HISTORY      VNS plate 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    NOÉ      04/2022    TUBAL ABDOMINAL LIGATION      VAGUS NERVE STIMULATOR IMPLANTATION         Medications On Admission  Medications Prior to Admission   Medication Sig Dispense Refill Last Dose    amitriptyline (ELAVIL) 25 MG tablet TAKE 1 TABLET BY MOUTH EVERY NIGHT 30 tablet 3     amLODIPine (NORVASC) 5 MG tablet TAKE 1 TABLET BY MOUTH DAILY (Patient taking differently: Take 1  tablet by mouth Every Night.) 30 tablet 11     aspirin 325 MG tablet Take 1 tablet by mouth Daily. 30 tablet 0     atorvastatin (LIPITOR) 40 MG tablet TAKE 1 TABLET BY MOUTH EVERY NIGHT (Patient taking differently: Take 1 tablet by mouth Every Night.) 30 tablet 3     Eslicarbazepine Acetate (Aptiom) 400 MG tablet Take 1 tablet by mouth Daily. (Patient taking differently: Take 1 tablet by mouth Daily. Take with 800mg for total dose 1200mg daily) 30 tablet 5     Eslicarbazepine Acetate (Aptiom) 800 MG tablet tablet Take 1 tablet by mouth Daily. (Patient taking differently: Take 1 tablet by mouth Daily. Take with 400mg for total dose 1200mg daily.) 30 tablet 3     lacosamide (VIMPAT) 200 MG tablet Take 1 tablet by mouth Every 12 (Twelve) Hours. 60 tablet 5     traMADol (ULTRAM) 50 MG tablet Take 1 tablet by mouth Every 4 (Four) Hours As Needed for Moderate Pain.       Xcopri 50 MG tablet Take 1 tablet by mouth Daily. 30 tablet 0     Vimpat 200 MG tablet TAKE 1 TABLET BY MOUTH EVERY 12 HOURS 60 tablet 0     vitamin D (ERGOCALCIFEROL) 1.25 MG (56705 UT) capsule capsule Take 1 capsule by mouth Every 7 (Seven) Days. (Patient not taking: Reported on 3/11/2024) 8 capsule 0        Allergies:  No Known Allergies    Social Hx:  Social History     Socioeconomic History    Marital status: Single    Number of children: 2   Tobacco Use    Smoking status: Former     Current packs/day: 0.00     Average packs/day: 1 pack/day for 29.0 years (29.0 ttl pk-yrs)     Types: Cigarettes     Start date:      Quit date:      Years since quittin.2    Smokeless tobacco: Never    Tobacco comments:     Quit 2022   Vaping Use    Vaping status: Every Day    Substances: Nicotine, Flavoring    Devices: Disposable, 6% NICOTINE   Substance and Sexual Activity    Alcohol use: Not Currently    Drug use: No    Sexual activity: Yes     Partners: Male     Birth control/protection: Tubal ligation       Family Hx:  Family History   Problem  "Relation Age of Onset    Breast cancer Mother 50         age 60 METS    Arthritis Mother     Arthritis Father     Diabetes Father     Heart disease Father     Hypertension Father     Heart attack Father     Hypertension Brother     Heart attack Paternal Grandfather     Ovarian cancer Neg Hx     Uterine cancer Neg Hx     Colon cancer Neg Hx     Deep vein thrombosis Neg Hx     Pulmonary embolism Neg Hx     Malig Hyperthermia Neg Hx        Exam    /77   Pulse 96   Temp 97.9 °F (36.6 °C)   Resp 16   Ht 162.6 cm (64\")   Wt 83 kg (183 lb)   SpO2 94%   BMI 31.41 kg/m²   gen: NAD, vitals reviewed  MS: oriented x3, recent/remote memory intact, normal attention/concentration, able to follow multiple single step commands, repeats phrases with many paraphrasic errors, anomia, no neglect, normal fund of knowledge  CN: visual acuity grossly normal, visual fields full, PERRL, EOMI, facial sensation equal, no facial droop, hearing symmetric, palate elevates symmetrically, shoulder shrug equal, tongue midline  Motor: 5/5 throughout upper and lower extremities, normal tone  Sensation: intact to vibration and temperature throughout  Reflexes: 2+ throughout upper and lower extremities, downgoing plantars  Coordination: no dysmetria with finger to nose bilaterally  Gait: no ataxia, normal station    DATA:    Lab Results   Component Value Date    GLUCOSE 85 2024    CALCIUM 8.9 2024     2024    K 3.7 2024    CO2 24.0 2024    CL 99 2024    BUN 7 2024    CREATININE 0.80 2024    BCR 8.8 2024    ANIONGAP 13.0 2024     Lab Results   Component Value Date    WBC 10.37 2024    HGB 13.9 2024    HCT 41.1 2024    MCV 83.2 2024     2024     Lab Results   Component Value Date    LDL 58 2024    LDL 55 2023    LDL 44 2022     Lab Results   Component Value Date    HGBA1C 5.60 2024     Lab Results   Component " Value Date    INR 1.05 04/08/2024    INR 1.03 03/05/2023    INR 1.0 04/27/2022    PROTIME 13.9 04/08/2024    PROTIME 13.6 03/05/2023    PROTIME 12.9 04/26/2022       Lab review: LDL: 58, hemoglobin A1c: 5.60, glucose: 85,     Imaging review:   CT head without contrast:  IMPRESSION:  1. This patient has a very subtle area of loss of gray-white matter  differentiation in the lateral left frontal lobe compatible with an  acute to subacute infarct in the distribution lateral frontal branch  left MCA territory measures up to 2.5 x 1.6 x 2.7 cm in size and just  anterior inferior to this is an additional subcentimeter acute to  subacute infarct in the lateral frontal branch of the left MCA  territory. This obviously is new when compared to prior MRI of the brain  on 03/06/2023. Furthermore there is a focal area of encephalomalacia in  the inferior left occipital lobe compatible with a late subacute to  chronic inferior left occipital infarct in left PCA territory that  measures 1.9 x 2.3 x 1 cm in size and this late subacute or chronic left  PCA territory infarct is new when compared to prior MRI of the brain  03/06/2023. Different vascular territories suggest the patient may have  a central or cardiac embolic source and young patient consider PFO and  performing echocardiography to evaluate for underlying PFO or other  cardiac source.  2. There is multiple chronic subcentimeter infarcts in the superior left  frontal parietal cortex in the left MCA territory and superior medial  right parietal lobe in the right ALEXYS territory and these are  subcentimeter in size and well demonstrated on prior MRI of the brain  03/06/2023 but are unable be appreciated on this head CT. There is an  additional 5 mm old posterior lateral right cerebellar infarct in right  PICA territory unchanged since 03/06/2023. The remainder of the head CT  is normal. I recommend an MRI of the brain to further date the left  frontal lobe infarcts.    CTA  head and neck:  FINDINGS:  No acute intracranial hemorrhage, midline shift or mass effect.  Hypodensity at the left renal cortex again suggests acute subacute  infarct. Encephalomalacia is again seen in the left occipital lobe.  No enhancing lesions are noted following contrast material  administration.  Ventricles, cisterns, cerebral sulci are unremarkable for patient age.  The visualized paranasal sinuses, orbits, mastoid air cells are  unremarkable.   The CT angiography images show no hemodynamically significant focal  stenosis, aneurysm, or dissection in the cervical carotid or vertebral  arteries, or in the arteries at the base of the brain. The proximal  right vertebral artery is partly obscured by artifact from dense  adjacent venous opacification.  Bilateral thyroid nodularity is evident, suboptimally evaluated with  this technique, thyroid ultrasound correlation advised as indicated.  Paraseptal emphysematous changes are seen at the lung apices.  IMPRESSION:  1. No hemodynamically significant focal stenosis, aneurysm, or  dissection in the cervical carotid or vertebral arteries or in the  arteries at the base of the brain.  2. Redemonstration of evidence of acute to subacute infarct at the left  frontal cortex. Encephalomalacia left occipital lobe. No acute  intracranial hemorrhage or hydrocephalus. No enhancing lesion in brain.  If there is further clinical concern, MRI could be considered for  further evaluation.  3. Thyroid nodularity.    TTE:   Left ventricular systolic function is normal. Left ventricular ejection fraction appears to be 66 - 70%.    Left ventricular diastolic function is consistent with (grade I) impaired relaxation.    Normal right ventricular cavity size and systolic function noted.    Trace tricuspid valve regurgitation is present. Insufficient TR velocity profile to estimate the right ventricular systolic pressure.    There is a trivial circumferential pericardial effusion. There  is no evidence of cardiac tamponade.    MRI brain:  There is a 3 x 2 x 2.5 cm acute infarct in the posterior  lateral left frontal lobe in the distribution of the posterior lateral  frontal branches of the left middle cerebral artery territory.  Furthermore, since the prior MRI of the brain on 03/06/2023, just over a  year ago, the patient has developed a 2.2 x 1.6 x 1.5 cm old inferior  left occipital infarct and this old infarct is in the left posterior  cerebral artery territory. Otherwise, there has been no change since  prior MRI of the brain on 03/06/2023. The patient has tiny subcentimeter  old cortical infarcts in the superior left frontal cortex and anterior  superior left parietal cortex in the left middle cerebral artery  territory and additional 5 x 3 mm old superior right frontal cortical  infarct in the right MCA territory. The patient has 2 separate 5 x 3 mm  old posterior right cerebellar infarcts in the right PICA territory and  an 11 x 4 mm old inferior lateral left cerebellar infarct in the left  PICA territory.  The multiple different vascular territories of these  chronic infarcts as well as the acute infarct in the lateral left  frontal lobe suggests the patient may have a chronic central or cardiac  embolic source or possibly a PFO in this young patient who is only 43  years of age. Correlation with echocardiography findings is suggested.       Diagnoses:  Acute Left MCA infarct, likely cardioembolic in nature, patient has strokes in multiple vascular territories   Chronic left PCA infarct, new since prior imaging in March 2023  Epilepsy-Vagus nerve stimulator, continue vimpat 200 mg q 12 hours, eslicarbazepine 1200 mg daily, and Xcorpi 50 mg daily  HTN-continue amlodipine   HLD: continue Lipitor 40 mg daily  TULIO: continue CPAP    Comment: Patient with two right MCA infarcts in March 2022. Then in March 2023, patient presented with multiple small left MCA,multiple tiny 3 to 6 mm old  posterior  lateral and inferior lateral cerebellar infarcts bilaterally  in the PICA territories. Today Patient MRI demonstrated acute infarct in posterior lateral frontal branch of left MCA and a chronic inferior left occipital lobe infarct in left PCA territory that is new from prior imaging. Patient CTH also confirmed these new strokes from prior imaging. CTA head and neck showed no acute abnormality. TTE demonstrated EF of 66% and trace tricuspid regurgitation. TTE ordered to further work up.      PLAN:   ASA 81 mg   Atorvastatin 40 mg  NOÉ  Hematology/oncology work up for hypercoag  Vagus nerve stimulator, continue vimpat 200 mg q 12 hours, eslicarbazepine 1200 mg daily, and Xcorpi 50 mg daily  Continue compliance with CPAP  Telemetry to monitor for arrhythmia  VTE prophylaxis  Neuro checks, if change in exam call neuro oncall  PT/OT when appropriate     Recommendations discussed with Dr. Durant and he is in agreement with plan.

## 2024-04-09 NOTE — PLAN OF CARE
Goal Outcome Evaluation:  Plan of Care Reviewed With: patient, parent        Progress: improving  Outcome Evaluation: Pt is a 43 y.o. female with PMHx of epilepsy with VNS, HTN, obstructive sleep apnea, multiple CVA's, mitral regurgitation presents to the ED on 4/8/24 with difficulty speaking after waking up yesterday morning. Pt reports being (I) w/ ADLs and mob at BL w/o AD. Today pt is pleasant and agreeable to OT Eval. Pt and family member present report pt is close to BL and does not have any additional concerns. Pt SV for bed mob, LBD, STS and fxnl mob w/o AD. Pt does not appear to need skilled OT at this time but does c/o speech issues and may benefit from SLP. OT will s/o at this time. Reorder if anything changes.      Anticipated Discharge Disposition (OT): home, home with assist

## 2024-04-09 NOTE — PROGRESS NOTES
Name: Alexandra Xiao ADMIT: 2024   : 1980  PCP: Annalee Huddleston APRN    MRN: [id number] LOS: 1 days   AGE/SEX: 43 y.o. female  ROOM: [facility]     Subjective   Subjective   Feels her speech is little better today.  Otherwise no new complaints.  Swallowing okay.    Review of Systems     Objective   Objective   Vital Signs  Temp:  [97.9 °F (36.6 °C)-98.8 °F (37.1 °C)] 97.9 °F (36.6 °C)  Heart Rate:  [] 96  Resp:  [16-20] 16  BP: (129-156)/() 131/77  SpO2:  [93 %-98 %] 94 %  on   ;   Device (Oxygen Therapy): room air  Body mass index is 31.41 kg/m².  Physical Exam  Vitals and nursing note reviewed.   Constitutional:       General: She is not in acute distress.  Cardiovascular:      Rate and Rhythm: Normal rate and regular rhythm.   Pulmonary:      Effort: Pulmonary effort is normal.      Breath sounds: Normal breath sounds.   Chest:      Comments: Left upper chest wall small surgical wound CDI  Abdominal:      General: Bowel sounds are normal.      Palpations: Abdomen is soft.      Tenderness: There is no abdominal tenderness.   Musculoskeletal:         General: No swelling.   Skin:     General: Skin is warm and dry.   Neurological:      Mental Status: She is alert. Mental status is at baseline.            Results Review:       I reviewed the patient's new clinical results.  Results from last 7 days   Lab Units 24  0433 24  1228   WBC 10*3/mm3 10.37 11.67*   HEMOGLOBIN g/dL 13.9 14.6   PLATELETS 10*3/mm3 342 332     Results from last 7 days   Lab Units 24  0433 24  1358   SODIUM mmol/L 136 136   POTASSIUM mmol/L 3.7 3.6   CHLORIDE mmol/L 99 101   CO2 mmol/L 24.0 23.0   BUN mg/dL 7 5*   CREATININE mg/dL 0.80 0.56*   GLUCOSE mg/dL 85 91   EGFR mL/min/1.73 93.3 115.6     Results from last 7 days   Lab Units 24  0433 24  1358   CALCIUM mg/dL 8.9 9.0   ALBUMIN g/dL 3.8 4.0     Results from last 7 days   Lab Units 24  0433   HDL CHOL mg/dL 59   LDL CHOL mg/dL  58   HEMOGLOBIN A1C % 5.60     Glucose   Date/Time Value Ref Range Status   04/09/2024 0611 89 70 - 130 mg/dL Final   04/08/2024 2053 95 70 - 130 mg/dL Final   04/08/2024 1219 91 70 - 130 mg/dL Final       MRI Brain With & Without Contrast  Narrative: MRI OF THE BRAIN WITH AND WITHOUT CONTRAST ON 04/09/2024     CLINICAL HISTORY: Patient had abnormal head CT yesterday demonstrating  acute stroke in the lateral left frontal lobe.      TECHNIQUE: Axial T1, FLAIR, fat-suppressed T2, axial diffusion and  gradient echo T2, sagittal T1 and postcontrast axial fat-suppressed T1  and coronal T1-weighted images were obtained of the entire head.     This study is correlated to an outside MRI of the brain on 03/30/2022  and MRIs of the brain from [facility] on 04/22/2022 and  03/06/2023. This is also correlated to yesterday's head CT and CT  angiogram of the head and neck on 04/08/2024.     FINDINGS: There is an area of intense increased uptake on the diffusion  weighted images in the posterior lateral left frontal lobe parenchyma  that is bright on the FLAIR images and dark on the ADC maps and is  consistent with an area of cytotoxic edema from an acute infarct  measuring up to 3 x 2 x 2.5 cm in medial lateral, anterior posterior and  cranial caudal dimensions in the distribution of posterior lateral  frontal branches of the left middle cerebral artery territory. Since  prior MRI of the brain on 03/06/2023 there has been development of a  focal area of encephalomalacia in the posterior inferior left occipital  lobe compatible with an old posterior inferior left occipital lobe  infarct measuring 2.2 x 1.6 x 1.5 cm in the distribution of the inferior  occipital branch of the left posterior cerebral artery territory. There  are 2 separate 5 x 3 mm old posterior inferior right cerebellar infarcts  in the right PICA territory that are unchanged since 03/06/2023.  There  is an additional 11 x 4 mm old inferior lateral  left cerebellar infarct  and a 3 mm old posterior medial left cerebellar infarct and these are in  the left PICA territory. Furthermore, patient has very tiny areas of  cortical encephalomalacia compatible with tiny old infarcts including a  7 x 5 mm old superior left frontal cortical infarct and an additional 5  x 3 mm old anterior superior left parietal cortical infarct and these  are in the distribution of the superior frontal parietal branches of the  left middle cerebral artery territory and there is an additional 5 x 3  mm old superior right frontal cortical infarct in the right MCA  territory. The remainder of the brain parenchyma is normal in signal  intensity. The ventricles are normal in size. I see no mass effect and  no midline shift.  No extra-axial fluid collections are identified. No  abnormal areas of enhancement are seen in the head. The calvarium and  skull base are normal in appearance. The paranasal sinuses, mastoid air  cells and middle ear cavities are clear. The orbits are normal in  appearance. Good flow voids are demonstrated in the cerebral vessels and  in the dural venous sinuses.     Impression: There is a 3 x 2 x 2.5 cm acute infarct in the posterior  lateral left frontal lobe in the distribution of the posterior lateral  frontal branches of the left middle cerebral artery territory.  Furthermore, since the prior MRI of the brain on 03/06/2023, just over a  year ago, the patient has developed a 2.2 x 1.6 x 1.5 cm old inferior  left occipital infarct and this old infarct is in the left posterior  cerebral artery territory. Otherwise, there has been no change since  prior MRI of the brain on 03/06/2023. The patient has tiny subcentimeter  old cortical infarcts in the superior left frontal cortex and anterior  superior left parietal cortex in the left middle cerebral artery  territory and additional 5 x 3 mm old superior right frontal cortical  infarct in the right MCA territory. The  patient has 2 separate 5 x 3 mm  old posterior right cerebellar infarcts in the right PICA territory and  an 11 x 4 mm old inferior lateral left cerebellar infarct in the left  PICA territory.  The multiple different vascular territories of these  chronic infarcts as well as the acute infarct in the lateral left  frontal lobe suggests the patient may have a chronic central or cardiac  embolic source or possibly a PFO in this young patient who is only 43  years of age. Correlation with echocardiography findings is suggested.     The results were communicated to Dr. Durant from stroke neurology by  telephone 04/09/2024 at 1:25 p.m.     Adult transthoracic echo complete    Left ventricular systolic function is normal. Left ventricular ejection   fraction appears to be 66 - 70%.    Left ventricular diastolic function is consistent with (grade I)   impaired relaxation.    Normal right ventricular cavity size and systolic function noted.    Trace tricuspid valve regurgitation is present. Insufficient TR   velocity profile to estimate the right ventricular systolic pressure.    There is a trivial circumferential pericardial effusion. There is no   evidence of cardiac tamponade.    Results for orders placed during the hospital encounter of 04/08/24    Adult transthoracic echo complete    Interpretation Summary    Left ventricular systolic function is normal. Left ventricular ejection fraction appears to be 66 - 70%.    Left ventricular diastolic function is consistent with (grade I) impaired relaxation.    Normal right ventricular cavity size and systolic function noted.    Trace tricuspid valve regurgitation is present. Insufficient TR velocity profile to estimate the right ventricular systolic pressure.    There is a trivial circumferential pericardial effusion. There is no evidence of cardiac tamponade.      I have personally reviewed all medications:  Scheduled Medications  amitriptyline, 25 mg, Oral,  Nightly  amLODIPine, 5 mg, Oral, Nightly  aspirin, 325 mg, Oral, Daily   Or  aspirin, 300 mg, Rectal, Daily  atorvastatin, 40 mg, Oral, Nightly  Cenobamate, 50 mg, Oral, Daily  Eslicarbazepine Acetate, 1,200 mg, Oral, Daily  lacosamide, 200 mg, Oral, Q12H    Infusions  sodium chloride, 75 mL/hr, Last Rate: 75 mL/hr (04/08/24 2152)    Diet  Diet: Regular/House; Fluid Consistency: Thin (IDDSI 0)    I have personally reviewed:  [x]  Laboratory   [x]  Microbiology   [x]  Radiology   [x]  EKG/Telemetry  [x]  Cardiology/Vascular   []  Pathology    []  Records         Assessment/Plan     Active Hospital Problems    Diagnosis  POA    **Cerebrovascular accident (CVA) due to embolism of left middle cerebral artery [I63.412]  Yes    S/P placement of VNS (vagus nerve stimulation) device [Z96.89]  Not Applicable    Abnormal urinalysis [R82.90]  Yes    HTN (hypertension) [I10]  Yes    Epilepsy [G40.909]  Yes      Resolved Hospital Problems   No resolved problems to display.       43 y.o. female with Cerebrovascular accident (CVA) due to embolism of left middle cerebral artery.    She had hypercoagulable workup April 2022 that looks negative.  It is while she was in with stroke and seen by [facility].  I do not see a follow-up note from them.  They did NOÉ at that time as well that showed moderate to severe MR but no PFO.  Neurology requesting consultation from cardiology and hematology to repeat studies.    She been restarted on her home medications including antiseizure medication.    Hemoglobin A1c normal.      SCDs for DVT prophylaxis.  Full code.  Discussed with patient, family, and consulting provider.  Anticipate discharge home with family timing yet to be determined.      Hermilo Victor MD  [facility]  04/09/24  14:05 EDT

## 2024-04-09 NOTE — CONSULTS
Date of Consultation: 24    Referral Provider: Hermilo Victor MD    Reason for Consultation: NOÉ evaluation    Encounter Provider: Tom Hannon MD    Group of Service: [facility]     Patient Name: Alexandra Xiao    :1980    Chief complaint: Speech difficulty.    History of Present Illness:      Alexandra Xiao is a 43 year-old female who follows with Dr. Lester and Dr. Dumont in our office. She has a past medical history significant for COPD, depression, epilepsy, hyperlipidemia, hypertension, and history of 2 strokes in . She has a vagus nerve stimulator.     She had a NOÉ in 2022 that showed no evidence of intracardiac shunt or thrombus. There was also a loop recorder that was placed in . Last year, when she followed up with Dr. Lester, there had been evidence of atrial arrhythmias. An echocardiogram at that time showed normal LV systolic function along with rheumatic changes of the mitral valve with moderate mitral regurgitation.     She presented to the emergency department on 2024 with complaints of speech disturbance. Her last known well was 11PM last night when she went to bed.  She has been found to have an acute left MCA CVA, mainly involving the posterior lateral left frontal lobe.  There was also an old inferior left occipital infarct in the left PCA territory which was new compared to her prior MRI at the time of her previous strokes.    On questioning, the patient has not had any chest pain or shortness of breath.  She did have a generator change of her vagus nerve stimulator on 2024.  Her loop recorder has not shown any atrial fibrillation on last interrogation, in 2024.  She does not have any issues with swallowing or any contraindications to a NOÉ.        ECHO 2024       Left ventricular systolic function is normal. Left ventricular ejection fraction appears to be 66 - 70%.    Left ventricular diastolic function is consistent with  (grade I) impaired relaxation.    Normal right ventricular cavity size and systolic function noted.    Trace tricuspid valve regurgitation is present. Insufficient TR velocity profile to estimate the right ventricular systolic pressure.    There is a trivial circumferential pericardial effusion. There is no evidence of cardiac tamponade.    Past Medical History:   Diagnosis Date    Abnormal bleeding in menstrual cycle     COPD (chronic obstructive pulmonary disease)     Depression     Epilepsy     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,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    Headache, tension-type     Heavy menstrual bleeding     WITH CLOTS    Hyperlipidemia     Hypertension     Memory loss     Migraine     Seizures     Status post placement of implantable loop recorder     Stroke     MARCH AND APRIL, 2022         Past Surgical History:   Procedure Laterality Date    BREAST BIOPSY      D & C HYSTEROSCOPY ENDOMETRIAL ABLATION N/A 06/10/2022    Procedure: DILATATION AND CURETTAGE HYSTEROSCOPY NOVASURE ENDOMETRIAL ABLATION;  Surgeon: Ernesto Mendosa MD;  Location: [facility];  Service: Obstetrics/Gynecology;  Laterality: N/A;    FOOT SURGERY      pins in left foot     INSERT / REPLACE / REMOVE PACEMAKER  04/01/2022    Dr. Cristofer Chamorro    OTHER SURGICAL HISTORY      VNS plate 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    NOÉ      04/2022    TUBAL ABDOMINAL LIGATION      VAGUS NERVE STIMULATOR IMPLANTATION           No Known Allergies      No current facility-administered medications on file prior to encounter.     Current Outpatient Medications on File Prior to Encounter   Medication Sig Dispense Refill    amitriptyline (ELAVIL) 25 MG tablet TAKE 1 TABLET BY MOUTH EVERY NIGHT 30 tablet 3    amLODIPine (NORVASC) 5 MG tablet TAKE 1 TABLET BY MOUTH DAILY (Patient taking differently: Take 1 tablet by mouth Every Night.) 30 tablet 11    aspirin 325 MG tablet Take 1 tablet by mouth Daily. 30 tablet 0    atorvastatin (LIPITOR) 40 MG tablet TAKE 1 TABLET  BY MOUTH EVERY NIGHT (Patient taking differently: Take 1 tablet by mouth Every Night.) 30 tablet 3    Eslicarbazepine Acetate (Aptiom) 400 MG tablet Take 1 tablet by mouth Daily. (Patient taking differently: Take 1 tablet by mouth Daily. Take with 800mg for total dose 1200mg daily) 30 tablet 5    Eslicarbazepine Acetate (Aptiom) 800 MG tablet tablet Take 1 tablet by mouth Daily. (Patient taking differently: Take 1 tablet by mouth Daily. Take with 400mg for total dose 1200mg daily.) 30 tablet 3    lacosamide (VIMPAT) 200 MG tablet Take 1 tablet by mouth Every 12 (Twelve) Hours. 60 tablet 5    traMADol (ULTRAM) 50 MG tablet Take 1 tablet by mouth Every 4 (Four) Hours As Needed for Moderate Pain.      Xcopri 50 MG tablet Take 1 tablet by mouth Daily. 30 tablet 0    Vimpat 200 MG tablet TAKE 1 TABLET BY MOUTH EVERY 12 HOURS 60 tablet 0    vitamin D (ERGOCALCIFEROL) 1.25 MG (70418 UT) capsule capsule Take 1 capsule by mouth Every 7 (Seven) Days. (Patient not taking: Reported on 3/11/2024) 8 capsule 0         Social History     Socioeconomic History    Marital status: Single    Number of children: 2   Tobacco Use    Smoking status: Former     Current packs/day: 0.00     Average packs/day: 1 pack/day for 29.0 years (29.0 ttl pk-yrs)     Types: Cigarettes     Start date:      Quit date:      Years since quittin.2    Smokeless tobacco: Never    Tobacco comments:     Quit 2022   Vaping Use    Vaping status: Every Day    Substances: Nicotine, Flavoring    Devices: Disposable, 6% NICOTINE   Substance and Sexual Activity    Alcohol use: Not Currently    Drug use: No    Sexual activity: Yes     Partners: Male     Birth control/protection: Tubal ligation         Family History   Problem Relation Age of Onset    Breast cancer Mother 50         age 60 METS    Arthritis Mother     Arthritis Father     Diabetes Father     Heart disease Father     Hypertension Father     Heart attack Father     Hypertension  "Brother     Heart attack Paternal Grandfather     Ovarian cancer Neg Hx     Uterine cancer Neg Hx     Colon cancer Neg Hx     Deep vein thrombosis Neg Hx     Pulmonary embolism Neg Hx     Malig Hyperthermia Neg Hx        REVIEW OF SYSTEMS:   Pertinent positives are noted in the HPI above.  Otherwise, all other systems were reviewed, and are negative.     Objective:     Vitals:    04/09/24 0347 04/09/24 0720 04/09/24 1123 04/09/24 1547   BP: 129/80 131/77  135/84   BP Location: Left arm      Patient Position: Lying      Pulse: 99 96  108   Resp: 16      Temp: 98.2 °F (36.8 °C) 97.9 °F (36.6 °C)  98.4 °F (36.9 °C)   TempSrc: Oral      SpO2: 94%   95%   Weight:   83 kg (183 lb)    Height:   162.6 cm (64\")      Body mass index is 31.41 kg/m².  Flowsheet Rows      Flowsheet Row First Filed Value   Admission Height 162.6 cm (64.02\") Documented at 04/08/2024 1222   Admission Weight 84.5 kg (186 lb 4.6 oz) Documented at 04/08/2024 1222             General:    No acute distress, alert and oriented x4, pleasant                   Head:    Normocephalic, atraumatic.   Eyes:          Conjunctivae and sclerae normal, no icterus.   Throat:   No oral lesions, no thrush, oral mucosa moist.    Neck:   Supple, trachea midline.   Lungs:     Clear to auscultation bilaterally     Heart:    Regular rhythm and normal rate.  No murmurs, gallops, or rubs noted.   Abdomen:     Soft, non-tender, non-distended, positive bowel sounds.    Extremities:   No clubbing, cyanosis, or edema.     Pulses:   Pulses palpable and equal bilaterally.    Skin:   No bleeding or rash.   Neuro:   Non-focal.  Complete neurological examination was not performed.   Psychiatric:   Normal mood and flat affect.       Lab Review:                Results from last 7 days   Lab Units 04/09/24  0433   SODIUM mmol/L 136   POTASSIUM mmol/L 3.7   CHLORIDE mmol/L 99   CO2 mmol/L 24.0   BUN mg/dL 7   CREATININE mg/dL 0.80   GLUCOSE mg/dL 85   CALCIUM mg/dL 8.9     Results from " last 7 days   Lab Units 04/08/24  1358   CK TOTAL U/L 30     Results from last 7 days   Lab Units 04/09/24  0433   WBC 10*3/mm3 10.37   HEMOGLOBIN g/dL 13.9   HEMATOCRIT % 41.1   PLATELETS 10*3/mm3 342     Results from last 7 days   Lab Units 04/08/24  1228   INR  1.05   APTT seconds 25.1     Results from last 7 days   Lab Units 04/09/24  0433   CHOLESTEROL mg/dL 134         Results from last 7 days   Lab Units 04/09/24  0433   CHOLESTEROL mg/dL 134   TRIGLYCERIDES mg/dL 93   HDL CHOL mg/dL 59   LDL CHOL mg/dL 58       EKG (reviewed by me personally):    4/8/2024 7/6/2023        Assessment:   1.  Acute left MCA CVA  2.  Chronic left PCA CVA, new since imaging in March 2023 (also prior strokes in 2022)  3.  Epilepsy with vagus nerve stimulator (generator change out on 4/5/2024)  4.  Mitral regurgitation  5.  Former tobacco use, quit in 2022  6.  Status post [employer] loop recorder placement in 2022  7.  Hypertension    Plan:       Again, she has had multiple and recurrent strokes in multiple distributions.  This strongly suggests an embolic source.  She had a NOÉ in 2022 which showed moderate to severe mitral regurgitation, but no embolic source was noted at that time.  Her last loop recorder interrogation was in March 2024, no atrial fibrillation was noted.  I will have her [employer]tr[employer] loop recorder interrogated again tomorrow, although I am doubtful that atrial fibrillation is going to be the cause here given that she has not had any up to this point.      I do agree that a repeat NOÉ is indicated given the situation and her young age.  She has no contraindications to a NOÉ, and has no difficulties with swallowing.  We will plan on proceeding with this tomorrow morning.    She is currently on aspirin and statin therapy.  I will see what the NOÉ shows, although I would have a low threshold to start her on systemic anticoagulation (potentially with Eliquis) if there are no contraindications from a neurology  standpoint.  Hematology has also been consulted for a repeat hypercoagulable workup.    Thank you very much for this consult.    Brandon Hannon MD

## 2024-04-09 NOTE — PLAN OF CARE
Goal Outcome Evaluation:      VSS.Neuro consulted for possible stroke.Q4H NIHSS.Strict NPO pending speech eval.Continues on IVF.Spouse at bedside,safety maintained.Neurology consult called in.WCTM.

## 2024-04-09 NOTE — CONSULTS
Subjective     REASON FOR CONSULTATION:  Provide an opinion on any further workup or treatment on:    Stroke  Evaluate for hypercoagulable state                       REQUESTING PHYSICIAN: Kumar Perdomo MD    HISTORY OF PRESENT ILLNESS:      Alexandra Xiao is a 43 y.o. patient who was admitted on 4/8/2024.  Patient was previously seen by Dr. Grissom in April 2022 when she had acute stroke.  She was admitted to [facility] on 3/30/2022 with CVA.  She was seen by neurology and they recommended continuing aspirin and statin.  Thrombophilia workup was obtained which was negative including lupus anticoagulant and anticardiolipin antibodies.  She had seizure disorder and was on Vimpat and Aptiom.  She was found to have anemia which was attributed to iron and folate deficiency and was started on oral iron and folic acid.  She was discharged on aspirin and statin. Patient had another stroke in March 2023.    Patient presented to the ER on 4/8/2024 with altered speech.  She was unable to get words out and it started the night before admission.  And her family noticed that she was not able to get the words right.  She was able to understand what people are saying.  She was brought to the ER.  CT of the head was obtained and revealed subacute left MCA territory infarct which was new compared to MRI from March 2023.  She was admitted and was seen by neurology on cardiology.     Patient's family reports that they noticed some improvement in her speech since admission.    Past Medical History:   Diagnosis Date    Abnormal bleeding in menstrual cycle     COPD (chronic obstructive pulmonary disease)     Depression     Epilepsy     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,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    Headache, tension-type     Heavy menstrual bleeding     WITH CLOTS    Hyperlipidemia     Hypertension     Memory loss     Migraine     Seizures     Status post placement of implantable loop recorder     Stroke     MARCH AND APRIL, 2022     Past Surgical  History:   Procedure Laterality Date    BREAST BIOPSY      D & C HYSTEROSCOPY ENDOMETRIAL ABLATION N/A 06/10/2022    Procedure: DILATATION AND CURETTAGE HYSTEROSCOPY NOVASURE ENDOMETRIAL ABLATION;  Surgeon: Ernesto Mendosa MD;  Location: [facility];  Service: Obstetrics/Gynecology;  Laterality: N/A;    FOOT SURGERY      pins in left foot     INSERT / REPLACE / REMOVE PACEMAKER  2022    Dr. Cristofer Chamorro    OTHER SURGICAL HISTORY      VNS plate 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    NOÉ      2022    TUBAL ABDOMINAL LIGATION      VAGUS NERVE STIMULATOR IMPLANTATION       SCHEDULED MEDS:  amitriptyline, 25 mg, Oral, Nightly  amLODIPine, 5 mg, Oral, Nightly  aspirin, 325 mg, Oral, Daily   Or  aspirin, 300 mg, Rectal, Daily  atorvastatin, 40 mg, Oral, Nightly  Cenobamate, 50 mg, Oral, Daily  Eslicarbazepine Acetate, 1,200 mg, Oral, Daily  lacosamide, 200 mg, Oral, Q12H      INFUSIONS:  sodium chloride, 75 mL/hr, Last Rate: 75 mL/hr (24)      ALLERGIES:  No Known Allergies     Social History     Socioeconomic History    Marital status: Single    Number of children: 2   Tobacco Use    Smoking status: Former     Current packs/day: 0.00     Average packs/day: 1 pack/day for 29.0 years (29.0 ttl pk-yrs)     Types: Cigarettes     Start date:      Quit date:      Years since quittin.2    Smokeless tobacco: Never    Tobacco comments:     Quit 2022   Vaping Use    Vaping status: Every Day    Substances: Nicotine, Flavoring    Devices: Disposable, 6% NICOTINE   Substance and Sexual Activity    Alcohol use: Not Currently    Drug use: No    Sexual activity: Yes     Partners: Male     Birth control/protection: Tubal ligation     Family History   Problem Relation Age of Onset    Breast cancer Mother 50         age 60 METS    Arthritis Mother     Arthritis Father     Diabetes Father     Heart disease Father     Hypertension Father     Heart attack Father     Hypertension  Brother     Heart attack Paternal Grandfather     Ovarian cancer Neg Hx     Uterine cancer Neg Hx     Colon cancer Neg Hx     Deep vein thrombosis Neg Hx     Pulmonary embolism Neg Hx     Malig Hyperthermia Neg Hx       REVIEW OF SYSTEMS:   GENERAL:  Negative.   SKIN: Negative.  HEME/LYMPH: Negative.  RESPIRATORY:  Negative.   CVS:  Negative.   GI:  Negative.   :  Negative.   MUSCULOSKELETAL:  Negative.  NEUROLOGICAL: See HPI.    Objective   VITAL SIGNS:  Temp:  [97.9 °F (36.6 °C)-98.8 °F (37.1 °C)] 98.4 °F (36.9 °C)  Heart Rate:  [] 108  Resp:  [16] 16  BP: (129-156)/() 135/84     Wt Readings from Last 3 Encounters:   04/09/24 83 kg (183 lb)   03/11/24 83.9 kg (185 lb)   11/30/23 84.7 kg (186 lb 12.8 oz)     PHYSICAL EXAMINATION:   GENERAL:  The patient appears in fair general condition, not in acute distress.  SKIN: No skin rash. No ecchymosis.  HEAD:  Normocephalic.  EYES:  No Jaundice. No Pallor.   NECK:  Supple. No Masses.  LYMPHATICS:  No cervical or supraclavicular lymphadenopathy.  CHEST: Normal respiratory effort. Lungs clear to auscultation.  VNS plate in the left side of the chest.  CARDIAC:  Normal S1 & S2. No murmur.   ABDOMEN: Nondistended.  EXTREMITIES:  No noted deformities.   NEUROLOGICAL:  No Focal neurological deficits.     RESULT REVIEW:   Results from last 7 days   Lab Units 04/09/24  0433 04/08/24  1228   WBC 10*3/mm3 10.37 11.67*   NEUTROS ABS 10*3/mm3  --  9.03*   HEMOGLOBIN g/dL 13.9 14.6   HEMATOCRIT % 41.1 44.1   PLATELETS 10*3/mm3 342 332     Results from last 7 days   Lab Units 04/09/24  0433 04/08/24  1358   SODIUM mmol/L 136 136   POTASSIUM mmol/L 3.7 3.6   CHLORIDE mmol/L 99 101   CO2 mmol/L 24.0 23.0   BUN mg/dL 7 5*   CREATININE mg/dL 0.80 0.56*   CALCIUM mg/dL 8.9 9.0   ALBUMIN g/dL 3.8 4.0   BILIRUBIN mg/dL 0.3 0.2   ALK PHOS U/L 206* 204*   ALT (SGPT) U/L 20 21   AST (SGOT) U/L 22 18     Results from last 7 days   Lab Units 04/08/24  1228   INR  1.05   APTT seconds  25.1     Component      Latest Ref Rng 4/27/2022   Protime      sec 10.5    INR      ratio 1.0    PTT      sec 24.8    APTT 1:1 NP --    APTT 1:1 Saline --    Thrombin Time      sec 15.6    DRVVT Screen Seconds      sec 34.3    DRVVT Confirm Seconds --    DRVVT/Confirm Ratio --    Hex Phosph Neut Test      sec 0    Anticardiolipin IgG      GPL <10    Anticardiolipin IgM      MPL <10    Beta-2 Glyco 1 IgG      0 - 20 GPI IgG units <9    Beta-2 Glyco 1 IgG       <10    Beta-2 Glyco 1 IgM      0 - 32 GPI IgM units <9    Beta-2 Glyco 1 IgM       <10    Beta-2 Glyco 1 IgA      0 - 25 GPI IgA units <9    Beta-2 Glyco 1 IgA       <10    LAC Interpretation Comment    ANTITHROMBIN ACTIVITY      90 - 134 % 125    Factor V Leiden      Normal  Normal    Factor II, DNA Analysis      Normal  Normal    Protein S Ag, Total      60 - 150 % 89    Protein S Ag, Free      49.0 - 138.0 % 119.0    Protein S Functional      70 - 127 % 144 (H)    Protein C Antigen      60 - 150 % 123    Protein C Activity      86 - 163 % 115       MRI brain on 4/9/2024:  There is a 3 x 2 x 2.5 cm acute infarct in the posterior  lateral left frontal lobe in the distribution of the posterior lateral  frontal branches of the left middle cerebral artery territory.  Furthermore, since the prior MRI of the brain on 03/06/2023, just over a  year ago, the patient has developed a 2.2 x 1.6 x 1.5 cm old inferior  left occipital infarct and this old infarct is in the left posterior  cerebral artery territory. Otherwise, there has been no change since  prior MRI of the brain on 03/06/2023. The patient has tiny subcentimeter  old cortical infarcts in the superior left frontal cortex and anterior  superior left parietal cortex in the left middle cerebral artery  territory and additional 5 x 3 mm old superior right frontal cortical  infarct in the right MCA territory. The patient has 2 separate 5 x 3 mm  old posterior right cerebellar infarcts in the right PICA territory  and  an 11 x 4 mm old inferior lateral left cerebellar infarct in the left  PICA territory.  The multiple different vascular territories of these  chronic infarcts as well as the acute infarct in the lateral left  frontal lobe suggests the patient may have a chronic central or cardiac  embolic source or possibly a PFO in this young patient who is only 43  years of age. Correlation with echocardiography findings is suggested.    Assessment & Plan   *Acute/subacute CVA.  Patient presented with expressive aphasia which started on 4/7/2024.  Patient was seen by Neurology and Cardiology.  MRI brain on 4/9/2024 revealed infarcts in multiple different vascular territories.  This suggests central or cardiac embolic source or PFO in this patient..  She is going to have NOÉ.  Neurology is considering empiric anticoagulation.     *History of CVA in 2022.  Medically workup at that time was negative.  She is placed on aspirin and statin.     *Seizure disorder.  She is on Vimpat, eslicarbazepine and Xcorpi.     *History of anemia secondary to iron and folate deficiency.  4/9/2024: Hemoglobin 13.9.     PLAN:    1.  I will repeat lupus anticoagulant, anticardiolipin and antibeta 2 glycoprotein antibodies.  2.  Repeat vitamin B12 and folate levels.  3.  Await NOÉ.    Discussed with family at bedside.      Juan Patel MD  04/09/24

## 2024-04-09 NOTE — PLAN OF CARE
Goal Outcome Evaluation:  Plan of Care Reviewed With: patient, family     Outcome Evaluation: Clinical swallow evaluation completed. Swallow appears WFL. Recommend a regular diet with thin liquids. Meds as tolerated. Upright for PO intake, small bites/sips. SLP to follow for full speech language evaluation.

## 2024-04-10 ENCOUNTER — TELEPHONE (OUTPATIENT)
Dept: NEUROLOGY | Facility: CLINIC | Age: 44
End: 2024-04-10

## 2024-04-10 ENCOUNTER — APPOINTMENT (OUTPATIENT)
Dept: CARDIOLOGY | Facility: HOSPITAL | Age: 44
End: 2024-04-10
Payer: MEDICARE

## 2024-04-10 ENCOUNTER — READMISSION MANAGEMENT (OUTPATIENT)
Dept: CALL CENTER | Facility: HOSPITAL | Age: 44
End: 2024-04-10
Payer: MEDICARE

## 2024-04-10 VITALS
WEIGHT: 183 LBS | BODY MASS INDEX: 31.24 KG/M2 | TEMPERATURE: 97.3 F | HEIGHT: 64 IN | RESPIRATION RATE: 16 BRPM | DIASTOLIC BLOOD PRESSURE: 95 MMHG | OXYGEN SATURATION: 95 % | SYSTOLIC BLOOD PRESSURE: 142 MMHG | HEART RATE: 90 BPM

## 2024-04-10 LAB
ANION GAP SERPL CALCULATED.3IONS-SCNC: 9.3 MMOL/L (ref 5–15)
BH CV ECHO MEAS - MED PEAK E' VEL: 10.1 CM/SEC
BH CV ECHO MEAS - MR MAX PG: 124.6 MMHG
BH CV ECHO MEAS - MR MAX VEL: 558.1 CM/SEC
BH CV ECHO MEAS - MV A MAX VEL: 126.5 CM/SEC
BH CV ECHO MEAS - MV DEC TIME: 0.17 SEC
BH CV ECHO MEAS - MV E MAX VEL: 99.9 CM/SEC
BH CV ECHO MEAS - MV E/A: 0.79
BUN SERPL-MCNC: 9 MG/DL (ref 6–20)
BUN/CREAT SERPL: 14.5 (ref 7–25)
CALCIUM SPEC-SCNC: 8.2 MG/DL (ref 8.6–10.5)
CHLORIDE SERPL-SCNC: 105 MMOL/L (ref 98–107)
CO2 SERPL-SCNC: 22.7 MMOL/L (ref 22–29)
CREAT SERPL-MCNC: 0.62 MG/DL (ref 0.57–1)
DEPRECATED RDW RBC AUTO: 41.4 FL (ref 37–54)
EGFRCR SERPLBLD CKD-EPI 2021: 112.8 ML/MIN/1.73
ERYTHROCYTE [DISTWIDTH] IN BLOOD BY AUTOMATED COUNT: 13.2 % (ref 12.3–15.4)
FOLATE SERPL-MCNC: 4.43 NG/ML (ref 4.78–24.2)
GLUCOSE SERPL-MCNC: 88 MG/DL (ref 65–99)
HCT VFR BLD AUTO: 37 % (ref 34–46.6)
HCYS SERPL-MCNC: 6.2 UMOL/L (ref 0–15)
HGB BLD-MCNC: 12 G/DL (ref 12–15.9)
MCH RBC QN AUTO: 27.6 PG (ref 26.6–33)
MCHC RBC AUTO-ENTMCNC: 32.4 G/DL (ref 31.5–35.7)
MCV RBC AUTO: 85.1 FL (ref 79–97)
PLATELET # BLD AUTO: 309 10*3/MM3 (ref 140–450)
PMV BLD AUTO: 10 FL (ref 6–12)
POTASSIUM SERPL-SCNC: 3.7 MMOL/L (ref 3.5–5.2)
RBC # BLD AUTO: 4.35 10*6/MM3 (ref 3.77–5.28)
SODIUM SERPL-SCNC: 137 MMOL/L (ref 136–145)
VIT B12 BLD-MCNC: 379 PG/ML (ref 211–946)
WBC NRBC COR # BLD AUTO: 9.91 10*3/MM3 (ref 3.4–10.8)

## 2024-04-10 PROCEDURE — 82607 VITAMIN B-12: CPT | Performed by: INTERNAL MEDICINE

## 2024-04-10 PROCEDURE — 93321 DOPPLER ECHO F-UP/LMTD STD: CPT | Performed by: INTERNAL MEDICINE

## 2024-04-10 PROCEDURE — 93312 ECHO TRANSESOPHAGEAL: CPT

## 2024-04-10 PROCEDURE — 85705 THROMBOPLASTIN INHIBITION: CPT | Performed by: INTERNAL MEDICINE

## 2024-04-10 PROCEDURE — 92523 SPEECH SOUND LANG COMPREHEN: CPT | Performed by: SPEECH-LANGUAGE PATHOLOGIST

## 2024-04-10 PROCEDURE — 86146 BETA-2 GLYCOPROTEIN ANTIBODY: CPT | Performed by: INTERNAL MEDICINE

## 2024-04-10 PROCEDURE — 85670 THROMBIN TIME PLASMA: CPT | Performed by: INTERNAL MEDICINE

## 2024-04-10 PROCEDURE — 80048 BASIC METABOLIC PNL TOTAL CA: CPT | Performed by: INTERNAL MEDICINE

## 2024-04-10 PROCEDURE — 83090 ASSAY OF HOMOCYSTEINE: CPT | Performed by: INTERNAL MEDICINE

## 2024-04-10 PROCEDURE — 25010000002 MIDAZOLAM PER 1 MG: Performed by: INTERNAL MEDICINE

## 2024-04-10 PROCEDURE — 85732 THROMBOPLASTIN TIME PARTIAL: CPT | Performed by: INTERNAL MEDICINE

## 2024-04-10 PROCEDURE — 85613 RUSSELL VIPER VENOM DILUTED: CPT | Performed by: INTERNAL MEDICINE

## 2024-04-10 PROCEDURE — 99232 SBSQ HOSP IP/OBS MODERATE 35: CPT | Performed by: INTERNAL MEDICINE

## 2024-04-10 PROCEDURE — 93321 DOPPLER ECHO F-UP/LMTD STD: CPT

## 2024-04-10 PROCEDURE — 93312 ECHO TRANSESOPHAGEAL: CPT | Performed by: INTERNAL MEDICINE

## 2024-04-10 PROCEDURE — 86147 CARDIOLIPIN ANTIBODY EA IG: CPT | Performed by: INTERNAL MEDICINE

## 2024-04-10 PROCEDURE — 93325 DOPPLER ECHO COLOR FLOW MAPG: CPT | Performed by: INTERNAL MEDICINE

## 2024-04-10 PROCEDURE — 85027 COMPLETE CBC AUTOMATED: CPT | Performed by: INTERNAL MEDICINE

## 2024-04-10 PROCEDURE — 93325 DOPPLER ECHO COLOR FLOW MAPG: CPT

## 2024-04-10 PROCEDURE — 99232 SBSQ HOSP IP/OBS MODERATE 35: CPT

## 2024-04-10 PROCEDURE — 25010000002 FENTANYL CITRATE (PF) 50 MCG/ML SOLUTION: Performed by: INTERNAL MEDICINE

## 2024-04-10 PROCEDURE — 82746 ASSAY OF FOLIC ACID SERUM: CPT | Performed by: INTERNAL MEDICINE

## 2024-04-10 RX ORDER — FENTANYL CITRATE 50 UG/ML
INJECTION, SOLUTION INTRAMUSCULAR; INTRAVENOUS
Status: COMPLETED | OUTPATIENT
Start: 2024-04-10 | End: 2024-04-10

## 2024-04-10 RX ORDER — MIDAZOLAM HYDROCHLORIDE 5 MG/ML
INJECTION INTRAMUSCULAR; INTRAVENOUS
Status: COMPLETED | OUTPATIENT
Start: 2024-04-10 | End: 2024-04-10

## 2024-04-10 RX ORDER — ESLICARBAZEPINE ACETATE 800 MG/1
800 TABLET ORAL DAILY
Start: 2024-04-10

## 2024-04-10 RX ORDER — LIDOCAINE HYDROCHLORIDE 20 MG/ML
SOLUTION OROPHARYNGEAL
Status: COMPLETED | OUTPATIENT
Start: 2024-04-10 | End: 2024-04-10

## 2024-04-10 RX ORDER — SODIUM CHLORIDE 9 MG/ML
INJECTION, SOLUTION INTRAVENOUS
Status: COMPLETED | OUTPATIENT
Start: 2024-04-10 | End: 2024-04-10

## 2024-04-10 RX ORDER — ASPIRIN 81 MG/1
81 TABLET ORAL DAILY
Qty: 30 TABLET | Refills: 0 | Status: SHIPPED | OUTPATIENT
Start: 2024-04-11

## 2024-04-10 RX ORDER — ESLICARBAZEPINE ACETATE 400 MG/1
1 TABLET ORAL DAILY
Start: 2024-04-10

## 2024-04-10 RX ADMIN — FENTANYL CITRATE 25 MCG: 50 INJECTION, SOLUTION INTRAMUSCULAR; INTRAVENOUS at 08:17

## 2024-04-10 RX ADMIN — SODIUM CHLORIDE 50 ML/HR: 9 INJECTION, SOLUTION INTRAVENOUS at 07:57

## 2024-04-10 RX ADMIN — ASPIRIN 325 MG: 325 TABLET ORAL at 09:51

## 2024-04-10 RX ADMIN — MIDAZOLAM 2 MG: 5 INJECTION INTRAMUSCULAR; INTRAVENOUS at 08:17

## 2024-04-10 RX ADMIN — LACOSAMIDE 200 MG: 100 TABLET, FILM COATED ORAL at 09:51

## 2024-04-10 RX ADMIN — FENTANYL CITRATE 25 MCG: 50 INJECTION, SOLUTION INTRAMUSCULAR; INTRAVENOUS at 08:30

## 2024-04-10 RX ADMIN — MIDAZOLAM 2 MG: 5 INJECTION INTRAMUSCULAR; INTRAVENOUS at 08:18

## 2024-04-10 RX ADMIN — LIDOCAINE HYDROCHLORIDE 10 ML: 20 SOLUTION ORAL at 07:57

## 2024-04-10 NOTE — PROGRESS NOTES
LOS: 2 days   Patient Care Team:  Annalee Huddleston APRN as PCP - General (Family Medicine)  Cristofer Chamorro MD as Consulting Physician (Cardiology)  Cb James II, MD as Consulting Physician (Neurology)  Ernesto Mendosa MD as Consulting Physician (Obstetrics and Gynecology)  Reza Lester MD as Consulting Physician (Cardiology)    Chief Complaint: Follow-up recurrent CVA, epilepsy, mitral regurgitation.    Interval History: No acute events.  No new neurological findings.  No chest pain or shortness of breath.  NOÉ performed without embolic source other than plaque in the aortic arch.    Vital Signs:  Temp:  [98.2 °F (36.8 °C)-98.4 °F (36.9 °C)] 98.4 °F (36.9 °C)  Heart Rate:  [] 85  Resp:  [14-18] 16  BP: (111-151)/(64-93) 130/75  No intake or output data in the 24 hours ending 04/10/24 0900    Physical Exam:   General Appearance:    No acute distress, alert and oriented x4   Lungs:     Clear to auscultation bilaterally     Heart:    Regular rhythm and normal rate.  No murmurs, gallops, or   rubs.   Abdomen:     Soft, nontender, nondistended.    Extremities:   No clubbing, cyanosis, or edema.     Results Review:    Results from last 7 days   Lab Units 04/10/24  0408   SODIUM mmol/L 137   POTASSIUM mmol/L 3.7   CHLORIDE mmol/L 105   CO2 mmol/L 22.7   BUN mg/dL 9   CREATININE mg/dL 0.62   GLUCOSE mg/dL 88   CALCIUM mg/dL 8.2*     Results from last 7 days   Lab Units 04/08/24  1358   CK TOTAL U/L 30     Results from last 7 days   Lab Units 04/10/24  0408   WBC 10*3/mm3 9.91   HEMOGLOBIN g/dL 12.0   HEMATOCRIT % 37.0   PLATELETS 10*3/mm3 309     Results from last 7 days   Lab Units 04/08/24  1228   INR  1.05   APTT seconds 25.1     Results from last 7 days   Lab Units 04/09/24  0433   CHOLESTEROL mg/dL 134         Results from last 7 days   Lab Units 04/09/24  0433   CHOLESTEROL mg/dL 134   TRIGLYCERIDES mg/dL 93   HDL CHOL mg/dL 59   LDL CHOL mg/dL 58       I reviewed the patient's new clinical  results.        Assessment:  1.  Acute left MCA CVA  2.  Chronic left PCA CVA, new since imaging in March 2023 (also prior strokes in 2022)  3.  Epilepsy with vagus nerve stimulator (generator change out on 4/5/2024)  4.  Mitral regurgitation  5.  Former tobacco use, quit in 2022  6.  Status post [employer] loop recorder placement in 2022  7.  Hypertension    Plan:  -NOÉ performed this morning.  There was no thrombus.  The septum was evaluated extensively with multiple bubble studies and color Doppler assessment.  There was no PFO or shunt noted.  She did have a moderate amount of plaque near the junction of the aortic arch and the descending thoracic aorta.    -I will have the loop recorder interrogated today, although I am doubtful this is atrial fibrillation as she has not had this noted previously.      -Given the recurrent nature of the strokes, I would recommend systemic anticoagulation with Eliquis 5 mg twice daily.  She should also likely be on antiplatelet therapy with aspirin if at all possible given the plaque in her aorta.  I will await neurology evaluation before starting her on systemic anticoagulation.    -Continue Lipitor 40 mg/day.    -Hematology has seen and is proceeding with a repeat hypercoagulable workup.    Tom Hannon MD  04/10/24  09:00 EDT

## 2024-04-10 NOTE — DISCHARGE SUMMARY
Patient Name: Alexandra Xiao  : 1980  MRN: [id number]    Date of Admission: 2024  Date of Discharge:  4/10/2024  Primary Care Physician: Annalee Huddleston APRN      Chief Complaint:   Stroke      Discharge Diagnoses     Active Hospital Problems    Diagnosis  POA    **Cerebrovascular accident (CVA) due to embolism of left middle cerebral artery [I63.412]  Yes    S/P placement of VNS (vagus nerve stimulation) device [Z96.89]  Not Applicable    Abnormal urinalysis [R82.90]  Yes    HTN (hypertension) [I10]  Yes    Epilepsy [G40.909]  Yes      Resolved Hospital Problems   No resolved problems to display.        Hospital Course     Ms. Xiao is a 43 y.o. female with a history of seizure disorder and prior stroke who presented to [facility] initially complaining of altered speech.  Please see the admitting history and physical for further details.  She was found to have acute versus subacute infarct on CT scan and was admitted to the hospital for further evaluation and treatment.  She was admitted with stroke protocol in place and seen in consultation by neurology.  MRI of the brain results below confirmed presence of acute left MCA infarct.  Neurology recommended NOÉ and hypercoagulable workup.  Cardiology and hematology were consulted.  NOÉ performed this morning shows no evidence of PFO or source for stroke.  She does have plaque in her aortic arch for which they recommend baby aspirin.  Due to recurrent nature of the strokes and apparent embolic distribution it was recommended she start full anticoagulation with Eliquis.  Interrogation of loop recording device did not demonstrate atrial fibrillation.  She will follow-up with hematology for results of hypercoagulable studies.      Day of Discharge     Subjective:  See today's progress note    Physical Exam:  Temp:  [97.3 °F (36.3 °C)-98.4 °F (36.9 °C)] 97.3 °F (36.3 °C)  Heart Rate:  [] 90  Resp:  [14-18] 16  BP: (111-151)/(64-95)  142/95  Body mass index is 31.41 kg/m².  Physical Exam    Consultants     Consult Orders (all) (From admission, onward)       Start     Ordered    04/09/24 1359  Hematology & Oncology Inpatient Consult  Once        Specialty:  Hematology and Oncology  Provider:  Priscilla Grissom MD    04/09/24 1358    04/09/24 1352  Inpatient Cardiology Consult  Once        Specialty:  Cardiology  Provider:  Tom Hannon MD    04/09/24 1358 04/08/24 1937  Consult to Diabetes Educator  Once,   Status:  Canceled        Provider:  (Not yet assigned)    04/08/24 1937 04/08/24 1937  Inpatient Neurology Consult Stroke  Once        Specialty:  Neurology  Provider:  (Not yet assigned)    04/08/24 1937        Procedures     Imaging Results (All)       Procedure Component Value Units Date/Time    MRI Brain With & Without Contrast [[id number]] Collected: 04/09/24 1352     Updated: 04/10/24 0732    Narrative:      MRI OF THE BRAIN WITH AND WITHOUT CONTRAST ON 04/09/2024     CLINICAL HISTORY: Patient had abnormal head CT yesterday demonstrating  acute stroke in the lateral left frontal lobe.      TECHNIQUE: Axial T1, FLAIR, fat-suppressed T2, axial diffusion and  gradient echo T2, sagittal T1 and postcontrast axial fat-suppressed T1  and coronal T1-weighted images were obtained of the entire head.     This study is correlated to an outside MRI of the brain on 03/30/2022  and MRIs of the brain from [facility] on 04/22/2022 and  03/06/2023. This is also correlated to yesterday's head CT and CT  angiogram of the head and neck on 04/08/2024.     FINDINGS: There is an area of intense increased uptake on the diffusion  weighted images in the posterior lateral left frontal lobe parenchyma  that is bright on the FLAIR images and dark on the ADC maps and is  consistent with an area of cytotoxic edema from an acute infarct  measuring up to 3 x 2 x 2.5 cm in medial lateral, anterior posterior and  cranial caudal dimensions in  the distribution of posterior lateral  frontal branches of the left middle cerebral artery territory. Since  prior MRI of the brain on 03/06/2023 there has been development of a  focal area of encephalomalacia in the posterior inferior left occipital  lobe compatible with an old posterior inferior left occipital lobe  infarct measuring 2.2 x 1.6 x 1.5 cm in the distribution of the inferior  occipital branch of the left posterior cerebral artery territory. There  are 2 separate 5 x 3 mm old posterior inferior right cerebellar infarcts  in the right PICA territory that are unchanged since 03/06/2023.  There  is an additional 11 x 4 mm old inferior lateral left cerebellar infarct  and a 3 mm old posterior medial left cerebellar infarct and these are in  the left PICA territory. Furthermore, patient has very tiny areas of  cortical encephalomalacia compatible with tiny old infarcts including a  7 x 5 mm old superior left frontal cortical infarct and an additional 5  x 3 mm old anterior superior left parietal cortical infarct and these  are in the distribution of the superior frontal parietal branches of the  left middle cerebral artery territory and there is an additional 5 x 3  mm old superior right frontal cortical infarct in the right MCA  territory. The remainder of the brain parenchyma is normal in signal  intensity. The ventricles are normal in size. I see no mass effect and  no midline shift.  No extra-axial fluid collections are identified. No  abnormal areas of enhancement are seen in the head. The calvarium and  skull base are normal in appearance. The paranasal sinuses, mastoid air  cells and middle ear cavities are clear. The orbits are normal in  appearance. Good flow voids are demonstrated in the cerebral vessels and  in the dural venous sinuses.       Impression:         1. There is a 3 x 2 x 2.5 cm acute infarct in the posterior lateral left  frontal lobe in the distribution of the posterior lateral  frontal  branches of the left middle cerebral artery territory. Furthermore,  since the prior MRI of the brain on 03/06/2023, just over a year ago,  the patient has developed a 2.2 x 1.6 x 1.5 cm old inferior left  occipital infarct and this old infarct is in the left posterior cerebral  artery territory. Otherwise, there has been no change since prior MRI of  the brain on 03/06/2023. The patient has tiny subcentimeter old cortical  infarcts in the superior left frontal cortex and anterior superior left  parietal cortex in the left middle cerebral artery territory and  additional 5 x 3 mm old superior right frontal cortical infarct in the  right MCA territory. The patient has 2 separate 5 x 3 mm old posterior  right cerebellar infarcts in the right PICA territory and an 11 x 4 mm  old inferior lateral left cerebellar infarct in the left PICA territory.   The multiple different vascular territories of these chronic infarcts  as well as the acute infarct in the lateral left frontal lobe suggests  the patient may have a chronic central or cardiac embolic source or  possibly a PFO in this young patient who is only 43 years of age.  Correlation with echocardiography findings is suggested.     The results were communicated to Dr. Durant from stroke neurology by  telephone 04/09/2024 at 1:25 p.m.     This report was finalized on 4/10/2024 7:29 AM by Dr. Jay Cid M.D  on Workstation: [id number]       CT Angiogram Head [[id number]] Collected: 04/08/24 1548     Updated: 04/08/24 1608    Narrative:      CT ANGIOGRAM HEAD-, CT ANGIOGRAM NECK-     INDICATIONS: Stroke     TECHNIQUE: Radiation dose reduction techniques were utilized, including  automated exposure control and exposure modulation based on body  size.Noncontrast head CT was performed, followed by enhanced CT  angiography of the head and neck. Three-dimensional reconstructions were  created, reviewed, and assessed according to NASCET criteria.     COMPARISON: Head  CT from 4/8/2024 at 1311 hours     FINDINGS:     No acute intracranial hemorrhage, midline shift or mass effect.  Hypodensity at the left renal cortex again suggests acute subacute  infarct. Encephalomalacia is again seen in the left occipital lobe.     No enhancing lesions are noted following contrast material  administration.        Ventricles, cisterns, cerebral sulci are unremarkable for patient age.     The visualized paranasal sinuses, orbits, mastoid air cells are  unremarkable.      The CT angiography images show no hemodynamically significant focal  stenosis, aneurysm, or dissection in the cervical carotid or vertebral  arteries, or in the arteries at the base of the brain. The proximal  right vertebral artery is partly obscured by artifact from dense  adjacent venous opacification.        Bilateral thyroid nodularity is evident, suboptimally evaluated with  this technique, thyroid ultrasound correlation advised as indicated.     Paraseptal emphysematous changes are seen at the lung apices.          Impression:      1. No hemodynamically significant focal stenosis, aneurysm, or  dissection in the cervical carotid or vertebral arteries or in the  arteries at the base of the brain.     2. Redemonstration of evidence of acute to subacute infarct at the left  frontal cortex. Encephalomalacia left occipital lobe. No acute  intracranial hemorrhage or hydrocephalus. No enhancing lesion in brain.  If there is further clinical concern, MRI could be considered for  further evaluation.     3. Thyroid nodularity.     This report was finalized on 4/8/2024 4:05 PM by Dr. Cb Butts M.D on Workstation: [id number]       CT Angiogram Neck [[id number]] Collected: 04/08/24 1548     Updated: 04/08/24 1608    Narrative:      CT ANGIOGRAM HEAD-, CT ANGIOGRAM NECK-     INDICATIONS: Stroke     TECHNIQUE: Radiation dose reduction techniques were utilized, including  automated exposure control and exposure modulation based on  body  size.Noncontrast head CT was performed, followed by enhanced CT  angiography of the head and neck. Three-dimensional reconstructions were  created, reviewed, and assessed according to NASCET criteria.     COMPARISON: Head CT from 4/8/2024 at 1311 hours     FINDINGS:     No acute intracranial hemorrhage, midline shift or mass effect.  Hypodensity at the left renal cortex again suggests acute subacute  infarct. Encephalomalacia is again seen in the left occipital lobe.     No enhancing lesions are noted following contrast material  administration.        Ventricles, cisterns, cerebral sulci are unremarkable for patient age.     The visualized paranasal sinuses, orbits, mastoid air cells are  unremarkable.      The CT angiography images show no hemodynamically significant focal  stenosis, aneurysm, or dissection in the cervical carotid or vertebral  arteries, or in the arteries at the base of the brain. The proximal  right vertebral artery is partly obscured by artifact from dense  adjacent venous opacification.        Bilateral thyroid nodularity is evident, suboptimally evaluated with  this technique, thyroid ultrasound correlation advised as indicated.     Paraseptal emphysematous changes are seen at the lung apices.          Impression:      1. No hemodynamically significant focal stenosis, aneurysm, or  dissection in the cervical carotid or vertebral arteries or in the  arteries at the base of the brain.     2. Redemonstration of evidence of acute to subacute infarct at the left  frontal cortex. Encephalomalacia left occipital lobe. No acute  intracranial hemorrhage or hydrocephalus. No enhancing lesion in brain.  If there is further clinical concern, MRI could be considered for  further evaluation.     3. Thyroid nodularity.     This report was finalized on 4/8/2024 4:05 PM by Dr. Cb Butts M.D on Workstation: [id number]       CT Head Without Contrast [[id number]] Collected: 04/08/24 6646     Updated:  04/08/24 1517    Narrative:      EMERGENCY CT SCAN OF THE HEAD WITHOUT CONTRAST ON 04/08/2024     CLINICAL HISTORY: Slurred speech and headaches.     TECHNIQUE: Spiral CT images were obtained from the base of the skull to  the vertex without intravenous contrast. Images were reformatted and  submitted in 3 mm thick axial, sagittal and coronal CT sections with  brain algorithm.     This is correlated to prior MRI of the brain from [facility] on 03/06/2023 and also a prior CT angiogram of the head and  neck and CT perfusion study of the brain on 03/05/2023 and a prior MRI  of the brain on 04/22/2022 and an outside MRI of the brain on  03/30/2022.     FINDINGS: Patient has tiny subcentimeter superior left frontal parietal  cortical infarcts on prior MRIs that are chronic and unable to be  appreciated on the current head CT. The patient also has tiny superior  medial right parietal chronic infarcts that are subcentimeter in size  and were seen on prior MRIs and they're in the right ALEXYS territory and  unable to be appreciated on the current head CT. There is loss of  gray-white matter differentiation in the lateral left frontal lobe. The  area measures up to the 2.5 x 1.6 x 2.7 cm in size compatible with an  acute to subacute lateral left frontal infarct in left MCA territory.  There is also a focal area of encephalomalacia in the inferior left  occipital lobe. Given the volume loss I think this is a late subacute or  chronic infarct in the distribution of inferior occipital branch of the  left PCA territory and measures up to 1.9 x 2.3 x 1 cm in size and is in  the left PCA territory is new when compared to the prior MRI 03/06/2023.  There is a tiny 5 mm old posterior inferior lateral right cerebellar  infarct in the right PICA territory unchanged. The remainder of the  brain parenchyma is normal in attenuation. The ventricles are normal in  size. I see no mass effect, no midline shift, no extra-axial  fluid  collections are identified and there is no evidence of acute  intracranial hemorrhage. The calvarium and skull base are normal in  appearance. The paranasal sinuses mastoid air cells and middle ear  cavities are clear.       Impression:      1. This patient has a very subtle area of loss of gray-white matter  differentiation in the lateral left frontal lobe compatible with an  acute to subacute infarct in the distribution lateral frontal branch  left MCA territory measures up to 2.5 x 1.6 x 2.7 cm in size and just  anterior inferior to this is an additional subcentimeter acute to  subacute infarct in the lateral frontal branch of the left MCA  territory. This obviously is new when compared to prior MRI of the brain  on 03/06/2023. Furthermore there is a focal area of encephalomalacia in  the inferior left occipital lobe compatible with a late subacute to  chronic inferior left occipital infarct in left PCA territory that  measures 1.9 x 2.3 x 1 cm in size and this late subacute or chronic left  PCA territory infarct is new when compared to prior MRI of the brain  03/06/2023. Different vascular territories suggest the patient may have  a central or cardiac embolic source and young patient consider PFO and  performing echocardiography to evaluate for underlying PFO or other  cardiac source.  2. There is multiple chronic subcentimeter infarcts in the superior left  frontal parietal cortex in the left MCA territory and superior medial  right parietal lobe in the right ALEXYS territory and these are  subcentimeter in size and well demonstrated on prior MRI of the brain  03/06/2023 but are unable be appreciated on this head CT. There is an  additional 5 mm old posterior lateral right cerebellar infarct in right  PICA territory unchanged since 03/06/2023. The remainder of the head CT  is normal. I recommend an MRI of the brain to further date the left  frontal lobe infarcts.     Radiation dose reduction techniques were  utilized, including automated  exposure control and exposure modulation based on body size.        This report was finalized on 4/8/2024 3:14 PM by Dr. Jay Cid M.D on  Workstation: [id number]          Results for orders placed during the hospital encounter of 04/08/24    Adult Transesophageal Echo (NOÉ) W/ Cont if Necessary Per Protocol    Interpretation Summary    There is focal thickening at the mid to distal anterior mitral valve leaflet. There is mild prolapse of the A2 segment of the anterior mitral valve leaflet.    There is moderate to severe mitral regurgitation which originates from multiple jets, including a prominent posteriorly directed jet    There are moderate plaques in the descending thoracic aorta. There is a focal moderate plaque without mobile components in the central aortic arch    Left ventricular systolic function is normal. Left ventricular ejection fraction appears to be 61 - 65%.    Left ventricular diastolic function is consistent with (grade I) impaired relaxation.    Normal right ventricular cavity size and systolic function noted.    Multiple agitated saline studies were negative, including with abdominal thrusts. There was no PFO noted on the agitated saline studies, or by color Doppler assessment    No evidence of a left atrial appendage thrombus was present.    There is a small (<1cm) pericardial effusion adjacent to the right atrium and right ventricle. There is no evidence of cardiac tamponade.    Pertinent Labs     Results from last 7 days   Lab Units 04/10/24  0408 04/09/24  0433 04/08/24  1228   WBC 10*3/mm3 9.91 10.37 11.67*   HEMOGLOBIN g/dL 12.0 13.9 14.6   PLATELETS 10*3/mm3 309 342 332     Results from last 7 days   Lab Units 04/10/24  0408 04/09/24  0433 04/08/24  1358   SODIUM mmol/L 137 136 136   POTASSIUM mmol/L 3.7 3.7 3.6   CHLORIDE mmol/L 105 99 101   CO2 mmol/L 22.7 24.0 23.0   BUN mg/dL 9 7 5*   CREATININE mg/dL 0.62 0.80 0.56*   GLUCOSE mg/dL 88 85 91   EGFR  mL/min/1.73 112.8 93.3 115.6     Results from last 7 days   Lab Units 04/09/24  0433 04/08/24  1358   ALBUMIN g/dL 3.8 4.0   BILIRUBIN mg/dL 0.3 0.2   ALK PHOS U/L 206* 204*   AST (SGOT) U/L 22 18   ALT (SGPT) U/L 20 21     Results from last 7 days   Lab Units 04/10/24  0408 04/09/24  0433 04/08/24  1358   CALCIUM mg/dL 8.2* 8.9 9.0   ALBUMIN g/dL  --  3.8 4.0       Results from last 7 days   Lab Units 04/08/24  1358   CK TOTAL U/L 30       Results from last 7 days   Lab Units 04/09/24  0433   CHOLESTEROL mg/dL 134   TRIGLYCERIDES mg/dL 93   HDL CHOL mg/dL 59   LDL CHOL mg/dL 58             Test Results Pending at Discharge     Pending Labs       Order Current Status    Beta-2 Glycoprotein Antibodies In process    Cardiolipin Antibody In process    Lupus Anticoagulant In process            Discharge Details        Discharge Medications        New Medications        Instructions Start Date   apixaban 5 MG tablet tablet  Commonly known as: ELIQUIS   5 mg, Oral, 2 Times Daily   Start Date: April 12, 2024     aspirin 81 MG EC tablet  Replaces: aspirin 325 MG tablet   81 mg, Oral, Daily   Start Date: April 11, 2024            Changes to Medications        Instructions Start Date   amLODIPine 5 MG tablet  Commonly known as: NORVASC  What changed: when to take this   5 mg, Oral, Daily      Aptiom 400 MG tablet  Generic drug: Eslicarbazepine Acetate  What changed: additional instructions   400 mg, Oral, Daily, Take with 800mg for total dose 1200mg daily      Aptiom 800 MG tablet tablet  Generic drug: Eslicarbazepine Acetate  What changed: additional instructions   800 mg, Oral, Daily, Take with 400mg for total dose 1200mg daily.      Vimpat 200 MG tablet  Generic drug: lacosamide  What changed: Another medication with the same name was removed. Continue taking this medication, and follow the directions you see here.   200 mg, Oral, Every 12 Hours             Continue These Medications        Instructions Start Date    amitriptyline 25 MG tablet  Commonly known as: ELAVIL   25 mg, Oral, Nightly      atorvastatin 40 MG tablet  Commonly known as: LIPITOR   TAKE 1 TABLET BY MOUTH EVERY NIGHT      traMADol 50 MG tablet  Commonly known as: ULTRAM   50 mg, Oral, Every 4 Hours PRN      Xcopri 50 MG tablet  Generic drug: Cenobamate   50 mg, Oral, Daily             Stop These Medications      aspirin 325 MG tablet  Replaced by: aspirin 81 MG EC tablet     vitamin D 1.25 MG (55999 UT) capsule capsule  Commonly known as: ERGOCALCIFEROL              No Known Allergies    Discharge Disposition:  Home or Self Care      Discharge Diet:  Diet Order   Procedures    Diet: Regular/House; Fluid Consistency: Thin (IDDSI 0)       Discharge Activity:   Activity Instructions       Activity as Tolerated              CODE STATUS:    Code Status and Medical Interventions:   Ordered at: 04/08/24 1803     Code Status (Patient has no pulse and is not breathing):    CPR (Attempt to Resuscitate)     Medical Interventions (Patient has pulse or is breathing):    Full Support       Future Appointments   Date Time Provider Department Center   4/12/2024  1:30 PM Annalee Huddleston APRN [facility]   4/19/2024 10:45 AM Annalee Huddleston APRN [facility]   7/15/2024  3:00 PM Ernesto Mendosa MD [facility]     Additional Instructions for the Follow-ups that You Need to Schedule       Ambulatory Referral to Speech Therapy   As directed      Speech language eval    Order Comments: Speech language eval    Follow-up needed: Yes        Discharge Follow-up with PCP   As directed       Currently Documented PCP:    Annalee Huddleston APRN    PCP Phone Number:    [phone]     Follow Up Details: 1 to 2 weeks (or sooner if problems)               Follow-up Information       Annalee Huddleston APRN Follow up on 4/19/2024.    Specialties: Family Medicine, Nurse Practitioner  Why: 1 to 2 weeks (or sooner if problems)  Appointment will be Friday, April 19, 2024 at  10:45am  Contact information:  [address]  [facility] [address]  [place] [id number]  [phone]               Javad Durant MD. Schedule an appointment as soon as possible for a visit in 2 week(s).    Specialties: Neurology, Hospitalist, Vascular Neurology  Why: Dr office will contact Ms Xiao about follow up appointment  Contact information:  [address]  [facility] [address]  [place] [id number] [phone]                             Additional Instructions for the Follow-ups that You Need to Schedule       Ambulatory Referral to Speech Therapy   As directed      Speech language eval    Order Comments: Speech language eval    Follow-up needed: Yes        Discharge Follow-up with PCP   As directed       Currently Documented PCP:    Annalee Huddleston APRN    PCP Phone Number:    [phone]     Follow Up Details: 1 to 2 weeks (or sooner if problems)            Time Spent on Discharge:  Greater than 30 minutes      Hermilo Victor MD  [facility]  04/10/24  12:28 EDT

## 2024-04-10 NOTE — PLAN OF CARE
Goal Outcome Evaluation:  Plan of Care Reviewed With: patient, spouse           Outcome Evaluation: Speech language evaluation completed pt scored 73.3 on [facility]AB bedside record form, overall demonstrates mild to moderate receptive and expressive aphasia. Recommend OP ST, pt and  agree and SLP informed CCP.      Anticipated Discharge Disposition (SLP): home with OP services    SLP Diagnosis: moderate, aphasia (04/10/24 1200)

## 2024-04-10 NOTE — PLAN OF CARE
Goal Outcome Evaluation:         Patient has not had any acute events overnight at this time.  Patient appeared to rest well overnight.  IV fluids provided as ordered.  No complaint of pain.  NIH score of 1 when assessed.  Patient states they are able to communicate easier then earlier.  Still needs some extra time to formulate the words they are searching.  Will continue to monitor and provide care as appropriate and ordered.  Bed in lowest position, call bell and personal items in reach.  Up ad abundio to the bathroom.

## 2024-04-10 NOTE — PROGRESS NOTES
Name: Alexandra Xiao ADMIT: 2024   : 1980  PCP: Annalee Huddleston APRN    MRN: [id number] LOS: 2 days   AGE/SEX: 43 y.o. female  ROOM: [facility]     Subjective   Subjective   Feels better.  Speech improving.  No new complaints.  Review of Systems     Objective   Objective   Vital Signs  Temp:  [98.2 °F (36.8 °C)-98.4 °F (36.9 °C)] 98.4 °F (36.9 °C)  Heart Rate:  [] 88  Resp:  [14-18] 16  BP: (111-151)/(64-93) 130/73  SpO2:  [93 %-99 %] 94 %  on   ;   Device (Oxygen Therapy): room air  Body mass index is 31.41 kg/m².  Physical Exam  Vitals and nursing note reviewed.   Constitutional:       General: She is not in acute distress.  Cardiovascular:      Rate and Rhythm: Normal rate and regular rhythm.   Pulmonary:      Effort: Pulmonary effort is normal.      Breath sounds: Normal breath sounds.   Chest:      Comments: Left upper chest wall small surgical wound CDI  Abdominal:      General: Bowel sounds are normal.      Palpations: Abdomen is soft.      Tenderness: There is no abdominal tenderness.   Musculoskeletal:         General: No swelling.   Skin:     General: Skin is warm and dry.   Neurological:      Mental Status: She is alert. Mental status is at baseline.            Results Review:       I reviewed the patient's new clinical results.  Results from last 7 days   Lab Units 04/10/24  04024  0433 24  1228   WBC 10*3/mm3 9.91 10.37 11.67*   HEMOGLOBIN g/dL 12.0 13.9 14.6   PLATELETS 10*3/mm3 309 342 332     Results from last 7 days   Lab Units 04/10/24  0408 24  0433 24  1358   SODIUM mmol/L 137 136 136   POTASSIUM mmol/L 3.7 3.7 3.6   CHLORIDE mmol/L 105 99 101   CO2 mmol/L 22.7 24.0 23.0   BUN mg/dL 9 7 5*   CREATININE mg/dL 0.62 0.80 0.56*   GLUCOSE mg/dL 88 85 91   EGFR mL/min/1.73 112.8 93.3 115.6     Results from last 7 days   Lab Units 04/10/24  0408 24  0433 24  1358   CALCIUM mg/dL 8.2* 8.9 9.0   ALBUMIN g/dL  --  3.8 4.0     Results from last 7 days    Lab Units 04/10/24  0408 04/09/24  0433   HDL CHOL mg/dL  --  59   LDL CHOL mg/dL  --  58   HEMOGLOBIN A1C %  --  5.60   VITAMIN B 12 pg/mL 379  --    FOLATE ng/mL 4.43*  --      Glucose   Date/Time Value Ref Range Status   04/09/2024 0611 89 70 - 130 mg/dL Final   04/08/2024 2053 95 70 - 130 mg/dL Final   04/08/2024 1219 91 70 - 130 mg/dL Final       MRI Brain With & Without Contrast  Narrative: MRI OF THE BRAIN WITH AND WITHOUT CONTRAST ON 04/09/2024     CLINICAL HISTORY: Patient had abnormal head CT yesterday demonstrating  acute stroke in the lateral left frontal lobe.      TECHNIQUE: Axial T1, FLAIR, fat-suppressed T2, axial diffusion and  gradient echo T2, sagittal T1 and postcontrast axial fat-suppressed T1  and coronal T1-weighted images were obtained of the entire head.     This study is correlated to an outside MRI of the brain on 03/30/2022  and MRIs of the brain from [facility] on 04/22/2022 and  03/06/2023. This is also correlated to yesterday's head CT and CT  angiogram of the head and neck on 04/08/2024.     FINDINGS: There is an area of intense increased uptake on the diffusion  weighted images in the posterior lateral left frontal lobe parenchyma  that is bright on the FLAIR images and dark on the ADC maps and is  consistent with an area of cytotoxic edema from an acute infarct  measuring up to 3 x 2 x 2.5 cm in medial lateral, anterior posterior and  cranial caudal dimensions in the distribution of posterior lateral  frontal branches of the left middle cerebral artery territory. Since  prior MRI of the brain on 03/06/2023 there has been development of a  focal area of encephalomalacia in the posterior inferior left occipital  lobe compatible with an old posterior inferior left occipital lobe  infarct measuring 2.2 x 1.6 x 1.5 cm in the distribution of the inferior  occipital branch of the left posterior cerebral artery territory. There  are 2 separate 5 x 3 mm old posterior  inferior right cerebellar infarcts  in the right PICA territory that are unchanged since 03/06/2023.  There  is an additional 11 x 4 mm old inferior lateral left cerebellar infarct  and a 3 mm old posterior medial left cerebellar infarct and these are in  the left PICA territory. Furthermore, patient has very tiny areas of  cortical encephalomalacia compatible with tiny old infarcts including a  7 x 5 mm old superior left frontal cortical infarct and an additional 5  x 3 mm old anterior superior left parietal cortical infarct and these  are in the distribution of the superior frontal parietal branches of the  left middle cerebral artery territory and there is an additional 5 x 3  mm old superior right frontal cortical infarct in the right MCA  territory. The remainder of the brain parenchyma is normal in signal  intensity. The ventricles are normal in size. I see no mass effect and  no midline shift.  No extra-axial fluid collections are identified. No  abnormal areas of enhancement are seen in the head. The calvarium and  skull base are normal in appearance. The paranasal sinuses, mastoid air  cells and middle ear cavities are clear. The orbits are normal in  appearance. Good flow voids are demonstrated in the cerebral vessels and  in the dural venous sinuses.     Impression:    1. There is a 3 x 2 x 2.5 cm acute infarct in the posterior lateral left  frontal lobe in the distribution of the posterior lateral frontal  branches of the left middle cerebral artery territory. Furthermore,  since the prior MRI of the brain on 03/06/2023, just over a year ago,  the patient has developed a 2.2 x 1.6 x 1.5 cm old inferior left  occipital infarct and this old infarct is in the left posterior cerebral  artery territory. Otherwise, there has been no change since prior MRI of  the brain on 03/06/2023. The patient has tiny subcentimeter old cortical  infarcts in the superior left frontal cortex and anterior superior  left  parietal cortex in the left middle cerebral artery territory and  additional 5 x 3 mm old superior right frontal cortical infarct in the  right MCA territory. The patient has 2 separate 5 x 3 mm old posterior  right cerebellar infarcts in the right PICA territory and an 11 x 4 mm  old inferior lateral left cerebellar infarct in the left PICA territory.   The multiple different vascular territories of these chronic infarcts  as well as the acute infarct in the lateral left frontal lobe suggests  the patient may have a chronic central or cardiac embolic source or  possibly a PFO in this young patient who is only 43 years of age.  Correlation with echocardiography findings is suggested.     The results were communicated to Dr. Durant from stroke neurology by  telephone 04/09/2024 at 1:25 p.m.     This report was finalized on 4/10/2024 7:29 AM by Dr. Jay Cid M.D  on Workstation: [id number]       Results for orders placed during the hospital encounter of 04/08/24    Adult transthoracic echo complete    Interpretation Summary    Left ventricular systolic function is normal. Left ventricular ejection fraction appears to be 66 - 70%.    Left ventricular diastolic function is consistent with (grade I) impaired relaxation.    Normal right ventricular cavity size and systolic function noted.    Trace tricuspid valve regurgitation is present. Insufficient TR velocity profile to estimate the right ventricular systolic pressure.    There is a trivial circumferential pericardial effusion. There is no evidence of cardiac tamponade.      I have personally reviewed all medications:  Scheduled Medications  amitriptyline, 25 mg, Oral, Nightly  amLODIPine, 5 mg, Oral, Nightly  aspirin, 325 mg, Oral, Daily   Or  aspirin, 300 mg, Rectal, Daily  atorvastatin, 40 mg, Oral, Nightly  Cenobamate, 50 mg, Oral, Daily  Eslicarbazepine Acetate, 1,200 mg, Oral, Daily  lacosamide, 200 mg, Oral, Q12H    Infusions  sodium chloride, 75  mL/hr, Last Rate: 75 mL/hr (04/08/24 2162)    Diet  Diet: Regular/House; Fluid Consistency: Thin (IDDSI 0)    I have personally reviewed:  [x]  Laboratory   [x]  Microbiology   [x]  Radiology   [x]  EKG/Telemetry  [x]  Cardiology/Vascular   []  Pathology    []  Records         Assessment/Plan     Active Hospital Problems    Diagnosis  POA    **Cerebrovascular accident (CVA) due to embolism of left middle cerebral artery [I63.412]  Yes    S/P placement of VNS (vagus nerve stimulation) device [Z96.89]  Not Applicable    Abnormal urinalysis [R82.90]  Yes    HTN (hypertension) [I10]  Yes    Epilepsy [G40.909]  Yes      Resolved Hospital Problems   No resolved problems to display.       43 y.o. female with Cerebrovascular accident (CVA) due to embolism of left middle cerebral artery.    NOÉ and loop recorder interrogation negative.  She has plaque in her aorta of uncertain clinical significance.  Cardiology recommending anticoagulation.  Hematology has seen and hypercoagulable workup ordered.  Will need outpatient follow-up.  Possible home today if okay with neurology and hematology.      SCDs for DVT prophylaxis.  Full code.  Discussed with patient, family, consulting provider, and care team on multidisciplinary rounds.  Anticipate discharge home with family today if okay with all.      Hermilo Victor MD  [facility]  04/10/24  10:11 EDT

## 2024-04-10 NOTE — OUTREACH NOTE
Prep Survey      Flowsheet Row Responses   [facility] patient discharged from? [place]   Is LACE score < 7 ? Yes   Eligibility [facility]   Date of Admission 04/08/24   Date of Discharge 04/10/24   Discharge Disposition Home or Self Care   Discharge diagnosis *Cerebrovascular accident (CVA) due to embolism of left middle cerebral artery (I63.412)   Does the patient have one of the following disease processes/diagnoses(primary or secondary)? Stroke   Does the patient have Home health ordered? No   Is there a DME ordered? No   Prep survey completed? Yes            ISABELLA ALCARAZ - Registered Nurse

## 2024-04-10 NOTE — PLAN OF CARE
Goal Outcome Evaluation:  Plan of Care Reviewed With: patient        Progress: improving  Outcome Evaluation: vss. telemetry monitor, sr. alert and oriented x4, room air. up ad abundio. to be discharged home. awaiting transportation.

## 2024-04-10 NOTE — PROGRESS NOTES
"DOS: 4/10/2024  NAME: Alexandra Xiao   : 1980  PCP: Annalee Huddleston APRN  Chief Complaint   Patient presents with    Stroke     Stroke    Subjective: Patient is resting in bed when entered room. Patient states that speech is doing a little better today and  is in agreement. Patient denies any concerns or complaints. She denies any weakness, numbness or tingling, visual disturbances, headache, n/v. She had no acute events overnight.     Interval History  History taken from: patient and     Objective:  Vital signs: /95 (BP Location: Left arm, Patient Position: Lying)   Pulse 90   Temp 97.3 °F (36.3 °C) (Oral)   Resp 16   Ht 162.6 cm (64\")   Wt 83 kg (183 lb)   SpO2 95%   BMI 31.41 kg/m²       Physical Exam:  GENERAL: NAD  gen: NAD, vitals reviewed  MS: oriented x4, recent/remote memory intact, normal attention/concentration, able to follow multiple single step commands, repeats phrases with a few paraphrasic errors, anomia, no neglect, normal fund of knowledge  CN: visual acuity grossly normal, visual fields full, PERRL, EOMI, facial sensation equal, no facial droop, hearing symmetric, palate elevates symmetrically, shoulder shrug equal, tongue midline  Motor: 5/5 throughout upper and lower extremities, normal tone  Sensation: intact to vibration and temperature throughout  Coordination: no dysmetria with finger to nose bilaterally    Results Review:     I reviewed the patient's new clinical results.    Current Medications:  Scheduled Medications:amitriptyline, 25 mg, Oral, Nightly  amLODIPine, 5 mg, Oral, Nightly  [START ON 2024] apixaban, 5 mg, Oral, Q12H  aspirin, 325 mg, Oral, Daily   Or  aspirin, 300 mg, Rectal, Daily  atorvastatin, 40 mg, Oral, Nightly  Cenobamate, 50 mg, Oral, Daily  Eslicarbazepine Acetate, 400 mg, Oral, Daily   And  Eslicarbazepine Acetate, 800 mg, Oral, Daily  lacosamide, 200 mg, Oral, Q12H      Infusions: sodium chloride, 75 mL/hr, Last Rate: 75 mL/hr " "(04/08/24 2152)      PRN Medications:    acetaminophen    LORazepam    ondansetron    Medications Reviewed:     Laboratory results:  Lab Results   Component Value Date    GLUCOSE 88 04/10/2024    CALCIUM 8.2 (L) 04/10/2024     04/10/2024    K 3.7 04/10/2024    CO2 22.7 04/10/2024     04/10/2024    BUN 9 04/10/2024    CREATININE 0.62 04/10/2024    BCR 14.5 04/10/2024    ANIONGAP 9.3 04/10/2024     Lab Results   Component Value Date    WBC 9.91 04/10/2024    HGB 12.0 04/10/2024    HCT 37.0 04/10/2024    MCV 85.1 04/10/2024     04/10/2024      Results from last 7 days   Lab Units 04/09/24  0433   CHOLESTEROL mg/dL 134     Lab Results   Component Value Date    INR 1.05 04/08/2024    INR 1.03 03/05/2023    INR 1.0 04/27/2022    PROTIME 13.9 04/08/2024    PROTIME 13.6 03/05/2023    PROTIME 12.9 04/26/2022     Lab Results   Component Value Date    TSH 1.790 04/11/2023     No components found for: \"LDLCALC\"  Lab Results   Component Value Date    HGBA1C 5.60 04/09/2024     No components found for: \"B12\"    Review and interpretation of imaging:  NOÉ:   There is focal thickening at the mid to distal anterior mitral valve leaflet. There is mild prolapse of the A2 segment of the anterior mitral valve leaflet.    There is moderate to severe mitral regurgitation which originates from multiple jets, including a prominent posteriorly directed jet    There are moderate plaques in the descending thoracic aorta. There is a focal moderate plaque without mobile components in the central aortic arch    Left ventricular systolic function is normal. Left ventricular ejection fraction appears to be 61 - 65%.    Left ventricular diastolic function is consistent with (grade I) impaired relaxation.    Normal right ventricular cavity size and systolic function noted.    Multiple agitated saline studies were negative, including with abdominal thrusts. There was no PFO noted on the agitated saline studies, or by color Doppler " assessment    No evidence of a left atrial appendage thrombus was present.    There is a small (<1cm) pericardial effusion adjacent to the right atrium and right ventricle. There is no evidence of cardiac tamponade.    Impression:   Patient with two right MCA infarcts in March 2022. Then in March 2023, patient presented with multiple small left MCA,multiple tiny 3 to 6 mm old posterior lateral and inferior lateral cerebellar infarcts bilaterally  in the PICA territories. Today Patient MRI demonstrated acute infarct in posterior lateral frontal branch of left MCA and a chronic inferior left occipital lobe infarct in left PCA territory that is new from prior imaging. Patient CTH also confirmed these new strokes from prior imaging. CTA head and neck showed no acute abnormality. TTE demonstrated EF of 66% and trace tricuspid regurgitation. NOÉ demonstrated EF of 61-65%, moderate to severe mitral regurgitation, no PFO, and no left atrial thrombus present. Loop recorder interrogated by cardiology demonstrating no atrial fibrillation events. Hematology doing hypercoagulability work up.     History of strokes:  Patient first strokes was in March 2022 where MRI demonstrated two 4-5 mm areas of restricted diffusion, consistent with acute infarcts are identified involving the right frontal lobe within the right MCA vascular distribution. CT head at time showed no acute abnormality. CTA head and neck demonstrated no significant stenosis, focal aneurysm, or discrete vascular cut off. Hypercoaguable panel done on April 2022 following which was negative for YOUSIF, antithrombin II, factor 5 leiden, factor 5 activity, factor II DNA analysis, protein s, protein c, lupus, beta 2 glycoprotein, ANCA and CRP. NOÉ done which demonstrated EF of 60% and moderate to severe MVP/MVR. Loop recorder placed and patient still has loop recorder currently.     In March 2023 MRI demonstrated:  multiple tiny old infarcts in multiple different vascular  territories including 3 separate tiny 5 to 8 mm old posterior superior left frontal cortical infarcts in the left middle cerebral  artery territory and two 5 mm old infarcts in the right hemisphere 1  involving the posterior superior right frontal subcortical white matter  and one involving the posterior superior right frontal cortex and these  old infarcts in the right middle cerebral artery territory occurred back  in April of 2022. Furthermore there are multiple tiny 3 to 6 mm old  posterior lateral and inferior lateral cerebellar infarcts bilaterally  in the PICA territories. The multiple different vascular territories of  these chronic infarcts suggest the patient may have a central cardiac or  embolic source TTE done demonstrating EF of 59%, left atrial cavity borderline dilated, moderate mitral valve regurgitation consistent with with prior rheumatic heart disease and mild mitral stenosis.    Plan:  Eliquis 5 mg twice daily, first dose should be tomorrow.  ASA 81 mg daily  Atorvastatin 40 mg  Hematology/oncology work up for hypercoagulability  Vagus nerve stimulator, continue vimpat 200 mg q 12 hours, eslicarbazepine 1200 mg daily, and Xcorpi 50 mg daily  Continue compliance with CPAP  Loop recorder-continue monitoring for heart arrhythmia  Speech therapy out patient   Follow up with neurology with Dr. Cb James in 3 months outpatient  Follow up with PCP in 1-2 weeks   Control risk factors to prevent stroke which means keep BP at or below 130/80, take your statin if able and try to have LDL measurements < 70, stop smoking if you smoke, control your blood sugar, and get regular moderate exercise four times weekly, and avoid prolonged sitting.  Adopt a healthy diet such as the Mediterranean diet which includes vegetables, fruits, whole grains, low-fat dairy, poultry, fish, legumes, nontropical fish oils, and nuts.  Limit sweets, sugary drinks, and red meats.  Reduce or eliminate alcohol intake. No birth  control especially estrogen containing or hormone replacement therapy with menopause.    I have discussed the above with the patient and family.  Sharifa Michelle PA-C  04/10/24  13:56 EDT        Cerebrovascular accident (CVA) due to embolism of left middle cerebral artery    Epilepsy    HTN (hypertension)    S/P placement of VNS (vagus nerve stimulation) device    Abnormal urinalysis

## 2024-04-10 NOTE — PROGRESS NOTES
Discharge Planning Assessment  [facility]     Patient Name: Alexandra Xiao  MRN: [id number]  Today's Date: 4/10/2024    Admit Date: 4/8/2024    Plan: Return home with S/O   Discharge Needs Assessment       Row Name 04/10/24 1416       Living Environment    People in Home spouse    Name(s) of People in Home S/O Yadiel Beryl    Current Living Arrangements other (see comments)  Mobile home    Primary Care Provided by self    Provides Primary Care For no one, unable/limited ability to care for self    Family Caregiver if Needed significant other    Family Caregiver Names S/O Yadiel Micanopy [phone]    Quality of Family Relationships helpful    Able to Return to Prior Arrangements yes       Resource/Environmental Concerns    Resource/Environmental Concerns none    Transportation Concerns none       Transition Planning    Patient/Family Anticipates Transition to home with family    Patient/Family Anticipated Services at Transition other (see comments)  Needs speech therapy       Discharge Needs Assessment    Readmission Within the Last 30 Days no previous admission in last 30 days    Equipment Currently Used at Home cpap    Concerns to be Addressed discharge planning    Anticipated Changes Related to Illness none    Equipment Needed After Discharge none                   Discharge Plan       Row Name 04/10/24 2029       Plan    Plan Return home with S/O    Patient/Family in Agreement with Plan yes    Plan Comments Spoke with patient and S/O Yadiel Beryl [phone] at bedside.  Patient lives with Yadiel, is IADL, uses a C-pap, has never used HH or been to SNF. PCP is Annalee THACKER and pharmacy is [facility] in [place] [address] [facility] [place].  Patient does not drive, Yadiel drives and assists if needed.  Patient will need OP speech therapy and plans to return home.  Ja SNYDER      Row Name 04/10/24 3797       Plan    Plan Home with S/O and outpt ST    Plan Comments ST recommends outpt ST.  Order received for outpt  ST.  S/W pt who is in agreement w/ coming here to [facility] for her outpt ST.  Angeline/  [facility] outpt therapies [phone]) notified and she will contact pt to arrange her first appointment.    Final Note DC home w/ S/O and outpt ST ............Camelia PINTO/ LY                  Continued Care and Services - Admitted Since 4/8/2024    No active coordination exists for this encounter.       Expected Discharge Date and Time       Expected Discharge Date Expected Discharge Time    Apr 10, 2024            Demographic Summary       Row Name 04/10/24 1415       General Information    Admission Type inpatient    Arrived From home    Referral Source admission list    Reason for Consult discharge planning    Preferred Language English                   Functional Status       Row Name 04/10/24 1415       Functional Status    Usual Activity Tolerance moderate    Current Activity Tolerance moderate       Functional Status, IADL    Medications independent    Meal Preparation independent;assistive person  S/O assists    Housekeeping assistive person;independent    Laundry independent;assistive person    Shopping assistive person       Mental Status    General Appearance WDL WDL       Mental Status Summary    Recent Changes in Mental Status/Cognitive Functioning no changes                             Becky S. [place], LILLIAN

## 2024-04-10 NOTE — DISCHARGE INSTRUCTIONS
"Stroke Signs and Symptoms: SUDDEN development of  one or more of the following symptoms \"BE FAST\": B (Balance-sudden dizziness or inability to walk), E (Eyes-sudden blurry vision, double vision, visual field cut, loss of vision), F (Facial droop), A(Arm weakness or weakness on one side of the body including the leg), S (Slurred speech or Speech difficutly), T (Time-time is brain the faster you get here the less likelihood of permanent brain tissue damage and more likely to treat stroke and prevent disability)   -Control risk factors to prevent stroke which means keep BP at or below 130/80, take your statin if able and try to have LDL measurements < 70, stop smoking if you smoke, control your blood sugar, and get regular moderate exercise four times weekly, and avoid prolonged sitting.  Adopt a healthy diet such as the Mediterranean diet which includes vegetables, fruits, whole grains, low-fat dairy, poultry, fish, legumes, nontropical fish oils, and nuts.  Limit sweets, sugary drinks, and red meats.  Reduce or eliminate alcohol intake.      "

## 2024-04-10 NOTE — THERAPY EVALUATION
Acute Care - Speech Language Pathology Initial Evaluation  [facility]     Patient Name: Alexandra Xiao  : 1980  MRN: [id number]  Today's Date: 4/10/2024               Admit Date: 2024     Visit Dx:    ICD-10-CM ICD-9-CM   1. Cerebrovascular accident (CVA), unspecified mechanism  I63.9 434.91   2. Speech disturbance, unspecified type  R47.9 784.59   3. Generalized nonconvulsive epilepsy  G40.309 345.00     Patient Active Problem List   Diagnosis    Sebaceous cyst of breast, right    Abnormal uterine bleeding    Acute CVA (cerebrovascular accident)    Microcytic anemia    Transaminitis    Epilepsy    Tobacco abuse    Major depressive disorder    Left-sided weakness    Anemia    Hyponatremia    HTN (hypertension)    History of stroke    Vitamin D deficiency    B12 deficiency    Observed sleep apnea    Snoring    Excessive daytime sleepiness    Class 1 obesity    Cerebrovascular accident (CVA) due to embolism of left middle cerebral artery    S/P placement of VNS (vagus nerve stimulation) device    Abnormal urinalysis     Past Medical History:   Diagnosis Date    Abnormal bleeding in menstrual cycle     COPD (chronic obstructive pulmonary disease)     Depression     Epilepsy     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,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    Headache, tension-type     Heavy menstrual bleeding     WITH CLOTS    Hyperlipidemia     Hypertension     Memory loss     Migraine     Seizures     Status post placement of implantable loop recorder     Stroke     MARCH AND      Past Surgical History:   Procedure Laterality Date    BREAST BIOPSY      D & C HYSTEROSCOPY ENDOMETRIAL ABLATION N/A 06/10/2022    Procedure: DILATATION AND CURETTAGE HYSTEROSCOPY NOVASURE ENDOMETRIAL ABLATION;  Surgeon: Ernesto Mendosa MD;  Location: [facility];  Service: Obstetrics/Gynecology;  Laterality: N/A;    FOOT SURGERY      pins in left foot     INSERT / REPLACE / REMOVE PACEMAKER  2022    Dr. Cristofer Chamorro    OTHER SURGICAL  HISTORY      VNS plate 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    NOÉ      04/2022    TUBAL ABDOMINAL LIGATION      VAGUS NERVE STIMULATOR IMPLANTATION         SLP Recommendation and Plan  SLP Diagnosis: moderate, aphasia (04/10/24 1200)           SLC Criteria for Skilled Therapy Interventions Met: yes (04/10/24 1200)  Anticipated Discharge Disposition (SLP): home with OP services (04/10/24 1200)        Therapy Frequency (SLP SLC): PRN (04/10/24 1200)  Predicted Duration Therapy Intervention (Days): until discharge (04/10/24 1200)                             Plan of Care Reviewed With: patient, spouse (04/10/24 1309)  Outcome Evaluation: Speech language evaluation completed pt scored 73.3 on WAB bedside record form, overall demonstrates mild to moderate receptive and expressive aphasia. Recommend OP ST, pt and  agree and SLP informed CCP. (04/10/24 1309)      SLP EVALUATION (Last 72 Hours)       SLP SLC Evaluation       Row Name 04/10/24 1200                   Communication Assessment/Intervention    Document Type evaluation  -KA        Subjective Information no complaints  -KA        Patient Observations alert;cooperative  -KA        Patient Effort good  -KA        Symptoms Noted During/After Treatment none  -KA           General Information    Patient Profile Reviewed yes  -KA        Pertinent History Of Current Problem Left MCA CVA  -KA        Precautions/Limitations, Vision WFL  -KA        Precautions/Limitations, Hearing WFL;for purposes of eval  -KA        Prior Level of Function-Communication WFL;other (see comments)  pt reports hx of CVA and  reports hx of aphasia and is familiar with term, reports pt returned to baseline without therapy from CVA last year  -KA        Plans/Goals Discussed with patient and family  -KA        Barriers to Rehab none identified  -KA        Patient's Goals for Discharge functional communication  -KA        Family Goals for Discharge functional communication   -KA           Comprehension Assessment/Intervention    Comprehension Assessment/Intervention Auditory Comprehension  -KA           Auditory Comprehension Assessment/Intervention    Auditory Comprehension (Communication) mild impairment;moderate impairment  -        Auditory Comprehension Communication, Comment WA bedside record form administered pt scored  9/10 during auditory verbal comprehension comprehension of y/n questions and 6/10 with sequential commands, pt required increased processing speed.  -           Expression Assessment/Intervention    Expression Assessment/Intervention verbal expression  -KA           Verbal Expression Assessment/Intervention    Verbal Expression moderate impairment  -        Verbal Expression, Comment On WAB bedside record form pt scored 8/10 on spontaneous speech content, and 5/10 on fluency with significant word finding difficulty, difficulty with formulation of sentences, occasional neologism and phonemic paraphasia. Halting and delay with word finding. 7/10 with repetition portion9/10 with object naming with delays and halting, occasoinal phonemic paraphasia with self correction. In writing portion pt able to write name and address, unable to write sentences. Writing letters to dictation 100% on 8 trials and shorting short CVC words 100% on five trials. Recommend outpatient ST and SLP informed CCP, pt and  agreeable.  -KA           SLP Evaluation Clinical Impressions    SLP Diagnosis moderate;aphasia  -KA        Rehab Potential/Prognosis excellent  -        SLC Criteria for Skilled Therapy Interventions Met yes  -KA        Functional Impact difficulty communicating wants, needs;difficulty communicating in an emergency;difficulty in expressing complex messages  -KA           Recommendations    Therapy Frequency (SLP SLC) PRN  -KA        Predicted Duration Therapy Intervention (Days) until discharge  -KA        Anticipated Discharge Disposition (SLP) home with OP  services  -KA                  User Key  (r) = Recorded By, (t) = Taken By, (c) = Cosigned By      Initials Name Effective Dates    Rene Viveros SLP 01/05/24 -                        EDUCATION  The patient has been educated in the following areas:     Communication Impairment.                      Time Calculation:      Time Calculation- SLP       Row Name 04/10/24 1312             Time Calculation- SLP    SLP Start Time 1015  -KA      SLP Received On 04/10/24  -KA         Untimed Charges    SLP Eval/Re-eval  ST Eval Speech and Production w/ Language - 48631  -KA      06634-SD Eval Speech and Production w/ Language Minutes 60  -KA         Total Minutes    Untimed Charges Total Minutes 60  -KA       Total Minutes 60  -KA                User Key  (r) = Recorded By, (t) = Taken By, (c) = Cosigned By      Initials Name Provider Type    Rene Viveros SLP Speech and Language Pathologist                    Therapy Charges for Today       Code Description Service Date Service Provider Modifiers Qty    [id number] HC ST EVAL SPEECH AND PROD W LANG  4 4/10/2024 Rene Del Cid SLP GN 1                       GERARD [place]  4/10/2024

## 2024-04-10 NOTE — CASE MANAGEMENT/SOCIAL WORK
Continued Stay Note  [facility]     Patient Name: Alexandra Xiao  MRN: [id number]  Today's Date: 4/10/2024    Admit Date: 4/8/2024    Plan: Home with S/O and outpt ST   Discharge Plan       Row Name 04/10/24 1356       Plan    Plan Home with S/O and outpt ST    Plan Comments ST recommends outpt ST.  Order received for outpt ST.  S/W pt who is in agreement w/ coming here to [facility] for her outpt ST.  Angeline/ [facility] outpt therapies ([phone]) notified and she will contact pt to arrange her first appointment.    Final Note DC home w/ S/O and outpt ST ............Camelia PINTO/ LY                   Discharge Codes    No documentation.                 Expected Discharge Date and Time       Expected Discharge Date Expected Discharge Time    Apr 10, 2024               Camelia Romano RN

## 2024-04-11 ENCOUNTER — TRANSITIONAL CARE MANAGEMENT TELEPHONE ENCOUNTER (OUTPATIENT)
Dept: CALL CENTER | Facility: HOSPITAL | Age: 44
End: 2024-04-11
Payer: MEDICARE

## 2024-04-11 ENCOUNTER — TELEPHONE (OUTPATIENT)
Dept: NEUROLOGY | Facility: CLINIC | Age: 44
End: 2024-04-11
Payer: MEDICARE

## 2024-04-11 LAB
CARDIOLIPIN IGA SER IA-ACNC: <9 APL U/ML (ref 0–11)
CARDIOLIPIN IGG SER IA-ACNC: <9 GPL U/ML (ref 0–14)
CARDIOLIPIN IGM SER IA-ACNC: <9 MPL U/ML (ref 0–12)

## 2024-04-11 NOTE — OUTREACH NOTE
Call Center TCM Note      Flowsheet Row Responses   [facility] patient discharged from? [place]   Does the patient have one of the following disease processes/diagnoses(primary or secondary)? Stroke   TCM attempt successful? No   Unsuccessful attempts Attempt 1            Mary Ann Kirby LPN    4/11/2024, 11:17 EDT

## 2024-04-11 NOTE — OUTREACH NOTE
Call Center TCM Note      Flowsheet Row Responses   [facility] patient discharged from? [place]   Does the patient have one of the following disease processes/diagnoses(primary or secondary)? Stroke   TCM attempt successful? Yes   Call start time 1618   Call end time 1621   Discharge diagnosis *Cerebrovascular accident (CVA) due to embolism of left middle cerebral artery (I63.412)   Meds reviewed with patient/caregiver? Yes   Is the patient having any side effects they believe may be caused by any medication additions or changes? No   Does the patient have all medications ordered at discharge? Yes   Is the patient taking all medications as directed (includes completed medication regime)? Yes   Does the patient have an appointment with their PCP within 7-14 days of discharge? Yes   Has home health visited the patient within 72 hours of discharge? N/A   Psychosocial issues? No   Does the patient require any assistance with activities of daily living such as eating, bathing, dressing, walking, etc.? No   Does the patient have any residual symptoms from stroke/TIA? No   Did the patient receive a copy of their discharge instructions? Yes   Nursing interventions Reviewed instructions with patient   What is the patient's perception of their health status since discharge? Improving   Is the patient/caregiver able to teach back the risk factors for a stroke? High blood pressure-goal below 120/80, High Cholesterol, History of TIAs, Carotid or other artery disease   Is the patient/caregiver able to teach back signs and symptoms related to disease process for when to call PCP? Yes   Is the patient/caregiver able to teach back signs and symptoms related to disease process for when to call 911? Yes   If the patient is a current smoker, are they able to teach back resources for cessation? Not a smoker   Is the patient/caregiver able to teach back the hierarchy of who to call/visit for symptoms/problems? PCP, Specialist,  Home health nurse, Urgent Care, ED, 911 Yes   Is the patient able to teach back FAST for Stroke? B alance: Watch for sudden loss of balance, E yes: Check for vision loss, F ace: Look for an uneven smile, A rm: Check if one arm is weak, S peech: Listen for slurred speech, T brenda: Call 9-1-1 right away   TCM call completed? Yes   Call end time 1621   Would this patient benefit from a Referral to [facility] Social Work? No   Is the patient interested in additional calls from an ambulatory ? No            Mary Ann Kirby LPN    4/11/2024, 16:21 EDT

## 2024-04-11 NOTE — TELEPHONE ENCOUNTER
Caller: Alexandra Xiao    Relationship: Self    Best call back number: [phone]    What was the call regarding: PATIENT STATED SHE HAS A LOT OF QUESTIONS AS TO WHAT TO DO FROM HERE AND IS SEEKING SOME SUPPORT FROM HER PROVIDER REGARDING THE STROKE SHE JUST HAD.     SHE ASKS IF POSSIBLE IF DR MORENO COULD CONTACT HER.     PLEASE REVIEW  THANK YOU     Is it okay if the provider responds through MyChart: YES

## 2024-04-11 NOTE — TELEPHONE ENCOUNTER
Caller: Alexandra Xiao    Relationship to patient: Self    Best call back number: [phone]    New or established patient?  [] New  [x] Established    Date of discharge: *04/10/2024    Facility discharged from: [facility]    Diagnosis/Symptoms: STROKE    Length of stay (If applicable): 2 DAYS    Specialty Only: Did you see a [facility] provider?    [x] Yes  [] No  If so, who? K ANETTE COOLEY    DISCHARGE NOTE SAYS TO SCHEDULE WITH [facility] IN 2 WEEKS OR ASAP BUT PATIENT WANTS TO SEE DR MORENO.     PATIENT ALSO HAD HER BATTERY CHANGED ON HER VNS AND NEEDS TO SCHEDULE WITH DR MORENO FOR THAT ALSO.     PLEASE ADVISE  THANK YOU

## 2024-04-12 ENCOUNTER — OFFICE VISIT (OUTPATIENT)
Dept: INTERNAL MEDICINE | Age: 44
End: 2024-04-12
Payer: MEDICARE

## 2024-04-12 VITALS
SYSTOLIC BLOOD PRESSURE: 160 MMHG | OXYGEN SATURATION: 96 % | HEIGHT: 64 IN | TEMPERATURE: 97.6 F | HEART RATE: 116 BPM | WEIGHT: 188 LBS | DIASTOLIC BLOOD PRESSURE: 100 MMHG | BODY MASS INDEX: 32.1 KG/M2

## 2024-04-12 DIAGNOSIS — I10 PRIMARY HYPERTENSION: ICD-10-CM

## 2024-04-12 DIAGNOSIS — Z86.73 HISTORY OF STROKE: Primary | ICD-10-CM

## 2024-04-12 LAB
APTT SCREEN TO CONFIRM RATIO: 1.24 RATIO (ref 0–1.34)
B2 GLYCOPROT1 IGA SER-ACNC: <9 GPI IGA UNITS (ref 0–25)
B2 GLYCOPROT1 IGG SER-ACNC: <9 GPI IGG UNITS (ref 0–20)
B2 GLYCOPROT1 IGM SER-ACNC: <9 GPI IGM UNITS (ref 0–32)
CONFIRM APTT/NORMAL: 44.4 SEC (ref 0–47.6)
LA 2 SCREEN W REFLEX-IMP: NORMAL
SCREEN APTT: 31.2 SEC (ref 0–43.5)
SCREEN DRVVT: 38.3 SEC (ref 0–47)
THROMBIN TIME: 18.6 SEC (ref 0–23)

## 2024-04-12 RX ORDER — METOPROLOL SUCCINATE 25 MG/1
25 TABLET, EXTENDED RELEASE ORAL DAILY
Qty: 30 TABLET | Refills: 0 | Status: SHIPPED | OUTPATIENT
Start: 2024-04-12

## 2024-04-12 NOTE — PROGRESS NOTES
"    I N T E R N A L  M E D I C I N E  Annalee Huddleston, APRN       ENCOUNTER DATE:  04/12/2024    Alexandra Xiao / 43 y.o. / female        CC:   (Transitional Care Follow Up Visit)  Hospital Follow Up Visit        Within 48 business hours after discharge our office contacted him via telephone to coordinate his care and needs.      I reviewed and discussed the details of that call along with the discharge summary, hospital problems, inpatient lab results, inpatient diagnostic studies, and consultation reports with the patient.         4/10/2024     6:39 PM   Date of TCM Phone Call   [facility]   Date of Admission 4/8/2024   Date of Discharge 4/10/2024   Discharge Disposition Home or Self Care       Risk for Readmission (LACE) Score: 6 (4/10/2024  6:00 AM)            VITALS    Visit Vitals  /100   Pulse 116   Temp 97.6 °F (36.4 °C)   Ht 162.6 cm (64.02\")   Wt 85.3 kg (188 lb)   SpO2 96%   BMI 32.25 kg/m²       BP Readings from Last 3 Encounters:   04/12/24 160/100   04/10/24 142/95   03/11/24 (!) 142/108     Wt Readings from Last 3 Encounters:   04/12/24 85.3 kg (188 lb)   04/10/24 83 kg (183 lb)   03/11/24 83.9 kg (185 lb)      Body mass index is 32.25 kg/m².    HPI:     Date of admission/discharge: As noted above in CC  Hospital: [facility]   Principle Dx: Cerebrovascular accident (CVA) due to embolism of left middle cerebral artery  Secondary Dx: S/P placement of VNS (vagus nerve stimulation) device, abnormal urinalysis, HTN, Epilepsy  History prior to hospitalization: Presented to ER with altered speech.  Found to have acute versus subacute infarct on CT scan and was admitted to the hospital for further evaluation and treatment.    Evaluation/Treatment: MRI Brain showed acute left MCA infarct.  Neurology recommended NOÉ and hypercoagulable workup.  Cardiology performed NOÉ, which showed no evidence of PFO or source of stroke.  Recommend aspirin 81 mg daily due to plaque in aortic " arch.  Due to recurrent stroke history and apparent embolic distribution, recommend she start Eliquis.  No evidence for atrial fibrillation on loop recording device.    Urinalysis was abnormal but she did not have any symptoms of infection.    Course: Today, she is feeling fatigued, but remains without new headache, numbness, tingling, weakness.  Speech is improving and she is scheduled with speech therapy next week.  Remains without signs/ symptoms of bleeding.  Has not yet scheduled with hematology to discuss results of hypercoagulable work up.  Scheduled with neurology, Dr. James, on July 10, 2024.  BP and HR are elevated at today's visit but is not having any chest pain, dyspnea.  Remains on amlodipine 5 mg daily as prescribed.  Does not currently have cardiology follow up scheduled.  Remains without urinary complaints.        Patient Care Team:  Annalee Huddleston APRN as PCP - General (Family Medicine)  Cristofer Chamorro MD as Consulting Physician (Cardiology)  Cb James II, MD as Consulting Physician (Neurology)  Ernesto Mendosa MD as Consulting Physician (Obstetrics and Gynecology)  Reza Lester MD as Consulting Physician (Cardiology)  ____________________________________________________________________    ASSESSMENT & PLAN:    1. History of stroke    2. Primary hypertension      Orders Placed This Encounter   Procedures    Ambulatory Referral to Hematology       Summary/Discussion:  Recheck of HR in low 100s, and recheck of BP was 142/98.  Recommend she add low dose Toprol XL with close monitoring of BP and HR at home.  Goal BP is < 130/80, HR between .  She will provide me with updated BP readings via [employer] next week and we will follow up during AWV as scheduled next Friday..  We helped patient schedule follow up with cardiology office on April 24, 2024.  Referral placed to hematology and she understands importance of scheduling close follow up.    Follow up with speech therapy as  scheduled.      Return for Next scheduled follow up.    ____________________________________________________________________    REVIEW OF SYSTEMS    Review of Systems   Constitutional:  Positive for fatigue. Negative for chills, fever and unexpected weight change.   Respiratory:  Negative for cough, chest tightness and shortness of breath.    Cardiovascular:  Negative for chest pain, palpitations and leg swelling.   Neurological:  Negative for dizziness, weakness, light-headedness and headaches.   Psychiatric/Behavioral:  The patient is not nervous/anxious.          PHYSICAL EXAMINATION    Physical Exam  Vitals reviewed.   Constitutional:       General: She is not in acute distress.     Appearance: Normal appearance. She is not ill-appearing, toxic-appearing or diaphoretic.   HENT:      Head: Normocephalic and atraumatic.   Eyes:      Extraocular Movements: Extraocular movements intact.      Pupils: Pupils are equal, round, and reactive to light.   Cardiovascular:      Rate and Rhythm: Normal rate and regular rhythm.      Heart sounds: Normal heart sounds.   Pulmonary:      Effort: Pulmonary effort is normal.      Breath sounds: Normal breath sounds.   Musculoskeletal:      Right lower leg: No edema.      Left lower leg: No edema.   Neurological:      Mental Status: She is alert and oriented to person, place, and time. Mental status is at baseline.      Sensory: No sensory deficit.      Motor: No weakness.      Coordination: Coordination normal.      Gait: Gait normal.      Comments: Speech is slow but able to finish full sentences, some word finding difficulty   Psychiatric:         Mood and Affect: Mood normal.         Behavior: Behavior normal.         Thought Content: Thought content normal.         Judgment: Judgment normal.           REVIEWED DATA:    Labs:   Lab Results   Component Value Date     04/10/2024    K 3.7 04/10/2024    CALCIUM 8.2 (L) 04/10/2024    AST 22 04/09/2024    ALT 20 04/09/2024     BUN 9 04/10/2024    CREATININE 0.62 04/10/2024    CREATININE 0.80 04/09/2024    CREATININE 0.56 (L) 04/08/2024       Lab Results   Component Value Date    WBC 9.91 04/10/2024    HGB 12.0 04/10/2024    HGB 13.9 04/09/2024    HGB 14.6 04/08/2024     04/10/2024       Lab Results   Component Value Date    GLUCOSEU Negative 04/08/2024    BLOODU Trace (A) 04/08/2024    NITRITEU Negative 04/08/2024    LEUKOCYTESUR Moderate (2+) (A) 04/08/2024       Imaging:   Adult Transesophageal Echo (NOÉ) W/ Cont if Necessary Per Protocol    Result Date: 4/10/2024  Narrative:   There is focal thickening at the mid to distal anterior mitral valve leaflet. There is mild prolapse of the A2 segment of the anterior mitral valve leaflet.   There is moderate to severe mitral regurgitation which originates from multiple jets, including a prominent posteriorly directed jet   There are moderate plaques in the descending thoracic aorta. There is a focal moderate plaque without mobile components in the central aortic arch   Left ventricular systolic function is normal. Left ventricular ejection fraction appears to be 61 - 65%.   Left ventricular diastolic function is consistent with (grade I) impaired relaxation.   Normal right ventricular cavity size and systolic function noted.   Multiple agitated saline studies were negative, including with abdominal thrusts. There was no PFO noted on the agitated saline studies, or by color Doppler assessment   No evidence of a left atrial appendage thrombus was present.   There is a small (<1cm) pericardial effusion adjacent to the right atrium and right ventricle. There is no evidence of cardiac tamponade.     MRI Brain With & Without Contrast    Result Date: 4/10/2024  Narrative: MRI OF THE BRAIN WITH AND WITHOUT CONTRAST ON 04/09/2024  CLINICAL HISTORY: Patient had abnormal head CT yesterday demonstrating acute stroke in the lateral left frontal lobe.  TECHNIQUE: Axial T1, FLAIR, fat-suppressed T2,  axial diffusion and gradient echo T2, sagittal T1 and postcontrast axial fat-suppressed T1 and coronal T1-weighted images were obtained of the entire head.  This study is correlated to an outside MRI of the brain on 03/30/2022 and MRIs of the brain from [facility] on 04/22/2022 and 03/06/2023. This is also correlated to yesterday's head CT and CT angiogram of the head and neck on 04/08/2024.  FINDINGS: There is an area of intense increased uptake on the diffusion weighted images in the posterior lateral left frontal lobe parenchyma that is bright on the FLAIR images and dark on the ADC maps and is consistent with an area of cytotoxic edema from an acute infarct measuring up to 3 x 2 x 2.5 cm in medial lateral, anterior posterior and cranial caudal dimensions in the distribution of posterior lateral frontal branches of the left middle cerebral artery territory. Since prior MRI of the brain on 03/06/2023 there has been development of a focal area of encephalomalacia in the posterior inferior left occipital lobe compatible with an old posterior inferior left occipital lobe infarct measuring 2.2 x 1.6 x 1.5 cm in the distribution of the inferior occipital branch of the left posterior cerebral artery territory. There are 2 separate 5 x 3 mm old posterior inferior right cerebellar infarcts in the right PICA territory that are unchanged since 03/06/2023.  There is an additional 11 x 4 mm old inferior lateral left cerebellar infarct and a 3 mm old posterior medial left cerebellar infarct and these are in the left PICA territory. Furthermore, patient has very tiny areas of cortical encephalomalacia compatible with tiny old infarcts including a 7 x 5 mm old superior left frontal cortical infarct and an additional 5 x 3 mm old anterior superior left parietal cortical infarct and these are in the distribution of the superior frontal parietal branches of the left middle cerebral artery territory and there is an  additional 5 x 3 mm old superior right frontal cortical infarct in the right MCA territory. The remainder of the brain parenchyma is normal in signal intensity. The ventricles are normal in size. I see no mass effect and no midline shift.  No extra-axial fluid collections are identified. No abnormal areas of enhancement are seen in the head. The calvarium and skull base are normal in appearance. The paranasal sinuses, mastoid air cells and middle ear cavities are clear. The orbits are normal in appearance. Good flow voids are demonstrated in the cerebral vessels and in the dural venous sinuses.      Impression:  1. There is a 3 x 2 x 2.5 cm acute infarct in the posterior lateral left frontal lobe in the distribution of the posterior lateral frontal branches of the left middle cerebral artery territory. Furthermore, since the prior MRI of the brain on 03/06/2023, just over a year ago, the patient has developed a 2.2 x 1.6 x 1.5 cm old inferior left occipital infarct and this old infarct is in the left posterior cerebral artery territory. Otherwise, there has been no change since prior MRI of the brain on 03/06/2023. The patient has tiny subcentimeter old cortical infarcts in the superior left frontal cortex and anterior superior left parietal cortex in the left middle cerebral artery territory and additional 5 x 3 mm old superior right frontal cortical infarct in the right MCA territory. The patient has 2 separate 5 x 3 mm old posterior right cerebellar infarcts in the right PICA territory and an 11 x 4 mm old inferior lateral left cerebellar infarct in the left PICA territory.  The multiple different vascular territories of these chronic infarcts as well as the acute infarct in the lateral left frontal lobe suggests the patient may have a chronic central or cardiac embolic source or possibly a PFO in this young patient who is only 43 years of age. Correlation with echocardiography findings is suggested.  The results  were communicated to Dr. Durant from stroke neurology by telephone 04/09/2024 at 1:25 p.m.  This report was finalized on 4/10/2024 7:29 AM by Dr. Jay Cid M.D on Workstation: [id number]      Adult transthoracic echo complete    Result Date: 4/9/2024  Narrative:   Left ventricular systolic function is normal. Left ventricular ejection fraction appears to be 66 - 70%.   Left ventricular diastolic function is consistent with (grade I) impaired relaxation.   Normal right ventricular cavity size and systolic function noted.   Trace tricuspid valve regurgitation is present. Insufficient TR velocity profile to estimate the right ventricular systolic pressure.   There is a trivial circumferential pericardial effusion. There is no evidence of cardiac tamponade.     CT Angiogram Head, CT Angiogram Neck    Result Date: 4/8/2024  Narrative: CT ANGIOGRAM HEAD-, CT ANGIOGRAM NECK-  INDICATIONS: Stroke  TECHNIQUE: Radiation dose reduction techniques were utilized, including automated exposure control and exposure modulation based on body size.Noncontrast head CT was performed, followed by enhanced CT angiography of the head and neck. Three-dimensional reconstructions were created, reviewed, and assessed according to NASCET criteria.  COMPARISON: Head CT from 4/8/2024 at 1311 hours  FINDINGS:  No acute intracranial hemorrhage, midline shift or mass effect. Hypodensity at the left renal cortex again suggests acute subacute infarct. Encephalomalacia is again seen in the left occipital lobe.  No enhancing lesions are noted following contrast material administration.   Ventricles, cisterns, cerebral sulci are unremarkable for patient age.  The visualized paranasal sinuses, orbits, mastoid air cells are unremarkable.  The CT angiography images show no hemodynamically significant focal stenosis, aneurysm, or dissection in the cervical carotid or vertebral arteries, or in the arteries at the base of the brain. The proximal right  vertebral artery is partly obscured by artifact from dense adjacent venous opacification.   Bilateral thyroid nodularity is evident, suboptimally evaluated with this technique, thyroid ultrasound correlation advised as indicated.  Paraseptal emphysematous changes are seen at the lung apices.       Impression: 1. No hemodynamically significant focal stenosis, aneurysm, or dissection in the cervical carotid or vertebral arteries or in the arteries at the base of the brain.  2. Redemonstration of evidence of acute to subacute infarct at the left frontal cortex. Encephalomalacia left occipital lobe. No acute intracranial hemorrhage or hydrocephalus. No enhancing lesion in brain. If there is further clinical concern, MRI could be considered for further evaluation.  3. Thyroid nodularity.  This report was finalized on 4/8/2024 4:05 PM by Dr. Cb Butts M.D on Workstation: [id number]      CT Head Without Contrast    Result Date: 4/8/2024  Narrative: EMERGENCY CT SCAN OF THE HEAD WITHOUT CONTRAST ON 04/08/2024  CLINICAL HISTORY: Slurred speech and headaches.  TECHNIQUE: Spiral CT images were obtained from the base of the skull to the vertex without intravenous contrast. Images were reformatted and submitted in 3 mm thick axial, sagittal and coronal CT sections with brain algorithm.  This is correlated to prior MRI of the brain from [facility] on 03/06/2023 and also a prior CT angiogram of the head and neck and CT perfusion study of the brain on 03/05/2023 and a prior MRI of the brain on 04/22/2022 and an outside MRI of the brain on 03/30/2022.  FINDINGS: Patient has tiny subcentimeter superior left frontal parietal cortical infarcts on prior MRIs that are chronic and unable to be appreciated on the current head CT. The patient also has tiny superior medial right parietal chronic infarcts that are subcentimeter in size and were seen on prior MRIs and they're in the right ALEXYS territory and unable to be  appreciated on the current head CT. There is loss of gray-white matter differentiation in the lateral left frontal lobe. The area measures up to the 2.5 x 1.6 x 2.7 cm in size compatible with an acute to subacute lateral left frontal infarct in left MCA territory. There is also a focal area of encephalomalacia in the inferior left occipital lobe. Given the volume loss I think this is a late subacute or chronic infarct in the distribution of inferior occipital branch of the left PCA territory and measures up to 1.9 x 2.3 x 1 cm in size and is in the left PCA territory is new when compared to the prior MRI 03/06/2023. There is a tiny 5 mm old posterior inferior lateral right cerebellar infarct in the right PICA territory unchanged. The remainder of the brain parenchyma is normal in attenuation. The ventricles are normal in size. I see no mass effect, no midline shift, no extra-axial fluid collections are identified and there is no evidence of acute intracranial hemorrhage. The calvarium and skull base are normal in appearance. The paranasal sinuses mastoid air cells and middle ear cavities are clear.      Impression: 1. This patient has a very subtle area of loss of gray-white matter differentiation in the lateral left frontal lobe compatible with an acute to subacute infarct in the distribution lateral frontal branch left MCA territory measures up to 2.5 x 1.6 x 2.7 cm in size and just anterior inferior to this is an additional subcentimeter acute to subacute infarct in the lateral frontal branch of the left MCA territory. This obviously is new when compared to prior MRI of the brain on 03/06/2023. Furthermore there is a focal area of encephalomalacia in the inferior left occipital lobe compatible with a late subacute to chronic inferior left occipital infarct in left PCA territory that measures 1.9 x 2.3 x 1 cm in size and this late subacute or chronic left PCA territory infarct is new when compared to prior MRI of  the brain 03/06/2023. Different vascular territories suggest the patient may have a central or cardiac embolic source and young patient consider PFO and performing echocardiography to evaluate for underlying PFO or other cardiac source. 2. There is multiple chronic subcentimeter infarcts in the superior left frontal parietal cortex in the left MCA territory and superior medial right parietal lobe in the right ALEXYS territory and these are subcentimeter in size and well demonstrated on prior MRI of the brain 03/06/2023 but are unable be appreciated on this head CT. There is an additional 5 mm old posterior lateral right cerebellar infarct in right PICA territory unchanged since 03/06/2023. The remainder of the head CT is normal. I recommend an MRI of the brain to further date the left frontal lobe infarcts.  Radiation dose reduction techniques were utilized, including automated exposure control and exposure modulation based on body size.   This report was finalized on 4/8/2024 3:14 PM by Dr. Jay Cid M.D on Workstation: [id number]        Medical Tests:        Summary of old records / correspondence / consultant report:   DC summary re: issues addressed on HPI    Request outside records:         MEDICATIONS   Current Outpatient Medications   Medication Sig Dispense Refill    amitriptyline (ELAVIL) 25 MG tablet TAKE 1 TABLET BY MOUTH EVERY NIGHT 30 tablet 3    amLODIPine (NORVASC) 5 MG tablet TAKE 1 TABLET BY MOUTH DAILY (Patient taking differently: Take 1 tablet by mouth Every Night.) 30 tablet 11    apixaban (ELIQUIS) 5 MG tablet tablet Take 1 tablet by mouth 2 (Two) Times a Day. 60 tablet 0    aspirin 81 MG EC tablet Take 1 tablet by mouth Daily. 30 tablet 0    atorvastatin (LIPITOR) 40 MG tablet TAKE 1 TABLET BY MOUTH EVERY NIGHT (Patient taking differently: Take 1 tablet by mouth Every Night.) 30 tablet 3    Eslicarbazepine Acetate (Aptiom) 400 MG tablet Take 1 tablet by mouth Daily. Take with 800mg for total dose  1200mg daily      Eslicarbazepine Acetate (Aptiom) 800 MG tablet tablet Take 1 tablet by mouth Daily. Take with 400mg for total dose 1200mg daily.      traMADol (ULTRAM) 50 MG tablet Take 1 tablet by mouth Every 4 (Four) Hours As Needed for Moderate Pain.      Vimpat 200 MG tablet TAKE 1 TABLET BY MOUTH EVERY 12 HOURS 60 tablet 0    Xcopri 50 MG tablet Take 1 tablet by mouth Daily. 30 tablet 0    metoprolol succinate XL (Toprol XL) 25 MG 24 hr tablet Take 1 tablet by mouth Daily. 30 tablet 0     No current facility-administered medications for this visit.       Current outpatient and discharge medications have been reconciled for the patient.  Reviewed by: KIRSTIE Pan         @MSK@

## 2024-04-15 RX ORDER — ATORVASTATIN CALCIUM 40 MG/1
TABLET, FILM COATED ORAL
Qty: 30 TABLET | Refills: 3 | Status: SHIPPED | OUTPATIENT
Start: 2024-04-15

## 2024-04-15 RX ORDER — ATORVASTATIN CALCIUM 40 MG/1
TABLET, FILM COATED ORAL
Qty: 30 TABLET | Refills: 3 | OUTPATIENT
Start: 2024-04-15

## 2024-04-15 NOTE — TELEPHONE ENCOUNTER
Pt has been scheduled with Dr James, for a hospital f/u. At this time provider will check VNS and go over plan of action & care.

## 2024-04-15 NOTE — TELEPHONE ENCOUNTER
Spoke w/ provider, he would like pt in sooner than July. Pt was placed in 1st available spot on 5/16. Pt is on cancellation list for sooner appt. [employer] & mail message sent to pt regarding new appt.

## 2024-04-19 ENCOUNTER — OFFICE VISIT (OUTPATIENT)
Dept: INTERNAL MEDICINE | Age: 44
End: 2024-04-19
Payer: MEDICARE

## 2024-04-19 VITALS
OXYGEN SATURATION: 98 % | HEART RATE: 104 BPM | BODY MASS INDEX: 32.1 KG/M2 | SYSTOLIC BLOOD PRESSURE: 132 MMHG | TEMPERATURE: 97.9 F | DIASTOLIC BLOOD PRESSURE: 84 MMHG | WEIGHT: 188 LBS | HEIGHT: 64 IN

## 2024-04-19 DIAGNOSIS — N39.41 URGE INCONTINENCE: ICD-10-CM

## 2024-04-19 DIAGNOSIS — Z12.11 ENCOUNTER FOR SCREENING COLONOSCOPY: ICD-10-CM

## 2024-04-19 DIAGNOSIS — I10 PRIMARY HYPERTENSION: ICD-10-CM

## 2024-04-19 DIAGNOSIS — Z00.00 ENCOUNTER FOR ANNUAL WELLNESS VISIT (AWV) IN MEDICARE PATIENT: Primary | ICD-10-CM

## 2024-04-19 NOTE — PROGRESS NOTES
I N T E R N A L  M E D I C I N E  KIRSTIE RODRIGUEZ      ENCOUNTER DATE:  04/19/2024    Alexandra M Green / 43 y.o. / female        MEDICARE ANNUAL WELLNESS VISIT       Chief Complaint: Medicare Wellness-subsequent       Patient's general assessment of her health since a year ago:     - Compared to one year ago, she feels her physical health is slightly better.    - Compared to one year ago, she feels her mental health is slightly better.      HPI for other active medical problems:     Followed by GYN, Dr. Mendosa, who updated pap and mammogram.  Medical history significant for breast cancer in mom. Does have a history of chronic urge incontinence.      Colonoscopy will be due starting this next October at age 44, and she is agreeable for order.      TULIO: Still wearing CPAP and tolerating well.     HTN: BP has improved with the additional of Toprol XL 25 mg daily to amlodipine 5 mg daily.  BP is now averaging 130/80.  Has not been monitoring HR at home.       HLD: Well controlled on atorvastatin 40 mg daily with April 2024 lipid panel with LDL of 58; triglycerides 93.  Scheduled with cardiology on April 24, 2024.    Speech has improved after recent infarct.  Does plan to meet with speech therapy on April 25, 2024.  Will follow up with neurology, Dr. Mcbride, on July 16, 2024, continued epilepsy management.    Plans to schedule with hematology in near future to review results from recent hospital coagulation workup.           HISTORY       Recent Hospitalizations:    Recent hospitalization?: YES    If YES, location, date, and diagnoses:     Location: [facility]  Date: April 8, 2024  Principle Discharge Dx:   Secondary Dx:       Patient Care Team:    Patient Care Team:  Annalee Huddleston APRN as PCP - General (Family Medicine)  Cristofer Chamorro MD as Consulting Physician (Cardiology)  Cb James II, MD as Consulting Physician (Neurology)  Ernesto Mendosa MD as Consulting Physician (Obstetrics and  Gynecology)  Reza Lester MD as Consulting Physician (Cardiology)      Allergies:  Patient has no known allergies.    Medications:  Current Outpatient Medications on File Prior to Visit   Medication Sig Dispense Refill    amitriptyline (ELAVIL) 25 MG tablet TAKE 1 TABLET BY MOUTH EVERY NIGHT 30 tablet 3    amLODIPine (NORVASC) 5 MG tablet TAKE 1 TABLET BY MOUTH DAILY (Patient taking differently: Take 1 tablet by mouth Every Night.) 30 tablet 11    apixaban (ELIQUIS) 5 MG tablet tablet Take 1 tablet by mouth 2 (Two) Times a Day. 60 tablet 0    aspirin 81 MG EC tablet Take 1 tablet by mouth Daily. 30 tablet 0    atorvastatin (LIPITOR) 40 MG tablet TAKE 1 TABLET BY MOUTH EVERY NIGHT 30 tablet 3    Eslicarbazepine Acetate (Aptiom) 400 MG tablet Take 1 tablet by mouth Daily. Take with 800mg for total dose 1200mg daily      Eslicarbazepine Acetate (Aptiom) 800 MG tablet tablet Take 1 tablet by mouth Daily. Take with 400mg for total dose 1200mg daily.      metoprolol succinate XL (Toprol XL) 25 MG 24 hr tablet Take 1 tablet by mouth Daily. 30 tablet 0    Vimpat 200 MG tablet TAKE 1 TABLET BY MOUTH EVERY 12 HOURS 60 tablet 0    Xcopri 50 MG tablet Take 1 tablet by mouth Daily. 30 tablet 0    [DISCONTINUED] traMADol (ULTRAM) 50 MG tablet Take 1 tablet by mouth Every 4 (Four) Hours As Needed for Moderate Pain. (Patient not taking: Reported on 4/19/2024)       No current facility-administered medications on file prior to visit.        PFSH:     The following portions of the patient's history were reviewed and updated as appropriate: Allergies / Current Medications / Past Medical History / Surgical History / Social History / Family History    Problem List:  Patient Active Problem List   Diagnosis    Sebaceous cyst of breast, right    Abnormal uterine bleeding    Acute CVA (cerebrovascular accident)    Microcytic anemia    Transaminitis    Epilepsy    Tobacco abuse    Major depressive disorder    Left-sided weakness    Anemia     Hyponatremia    HTN (hypertension)    History of stroke    Vitamin D deficiency    B12 deficiency    Observed sleep apnea    Snoring    Excessive daytime sleepiness    Class 1 obesity    Cerebrovascular accident (CVA) due to embolism of left middle cerebral artery    S/P placement of VNS (vagus nerve stimulation) device    Abnormal urinalysis       Past Medical History:  Past Medical History:   Diagnosis Date    Abnormal bleeding in menstrual cycle     COPD (chronic obstructive pulmonary disease)     Depression     Epilepsy     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,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    Headache, tension-type     Heavy menstrual bleeding     WITH CLOTS    Hyperlipidemia     Hypertension     Memory loss     Migraine     Seizures     Status post placement of implantable loop recorder     Stroke     MARCH AND        Past Surgical History:  Past Surgical History:   Procedure Laterality Date    BREAST BIOPSY      D & C HYSTEROSCOPY ENDOMETRIAL ABLATION N/A 06/10/2022    Procedure: DILATATION AND CURETTAGE HYSTEROSCOPY NOVASURE ENDOMETRIAL ABLATION;  Surgeon: Ernesto Mendosa MD;  Location: [facility];  Service: Obstetrics/Gynecology;  Laterality: N/A;    FOOT SURGERY      pins in left foot     INSERT / REPLACE / REMOVE PACEMAKER  2022    Dr. Cristofer Chamorro    OTHER SURGICAL HISTORY      VNS plate 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    NOÉ      2022    TUBAL ABDOMINAL LIGATION      VAGUS NERVE STIMULATOR IMPLANTATION         Social History:  Social History     Socioeconomic History    Marital status: Single    Number of children: 2   Tobacco Use    Smoking status: Former     Current packs/day: 0.00     Average packs/day: 1 pack/day for 29.0 years (29.0 ttl pk-yrs)     Types: Cigarettes     Start date:      Quit date:      Years since quittin.2    Smokeless tobacco: Never    Tobacco comments:     Quit 2022   Vaping Use    Vaping status: Every Day    Substances: Nicotine, Flavoring     "Devices: Disposable, 6% NICOTINE   Substance and Sexual Activity    Alcohol use: Not Currently    Drug use: No    Sexual activity: Yes     Partners: Male     Birth control/protection: Tubal ligation       Family History:  Family History   Problem Relation Age of Onset    Breast cancer Mother 50         age 60 METS    Arthritis Mother     Arthritis Father     Diabetes Father     Heart disease Father     Hypertension Father     Heart attack Father     Hypertension Brother     Heart attack Paternal Grandfather     Ovarian cancer Neg Hx     Uterine cancer Neg Hx     Colon cancer Neg Hx     Deep vein thrombosis Neg Hx     Pulmonary embolism Neg Hx     Malig Hyperthermia Neg Hx          PATIENT ASSESSMENT     Vitals:  Vitals:    24 1127   BP: 132/84   BP Location: Left arm   Patient Position: Sitting   Cuff Size: Adult   Pulse: 104   Temp: 97.9 °F (36.6 °C)   TempSrc: Temporal   SpO2: 98%   Weight: 85.3 kg (188 lb)   Height: 162.6 cm (64.02\")       BP Readings from Last 3 Encounters:   24 132/84   24 160/100   04/10/24 142/95     Wt Readings from Last 3 Encounters:   24 85.3 kg (188 lb)   24 85.3 kg (188 lb)   04/10/24 83 kg (183 lb)      Body mass index is 32.25 kg/m².    [unfilled]        Review of Systems:    Review of Systems   Constitutional:  Negative for chills, fever and unexpected weight change.   Respiratory:  Negative for cough, chest tightness and shortness of breath.    Cardiovascular:  Negative for chest pain, palpitations and leg swelling.   Genitourinary:  Positive for urgency (With incontinence).   Neurological:  Negative for dizziness, weakness, light-headedness and headaches.   Psychiatric/Behavioral:  The patient is not nervous/anxious.        Physical Exam:    Physical Exam  Vitals reviewed.   Constitutional:       General: She is not in acute distress.     Appearance: Normal appearance. She is not ill-appearing, toxic-appearing or diaphoretic.   HENT:      Head: " Normocephalic and atraumatic.      Right Ear: Tympanic membrane, ear canal and external ear normal. There is no impacted cerumen.      Left Ear: Tympanic membrane, ear canal and external ear normal. There is no impacted cerumen.      Nose: Nose normal. No congestion or rhinorrhea.      Mouth/Throat:      Mouth: Mucous membranes are moist.      Pharynx: Oropharynx is clear. No oropharyngeal exudate or posterior oropharyngeal erythema.   Eyes:      Extraocular Movements: Extraocular movements intact.      Conjunctiva/sclera: Conjunctivae normal.      Pupils: Pupils are equal, round, and reactive to light.   Cardiovascular:      Rate and Rhythm: Normal rate and regular rhythm.      Heart sounds: Normal heart sounds.   Pulmonary:      Effort: Pulmonary effort is normal. No respiratory distress.      Breath sounds: Normal breath sounds.   Abdominal:      General: Bowel sounds are normal.      Palpations: Abdomen is soft.      Tenderness: There is no abdominal tenderness.   Musculoskeletal:         General: Normal range of motion.      Cervical back: Normal range of motion and neck supple.      Right lower leg: No edema.      Left lower leg: No edema.   Lymphadenopathy:      Cervical: No cervical adenopathy.   Skin:     General: Skin is warm and dry.   Neurological:      General: No focal deficit present.      Mental Status: She is alert and oriented to person, place, and time. Mental status is at baseline.   Psychiatric:         Mood and Affect: Mood normal.         Behavior: Behavior normal.         Thought Content: Thought content normal.         Judgment: Judgment normal.           Reviewed Data:    Labs:   Lab Results   Component Value Date     04/10/2024    K 3.7 04/10/2024    CALCIUM 8.2 (L) 04/10/2024    AST 22 04/09/2024    ALT 20 04/09/2024    BUN 9 04/10/2024    CREATININE 0.62 04/10/2024    CREATININE 0.80 04/09/2024    CREATININE 0.56 (L) 04/08/2024       Lab Results   Component Value Date    HGBA1C  "5.60 04/09/2024    HGBA1C 5.40 03/06/2023    HGBA1C 4.80 04/27/2022       Lab Results   Component Value Date    LDL 58 04/09/2024    LDL 55 03/06/2023    LDL 44 04/27/2022    HDL 59 04/09/2024    TRIG 93 04/09/2024       Lab Results   Component Value Date    TSH 1.790 04/11/2023    FREET4 0.89 (L) 04/11/2023          Lab Results   Component Value Date    WBC 9.91 04/10/2024    HGB 12.0 04/10/2024    HGB 13.9 04/09/2024    HGB 14.6 04/08/2024     04/10/2024                 No results found for: \"PSA\"    Imaging:          Medical Tests:          Screening for Glaucoma:  Previous screening for glaucoma?: No, Plans to schedule appointment      Hearing Loss Screen:  Finger Rub Hearing Test (right ear): passed  Finger Rub Hearing Test (left ear): passed      Urinary Incontinence Screen:  Episodes of urinary incontinence? : Yes      Depression Screen:      4/19/2024    11:23 AM   PHQ-2/PHQ-9 Depression Screening   Little Interest or Pleasure in Doing Things 0-->not at all   Feeling Down, Depressed or Hopeless 0-->not at all   PHQ-9: Brief Depression Severity Measure Score 0        PHQ-2: 0 (Not depressed)    PHQ-9: 0 (Negative screening for depression)       FUNCTIONAL, FALL RISK, & COGNITIVE SCREENING (Components below):    DATA:        4/19/2024    11:25 AM   Functional & Cognitive Status   Do you have difficulty preparing food and eating? No   Do you have difficulty bathing yourself, getting dressed or grooming yourself? No   Do you have difficulty using the toilet? No   Do you have difficulty moving around from place to place? No   Do you have trouble with steps or getting out of a bed or a chair? No   Current Diet Well Balanced Diet   Dental Exam Up to date   Eye Exam Up to date   Exercise (times per week) 2 times per week   Current Exercises Include Treadmill;Walking;Other   Do you need help using the phone?  No   Are you deaf or do you have serious difficulty hearing?  No   Do you need help to go to places " out of walking distance? No   Do you need help preparing meals?  No   Do you need help with housework?  No   Do you need help with laundry? No   Do you need help taking your medications? No   Do you need help managing money? No   Do you ever drive or ride in a car without wearing a seat belt? No   Have you felt unusual stress, anger or loneliness in the last month? No   Who do you live with? Spouse   If you need help, do you have trouble finding someone available to you? No   Have you been bothered in the last four weeks by sexual problems? No   Do you have difficulty concentrating, remembering or making decisions? No         A) Assessment of Functional Ability:  (Assessment of ability to perform ADL's (showering/bathing, using toilet, dressing, feeding self, moving self around) and IADL's (use telephone, shop, prepare food, housekeep, do laundry, transport independently, take medications independently, and handle finances)    Degree of functional impairment: MODERATE (based on assessment noted above)      B) Assessment of Fall Risk:  Fall Risk Assessment was completed, and patient is at moderate risk for falls.       Need for further evaluation of gait, strength, and balance? : No    Timed Up and Go (TUG):   (>= 12 seconds indicates high risk for falling)    Observable abnormalities included: Normal gait pattern       C. Assessment of Cognitive Function:    Mini-Cog Test:     1) Registration (3 objects): No   2) Number of objects recalled: 1   3) Clock Draw: Passed? : No       Further evaluation required? : Will need close follow-up. , Followed by neurology        COUNSELING       A. Identification of Health Risk Factors:    Risk factors include: tobacco use      B. Age-Appropriate Screening Schedule:  (Refer to the list below for future screening recommendations based on patient's age, sex and/or medical conditions. Orders for these recommended tests are listed in the plan section. The patient has been provided  with a written plan)    Health Maintenance Topics  Health Maintenance   Topic Date Due    COVID-19 Vaccine (1 - 2023-24 season) 04/21/2024 (Originally 9/1/2023)    BMI FOLLOWUP  06/07/2024    INFLUENZA VACCINE  08/01/2024    PAP SMEAR  02/23/2025    LIPID PANEL  04/09/2025    ANNUAL WELLNESS VISIT  04/19/2025    TDAP/TD VACCINES (3 - Td or Tdap) 09/11/2026    HEPATITIS C SCREENING  Completed    Pneumococcal Vaccine 0-64  Completed       Health Maintenance Topics Due or Over-Due  There are no preventive care reminders to display for this patient.        C. Advanced Care Planning:    Advance Care Planning   ACP discussion was held with the patient during this visit. Patient does not have an advance directive, information provided.       D. Patient Self-Management and Personalized Health Advice:    She has been provided with personalized counseling/information (including brochures/handouts) about:     -- optimizing diet/nutrition plans, improving exercise / conditioning, tobacco cessation, reducing risk for cardiac/vascular events with pre-existing disease, fall prevention, and designing advance directives      She has been recommended for the following preventative services which has been performed today, will be ordered today or ordered/performed on upcoming follow-up visit:     -- BREAST CANCER screening DISCUSSED, CERVICAL CANCER screening DISCUSSED , **COLORECTAL cancer screening (colonoscopy/Cologuard discussed or ordered), vaccination for PNEUMOVAX/PCV administered/recommended/discussed, COVID-19 vaccination (and/or booster) administered/recommended/discussed, DIABETES screening performed (current/reviewed labs/lab ordered)      E. Miscellaneous Items:    -Aspirin use counseling: Taking ASA appropriately as indicated    -Discussed BMI with her. The BMI is above average; BMI management plan is completed (discussed plans for weight loss)    -Reviewed use of high risk medication in the elderly: YES    -Reviewed for  potential of harmful drug interactions in the elderly: YES        WRAP UP       Assessment & Plan:    1) MEDICARE ANNUAL WELLNESS VISIT    2) OTHER MEDICAL CONDITIONS ADDRESSED TODAY:            Problem List Items Addressed This Visit       HTN (hypertension)    Overview     Continue amlodipine 5 mg daily and Toprol XL 25 mg daily.         Relevant Medications    amLODIPine (NORVASC) 5 MG tablet    metoprolol succinate XL (Toprol XL) 25 MG 24 hr tablet     Other Visit Diagnoses       Encounter for annual wellness visit (AWV) in Medicare patient    -  Primary    Urge incontinence        Relevant Orders    Ambulatory Referral to Gynecologic Urology (Completed)    Encounter for screening colonoscopy        Relevant Orders    Ambulatory Referral For Screening Colonoscopy                      Orders Placed This Encounter   Procedures    Ambulatory Referral to Gynecologic Urology    Ambulatory Referral For Screening Colonoscopy       Discussion / Summary:    BP has improved to 130/80 with addition of Toprol XL.  HR mildly elevated at today's visit.  Recommend she monitor HR at home and bring in readings for review at upcoming cardiology appointment next week to consider further titration of BP medication.      Medications as of TODAY:              Current Outpatient Medications   Medication Sig Dispense Refill    amitriptyline (ELAVIL) 25 MG tablet TAKE 1 TABLET BY MOUTH EVERY NIGHT 30 tablet 3    amLODIPine (NORVASC) 5 MG tablet TAKE 1 TABLET BY MOUTH DAILY (Patient taking differently: Take 1 tablet by mouth Every Night.) 30 tablet 11    apixaban (ELIQUIS) 5 MG tablet tablet Take 1 tablet by mouth 2 (Two) Times a Day. 60 tablet 0    aspirin 81 MG EC tablet Take 1 tablet by mouth Daily. 30 tablet 0    atorvastatin (LIPITOR) 40 MG tablet TAKE 1 TABLET BY MOUTH EVERY NIGHT 30 tablet 3    Eslicarbazepine Acetate (Aptiom) 400 MG tablet Take 1 tablet by mouth Daily. Take with 800mg for total dose 1200mg daily       Eslicarbazepine Acetate (Aptiom) 800 MG tablet tablet Take 1 tablet by mouth Daily. Take with 400mg for total dose 1200mg daily.      metoprolol succinate XL (Toprol XL) 25 MG 24 hr tablet Take 1 tablet by mouth Daily. 30 tablet 0    Vimpat 200 MG tablet TAKE 1 TABLET BY MOUTH EVERY 12 HOURS 60 tablet 0    Xcopri 50 MG tablet Take 1 tablet by mouth Daily. 30 tablet 0    Pneumococcal 20-Carmen Conj Vacc (Prevnar 20) 0.5 ML suspension prefilled syringe vaccine Inject  into the appropriate muscle as directed by prescriber. 0.5 mL 0     No current facility-administered medications for this visit.         FOLLOW-UP:            Return for 6 month chronic care, 1 year AWV.                 Future Appointments   Date Time Provider Department Center   4/24/2024  3:00 PM Fatoumata Richardson APRN [facility] CD [facility]   4/25/2024 10:15 AM Rene Del Cid SLP  [facility]   5/2/2024 10:15 AM Rene Del Cid SLP  [facility]   5/9/2024 10:15 AM Rene Del Cid SLP  [facility]   5/16/2024 10:15 AM Rene Del Cid SLP  [facility]   5/16/2024 12:40 PM Cb James II, MD [facility] N [facility]   5/23/2024 10:15 AM Rene Del Cid SLP  [facility]   5/30/2024 10:15 AM Rene Del Cid SLP  [facility]   6/6/2024 10:15 AM Rene Del Cid SLP  [facility]   6/13/2024 10:15 AM Rene Del Cid SLP  [facility]   6/20/2024 10:15 AM Rene Del Cid SLP  [facility]   6/27/2024 10:15 AM Rene Del Cid, SLP  [facility]   7/15/2024  3:00 PM Ernesto Mendosa MD [facility] LOBG PRE [facility]   7/16/2024  2:30 PM LAB CHAIR 5 [facility]  LAB [facility]   7/16/2024  3:00 PM Laurel Mcbride MD PhD [facility] CBC [facility]   10/24/2024  3:30 PM Annalee Huddleston APRN [facility] PC  [facility]   4/21/2025  3:15 PM Annalee Huddleston APRN [facility]  [facility]           After Visit Summary (AVS) including the Personalized Prevention  Plan Services (PPPS) was either printed and given to the patient at check-out today and/or sent to [employer] for review.       @[facility]@

## 2024-04-22 ENCOUNTER — TELEPHONE (OUTPATIENT)
Dept: NEUROLOGY | Facility: CLINIC | Age: 44
End: 2024-04-22
Payer: MEDICARE

## 2024-04-22 ENCOUNTER — TELEPHONE (OUTPATIENT)
Dept: ONCOLOGY | Facility: CLINIC | Age: 44
End: 2024-04-22
Payer: MEDICARE

## 2024-04-22 ENCOUNTER — TELEPHONE (OUTPATIENT)
Dept: INTERNAL MEDICINE | Age: 44
End: 2024-04-22
Payer: MEDICARE

## 2024-04-22 DIAGNOSIS — E53.8 B12 DEFICIENCY: ICD-10-CM

## 2024-04-22 DIAGNOSIS — D50.9 MICROCYTIC ANEMIA: Primary | ICD-10-CM

## 2024-04-22 NOTE — TELEPHONE ENCOUNTER
UNABLE TO WARM TRANSFER     Caller: Alexandra Xiao    Relationship to patient: Self    Best call back number: [phone]     Patient is needing: [employer] PAPERWORK COMPLETED FOR HER     PATIENT STATES HER  IS TAKING CARE OF HER AND WOULD LIKE TO KNOW IF SHE CAN JUST DROP OFF THE PAPERWORK TO BE COMPLETED         PATIENT WOULD LIKE A CALLBACK

## 2024-04-22 NOTE — TELEPHONE ENCOUNTER
----- Message from Juan Patel MD sent at 4/21/2024 11:50 PM EDT -----  Felicitas,    Patient was seen at [facility] in consultation. Labs showed low folate and vitamin B12 levels.     I recommend starting Folic acid 1 mg daily and vitamin B12 1000 mcg daily.      Jennifer,  She was referred to our office by her PCP and was scheduled to see Dr. Mcbride.   Please change the appointment to be with me in mid June with labs - CBC CMP folic acid and vitamin B12 levels.     Thank you

## 2024-04-22 NOTE — TELEPHONE ENCOUNTER
Pt and caregiver, Yadiel Cordoba, came in to office to drop off FMLA paperwork for Yadiel so that he can care for Alexandra.    Pt would like completed paperwork to be faxed to [phone] once completed.  Pt would like to receive call when paperwork is complete so that Yadiel can come to pick it up. Please call Yadiel at [phone].  Advised that the office has 2 weeks to complete paperwork.  Pt and caregiver acknowledged understanding.    I will put the paperwork in Dr James's mailbox.

## 2024-04-22 NOTE — TELEPHONE ENCOUNTER
Reviewed Dr. Patel's note and instructions with the patient, she requested the message be sent via [employer].

## 2024-04-23 NOTE — PROGRESS NOTES
Enter Query Response Below      Query Response: cerebral edema             If applicable, please update the problem list.     Patient: Alexandra Xiao        : 1980  Account: [id number]           Admit Date: 2024        How to Respond to this query:       a. Click New Note     b. Answer query within the yellow box.                c. Update the Problem List, if applicable.      If you have any questions about this query contact me at: [email or web address]    Dear Ms. Viviana PA-C,     Patient admitted with CVA.  Per MRI-There is an area of intense increased uptake on the diffusion  weighted images in the posterior lateral left frontal lobe parenchyma that is bright on the FLAIR images and dark on the ADC maps and is consistent with an area of cytotoxic edema from an acute infarct measuring up to 3 x 2 x 2.5 cm in medial lateral, anterior posterior and cranial caudal dimensions in the distribution of posterior lateral frontal branches of the left middle cerebral artery territory.     Please clarify if the patient being treated/monitored for one or more of the following:  Cerebral edema     By submitting this query, we are merely seeking further clarification of documentation to accurately reflect all conditions that you are monitoring, evaluating, treating or that extend the hospitalization or utilize additional resources of care. Please utilize your independent clinical judgment when addressing the question(s) above.     This query and your response, once completed, will be entered into the legal medical record.    Sincerely,  Alana Fairchild RN BSN  Clinical Documentation Integrity Program

## 2024-04-24 ENCOUNTER — OFFICE VISIT (OUTPATIENT)
Dept: CARDIOLOGY | Facility: CLINIC | Age: 44
End: 2024-04-24
Payer: MEDICARE

## 2024-04-24 VITALS
DIASTOLIC BLOOD PRESSURE: 88 MMHG | BODY MASS INDEX: 31.89 KG/M2 | SYSTOLIC BLOOD PRESSURE: 128 MMHG | OXYGEN SATURATION: 98 % | HEIGHT: 64 IN | WEIGHT: 186.8 LBS | HEART RATE: 98 BPM

## 2024-04-24 DIAGNOSIS — Z86.73 HISTORY OF STROKE: ICD-10-CM

## 2024-04-24 DIAGNOSIS — F17.290 VAPING NICOTINE DEPENDENCE, TOBACCO PRODUCT: ICD-10-CM

## 2024-04-24 DIAGNOSIS — Z95.818 IMPLANTABLE LOOP RECORDER PRESENT: ICD-10-CM

## 2024-04-24 DIAGNOSIS — I63.412 CEREBROVASCULAR ACCIDENT (CVA) DUE TO EMBOLISM OF LEFT MIDDLE CEREBRAL ARTERY: Primary | ICD-10-CM

## 2024-04-24 DIAGNOSIS — E66.9 CLASS 1 OBESITY: ICD-10-CM

## 2024-04-24 DIAGNOSIS — I10 PRIMARY HYPERTENSION: ICD-10-CM

## 2024-04-24 PROBLEM — Z72.0 TOBACCO ABUSE: Status: RESOLVED | Noted: 2022-04-27 | Resolved: 2024-04-24

## 2024-04-24 PROBLEM — I63.9 ACUTE CVA (CEREBROVASCULAR ACCIDENT): Status: RESOLVED | Noted: 2022-04-26 | Resolved: 2024-04-24

## 2024-04-24 PROCEDURE — 99214 OFFICE O/P EST MOD 30 MIN: CPT | Performed by: NURSE PRACTITIONER

## 2024-04-24 PROCEDURE — 93000 ELECTROCARDIOGRAM COMPLETE: CPT | Performed by: NURSE PRACTITIONER

## 2024-04-24 NOTE — PROGRESS NOTES
Date of Office Visit: 2024  Encounter Provider: KIRSTIE Harper  Place of Service: [facility] CARDIOLOGY  Patient Name: Alexandra Xiao  :1980  Primary Cardiologist: Dr. Reza Lester     Chief Complaint   Patient presents with   • Hospital Follow Up Visit   • Stroke     :     HPI: Alexandra Xiao is a 43 y.o. female who presents today for hospital follow-up visit.  She is a new patient to me and I reviewed her medical records.    She has a history of strokes.  In 2022, NOÉ showed normal EF, moderate to severe mitral regurgitation, and no thrombus.  Loop recorder was placed and has not shown any atrial arrhythmias.    In 2023, echocardiogram showed normal LVEF, grade 1A diastolic dysfunction, left atrium borderline dilated, and moderate mitral regurgitation.  In 2023, she established care with Dr. Reza Lester.  Dr. Lester said she had evidence for rheumatic mitral valvular disease with moderate mitral regurgitation.    She recently presented to [facility] ED with altered speech.  CT scan showed acute versus subacute infarct.  MRI showed acute left MCA stroke.  NOÉ showed normal EF, no PFO and moderate to severe mitral regurgitation.  She had a moderate plaque in her aortic arch and descending thoracic aorta.  Baby aspirin was recommended.  Due to the embolic distribution it was recommended that she start full dose apixaban.  Loop recorder did not demonstrate atrial fibrillation.    She presents today with her  accompanying her.  She said she still having trouble with her speech, finding her words, reading, and writing.  She recently started speech therapy.  She fatigues easily with tasks that was going on before she was hospitalized.  She reports some dizziness.  Yesterday she had a severe lower back spasm.  She denies chest pain, shortness of breath, palpitations, edema, syncope, or bleeding.  She is a former cigarette smoker and currently vapes  nicotine.      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   • Headache, tension-type    • Heavy menstrual bleeding     WITH CLOTS   • Hyperlipidemia    • Hypertension    • Memory loss    • Migraine    • Seizures    • Status post placement of implantable loop recorder    • Stroke     MARCH AND        Past Surgical History:   Procedure Laterality Date   • BREAST BIOPSY     • D & C HYSTEROSCOPY ENDOMETRIAL ABLATION N/A 06/10/2022    Procedure: DILATATION AND CURETTAGE HYSTEROSCOPY NOVASURE ENDOMETRIAL ABLATION;  Surgeon: Ernesto Mendosa MD;  Location: [facility] OR;  Service: Obstetrics/Gynecology;  Laterality: N/A;   • FOOT SURGERY      pins in left foot    • INSERT / REPLACE / REMOVE PACEMAKER  2022    Dr. Cristofer Chamorro   • OTHER SURGICAL HISTORY      VNS plate 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   • NOÉ      2022   • TUBAL ABDOMINAL LIGATION     • VAGUS NERVE STIMULATOR IMPLANTATION         Social History     Socioeconomic History   • Marital status: Single   • Number of children: 2   Tobacco Use   • Smoking status: Former     Current packs/day: 0.00     Average packs/day: 1 pack/day for 29.0 years (29.0 ttl pk-yrs)     Types: Cigarettes     Start date:      Quit date:      Years since quittin.3   • Smokeless tobacco: Never   • Tobacco comments:     Quit 2022   Vaping Use   • Vaping status: Every Day   • Substances: Nicotine, Flavoring   • Devices: Disposable, 6% NICOTINE   Substance and Sexual Activity   • Alcohol use: Not Currently   • Drug use: No   • Sexual activity: Yes     Partners: Male     Birth control/protection: Tubal ligation       Family History   Problem Relation Age of Onset   • Breast cancer Mother 50         age 60 METS   • Arthritis Mother    • Arthritis Father    • Diabetes Father    •  Heart disease Father    • Hypertension Father    • Heart attack Father    • Hypertension Brother    • Heart attack Paternal Grandfather    • Ovarian cancer Neg Hx    • Uterine cancer Neg Hx    • Colon cancer Neg Hx    • Deep vein thrombosis Neg Hx    • Pulmonary embolism Neg Hx    • Malig Hyperthermia Neg Hx        The following portion of the patient's history were reviewed and updated as appropriate: past medical history, past surgical history, past social history, past family history, allergies, current medications, and problem list.    Review of Systems   Constitutional: Positive for malaise/fatigue.   Cardiovascular: Negative.    Respiratory: Negative.     Musculoskeletal:  Positive for back pain.   Neurological:  Positive for excessive daytime sleepiness and dizziness.       No Known Allergies      Current Outpatient Medications:   •  amitriptyline (ELAVIL) 25 MG tablet, TAKE 1 TABLET BY MOUTH EVERY NIGHT, Disp: 30 tablet, Rfl: 3  •  amLODIPine (NORVASC) 5 MG tablet, TAKE 1 TABLET BY MOUTH DAILY (Patient taking differently: Take 1 tablet by mouth Every Night.), Disp: 30 tablet, Rfl: 11  •  apixaban (ELIQUIS) 5 MG tablet tablet, Take 1 tablet by mouth 2 (Two) Times a Day., Disp: 180 tablet, Rfl: 2  •  aspirin 81 MG EC tablet, Take 1 tablet by mouth Daily., Disp: 30 tablet, Rfl: 0  •  atorvastatin (LIPITOR) 40 MG tablet, TAKE 1 TABLET BY MOUTH EVERY NIGHT, Disp: 30 tablet, Rfl: 3  •  Eslicarbazepine Acetate (Aptiom) 400 MG tablet, Take 1 tablet by mouth Daily. Take with 800mg for total dose 1200mg daily, Disp: , Rfl:   •  Eslicarbazepine Acetate (Aptiom) 800 MG tablet tablet, Take 1 tablet by mouth Daily. Take with 400mg for total dose 1200mg daily., Disp: , Rfl:   •  metoprolol succinate XL (Toprol XL) 25 MG 24 hr tablet, Take 1 tablet by mouth Daily., Disp: 30 tablet, Rfl: 0  •  Vimpat 200 MG tablet, TAKE 1 TABLET BY MOUTH EVERY 12 HOURS, Disp: 60 tablet, Rfl: 0  •  Xcopri 50 MG tablet, Take 1 tablet by mouth  "Daily., Disp: 30 tablet, Rfl: 0         Objective:     Vitals:    04/24/24 1516   BP: 128/88   BP Location: Left arm   Patient Position: Sitting   Cuff Size: Adult   Pulse: 98   SpO2: 98%   Weight: 84.7 kg (186 lb 12.8 oz)   Height: 162.6 cm (64.02\")     Body mass index is 32.05 kg/m².    PHYSICAL EXAM:    Vitals Reviewed.   General Appearance: No acute distress, well developed and well nourished. Obese.   HENT: No hearing loss noted.    Respiratory: No signs of respiratory distress. Respiration rhythm and depth normal.  Clear to auscultation.   Cardiovascular:  Jugular Venous Pressure: Normal  Heart Rate and Rhythm: Normal, Heart Sounds: Normal S1 and S2. No S3 or S4 noted.  Murmurs: No murmurs noted. No rubs, thrills, or gallops.   Lower Extremities: No edema noted.  Musculoskeletal: Normal movement of extremities.  Skin: General appearance normal.    Psychiatric: Patient alert and oriented to person, place, and time. Speech and behavior appropriate. Normal mood and affect.       ECG 12 Lead    Date/Time: 4/24/2024 3:19 PM  Performed by: Fatoumata Richardson APRN    Authorized by: Fatoumata Richardson APRN  Comparison: compared with previous ECG from 4/8/2024  Comparison to previous ECG: Sinus tachycardia, heart rate 105  Rhythm: sinus rhythm  Rate: normal  BPM: 98  Conduction: conduction normal  T Waves: T waves normal  QRS axis: normal  Other findings: non-specific ST-T wave changes and poor R wave progression    Clinical impression: abnormal EKG          Assessment:       Diagnosis Plan   1. Cerebrovascular accident (CVA) due to embolism of left middle cerebral artery        2. History of stroke        3. Implantable loop recorder present        4. Primary hypertension        5. Vaping nicotine dependence, tobacco product        6. Class 1 obesity        7. Tobacco use               Plan:     1/2.  She has had multiple strokes in the past.  She recently had an acute left MCA stroke.  She has aortic plaque.  She was " recommended to remain on aspirin, atorvastatin, and apixaban.  She has residual stroke symptoms of trouble finding her words, altered speech, and trouble reading and writing.  She is working with a speech therapist.    3.  Status post implantable loop recorder.  There is been no documented atrial fibrillation.    4.  Hypertension: Diastolic portion of blood pressure elevated today.  I am hesitant to bring her blood pressure down too quickly and will defer to neurology upon her next visit.    5.  We had a long conversation about the importance of abstaining from all nicotine products as it can cause severe damage to her arteries/veins.    6.  Obesity: Body mass index is 32.05 kg/m².     7.  We will continue with her current medications.  She will follow-up with Dr. Lester in 3 months.      As always, it has been a pleasure to participate in your patient's care. Thank you.         Sincerely,         KIRSTIE Foote  [facility] Cardiology      Dictated utilizing Dragon Dictation  I spent 35 minutes reviewing her medical records/testing/previous office notes/labs, face-to-face interaction with patient, physical examination, formulating the plan of care, and discussion of plan of care with patient.

## 2024-04-24 NOTE — TELEPHONE ENCOUNTER
Paperwork filled out & signed. Faxed w/ successful confirmation. Called  [facility] to let them know that ppw has been faxed and is ready for  at the .

## 2024-04-25 RX ORDER — FOLIC ACID 1 MG/1
1 TABLET ORAL DAILY
COMMUNITY
Start: 2024-04-25

## 2024-04-25 RX ORDER — LANOLIN ALCOHOL/MO/W.PET/CERES
1000 CREAM (GRAM) TOPICAL DAILY
COMMUNITY
Start: 2024-04-25

## 2024-04-26 DIAGNOSIS — G40.309 GENERALIZED NONCONVULSIVE EPILEPSY: ICD-10-CM

## 2024-04-26 RX ORDER — CENOBAMATE 50 MG/1
1 TABLET, FILM COATED ORAL DAILY
Qty: 30 TABLET | Refills: 4 | Status: SHIPPED | OUTPATIENT
Start: 2024-04-26

## 2024-04-29 ENCOUNTER — TELEPHONE (OUTPATIENT)
Dept: NEUROLOGY | Facility: CLINIC | Age: 44
End: 2024-04-29
Payer: MEDICARE

## 2024-04-29 DIAGNOSIS — I10 PRIMARY HYPERTENSION: ICD-10-CM

## 2024-04-29 RX ORDER — AMLODIPINE BESYLATE 5 MG/1
5 TABLET ORAL DAILY
Qty: 90 TABLET | Refills: 1 | Status: SHIPPED | OUTPATIENT
Start: 2024-04-29

## 2024-05-09 NOTE — OUTREACH NOTE
Stroke Week 1 Survey    Flowsheet Row Responses   [facility] patient discharged from? [facility]   Does the patient have one of the following disease processes/diagnoses(primary or secondary)? Stroke (TIA)   Week 1 attempt successful? Yes   Call start time 0929   Call end time 0934   Discharge diagnosis Acute CVA    Meds reviewed with patient/caregiver? Yes   Is the patient having any side effects they believe may be caused by any medication additions or changes? No   Does the patient have all medications ordered at discharge? Yes   Is the patient taking all medications as directed (includes completed medication regime)? Yes   Does the patient have a primary care provider?  Yes  [Looking for a new PCP]   Does the patient have an appointment with their PCP within 7 days of discharge? No   What is preventing the patient from scheduling follow up appointments within 7 days of discharge? Unsure of when or with whom   Nursing Interventions Educated patient on importance of making appointment   Has the patient kept scheduled appointments due by today? N/A   Comments will see Neuro in 2 days   Has home health visited the patient within 72 hours of discharge? N/A   Psychosocial issues? No   Does the patient require any assistance with activities of daily living such as eating, bathing, dressing, walking, etc.? No   Does the patient have any residual symptoms from stroke/TIA? No   Does the patient understand the diet ordered at discharge? Yes   Did the patient receive a copy of their discharge instructions? Yes   Nursing interventions Reviewed instructions with patient   What is the patient's perception of their health status since discharge? Improving   Nursing interventions Nurse provided patient education   Is the patient able to teach back FAST for Stroke? Yes   Is the patient/caregiver able to teach back the risk factors for a stroke? High blood pressure-goal below 120/80, Smoking, High Cholesterol   Is the  patient/caregiver able to teach back signs and symptoms related to disease process for when to call PCP? Yes   Is the patient/caregiver able to teach back signs and symptoms related to disease process for when to call 911? Yes   If the patient is a current smoker, are they able to teach back resources for cessation? Smoking cessation medications  [States she has stopped smoking]   Is the patient/caregiver able to teach back the hierarchy of who to call/visit for symptoms/problems? PCP, Specialist, Home health nurse, Urgent Care, ED, 911 Yes   Week 1 call completed? Yes          ORLANDO OLIVARES - Registered Nurse   Render In Strict Bullet Format?: No Initiate Treatment: Betamethasone dipropionate 0.05% topical ointment - Apply to affected areas of rash at the body BID for up to 2 weeks. wait one week before reusing as needed. \\n\\nBetamethasone lotion Detail Level: Simple Plan: -discussed keeping skin cool, avoiding prolonged heat exposure, using a fan and cool compresses. Continue Regimen: Hydroxyzine 25mg - Take one pill PO QHS PRN itching (previously prescribed by different provider)

## 2024-05-13 DIAGNOSIS — I10 PRIMARY HYPERTENSION: ICD-10-CM

## 2024-05-14 RX ORDER — METOPROLOL SUCCINATE 25 MG/1
25 TABLET, EXTENDED RELEASE ORAL DAILY
Qty: 90 TABLET | Refills: 0 | Status: SHIPPED | OUTPATIENT
Start: 2024-05-14

## 2024-05-16 ENCOUNTER — OFFICE VISIT (OUTPATIENT)
Dept: NEUROLOGY | Facility: CLINIC | Age: 44
End: 2024-05-16
Payer: MEDICARE

## 2024-05-16 VITALS
DIASTOLIC BLOOD PRESSURE: 80 MMHG | WEIGHT: 185 LBS | SYSTOLIC BLOOD PRESSURE: 124 MMHG | BODY MASS INDEX: 31.58 KG/M2 | HEIGHT: 64 IN | OXYGEN SATURATION: 98 % | HEART RATE: 103 BPM

## 2024-05-16 DIAGNOSIS — I63.50 CEREBRAL ARTERY OCCLUSION WITH CEREBRAL INFARCTION: ICD-10-CM

## 2024-05-16 DIAGNOSIS — Z96.89 STATUS POST VNS (VAGUS NERVE STIMULATOR) PLACEMENT: ICD-10-CM

## 2024-05-16 DIAGNOSIS — G40.309 GENERALIZED NONCONVULSIVE EPILEPSY: Primary | ICD-10-CM

## 2024-05-16 NOTE — PROGRESS NOTES
Chief Complaint   Patient presents with    Seizures    VNS    Stroke       Patient ID: Alexandra Xiao is a 43 y.o. female.    HPI:  I had the pleasure of seeing your patient today.  As you may know she is a 43-year-old female here for the management of seizures.  She was recently hospitalized for stroke.  She had a left MCA distribution CVA.  She has not had any new stroke symptoms since discharge.  She did have some aphasia.  She does note that she still has trouble with writing.  However she understands well.  She does occasionally get caught on words.  She is scheduled for speech therapy however has not started yet.  She is also in the process of hypercoag workup.  Thus far all looks good from that perspective.  She does have follow-up with heme-onc soon.  She is on full anticoagulation at this point.  No seizures since last follow-up.  She did recently have the vagus nerve stimulator generator replaced.  She has been tolerating stimulation well.    The following portions of the patient's history were reviewed and updated as appropriate: allergies, current medications, past family history, past medical history, past social history, past surgical history and problem list.    Review of Systems   Neurological:  Positive for speech difficulty and weakness. Negative for dizziness, tremors, seizures, syncope, facial asymmetry, light-headedness, numbness and headaches.   Psychiatric/Behavioral:  Negative for agitation, behavioral problems, confusion, decreased concentration, dysphoric mood, hallucinations, self-injury, sleep disturbance and suicidal ideas. The patient is not nervous/anxious and is not hyperactive.       I have reviewed the review of systems above performed by my medical assistant.      Vitals:    05/16/24 1254   BP: 124/80   Pulse: 103   SpO2: 98%       Neurologic Exam     Mental Status   Oriented to person, place, and time.   Concentration: normal.   Level of consciousness: alert  Knowledge:  consistent with education (No deficits found.).     Cranial Nerves     CN II   Visual fields full to confrontation.     CN III, IV, VI   Pupils are equal, round, and reactive to light.  Extraocular motions are normal.   CN III: no CN III palsy  CN VI: no CN VI palsy    CN V   Facial sensation intact.     CN VII   Facial expression full, symmetric.     CN VIII   CN VIII normal.     CN IX, X   CN IX normal.   CN X normal.     CN XI   CN XI normal.     CN XII   CN XII normal.     Motor Exam     Strength   Right neck flexion: 5/5  Left neck flexion: 5/5  Right neck extension: 5/5  Left neck extension: 5/5  Right deltoid: 5/5  Left deltoid: 5/5  Right biceps: 5/5  Left biceps: 5/5  Right triceps: 5/5  Left triceps: 5/5  Right wrist flexion: 5/5  Left wrist flexion: 5/5  Right wrist extension: 5/5  Left wrist extension: 5/5  Right interossei: 5/5  Left interossei: 5/5  Right abdominals: 5/5  Left abdominals: 5/5  Right iliopsoas: 5/5  Left iliopsoas: 5/5  Right quadriceps: 5/5  Left quadriceps: 5/5  Right hamstrin/5  Left hamstrin/5  Right glutei: 5/5  Left glutei: 5/5  Right anterior tibial: 5/5  Left anterior tibial: 5/5  Right posterior tibial: 5/5  Left posterior tibial: 5/5  Right peroneal: 5/5  Left peroneal: 5/5  Right gastroc: 5/5  Left gastroc: 5/5    Sensory Exam   Light touch normal.   Vibration normal.     Gait, Coordination, and Reflexes     Gait  Gait: normal    Reflexes   Right brachioradialis: 2+  Left brachioradialis: 2+  Right biceps: 2+  Left biceps: 2+  Right triceps: 2+  Left triceps: 2+  Right patellar: 2+  Left patellar: 2+  Right achilles: 2+  Left achilles: 2+  Right : 2+  Left : 2+Station is normal.       Physical Exam  Vitals reviewed.   Constitutional:       Appearance: She is well-developed.   HENT:      Head: Normocephalic and atraumatic.   Eyes:      Extraocular Movements: EOM normal.      Pupils: Pupils are equal, round, and reactive to light.   Cardiovascular:      Rate and  Rhythm: Normal rate and regular rhythm.   Pulmonary:      Breath sounds: Normal breath sounds.   Musculoskeletal:         General: Normal range of motion.   Skin:     General: Skin is warm.   Neurological:      Mental Status: She is oriented to person, place, and time.      Gait: Gait is intact.      Deep Tendon Reflexes:      Reflex Scores:       Tricep reflexes are 2+ on the right side and 2+ on the left side.       Bicep reflexes are 2+ on the right side and 2+ on the left side.       Brachioradialis reflexes are 2+ on the right side and 2+ on the left side.       Patellar reflexes are 2+ on the right side and 2+ on the left side.       Achilles reflexes are 2+ on the right side and 2+ on the left side.        Procedures: Vagus nerve stimulator interrogation and diagnostics  Indication: History of seizures  Report:  Normal mode settings are as follows: Output current is set to 1.625 mA, frequency is 30 Hz, pulse width is 250 µs, on x30 seconds, off time is 5 minutes and duty cycle is 10%.  Auto stimulation mode settings are as follows: Output current is set to 1.875 mA, pulse width is 250 µs and on time 60 seconds.  Magnet mode settings are as follows: Output current is set to 2.75 mA, pulse width is 250 µs and on time is 60 seconds.  Setting changes: No setting changes were made today.  Diagnostics: Battery is at %.  Output is 1.625 mA.  Impedance looks good.    Assessment/Plan: We are going to continue with the current antiepileptic drug therapy regimen including Aptiom and Xcopri.  She will also continue with Vimpat.  We will continue with current stimulator settings.  I did advise her to follow-up with speech therapy and hematology.  Will see her back in 4 months or sooner if needed.  A total of 25 minutes was spent face-to-face with the patient today.  Of that greater than 50% of this time was spent discussing signs and symptoms of seizures, stroke, patient education, plan of care and prognosis.          Diagnoses and all orders for this visit:    1. Generalized nonconvulsive epilepsy (Primary)    2. Status post VNS (vagus nerve stimulator) placement    3. Cerebral artery occlusion with cerebral infarction           Cb James II, MD

## 2024-06-03 ENCOUNTER — TELEPHONE (OUTPATIENT)
Dept: NEUROLOGY | Facility: CLINIC | Age: 44
End: 2024-06-03
Payer: MEDICARE

## 2024-06-03 NOTE — TELEPHONE ENCOUNTER
Provider: JOSH    Caller: PATIENT    Relationship to Patient: SELF    Pharmacy: [facility]    Phone Number: [phone]    Reason for Call: PT RECEIVED A LETTER FROM HER INSURANCE THAT THEY WILL NOT COVER HER VIMPAT, BUT WILL COVER THE GENERIC.  PT HAS TRIED THE GENERIC BUT STILL HAD SEIZURES AND HAS PROBLEMS WITH DEPRESSION ON IT.  SHE WAS TOLD THE PROVIDER NEEDED TO SEND SOMETHING IN TO GET THE BRAND NAME COVERED BY INSURANCE.  SHE HAS ABOUT 1 AND A HALF WEEKS WORTH OF MEDICATION LEFT. THIS IS THROUGH [facility].     PLEASE REVIEW AND ADVISE     THANK YOU

## 2024-06-06 NOTE — TELEPHONE ENCOUNTER
Vimpat PA for name brand only.     Outcome  Approved today  PA Case: [id number], Status: Approved, Coverage Starts on: 5/1/2024 12:00:00 AM, Coverage Ends on: 6/6/2025 12:00:00 AM.  Authorization Expiration Date: 6/5/2025  Drug  Vimpat 200MG tablets    Form  [employer] Electronic PA Form (2017 NCPDP)

## 2024-06-18 NOTE — PROGRESS NOTES
Appointment ((Hematology new pt))      REASON FOR CONSULTATION:   History of stroke                           REQUESTING PHYSICIAN: KIRSTIE Pan  RECORDS OBTAINED:  Records of the patients history including those from the electronic medical record were reviewed and summarized in detail.    HISTORY OF PRESENT ILLNESS:  The patient is a 43 y.o. year old female with striae of tobacco abuse (quit 2023), vapes now,  COPD, hypertension, hyperlipidemia, seizures, stroke in 2022.    She was seen by cardiology for evaluation of etiology of her left MCA stroke.  NOÉ revealed moderate plaques in descending thoracic aorta, focal moderate plaque without mobile components and central aortic arch.  Loop recorder without any evidence of A-fib.  Her left MCA stroke was thought to be secondary to aortic plaque.  She was started on aspirin, statin and apixaban.  She also had a hypercoagulable workup done by neurology in April 2024-beta-2 glycoprotein IgG, IgM antibody,Anticardiolipin IgG IgM antibody, lupus anticoagulant unremarkable.    She is here to establish care with me.  Has some residual deficits from her most recent stroke.  No h/o VTE.  She is up-to-date on her mammograms.    Family history - mother breast cancer in her 40s.  No other cancers in the family.    Past Medical History:   Diagnosis Date    Abnormal bleeding in menstrual cycle     COPD (chronic obstructive pulmonary disease)     Depression     Epilepsy     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,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    Headache, tension-type     Heavy menstrual bleeding     WITH CLOTS    Hyperlipidemia     Hypertension     Memory loss     Migraine     Seizures     Status post placement of implantable loop recorder     Stroke     MARCH AND APRIL, 2022     Past Surgical History:   Procedure Laterality Date    BREAST BIOPSY      D & C HYSTEROSCOPY ENDOMETRIAL ABLATION N/A 06/10/2022    Procedure: DILATATION AND CURETTAGE HYSTEROSCOPY NOVASURE ENDOMETRIAL ABLATION;  Surgeon: Deondre  Ernesto Curry MD;  Location: [facility] MAIN OR;  Service: Obstetrics/Gynecology;  Laterality: N/A;    FOOT SURGERY      pins in left foot     INSERT / REPLACE / REMOVE PACEMAKER  04/01/2022    Dr. Cristofer Chamorro    OTHER SURGICAL HISTORY      VNS plate 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    NOÉ      04/2022    TUBAL ABDOMINAL LIGATION      VAGUS NERVE STIMULATOR IMPLANTATION         MEDICATIONS    Current Outpatient Medications:     amitriptyline (ELAVIL) 25 MG tablet, TAKE 1 TABLET BY MOUTH EVERY NIGHT, Disp: 30 tablet, Rfl: 3    amLODIPine (NORVASC) 5 MG tablet, TAKE 1 TABLET BY MOUTH DAILY, Disp: 90 tablet, Rfl: 1    apixaban (ELIQUIS) 5 MG tablet tablet, Take 1 tablet by mouth 2 (Two) Times a Day., Disp: 180 tablet, Rfl: 2    aspirin 81 MG EC tablet, Take 1 tablet by mouth Daily., Disp: 30 tablet, Rfl: 0    atorvastatin (LIPITOR) 40 MG tablet, TAKE 1 TABLET BY MOUTH EVERY NIGHT, Disp: 30 tablet, Rfl: 3    Eslicarbazepine Acetate (Aptiom) 400 MG tablet, Take 1 tablet by mouth Daily. Take with 800mg for total dose 1200mg daily, Disp: , Rfl:     Eslicarbazepine Acetate (Aptiom) 800 MG tablet tablet, Take 1 tablet by mouth Daily. Take with 400mg for total dose 1200mg daily., Disp: , Rfl:     metoprolol succinate XL (TOPROL-XL) 25 MG 24 hr tablet, TAKE 1 TABLET BY MOUTH DAILY, Disp: 90 tablet, Rfl: 0    Vimpat 200 MG tablet, TAKE 1 TABLET BY MOUTH EVERY 12 HOURS, Disp: 60 tablet, Rfl: 0    Xcopri 50 MG tablet, TAKE 1 TABLET BY MOUTH DAILY, Disp: 30 tablet, Rfl: 4    folic acid (FOLVITE) 1 MG tablet, Take 1 tablet by mouth Daily. (Patient not taking: Reported on 5/16/2024), Disp: , Rfl:     vitamin B-12 (CYANOCOBALAMIN) 1000 MCG tablet, Take 1 tablet by mouth Daily. (Patient not taking: Reported on 5/16/2024), Disp: , Rfl:     ALLERGIES:   No Known Allergies    SOCIAL HISTORY:       Social History     Socioeconomic History    Marital status: Single    Number of children: 2   Tobacco Use    Smoking status: Former      Current packs/day: 0.00     Average packs/day: 1 pack/day for 29.0 years (29.0 ttl pk-yrs)     Types: Cigarettes     Start date:      Quit date:      Years since quittin.4    Smokeless tobacco: Never    Tobacco comments:     Quit 2022   Vaping Use    Vaping status: Every Day    Substances: Nicotine, Flavoring    Devices: Disposable, 6% NICOTINE   Substance and Sexual Activity    Alcohol use: Not Currently    Drug use: No    Sexual activity: Yes     Partners: Male     Birth control/protection: Tubal ligation         FAMILY HISTORY:  Family History   Problem Relation Age of Onset    Breast cancer Mother 50         age 60 METS    Arthritis Mother     Arthritis Father     Diabetes Father     Heart disease Father     Hypertension Father     Heart attack Father     Hypertension Brother     Heart attack Paternal Grandfather     Ovarian cancer Neg Hx     Uterine cancer Neg Hx     Colon cancer Neg Hx     Deep vein thrombosis Neg Hx     Pulmonary embolism Neg Hx     Malig Hyperthermia Neg Hx        REVIEW OF SYSTEMS:  Review of Systems   Constitutional: Negative.    HENT: Negative.     Respiratory: Negative.     Cardiovascular: Negative.    Gastrointestinal: Negative.    Neurological:         Some residual deficit in terms of word finding difficulty and handwriting              Vitals:    24 1241   BP: 156/95   Pulse: 96   Resp: 18   Temp: 97.6 °F (36.4 °C)   SpO2: 98%   Weight: 84.2 kg (185 lb 11.2 oz)          No data to display                 PHYSICAL EXAM:    CONSTITUTIONAL:  Vital signs reviewed.  No distress, looks comfortable.  RESPIRATORY:  Normal respiratory effort.  Lungs clear to auscultation bilaterally.  CARDIOVASCULAR:  Normal S1, S2.  No murmurs rubs or gallops.  No significant lower extremity edema.  GASTROINTESTINAL: Abdomen appears unremarkable.  Nontender.   SKIN:  Warm.  No rashes.  PSYCHIATRIC:  Normal judgment and insight.  Normal mood and affect.     RECENT LABS:         WBC   Date Value Ref Range Status   06/19/2024 8.74 3.40 - 10.80 10*3/mm3 Final   04/10/2024 9.91 3.40 - 10.80 10*3/mm3 Final   04/09/2024 10.37 3.40 - 10.80 10*3/mm3 Final   04/08/2024 11.67 (H) 3.40 - 10.80 10*3/mm3 Final   08/07/2023 8.8 3.4 - 10.8 x10E3/uL Final   03/13/2023 11.39 (H) 3.40 - 10.80 10*3/mm3 Final   03/08/2023 9.93 3.40 - 10.80 10*3/mm3 Final   03/06/2023 8.61 3.40 - 10.80 10*3/mm3 Final   03/05/2023 7.80 3.40 - 10.80 10*3/mm3 Final   02/20/2023 11.98 (H) 3.40 - 10.80 10*3/mm3 Final   06/08/2022 10.26 3.40 - 10.80 10*3/mm3 Final   05/25/2022 13.23 (H) 3.40 - 10.80 10*3/mm3 Final   05/01/2022 10.16 3.40 - 10.80 10*3/mm3 Final   04/30/2022 11.06 (H) 3.40 - 10.80 10*3/mm3 Final   04/29/2022 11.56 (H) 3.40 - 10.80 10*3/mm3 Final   04/28/2022 11.52 (H) 3.40 - 10.80 10*3/mm3 Final   04/27/2022 9.56 3.40 - 10.80 10*3/mm3 Final   04/26/2022 13.00 (H) 3.40 - 10.80 10*3/mm3 Final   02/23/2022 16.8 (H) 3.4 - 10.8 x10E3/uL Final     Hemoglobin   Date Value Ref Range Status   06/19/2024 13.3 12.0 - 15.9 g/dL Final   04/10/2024 12.0 12.0 - 15.9 g/dL Final   04/09/2024 13.9 12.0 - 15.9 g/dL Final   04/08/2024 14.6 12.0 - 15.9 g/dL Final   08/07/2023 12.1 11.1 - 15.9 g/dL Final   03/13/2023 12.6 12.0 - 15.9 g/dL Final   03/08/2023 12.1 12.0 - 15.9 g/dL Final   03/06/2023 11.0 (L) 12.0 - 15.9 g/dL Final   03/05/2023 9.9 (L) 12.0 - 15.9 g/dL Final   02/20/2023 11.3 (L) 12.0 - 15.9 g/dL Final   06/08/2022 10.2 (L) 12.0 - 15.9 g/dL Final   05/25/2022 10.0 (L) 12.0 - 15.9 g/dL Final   05/01/2022 9.8 (L) 12.0 - 15.9 g/dL Final   04/30/2022 10.3 (L) 12.0 - 15.9 g/dL Final   04/29/2022 10.1 (L) 12.0 - 15.9 g/dL Final   04/28/2022 9.9 (L) 12.0 - 15.9 g/dL Final   04/27/2022 10.4 (L) 12.0 - 15.9 g/dL Final   04/26/2022 10.9 (L) 12.0 - 15.9 g/dL Final   02/23/2022 11.5 11.1 - 15.9 g/dL Final     Platelets   Date Value Ref Range Status   06/19/2024 358 140 - 450 10*3/mm3 Final   04/10/2024 309 140 - 450 10*3/mm3 Final    04/09/2024 342 140 - 450 10*3/mm3 Final   04/08/2024 332 140 - 450 10*3/mm3 Final   08/07/2023 401 150 - 450 x10E3/uL Final   03/13/2023 438 140 - 450 10*3/mm3 Final   03/08/2023 410 140 - 450 10*3/mm3 Final   03/06/2023 368 140 - 450 10*3/mm3 Final   03/05/2023 331 140 - 450 10*3/mm3 Final   02/20/2023 298 140 - 450 10*3/mm3 Final   06/08/2022 474 (H) 140 - 450 10*3/mm3 Final   05/25/2022 399 140 - 450 10*3/mm3 Final   05/01/2022 347 140 - 450 10*3/mm3 Final   04/30/2022 364 140 - 450 10*3/mm3 Final   04/29/2022 349 140 - 450 10*3/mm3 Final   04/28/2022 327 140 - 450 10*3/mm3 Final   04/27/2022 330 140 - 450 10*3/mm3 Final   04/26/2022 324 140 - 450 10*3/mm3 Final   02/23/2022 404 150 - 450 x10E3/uL Final     Imaging  04/09/2024 MRI brain  MRI Brain With & Without Contrast (04/09/2024 13:02)     Assessment:  *History of multiple CVA, likely embolic  *History of tobacco abuse, quit 2022  -April 2024 NOÉ:  moderate plaques in descending thoracic aorta, focal moderate plaque without mobile components and central aortic arch.  -Hypercoagulable workup done by neurology in April 2024-beta-2 glycoprotein IgG, IgM antibody,Anticardiolipin IgG IgM antibody, lupus anticoagulant unremarkable.    *Elevated LFTs  -?  Lipitor  -Recommended following up with primary care for further evaluation management.  Lab note forwarded to primary care as well    Plan  -Will obtain factor V Leiden mutation and prothrombin gene mutation.  Discussed deferring protein C, protein S, Antithrombin III given testing interference with Eliquis.  -Follow-up with primary care for further evaluation and management of elevated LFTs    Will call with results. If normal, can see us on an as-needed basis.

## 2024-06-19 ENCOUNTER — TELEPHONE (OUTPATIENT)
Dept: INTERNAL MEDICINE | Age: 44
End: 2024-06-19
Payer: MEDICARE

## 2024-06-19 ENCOUNTER — PRIOR AUTHORIZATION (OUTPATIENT)
Dept: ONCOLOGY | Facility: CLINIC | Age: 44
End: 2024-06-19
Payer: MEDICARE

## 2024-06-19 ENCOUNTER — LAB (OUTPATIENT)
Dept: LAB | Facility: HOSPITAL | Age: 44
End: 2024-06-19
Payer: MEDICARE

## 2024-06-19 ENCOUNTER — CONSULT (OUTPATIENT)
Dept: ONCOLOGY | Facility: CLINIC | Age: 44
End: 2024-06-19
Payer: MEDICARE

## 2024-06-19 VITALS
RESPIRATION RATE: 18 BRPM | BODY MASS INDEX: 31.86 KG/M2 | HEART RATE: 96 BPM | OXYGEN SATURATION: 98 % | WEIGHT: 185.7 LBS | TEMPERATURE: 97.6 F | DIASTOLIC BLOOD PRESSURE: 95 MMHG | SYSTOLIC BLOOD PRESSURE: 156 MMHG

## 2024-06-19 DIAGNOSIS — R74.8 ELEVATED LIVER ENZYMES: Primary | ICD-10-CM

## 2024-06-19 DIAGNOSIS — R79.89 ELEVATED LIVER FUNCTION TESTS: ICD-10-CM

## 2024-06-19 DIAGNOSIS — E53.8 B12 DEFICIENCY: ICD-10-CM

## 2024-06-19 DIAGNOSIS — D50.9 MICROCYTIC ANEMIA: ICD-10-CM

## 2024-06-19 DIAGNOSIS — Z86.73 HISTORY OF CVA (CEREBROVASCULAR ACCIDENT): Primary | ICD-10-CM

## 2024-06-19 LAB
ALBUMIN SERPL-MCNC: 4.1 G/DL (ref 3.5–5.2)
ALBUMIN/GLOB SERPL: 1.1 G/DL
ALP SERPL-CCNC: 206 U/L (ref 39–117)
ALT SERPL W P-5'-P-CCNC: 38 U/L (ref 1–33)
ANION GAP SERPL CALCULATED.3IONS-SCNC: 10.6 MMOL/L (ref 5–15)
AST SERPL-CCNC: 52 U/L (ref 1–32)
BASOPHILS # BLD AUTO: 0.03 10*3/MM3 (ref 0–0.2)
BASOPHILS NFR BLD AUTO: 0.3 % (ref 0–1.5)
BILIRUB SERPL-MCNC: 0.3 MG/DL (ref 0–1.2)
BUN SERPL-MCNC: 6 MG/DL (ref 6–20)
BUN/CREAT SERPL: 9.8 (ref 7–25)
CALCIUM SPEC-SCNC: 9.5 MG/DL (ref 8.6–10.5)
CHLORIDE SERPL-SCNC: 100 MMOL/L (ref 98–107)
CO2 SERPL-SCNC: 25.4 MMOL/L (ref 22–29)
CREAT SERPL-MCNC: 0.61 MG/DL (ref 0.57–1)
DEPRECATED RDW RBC AUTO: 46.8 FL (ref 37–54)
EGFRCR SERPLBLD CKD-EPI 2021: 113.2 ML/MIN/1.73
EOSINOPHIL # BLD AUTO: 0.08 10*3/MM3 (ref 0–0.4)
EOSINOPHIL NFR BLD AUTO: 0.9 % (ref 0.3–6.2)
ERYTHROCYTE [DISTWIDTH] IN BLOOD BY AUTOMATED COUNT: 14.6 % (ref 12.3–15.4)
FOLATE SERPL-MCNC: 5.08 NG/ML (ref 4.78–24.2)
GLOBULIN UR ELPH-MCNC: 3.7 GM/DL
GLUCOSE SERPL-MCNC: 91 MG/DL (ref 65–99)
HCT VFR BLD AUTO: 40 % (ref 34–46.6)
HGB BLD-MCNC: 13.3 G/DL (ref 12–15.9)
IMM GRANULOCYTES # BLD AUTO: 0.03 10*3/MM3 (ref 0–0.05)
IMM GRANULOCYTES NFR BLD AUTO: 0.3 % (ref 0–0.5)
LYMPHOCYTES # BLD AUTO: 2.01 10*3/MM3 (ref 0.7–3.1)
LYMPHOCYTES NFR BLD AUTO: 23 % (ref 19.6–45.3)
MCH RBC QN AUTO: 29.2 PG (ref 26.6–33)
MCHC RBC AUTO-ENTMCNC: 33.3 G/DL (ref 31.5–35.7)
MCV RBC AUTO: 87.9 FL (ref 79–97)
MONOCYTES # BLD AUTO: 0.47 10*3/MM3 (ref 0.1–0.9)
MONOCYTES NFR BLD AUTO: 5.4 % (ref 5–12)
NEUTROPHILS NFR BLD AUTO: 6.12 10*3/MM3 (ref 1.7–7)
NEUTROPHILS NFR BLD AUTO: 70.1 % (ref 42.7–76)
NRBC BLD AUTO-RTO: 0 /100 WBC (ref 0–0.2)
PLATELET # BLD AUTO: 358 10*3/MM3 (ref 140–450)
PMV BLD AUTO: 9.4 FL (ref 6–12)
POTASSIUM SERPL-SCNC: 4.6 MMOL/L (ref 3.5–5.2)
PROT SERPL-MCNC: 7.8 G/DL (ref 6–8.5)
RBC # BLD AUTO: 4.55 10*6/MM3 (ref 3.77–5.28)
SODIUM SERPL-SCNC: 136 MMOL/L (ref 136–145)
VIT B12 BLD-MCNC: 361 PG/ML (ref 211–946)
WBC NRBC COR # BLD AUTO: 8.74 10*3/MM3 (ref 3.4–10.8)

## 2024-06-19 PROCEDURE — 82607 VITAMIN B-12: CPT | Performed by: INTERNAL MEDICINE

## 2024-06-19 PROCEDURE — 80053 COMPREHEN METABOLIC PANEL: CPT

## 2024-06-19 PROCEDURE — 85025 COMPLETE CBC W/AUTO DIFF WBC: CPT

## 2024-06-19 PROCEDURE — 36415 COLL VENOUS BLD VENIPUNCTURE: CPT

## 2024-06-19 PROCEDURE — 82746 ASSAY OF FOLIC ACID SERUM: CPT | Performed by: INTERNAL MEDICINE

## 2024-06-19 NOTE — TELEPHONE ENCOUNTER
Please call patient and speak to directly.    Dr. Peña reached out to me to let me know your recent blood work showed some elevated liver enzymes.  Recommend that we investigate further with liver ultrasound.  Please monitor for any abdominal pain.  Please avoid alcohol/ acetaminophen.

## 2024-06-19 NOTE — PROGRESS NOTES
Claudio Mantilla,     LFTs are minimally elevated - ? Lipitor. Could you please monitor this? Thanks

## 2024-06-20 NOTE — TELEPHONE ENCOUNTER
Called Patient and advise her of the results and she said the hospital told her to only take acetaminophen because she's on blood thinner's. Please advise what you would like the patient to take instead.

## 2024-06-20 NOTE — TELEPHONE ENCOUNTER
Recommend she avoid prolonged, excessive use of acetaminophen.  May use acetaminophen sparingly as needed.

## 2024-06-25 ENCOUNTER — TELEPHONE (OUTPATIENT)
Dept: INTERNAL MEDICINE | Age: 44
End: 2024-06-25

## 2024-06-25 NOTE — TELEPHONE ENCOUNTER
Caller: Alexandra Xiao    Relationship to patient: Self    Best call back number:      Patient is needing: PATIENT STATES SHE HAS BEEN DEALING WITH ABDOMINAL PAIN FOR AWHILE NOW, AND SHE IS SCHEDULED FOR AN ULTRASOUND ON 6/27.       PATIENT IS CALLING TODAY STATING THE PAIN IS EXTREME, AND SHE WAS ADVISED NOT TO TAKE TYLENOL, BUT SHE DID ANYWAY OTHERWISE SHE STATES SHE WOULD NOT BE ABLE TO GET OUT OF BED.      PATIENT WOULD LIKE A CALLBACK WITH WHAT DAVID MARTINES ADVISES SHE DO.    PATIENT DID NOT WANT TO SCHEDULE AN OFFICE VISIT UNTIL SHE HEARS BACK FROM DAVID.

## 2024-06-26 ENCOUNTER — APPOINTMENT (OUTPATIENT)
Dept: CT IMAGING | Facility: HOSPITAL | Age: 44
DRG: 690 | End: 2024-06-26
Payer: MEDICARE

## 2024-06-26 ENCOUNTER — HOSPITAL ENCOUNTER (INPATIENT)
Facility: HOSPITAL | Age: 44
LOS: 1 days | Discharge: HOME | DRG: 690 | End: 2024-06-28
Attending: STUDENT IN AN ORGANIZED HEALTH CARE EDUCATION/TRAINING PROGRAM | Admitting: STUDENT IN AN ORGANIZED HEALTH CARE EDUCATION/TRAINING PROGRAM
Payer: MEDICARE

## 2024-06-26 DIAGNOSIS — R52 UNCONTROLLED PAIN: ICD-10-CM

## 2024-06-26 DIAGNOSIS — N85.8 UTERINE MASS: ICD-10-CM

## 2024-06-26 DIAGNOSIS — N30.00 ACUTE CYSTITIS WITHOUT HEMATURIA: Primary | ICD-10-CM

## 2024-06-26 PROBLEM — N39.0 UTI (URINARY TRACT INFECTION): Status: ACTIVE | Noted: 2024-06-26

## 2024-06-26 LAB
ALBUMIN SERPL-MCNC: 4 G/DL (ref 3.5–5.2)
ALBUMIN/GLOB SERPL: 1.1 G/DL
ALP SERPL-CCNC: 199 U/L (ref 39–117)
ALT SERPL W P-5'-P-CCNC: 36 U/L (ref 1–33)
ANION GAP SERPL CALCULATED.3IONS-SCNC: 11.9 MMOL/L (ref 5–15)
AST SERPL-CCNC: 41 U/L (ref 1–32)
BACTERIA UR QL AUTO: ABNORMAL /HPF
BASOPHILS # BLD AUTO: 0.03 10*3/MM3 (ref 0–0.2)
BASOPHILS NFR BLD AUTO: 0.3 % (ref 0–1.5)
BILIRUB SERPL-MCNC: 0.2 MG/DL (ref 0–1.2)
BILIRUB UR QL STRIP: NEGATIVE
BUN SERPL-MCNC: 5 MG/DL (ref 6–20)
BUN/CREAT SERPL: 9.8 (ref 7–25)
CALCIUM SPEC-SCNC: 9.1 MG/DL (ref 8.6–10.5)
CHLORIDE SERPL-SCNC: 98 MMOL/L (ref 98–107)
CLARITY UR: CLEAR
CO2 SERPL-SCNC: 21.1 MMOL/L (ref 22–29)
COLOR UR: YELLOW
CREAT SERPL-MCNC: 0.51 MG/DL (ref 0.57–1)
D-LACTATE SERPL-SCNC: 1.5 MMOL/L (ref 0.5–2)
DEPRECATED RDW RBC AUTO: 42.1 FL (ref 37–54)
EGFRCR SERPLBLD CKD-EPI 2021: 118.2 ML/MIN/1.73
EOSINOPHIL # BLD AUTO: 0.08 10*3/MM3 (ref 0–0.4)
EOSINOPHIL NFR BLD AUTO: 0.7 % (ref 0.3–6.2)
ERYTHROCYTE [DISTWIDTH] IN BLOOD BY AUTOMATED COUNT: 13.9 % (ref 12.3–15.4)
GLOBULIN UR ELPH-MCNC: 3.5 GM/DL
GLUCOSE SERPL-MCNC: 88 MG/DL (ref 65–99)
GLUCOSE UR STRIP-MCNC: NEGATIVE MG/DL
HCT VFR BLD AUTO: 39 % (ref 34–46.6)
HGB BLD-MCNC: 13.2 G/DL (ref 12–15.9)
HGB UR QL STRIP.AUTO: ABNORMAL
HYALINE CASTS UR QL AUTO: ABNORMAL /LPF
IMM GRANULOCYTES # BLD AUTO: 0.06 10*3/MM3 (ref 0–0.05)
IMM GRANULOCYTES NFR BLD AUTO: 0.5 % (ref 0–0.5)
KETONES UR QL STRIP: NEGATIVE
LEUKOCYTE ESTERASE UR QL STRIP.AUTO: ABNORMAL
LIPASE SERPL-CCNC: 13 U/L (ref 13–60)
LYMPHOCYTES # BLD AUTO: 2.11 10*3/MM3 (ref 0.7–3.1)
LYMPHOCYTES NFR BLD AUTO: 17.7 % (ref 19.6–45.3)
MCH RBC QN AUTO: 28.4 PG (ref 26.6–33)
MCHC RBC AUTO-ENTMCNC: 33.8 G/DL (ref 31.5–35.7)
MCV RBC AUTO: 84.1 FL (ref 79–97)
MONOCYTES # BLD AUTO: 0.68 10*3/MM3 (ref 0.1–0.9)
MONOCYTES NFR BLD AUTO: 5.7 % (ref 5–12)
NEUTROPHILS NFR BLD AUTO: 75.1 % (ref 42.7–76)
NEUTROPHILS NFR BLD AUTO: 8.96 10*3/MM3 (ref 1.7–7)
NITRITE UR QL STRIP: POSITIVE
NRBC BLD AUTO-RTO: 0 /100 WBC (ref 0–0.2)
PH UR STRIP.AUTO: 5.5 [PH] (ref 5–8)
PLATELET # BLD AUTO: 380 10*3/MM3 (ref 140–450)
PMV BLD AUTO: 10.2 FL (ref 6–12)
POTASSIUM SERPL-SCNC: 3.7 MMOL/L (ref 3.5–5.2)
PROT SERPL-MCNC: 7.5 G/DL (ref 6–8.5)
PROT UR QL STRIP: NEGATIVE
RBC # BLD AUTO: 4.64 10*6/MM3 (ref 3.77–5.28)
RBC # UR STRIP: ABNORMAL /HPF
REF LAB TEST METHOD: ABNORMAL
SODIUM SERPL-SCNC: 131 MMOL/L (ref 136–145)
SP GR UR STRIP: 1.01 (ref 1–1.03)
SQUAMOUS #/AREA URNS HPF: ABNORMAL /HPF
UROBILINOGEN UR QL STRIP: ABNORMAL
WBC # UR STRIP: ABNORMAL /HPF
WBC NRBC COR # BLD AUTO: 11.92 10*3/MM3 (ref 3.4–10.8)

## 2024-06-26 PROCEDURE — 25010000002 ONDANSETRON PER 1 MG: Performed by: STUDENT IN AN ORGANIZED HEALTH CARE EDUCATION/TRAINING PROGRAM

## 2024-06-26 PROCEDURE — 81001 URINALYSIS AUTO W/SCOPE: CPT | Performed by: STUDENT IN AN ORGANIZED HEALTH CARE EDUCATION/TRAINING PROGRAM

## 2024-06-26 PROCEDURE — 36415 COLL VENOUS BLD VENIPUNCTURE: CPT

## 2024-06-26 PROCEDURE — 83690 ASSAY OF LIPASE: CPT | Performed by: STUDENT IN AN ORGANIZED HEALTH CARE EDUCATION/TRAINING PROGRAM

## 2024-06-26 PROCEDURE — 85025 COMPLETE CBC W/AUTO DIFF WBC: CPT | Performed by: STUDENT IN AN ORGANIZED HEALTH CARE EDUCATION/TRAINING PROGRAM

## 2024-06-26 PROCEDURE — 25510000001 IOPAMIDOL 61 % SOLUTION: Performed by: STUDENT IN AN ORGANIZED HEALTH CARE EDUCATION/TRAINING PROGRAM

## 2024-06-26 PROCEDURE — G0378 HOSPITAL OBSERVATION PER HR: HCPCS

## 2024-06-26 PROCEDURE — 87186 SC STD MICRODIL/AGAR DIL: CPT | Performed by: STUDENT IN AN ORGANIZED HEALTH CARE EDUCATION/TRAINING PROGRAM

## 2024-06-26 PROCEDURE — 74177 CT ABD & PELVIS W/CONTRAST: CPT

## 2024-06-26 PROCEDURE — 83605 ASSAY OF LACTIC ACID: CPT | Performed by: STUDENT IN AN ORGANIZED HEALTH CARE EDUCATION/TRAINING PROGRAM

## 2024-06-26 PROCEDURE — 25010000002 MORPHINE PER 10 MG: Performed by: STUDENT IN AN ORGANIZED HEALTH CARE EDUCATION/TRAINING PROGRAM

## 2024-06-26 PROCEDURE — 87040 BLOOD CULTURE FOR BACTERIA: CPT | Performed by: STUDENT IN AN ORGANIZED HEALTH CARE EDUCATION/TRAINING PROGRAM

## 2024-06-26 PROCEDURE — 80053 COMPREHEN METABOLIC PANEL: CPT | Performed by: STUDENT IN AN ORGANIZED HEALTH CARE EDUCATION/TRAINING PROGRAM

## 2024-06-26 PROCEDURE — 87077 CULTURE AEROBIC IDENTIFY: CPT | Performed by: STUDENT IN AN ORGANIZED HEALTH CARE EDUCATION/TRAINING PROGRAM

## 2024-06-26 PROCEDURE — 87086 URINE CULTURE/COLONY COUNT: CPT | Performed by: STUDENT IN AN ORGANIZED HEALTH CARE EDUCATION/TRAINING PROGRAM

## 2024-06-26 PROCEDURE — 99285 EMERGENCY DEPT VISIT HI MDM: CPT

## 2024-06-26 PROCEDURE — 25010000002 CEFTRIAXONE PER 250 MG: Performed by: STUDENT IN AN ORGANIZED HEALTH CARE EDUCATION/TRAINING PROGRAM

## 2024-06-26 RX ORDER — ONDANSETRON 2 MG/ML
4 INJECTION INTRAMUSCULAR; INTRAVENOUS ONCE
Status: COMPLETED | OUTPATIENT
Start: 2024-06-26 | End: 2024-06-26

## 2024-06-26 RX ORDER — AMITRIPTYLINE HYDROCHLORIDE 25 MG/1
25 TABLET, FILM COATED ORAL NIGHTLY
Status: DISCONTINUED | OUTPATIENT
Start: 2024-06-26 | End: 2024-06-28 | Stop reason: HOSPADM

## 2024-06-26 RX ORDER — SODIUM CHLORIDE 0.9 % (FLUSH) 0.9 %
10 SYRINGE (ML) INJECTION AS NEEDED
Status: DISCONTINUED | OUTPATIENT
Start: 2024-06-26 | End: 2024-06-28 | Stop reason: HOSPADM

## 2024-06-26 RX ORDER — ONDANSETRON 2 MG/ML
4 INJECTION INTRAMUSCULAR; INTRAVENOUS EVERY 6 HOURS PRN
Status: DISCONTINUED | OUTPATIENT
Start: 2024-06-26 | End: 2024-06-28 | Stop reason: HOSPADM

## 2024-06-26 RX ORDER — AMLODIPINE BESYLATE 5 MG/1
5 TABLET ORAL DAILY
Status: DISCONTINUED | OUTPATIENT
Start: 2024-06-27 | End: 2024-06-28 | Stop reason: HOSPADM

## 2024-06-26 RX ORDER — ONDANSETRON 4 MG/1
4 TABLET, ORALLY DISINTEGRATING ORAL EVERY 6 HOURS PRN
Status: DISCONTINUED | OUTPATIENT
Start: 2024-06-26 | End: 2024-06-28 | Stop reason: HOSPADM

## 2024-06-26 RX ORDER — AMOXICILLIN 250 MG
2 CAPSULE ORAL 2 TIMES DAILY PRN
Status: DISCONTINUED | OUTPATIENT
Start: 2024-06-26 | End: 2024-06-28 | Stop reason: HOSPADM

## 2024-06-26 RX ORDER — POLYETHYLENE GLYCOL 3350 17 G/17G
17 POWDER, FOR SOLUTION ORAL DAILY PRN
Status: DISCONTINUED | OUTPATIENT
Start: 2024-06-26 | End: 2024-06-28 | Stop reason: HOSPADM

## 2024-06-26 RX ORDER — MORPHINE SULFATE 2 MG/ML
4 INJECTION, SOLUTION INTRAMUSCULAR; INTRAVENOUS ONCE
Status: COMPLETED | OUTPATIENT
Start: 2024-06-26 | End: 2024-06-26

## 2024-06-26 RX ORDER — HYDROMORPHONE HYDROCHLORIDE 1 MG/ML
0.5 INJECTION, SOLUTION INTRAMUSCULAR; INTRAVENOUS; SUBCUTANEOUS
Status: DISCONTINUED | OUTPATIENT
Start: 2024-06-26 | End: 2024-06-28 | Stop reason: HOSPADM

## 2024-06-26 RX ORDER — SODIUM CHLORIDE 9 MG/ML
75 INJECTION, SOLUTION INTRAVENOUS CONTINUOUS
Status: DISCONTINUED | OUTPATIENT
Start: 2024-06-26 | End: 2024-06-28 | Stop reason: HOSPADM

## 2024-06-26 RX ORDER — BISACODYL 5 MG/1
5 TABLET, DELAYED RELEASE ORAL DAILY PRN
Status: DISCONTINUED | OUTPATIENT
Start: 2024-06-26 | End: 2024-06-28 | Stop reason: HOSPADM

## 2024-06-26 RX ORDER — SODIUM CHLORIDE 0.9 % (FLUSH) 0.9 %
10 SYRINGE (ML) INJECTION EVERY 12 HOURS SCHEDULED
Status: DISCONTINUED | OUTPATIENT
Start: 2024-06-26 | End: 2024-06-28 | Stop reason: HOSPADM

## 2024-06-26 RX ORDER — LACOSAMIDE 50 MG/1
200 TABLET ORAL EVERY 12 HOURS SCHEDULED
Status: DISCONTINUED | OUTPATIENT
Start: 2024-06-26 | End: 2024-06-28 | Stop reason: HOSPADM

## 2024-06-26 RX ORDER — CALCIUM CARBONATE 500 MG/1
2 TABLET, CHEWABLE ORAL 2 TIMES DAILY PRN
Status: DISCONTINUED | OUTPATIENT
Start: 2024-06-26 | End: 2024-06-28 | Stop reason: HOSPADM

## 2024-06-26 RX ORDER — SODIUM CHLORIDE 9 MG/ML
40 INJECTION, SOLUTION INTRAVENOUS AS NEEDED
Status: DISCONTINUED | OUTPATIENT
Start: 2024-06-26 | End: 2024-06-28 | Stop reason: HOSPADM

## 2024-06-26 RX ORDER — ATORVASTATIN CALCIUM 20 MG/1
40 TABLET, FILM COATED ORAL NIGHTLY
Status: DISCONTINUED | OUTPATIENT
Start: 2024-06-26 | End: 2024-06-28 | Stop reason: HOSPADM

## 2024-06-26 RX ORDER — BISACODYL 10 MG
10 SUPPOSITORY, RECTAL RECTAL DAILY PRN
Status: DISCONTINUED | OUTPATIENT
Start: 2024-06-26 | End: 2024-06-28 | Stop reason: HOSPADM

## 2024-06-26 RX ORDER — METOPROLOL SUCCINATE 25 MG/1
25 TABLET, EXTENDED RELEASE ORAL DAILY
Status: DISCONTINUED | OUTPATIENT
Start: 2024-06-27 | End: 2024-06-28 | Stop reason: HOSPADM

## 2024-06-26 RX ADMIN — MORPHINE SULFATE 4 MG: 2 INJECTION, SOLUTION INTRAMUSCULAR; INTRAVENOUS at 19:41

## 2024-06-26 RX ADMIN — MORPHINE SULFATE 4 MG: 2 INJECTION, SOLUTION INTRAMUSCULAR; INTRAVENOUS at 21:51

## 2024-06-26 RX ADMIN — ONDANSETRON 4 MG: 2 INJECTION INTRAMUSCULAR; INTRAVENOUS at 19:41

## 2024-06-26 RX ADMIN — IOPAMIDOL 85 ML: 612 INJECTION, SOLUTION INTRAVENOUS at 19:50

## 2024-06-26 RX ADMIN — CEFTRIAXONE SODIUM 1000 MG: 1 INJECTION, POWDER, FOR SOLUTION INTRAMUSCULAR; INTRAVENOUS at 20:29

## 2024-06-26 NOTE — TELEPHONE ENCOUNTER
If abdominal pain is acutely worsening or severe, I recommend she be evaluated within ER today.  She may taking acetaminophen but recommend limiting to truly as needed, and avoiding continuous use.

## 2024-06-26 NOTE — ED PROVIDER NOTES
EMERGENCY DEPARTMENT ENCOUNTER  Room Number:  12/12  PCP: Annalee Huddleston APRN  Independent Historians: Patient and Family      HPI:  Chief Complaint: had concerns including Abdominal Pain.       Context: Alexandra Xiao is a 43 y.o. female with a medical history of depression, epilepsy, HTN, CVA, COPD, HLD who presents to the ED c/o acute abdominal pain.  Pain is located in the right upper quadrant and epigastrium as well as in the left lower quadrant.  Patient states this has been a bit of a chronic issue but has worsened intensely over the last 3 to 4 days.  Patient denies fever, chills, nausea, vomiting.  Patient states her bowel movements have been normal.  Patient denies previous abdominal surgery.  Patient has been noted to have elevated liver enzymes and has been undergoing outpatient evaluation for that.  Patient denies being a regular alcohol drinker.      Review of prior external notes (non-ED) -and- Review of prior external test results outside of this encounter: Office visit with hematology and oncology from 6/19/2024 reviewed and notable for history of COPD, HTN, HLD, seizures, CVA and a history of tobacco use.  Patient noted to have a previous left MCA stroke.  Patient noted to have unremarkable hypercoagulable workup.  Patient noted to have elevated LFTs with plan for further evaluation and management.    Prescription drug monitoring program review:         PAST MEDICAL HISTORY  Active Ambulatory Problems     Diagnosis Date Noted    Sebaceous cyst of breast, right 11/06/2018    Abnormal uterine bleeding 03/23/2022    Microcytic anemia 04/27/2022    Transaminitis 04/27/2022    Epilepsy 04/27/2022    Major depressive disorder 01/01/2014    Left-sided weakness 03/05/2023    Anemia     Hyponatremia     HTN (hypertension)     History of stroke 03/06/2023    Vitamin D deficiency 03/09/2023    B12 deficiency 03/09/2023    Observed sleep apnea 11/30/2023    Snoring 11/30/2023    Excessive daytime sleepiness  2023    Class 1 obesity 2023    Cerebrovascular accident (CVA) due to embolism of left middle cerebral artery 2024    S/P placement of VNS (vagus nerve stimulation) device 2024    Abnormal urinalysis 2024    Vaping nicotine dependence, tobacco product     Implantable loop recorder present      Resolved Ambulatory Problems     Diagnosis Date Noted    Acute CVA (cerebrovascular accident) 2022    Tobacco abuse 2022     Past Medical History:   Diagnosis Date    Abnormal bleeding in menstrual cycle     COPD (chronic obstructive pulmonary disease)     Depression     Headache, tension-type     Heavy menstrual bleeding     Hyperlipidemia     Hypertension     Memory loss     Migraine     Seizures     Status post placement of implantable loop recorder     Stroke          PAST SURGICAL HISTORY  Past Surgical History:   Procedure Laterality Date    BREAST BIOPSY      D & C HYSTEROSCOPY ENDOMETRIAL ABLATION N/A 06/10/2022    Procedure: DILATATION AND CURETTAGE HYSTEROSCOPY NOVASURE ENDOMETRIAL ABLATION;  Surgeon: Ernesto Mendosa MD;  Location: [facility];  Service: Obstetrics/Gynecology;  Laterality: N/A;    FOOT SURGERY      pins in left foot     INSERT / REPLACE / REMOVE PACEMAKER  2022    Dr. Cristofer Chamorro    OTHER SURGICAL HISTORY      VNS plate 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    NOÉ      2022    TUBAL ABDOMINAL LIGATION      VAGUS NERVE STIMULATOR IMPLANTATION           FAMILY HISTORY  Family History   Problem Relation Age of Onset    Breast cancer Mother 50         age 60 METS    Arthritis Mother     Arthritis Father     Diabetes Father     Heart disease Father     Hypertension Father     Heart attack Father     Hypertension Brother     Heart attack Paternal Grandfather     Ovarian cancer Neg Hx     Uterine cancer Neg Hx     Colon cancer Neg Hx     Deep vein thrombosis Neg Hx     Pulmonary embolism Neg Hx     Malig Hyperthermia Neg Hx           SOCIAL HISTORY  Social History     Socioeconomic History    Marital status: Single    Number of children: 2   Tobacco Use    Smoking status: Former     Current packs/day: 0.00     Average packs/day: 1 pack/day for 29.0 years (29.0 ttl pk-yrs)     Types: Cigarettes     Start date:      Quit date:      Years since quittin.4    Smokeless tobacco: Never    Tobacco comments:     Quit 2022   Vaping Use    Vaping status: Every Day    Substances: Nicotine, Flavoring    Devices: Disposable, 6% NICOTINE   Substance and Sexual Activity    Alcohol use: Not Currently    Drug use: No    Sexual activity: Yes     Partners: Male     Birth control/protection: Tubal ligation         ALLERGIES  Patient has no known allergies.      REVIEW OF SYSTEMS  Review of Systems  Included in HPI  All systems reviewed and negative except for those discussed in HPI.      PHYSICAL EXAM    I have reviewed the triage vital signs and nursing notes.    ED Triage Vitals [24 1825]   Temp Heart Rate Resp BP SpO2   96.7 °F (35.9 °C) 117 -- -- 97 %      Temp src Heart Rate Source Patient Position BP Location FiO2 (%)   -- -- -- -- --       Physical Exam  GENERAL: alert, no acute distress  SKIN: Warm, dry  HENT: Normocephalic, atraumatic  EYES: no scleral icterus  CV: regular rhythm, regular rate  RESPIRATORY: normal effort, lungs clear  ABDOMEN: soft, generalized tenderness to palpation greatest in the right upper quadrant/epigastrium and left lower quadrant.  MUSCULOSKELETAL: no deformity  NEURO: alert, moves all extremities, follows commands            LAB RESULTS  Recent Results (from the past 24 hour(s))   CBC Auto Differential    Collection Time: 24  6:49 PM    Specimen: Arm, Right; Blood   Result Value Ref Range    WBC 11.92 (H) 3.40 - 10.80 10*3/mm3    RBC 4.64 3.77 - 5.28 10*6/mm3    Hemoglobin 13.2 12.0 - 15.9 g/dL    Hematocrit 39.0 34.0 - 46.6 %    MCV 84.1 79.0 - 97.0 fL    MCH 28.4 26.6 - 33.0 pg    MCHC 33.8  31.5 - 35.7 g/dL    RDW 13.9 12.3 - 15.4 %    RDW-SD 42.1 37.0 - 54.0 fl    MPV 10.2 6.0 - 12.0 fL    Platelets 380 140 - 450 10*3/mm3    Neutrophil % 75.1 42.7 - 76.0 %    Lymphocyte % 17.7 (L) 19.6 - 45.3 %    Monocyte % 5.7 5.0 - 12.0 %    Eosinophil % 0.7 0.3 - 6.2 %    Basophil % 0.3 0.0 - 1.5 %    Immature Grans % 0.5 0.0 - 0.5 %    Neutrophils, Absolute 8.96 (H) 1.70 - 7.00 10*3/mm3    Lymphocytes, Absolute 2.11 0.70 - 3.10 10*3/mm3    Monocytes, Absolute 0.68 0.10 - 0.90 10*3/mm3    Eosinophils, Absolute 0.08 0.00 - 0.40 10*3/mm3    Basophils, Absolute 0.03 0.00 - 0.20 10*3/mm3    Immature Grans, Absolute 0.06 (H) 0.00 - 0.05 10*3/mm3    nRBC 0.0 0.0 - 0.2 /100 WBC   Urinalysis With Microscopic If Indicated (No Culture) - Urine, Clean Catch    Collection Time: 06/26/24  6:57 PM    Specimen: Urine, Clean Catch   Result Value Ref Range    Color, UA Yellow Yellow, Straw    Appearance, UA Clear Clear    pH, UA 5.5 5.0 - 8.0    Specific Gravity, UA 1.010 1.005 - 1.030    Glucose, UA Negative Negative    Ketones, UA Negative Negative    Bilirubin, UA Negative Negative    Blood, UA Small (1+) (A) Negative    Protein, UA Negative Negative    Leuk Esterase, UA Trace (A) Negative    Nitrite, UA Positive (A) Negative    Urobilinogen, UA 0.2 E.U./dL 0.2 - 1.0 E.U./dL   Urinalysis, Microscopic Only - Urine, Clean Catch    Collection Time: 06/26/24  6:57 PM    Specimen: Urine, Clean Catch   Result Value Ref Range    RBC, UA 3-5 (A) None Seen, 0-2 /HPF    WBC, UA 3-5 (A) None Seen, 0-2 /HPF    Bacteria, UA 4+ (A) None Seen /HPF    Squamous Epithelial Cells, UA 0-2 None Seen, 0-2 /HPF    Hyaline Casts, UA None Seen None Seen /LPF    Methodology Automated Microscopy    Comprehensive Metabolic Panel    Collection Time: 06/26/24  8:11 PM    Specimen: Arm, Left; Blood   Result Value Ref Range    Glucose 88 65 - 99 mg/dL    BUN 5 (L) 6 - 20 mg/dL    Creatinine 0.51 (L) 0.57 - 1.00 mg/dL    Sodium 131 (L) 136 - 145 mmol/L     Potassium 3.7 3.5 - 5.2 mmol/L    Chloride 98 98 - 107 mmol/L    CO2 21.1 (L) 22.0 - 29.0 mmol/L    Calcium 9.1 8.6 - 10.5 mg/dL    Total Protein 7.5 6.0 - 8.5 g/dL    Albumin 4.0 3.5 - 5.2 g/dL    ALT (SGPT) 36 (H) 1 - 33 U/L    AST (SGOT) 41 (H) 1 - 32 U/L    Alkaline Phosphatase 199 (H) 39 - 117 U/L    Total Bilirubin 0.2 0.0 - 1.2 mg/dL    Globulin 3.5 gm/dL    A/G Ratio 1.1 g/dL    BUN/Creatinine Ratio 9.8 7.0 - 25.0    Anion Gap 11.9 5.0 - 15.0 mmol/L    eGFR 118.2 >60.0 mL/min/1.73   Lipase    Collection Time: 06/26/24  8:11 PM    Specimen: Arm, Left; Blood   Result Value Ref Range    Lipase 13 13 - 60 U/L   Lactic Acid, Plasma    Collection Time: 06/26/24  8:11 PM    Specimen: Arm, Left; Blood   Result Value Ref Range    Lactate 1.5 0.5 - 2.0 mmol/L         RADIOLOGY  CT Abdomen Pelvis With Contrast    Result Date: 6/26/2024  CT ABDOMEN PELVIS W CONTRAST-  INDICATIONS: Right upper quadrant pain  TECHNIQUE: Radiation dose reduction techniques were utilized, including automated exposure control and exposure modulation based on body size. Enhanced ABDOMEN AND PELVIS CT  COMPARISON: None available  FINDINGS:  The liver is enlarged, and shows diffuse low-density, compatible with steatosis, with some areas of fatty sparing suggested. Possibility of underlying hepatitis is not excluded in this setting of right upper quadrant pain. The gallbladder appears unremarkable by CT criteria, could be further characterized with ultrasound if indicated.  At the left margin of the spleen, hypodense region measuring 2.4 cm is indeterminate, possibility of an area of infarct is not excluded.  A large heterogeneous mass of the uterine fundus, with areas of cystic change, measuring about 9 cm overall, could represent fibroid with necrosis, possibility of leiomyosarcoma or other neoplastic process is not excluded, gynecology consultation is advised. There may be fluid in the endometrial canal. Cystic change is evident at the  bilateral adnexa.   Otherwise unremarkable appearance of the liver, spleen, adrenal glands, pancreas, kidneys, bladder.  No bowel obstruction. Assessment of the distal colon for wall thickening is limited by lack of distention. The appendix does not appear inflamed. Colonic diverticula are seen that do not appear inflamed.  No free intraperitoneal gas or free fluid.  Scattered small mesenteric and para-aortic lymph nodes are seen that are not significant by size criteria.  Abdominal aorta is not aneurysmal. Aortic and other arterial calcifications are present.  The lung bases are clear.  No acute fracture is identified.          1. Hepatomegaly with evidence of hepatic steatosis. Indeterminate hypodense focus in the spleen, as described above.  2. No obstructive uropathy. Large indeterminate mass of the uterus, gynecology consultation advised.  3. Colonic diverticulosis. No acute inflammatory process of bowel is identified, follow-up as indications persist.     This report was finalized on 6/26/2024 8:43 PM by Dr. Cb Butts M.D on Workstation: [id number]         MEDICATIONS GIVEN IN ER  Medications   cefTRIAXone (ROCEPHIN) 1,000 mg in sodium chloride 0.9 % 100 mL MBP (0 mg Intravenous Stopped 6/26/24 2137)   morphine injection 4 mg (4 mg Intravenous Given 6/26/24 1941)   ondansetron (ZOFRAN) injection 4 mg (4 mg Intravenous Given 6/26/24 1941)   iopamidol (ISOVUE-300) 61 % injection 100 mL (85 mL Intravenous Given by Other 6/26/24 1950)   morphine injection 4 mg (4 mg Intravenous Given 6/26/24 2151)         ORDERS PLACED DURING THIS VISIT:  Orders Placed This Encounter   Procedures    Blood Culture - Blood,    Blood Culture - Blood,    Urine Culture - Urine,    CT Abdomen Pelvis With Contrast    Comprehensive Metabolic Panel    Lipase    Urinalysis With Microscopic If Indicated (No Culture) - Urine, Clean Catch    CBC Auto Differential    Urinalysis, Microscopic Only - Urine, Clean Catch    Lactic Acid,  Plasma    [facility] (on-call MD unless specified) Details    Initiate Observation Status    CBC & Differential         OUTPATIENT MEDICATION MANAGEMENT:  No current Epic-ordered facility-administered medications on file.     Current Outpatient Medications Ordered in Epic   Medication Sig Dispense Refill    amitriptyline (ELAVIL) 25 MG tablet TAKE 1 TABLET BY MOUTH EVERY NIGHT 30 tablet 3    amLODIPine (NORVASC) 5 MG tablet TAKE 1 TABLET BY MOUTH DAILY 90 tablet 1    apixaban (ELIQUIS) 5 MG tablet tablet Take 1 tablet by mouth 2 (Two) Times a Day. 180 tablet 2    aspirin 81 MG EC tablet Take 1 tablet by mouth Daily. 30 tablet 0    atorvastatin (LIPITOR) 40 MG tablet TAKE 1 TABLET BY MOUTH EVERY NIGHT 30 tablet 3    Eslicarbazepine Acetate (Aptiom) 400 MG tablet Take 1 tablet by mouth Daily. Take with 800mg for total dose 1200mg daily      Eslicarbazepine Acetate (Aptiom) 800 MG tablet tablet Take 1 tablet by mouth Daily. Take with 400mg for total dose 1200mg daily.      folic acid (FOLVITE) 1 MG tablet Take 1 tablet by mouth Daily. (Patient not taking: Reported on 5/16/2024)      metoprolol succinate XL (TOPROL-XL) 25 MG 24 hr tablet TAKE 1 TABLET BY MOUTH DAILY 90 tablet 0    Vimpat 200 MG tablet TAKE 1 TABLET BY MOUTH EVERY 12 HOURS 60 tablet 0    vitamin B-12 (CYANOCOBALAMIN) 1000 MCG tablet Take 1 tablet by mouth Daily. (Patient not taking: Reported on 5/16/2024)      Xcopri 50 MG tablet TAKE 1 TABLET BY MOUTH DAILY 30 tablet 4         PROCEDURES  Procedures            PROGRESS, DATA ANALYSIS, CONSULTS, AND MEDICAL DECISION MAKING  All labs have been independently interpreted by me.  All radiology studies have been reviewed by me. All EKG's have been independently viewed and interpreted by me.  Discussion below represents my analysis of pertinent findings related to patient's condition, differential diagnosis, treatment plan and final disposition.    Differential diagnosis includes but is not limited to biliary  pathology, UTI, pyelonephritis, colitis, diverticulitis, pancreatitis  Clinical Scores:                   ED Course as of 06/26/24 2231 Wed Jun 26, 2024   2100 CT abdomen and pelvis interpreted by me and demonstrates no evidence of obstruction [MW]   2150 Laboratory evaluation is notable for nitrite positive UTI, elevation of liver enzymes and a leukocytosis.  Radiological evaluation demonstrates hepatic steatosis and a uterine mass.  Recommendation was for gynecological consultation.  Given patient's uncontrolled pain despite use of IV narcotics, nitrate positive UTI and other findings on imaging I believe she will benefit from admission for further evaluation and management. [MW]   2231 Discussed with Dr. Martino who agrees to admit [MW]      ED Course User Index  [MW] Wyatt Garcia MD             AS OF 22:31 EDT VITALS:    BP - 150/88  HR - 93  TEMP - 96.7 °F (35.9 °C)  O2 SATS - 97%    COMPLEXITY OF CARE  The patient requires admission.      DIAGNOSIS  Final diagnoses:   Acute cystitis without hematuria   Uterine mass   Uncontrolled pain         DISPOSITION  ED Disposition       ED Disposition   Decision to Admit    Condition   --    Comment   Level of Care: Telemetry [5]   Diagnosis: UTI (urinary tract infection) [300725]   Admitting Physician: RUY MARTINO [[id number]]   Attending Physician: RUY MARTINO [[id number]]                  Please note that portions of this document were completed with a voice recognition program.    Note Disclaimer: At [facility], we believe that sharing information builds trust and better relationships. You are receiving this note because you recently visited [facility]. It is possible you will see health information before a provider has talked with you about it. This kind of information can be easy to misunderstand. To help you fully understand what it means for your health, we urge you to discuss this note with your provider.         Wyatt Garcia,  MD  06/26/24 [id number]

## 2024-06-27 ENCOUNTER — HOSPITAL ENCOUNTER (OUTPATIENT)
Dept: ULTRASOUND IMAGING | Facility: HOSPITAL | Age: 44
End: 2024-06-27
Payer: MEDICARE

## 2024-06-27 LAB
ALBUMIN SERPL-MCNC: 3.5 G/DL (ref 3.5–5.2)
ALBUMIN/GLOB SERPL: 1.2 G/DL
ALP SERPL-CCNC: 180 U/L (ref 39–117)
ALT SERPL W P-5'-P-CCNC: 30 U/L (ref 1–33)
ANION GAP SERPL CALCULATED.3IONS-SCNC: 13.1 MMOL/L (ref 5–15)
AST SERPL-CCNC: 32 U/L (ref 1–32)
BILIRUB SERPL-MCNC: 0.2 MG/DL (ref 0–1.2)
BUN SERPL-MCNC: 5 MG/DL (ref 6–20)
BUN/CREAT SERPL: 9.6 (ref 7–25)
CALCIUM SPEC-SCNC: 8.6 MG/DL (ref 8.6–10.5)
CHLORIDE SERPL-SCNC: 100 MMOL/L (ref 98–107)
CO2 SERPL-SCNC: 20.9 MMOL/L (ref 22–29)
CREAT SERPL-MCNC: 0.52 MG/DL (ref 0.57–1)
D DIMER PPP FEU-MCNC: <0.27 MCGFEU/ML (ref 0–0.5)
DEPRECATED RDW RBC AUTO: 41.3 FL (ref 37–54)
EGFRCR SERPLBLD CKD-EPI 2021: 117.7 ML/MIN/1.73
ERYTHROCYTE [DISTWIDTH] IN BLOOD BY AUTOMATED COUNT: 13.2 % (ref 12.3–15.4)
GLOBULIN UR ELPH-MCNC: 2.9 GM/DL
GLUCOSE SERPL-MCNC: 121 MG/DL (ref 65–99)
HCT VFR BLD AUTO: 35.9 % (ref 34–46.6)
HGB BLD-MCNC: 12 G/DL (ref 12–15.9)
LIPASE SERPL-CCNC: 14 U/L (ref 13–60)
MAGNESIUM SERPL-MCNC: 1.7 MG/DL (ref 1.6–2.6)
MCH RBC QN AUTO: 28.4 PG (ref 26.6–33)
MCHC RBC AUTO-ENTMCNC: 33.4 G/DL (ref 31.5–35.7)
MCV RBC AUTO: 84.9 FL (ref 79–97)
PHOSPHATE SERPL-MCNC: 2.5 MG/DL (ref 2.5–4.5)
PLATELET # BLD AUTO: 318 10*3/MM3 (ref 140–450)
PMV BLD AUTO: 9.8 FL (ref 6–12)
POTASSIUM SERPL-SCNC: 3.6 MMOL/L (ref 3.5–5.2)
PROT SERPL-MCNC: 6.4 G/DL (ref 6–8.5)
RBC # BLD AUTO: 4.23 10*6/MM3 (ref 3.77–5.28)
SODIUM SERPL-SCNC: 134 MMOL/L (ref 136–145)
WBC NRBC COR # BLD AUTO: 15.15 10*3/MM3 (ref 3.4–10.8)

## 2024-06-27 PROCEDURE — 25810000003 SODIUM CHLORIDE 0.9 % SOLUTION: Performed by: NURSE PRACTITIONER

## 2024-06-27 PROCEDURE — 83690 ASSAY OF LIPASE: CPT | Performed by: HOSPITALIST

## 2024-06-27 PROCEDURE — 85379 FIBRIN DEGRADATION QUANT: CPT | Performed by: HOSPITALIST

## 2024-06-27 PROCEDURE — 36415 COLL VENOUS BLD VENIPUNCTURE: CPT | Performed by: NURSE PRACTITIONER

## 2024-06-27 PROCEDURE — 25010000002 KETOROLAC TROMETHAMINE PER 15 MG: Performed by: HOSPITALIST

## 2024-06-27 PROCEDURE — 99222 1ST HOSP IP/OBS MODERATE 55: CPT | Performed by: INTERNAL MEDICINE

## 2024-06-27 PROCEDURE — 84100 ASSAY OF PHOSPHORUS: CPT | Performed by: STUDENT IN AN ORGANIZED HEALTH CARE EDUCATION/TRAINING PROGRAM

## 2024-06-27 PROCEDURE — 25010000002 ONDANSETRON PER 1 MG: Performed by: NURSE PRACTITIONER

## 2024-06-27 PROCEDURE — 25010000002 ENOXAPARIN PER 10 MG: Performed by: STUDENT IN AN ORGANIZED HEALTH CARE EDUCATION/TRAINING PROGRAM

## 2024-06-27 PROCEDURE — 25010000002 CEFTRIAXONE PER 250 MG: Performed by: STUDENT IN AN ORGANIZED HEALTH CARE EDUCATION/TRAINING PROGRAM

## 2024-06-27 PROCEDURE — 25010000002 HYDROMORPHONE PER 4 MG: Performed by: NURSE PRACTITIONER

## 2024-06-27 PROCEDURE — 80053 COMPREHEN METABOLIC PANEL: CPT | Performed by: STUDENT IN AN ORGANIZED HEALTH CARE EDUCATION/TRAINING PROGRAM

## 2024-06-27 PROCEDURE — 83735 ASSAY OF MAGNESIUM: CPT | Performed by: STUDENT IN AN ORGANIZED HEALTH CARE EDUCATION/TRAINING PROGRAM

## 2024-06-27 PROCEDURE — 25810000003 SODIUM CHLORIDE 0.9 % SOLUTION: Performed by: HOSPITALIST

## 2024-06-27 PROCEDURE — 85027 COMPLETE CBC AUTOMATED: CPT | Performed by: NURSE PRACTITIONER

## 2024-06-27 RX ORDER — FOLIC ACID 1 MG/1
1 TABLET ORAL DAILY
Status: DISCONTINUED | OUTPATIENT
Start: 2024-06-27 | End: 2024-06-28 | Stop reason: HOSPADM

## 2024-06-27 RX ORDER — PANTOPRAZOLE SODIUM 40 MG/1
40 TABLET, DELAYED RELEASE ORAL
Status: DISCONTINUED | OUTPATIENT
Start: 2024-06-27 | End: 2024-06-28 | Stop reason: HOSPADM

## 2024-06-27 RX ORDER — KETOROLAC TROMETHAMINE 15 MG/ML
15 INJECTION, SOLUTION INTRAMUSCULAR; INTRAVENOUS EVERY 8 HOURS
Status: COMPLETED | OUTPATIENT
Start: 2024-06-27 | End: 2024-06-28

## 2024-06-27 RX ORDER — ASPIRIN 81 MG/1
81 TABLET ORAL DAILY
Status: DISCONTINUED | OUTPATIENT
Start: 2024-06-27 | End: 2024-06-28 | Stop reason: HOSPADM

## 2024-06-27 RX ORDER — ENOXAPARIN SODIUM 100 MG/ML
1 INJECTION SUBCUTANEOUS EVERY 12 HOURS
Status: DISCONTINUED | OUTPATIENT
Start: 2024-06-27 | End: 2024-06-27 | Stop reason: ALTCHOICE

## 2024-06-27 RX ORDER — OXYCODONE HYDROCHLORIDE 5 MG/1
5 TABLET ORAL EVERY 6 HOURS PRN
Status: DISCONTINUED | OUTPATIENT
Start: 2024-06-27 | End: 2024-06-28 | Stop reason: HOSPADM

## 2024-06-27 RX ORDER — OXYCODONE HYDROCHLORIDE AND ACETAMINOPHEN 5; 325 MG/1; MG/1
1 TABLET ORAL EVERY 6 HOURS PRN
Status: DISCONTINUED | OUTPATIENT
Start: 2024-06-27 | End: 2024-06-27

## 2024-06-27 RX ADMIN — Medication 10 ML: at 20:12

## 2024-06-27 RX ADMIN — KETOROLAC TROMETHAMINE 15 MG: 15 INJECTION, SOLUTION INTRAMUSCULAR; INTRAVENOUS at 12:18

## 2024-06-27 RX ADMIN — APIXABAN 5 MG: 5 TABLET, FILM COATED ORAL at 23:03

## 2024-06-27 RX ADMIN — OXYCODONE HYDROCHLORIDE 5 MG: 5 TABLET ORAL at 19:35

## 2024-06-27 RX ADMIN — ONDANSETRON 4 MG: 2 INJECTION INTRAMUSCULAR; INTRAVENOUS at 10:44

## 2024-06-27 RX ADMIN — LACOSAMIDE 200 MG: 50 TABLET, FILM COATED ORAL at 20:11

## 2024-06-27 RX ADMIN — PANTOPRAZOLE SODIUM 40 MG: 40 TABLET, DELAYED RELEASE ORAL at 18:45

## 2024-06-27 RX ADMIN — LACOSAMIDE 200 MG: 50 TABLET, FILM COATED ORAL at 08:22

## 2024-06-27 RX ADMIN — ATORVASTATIN CALCIUM 40 MG: 20 TABLET, FILM COATED ORAL at 00:29

## 2024-06-27 RX ADMIN — Medication 10 ML: at 08:18

## 2024-06-27 RX ADMIN — LACOSAMIDE 200 MG: 50 TABLET, FILM COATED ORAL at 00:29

## 2024-06-27 RX ADMIN — METOPROLOL SUCCINATE 25 MG: 25 TABLET, EXTENDED RELEASE ORAL at 08:22

## 2024-06-27 RX ADMIN — AMITRIPTYLINE HYDROCHLORIDE 25 MG: 25 TABLET, FILM COATED ORAL at 20:11

## 2024-06-27 RX ADMIN — KETOROLAC TROMETHAMINE 15 MG: 15 INJECTION, SOLUTION INTRAMUSCULAR; INTRAVENOUS at 18:45

## 2024-06-27 RX ADMIN — ASPIRIN 81 MG: 81 TABLET, COATED ORAL at 10:44

## 2024-06-27 RX ADMIN — OXYCODONE HYDROCHLORIDE 5 MG: 5 TABLET ORAL at 10:44

## 2024-06-27 RX ADMIN — ATORVASTATIN CALCIUM 40 MG: 20 TABLET, FILM COATED ORAL at 20:11

## 2024-06-27 RX ADMIN — Medication 10 ML: at 00:30

## 2024-06-27 RX ADMIN — SODIUM CHLORIDE 100 ML/HR: 9 INJECTION, SOLUTION INTRAVENOUS at 00:37

## 2024-06-27 RX ADMIN — AMITRIPTYLINE HYDROCHLORIDE 25 MG: 25 TABLET, FILM COATED ORAL at 00:29

## 2024-06-27 RX ADMIN — CEFTRIAXONE SODIUM 1000 MG: 1 INJECTION, POWDER, FOR SOLUTION INTRAMUSCULAR; INTRAVENOUS at 20:11

## 2024-06-27 RX ADMIN — AMLODIPINE BESYLATE 5 MG: 5 TABLET ORAL at 08:22

## 2024-06-27 RX ADMIN — HYDROMORPHONE HYDROCHLORIDE 0.5 MG: 1 INJECTION, SOLUTION INTRAMUSCULAR; INTRAVENOUS; SUBCUTANEOUS at 00:37

## 2024-06-27 RX ADMIN — ONDANSETRON 4 MG: 2 INJECTION INTRAMUSCULAR; INTRAVENOUS at 04:52

## 2024-06-27 RX ADMIN — ENOXAPARIN SODIUM 80 MG: 100 INJECTION SUBCUTANEOUS at 00:43

## 2024-06-27 RX ADMIN — ENOXAPARIN SODIUM 80 MG: 100 INJECTION SUBCUTANEOUS at 12:18

## 2024-06-27 RX ADMIN — SODIUM CHLORIDE 75 ML/HR: 9 INJECTION, SOLUTION INTRAVENOUS at 21:05

## 2024-06-27 RX ADMIN — OXYCODONE HYDROCHLORIDE 5 MG: 5 TABLET ORAL at 04:49

## 2024-06-27 NOTE — CASE MANAGEMENT/SOCIAL WORK
Continued Stay Note  [facility]     Patient Name: Alexandra Xiao  MRN: [id number]  Today's Date: 6/27/2024    Admit Date: 6/26/2024    Plan: Plan home with family.  VIRAJ Jimenez RN   Discharge Plan       Row Name 06/27/24 0939       Plan    Plan Plan home with family.  VIRAJ Jimenez RN    Patient/Family in Agreement with Plan yes    Plan Comments FACE SHEET VERIFIED/ HAMMER SIGNED.  Spoke with pt and friend  (Nalini Beryl) at bedside. Pt's PCP is KIRSTIE Pan.  Pt lives with her significant other ( Yadiel Cordoba [phone]) and daughter  (Osmar Xiao [phone]) in a mobile home.  Pt is independent with ADLs. Pt has a  C PAP and a Vagal Nerve Stimulator.  Pt gets her prescriptions at [facility] ([facility]).  Pt denies any issues affording medications. Pt is not current with . Pt has not been in SNF.  Pt denies any discharge needs. Pt's significant other and friend will assist pt at home if needed and will transport pt home. Plan home with family.  VIRAJ Jimenez RN                   Discharge Codes    No documentation.                 Expected Discharge Date and Time       Expected Discharge Date Expected Discharge Time    Jul 2, 2024               Jana Jimenez RN

## 2024-06-27 NOTE — CONSULTS
Subjective     REASON FOR CONSULTATION:  Provide an opinion on any further workup or treatment on:    Indeterminate splenic infarct on CT scan                        REQUESTING PHYSICIAN: Dr. Florence    HISTORY OF PRESENT ILLNESS:      Alexandra Xiao is a 43 y.o. patient who was admitted on 6/26/2024.  Patient's history is significant for hypertension, stroke, obesity and obstructive sleep apnea.  She is on anticoagulation.  She presented to the ER on 6/26/2024 complaining of abdominal pain.  This started 2 months ago but worsened 3 days before admission.  Pain is in the lower abdomen.  It radiates to the left side.  She reports nausea.    Patient's lab revealed sodium of 131.  CT of the abdomen pelvis revealed hepatomegaly.  There were changes of hepatic steatosis.  There was a large indeterminate mass of the uterus.  She was started on IV Rocephin and was admitted for further evaluation and management.     GYN oncology consultation was recommended.  However, patient could not be seen by GYN oncology during this hospitalization but she is being referred to GYN oncology as outpatient.    CT scan revealed questionable splenic infarct.  Patient is on anticoagulation.  Hematology consultation was requested for further recommendations regarding this.    Patient reports that she did not miss any doses of Eliquis or aspirin.    Past Medical History:   Diagnosis Date    Abnormal bleeding in menstrual cycle     COPD (chronic obstructive pulmonary disease)     Depression     Epilepsy     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,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    Headache, tension-type     Heavy menstrual bleeding     WITH CLOTS    Hyperlipidemia     Hypertension     Memory loss     Migraine     Seizures     Status post placement of implantable loop recorder     Stroke     MARCH AND APRIL, 2022     Past Surgical History:   Procedure Laterality Date    BREAST BIOPSY      D & C HYSTEROSCOPY ENDOMETRIAL ABLATION N/A 06/10/2022    Procedure: DILATATION AND CURETTAGE  HYSTEROSCOPY NOVASURE ENDOMETRIAL ABLATION;  Surgeon: Ernesto Mendosa MD;  Location: [facility];  Service: Obstetrics/Gynecology;  Laterality: N/A;    FOOT SURGERY      pins in left foot     INSERT / REPLACE / REMOVE PACEMAKER  2022    Dr. Cristofer Chamorro    OTHER SURGICAL HISTORY      VNS plate 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    NOÉ      2022    TUBAL ABDOMINAL LIGATION      VAGUS NERVE STIMULATOR IMPLANTATION       SCHEDULED MEDS:  amitriptyline, 25 mg, Oral, Nightly  amLODIPine, 5 mg, Oral, Daily  aspirin, 81 mg, Oral, Daily  atorvastatin, 40 mg, Oral, Nightly  cefTRIAXone, 1,000 mg, Intravenous, Q24H  Cenobamate, 50 mg, Oral, Nightly  enoxaparin, 1 mg/kg, Subcutaneous, Q12H  Eslicarbazepine Acetate, 1,200 mg, Oral, Nightly  ketorolac, 15 mg, Intravenous, Q8H  lacosamide, 200 mg, Oral, Q12H  metoprolol succinate XL, 25 mg, Oral, Daily  pantoprazole, 40 mg, Oral, BID AC  sodium chloride, 10 mL, Intravenous, Q12H      ALLERGIES:  No Known Allergies     Social History     Socioeconomic History    Marital status: Single    Number of children: 2   Tobacco Use    Smoking status: Former     Current packs/day: 0.00     Average packs/day: 1 pack/day for 29.0 years (29.0 ttl pk-yrs)     Types: Cigarettes     Start date:      Quit date:      Years since quittin.4    Smokeless tobacco: Never    Tobacco comments:     Quit 2022   Vaping Use    Vaping status: Every Day    Substances: Nicotine, Flavoring    Devices: Disposable, 6% NICOTINE   Substance and Sexual Activity    Alcohol use: Not Currently    Drug use: No    Sexual activity: Yes     Partners: Male     Birth control/protection: Tubal ligation     Family History   Problem Relation Age of Onset    Breast cancer Mother 50         age 60 METS    Arthritis Mother     Arthritis Father     Diabetes Father     Heart disease Father     Hypertension Father     Heart attack Father     Hypertension Brother     Heart attack  Paternal Grandfather     Ovarian cancer Neg Hx     Uterine cancer Neg Hx     Colon cancer Neg Hx     Deep vein thrombosis Neg Hx     Pulmonary embolism Neg Hx     Malig Hyperthermia Neg Hx       REVIEW OF SYSTEMS:   GENERAL:  Negative.   SKIN: Negative.  HEME/LYMPH: Negative.  RESPIRATORY:  Negative.   CVS:  Negative.   GI: Abdominal pain.  :  Negative.   MUSCULOSKELETAL:  Negative.  NEUROLOGICAL:  Negative.    Objective   VITAL SIGNS:  Temp:  [96.7 °F (35.9 °C)-97.8 °F (36.6 °C)] 97.4 °F (36.3 °C)  Heart Rate:  [] 95  Resp:  [18] 18  BP: (128-186)/() 128/73     Wt Readings from Last 3 Encounters:   06/26/24 84.3 kg (185 lb 12.8 oz)   06/19/24 84.2 kg (185 lb 11.2 oz)   05/16/24 83.9 kg (185 lb)     PHYSICAL EXAMINATION:   GENERAL:  The patient appears in  general condition, not in acute distress.  SKIN: No skin rash. No ecchymosis.  HEAD:  Normocephalic.  EYES:  No Jaundice. No Pallor.   NECK:  Supple. No Masses.  LYMPHATICS:  No cervical or supraclavicular lymphadenopathy.  CHEST: Normal respiratory effort.   CARDIAC: No edema.  ABDOMEN:  Soft.  Tenderness in the left side of the abdomen and left lower quadrant. No Hepatomegaly. No Splenomegaly. No masses.  EXTREMITIES:  No joint deformities.   NEUROLOGICAL:  No Focal neurological deficits.     RESULT REVIEW:   Results from last 7 days   Lab Units 06/27/24  0527 06/26/24  1849   WBC 10*3/mm3 15.15* 11.92*   NEUTROS ABS 10*3/mm3  --  8.96*   HEMOGLOBIN g/dL 12.0 13.2   HEMATOCRIT % 35.9 39.0   PLATELETS 10*3/mm3 318 380     Results from last 7 days   Lab Units 06/27/24 0527 06/26/24 2011   SODIUM mmol/L 134* 131*   POTASSIUM mmol/L 3.6 3.7   CHLORIDE mmol/L 100 98   CO2 mmol/L 20.9* 21.1*   BUN mg/dL 5* 5*   CREATININE mg/dL 0.52* 0.51*   CALCIUM mg/dL 8.6 9.1   ALBUMIN g/dL 3.5 4.0   BILIRUBIN mg/dL 0.2 0.2   ALK PHOS U/L 180* 199*   ALT (SGPT) U/L 30 36*   AST (SGOT) U/L 32 41*   MAGNESIUM mg/dL 1.7  --      Lab Results   Component Value Date     FERRITIN 13.90 03/08/2023    FERRITIN 7.74 (L) 04/28/2022    IRON 18 (L) 04/28/2022    TIBC 502 03/08/2023    TIBC 459 04/28/2022     Lab Results   Component Value Date    FOLATE 5.08 06/19/2024    FOLATE 4.43 (L) 04/10/2024    FOLATE 4.54 (L) 04/28/2022     Lab Results   Component Value Date    OEURRMFN32 361 06/19/2024    NCNTLIBN47 379 04/10/2024    ALIMIEDV58 376 03/08/2023     Lab Results   Component Value Date    SEDRATE 26 (H) 04/29/2022     Component      Latest Ref Rng 4/10/2024   Dilute Prothrombin Time(dPT)      0.0 - 47.6 sec 44.4    dPT Confirm Ratio      0.00 - 1.34 Ratio 1.24    Thrombin Time      0.0 - 23.0 sec 18.6    PTT LA      0.0 - 43.5 sec 31.2    Dilute Viper Venom Time      0.0 - 47.0 sec 38.3    Lupus Anticoagulant Reflex Comment:    Anticardiolipin IgG      0 - 14 GPL U/mL <9    Anticardiolipin IgM      0 - 12 MPL U/mL <9    Anticardiolipin IgA      0 - 11 APL U/mL <9    Beta-2 Glyco 1 IgG      0 - 20 GPI IgG units <9    Beta-2 Glyco 1 IgA      0 - 25 GPI IgA units <9    Beta-2 Glyco 1 IgM      0 - 32 GPI IgM units <9    Homocysteine, Plasma      0.0 - 15.0 umol/L 6.2      *Lupus anticoagulant was not detected.    CT abdomen and pelvis on 6/26/2024:  The liver is enlarged, and shows diffuse low-density, compatible with  steatosis, with some areas of fatty sparing suggested. Possibility of  underlying hepatitis is not excluded in this setting of right upper  quadrant pain. The gallbladder appears unremarkable by CT criteria,  could be further characterized with ultrasound if indicated.      At the left margin of the spleen, hypodense region measuring 2.4 cm is  indeterminate, possibility of an area of infarct is not excluded.     A large heterogeneous mass of the uterine fundus, with areas of cystic  change, measuring about 9 cm overall, could represent fibroid with  necrosis, possibility of leiomyosarcoma or other neoplastic process is  not excluded, gynecology consultation is advised.  There may be fluid in  the endometrial canal. Cystic change is evident at the bilateral adnexa.     Otherwise unremarkable appearance of the liver, spleen, adrenal glands,  pancreas, kidneys, bladder.     No bowel obstruction. Assessment of the distal colon for wall thickening  is limited by lack of distention. The appendix does not appear inflamed.  Colonic diverticula are seen that do not appear inflamed.     No free intraperitoneal gas or free fluid.     Scattered small mesenteric and para-aortic lymph nodes are seen that are  not significant by size criteria.     Abdominal aorta is not aneurysmal. Aortic and other arterial  calcifications are present.     The lung bases are clear.     No acute fracture is identified.     IMPRESSION:     1. Hepatomegaly with evidence of hepatic steatosis. Indeterminate  hypodense focus in the spleen, as described above.     2. No obstructive uropathy. Large indeterminate mass of the uterus,  gynecology consultation advised.     3. Colonic diverticulosis. No acute inflammatory process of bowel is  identified, follow-up as indications persist.      Assessment & Plan   *Suspected splenic infarct.  Hypodense focus in the spleen.  It was reported on the CT scan on 6/26/2024.  It measured 2.4 cm and was at the left margin of the spleen.  It was considered indeterminate, possibly representing an area of infarct.  Patient reports that she started experiencing pain in the left side of the abdomen in April 2024 which was the time that she was diagnosed with acute/subacute CVA.  This may not represent any new thrombotic event.  I recommended continuing anticoagulation without change in her regimen.    *Large indeterminant mass of the uterus.  GYN oncology consultation was recommended.  Patient is not able to be seen by GYN oncology during this hospitalization but will be seen as outpatient.    *Acute/subacute CVA in April 2024.  Patient presented with expressive aphasia which started on  4/7/2024.  Patient was seen by Neurology and Cardiology.  MRI brain on 4/9/2024 revealed infarcts in multiple different vascular territories.  This suggests central or cardiac embolic source or PFO in this patient.  Testing for lupus anticoagulant, anticardiolipin and antibeta 2 glycoprotein was negative.  She had NOÉ.    She is on Eliquis 5 mg twice daily, aspirin 81 mg daily.     *History of CVA in 2022.  Medically workup at that time was negative.  She is placed on aspirin and statin.     *Seizure disorder.  She is on Vimpat, eslicarbazepine and Xcorpi.     *History of anemia secondary to iron and folate deficiency.  4/9/2024: Hemoglobin 13.9.  Folate 4.43 on 4/10/2024.  She was prescribed folic acid but did not start it.  6/27/2024: Hemoglobin 12.0.  6/19/2024: Vitamin B12 361.  Folate 5.08.       PLAN:     1.  Start folic acid 1 mg daily.   2.  Obtain factor V and prothrombin gene mutation tests.  3.  Since there are no immediate plans for surgery, okay to restart aspirin and Eliquis.        Juan Patel MD  06/27/24

## 2024-06-27 NOTE — ED NOTES
Nursing report ED to floor  Alexandra Xiao  43 y.o.  female    HPI :   Chief Complaint   Patient presents with    Abdominal Pain       Admitting doctor:   Ellen Yeboah MD    Admitting diagnosis:   The primary encounter diagnosis was Acute cystitis without hematuria. Diagnoses of Uterine mass and Uncontrolled pain were also pertinent to this visit.    Code status:   Current Code Status       Date Active Code Status Order ID Comments User Context       Prior            Allergies:   Patient has no known allergies.    Isolation:   No active isolations    Intake and Output  No intake or output data in the 24 hours ending 06/26/24 2242    Weight:       06/26/24  1835   Weight: 84.2 kg (185 lb 10 oz)       Most recent vitals:   Vitals:    06/26/24 2130 06/26/24 2131 06/26/24 2136 06/26/24 2137   BP:  150/88     Pulse: 92 83 91 93   Resp:       Temp:       SpO2: 95% 94% 97% 97%   Weight:       Height:           Active LDAs/IV Access:   Lines, Drains & Airways       Active LDAs       Name Placement date Placement time Site Days    Peripheral IV 06/26/24 1849 Right Hand 06/26/24  1849  Hand  less than 1                    Labs (abnormal labs have a star):   Labs Reviewed   COMPREHENSIVE METABOLIC PANEL - Abnormal; Notable for the following components:       Result Value    BUN 5 (*)     Creatinine 0.51 (*)     Sodium 131 (*)     CO2 21.1 (*)     ALT (SGPT) 36 (*)     AST (SGOT) 41 (*)     Alkaline Phosphatase 199 (*)     All other components within normal limits    Narrative:     GFR Normal >60  Chronic Kidney Disease <60  Kidney Failure <15     URINALYSIS W/ MICROSCOPIC IF INDICATED (NO CULTURE) - Abnormal; Notable for the following components:    Blood, UA Small (1+) (*)     Leuk Esterase, UA Trace (*)     Nitrite, UA Positive (*)     All other components within normal limits   CBC WITH AUTO DIFFERENTIAL - Abnormal; Notable for the following components:    WBC 11.92 (*)     Lymphocyte % 17.7 (*)     Neutrophils,  Absolute 8.96 (*)     Immature Grans, Absolute 0.06 (*)     All other components within normal limits   URINALYSIS, MICROSCOPIC ONLY - Abnormal; Notable for the following components:    RBC, UA 3-5 (*)     WBC, UA 3-5 (*)     Bacteria, UA 4+ (*)     All other components within normal limits   LIPASE - Normal   LACTIC ACID, PLASMA - Normal   BLOOD CULTURE   BLOOD CULTURE   URINE CULTURE   CBC AND DIFFERENTIAL    Narrative:     The following orders were created for panel order CBC & Differential.  Procedure                               Abnormality         Status                     ---------                               -----------         ------                     CBC Auto Differential[[id number]]        Abnormal            Final result                 Please view results for these tests on the individual orders.       EKG:   No orders to display       Meds given in ED:   Medications   cefTRIAXone (ROCEPHIN) 1,000 mg in sodium chloride 0.9 % 100 mL MBP (0 mg Intravenous Stopped 6/26/24 2137)   morphine injection 4 mg (4 mg Intravenous Given 6/26/24 1941)   ondansetron (ZOFRAN) injection 4 mg (4 mg Intravenous Given 6/26/24 1941)   iopamidol (ISOVUE-300) 61 % injection 100 mL (85 mL Intravenous Given by Other 6/26/24 1950)   morphine injection 4 mg (4 mg Intravenous Given 6/26/24 2151)       Imaging results:  CT Abdomen Pelvis With Contrast    Result Date: 6/26/2024    1. Hepatomegaly with evidence of hepatic steatosis. Indeterminate hypodense focus in the spleen, as described above.  2. No obstructive uropathy. Large indeterminate mass of the uterus, gynecology consultation advised.  3. Colonic diverticulosis. No acute inflammatory process of bowel is identified, follow-up as indications persist.     This report was finalized on 6/26/2024 8:43 PM by Dr. Cb Butts M.D on Workstation: [id number]       Ambulatory status:   - ambulatory    Social issues:   Social History     Socioeconomic History    Marital  status: Single    Number of children: 2   Tobacco Use    Smoking status: Former     Current packs/day: 0.00     Average packs/day: 1 pack/day for 29.0 years (29.0 ttl pk-yrs)     Types: Cigarettes     Start date:      Quit date:      Years since quittin.4    Smokeless tobacco: Never    Tobacco comments:     Quit 2022   Vaping Use    Vaping status: Every Day    Substances: Nicotine, Flavoring    Devices: Disposable, 6% NICOTINE   Substance and Sexual Activity    Alcohol use: Not Currently    Drug use: No    Sexual activity: Yes     Partners: Male     Birth control/protection: Tubal ligation       NIH Stroke Scale:       Hoang Kwong RN  24 22:42 EDT

## 2024-06-27 NOTE — CASE MANAGEMENT/SOCIAL WORK
Discharge Planning Assessment  [facility]     Patient Name: Alexandra Xiao  MRN: [id number]  Today's Date: 6/27/2024    Admit Date: 6/26/2024    Plan: Plan home with family.  VIRAJ Jimenez RN   Discharge Needs Assessment       Row Name 06/27/24 0931       Living Environment    People in Home child(coco), adult;significant other    Name(s) of People in Home Significant Other  ( Yadiel Cordoba  [phone]) and daughter ( Osmar Xiao 21 y/o [phone])    Current Living Arrangements other (see comments)  MOBILE HOME    Potentially Unsafe Housing Conditions none    In the past 12 months has the electric, gas, oil, or water company threatened to shut off services in your home? No    Primary Care Provided by self    Provides Primary Care For no one    Family Caregiver if Needed child(coco), adult;significant other    Family Caregiver Names Significant Other ( Yadiel Cordoba [phone]) and daughter ( Osmar Xiao 21 y/o [phone])    Quality of Family Relationships involved;helpful;supportive    Able to Return to Prior Arrangements yes    Living Arrangement Comments Pt lives with her Significant Other ( Yadiel Cordoba [phone]) and daughter ( Osmar Xiao 21 y/o [phone]) in a mobile home.       Resource/Environmental Concerns    Resource/Environmental Concerns none    Transportation Concerns none       Transportation Needs    In the past 12 months, has lack of transportation kept you from medical appointments or from getting medications? no    In the past 12 months, has lack of transportation kept you from meetings, work, or from getting things needed for daily living? No       Food Insecurity    Within the past 12 months, you worried that your food would run out before you got the money to buy more. Never true    Within the past 12 months, the food you bought just didn't last and you didn't have money to get more. Never true       Transition Planning    Patient/Family Anticipates Transition to home  with family    Patient/Family Anticipated Services at Transition none    Transportation Anticipated family or friend will provide       Discharge Needs Assessment    Readmission Within the Last 30 Days no previous admission in last 30 days    Equipment Currently Used at Home cpap;other (see comments)  Vagal nerve stimulator    Concerns to be Addressed no discharge needs identified;denies needs/concerns at this time    Anticipated Changes Related to Illness none    Equipment Needed After Discharge cpap;other (see comments)  Vagal nerve stimulator                   Discharge Plan       Row Name 06/27/24 0939       Plan    Plan Plan home with family.  VIRAJ Jimenez RN    Patient/Family in Agreement with Plan yes    Plan Comments FACE SHEET VERIFIED/ HAMMER SIGNED.  Spoke with pt and friend  (Nalini Cordoba) at bedside. Pt's PCP is KIRSTIE Pan.  Pt lives with her significant other ( Yadiel Cordoba [phone]) and daughter  (Osmar Xiao [phone]) in a mobile home.  Pt is independent with ADLs. Pt has a  C PAP and a Vagal Nerve Stimulator.  Pt [facility] her prescriptions [facility]  Pt denies any issues affording medications. Pt is not current with . Pt has not been in SNF.  Pt denies any discharge needs. Pt's significant other and friend will assist pt at home if needed and will transport pt home. Plan home with family.  VIRAJ Jimenez RN                  Continued Care and Services - Admitted Since 6/26/2024    No active coordination exists for this encounter.       Expected Discharge Date and Time       Expected Discharge Date Expected Discharge Time    Jul 2, 2024            Demographic Summary       Row Name 06/27/24 0930       General Information    Admission Type observation    Arrived From emergency department    Required Notices Provided Observation Status Notice    Referral Source admission list    Reason for Consult discharge planning    Preferred Language English                    Functional Status       Row Name 06/27/24 0930       Functional Status    Usual Activity Tolerance moderate    Current Activity Tolerance moderate       Physical Activity    On average, how many days per week do you engage in moderate to strenuous exercise (like a brisk walk)? 0 days    On average, how many minutes do you engage in exercise at this level? 0 min    Number of minutes of exercise per week 0       Functional Status, IADL    Medications independent    Meal Preparation assistive person    Housekeeping assistive person    Laundry assistive person    Shopping assistive person       Mental Status    General Appearance WDL WDL                   Psychosocial    No documentation.                  Abuse/Neglect    No documentation.                  Legal    No documentation.                  Substance Abuse    No documentation.                  Patient Forms    No documentation.                     Jana Jimenez, RN

## 2024-06-27 NOTE — PROGRESS NOTES
RX Transition consult: Lovenox to Apixaban per Dr. Florence    Dced lovenox and will start apixaban 5mg q12h with 1st dose at 0000 6/28/24. Per PI should start therapeutic doses of apixaban with next regularly scheduled dose of lovenox. (Received lovenox ~1200 today)     Thanks,   Patricio Dumont [facility]

## 2024-06-27 NOTE — H&P
Patient Name:  Alexandra Xiao  YOB: 1980  MRN:  [id number]  Admit Date:  6/26/2024  Patient Care Team:  Annalee Huddleston APRN as PCP - General (Family Medicine)  Cristofer Chamorro MD as Consulting Physician (Cardiology)  Cb James II, MD as Consulting Physician (Neurology)  Ernesto Mendosa MD as Consulting Physician (Obstetrics and Gynecology)  Reza Lester MD as Cardiologist (Cardiology)  Annalee Huddleston APRN as Referring Physician (Family Medicine)  Marija Peña MD as Consulting Physician (Hematology and Oncology)      Subjective   History Present Illness     Chief Complaint   Patient presents with    Abdominal Pain       Ms. Xiao is a 43 y.o. former smoker with a history of hypertension, stroke, epilepsy, obesity, and TULIO that presents to [facility] complaining of abdominal pain. She reports abdominal pain for the past one month that has worsened in the past three days. She states the pain is located in her lower abdomen and radiates around to her lower back. She reports associated nausea. She denies fever, chills, vomiting, diarrhea, hematuria, dysuria, and decreased urine. Work up in the ED revealed sodium 131, alkaline phosphatase 199, AST 41, and ALT 36. A urinalysis was positive for 4+ bacteria, WBCs, RBCs, trace leukocytes, nitrites, and 1+ blood. A CT of the abdomen/pelvis showed hepatomegaly with evidence of hepatic steatosis, a large indeterminate mass of the uterus, and colonic diverticulosis. She received a dose of Rocephin and is being admitted for further evaluation.      History of Present Illness  Review of Systems   Constitutional:  Negative for chills and fever.   HENT:  Negative for congestion and sore throat.    Eyes:  Negative for photophobia and visual disturbance.   Respiratory:  Negative for cough, shortness of breath and wheezing.    Cardiovascular:  Negative for chest pain, palpitations and leg swelling.   Gastrointestinal:  Positive  for abdominal pain and nausea. Negative for constipation, diarrhea and vomiting.   Genitourinary:  Negative for decreased urine volume, difficulty urinating, dysuria, flank pain, frequency, hematuria and urgency.   Musculoskeletal:  Positive for back pain and myalgias.   Neurological:  Negative for dizziness, weakness, light-headedness, numbness and headaches.        Personal History     Past Medical History:   Diagnosis Date    Abnormal bleeding in menstrual cycle     COPD (chronic obstructive pulmonary disease)     Depression     Epilepsy     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,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    Headache, tension-type     Heavy menstrual bleeding     WITH CLOTS    Hyperlipidemia     Hypertension     Memory loss     Migraine     Seizures     Status post placement of implantable loop recorder     Stroke     MARCH AND      Past Surgical History:   Procedure Laterality Date    BREAST BIOPSY      D & C HYSTEROSCOPY ENDOMETRIAL ABLATION N/A 06/10/2022    Procedure: DILATATION AND CURETTAGE HYSTEROSCOPY NOVASURE ENDOMETRIAL ABLATION;  Surgeon: Ernesto Mendosa MD;  Location: [facility];  Service: Obstetrics/Gynecology;  Laterality: N/A;    FOOT SURGERY      pins in left foot     INSERT / REPLACE / REMOVE PACEMAKER  2022    Dr. Cristofer Chamorro    OTHER SURGICAL HISTORY      VNS plate 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    NOÉ      2022    TUBAL ABDOMINAL LIGATION      VAGUS NERVE STIMULATOR IMPLANTATION       Family History   Problem Relation Age of Onset    Breast cancer Mother 50         age 60 METS    Arthritis Mother     Arthritis Father     Diabetes Father     Heart disease Father     Hypertension Father     Heart attack Father     Hypertension Brother     Heart attack Paternal Grandfather     Ovarian cancer Neg Hx     Uterine cancer Neg Hx     Colon cancer Neg Hx     Deep vein thrombosis Neg Hx     Pulmonary embolism Neg Hx     Malig Hyperthermia Neg Hx      Social History     Tobacco  Use    Smoking status: Former     Current packs/day: 0.00     Average packs/day: 1 pack/day for 29.0 years (29.0 ttl pk-yrs)     Types: Cigarettes     Start date:      Quit date:      Years since quittin.4    Smokeless tobacco: Never    Tobacco comments:     Quit 2022   Vaping Use    Vaping status: Every Day    Substances: Nicotine, Flavoring    Devices: Disposable, 6% NICOTINE   Substance Use Topics    Alcohol use: Not Currently    Drug use: No     (Not in a hospital admission)    Allergies:  No Known Allergies    Objective    Objective     Vital Signs  Temp:  [96.7 °F (35.9 °C)] 96.7 °F (35.9 °C)  Heart Rate:  [] 93  Resp:  [18] 18  BP: (150-186)/() 150/88  SpO2:  [94 %-97 %] 97 %  on   ;   Device (Oxygen Therapy): room air  Body mass index is 31.84 kg/m².    Physical Exam  Vitals and nursing note reviewed.   Constitutional:       Appearance: Normal appearance.   HENT:      Head: Normocephalic and atraumatic.      Nose: Nose normal.      Mouth/Throat:      Mouth: Mucous membranes are moist.      Pharynx: Oropharynx is clear.   Eyes:      Extraocular Movements: Extraocular movements intact.      Conjunctiva/sclera: Conjunctivae normal.   Cardiovascular:      Rate and Rhythm: Normal rate and regular rhythm.      Pulses: Normal pulses.      Heart sounds: Normal heart sounds.   Pulmonary:      Effort: Pulmonary effort is normal.      Breath sounds: Normal breath sounds.   Abdominal:      General: Bowel sounds are normal. There is no distension.      Palpations: Abdomen is soft. There is no mass.      Tenderness: There is abdominal tenderness. There is no guarding or rebound.      Hernia: No hernia is present.   Musculoskeletal:         General: No swelling. Normal range of motion.      Cervical back: Normal range of motion and neck supple.   Skin:     General: Skin is warm and dry.   Neurological:      General: No focal deficit present.      Mental Status: She is alert and oriented to  person, place, and time.   Psychiatric:         Mood and Affect: Mood normal.         Behavior: Behavior normal.         Results Review:  I reviewed the patient's new clinical results.  I reviewed the patient's new imaging results and agree with the interpretation.  I reviewed the patient's other test results and agree with the interpretation  I personally viewed and interpreted the patient's EKG/Telemetry data  Discussed with ED provider.    Lab Results (last 24 hours)       Procedure Component Value Units Date/Time    CBC & Differential [[id number]]  (Abnormal) Collected: 06/26/24 1849    Specimen: Blood from Arm, Right Updated: 06/26/24 1900    Narrative:      The following orders were created for panel order CBC & Differential.  Procedure                               Abnormality         Status                     ---------                               -----------         ------                     CBC Auto Differential[[id number]]        Abnormal            Final result                 Please view results for these tests on the individual orders.    CBC Auto Differential [[id number]]  (Abnormal) Collected: 06/26/24 1849    Specimen: Blood from Arm, Right Updated: 06/26/24 1900     WBC 11.92 10*3/mm3      RBC 4.64 10*6/mm3      Hemoglobin 13.2 g/dL      Hematocrit 39.0 %      MCV 84.1 fL      MCH 28.4 pg      MCHC 33.8 g/dL      RDW 13.9 %      RDW-SD 42.1 fl      MPV 10.2 fL      Platelets 380 10*3/mm3      Neutrophil % 75.1 %      Lymphocyte % 17.7 %      Monocyte % 5.7 %      Eosinophil % 0.7 %      Basophil % 0.3 %      Immature Grans % 0.5 %      Neutrophils, Absolute 8.96 10*3/mm3      Lymphocytes, Absolute 2.11 10*3/mm3      Monocytes, Absolute 0.68 10*3/mm3      Eosinophils, Absolute 0.08 10*3/mm3      Basophils, Absolute 0.03 10*3/mm3      Immature Grans, Absolute 0.06 10*3/mm3      nRBC 0.0 /100 WBC     Urinalysis With Microscopic If Indicated (No Culture) - Urine, Clean Catch [[id number]]  (Abnormal)  Collected: 06/26/24 1857    Specimen: Urine, Clean Catch Updated: 06/26/24 1907     Color, UA Yellow     Appearance, UA Clear     pH, UA 5.5     Specific Gravity, UA 1.010     Glucose, UA Negative     Ketones, UA Negative     Bilirubin, UA Negative     Blood, UA Small (1+)     Protein, UA Negative     Leuk Esterase, UA Trace     Nitrite, UA Positive     Urobilinogen, UA 0.2 E.U./dL    Urinalysis, Microscopic Only - Urine, Clean Catch [[id number]]  (Abnormal) Collected: 06/26/24 1857    Specimen: Urine, Clean Catch Updated: 06/26/24 1907     RBC, UA 3-5 /HPF      WBC, UA 3-5 /HPF      Bacteria, UA 4+ /HPF      Squamous Epithelial Cells, UA 0-2 /HPF      Hyaline Casts, UA None Seen /LPF      Methodology Automated Microscopy    Urine Culture - Urine, Urine, Clean Catch [[id number]] Collected: 06/26/24 1857    Specimen: Urine, Clean Catch Updated: 06/26/24 2055    Comprehensive Metabolic Panel [[id number]]  (Abnormal) Collected: 06/26/24 2011    Specimen: Blood from Arm, Left Updated: 06/26/24 2052     Glucose 88 mg/dL      BUN 5 mg/dL      Creatinine 0.51 mg/dL      Sodium 131 mmol/L      Potassium 3.7 mmol/L      Chloride 98 mmol/L      CO2 21.1 mmol/L      Calcium 9.1 mg/dL      Total Protein 7.5 g/dL      Albumin 4.0 g/dL      ALT (SGPT) 36 U/L      AST (SGOT) 41 U/L      Alkaline Phosphatase 199 U/L      Total Bilirubin 0.2 mg/dL      Globulin 3.5 gm/dL      A/G Ratio 1.1 g/dL      BUN/Creatinine Ratio 9.8     Anion Gap 11.9 mmol/L      eGFR 118.2 mL/min/1.73     Narrative:      GFR Normal >60  Chronic Kidney Disease <60  Kidney Failure <15      Lipase [[id number]]  (Normal) Collected: 06/26/24 2011    Specimen: Blood from Arm, Left Updated: 06/26/24 2052     Lipase 13 U/L     Lactic Acid, Plasma [[id number]]  (Normal) Collected: 06/26/24 2011    Specimen: Blood from Arm, Left Updated: 06/26/24 2042     Lactate 1.5 mmol/L     Blood Culture - Blood, Arm, Left [[id number]] Collected: 06/26/24 2011    Specimen: Blood from  Arm, Left Updated: 06/26/24 2017    Blood Culture - Blood, Arm, Left [[id number]] Collected: 06/26/24 2011    Specimen: Blood from Arm, Left Updated: 06/26/24 2016            Imaging Results (Last 24 Hours)       Procedure Component Value Units Date/Time    CT Abdomen Pelvis With Contrast [[id number]] Collected: 06/26/24 2035     Updated: 06/26/24 2046    Narrative:      CT ABDOMEN PELVIS W CONTRAST-     INDICATIONS: Right upper quadrant pain     TECHNIQUE: Radiation dose reduction techniques were utilized, including  automated exposure control and exposure modulation based on body size.  Enhanced ABDOMEN AND PELVIS CT     COMPARISON: None available     FINDINGS:     The liver is enlarged, and shows diffuse low-density, compatible with  steatosis, with some areas of fatty sparing suggested. Possibility of  underlying hepatitis is not excluded in this setting of right upper  quadrant pain. The gallbladder appears unremarkable by CT criteria,  could be further characterized with ultrasound if indicated.      At the left margin of the spleen, hypodense region measuring 2.4 cm is  indeterminate, possibility of an area of infarct is not excluded.     A large heterogeneous mass of the uterine fundus, with areas of cystic  change, measuring about 9 cm overall, could represent fibroid with  necrosis, possibility of leiomyosarcoma or other neoplastic process is  not excluded, gynecology consultation is advised. There may be fluid in  the endometrial canal. Cystic change is evident at the bilateral adnexa.        Otherwise unremarkable appearance of the liver, spleen, adrenal glands,  pancreas, kidneys, bladder.     No bowel obstruction. Assessment of the distal colon for wall thickening  is limited by lack of distention. The appendix does not appear inflamed.  Colonic diverticula are seen that do not appear inflamed.     No free intraperitoneal gas or free fluid.     Scattered small mesenteric and para-aortic lymph nodes are  seen that are  not significant by size criteria.     Abdominal aorta is not aneurysmal. Aortic and other arterial  calcifications are present.     The lung bases are clear.     No acute fracture is identified.             Impression:            1. Hepatomegaly with evidence of hepatic steatosis. Indeterminate  hypodense focus in the spleen, as described above.     2. No obstructive uropathy. Large indeterminate mass of the uterus,  gynecology consultation advised.     3. Colonic diverticulosis. No acute inflammatory process of bowel is  identified, follow-up as indications persist.              This report was finalized on 6/26/2024 8:43 PM by Dr. Cb Butts M.D on Workstation: [id number]               Results for orders placed during the hospital encounter of 04/08/24    Adult Transesophageal Echo (NOÉ) W/ Cont if Necessary Per Protocol    Interpretation Summary    There is focal thickening at the mid to distal anterior mitral valve leaflet. There is mild prolapse of the A2 segment of the anterior mitral valve leaflet.    There is moderate to severe mitral regurgitation which originates from multiple jets, including a prominent posteriorly directed jet    There are moderate plaques in the descending thoracic aorta. There is a focal moderate plaque without mobile components in the central aortic arch    Left ventricular systolic function is normal. Left ventricular ejection fraction appears to be 61 - 65%.    Left ventricular diastolic function is consistent with (grade I) impaired relaxation.    Normal right ventricular cavity size and systolic function noted.    Multiple agitated saline studies were negative, including with abdominal thrusts. There was no PFO noted on the agitated saline studies, or by color Doppler assessment    No evidence of a left atrial appendage thrombus was present.    There is a small (<1cm) pericardial effusion adjacent to the right atrium and right ventricle. There is no evidence  of cardiac tamponade.      No orders to display        Assessment/Plan     Active Hospital Problems    Diagnosis  POA    **UTI (urinary tract infection) [N39.0]  Yes    Observed sleep apnea [G47.30]  Yes    History of stroke [Z86.73]  Not Applicable    HTN (hypertension) [I10]  Yes     Continue amlodipine 5 mg daily and Toprol XL 25 mg daily.      Hyponatremia [E87.1]  Yes    Epilepsy [G40.909]  Yes       UTI  -Admit to a telemetry unit for monitoring  -She has been afebrile, WBC and lactate are ok  -Continue Rocephin 1 gm daily  -Blood and urine cultures are pending    Uterine Mass  -Gynecology/oncology consult  -PRN analgesia for pain    Hyponatremia  -Does not appear to be medication related  -Slow IVF overnight  -Repeat labs in AM    Transaminitis  -Chronic. CT showed hepatomegaly and hepatic steatosis   -LFTs are improved from her labs on 6/19/24  -Repeat labs in AM    Hypertension  -She was hypertensive on arrival. Continue Norvasc and Metoprolol  -Monitor    History of Stroke  -Continue statin  -Hold aspirin and Eliquis in case surgical intervention is required    Epilepsy  -Continue home dose of Vimpat and Xcopri  -Seizure precautions    TULIO  -She may use her home CPAP if available  -Supplemental oxygen as needed at night to keep sats greater than or equal to 92%  -Continuous pulse oximetry    -I discussed the patients findings and my recommendations with patient.    VTE Prophylaxis - SCDs.  Code Status - Full code.       KIRSTIE Cortes  [facility]  06/26/24  22:49 EDT

## 2024-06-27 NOTE — PROGRESS NOTES
"[facility] Clinical Pharmacy Services: Enoxaparin Consult    Alexandra Xiao has a pharmacy consult to dose full-dose enoxaparin per Zhannaov's request.     Indication:  HX CVA  Home Anticoagulation: Apixaban 5 mg BID     Relevant clinical data and objective history reviewed:  43 y.o. female 162.6 cm (64\") 84.3 kg (185 lb 12.8 oz)   Body mass index is 31.89 kg/m².   Results from last 7 days   Lab Units 06/26/24  1849   PLATELETS 10*3/mm3 380     Estimated Creatinine Clearance: 147.8 mL/min (A) (by C-G formula based on SCr of 0.51 mg/dL (L)).    Assessment/Plan    Will start patient on  80 (1mg/kg) subcutaneous every 12 hours, adjusted for renal function. Consult order will be discontinued but pharmacy will continue to follow.     Joey Chapman [facility]  Clinical Pharmacist   "

## 2024-06-27 NOTE — PLAN OF CARE
Goal Outcome Evaluation:      Patient having abdominal pain, gave prn meds.  Consult placed for Gyno Oncology.  Patient A&Ox4, VSS, abulating with no issues.  Will continue to monitor.

## 2024-06-27 NOTE — PROGRESS NOTES
[facility]     LOS: 0 days     Subjective:    CC:Abdominal Pain    DIET:  Diet Order   Procedures    Diet: Regular/House; Fluid Consistency: Thin (IDDSI 0)     Lower abdominal pain still 7 out of 10    Objective:    Vital Signs:  Temp:  [96.7 °F (35.9 °C)-97.8 °F (36.6 °C)] 97.8 °F (36.6 °C)  Heart Rate:  [] 95  Resp:  [18] 18  BP: (132-186)/() 132/82    SpO2:  [94 %-97 %] 95 %  on   ;   Device (Oxygen Therapy): room air  Body mass index is 31.89 kg/m².    Physical Exam  Constitutional:       Appearance: Normal appearance.   HENT:      Head: Normocephalic and atraumatic.   Cardiovascular:      Rate and Rhythm: Normal rate and regular rhythm.      Heart sounds: No murmur heard.     No friction rub.   Pulmonary:      Effort: Pulmonary effort is normal.      Breath sounds: Normal breath sounds.   Abdominal:      General: Bowel sounds are normal. There is no distension.      Palpations: Abdomen is soft.      Tenderness: There is abdominal tenderness.   Skin:     General: Skin is warm and dry.   Psychiatric:         Mood and Affect: Mood normal.         Behavior: Behavior normal.         Results Review:    Glucose   Date Value Ref Range Status   06/27/2024 121 (H) 65 - 99 mg/dL Final   06/26/2024 88 65 - 99 mg/dL Final     Results from last 7 days   Lab Units 06/27/24  0527   WBC 10*3/mm3 15.15*   HEMOGLOBIN g/dL 12.0   HEMATOCRIT % 35.9   PLATELETS 10*3/mm3 318     Results from last 7 days   Lab Units 06/27/24  0527   SODIUM mmol/L 134*   POTASSIUM mmol/L 3.6   CHLORIDE mmol/L 100   CO2 mmol/L 20.9*   BUN mg/dL 5*   CREATININE mg/dL 0.52*   CALCIUM mg/dL 8.6   BILIRUBIN mg/dL 0.2   ALK PHOS U/L 180*   ALT (SGPT) U/L 30   AST (SGOT) U/L 32   GLUCOSE mg/dL 121*         Results from last 7 days   Lab Units 06/27/24  0527   MAGNESIUM mg/dL 1.7         Cultures:  No results found for: \"BLOODCX\", \"URINECX\", \"WOUNDCX\", \"MRSACX\", \"RESPCX\", \"STOOLCX\"    I have reviewed daily medications " and changes in CPOE    Scheduled meds  amitriptyline, 25 mg, Oral, Nightly  amLODIPine, 5 mg, Oral, Daily  atorvastatin, 40 mg, Oral, Nightly  cefTRIAXone, 1,000 mg, Intravenous, Q24H  Cenobamate, 50 mg, Oral, Nightly  enoxaparin, 1 mg/kg, Subcutaneous, Q12H  lacosamide, 200 mg, Oral, Q12H  metoprolol succinate XL, 25 mg, Oral, Daily  Non-Formulary / Patient Supplied Medication, 1,200 mg, Oral, Nightly  sodium chloride, 10 mL, Intravenous, Q12H        sodium chloride, 100 mL/hr, Last Rate: 100 mL/hr (06/27/24 0922)      PRN meds    senna-docusate sodium **AND** polyethylene glycol **AND** bisacodyl **AND** bisacodyl    calcium carbonate    HYDROmorphone    ondansetron ODT **OR** ondansetron    oxyCODONE    sodium chloride    sodium chloride        UTI (urinary tract infection)    Epilepsy    Hyponatremia    HTN (hypertension)    History of stroke    Observed sleep apnea        Assessment/Plan:      Large uterine mass/spinal lesion  Scan showed large indeterminate mass of the uterus/spleen, hypodense region measuring 2.4 cm is indeterminate, possibility of an area of infarct is not excluded.  I discussed this with Dr. Whitt  -Gynecology/oncology no longer comes to [facility], plan is for follow-up in their office next week  -Dr. Whitt recommend IV Toradol and ibuprofen on discharge      UTI  -Continue IV ceftriaxone, follow-up urine and blood cultures     History of Stroke  -Continue statin  -No plans for surgery so will restart aspirin and Eliquis    -Questionable splenic infarct, patient already on maximal anticoagulation.  Ask hematology for for recommendation     Epilepsy  -Resumed Vimpat 200 mg BID   -On amptiom 1200 mg nightly and xcopri 50 mg nightly which are nonformulary so patient will need to supply her own medication    HTN- norvasc 5    GI prophylaxis while on Toradol, aspirin, Eliquis         D/w Dr Whitt and he recommends tordol discussed risks and benefits with the patient       Crow TOMLIN  MD Naman  06/27/24  10:04 EDT

## 2024-06-28 ENCOUNTER — READMISSION MANAGEMENT (OUTPATIENT)
Dept: CALL CENTER | Facility: HOSPITAL | Age: 44
End: 2024-06-28
Payer: MEDICARE

## 2024-06-28 ENCOUNTER — APPOINTMENT (OUTPATIENT)
Dept: ULTRASOUND IMAGING | Facility: HOSPITAL | Age: 44
DRG: 690 | End: 2024-06-28
Payer: MEDICARE

## 2024-06-28 VITALS
DIASTOLIC BLOOD PRESSURE: 79 MMHG | BODY MASS INDEX: 31.72 KG/M2 | WEIGHT: 185.8 LBS | SYSTOLIC BLOOD PRESSURE: 115 MMHG | HEART RATE: 83 BPM | TEMPERATURE: 98.1 F | RESPIRATION RATE: 18 BRPM | HEIGHT: 64 IN | OXYGEN SATURATION: 100 %

## 2024-06-28 LAB
ALBUMIN SERPL-MCNC: 3.1 G/DL (ref 3.5–5.2)
ALBUMIN SERPL-MCNC: 3.4 G/DL (ref 3.5–5.2)
ALBUMIN/GLOB SERPL: 1.1 G/DL
ALBUMIN/GLOB SERPL: 1.1 G/DL
ALP SERPL-CCNC: 137 U/L (ref 39–117)
ALP SERPL-CCNC: 153 U/L (ref 39–117)
ALT SERPL W P-5'-P-CCNC: 22 U/L (ref 1–33)
ALT SERPL W P-5'-P-CCNC: 25 U/L (ref 1–33)
ANION GAP SERPL CALCULATED.3IONS-SCNC: 6 MMOL/L (ref 5–15)
ANION GAP SERPL CALCULATED.3IONS-SCNC: 7 MMOL/L (ref 5–15)
AST SERPL-CCNC: 28 U/L (ref 1–32)
AST SERPL-CCNC: 33 U/L (ref 1–32)
BACTERIA SPEC AEROBE CULT: ABNORMAL
BASOPHILS # BLD AUTO: 0.02 10*3/MM3 (ref 0–0.2)
BASOPHILS # BLD AUTO: 0.03 10*3/MM3 (ref 0–0.2)
BASOPHILS NFR BLD AUTO: 0.2 % (ref 0–1.5)
BASOPHILS NFR BLD AUTO: 0.4 % (ref 0–1.5)
BILIRUB SERPL-MCNC: 0.2 MG/DL (ref 0–1.2)
BILIRUB SERPL-MCNC: <0.2 MG/DL (ref 0–1.2)
BUN SERPL-MCNC: 8 MG/DL (ref 6–20)
BUN SERPL-MCNC: 8 MG/DL (ref 6–20)
BUN/CREAT SERPL: 14.8 (ref 7–25)
BUN/CREAT SERPL: 15.1 (ref 7–25)
CALCIUM SPEC-SCNC: 7.5 MG/DL (ref 8.6–10.5)
CALCIUM SPEC-SCNC: 7.8 MG/DL (ref 8.6–10.5)
CHLORIDE SERPL-SCNC: 106 MMOL/L (ref 98–107)
CHLORIDE SERPL-SCNC: 107 MMOL/L (ref 98–107)
CO2 SERPL-SCNC: 21 MMOL/L (ref 22–29)
CO2 SERPL-SCNC: 23 MMOL/L (ref 22–29)
CREAT SERPL-MCNC: 0.53 MG/DL (ref 0.57–1)
CREAT SERPL-MCNC: 0.54 MG/DL (ref 0.57–1)
DEPRECATED RDW RBC AUTO: 42.6 FL (ref 37–54)
DEPRECATED RDW RBC AUTO: 43.2 FL (ref 37–54)
EGFRCR SERPLBLD CKD-EPI 2021: 116.6 ML/MIN/1.73
EGFRCR SERPLBLD CKD-EPI 2021: 117.1 ML/MIN/1.73
EOSINOPHIL # BLD AUTO: 0.09 10*3/MM3 (ref 0–0.4)
EOSINOPHIL # BLD AUTO: 0.09 10*3/MM3 (ref 0–0.4)
EOSINOPHIL NFR BLD AUTO: 1.1 % (ref 0.3–6.2)
EOSINOPHIL NFR BLD AUTO: 1.1 % (ref 0.3–6.2)
ERYTHROCYTE [DISTWIDTH] IN BLOOD BY AUTOMATED COUNT: 13.6 % (ref 12.3–15.4)
ERYTHROCYTE [DISTWIDTH] IN BLOOD BY AUTOMATED COUNT: 13.6 % (ref 12.3–15.4)
F5 GENE MUT ANL BLD/T: NORMAL
FACTOR II, DNA ANALYSIS: NORMAL
GLOBULIN UR ELPH-MCNC: 2.7 GM/DL
GLOBULIN UR ELPH-MCNC: 3 GM/DL
GLUCOSE SERPL-MCNC: 77 MG/DL (ref 65–99)
GLUCOSE SERPL-MCNC: 78 MG/DL (ref 65–99)
HCT VFR BLD AUTO: 32.5 % (ref 34–46.6)
HCT VFR BLD AUTO: 34.1 % (ref 34–46.6)
HGB BLD-MCNC: 10.7 G/DL (ref 12–15.9)
HGB BLD-MCNC: 11.4 G/DL (ref 12–15.9)
IMM GRANULOCYTES # BLD AUTO: 0.02 10*3/MM3 (ref 0–0.05)
IMM GRANULOCYTES # BLD AUTO: 0.04 10*3/MM3 (ref 0–0.05)
IMM GRANULOCYTES NFR BLD AUTO: 0.2 % (ref 0–0.5)
IMM GRANULOCYTES NFR BLD AUTO: 0.5 % (ref 0–0.5)
LYMPHOCYTES # BLD AUTO: 2.11 10*3/MM3 (ref 0.7–3.1)
LYMPHOCYTES # BLD AUTO: 2.68 10*3/MM3 (ref 0.7–3.1)
LYMPHOCYTES NFR BLD AUTO: 25.2 % (ref 19.6–45.3)
LYMPHOCYTES NFR BLD AUTO: 31.6 % (ref 19.6–45.3)
MCH RBC QN AUTO: 28.3 PG (ref 26.6–33)
MCH RBC QN AUTO: 28.8 PG (ref 26.6–33)
MCHC RBC AUTO-ENTMCNC: 32.9 G/DL (ref 31.5–35.7)
MCHC RBC AUTO-ENTMCNC: 33.4 G/DL (ref 31.5–35.7)
MCV RBC AUTO: 86 FL (ref 79–97)
MCV RBC AUTO: 86.1 FL (ref 79–97)
MONOCYTES # BLD AUTO: 0.42 10*3/MM3 (ref 0.1–0.9)
MONOCYTES # BLD AUTO: 0.47 10*3/MM3 (ref 0.1–0.9)
MONOCYTES NFR BLD AUTO: 5 % (ref 5–12)
MONOCYTES NFR BLD AUTO: 5.5 % (ref 5–12)
NEUTROPHILS NFR BLD AUTO: 5.16 10*3/MM3 (ref 1.7–7)
NEUTROPHILS NFR BLD AUTO: 5.72 10*3/MM3 (ref 1.7–7)
NEUTROPHILS NFR BLD AUTO: 60.9 % (ref 42.7–76)
NEUTROPHILS NFR BLD AUTO: 68.3 % (ref 42.7–76)
NRBC BLD AUTO-RTO: 0 /100 WBC (ref 0–0.2)
NRBC BLD AUTO-RTO: 0 /100 WBC (ref 0–0.2)
PLATELET # BLD AUTO: 261 10*3/MM3 (ref 140–450)
PLATELET # BLD AUTO: 269 10*3/MM3 (ref 140–450)
PMV BLD AUTO: 9.6 FL (ref 6–12)
PMV BLD AUTO: 9.8 FL (ref 6–12)
POTASSIUM SERPL-SCNC: 3.8 MMOL/L (ref 3.5–5.2)
POTASSIUM SERPL-SCNC: 3.8 MMOL/L (ref 3.5–5.2)
PROT SERPL-MCNC: 5.8 G/DL (ref 6–8.5)
PROT SERPL-MCNC: 6.4 G/DL (ref 6–8.5)
RBC # BLD AUTO: 3.78 10*6/MM3 (ref 3.77–5.28)
RBC # BLD AUTO: 3.96 10*6/MM3 (ref 3.77–5.28)
SODIUM SERPL-SCNC: 134 MMOL/L (ref 136–145)
SODIUM SERPL-SCNC: 136 MMOL/L (ref 136–145)
WBC NRBC COR # BLD AUTO: 8.38 10*3/MM3 (ref 3.4–10.8)
WBC NRBC COR # BLD AUTO: 8.47 10*3/MM3 (ref 3.4–10.8)

## 2024-06-28 PROCEDURE — 81240 F2 GENE: CPT | Performed by: INTERNAL MEDICINE

## 2024-06-28 PROCEDURE — 81241 F5 GENE: CPT | Performed by: INTERNAL MEDICINE

## 2024-06-28 PROCEDURE — 85025 COMPLETE CBC W/AUTO DIFF WBC: CPT | Performed by: HOSPITALIST

## 2024-06-28 PROCEDURE — 80053 COMPREHEN METABOLIC PANEL: CPT | Performed by: HOSPITALIST

## 2024-06-28 PROCEDURE — 25010000002 KETOROLAC TROMETHAMINE PER 15 MG: Performed by: HOSPITALIST

## 2024-06-28 PROCEDURE — 76705 ECHO EXAM OF ABDOMEN: CPT

## 2024-06-28 RX ORDER — OXYCODONE HYDROCHLORIDE 5 MG/1
5 TABLET ORAL EVERY 6 HOURS PRN
Qty: 8 TABLET | Refills: 0 | Status: SHIPPED | OUTPATIENT
Start: 2024-06-28 | End: 2024-07-02

## 2024-06-28 RX ORDER — CEFDINIR 300 MG/1
300 CAPSULE ORAL 2 TIMES DAILY
Qty: 10 CAPSULE | Refills: 0 | Status: SHIPPED | OUTPATIENT
Start: 2024-06-28 | End: 2024-07-03

## 2024-06-28 RX ORDER — IBUPROFEN 600 MG/1
600 TABLET ORAL EVERY 6 HOURS PRN
Start: 2024-06-28 | End: 2024-07-10

## 2024-06-28 RX ORDER — PANTOPRAZOLE SODIUM 40 MG/1
40 TABLET, DELAYED RELEASE ORAL DAILY
Qty: 7 TABLET | Refills: 0 | Status: SHIPPED | OUTPATIENT
Start: 2024-06-28 | End: 2024-07-10

## 2024-06-28 RX ADMIN — AMLODIPINE BESYLATE 5 MG: 5 TABLET ORAL at 09:30

## 2024-06-28 RX ADMIN — Medication 1 MG: at 11:45

## 2024-06-28 RX ADMIN — Medication 10 ML: at 09:30

## 2024-06-28 RX ADMIN — ASPIRIN 81 MG: 81 TABLET, COATED ORAL at 09:30

## 2024-06-28 RX ADMIN — KETOROLAC TROMETHAMINE 15 MG: 15 INJECTION, SOLUTION INTRAMUSCULAR; INTRAVENOUS at 03:57

## 2024-06-28 RX ADMIN — LACOSAMIDE 200 MG: 50 TABLET, FILM COATED ORAL at 09:30

## 2024-06-28 RX ADMIN — PANTOPRAZOLE SODIUM 40 MG: 40 TABLET, DELAYED RELEASE ORAL at 06:32

## 2024-06-28 RX ADMIN — METOPROLOL SUCCINATE 25 MG: 25 TABLET, EXTENDED RELEASE ORAL at 09:30

## 2024-06-28 RX ADMIN — OXYCODONE HYDROCHLORIDE 5 MG: 5 TABLET ORAL at 05:24

## 2024-06-28 RX ADMIN — APIXABAN 5 MG: 5 TABLET, FILM COATED ORAL at 11:33

## 2024-06-28 NOTE — PLAN OF CARE
Goal Outcome Evaluation:         Pt. Alert, oriented x4, ambulatory, used bathroom self, no voiced c/o pain until this time, v/s stable, ATB IV therapy done as ordered, 02 sats kept over 90% on room air, no s/s acute distress noted

## 2024-06-28 NOTE — DISCHARGE SUMMARY
[facility]  [phone]    DISCHARGE SUMMARY  [facility]    Patient Identification:  Name: Alexandra Xiao  Age: 43 y.o.  Sex: female  :  1980  MRN: [id number]  Primary Care Physician: Annalee Huddleston APRN    Admit date: 2024  Discharge date and time:      Discharge Diagnoses:  UTI (urinary tract infection)    Epilepsy    Hyponatremia    HTN (hypertension)    History of stroke    Observed sleep apnea       History of present illness from H&P:    Ms. Xiao is a 43 y.o. former smoker with a history of hypertension, stroke, epilepsy, obesity, and TULIO that presents to [facility] complaining of abdominal pain. She reports abdominal pain for the past one month that has worsened in the past three days. She states the pain is located in her lower abdomen and radiates around to her lower back. She reports associated nausea. She denies fever, chills, vomiting, diarrhea, hematuria, dysuria, and decreased urine. Work up in the ED revealed sodium 131, alkaline phosphatase 199, AST 41, and ALT 36. A urinalysis was positive for 4+ bacteria, WBCs, RBCs, trace leukocytes, nitrites, and 1+ blood. A CT of the abdomen/pelvis showed hepatomegaly with evidence of hepatic steatosis, a large indeterminate mass of the uterus, and colonic diverticulosis. She received a dose of Rocephin and is being admitted for further evaluation.     Hospital Course:       Large uterine mass/spinal lesion  Scan showed large indeterminate mass of the uterus/spleen, hypodense region measuring 2.4 cm is indeterminate, possibility of an area of infarct is not excluded.  I discussed this with Dr. Whitt and their office will see the patient next week  -Gynecology/oncology no longer comes to [facility], plan is for follow-up in their office next week  -Dr. Whitt recommend IV Toradol and ibuprofen on discharge, risks and benefits of ibuprofen discussed with the patient and she  understands she can only use the ibuprofen foe about one week and then will need to stop      UTI  - ceftriaxone x 2 doses, follow-up urine with e coli and blood cultures are negatice   -omnicef 300 bid x 5 more days on discharge, she denies have any dysuria but with the lower abdominal pain I would err on the side of treatment    History of Stroke  -Continue statin  -No plans for surgery so will restart aspirin and Eliquis and continue for now  -Questionable splenic infarct, patient already on maximal anticoagulation.  Ask hematology for for recommendation     Epilepsy  -Resumed Vimpat 200 mg BID   -On amptiom 1200 mg nightly and xcopri 50 mg nightly which are nonformulary so patient will need to supply her own medication     HTN- norvasc 5     GI prophylaxis while on Toradol, aspirin, Eliquis          The patient was seen and examined on the day of discharge.    Consults:   Consults       Date and Time Order Name Status Description    6/27/2024  9:52 AM Hematology & Oncology Inpatient Consult Completed     6/26/2024 10:48 PM Inpatient Gynecologic Oncology Consult      6/26/2024 10:22 PM [facility] (on-call MD unless specified) Details              Results from last 7 days   Lab Units 06/28/24  0839   WBC 10*3/mm3 8.38   HEMOGLOBIN g/dL 11.4*   HEMATOCRIT % 34.1   PLATELETS 10*3/mm3 269       Results from last 7 days   Lab Units 06/28/24  0839   SODIUM mmol/L 134*   POTASSIUM mmol/L 3.8   CHLORIDE mmol/L 106   CO2 mmol/L 21.0*   BUN mg/dL 8   CREATININE mg/dL 0.53*   GLUCOSE mg/dL 77   CALCIUM mg/dL 7.8*       Significant Diagnostic Studies:   WBC   Date Value Ref Range Status   06/28/2024 8.38 3.40 - 10.80 10*3/mm3 Final     Hemoglobin   Date Value Ref Range Status   06/28/2024 11.4 (L) 12.0 - 15.9 g/dL Final     Hematocrit   Date Value Ref Range Status   06/28/2024 34.1 34.0 - 46.6 % Final     Platelets   Date Value Ref Range Status   06/28/2024 269 140 - 450 10*3/mm3 Final     Sodium   Date Value Ref Range Status  "  06/28/2024 134 (L) 136 - 145 mmol/L Final     Potassium   Date Value Ref Range Status   06/28/2024 3.8 3.5 - 5.2 mmol/L Final     Chloride   Date Value Ref Range Status   06/28/2024 106 98 - 107 mmol/L Final     CO2   Date Value Ref Range Status   06/28/2024 21.0 (L) 22.0 - 29.0 mmol/L Final     BUN   Date Value Ref Range Status   06/28/2024 8 6 - 20 mg/dL Final     Creatinine   Date Value Ref Range Status   06/28/2024 0.53 (L) 0.57 - 1.00 mg/dL Final     Glucose   Date Value Ref Range Status   06/28/2024 77 65 - 99 mg/dL Final     Calcium   Date Value Ref Range Status   06/28/2024 7.8 (L) 8.6 - 10.5 mg/dL Final     Magnesium   Date Value Ref Range Status   06/27/2024 1.7 1.6 - 2.6 mg/dL Final     Phosphorus   Date Value Ref Range Status   06/27/2024 2.5 2.5 - 4.5 mg/dL Final     AST (SGOT)   Date Value Ref Range Status   06/28/2024 33 (H) 1 - 32 U/L Final     ALT (SGPT)   Date Value Ref Range Status   06/28/2024 25 1 - 33 U/L Final     Alkaline Phosphatase   Date Value Ref Range Status   06/28/2024 153 (H) 39 - 117 U/L Final     No results found for: \"APTT\", \"INR\"  Color, UA   Date Value Ref Range Status   06/26/2024 Yellow Yellow, Straw Final     Appearance, UA   Date Value Ref Range Status   06/26/2024 Clear Clear Final     pH, UA   Date Value Ref Range Status   06/26/2024 5.5 5.0 - 8.0 Final     Glucose, UA   Date Value Ref Range Status   06/26/2024 Negative Negative Final     Ketones, UA   Date Value Ref Range Status   06/26/2024 Negative Negative Final     Blood, UA   Date Value Ref Range Status   06/26/2024 Small (1+) (A) Negative Final     Leuk Esterase, UA   Date Value Ref Range Status   06/26/2024 Trace (A) Negative Final     Bilirubin, UA   Date Value Ref Range Status   06/26/2024 Negative Negative Final     Urobilinogen, UA   Date Value Ref Range Status   06/26/2024 0.2 E.U./dL 0.2 - 1.0 E.U./dL Final     RBC, UA   Date Value Ref Range Status   06/26/2024 3-5 (A) None Seen, 0-2 /HPF Final     WBC, UA " "  Date Value Ref Range Status   06/26/2024 3-5 (A) None Seen, 0-2 /HPF Final     Bacteria, UA   Date Value Ref Range Status   06/26/2024 4+ (A) None Seen /HPF Final     No results found for: \"TROPONINT\", \"TROPONINI\", \"BNP\"  No components found for: \"HGBA1C;2\"  No components found for: \"TSH;2\"    Imaging Results (All)       Procedure Component Value Units Date/Time    US Liver [[id number]] Resulted: 06/28/24 0853     Updated: 06/28/24 0926    CT Abdomen Pelvis With Contrast [[id number]] Collected: 06/26/24 2035     Updated: 06/26/24 2046    Narrative:      CT ABDOMEN PELVIS W CONTRAST-     INDICATIONS: Right upper quadrant pain     TECHNIQUE: Radiation dose reduction techniques were utilized, including  automated exposure control and exposure modulation based on body size.  Enhanced ABDOMEN AND PELVIS CT     COMPARISON: None available     FINDINGS:     The liver is enlarged, and shows diffuse low-density, compatible with  steatosis, with some areas of fatty sparing suggested. Possibility of  underlying hepatitis is not excluded in this setting of right upper  quadrant pain. The gallbladder appears unremarkable by CT criteria,  could be further characterized with ultrasound if indicated.      At the left margin of the spleen, hypodense region measuring 2.4 cm is  indeterminate, possibility of an area of infarct is not excluded.     A large heterogeneous mass of the uterine fundus, with areas of cystic  change, measuring about 9 cm overall, could represent fibroid with  necrosis, possibility of leiomyosarcoma or other neoplastic process is  not excluded, gynecology consultation is advised. There may be fluid in  the endometrial canal. Cystic change is evident at the bilateral adnexa.        Otherwise unremarkable appearance of the liver, spleen, adrenal glands,  pancreas, kidneys, bladder.     No bowel obstruction. Assessment of the distal colon for wall thickening  is limited by lack of distention. The appendix does not " "appear inflamed.  Colonic diverticula are seen that do not appear inflamed.     No free intraperitoneal gas or free fluid.     Scattered small mesenteric and para-aortic lymph nodes are seen that are  not significant by size criteria.     Abdominal aorta is not aneurysmal. Aortic and other arterial  calcifications are present.     The lung bases are clear.     No acute fracture is identified.             Impression:            1. Hepatomegaly with evidence of hepatic steatosis. Indeterminate  hypodense focus in the spleen, as described above.     2. No obstructive uropathy. Large indeterminate mass of the uterus,  gynecology consultation advised.     3. Colonic diverticulosis. No acute inflammatory process of bowel is  identified, follow-up as indications persist.              This report was finalized on 6/26/2024 8:43 PM by Dr. Cb Butts M.D on Workstation: [id number]           No results found for: \"SITE\", \"ALLENTEST\", \"PHART\", \"AZO4PUL\", \"PO2ART\", \"YKA4TQZ\", \"BASEEXCESS\", \"E3EKELVI\", \"HGBBG\", \"HCTABG\", \"OXYHEMOGLOBI\", \"METHHGBN\", \"CARBOXYHGB\", \"CO2CT\", \"BAROMETRIC\", \"MODALITY\", \"FIO2\"       Discharge Medications        New Medications        Instructions Start Date   ibuprofen 600 MG tablet  Commonly known as: ADVIL,MOTRIN   600 mg, Oral, Every 6 Hours PRN      oxyCODONE 5 MG immediate release tablet  Commonly known as: ROXICODONE   Take 1 tablet by mouth Every 6 (Six) Hours As Needed for Moderate Pain.      pantoprazole 40 MG EC tablet  Commonly known as: PROTONIX   40 mg, Oral, Daily             Continue These Medications        Instructions Start Date   amitriptyline 25 MG tablet  Commonly known as: ELAVIL   25 mg, Oral, Nightly      amLODIPine 5 MG tablet  Commonly known as: NORVASC   5 mg, Oral, Daily      apixaban 5 MG tablet tablet  Commonly known as: ELIQUIS   5 mg, Oral, 2 Times Daily      Aptiom 400 MG tablet  Generic drug: Eslicarbazepine Acetate   400 mg, Oral, Daily, Take with 800mg for " total dose 1200mg daily      Aptiom 800 MG tablet tablet  Generic drug: Eslicarbazepine Acetate   800 mg, Oral, Daily, Take with 400mg for total dose 1200mg daily.      aspirin 81 MG EC tablet   81 mg, Oral, Daily      atorvastatin 40 MG tablet  Commonly known as: LIPITOR   TAKE 1 TABLET BY MOUTH EVERY NIGHT      folic acid 1 MG tablet  Commonly known as: FOLVITE   1 mg, Oral, Daily      metoprolol succinate XL 25 MG 24 hr tablet  Commonly known as: TOPROL-XL   25 mg, Oral, Daily      Vimpat 200 MG tablet  Generic drug: lacosamide   200 mg, Oral, Every 12 Hours      vitamin B-12 1000 MCG tablet  Commonly known as: CYANOCOBALAMIN   1,000 mcg, Oral, Daily      Xcopri 50 MG tablet  Generic drug: Cenobamate   50 mg, Oral, Daily                 Patient Instructions:       Future Appointments   Date Time Provider Department Center   7/17/2024 11:15 AM Ernesto Mendosa MD [facility]   7/31/2024  2:45 PM Reza Lester MD [facility]   9/17/2024  3:20 PM Cb James II, MD [facility]   10/24/2024  3:30 PM Annalee Huddleston APRN [facility]  [facility]   4/21/2025  3:15 PM Annalee Huddleston APRN [facility]  [facility]         Follow-up Information       Annalee Huddleston APRN Follow up in 2 week(s).    Specialties: Family Medicine, Nurse Practitioner  Contact information:  [address]  [facility] [address]  [facility] [id number] [phone]               Alan Whitt MD Follow up.    Specialty: Obstetrics and Gynecology  Why: uterine mass with necrosis  Contact information:  [address]  [facility] [id number] [phone]                             Discharge Order (From admission, onward)       Start     Ordered    06/28/24 1017  Discharge patient  Once        Expected Discharge Date: 06/28/24   Discharge Disposition: Home or Self Care   Physician of Record for Attribution - Please select from Treatment Team: ISABEL AMOS [[id number]]   Review needed by CMO to determine Physician of Record: No      Question  Answer Comment   Physician of Record for Attribution - Please select from Treatment Team CROW FLORENCE    Review needed by CMO to determine Physician of Record No        06/28/24 1022                    Diet Order   Procedures    Diet: Regular/House; Fluid Consistency: Thin (IDDSI 0)       TEST  RESULTS PENDING AT DISCHARGE  Pending Labs       Order Current Status    Blood Culture - Blood, Arm, Left Preliminary result    Blood Culture - Blood, Arm, Left Preliminary result              Discharge instructions:  Follow up with your primary care provider in 1-2 weeks with a cbc and cmp         Total time spent discharging patient including evaluation, post hospitalization follow up,  medication and post hospitalization instructions and education, total time exceeds 30 minutes.    Signed:  Crow Florence MD  6/28/2024  10:23 EDT

## 2024-06-28 NOTE — CASE MANAGEMENT/SOCIAL WORK
Case Management Discharge Note      Final Note: Pt discharged home with significant other.  VIRAJ Jimenez RN         Selected Continued Care - Admitted Since 6/26/2024       Destination    No services have been selected for the patient.                Durable Medical Equipment    No services have been selected for the patient.                Dialysis/Infusion    No services have been selected for the patient.                Home Medical Care    No services have been selected for the patient.                Therapy    No services have been selected for the patient.                Community Resources    No services have been selected for the patient.                Community & DME    No services have been selected for the patient.                    Transportation Services  Private: Car    Final Discharge Disposition Code: 01 - home or self-care

## 2024-06-28 NOTE — OUTREACH NOTE
Prep Survey      Flowsheet Row Responses   [facility] patient discharged from? [place]   Is LACE score < 7 ? Yes   Eligibility [facility]   Date of Admission 06/26/24   Date of Discharge 06/28/24   Discharge diagnosis UTI (urinary tract infection)   Does the patient have one of the following disease processes/diagnoses(primary or secondary)? Other   Prep survey completed? Yes            Rupali MAY - Registered Nurse

## 2024-07-01 ENCOUNTER — TRANSITIONAL CARE MANAGEMENT TELEPHONE ENCOUNTER (OUTPATIENT)
Dept: CALL CENTER | Facility: HOSPITAL | Age: 44
End: 2024-07-01
Payer: MEDICARE

## 2024-07-01 LAB
BACTERIA SPEC AEROBE CULT: NORMAL
BACTERIA SPEC AEROBE CULT: NORMAL

## 2024-07-01 NOTE — OUTREACH NOTE
Call Center TCM Note      Flowsheet Row Responses   [facility] patient discharged from? [place]   Does the patient have one of the following disease processes/diagnoses(primary or secondary)? Other   TCM attempt successful? No   Unsuccessful attempts Attempt 2            Anna Dailey RN    7/1/2024, 15:44 EDT

## 2024-07-01 NOTE — OUTREACH NOTE
Call Center TCM Note      Flowsheet Row Responses   [facility] patient discharged from? [place]   Does the patient have one of the following disease processes/diagnoses(primary or secondary)? Other   TCM attempt successful? No   Unsuccessful attempts Attempt 1  [No verbal release on file from PCP group]            Anna Dailey RN    7/1/2024, 15:00 EDT

## 2024-07-02 ENCOUNTER — TRANSITIONAL CARE MANAGEMENT TELEPHONE ENCOUNTER (OUTPATIENT)
Dept: CALL CENTER | Facility: HOSPITAL | Age: 44
End: 2024-07-02
Payer: MEDICARE

## 2024-07-02 NOTE — OUTREACH NOTE
Call Center TCM Note      Flowsheet Row Responses   [facility] patient discharged from? [place]   Does the patient have one of the following disease processes/diagnoses(primary or secondary)? Other   TCM attempt successful? Yes   Call start time 1100   Call end time 1105   Discharge diagnosis UTI (urinary tract infection)  /  Epilepsy /  Hyponatremia    HTN (hypertension) /  History of stroke / Observed sleep apnea.  Large uternine mass/spinal lesion   Person spoke with today (if not patient) and relationship Patient   Meds reviewed with patient/caregiver? Yes  [New: cefdinir, ibuprofen, oxycodone, pantoprazole]   Does the patient have all medications ordered at discharge? Yes   Is the patient taking all medications as directed (includes completed medication regime)? Yes   Comments PCP Annalee THACKER. Hospital follow up appt scheduled for 7/11  345pm with Neftali COOLEY.   Does the patient have an appointment with their PCP within 7-14 days of discharge? No   Nursing Interventions Assisted patient with making appointment per protocol, Routed TCM call to PCP office   Has home health visited the patient within 72 hours of discharge? N/A   Psychosocial issues? No   Did the patient receive a copy of their discharge instructions? Yes   Nursing interventions Reviewed instructions with patient   What is the patient's perception of their health status since discharge? Same   Is the patient/caregiver able to teach back signs and symptoms related to disease process for when to call PCP? Yes   Is the patient/caregiver able to teach back signs and symptoms related to disease process for when to call 911? Yes   Is the patient/caregiver able to teach back the hierarchy of who to call/visit for symptoms/problems? PCP, Specialist, Home health nurse, Urgent Care, ED, 911 Yes   If the patient is a current smoker, are they able to teach back resources for cessation? Not a smoker   TCM call completed? Yes   Wrap up  additional comments [facility] appt 7/17   Call end time 1105   Would this patient benefit from a Referral to [facility] Social Work? No   Is the patient interested in additional calls from an ambulatory ? No            Maddi Segovia RN    7/2/2024, 11:09 EDT

## 2024-07-07 ENCOUNTER — HOSPITAL ENCOUNTER (EMERGENCY)
Facility: HOSPITAL | Age: 44
Discharge: HOME | End: 2024-07-07
Attending: EMERGENCY MEDICINE | Admitting: EMERGENCY MEDICINE
Payer: MEDICARE

## 2024-07-07 ENCOUNTER — APPOINTMENT (OUTPATIENT)
Dept: CT IMAGING | Facility: HOSPITAL | Age: 44
End: 2024-07-07
Payer: MEDICARE

## 2024-07-07 VITALS
OXYGEN SATURATION: 96 % | TEMPERATURE: 96.9 F | HEIGHT: 65 IN | DIASTOLIC BLOOD PRESSURE: 92 MMHG | WEIGHT: 186 LBS | SYSTOLIC BLOOD PRESSURE: 158 MMHG | RESPIRATION RATE: 16 BRPM | HEART RATE: 85 BPM | BODY MASS INDEX: 30.99 KG/M2

## 2024-07-07 DIAGNOSIS — N85.8 UTERINE MASS: Primary | ICD-10-CM

## 2024-07-07 DIAGNOSIS — R10.30 LOWER ABDOMINAL PAIN: ICD-10-CM

## 2024-07-07 LAB
ALBUMIN SERPL-MCNC: 3.7 G/DL (ref 3.5–5.2)
ALBUMIN/GLOB SERPL: 1.1 G/DL
ALP SERPL-CCNC: 172 U/L (ref 39–117)
ALT SERPL W P-5'-P-CCNC: 21 U/L (ref 1–33)
ANION GAP SERPL CALCULATED.3IONS-SCNC: 12.5 MMOL/L (ref 5–15)
AST SERPL-CCNC: 30 U/L (ref 1–32)
BACTERIA UR QL AUTO: NORMAL /HPF
BASOPHILS # BLD AUTO: 0.03 10*3/MM3 (ref 0–0.2)
BASOPHILS NFR BLD AUTO: 0.2 % (ref 0–1.5)
BILIRUB SERPL-MCNC: 0.2 MG/DL (ref 0–1.2)
BILIRUB UR QL STRIP: NEGATIVE
BUN SERPL-MCNC: 5 MG/DL (ref 6–20)
BUN/CREAT SERPL: 9.1 (ref 7–25)
CALCIUM SPEC-SCNC: 8.7 MG/DL (ref 8.6–10.5)
CHLORIDE SERPL-SCNC: 101 MMOL/L (ref 98–107)
CLARITY UR: CLEAR
CO2 SERPL-SCNC: 20.5 MMOL/L (ref 22–29)
COLOR UR: YELLOW
CREAT SERPL-MCNC: 0.55 MG/DL (ref 0.57–1)
DEPRECATED RDW RBC AUTO: 42.1 FL (ref 37–54)
EGFRCR SERPLBLD CKD-EPI 2021: 116.1 ML/MIN/1.73
EOSINOPHIL # BLD AUTO: 0.13 10*3/MM3 (ref 0–0.4)
EOSINOPHIL NFR BLD AUTO: 1.1 % (ref 0.3–6.2)
ERYTHROCYTE [DISTWIDTH] IN BLOOD BY AUTOMATED COUNT: 13.6 % (ref 12.3–15.4)
GLOBULIN UR ELPH-MCNC: 3.5 GM/DL
GLUCOSE SERPL-MCNC: 91 MG/DL (ref 65–99)
GLUCOSE UR STRIP-MCNC: NEGATIVE MG/DL
HCT VFR BLD AUTO: 39.3 % (ref 34–46.6)
HGB BLD-MCNC: 13.1 G/DL (ref 12–15.9)
HGB UR QL STRIP.AUTO: NEGATIVE
HOLD SPECIMEN: NORMAL
HOLD SPECIMEN: NORMAL
HYALINE CASTS UR QL AUTO: NORMAL /LPF
IMM GRANULOCYTES # BLD AUTO: 0.04 10*3/MM3 (ref 0–0.05)
IMM GRANULOCYTES NFR BLD AUTO: 0.3 % (ref 0–0.5)
KETONES UR QL STRIP: NEGATIVE
LEUKOCYTE ESTERASE UR QL STRIP.AUTO: ABNORMAL
LIPASE SERPL-CCNC: 15 U/L (ref 13–60)
LYMPHOCYTES # BLD AUTO: 1.85 10*3/MM3 (ref 0.7–3.1)
LYMPHOCYTES NFR BLD AUTO: 15.3 % (ref 19.6–45.3)
MCH RBC QN AUTO: 28.4 PG (ref 26.6–33)
MCHC RBC AUTO-ENTMCNC: 33.3 G/DL (ref 31.5–35.7)
MCV RBC AUTO: 85.1 FL (ref 79–97)
MONOCYTES # BLD AUTO: 0.6 10*3/MM3 (ref 0.1–0.9)
MONOCYTES NFR BLD AUTO: 5 % (ref 5–12)
NEUTROPHILS NFR BLD AUTO: 78.1 % (ref 42.7–76)
NEUTROPHILS NFR BLD AUTO: 9.44 10*3/MM3 (ref 1.7–7)
NITRITE UR QL STRIP: NEGATIVE
NRBC BLD AUTO-RTO: 0 /100 WBC (ref 0–0.2)
PH UR STRIP.AUTO: 6 [PH] (ref 5–8)
PLATELET # BLD AUTO: 317 10*3/MM3 (ref 140–450)
PMV BLD AUTO: 9.6 FL (ref 6–12)
POTASSIUM SERPL-SCNC: 3.7 MMOL/L (ref 3.5–5.2)
PROT SERPL-MCNC: 7.2 G/DL (ref 6–8.5)
PROT UR QL STRIP: NEGATIVE
RBC # BLD AUTO: 4.62 10*6/MM3 (ref 3.77–5.28)
RBC # UR STRIP: NORMAL /HPF
REF LAB TEST METHOD: NORMAL
SODIUM SERPL-SCNC: 134 MMOL/L (ref 136–145)
SP GR UR STRIP: 1.01 (ref 1–1.03)
SQUAMOUS #/AREA URNS HPF: NORMAL /HPF
UROBILINOGEN UR QL STRIP: ABNORMAL
WBC # UR STRIP: NORMAL /HPF
WBC NRBC COR # BLD AUTO: 12.09 10*3/MM3 (ref 3.4–10.8)
WHOLE BLOOD HOLD COAG: NORMAL
WHOLE BLOOD HOLD SPECIMEN: NORMAL

## 2024-07-07 PROCEDURE — 85025 COMPLETE CBC W/AUTO DIFF WBC: CPT | Performed by: EMERGENCY MEDICINE

## 2024-07-07 PROCEDURE — 99285 EMERGENCY DEPT VISIT HI MDM: CPT

## 2024-07-07 PROCEDURE — 25010000002 ONDANSETRON PER 1 MG: Performed by: EMERGENCY MEDICINE

## 2024-07-07 PROCEDURE — 25010000002 HYDROMORPHONE PER 4 MG: Performed by: EMERGENCY MEDICINE

## 2024-07-07 PROCEDURE — 83690 ASSAY OF LIPASE: CPT | Performed by: EMERGENCY MEDICINE

## 2024-07-07 PROCEDURE — 80053 COMPREHEN METABOLIC PANEL: CPT | Performed by: EMERGENCY MEDICINE

## 2024-07-07 PROCEDURE — 25510000001 IOPAMIDOL 61 % SOLUTION: Performed by: EMERGENCY MEDICINE

## 2024-07-07 PROCEDURE — 81001 URINALYSIS AUTO W/SCOPE: CPT | Performed by: EMERGENCY MEDICINE

## 2024-07-07 PROCEDURE — 96375 TX/PRO/DX INJ NEW DRUG ADDON: CPT

## 2024-07-07 PROCEDURE — 74177 CT ABD & PELVIS W/CONTRAST: CPT

## 2024-07-07 PROCEDURE — 96374 THER/PROPH/DIAG INJ IV PUSH: CPT

## 2024-07-07 PROCEDURE — 25810000003 LACTATED RINGERS SOLUTION: Performed by: EMERGENCY MEDICINE

## 2024-07-07 RX ORDER — HYDROMORPHONE HYDROCHLORIDE 1 MG/ML
0.5 INJECTION, SOLUTION INTRAMUSCULAR; INTRAVENOUS; SUBCUTANEOUS ONCE
Status: COMPLETED | OUTPATIENT
Start: 2024-07-07 | End: 2024-07-07

## 2024-07-07 RX ORDER — HYDROCODONE BITARTRATE AND ACETAMINOPHEN 5; 325 MG/1; MG/1
1 TABLET ORAL EVERY 6 HOURS PRN
Qty: 10 TABLET | Refills: 0 | Status: SHIPPED | OUTPATIENT
Start: 2024-07-07

## 2024-07-07 RX ORDER — ONDANSETRON 2 MG/ML
4 INJECTION INTRAMUSCULAR; INTRAVENOUS ONCE
Status: COMPLETED | OUTPATIENT
Start: 2024-07-07 | End: 2024-07-07

## 2024-07-07 RX ORDER — HYDROCODONE BITARTRATE AND ACETAMINOPHEN 7.5; 325 MG/1; MG/1
1 TABLET ORAL ONCE
Status: COMPLETED | OUTPATIENT
Start: 2024-07-07 | End: 2024-07-07

## 2024-07-07 RX ADMIN — IOPAMIDOL 85 ML: 612 INJECTION, SOLUTION INTRAVENOUS at 14:10

## 2024-07-07 RX ADMIN — SODIUM CHLORIDE, POTASSIUM CHLORIDE, SODIUM LACTATE AND CALCIUM CHLORIDE 1000 ML: 600; 310; 30; 20 INJECTION, SOLUTION INTRAVENOUS at 12:36

## 2024-07-07 RX ADMIN — ONDANSETRON 4 MG: 2 INJECTION INTRAMUSCULAR; INTRAVENOUS at 12:34

## 2024-07-07 RX ADMIN — HYDROMORPHONE HYDROCHLORIDE 0.5 MG: 1 INJECTION, SOLUTION INTRAMUSCULAR; INTRAVENOUS; SUBCUTANEOUS at 12:36

## 2024-07-07 RX ADMIN — HYDROCODONE BITARTRATE AND ACETAMINOPHEN 1 TABLET: 7.5; 325 TABLET ORAL at 15:58

## 2024-07-07 NOTE — ED TRIAGE NOTES
Patient to ED via PV c/o severe abdominal pain that began this morning. Patient states she was recently diagnosed with a mass on her uterus. Patient denies N/V. Patient states she has surgery scheduled to remove the mass but is unable to tolerate the pain.

## 2024-07-07 NOTE — ED PROVIDER NOTES
EMERGENCY DEPARTMENT ENCOUNTER    Room Number:  20/20  PCP: Annalee Huddleston APRN  Historian: Patient      HPI:  Chief Complaint: Abdominal pain  A complete HPI/ROS/PMH/PSH/SH/FH are unobtainable due to: None   Context: Alexandra Xiao is a 43 y.o. female who presents to the ED c/o abdominal pain.  Patient states she has been having abdominal pain recently.  Was admitted to the hospital here for that.  Patient had uterine mass and was discharged.  Patient is followed up with GYN oncology at outside hospital.  Scheduled to have this removed on the 16th.  Patient states pain is gotten progressively worse.  Is had no vomiting but has had nausea.  Had no fevers or chills.  No burning with urination.  Patient states this is the same pain she was having.  Patient attempted to go to outside facility but could not be seen in timely manner            PAST MEDICAL HISTORY  Active Ambulatory Problems     Diagnosis Date Noted    Sebaceous cyst of breast, right 11/06/2018    Abnormal uterine bleeding 03/23/2022    Microcytic anemia 04/27/2022    Transaminitis 04/27/2022    Epilepsy 04/27/2022    Major depressive disorder 01/01/2014    Left-sided weakness 03/05/2023    Anemia     Hyponatremia     HTN (hypertension)     History of stroke 03/06/2023    Vitamin D deficiency 03/09/2023    B12 deficiency 03/09/2023    Observed sleep apnea 11/30/2023    Snoring 11/30/2023    Excessive daytime sleepiness 11/30/2023    Class 1 obesity 11/30/2023    Cerebrovascular accident (CVA) due to embolism of left middle cerebral artery 04/08/2024    S/P placement of VNS (vagus nerve stimulation) device 04/08/2024    Abnormal urinalysis 04/08/2024    Vaping nicotine dependence, tobacco product     Implantable loop recorder present     UTI (urinary tract infection) 06/26/2024     Resolved Ambulatory Problems     Diagnosis Date Noted    Acute CVA (cerebrovascular accident) 04/26/2022    Tobacco abuse 04/27/2022     Past Medical History:   Diagnosis  Date    Abnormal bleeding in menstrual cycle     COPD (chronic obstructive pulmonary disease)     Depression     Headache, tension-type     Heavy menstrual bleeding     Hyperlipidemia     Hypertension     Memory loss     Migraine     Seizures     Status post placement of implantable loop recorder     Stroke          PAST SURGICAL HISTORY  Past Surgical History:   Procedure Laterality Date    BREAST BIOPSY      D & C HYSTEROSCOPY ENDOMETRIAL ABLATION N/A 06/10/2022    Procedure: DILATATION AND CURETTAGE HYSTEROSCOPY NOVASURE ENDOMETRIAL ABLATION;  Surgeon: Ernesto Mendosa MD;  Location: [facility] OR;  Service: Obstetrics/Gynecology;  Laterality: N/A;    FOOT SURGERY      pins in left foot     INSERT / REPLACE / REMOVE PACEMAKER  2022    Dr. Cristofer Chamorro    OTHER SURGICAL HISTORY      VNS plate 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    NOÉ      2022    TUBAL ABDOMINAL LIGATION      VAGUS NERVE STIMULATOR IMPLANTATION           FAMILY HISTORY  Family History   Problem Relation Age of Onset    Breast cancer Mother 50         age 60 METS    Arthritis Mother     Arthritis Father     Diabetes Father     Heart disease Father     Hypertension Father     Heart attack Father     Hypertension Brother     Heart attack Paternal Grandfather     Ovarian cancer Neg Hx     Uterine cancer Neg Hx     Colon cancer Neg Hx     Deep vein thrombosis Neg Hx     Pulmonary embolism Neg Hx     Malig Hyperthermia Neg Hx          SOCIAL HISTORY  Social History     Socioeconomic History    Marital status: Single    Number of children: 2   Tobacco Use    Smoking status: Former     Current packs/day: 0.00     Average packs/day: 1 pack/day for 29.0 years (29.0 ttl pk-yrs)     Types: Cigarettes     Start date:      Quit date:      Years since quittin.5    Smokeless tobacco: Never    Tobacco comments:     Quit 2022   Vaping Use    Vaping status: Every Day    Substances: Nicotine, Flavoring    Devices:  Disposable, 6% NICOTINE   Substance and Sexual Activity    Alcohol use: Not Currently    Drug use: No    Sexual activity: Yes     Partners: Male     Birth control/protection: Tubal ligation         ALLERGIES  Patient has no known allergies.        REVIEW OF SYSTEMS  Review of Systems   Abdominal pain      PHYSICAL EXAM  ED Triage Vitals   Temp Heart Rate Resp BP SpO2   07/07/24 1201 07/07/24 1201 07/07/24 1201 07/07/24 1208 07/07/24 1201   96.9 °F (36.1 °C) (!) 129 18 146/96 97 %      Temp src Heart Rate Source Patient Position BP Location FiO2 (%)   07/07/24 1201 07/07/24 1201 07/07/24 1208 07/07/24 1208 --   Tympanic Monitor Lying Right arm        Physical Exam      GENERAL: no acute distress  HENT: nares patent  EYES: no scleral icterus  CV: regular rhythm, normal rate  RESPIRATORY: normal effort  ABDOMEN: soft.  Moderate lower abdominal tenderness  MUSCULOSKELETAL: no deformity  NEURO: alert, moves all extremities, follows commands  PSYCH:  calm, cooperative  SKIN: warm, dry    Vital signs and nursing notes reviewed.          LAB RESULTS  Recent Results (from the past 24 hour(s))   Comprehensive Metabolic Panel    Collection Time: 07/07/24 12:34 PM    Specimen: Blood   Result Value Ref Range    Glucose 91 65 - 99 mg/dL    BUN 5 (L) 6 - 20 mg/dL    Creatinine 0.55 (L) 0.57 - 1.00 mg/dL    Sodium 134 (L) 136 - 145 mmol/L    Potassium 3.7 3.5 - 5.2 mmol/L    Chloride 101 98 - 107 mmol/L    CO2 20.5 (L) 22.0 - 29.0 mmol/L    Calcium 8.7 8.6 - 10.5 mg/dL    Total Protein 7.2 6.0 - 8.5 g/dL    Albumin 3.7 3.5 - 5.2 g/dL    ALT (SGPT) 21 1 - 33 U/L    AST (SGOT) 30 1 - 32 U/L    Alkaline Phosphatase 172 (H) 39 - 117 U/L    Total Bilirubin 0.2 0.0 - 1.2 mg/dL    Globulin 3.5 gm/dL    A/G Ratio 1.1 g/dL    BUN/Creatinine Ratio 9.1 7.0 - 25.0    Anion Gap 12.5 5.0 - 15.0 mmol/L    eGFR 116.1 >60.0 mL/min/1.73   Lipase    Collection Time: 07/07/24 12:34 PM    Specimen: Blood   Result Value Ref Range    Lipase 15 13 - 60  U/L   CBC Auto Differential    Collection Time: 07/07/24 12:34 PM    Specimen: Blood   Result Value Ref Range    WBC 12.09 (H) 3.40 - 10.80 10*3/mm3    RBC 4.62 3.77 - 5.28 10*6/mm3    Hemoglobin 13.1 12.0 - 15.9 g/dL    Hematocrit 39.3 34.0 - 46.6 %    MCV 85.1 79.0 - 97.0 fL    MCH 28.4 26.6 - 33.0 pg    MCHC 33.3 31.5 - 35.7 g/dL    RDW 13.6 12.3 - 15.4 %    RDW-SD 42.1 37.0 - 54.0 fl    MPV 9.6 6.0 - 12.0 fL    Platelets 317 140 - 450 10*3/mm3    Neutrophil % 78.1 (H) 42.7 - 76.0 %    Lymphocyte % 15.3 (L) 19.6 - 45.3 %    Monocyte % 5.0 5.0 - 12.0 %    Eosinophil % 1.1 0.3 - 6.2 %    Basophil % 0.2 0.0 - 1.5 %    Immature Grans % 0.3 0.0 - 0.5 %    Neutrophils, Absolute 9.44 (H) 1.70 - 7.00 10*3/mm3    Lymphocytes, Absolute 1.85 0.70 - 3.10 10*3/mm3    Monocytes, Absolute 0.60 0.10 - 0.90 10*3/mm3    Eosinophils, Absolute 0.13 0.00 - 0.40 10*3/mm3    Basophils, Absolute 0.03 0.00 - 0.20 10*3/mm3    Immature Grans, Absolute 0.04 0.00 - 0.05 10*3/mm3    nRBC 0.0 0.0 - 0.2 /100 WBC   Green Top (Gel)    Collection Time: 07/07/24 12:34 PM   Result Value Ref Range    Extra Tube Hold for add-ons.    Lavender Top    Collection Time: 07/07/24 12:34 PM   Result Value Ref Range    Extra Tube hold for add-on    Gold Top - SST    Collection Time: 07/07/24 12:34 PM   Result Value Ref Range    Extra Tube Hold for add-ons.    Light Blue Top    Collection Time: 07/07/24 12:34 PM   Result Value Ref Range    Extra Tube Hold for add-ons.    Urinalysis With Microscopic If Indicated (No Culture) - Urine, Clean Catch    Collection Time: 07/07/24  1:58 PM    Specimen: Urine, Clean Catch   Result Value Ref Range    Color, UA Yellow Yellow, Straw    Appearance, UA Clear Clear    pH, UA 6.0 5.0 - 8.0    Specific Gravity, UA 1.006 1.005 - 1.030    Glucose, UA Negative Negative    Ketones, UA Negative Negative    Bilirubin, UA Negative Negative    Blood, UA Negative Negative    Protein, UA Negative Negative    Leuk Esterase, UA Small (1+)  (A) Negative    Nitrite, UA Negative Negative    Urobilinogen, UA 0.2 E.U./dL 0.2 - 1.0 E.U./dL   Urinalysis, Microscopic Only - Urine, Clean Catch    Collection Time: 07/07/24  1:58 PM    Specimen: Urine, Clean Catch   Result Value Ref Range    RBC, UA 0-2 None Seen, 0-2 /HPF    WBC, UA 0-2 None Seen, 0-2 /HPF    Bacteria, UA None Seen None Seen /HPF    Squamous Epithelial Cells, UA 0-2 None Seen, 0-2 /HPF    Hyaline Casts, UA None Seen None Seen /LPF    Methodology Automated Microscopy        Ordered the above labs and reviewed the results.        RADIOLOGY  CT Abdomen Pelvis With Contrast    Result Date: 7/7/2024  CT OF THE ABDOMEN AND PELVIS WITH CONTRAST 07/07/2024  HISTORY: Abdominal pain.  Spiral images were obtained from the lung bases to the symphysis pubis after intravenous contrast. No oral contrast was given.  There is fatty infiltration of the liver with the liver density appearing somewhat heterogeneous. The gallbladder, spleen, pancreas and adrenals appear unremarkable.  There is mild fullness of the right collecting system and right ureter compared to the left and this fullness appears slightly more prominent than on 06/26/2024. No obstructing stones are seen. There is a septated cyst or 2 adjacent cysts in the right adnexa with total size measuring up to 3.5 cm. The uterus is enlarged and heterogeneous in density, likely containing several uterine fibroids. There may be some fluid within the endometrial cavity as well. The uterus appears similar to its appearance on the 06/26/2024 study. No free fluid is seen. Urinary bladder is unremarkable.      1. Fatty infiltration of the liver. 2. Enlarged heterogeneous uterus may contain several fibroids and there may be a small amount of fluid in the endometrial cavity. 3. Small right adnexal cyst or collection of cysts measuring up to 3.5 cm. 4. Minimal dilatation of the right collecting system and right ureter down to the right hemipelvis and the right  adnexal cyst region. No obstructing stones are seen. 5. Findings were discussed with the ER physician.  Radiation dose reduction techniques were utilized, including automated exposure control and exposure modulation based on body size.        Ordered the above noted radiological studies.  CT abdomen independently interpreted by me and does show uterine mass          PROCEDURES  Procedures          MEDICATIONS GIVEN IN ER  Medications   lactated ringers bolus 1,000 mL (0 mL Intravenous Stopped 7/7/24 1353)   HYDROmorphone (DILAUDID) injection 0.5 mg (0.5 mg Intravenous Given 7/7/24 1236)   ondansetron (ZOFRAN) injection 4 mg (4 mg Intravenous Given 7/7/24 1234)   iopamidol (ISOVUE-300) 61 % injection 100 mL (85 mL Intravenous Given by Other 7/7/24 1410)   HYDROcodone-acetaminophen (NORCO) 7.5-325 MG per tablet 1 tablet (1 tablet Oral Given 7/7/24 1558)                   MEDICAL DECISION MAKING, PROGRESS, and CONSULTS    All labs have been independently reviewed by me.  All radiology studies have been reviewed by me and I have also reviewed the radiology report.   EKG's independently viewed and interpreted by me.  Discussion below represents my analysis of pertinent findings related to patient's condition, differential diagnosis, treatment plan and final disposition.      Additional sources:  - Discussed/ obtained information from independent historians: None    - External (non-ED) record review: Epic reviewed patient was admitted to the hospital 6/26/2024 for uterine mass    - Chronic or social conditions impacting care: None    - Shared decision making: None      Orders placed during this visit:  Orders Placed This Encounter   Procedures    CT Abdomen Pelvis With Contrast    Comprehensive Metabolic Panel    Lipase    Urinalysis With Microscopic If Indicated (No Culture) - Urine, Clean Catch    CBC Auto Differential    [place] Draw    Urinalysis, Microscopic Only - Urine, Clean Catch    OB/GYN (on-call MD unless  specified)    CBC & Differential    Green Top (Gel)    Lavender Top    Gold Top - SST    Light Blue Top         Additional orders considered but not ordered:  None        Differential diagnosis includes but is not limited to:    Urine mass, mild hydronephrosis      Independent interpretation of labs, radiology studies, and discussions with consultants:  ED Course as of 07/07/24 1611   Sun Jul 07, 2024   1551 15:51 EDT  Patient presents for abdominal pain.  Has known uterine mass that is scheduled to be removed.  Patient states her pain is gotten worse.  CT scan shows continued lesion there may be small amount of hydronephrosis on the right.  Discussed with Dr. Madrigal's office.  Will discharge patient home with small amount of pain medication tonight.  She will call their office tomorrow for reevaluation and determine if surgery needs to be moved forward.  Will be given small amount of pain medications. [SL]      ED Course User Index  [SL] Carlitos Mccord MD                 DIAGNOSIS  Final diagnoses:   Uterine mass   Lower abdominal pain         DISPOSITION  DISCHARGE    Patient discharged in stable condition.    Reviewed implications of results, diagnosis, meds, responsibility to follow up, warning signs and symptoms of possible worsening, potential complications and reasons to return to ER, including worsening symptoms    Patient/Family voiced understanding of above instructions.    Discussed plan for discharge, as there is no emergent indication for admission. Patient referred to primary care provider for BP management due to today's BP. Pt/family is agreeable and understands need for follow up and repeat testing.  Pt is aware that discharge does not mean that nothing is wrong but it indicates no emergency is present that requires admission and they must continue care with follow-up as given below or physician of their choice.     FOLLOW-UP  Griselda Madrigal MD  [address]  [id number] [phone]    Call in 1 day           Medication List        New Prescriptions      HYDROcodone-acetaminophen 5-325 MG per tablet  Commonly known as: NORCO  Take 1 tablet by mouth Every 6 (Six) Hours As Needed for Moderate Pain.               Where to Get Your Medications        These medications were sent to [id number] - [place], [place] - [address] AT [facility] - [phone] [facility] [phone]   [id number], [facility] [address]      Phone: [phone]   HYDROcodone-acetaminophen 5-325 MG per tablet                  Latest Documented Vital Signs:  As of 16:11 EDT  BP- 158/92 HR- 85 Temp- 96.9 °F (36.1 °C) (Tympanic) O2 sat- 96%              --    Please note that portions of this were completed with a voice recognition program.       Note Disclaimer: At [facility], we believe that sharing information builds trust and better relationships. You are receiving this note because you are receiving care at [facility] or recently visited. It is possible you will see health information before a provider has talked with you about it. This kind of information can be easy to misunderstand. To help you fully understand what it means for your health, we urge you to discuss this note with your provider.            Carlitos Mccord MD  07/07/24 [id number]

## 2024-07-08 NOTE — PROGRESS NOTES
RM:________     PCP: Annalee Huddleston APRN    : 1980  AGE: 43 y.o.  EST PATIENT     REASON FOR VISIT/  CC:        BP Readings from Last 3 Encounters:   24 158/92   24 115/79   24 156/95      Wt Readings from Last 3 Encounters:   24 84.4 kg (186 lb)   24 84.3 kg (185 lb 12.8 oz)   24 84.2 kg (185 lb 11.2 oz)        WT: ____________ BP: __________L __________R HR______    CHEST PAIN: _____________    SOA: _____________PALPS: _______________     LIGHTHEADED: ___________FATIGUE: ________________ EDEMA __________    ALLERGIES:Patient has no known allergies. SMOKING HISTORY:  Social History     Tobacco Use    Smoking status: Former     Current packs/day: 0.00     Average packs/day: 1 pack/day for 29.0 years (29.0 ttl pk-yrs)     Types: Cigarettes     Start date:      Quit date:      Years since quittin.5    Smokeless tobacco: Never    Tobacco comments:     Quit 2022   Vaping Use    Vaping status: Every Day    Substances: Nicotine, Flavoring    Devices: Disposable, 6% NICOTINE   Substance Use Topics    Alcohol use: Not Currently    Drug use: No     CAFFEINE USE_________________  ALCOHOL ______________________

## 2024-07-10 ENCOUNTER — OFFICE VISIT (OUTPATIENT)
Dept: CARDIOLOGY | Facility: CLINIC | Age: 44
End: 2024-07-10
Payer: MEDICARE

## 2024-07-10 VITALS
BODY MASS INDEX: 30.82 KG/M2 | WEIGHT: 185 LBS | SYSTOLIC BLOOD PRESSURE: 126 MMHG | HEART RATE: 86 BPM | OXYGEN SATURATION: 96 % | DIASTOLIC BLOOD PRESSURE: 78 MMHG | HEIGHT: 65 IN

## 2024-07-10 DIAGNOSIS — I05.1 RHEUMATIC MITRAL REGURGITATION: ICD-10-CM

## 2024-07-10 DIAGNOSIS — Z95.818 IMPLANTABLE LOOP RECORDER PRESENT: ICD-10-CM

## 2024-07-10 DIAGNOSIS — I34.1 MVP (MITRAL VALVE PROLAPSE): ICD-10-CM

## 2024-07-10 DIAGNOSIS — I10 PRIMARY HYPERTENSION: ICD-10-CM

## 2024-07-10 DIAGNOSIS — I70.0 AORTIC ARCH ATHEROSCLEROSIS: ICD-10-CM

## 2024-07-10 DIAGNOSIS — Z01.810 PREOP CARDIOVASCULAR EXAM: ICD-10-CM

## 2024-07-10 DIAGNOSIS — I63.412 CEREBROVASCULAR ACCIDENT (CVA) DUE TO EMBOLISM OF LEFT MIDDLE CEREBRAL ARTERY: Primary | ICD-10-CM

## 2024-07-10 PROBLEM — N39.0 UTI (URINARY TRACT INFECTION): Status: RESOLVED | Noted: 2024-06-26 | Resolved: 2024-07-10

## 2024-07-10 NOTE — PROGRESS NOTES
Date of Office Visit: 07/10/2024  Encounter Provider: Reza Lester MD  Place of Service: [facility] CARDIOLOGY  Patient Name: Alexandra Xiao  :1980    Chief Complaint   Patient presents with    Stroke    Follow-up    Cardiac Clearance      HPI:     Ms. Xiao is 43 y.o. and presents today in follow up. I have reviewed prior notes and there are no changes except for any new updates described below. I have also reviewed any information entered into the medical record by the patient or by ancillary staff.     She has a history of strokes; she had a NOÉ at [facility] in 2022.  I personally reviewed those images.  There was no evidence of intracardiac shunt or thrombus.  The mitral valve was noted to be abnormal, although I would have called the mitral regurgitation moderate.  She had an echo in 2023 that I reviewed.  She has normal left ventricular systolic function.  She has rheumatic changes of the mitral valve with moderate mitral regurgitation.  A loop recorder was placed in ; it has shown no evidence of atrial arrhythmias.    She presented in 2024 with an acute left MCA stroke. Loop interrogation was normal. Another NOÉ was performed; there was no evidence of shunt. There was mild A2 prolapse and moderately severe MR. Aortic plaque was noted in the arch. Because of the recurrent nature of her stroke, neurology recommended she start OAC in addition to aspirin. She's on apixaban.      Her cardiac status is stable.  She has not had any chest pain or shortness of breath. She is undergoing robotic hysterectomy/salpingectomy next Tuesday.    Past Medical History:   Diagnosis Date    Abnormal bleeding in menstrual cycle     COPD (chronic obstructive pulmonary disease)     Depression     Epilepsy     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,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    Headache, tension-type     Heavy menstrual bleeding     WITH CLOTS    Hyperlipidemia     Hypertension     Memory loss     Migraine      Seizures     Status post placement of implantable loop recorder     Stroke     MARCH AND      Past Surgical History:   Procedure Laterality Date    BREAST BIOPSY      D & C HYSTEROSCOPY ENDOMETRIAL ABLATION N/A 06/10/2022    Procedure: DILATATION AND CURETTAGE HYSTEROSCOPY NOVASURE ENDOMETRIAL ABLATION;  Surgeon: Ernesto Mendosa MD;  Location: [facility] OR;  Service: Obstetrics/Gynecology;  Laterality: N/A;    FOOT SURGERY      pins in left foot     INSERT / REPLACE / REMOVE PACEMAKER  2022    Dr. Cristofer Chamorro    OTHER SURGICAL HISTORY      VNS plate 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    NOÉ      2022    TUBAL ABDOMINAL LIGATION      VAGUS NERVE STIMULATOR IMPLANTATION         Social History     Socioeconomic History    Marital status: Single    Number of children: 2   Tobacco Use    Smoking status: Former     Current packs/day: 0.00     Average packs/day: 1 pack/day for 29.0 years (29.0 ttl pk-yrs)     Types: Cigarettes     Start date:      Quit date:      Years since quittin.5    Smokeless tobacco: Never    Tobacco comments:     Quit 2022   Vaping Use    Vaping status: Every Day    Substances: Nicotine, Flavoring    Devices: Disposable, 6% NICOTINE   Substance and Sexual Activity    Alcohol use: Not Currently    Drug use: No    Sexual activity: Yes     Partners: Male     Birth control/protection: Tubal ligation       Family History   Problem Relation Age of Onset    Breast cancer Mother 50         age 60 METS    Arthritis Mother     Arthritis Father     Diabetes Father     Heart disease Father     Hypertension Father     Heart attack Father     Hypertension Brother     Heart attack Paternal Grandfather     Ovarian cancer Neg Hx     Uterine cancer Neg Hx     Colon cancer Neg Hx     Deep vein thrombosis Neg Hx     Pulmonary embolism Neg Hx     Malig Hyperthermia Neg Hx        Review of Systems   Constitutional: Positive for malaise/fatigue.  "  Neurological:  Positive for seizures.       No Known Allergies      Current Outpatient Medications:     amitriptyline (ELAVIL) 25 MG tablet, TAKE 1 TABLET BY MOUTH EVERY NIGHT, Disp: 30 tablet, Rfl: 3    amLODIPine (NORVASC) 5 MG tablet, TAKE 1 TABLET BY MOUTH DAILY, Disp: 90 tablet, Rfl: 1    apixaban (ELIQUIS) 5 MG tablet tablet, Take 1 tablet by mouth 2 (Two) Times a Day., Disp: 180 tablet, Rfl: 2    aspirin 81 MG EC tablet, Take 1 tablet by mouth Daily., Disp: 30 tablet, Rfl: 0    atorvastatin (LIPITOR) 40 MG tablet, TAKE 1 TABLET BY MOUTH EVERY NIGHT, Disp: 30 tablet, Rfl: 3    Eslicarbazepine Acetate (Aptiom) 400 MG tablet, Take 1 tablet by mouth Daily. Take with 800mg for total dose 1200mg daily, Disp: , Rfl:     Eslicarbazepine Acetate (Aptiom) 800 MG tablet tablet, Take 1 tablet by mouth Daily. Take with 400mg for total dose 1200mg daily., Disp: , Rfl:     HYDROcodone-acetaminophen (NORCO) 5-325 MG per tablet, Take 1 tablet by mouth Every 6 (Six) Hours As Needed for Moderate Pain., Disp: 10 tablet, Rfl: 0    metoprolol succinate XL (TOPROL-XL) 25 MG 24 hr tablet, TAKE 1 TABLET BY MOUTH DAILY, Disp: 90 tablet, Rfl: 0    Vimpat 200 MG tablet, TAKE 1 TABLET BY MOUTH EVERY 12 HOURS, Disp: 60 tablet, Rfl: 0    Xcopri 50 MG tablet, TAKE 1 TABLET BY MOUTH DAILY, Disp: 30 tablet, Rfl: 4      Objective:     Vitals:    07/10/24 1441   BP: 126/78   Pulse: 86   SpO2: 96%   Weight: 83.9 kg (185 lb)   Height: 165.1 cm (65\")       Body mass index is 30.79 kg/m².    Vitals reviewed.   Constitutional:       Appearance: Well-developed and not in distress.   Eyes:      Conjunctiva/sclera: Conjunctivae normal.   HENT:      Head: Normocephalic.      Nose: Nose normal.    Mouth/Throat:      Pharynx: Oropharynx is clear.   Neck:      Thyroid: Thyroid normal.      Vascular: No JVD. JVD normal.      Lymphadenopathy: No cervical adenopathy.   Pulmonary:      Effort: Pulmonary effort is normal.      Breath sounds: Normal breath " sounds.   Cardiovascular:      Tachycardia present. Regular rhythm.      Murmurs: There is a grade 2/6 systolic murmur.   Pulses:     Intact distal pulses.   Edema:     Peripheral edema absent.   Abdominal:      Palpations: Abdomen is soft.      Tenderness: There is no abdominal tenderness.   Musculoskeletal: Normal range of motion.      Cervical back: Normal range of motion. Skin:     General: Skin is warm and dry.   Neurological:      General: No focal deficit present.      Mental Status: Oriented to person, place, and time.      Cranial Nerves: No cranial nerve deficit.   Psychiatric:         Behavior: Behavior normal.         Thought Content: Thought content normal.         Judgment: Judgment normal.         Procedures EKG --   I have personally reviewed EKG on 04/24/2024 and my interpretation of the tracing is as follows: NSR, early transition, NSST abnormality, no change      Assessment:       Diagnosis Plan   1. Cerebrovascular accident (CVA) due to embolism of left middle cerebral artery        2. Implantable loop recorder present        3. Aortic arch atherosclerosis        4. Primary hypertension        5. Rheumatic mitral regurgitation        6. MVP (mitral valve prolapse)        7. Preop cardiovascular exam             Plan:       1/2/3.  She has had strokes in the past and different vascular territories.  A loop recorder has been in place since 2022 and she has not had any atrial arrhythmias.  However, she does have rheumatic changes of her mitral valve and left atrial dilation.  She was noted to have aortic arch atherosclerosis on recent NOÉ.  Because of recurrent events while on antiplatelet therapy, she has been empirically placed on oral anticoagulation.    She is a former smoker but has quit smoking but unfortunately she still does vape.  Cessation is recommended. She will remain on atorvastatin.    4.  Her blood pressure is well-controlled.    5/6.  She has rheumatic mitral valve changes but she  also has mild prolapse of the A2 segment.  A NOÉ in April 2024 showed moderately severe mitral regurgitation.  She has normal left ventricular size and systolic function.  She denies any symptoms and she is euvolemic on exam.  We will reevaluate her MR in 2025.    7.  She is at low risk of major adverse cardiovascular events with upcoming surgery.  She has good functional status and no worrisome cardiac symptoms.  She is euvolemic.  Care should be given to avoid overly aggressive fluid resuscitation given her mitral regurgitation.  Apixaban will be held for 4 doses prior to surgery and aspirin will be continued perioperatively.    Sincerely,       Reza Lester MD

## 2024-07-10 NOTE — PATIENT INSTRUCTIONS
1) Last dose of Eliquis is Sunday AM    2) Stay on aspirin like usual    3 )Call Dr Madrigal's office and ask for her surgery scheduler, let them know that my clearance note from today is in [facility] but that I'm at [facility]

## 2024-07-11 ENCOUNTER — OFFICE VISIT (OUTPATIENT)
Dept: INTERNAL MEDICINE | Age: 44
End: 2024-07-11
Payer: MEDICARE

## 2024-07-11 ENCOUNTER — TELEPHONE (OUTPATIENT)
Dept: INTERNAL MEDICINE | Age: 44
End: 2024-07-11
Payer: MEDICARE

## 2024-07-11 VITALS
RESPIRATION RATE: 18 BRPM | HEART RATE: 101 BPM | HEIGHT: 65 IN | OXYGEN SATURATION: 98 % | WEIGHT: 186 LBS | TEMPERATURE: 98 F | SYSTOLIC BLOOD PRESSURE: 125 MMHG | DIASTOLIC BLOOD PRESSURE: 82 MMHG | BODY MASS INDEX: 30.99 KG/M2

## 2024-07-11 DIAGNOSIS — D72.829 LEUKOCYTOSIS, UNSPECIFIED TYPE: ICD-10-CM

## 2024-07-11 DIAGNOSIS — Z01.818 PREOPERATIVE CLEARANCE: Primary | ICD-10-CM

## 2024-07-11 DIAGNOSIS — E87.1 SODIUM (NA) DEFICIENCY: ICD-10-CM

## 2024-07-11 NOTE — LETTER
July 12, 2024     Griselda Madrigal MD  [address]  [address] [address]    Patient: Alexandra Xiao   YOB: 1980   Date of Visit: 7/11/2024       Dear Griselda Madrigal MD:    Thank you for referring Alexandra Xiao to me for evaluation. Below are the relevant portions of my assessment and plan of care.    Encounter Diagnosis and Orders:  Diagnoses and all orders for this visit:    1. Preoperative clearance (Primary)  Assessment & Plan:  Patient is hemodynamically stable and cleared for surgery.  Low risk of major adverse cardiovascular events per Dr. Lester of cardiology on 7/10/2024.  She agrees to have repeat CBC and CMP completed by tomorrow.  Cessation of vaping advised.      2. Leukocytosis, unspecified type  -     CBC & Differential; Future    3. Sodium (Na) deficiency  -     Comprehensive Metabolic Panel; Future      Despite low risk and may be advisable to restart patient's prescribed Apixaban as soon as feasible post procedure due to history of CVA to left middle cerebral artery with unknown origin.    If you have questions, please do not hesitate to call me. I look forward to following Alexandra along with you.         Sincerely,        Neftali Breaux PA-C        CC: KIRSTIE Pan MD Jeremy James [place], MD

## 2024-07-11 NOTE — PROGRESS NOTES
"    I N T E R N A L  M E D I C I N E    FRANKLIN MCKINLEY PA-C      ENCOUNTER DATE:  07/11/2024    Alexandra Xiao / 43 y.o. / female      CC:   (Transitional Care Follow Up Visit)  Abdominal Pain (They found a mass on her uterus. She is also scheduled for surgery on Tuesday for hysteretomy and needs surgical clearance.  )        Within 48 business hours after discharge our office contacted her via telephone to coordinate her care and needs.      I reviewed and discussed the details of that call along with the discharge summary, hospital problems, inpatient lab results, inpatient diagnostic studies, and consultation reports with the patient.         6/28/2024     7:01 PM   Date of TCM Phone Call   [facility]   Date of Admission 6/26/2024   Date of Discharge 6/28/2024       Risk for Readmission (LACE) Score: 7 (6/28/2024  6:01 AM)          VITALS    Vitals:    07/11/24 1507   BP: 125/82   Pulse: 101   Resp: 18   Temp: 98 °F (36.7 °C)   SpO2: 98%   Weight: 84.4 kg (186 lb)   Height: 165.1 cm (65\")       BP Readings from Last 3 Encounters:   07/11/24 125/82   07/10/24 126/78   07/07/24 158/92     Wt Readings from Last 3 Encounters:   07/11/24 84.4 kg (186 lb)   07/10/24 83.9 kg (185 lb)   07/07/24 84.4 kg (186 lb)      Body mass index is 30.95 kg/m².    HPI:     Date of admission/discharge: As noted above in CC  Hospital: [facility]   Principle Dx: Abdomen Pain.   Secondary Dx: UTI.  History prior to hospitalization: CVA, Epilepsy.  Evaluation/Treatment: Pain control;  UTI therapy.   Course: Afebrile and Asymptomatic    Date of planned surgery: 7/16/2024  Hospital: [facility]  Planned Surgery: Total laparoscopic hysterectomy with bilateral salpingectomy.  Primary Indication : Uterine mass  Surgeon: Griselda Madrigal MD  Anesthesia: [facility]  Risk Factors: CVA due to embolism of left middle cerebral artery of unknown origin.      Pt is 44y/o female with a recent history of abdomen " "pain and stroke, who presents for hospital follow up and for surgical clearance prior to procedure Tuesday.     She reports having self-reported to the [facility] ER on 6/26/24 with complaint of worsening abdominal pain x 1 month. Following lab and imaging workup, she was admitted to the hospital due to diagnosis of a \"large indeterminate mass of the uterus\", a low sodium level, and a UTI. She was discharged on 6/28/2024 after 2 days.     She was forced to present to the ER again on 7/07/2024 with acute abdomen pain. She was evaluated, treated for pain, and discharged on Hydrocodone 5-325mg prn.     She admits much improved pain when using Hydrocodone in the AM, when her pain is at its worst, and then Tylenol for the remainder of the day. Pain is elevated currently due to forgetting to take her afternoon dose of Tylenol. Pain is worsened with movement, improved with remaining still and with pain medication. She feels she should have enough pain medication to last until her surgery, and declines more medicine at this time.     Former smoker of cigarettes, but does vape. Denies constitutional symptoms, chest pain, or history of MI.     BP well controlled on today. HR slightly elevated, due to patient's pain being elevated.     CBC & CMP ordered to follow-up for Leukocytosis and Low sodium at ER visit on 7/07/2024.     Cardiac surgical clearance completed on yesterday with Dr. Reza Lester of Cardiology. Apixiban to be held 4 doses prior to sugery. Aspirin to be continued post-op.     It would be advisable that due to the patient's history of CVA due to unknown cause, Apixiban dosing should be continued as soon as possible post-surgery.     Surgeon is Dr. Griselda Madrigal of [facility].      Patient Care Team:  Annalee Huddleston APRN as PCP - General (Family Medicine)  Cristofer Chamorro MD as Consulting Physician (Cardiology)  Cb James II, MD as Consulting Physician (Neurology)  Ernesto Mendosa " MD Patricio as Consulting Physician (Obstetrics and Gynecology)  Reza Lester MD as Cardiologist (Cardiology)  Annalee Huddleston APRN as Referring Physician (Family Medicine)  Marija Peña MD as Consulting Physician (Hematology and Oncology)  ____________________________________________________________________    ASSESSMENT & PLAN:    1. Preoperative clearance    2. Leukocytosis, unspecified type    3. Sodium (Na) deficiency      Orders Placed This Encounter   Procedures    Comprehensive Metabolic Panel    CBC & Differential       Summary/Discussion:      Return if symptoms worsen or fail to improve, for Next scheduled follow up with Annalee 10/24/2024.    ____________________________________________________________________    REVIEW OF SYSTEMS    Review of Systems  As per HPI.  All other systems normal.     PHYSICAL EXAMINATION    Physical Exam  Constitutional:       General: She is not in acute distress.     Appearance: Normal appearance. She is not diaphoretic.   Cardiovascular:      Rate and Rhythm: Normal rate and regular rhythm.      Pulses: Normal pulses.      Heart sounds: Normal heart sounds.   Pulmonary:      Effort: Pulmonary effort is normal.      Breath sounds: Normal breath sounds.   Neurological:      Mental Status: She is alert.           REVIEWED DATA:    Labs:   Lab Results   Component Value Date     (L) 07/07/2024    K 3.7 07/07/2024    CALCIUM 8.7 07/07/2024    AST 30 07/07/2024    ALT 21 07/07/2024    BUN 5 (L) 07/07/2024    CREATININE 0.55 (L) 07/07/2024    CREATININE 0.53 (L) 06/28/2024    CREATININE 0.54 (L) 06/28/2024       Lab Results   Component Value Date    WBC 12.09 (H) 07/07/2024    HGB 13.1 07/07/2024    HGB 11.4 (L) 06/28/2024    HGB 10.7 (L) 06/28/2024     07/07/2024       Lab Results   Component Value Date    GLUCOSEU Negative 07/07/2024    BLOODU Negative 07/07/2024    NITRITEU Negative 07/07/2024    LEUKOCYTESUR Small (1+) (A) 07/07/2024           Imaging:   CT Abdomen  Pelvis With Contrast    Result Date: 7/9/2024  Narrative: CT OF THE ABDOMEN AND PELVIS WITH CONTRAST 07/07/2024  HISTORY: Abdominal pain.  Spiral images were obtained from the lung bases to the symphysis pubis after intravenous contrast. No oral contrast was given.  There is fatty infiltration of the liver with the liver density appearing somewhat heterogeneous. The gallbladder, spleen, pancreas and adrenals appear unremarkable.  There is mild fullness of the right collecting system and right ureter compared to the left and this fullness appears slightly more prominent than on 06/26/2024. No obstructing stones are seen. There is a septated cyst or 2 adjacent cysts in the right adnexa with total size measuring up to 3.5 cm. The uterus is enlarged and heterogeneous in density, likely containing several uterine fibroids. There may be some fluid within the endometrial cavity as well. The uterus appears similar to its appearance on the 06/26/2024 study. No free fluid is seen. Urinary bladder is unremarkable.      Impression: 1. Fatty infiltration of the liver. 2. Enlarged heterogeneous uterus may contain several fibroids and there may be a small amount of fluid in the endometrial cavity. Possibility of neoplastic mass such as leiomyosarcoma could be present in the uterus. Further evaluation recommended. 3. Small right adnexal cyst or collection of cysts measuring up to 3.5 cm. 4. Minimal dilatation of the right collecting system and right ureter down to the right hemipelvis and the right adnexal cyst region. No obstructing stones are seen. 5. Findings were discussed with the ER physician.  Radiation dose reduction techniques were utilized, including automated exposure control and exposure modulation based on body size.   This report was finalized on 7/9/2024 11:43 AM by Dr. Rivera Wayne M.D on Workstation: [id number]      US Liver    Result Date: 6/28/2024  Narrative: US LIVER-  Clinical: Elevated liver enzymes   FINDINGS: Visualized portions of the pancreas are satisfactory in size and echotexture. No ductal dilatation is demonstrated. Much of the pancreas is obscured due to gas within the overlying stomach.  Diffusely increased hepatic echotexture with portions of the liver difficult to penetrate and visualize, consistent with fatty infiltration. Antegrade flow within the main portal vein demonstrated on color Doppler and spectral analysis. No focal liver lesion seen.  The right kidney measures 12 cm in length and is normal in appearance. No calculus or obstructive uropathy.  Gallbladder is normal, no biliary duct dilatation CBD is 4 mm. No free fluid within the right upper quadrant. Remainder is unremarkable.  This report was finalized on 6/28/2024 11:00 AM by Dr. Juwan Adames M.D on Workstation: [id number]      CT Abdomen Pelvis With Contrast    Result Date: 6/26/2024  Narrative: CT ABDOMEN PELVIS W CONTRAST-  INDICATIONS: Right upper quadrant pain  TECHNIQUE: Radiation dose reduction techniques were utilized, including automated exposure control and exposure modulation based on body size. Enhanced ABDOMEN AND PELVIS CT  COMPARISON: None available  FINDINGS:  The liver is enlarged, and shows diffuse low-density, compatible with steatosis, with some areas of fatty sparing suggested. Possibility of underlying hepatitis is not excluded in this setting of right upper quadrant pain. The gallbladder appears unremarkable by CT criteria, could be further characterized with ultrasound if indicated.  At the left margin of the spleen, hypodense region measuring 2.4 cm is indeterminate, possibility of an area of infarct is not excluded.  A large heterogeneous mass of the uterine fundus, with areas of cystic change, measuring about 9 cm overall, could represent fibroid with necrosis, possibility of leiomyosarcoma or other neoplastic process is not excluded, gynecology consultation is advised. There may be fluid in the endometrial  canal. Cystic change is evident at the bilateral adnexa.   Otherwise unremarkable appearance of the liver, spleen, adrenal glands, pancreas, kidneys, bladder.  No bowel obstruction. Assessment of the distal colon for wall thickening is limited by lack of distention. The appendix does not appear inflamed. Colonic diverticula are seen that do not appear inflamed.  No free intraperitoneal gas or free fluid.  Scattered small mesenteric and para-aortic lymph nodes are seen that are not significant by size criteria.  Abdominal aorta is not aneurysmal. Aortic and other arterial calcifications are present.  The lung bases are clear.  No acute fracture is identified.        Impression:   1. Hepatomegaly with evidence of hepatic steatosis. Indeterminate hypodense focus in the spleen, as described above.  2. No obstructive uropathy. Large indeterminate mass of the uterus, gynecology consultation advised.  3. Colonic diverticulosis. No acute inflammatory process of bowel is identified, follow-up as indications persist.     This report was finalized on 6/26/2024 8:43 PM by Dr. Cb Butts M.D on Workstation: [id number]                    Medical Tests:                  Summary of old records / correspondence / consultant report:   H&P, DC summary re: issues addressed on HPI, Consultation notes:  and ED encounter note re: issues addressed on HPI              Request outside records:         MEDICATIONS   Current Outpatient Medications   Medication Sig Dispense Refill    amitriptyline (ELAVIL) 25 MG tablet TAKE 1 TABLET BY MOUTH EVERY NIGHT 30 tablet 3    amLODIPine (NORVASC) 5 MG tablet TAKE 1 TABLET BY MOUTH DAILY 90 tablet 1    apixaban (ELIQUIS) 5 MG tablet tablet Take 1 tablet by mouth 2 (Two) Times a Day. 180 tablet 2    aspirin 81 MG EC tablet Take 1 tablet by mouth Daily. 30 tablet 0    atorvastatin (LIPITOR) 40 MG tablet TAKE 1 TABLET BY MOUTH EVERY NIGHT 30 tablet 3    Eslicarbazepine Acetate (Aptiom) 400 MG tablet  Take 1 tablet by mouth Daily. Take with 800mg for total dose 1200mg daily      Eslicarbazepine Acetate (Aptiom) 800 MG tablet tablet Take 1 tablet by mouth Daily. Take with 400mg for total dose 1200mg daily.      HYDROcodone-acetaminophen (NORCO) 5-325 MG per tablet Take 1 tablet by mouth Every 6 (Six) Hours As Needed for Moderate Pain. 10 tablet 0    metoprolol succinate XL (TOPROL-XL) 25 MG 24 hr tablet TAKE 1 TABLET BY MOUTH DAILY 90 tablet 0    Vimpat 200 MG tablet TAKE 1 TABLET BY MOUTH EVERY 12 HOURS 60 tablet 0    Xcopri 50 MG tablet TAKE 1 TABLET BY MOUTH DAILY 30 tablet 4     No current facility-administered medications for this visit.       Current outpatient and discharge medications have been reconciled for the patient.  Reviewed by: Neftali Breaux PA-C

## 2024-07-12 ENCOUNTER — TELEPHONE (OUTPATIENT)
Dept: NEUROLOGY | Facility: CLINIC | Age: 44
End: 2024-07-12
Payer: MEDICARE

## 2024-07-12 PROBLEM — D72.829 LEUKOCYTOSIS: Status: ACTIVE | Noted: 2024-07-12

## 2024-07-12 PROBLEM — E87.1: Status: ACTIVE | Noted: 2024-07-12

## 2024-07-12 PROBLEM — Z01.818 PREOPERATIVE CLEARANCE: Status: ACTIVE | Noted: 2024-07-12

## 2024-07-12 NOTE — ASSESSMENT & PLAN NOTE
Patient is hemodynamically stable and cleared for surgery.  Low risk of major adverse cardiovascular events per Dr. Lester of cardiology on 7/10/2024.  She agrees to have repeat CBC and CMP completed by tomorrow.  Cessation of vaping advised.

## 2024-07-12 NOTE — TELEPHONE ENCOUNTER
"Caller: Alexandra Xiao    Relationship: Self    Best call back number: [phone]    What form or medical record are you requesting: FMLW PPW THROUGH THE [employer] FOR PT'S SIGNIFICANT OTHER- NEED PAGE 3 UPDATED. CURRENT ANSWER STATES \"FLARE UP\" BUT NEEDS TO STATE \"UNLIMITED\" AS PT STATES SEIZURES DON'T HAVE FLARE-UPS AND CAN COME ON SUDDENLY.    Who is requesting this form or medical record from you: PT'S SIGNIFICANT OTHER'S EMPLOYER, [employer].    How would you like to receive the form or medical records (pick-up, mail, fax): FAX  If fax, what is the fax number: FAX # ON PPW    Timeframe paperwork needed: ASAP    Additional notes: PT STATES SHE IS NEEDING FMLA PPW UPDATED FOR HER SIGNIFICANT OTHER CAN BE APPROVED FOR LEAVE.    PLEASE INITIAL THE AREA THAT IS UPDATED SO THE [employer] IS AWARE OF THE SECTION THAT HAVE BEEN CORRECTED.    PLEASE REVIEW AND ADVISE.  "

## 2024-07-15 NOTE — TELEPHONE ENCOUNTER
PT SAID THE FLMA WAS FILLED OUT WRONG AS STATED IN PRIOR MESSAGE . CAN YOU PLEASE UPDATE PAGE 3 AND FAX ASAP     PLEASE ADVISE     ELIE CHANEL [phone] PLEASE CALL TO INFORM WHEN COMPLETE .

## 2024-07-15 NOTE — TELEPHONE ENCOUNTER
"Spoke w/ pt, let her know that we can remove the verbiage \"flare up\" and put seizures in it's place. However we cannot put unlimited. It was explained to pt that what is on the ppw is what provider will give for intermittent FMLA. Pt acknowledge and agreed to changing verbiage. Will give to provider to sign addendum when he returns tomorrow and re-fax ppw.     "

## 2024-07-23 RX ORDER — AMITRIPTYLINE HYDROCHLORIDE 25 MG/1
25 TABLET, FILM COATED ORAL NIGHTLY
Qty: 30 TABLET | Refills: 3 | Status: SHIPPED | OUTPATIENT
Start: 2024-07-23

## 2024-07-31 ENCOUNTER — TELEPHONE (OUTPATIENT)
Dept: NEUROLOGY | Facility: CLINIC | Age: 44
End: 2024-07-31
Payer: MEDICARE

## 2024-07-31 NOTE — TELEPHONE ENCOUNTER
Provider: ROSALVA MORENO MD    Caller: DIONTE    Relationship to Patient: EC    Phone Number: [phone]    Reason for Call: CALLING TO LET PROVIDER KNOW THAT PATIENT IS AT [place] ON [facility].   STATED SHE HAD A MINI STROKE.  STATED PT HAD A HYSTERECTOMY 2 WEEK AGO.   STATED PT WAS IN PAIN, VOMITING AND HAD A SMALL FEVER YESTERDAY, AND TOOK HER TO [place] WHERE SHE HAD THE HYSTERECTOMY.    STATED PATIENT IS SCHEDULED FOR AN MRI TODAY (7-31-24).      STATED THEY KEEP ASKING THEM TO SPEAK WITH NEUROLOGY.   STATED THE HOSPITAL KEEPS ASKING FOR THE REMOTE FOR THE VNS SO THEY CAN DO THE MRI.   STATED THEY HAVE NEVER BEEN ASKED FOR THE REMOTE & THEY DON'T HAVE A REMOTE.      STATED HE MAY WANT THE PATIENT TRANSFERRED TO [facility].  STATED HE DOES NOT THINK THEY DON'T HAVE THE CAPABILITY REGARDING THE VNS.       STATED CAN PROVIDER REQUEST TRANSFER TO [facility]?    When was the patient last seen: 5-16-24      PLEASE CALL & ADVISE

## 2024-08-06 ENCOUNTER — READMISSION MANAGEMENT (OUTPATIENT)
Dept: CALL CENTER | Facility: HOSPITAL | Age: 44
End: 2024-08-06
Payer: MEDICARE

## 2024-08-06 NOTE — OUTREACH NOTE
Prep Survey      Flowsheet Row Responses   [facility] facility patient discharged from? Non-BH   Is LACE score < 7 ? Non- Discharge   Eligibility [facility]   Date of Admission 07/30/24   Date of Discharge 08/06/24   Discharge Disposition Home or Self Care   Discharge diagnosis Slurred speech (Primary Dx),  Episode of loss of consciousness,  History of epilepsy,  History of stroke   Does the patient have one of the following disease processes/diagnoses(primary or secondary)? Other   Prep survey completed? Yes            Liliana DOYLE - Registered Nurse

## 2024-08-07 ENCOUNTER — TRANSITIONAL CARE MANAGEMENT TELEPHONE ENCOUNTER (OUTPATIENT)
Dept: CALL CENTER | Facility: HOSPITAL | Age: 44
End: 2024-08-07
Payer: MEDICARE

## 2024-08-07 NOTE — OUTREACH NOTE
Call Center TCM Note      Flowsheet Row Responses   [facility] patient discharged from? Non-  [[place]]   Does the patient have one of the following disease processes/diagnoses(primary or secondary)? Other   TCM attempt successful? Yes   Call start time 0828   Call end time 0837   Discharge diagnosis Slurred speech (Primary Dx)- Previous Surgery Hysterectomy- had absess on surgical site. Sepsis R/T ECOLI   Person spoke with today (if not patient) and relationship Patient   Meds reviewed with patient/caregiver? Yes   Comments Patient reports she wants to see Annalee THACKER only- No appt available within tcm timeframe- please advise.   Does the patient have an appointment with their PCP within 7-14 days of discharge? No appointments available   Nursing Interventions Routed TCM call to PCP office   Has home health visited the patient within 72 hours of discharge? N/A   Psychosocial issues? No   Did the patient receive a copy of their discharge instructions? Yes   Nursing interventions Reviewed instructions with patient   What is the patient's perception of their health status since discharge? Improving   Is the patient/caregiver able to teach back signs and symptoms related to disease process for when to call PCP? Yes   Is the patient/caregiver able to teach back signs and symptoms related to disease process for when to call 911? Yes   Is the patient/caregiver able to teach back the hierarchy of who to call/visit for symptoms/problems? PCP, Specialist, Home health nurse, Urgent Care, ED, 911 Yes   TCM call completed? Yes   Wrap up additional comments Patient reports doing okay today- DC yesterday from [facility]. Septic Related to ECOLI. No current concerns or questions noted.   Call end time 0837   Would this patient benefit from a Referral to Amb Social Work? No   Is the patient interested in additional calls from an ambulatory ? No            Rupali Fernandez, LILLIAN    8/7/2024, 08:38 EDT

## 2024-08-08 DIAGNOSIS — G40.309 GENERALIZED NONCONVULSIVE EPILEPSY: ICD-10-CM

## 2024-08-08 RX ORDER — ESLICARBAZEPINE ACETATE 800 MG/1
800 TABLET ORAL DAILY
Qty: 30 TABLET | Refills: 3 | Status: SHIPPED | OUTPATIENT
Start: 2024-08-08

## 2024-08-08 RX ORDER — ESLICARBAZEPINE ACETATE 400 MG/1
1 TABLET ORAL DAILY
Qty: 30 TABLET | Refills: 3 | Status: SHIPPED | OUTPATIENT
Start: 2024-08-08

## 2024-08-08 NOTE — TELEPHONE ENCOUNTER
Caller: Alexandra Xiao    Relationship: Self    Best call back number: [phone]    Requested Prescriptions:   Requested Prescriptions     Pending Prescriptions Disp Refills    Eslicarbazepine Acetate (Aptiom) 800 MG tablet tablet       Sig: Take 1 tablet by mouth Daily. Take with 400mg for total dose 1200mg daily.        Pharmacy where request should be sent: [id number] [address] [facility] [address] AT [facility] & [facility] [phone] [facility] [phone] FX     Last office visit with prescribing clinician: 5/16/2024   Last telemedicine visit with prescribing clinician: Visit date not found   Next office visit with prescribing clinician: 9/17/2024     Additional details provided by patient: COMPLETELY OUT OF SCRIPT, DOESN'T HAVE DOSE FOR TONIGHT.     Does the patient have less than a 3 day supply:  [x] Yes  [] No    Would you like a call back once the refill request has been completed: [] Yes [x] No    If the office needs to give you a call back, can they leave a voicemail: [] Yes [x] No    Anthony Alvarez   08/08/24 09:24 EDT

## 2024-08-14 ENCOUNTER — OFFICE VISIT (OUTPATIENT)
Dept: INTERNAL MEDICINE | Age: 44
End: 2024-08-14
Payer: MEDICARE

## 2024-08-14 VITALS
HEART RATE: 64 BPM | OXYGEN SATURATION: 98 % | WEIGHT: 186 LBS | DIASTOLIC BLOOD PRESSURE: 96 MMHG | TEMPERATURE: 96.9 F | BODY MASS INDEX: 30.99 KG/M2 | HEIGHT: 65 IN | SYSTOLIC BLOOD PRESSURE: 140 MMHG

## 2024-08-14 DIAGNOSIS — I10 PRIMARY HYPERTENSION: Primary | ICD-10-CM

## 2024-08-14 DIAGNOSIS — Z87.440 HISTORY OF CYSTITIS: ICD-10-CM

## 2024-08-14 DIAGNOSIS — Z90.710 HISTORY OF HYSTERECTOMY: ICD-10-CM

## 2024-08-14 RX ORDER — ACETAMINOPHEN 500 MG
500 TABLET ORAL
COMMUNITY
Start: 2024-07-16

## 2024-08-14 RX ORDER — AMOXICILLIN AND CLAVULANATE POTASSIUM 875; 125 MG/1; MG/1
1 TABLET, FILM COATED ORAL EVERY 12 HOURS SCHEDULED
COMMUNITY
Start: 2024-08-06

## 2024-08-14 RX ORDER — PSEUDOEPHEDRINE HCL 30 MG
100 TABLET ORAL
COMMUNITY
Start: 2024-07-16

## 2024-08-14 NOTE — PROGRESS NOTES
"    I N T E R N A L  M E D I C I N E  Annalee Huddleston, APRN       ENCOUNTER DATE:  08/14/2024    Alexandra M Green / 43 y.o. / female        CC:   (Transitional Care Follow Up Visit)  TCM-Slurred speech  (7/30-8/6/Slurred speech (Primary Dx); /Episode of loss of consciousness; /History of epilepsy; /History of stroke /)        Within 48 business hours after discharge our office contacted him via telephone to coordinate his care and needs.      I reviewed and discussed the details of that call along with the discharge summary, hospital problems, inpatient lab results, inpatient diagnostic studies, and consultation reports with the patient.         8/6/2024     7:51 PM   Date of TCM Phone Call   [facility]   Date of Admission 7/30/2024   Date of Discharge 8/6/2024   Discharge Disposition Home or Self Care       Risk for Readmission (LACE) No data recorded          VITALS    Visit Vitals  /96   Pulse 64   Temp 96.9 °F (36.1 °C)   Ht 165.1 cm (65\")   Wt 84.4 kg (186 lb)   LMP 06/04/2022 (Exact Date)   SpO2 98%   BMI 30.95 kg/m²       BP Readings from Last 3 Encounters:   08/14/24 140/96   07/11/24 125/82   07/10/24 126/78     Wt Readings from Last 3 Encounters:   08/14/24 84.4 kg (186 lb)   07/11/24 84.4 kg (186 lb)   07/10/24 83.9 kg (185 lb)      Body mass index is 30.95 kg/m².    HPI:     Date of admission/discharge: As noted above in CC  Hospital: [facility]   Principle Dx: Syncope  Secondary Dx: Vaginal cuff abscess s/p hysterectomy  History prior to hospitalization: Underwent RTLH, BS for uterine mass on July 16, 2024 who presented to ER via EMS with initial complaint of syncope.     Evaluation/Treatment: CTA Stroke imaging showed no acute intracranial abnormality.  Neurology evaluated, and syncope was attributed to dehydration.  August 1, 2024 CT Abd showed vaginal cuff fluid collection.  Drain placed on August 2, 2024.  Treated with postoperative therapeutic anticoagulation.  Abscess " culture showed E. Coli growth, treated with IV Rocephin and Flaygl, and transitioned to PO Augmentin.  August 5, 2024 CT Abd/ Pelvis showed reduction in now small residual pelvic fluid collection and increase in mild presacral/ pelvic edema without drainable component.  July 31, 2024 urine culture showed E. Coli.  Drain removed prior to discharge.    Course: She is completing Augmentin prescription as prescribed for UTI.  Denies fever, chills, abdominal pain, urinary complaints, chest pain, dyspnea.  Eating, drinking, urinating well.  Regular bowel movements using stool softener.  Pain is minimal, using acetaminophen rarely PRN.  Saw GYN oncology, Dr. Griselda Madrigal, on August 12, 2024.  Provider recommended completing antibiotics as prescribed for vaginal cuff hematoma with super imposed E. Coli infection.      BP has been averaging 140-130/80-70 at home consistently on metoprolol succinate 12.5 mg daily, amlodipine 5 mg daily.  Neurology follow up is scheduled with Dr. James on September 17, 2024.  She remains on her anti seizure medications as prescribed.  2 days after her surgery, she did experience a seizure, but this was attributed to missing dose of prescribed medications.  Seizure was witnessed, did not hit her head, and treated at home.      Accompanied by mother in law to today's appointment.      Patient Care Team:  Annalee Huddleston APRN as PCP - General (Family Medicine)  Cristofer Chamorro MD as Consulting Physician (Cardiology)  Cb James II, MD as Consulting Physician (Neurology)  Ernesto Mendosa MD as Consulting Physician (Obstetrics and Gynecology)  Reza Lester MD as Cardiologist (Cardiology)  Annalee Huddleston APRN as Referring Physician (Family Medicine)  Marija Peña MD as Consulting Physician (Hematology and Oncology)  ____________________________________________________________________    ASSESSMENT & PLAN:    1. Primary hypertension    2. History of cystitis    3. History of  hysterectomy      No orders of the defined types were placed in this encounter.      Summary/Discussion:  BP continues to average mildly over goal of < 130/80.  She reports pain levels are well controlled after recent surgery.  Will increase metoprolol succinate dosing to 25 mg daily, continue amlodipine 5 mg daily.  She is agreeable to send me BP readings for review in 1-2 weeks.  Monitor for any episodes of hypotension.  She will follow up with OBGYN, neurology as scheduled.    Return for Next scheduled follow up.    ____________________________________________________________________    REVIEW OF SYSTEMS    Review of Systems   Constitutional:  Negative for chills, fever and unexpected weight change.   Respiratory:  Negative for cough, chest tightness and shortness of breath.    Cardiovascular:  Negative for chest pain, palpitations and leg swelling.   Neurological:  Negative for dizziness, weakness, light-headedness and headaches.   Psychiatric/Behavioral:  The patient is not nervous/anxious.          PHYSICAL EXAMINATION    Physical Exam  Vitals reviewed.   Constitutional:       General: She is not in acute distress.     Appearance: Normal appearance. She is not ill-appearing, toxic-appearing or diaphoretic.   HENT:      Head: Normocephalic and atraumatic.   Cardiovascular:      Rate and Rhythm: Normal rate and regular rhythm.      Heart sounds: Normal heart sounds.   Pulmonary:      Effort: Pulmonary effort is normal.      Breath sounds: Normal breath sounds.   Abdominal:      General: Bowel sounds are normal.      Palpations: Abdomen is soft.      Tenderness: There is no abdominal tenderness.      Comments: Clean, dry, intact lap sites   Musculoskeletal:      Right lower leg: No edema.      Left lower leg: No edema.   Neurological:      Mental Status: She is alert and oriented to person, place, and time. Mental status is at baseline.   Psychiatric:         Mood and Affect: Mood normal.         Behavior:  Behavior normal.         Thought Content: Thought content normal.         Judgment: Judgment normal.           REVIEWED DATA:    Labs:   Lab Results   Component Value Date     (L) 2024    K 4.4 2024    CALCIUM 8.7 2024    AST 30 2024    ALT 21 2024    BUN 5 (L) 2024    CREATININE 0.55 (L) 2024    CREATININE 0.53 (L) 2024    CREATININE 0.54 (L) 2024       Lab Results   Component Value Date    WBC 9.68 2024    HGB 10.6 (L) 2024    HGB 10.2 (L) 2024    HGB 9.3 (L) 2024     (H) 2024       Lab Results   Component Value Date    GLUCOSEU Negative 2024    BLOODU Negative 2024    NITRITEU Negative 2024    LEUKOCYTESUR Small (1+) (A) 2024       Imaging:   CT Abdomen Pelvis With Contrast    Result Date: 2024  Narrative: REVIEWING YOUR TEST RESULTS IN [facility] IS NOT A SUBSTITUTE FOR DISCUSSING THOSE RESULTS WITH YOUR HEALTH CARE PROVIDER. PLEASE CONTACT YOUR PROVIDER VIA [facility] TO DISCUSS ANY QUESTIONS OR CONCERNS YOU MAY HAVE REGARDING THESE TEST RESULTS.  RADIOLOGY REPORT FACILITY:  [facility] UNIT/AGE/GENDER: ALPESH  IN      AGE:43 Y          SEX:F PATIENT NAME/:  ELIE CHANEL    1980 UNIT NUMBER:  [id number] ACCOUNT NUMBER:  [id number] ACCESSION NUMBER:  [id number] EXAMINATION: CT of the abdomen and pelvis with contrast DATE: 2024 HISTORY: Pelvic abscess/hematoma. Status post drain placement. History of hysterectomy. TECHNIQUE:   Helical CT of the abdomen and pelvis was performed following the uneventful injection of 100 mL Omnipaque 350 nonionic contrast media.   CT scans at this facility use dose modulation, iterative reconstruction and/or weight based dosing when appropriate to reduce radiation dose to as low as reasonably achievable COMPARISON:2024. FINDINGS: CT abdomen and pelvis: There is mild dependent atelectasis at the lung  bases. Heart size is normal. No pericardial effusion. There is diffuse hepatic steatosis. The liver is noncirrhotic in morphology. The gallbladder is not distended. The spleen is normal in size. No pancreatic or adrenal mass. No hydronephrosis. There is moderate atherosclerosis. No abdominal aortic aneurysm. There are post surgical changes from recent hysterectomy. Interval placement of a pelvic drainage catheter from a left gluteal approach. The distal tip of the catheter is seen along the left paracentral aspect of the hysterectomy bed. Significant interval reduction in previously identified rim-enhancing fluid within the pelvis. On today's examination, a small residual somewhat curvilinear collection of fluid is seen within the hysterectomy bed measuring up to approximately 2 cm in short axis diameter compared to prior measurements of 8.1 x 7.2 cm. Mild presacral edema has developed in the interval. No new collection. No bowel obstruction. No evidence for diverticulitis. There is mild colonic diverticulosis. There is mild stool burden. No evidence for appendicitis. No gas within the urinary bladder. Mild fat stranding is seen along the anterior abdominal pelvic wall from recent surgery. No acute osseous abnormality. IMPRESSION: 1. Interval placement of a percutaneous pelvic drainage catheter with significant interval reduction in a now small residual pelvic fluid collection. The collection again demonstrates some rim enhancement as can be seen with infection or inflammation. No new collection. 2. Status post hysterectomy. 3. Increase in mild presacral/pelvic edema without drainable component. 4. Moderate atherosclerosis. 5. Hepatic steatosis. Dictated by: Yadiel Moura M.D. Images and Report reviewed and interpreted by: Yadiel Moura M.D. <PS><Electronically signed by: Yadiel Moura M.D.> 08/05/2024 1601 D: 08/05/2024 1549 T: 08/05/2024 1549    CT Guidance for Needle Placement S&I Drain    Result Date:  2024  Narrative: REVIEWING YOUR TEST RESULTS IN [facility] IS NOT A SUBSTITUTE FOR DISCUSSING THOSE RESULTS WITH YOUR HEALTH CARE PROVIDER. PLEASE CONTACT YOUR PROVIDER VIA [facility] TO DISCUSS ANY QUESTIONS OR CONCERNS YOU MAY HAVE REGARDING THESE TEST RESULTS.  RADIOLOGY REPORT FACILITY:  [facility] AND [facility]DR[facility] UNIT/AGE/GENDER: ALPESH  IN      AGE:43 Y          SEX:F PATIENT NAME/:  ELIE CHANEL    1980 UNIT NUMBER:  [id number] ACCOUNT NUMBER:  [id number] ACCESSION NUMBER:  [id number] Exam: 1. CT GUIDED PELVIC ABSCESS DRAINAGE. 2.  MODERATE SEDATION SERVICES, INITIAL 15 MINUTES (10321) 3.  MODERATE SEDATION SERVICES, ADDITIONAL 15 MINUTES (86468) DATE: 2024. HISTORY: Recent hysterectomy, vaginal cuff fluid collection. TECHNIQUE: Informed consent was obtained. The patient was placed in the prone position. The lower back and gluteal region was prepped and draped in the usual sterile fashion. Sterile barrier technique was used including sterile cap, mask, gloves, gown and large sterile sheet as well as preprocedure hand washing as well as cutaneous antisepsis with 2% chlorhexidine. .  1% Lidocaine solution was used for local anesthesia. Using CT guidance, an 18-gauge needle was advanced into the abdominal abscess. Purulent material was aspirated and sent for culture. A short Amplatz wire was advanced into the abscess. Serial tract dilatation was performed followed by placement of a 12 Armenian abscess drainage catheter. The catheter was secured in place with 0-Prolene. There were no complications. CONSCIOUS SEDATION: A dedicated conscious sedation nurse was present and continuously monitored the patient's hemodynamic vital signs during the procedure. Patient was administered conscious sedation medication (Versed and Fentanyl with dosages recorded in the nursing notes), under my direct supervision, by the conscious sedation nurse. The total intraprocedural time was 45   minutes. Findings: There is a fluid collection above the vaginal cuff. Tract was placed without complication. The fluid was predominantly old bloody material. Impression:  Successful CT guided pelvic abscess drainage. CT scans at this facility use dose modulation, iterative reconstruction and/or weight based dosing when appropriate to reduce radiation dose to as low as reasonably achievable. Dictated by: Jay Goyal M.D. Images and Report reviewed and interpreted by: Jay Goyal M.D. <PS><Electronically signed by: Jay Goyal M.D.> 2024 1041 D: 2024 1041 T: 2024 1041    CT Abdomen Pelvis With Contrast    Result Date: 2024  Narrative: RE[facility]ING YOUR TEST RESULTS IN [facility] IS NOT A SUBSTITUTE FOR DISCUSSING THOSE RESULTS WITH YOUR HEALTH CARE PROVIDER. PLEASE CONTACT YOUR PROVIDER VIA [facility] TO DISCUSS ANY QUESTIONS OR CONCERNS YOU MAY HAVE REGARDING THESE TEST RESULTS.  RADIOLOGY REPORT FACILITY:  [facility] AND [facility]ILDR[facility] UNIT/AGE/GENDER: ALPESH  IN      AGE:43 Y          SEX:F PATIENT NAME/:  ELIE CHANEL    1980 UNIT NUMBER:  [id number] ACCOUNT NUMBER:  [id number] ACCESSION NUMBER:  [id number] EXAMINATION: CT ABDOMEN AND PELVIS W IV CONTRAST DATE: 2024 HISTORY: Abdominal pain, post-op COMPARISON: None TECHNIQUE: A routine CT of the abdomen and pelvis was performed using axial slices from the dome of the diaphragm to the pubic symphysis. 80ml Omnipaque 350 were administered intravenously. CT scans at this facility use dose modulation, iterative reconstruction and/or weight based dosing when appropriate to reduce radiation dose to as low as reasonably achievable. FINDINGS: A limited evaluation of the base the chest demonstrates mild bibasilar atelectasis. There is no pleural effusion or pneumothorax. Cardiac structures are within normal limits. ABDOMEN: There is no perihepatic ascites or intrahepatic biliary dilatation. There is moderate  diffuse hepatic steatosis. The gallbladder, spleen, adrenals, and pancreas are normal. The kidneys enhance symmetrically without hydronephrosis. There are no obstructing stones. The stomach, duodenum, and small bowel are normal. The appendix is present and is unremarkable. There is moderate stool throughout the colon without obstruction. Pelvis: The urinary bladder is within normal limits., Just above the vaginal cuff. This measures 8.1 x 7.2 cm on series 2, image 131. Moderate inflammatory stranding is seen surrounding this collection. There is no free air. There is no pathologic lymphadenopathy. There are no suspicious lytic or sclerotic osseous lesions. The uterus is surgically absent. There is mild atherosclerotic calcification of the abdominal aorta as well as its major branches. A small fat-containing umbilical hernia is present. IMPRESSION: 1. A rim-enhancing fluid collection is present within the pelvis. Given the patient's history of recent hysterectomy, this likely represents a postoperative abscess. 2. Mild diffuse hepatic steatosis. Dictated by: Alcides Wright M.D. Images and Report reviewed and interpreted by: Alcides Wright M.D. <PS><Electronically signed by: Alcides Wright M.D.> 2024 0938 D: 2024 0930 T: 2024 0930    XR Chest 1 View    Result Date: 2024  Narrative: REVIEWING YOUR TEST RESULTS IN [facility] IS NOT A SUBSTITUTE FOR DISCUSSING THOSE RESULTS WITH YOUR HEALTH CARE PROVIDER. PLEASE CONTACT YOUR PROVIDER VIA [facility] TO DISCUSS ANY QUESTIONS OR CONCERNS YOU MAY HAVE REGARDING THESE TEST RESULTS.  RADIOLOGY REPORT FACILITY:  [facility] UNIT/AGE/GENDER: ALPESH  IN      AGE:43 Y          SEX:F PATIENT NAME/:  ELIE CHANEL    1980 UNIT NUMBER:  [id number] ACCOUNT NUMBER:  [id number] ACCESSION NUMBER:  [id number] EXAM: XR CHEST 1 VW 2024 3:31 PM HISTORY: leukocytosis COMPARISON: None. TECHNIQUE:  Single portable  AP view of the chest FINDINGS: Left-sided vagal nerve stimulator and Loop recorder device in place. Normal heart size. Low lung volumes with mild atelectasis. No pleural effusion or pneumothorax. IMPRESSION: Low lung volumes with mild atelectasis Dictated by: Jose Echols M.D. Images and Report reviewed and interpreted by: Jose Echols M.D. <PS><Electronically signed by: Jose Echols M.D.> 2024 1535 D: 2024 1535 T: 2024 1535    CTA Stroke Protocol, Neck    Result Date: 2024  Narrative: REVIEWING YOUR TEST RESULTS IN [facility] IS NOT A SUBSTITUTE FOR DISCUSSING THOSE RESULTS WITH YOUR HEALTH CARE PROVIDER. PLEASE CONTACT YOUR PROVIDER [facility]IA [facility] TO DISCUSS ANY QUESTIONS OR CONCERNS YOU MAY HAVE REGARDING THESE TEST RESULTS.  RADIOLOGY REPORT FACILITY:  [facility]ILDREN'S [facility] UNIT/AGE/GENDER: M.ED  ER      AGE:43 Y          SEX:F PATIENT NAME/:  ELIE CHANEL    1980 UNIT NUMBER:  [id number] ACCOUNT NUMBER:  [id number] ACCESSION NUMBER:  [id number] CTA of the neck and brain HISTORY:  Slurred speech. Left facial droop. Left facial numbness. COMPARISON:  None TECHNIQUE: Multiple 5 mm axial images are obtained through the brain without contrast. Multiple helical images were obtained from skull vertex to the skull-base during intravenous administration of 100 cc of Isovue-370 utilizing CTA of the neck and brain protocol. Multiple coronal and sagittal reformatted images are obtained. Multiple volume rendered and surface rendered images are obtained through the cervical and cerebral vasculature. Per CMS specifications, dose optimization techniques including at least one of the following were performed, as appropriate:  Automated exposure control, adjustment of the mA and/or kV according to the patient's size, use of iterative reconstruction techniques. FINDINGS:      CTA of the neck: Aortic Arch:Normal aortic arch. Subclavian arteries:Normal Lung  apices:Apices are clear. RIGHT anterior circulation: The common carotid origin is widely patent. The common carotid and internal carotid artery are normal. There is no significant hemodynamic stenosis. LEFT anterior circulation: The LEFT common carotid origin is patent. The common carotid and internal carotid arteries are normal. There is no hemodynamic stenosis. Posterior circulation: The RIGHT vertebral artery is widely patent with no significant stenosis or evidence of dissection. The LEFT vertebral artery is widely patent with no evidence of stenosis or dissection. No laryngeal or pharyngeal mass lesions are identified. The thyroid gland, submandibular glands and parotid glands are unremarkable. Scattered subcentimeter lymph nodes are identified, but they do not meet pathologic size criteria.    CTA of the head Right anterior circulation:  There is no significant stenosis or aneurysm. Left anterior circulation:   There is no significant stenosis or aneurysm. Posterior circulation:  There is no significant stenosis or aneurysm. Impression: No significant hemodynamic stenosis in the cervical or intracranial vasculature. Dictated by: Yadiel Shepard M.D. Images and Report reviewed and interpreted by: Yadiel Shepard M.D. <PS><Electronically signed by: Yadiel Shepard M.D.> 2024 0017 D: 2024 0012 T: 2024 0012    CTA Stroke Protocol, Head    Result Date: 2024  Narrative: REVIEWING YOUR TEST RESULTS IN [facility] IS NOT A SUBSTITUTE FOR DISCUSSING THOSE RESULTS WITH YOUR HEALTH CARE PROVIDER. PLEASE CONTACT YOUR PROVIDER VIA [facility] TO DISCUSS ANY QUESTIONS OR CONCERNS YOU MAY HAVE REGARDING THESE TEST RESULTS.  RADIOLOGY REPORT FACILITY:  [facility] UNIT/AGE/GENDER: M.ED  ER      AGE:43 Y          SEX:F PATIENT NAME/:  ELIE CHANEL    1980 UNIT NUMBER:  [id number] ACCOUNT NUMBER:  [id number] ACCESSION NUMBER:  [id number] CTA of the  neck and brain HISTORY:  Slurred speech. Left facial droop. Left facial numbness. COMPARISON:  None TECHNIQUE: Multiple 5 mm axial images are obtained through the brain without contrast. Multiple helical images were obtained from skull vertex to the skull-base during intravenous administration of 100 cc of Isovue-370 utilizing CTA of the neck and brain protocol. Multiple coronal and sagittal reformatted images are obtained. Multiple volume rendered and surface rendered images are obtained through the cervical and cerebral vasculature. Per CMS specifications, dose optimization techniques including at least one of the following were performed, as appropriate:  Automated exposure control, adjustment of the mA and/or kV according to the patient's size, use of iterative reconstruction techniques. FINDINGS:      CTA of the neck: Aortic Arch:Normal aortic arch. Subclavian arteries:Normal Lung apices:Apices are clear. RIGHT anterior circulation: The common carotid origin is widely patent. The common carotid and internal carotid artery are normal. There is no significant hemodynamic stenosis. LEFT anterior circulation: The LEFT common carotid origin is patent. The common carotid and internal carotid arteries are normal. There is no hemodynamic stenosis. Posterior circulation: The RIGHT vertebral artery is widely patent with no significant stenosis or evidence of dissection. The LEFT vertebral artery is widely patent with no evidence of stenosis or dissection. No laryngeal or pharyngeal mass lesions are identified. The thyroid gland, submandibular glands and parotid glands are unremarkable. Scattered subcentimeter lymph nodes are identified, but they do not meet pathologic size criteria.    CTA of the head Right anterior circulation:  There is no significant stenosis or aneurysm. Left anterior circulation:   There is no significant stenosis or aneurysm. Posterior circulation:  There is no significant stenosis or aneurysm.  Impression: No significant hemodynamic stenosis in the cervical or intracranial vasculature. Dictated by: Yadiel Shepard M.D. Images and Report reviewed and interpreted by: Yadiel Shepard M.D. <PS><Electronically signed by: Yadiel Shepard M.D.> 2024 D: 2024 T: 2024    CT Stroke Protocol, Head WO Contrast    Result Date: 2024  Narrative: REVIEWING YOUR TEST RESULTS IN [facility] IS NOT A SUBSTITUTE FOR DISCUSSING THOSE RESULTS WITH YOUR HEALTH CARE PROVIDER. PLEASE CONTACT YOUR PROVIDER VIA [facility] TO DISCUSS ANY QUESTIONS OR CONCERNS YOU MAY HAVE REGARDING THESE TEST RESULTS.  RADIOLOGY REPORT FACILITY:  [facility] AND [facility]DR[facility] UNIT/AGE/GENDER: M.ED  ER      AGE:43 Y          SEX:F PATIENT NAME/:  ELIE CHANEL    1980 UNIT NUMBER:  [id number] ACCOUNT NUMBER:  [id number] ACCESSION NUMBER:  [id number] EXAMINATION: CT of the head without contrast. STROKE PROTOCOL HISTORY:  Neuro deficit, acute, stroke suspected.   COMPARISON: None. TECHNIQUE: Multiple axial images of the brain were obtained without contrast. Per CMS specifications, dose optimization techniques including at least one of the following were performed, as appropriate:  Automated exposure control, adjustment of the mA and/or kV according to the patient's size, use of iterative reconstruction techniques. FINDINGS: There is no intraparenchymal hemorrhage or extra-axial fluid collection. There is no hyperdense vasculature. Brain stem and cerebellum are normal. . The deep gray nuclei are normal. Normal insular ribbons and internal capsules. . The third and lateral ventricles are symmetric and normal in size. The fourth ventricle is normal and in the midline. The visualized bones of the face, skull base, and calvarium are intact. The visualized paranasal sinuses are clear.. IMPRESSION: No CT evidence of acute intracranial abnormality. End Impression Dictated by: Yadiel Shepard M.D.  Images and Report reviewed and interpreted by: Yadiel Shepard M.D. <PS><Electronically signed by: Yadiel Shepard M.D.> 07/30/2024 2355 D: 07/30/2024 2354 T: 07/30/2024 2354      Medical Tests:        Summary of old records / correspondence / consultant report:   DC summary re: issues addressed on HPI    Request outside records:         MEDICATIONS   Current Outpatient Medications   Medication Sig Dispense Refill    acetaminophen (TYLENOL) 500 MG tablet Take 1 tablet by mouth.      amitriptyline (ELAVIL) 25 MG tablet TAKE 1 TABLET BY MOUTH EVERY NIGHT 30 tablet 3    amLODIPine (NORVASC) 5 MG tablet TAKE 1 TABLET BY MOUTH DAILY 90 tablet 1    amoxicillin-clavulanate (AUGMENTIN) 875-125 MG per tablet Take 1 tablet by mouth Every 12 (Twelve) Hours.      apixaban (ELIQUIS) 5 MG tablet tablet Take 1 tablet by mouth 2 (Two) Times a Day. 180 tablet 2    aspirin 81 MG EC tablet Take 1 tablet by mouth Daily. 30 tablet 0    atorvastatin (LIPITOR) 40 MG tablet TAKE 1 TABLET BY MOUTH EVERY NIGHT 30 tablet 3    docusate sodium 100 MG capsule Take 1 capsule by mouth.      Eslicarbazepine Acetate (Aptiom) 400 MG tablet Take 1 tablet by mouth Daily. Take with 800mg for total dose 1200mg daily 30 tablet 3    Eslicarbazepine Acetate (Aptiom) 800 MG tablet tablet Take 1 tablet by mouth Daily. Take with 400mg for total dose 1200mg daily. 30 tablet 3    metoprolol succinate XL (TOPROL-XL) 25 MG 24 hr tablet TAKE 1 TABLET BY MOUTH DAILY 90 tablet 0    Vimpat 200 MG tablet TAKE 1 TABLET BY MOUTH EVERY 12 HOURS 60 tablet 0    Xcopri 50 MG tablet TAKE 1 TABLET BY MOUTH DAILY 30 tablet 4     No current facility-administered medications for this visit.       Current outpatient and discharge medications have been reconciled for the patient.  Reviewed by: KIRSTIE Pan         @MSK@

## 2024-08-19 RX ORDER — ATORVASTATIN CALCIUM 40 MG/1
40 TABLET, FILM COATED ORAL DAILY
Qty: 60 TABLET | Refills: 2 | Status: SHIPPED | OUTPATIENT
Start: 2024-08-19

## 2024-08-21 DIAGNOSIS — G40.309 GENERALIZED NONCONVULSIVE EPILEPSY: ICD-10-CM

## 2024-08-21 RX ORDER — LACOSAMIDE 200 MG/1
1 TABLET, FILM COATED ORAL EVERY 12 HOURS
Qty: 60 TABLET | Refills: 0 | Status: SHIPPED | OUTPATIENT
Start: 2024-08-21

## 2024-08-21 NOTE — TELEPHONE ENCOUNTER
Caller: Alexandra Xiao CAMILA    Relationship: Self    Best call back number: [phone]    Requested Prescriptions:   Requested Prescriptions     Pending Prescriptions Disp Refills    Vimpat 200 MG tablet 60 tablet 0     Sig: Take 1 tablet by mouth Every 12 (Twelve) Hours.        Pharmacy where request should be sent: [id number] [address] AT [facility] [facility] [phone] [facility] [phone] FX     Last office visit with prescribing clinician: 5/16/2024   Last telemedicine visit with prescribing clinician: Visit date not found   Next office visit with prescribing clinician: 9/17/2024     Additional details provided by patient: PATIENT STATES SHE ONLY HAS 1-2 DAYS WORTH LEFT.    Does the patient have less than a 3 day supply:  [x] Yes  [] No    Would you like a call back once the refill request has been completed: [] Yes [x] No    If the office needs to give you a call back, can they leave a voicemail: [] Yes [x] No    Anthony Brown Rep   08/21/24 10:43 EDT

## 2024-09-06 DIAGNOSIS — Z96.89 STATUS POST VNS (VAGUS NERVE STIMULATOR) PLACEMENT: Primary | ICD-10-CM

## 2024-09-06 DIAGNOSIS — G40.309 GENERALIZED NONCONVULSIVE EPILEPSY: ICD-10-CM

## 2024-09-06 RX ORDER — ESLICARBAZEPINE ACETATE 800 MG/1
800 TABLET ORAL DAILY
Qty: 90 TABLET | Refills: 1 | Status: ON HOLD | OUTPATIENT
Start: 2024-09-06

## 2024-09-06 RX ORDER — ESLICARBAZEPINE ACETATE 400 MG/1
1 TABLET ORAL DAILY
Qty: 90 TABLET | Refills: 1 | Status: ON HOLD | OUTPATIENT
Start: 2024-09-06

## 2024-09-15 ENCOUNTER — HOSPITAL ENCOUNTER (OUTPATIENT)
Facility: HOSPITAL | Age: 44
Setting detail: OBSERVATION
Discharge: HOME | End: 2024-09-17
Attending: STUDENT IN AN ORGANIZED HEALTH CARE EDUCATION/TRAINING PROGRAM | Admitting: STUDENT IN AN ORGANIZED HEALTH CARE EDUCATION/TRAINING PROGRAM
Payer: MEDICARE

## 2024-09-15 ENCOUNTER — APPOINTMENT (OUTPATIENT)
Dept: CT IMAGING | Facility: HOSPITAL | Age: 44
End: 2024-09-15
Payer: MEDICARE

## 2024-09-15 DIAGNOSIS — Z79.01 CHRONIC ANTICOAGULATION: ICD-10-CM

## 2024-09-15 DIAGNOSIS — K62.5 RECTAL BLEEDING: ICD-10-CM

## 2024-09-15 DIAGNOSIS — K52.9 COLITIS: Primary | ICD-10-CM

## 2024-09-15 DIAGNOSIS — E87.6 HYPOKALEMIA: ICD-10-CM

## 2024-09-15 DIAGNOSIS — R19.7 DIARRHEA, UNSPECIFIED TYPE: ICD-10-CM

## 2024-09-15 LAB
ALBUMIN SERPL-MCNC: 3.5 G/DL (ref 3.5–5.2)
ALBUMIN/GLOB SERPL: 1.1 G/DL
ALP SERPL-CCNC: 215 U/L (ref 39–117)
ALT SERPL W P-5'-P-CCNC: 7 U/L (ref 1–33)
ANION GAP SERPL CALCULATED.3IONS-SCNC: 11 MMOL/L (ref 5–15)
AST SERPL-CCNC: 15 U/L (ref 1–32)
BACTERIA UR QL AUTO: ABNORMAL /HPF
BASOPHILS # BLD AUTO: 0.03 10*3/MM3 (ref 0–0.2)
BASOPHILS NFR BLD AUTO: 0.3 % (ref 0–1.5)
BILIRUB SERPL-MCNC: 0.2 MG/DL (ref 0–1.2)
BILIRUB UR QL STRIP: NEGATIVE
BUN SERPL-MCNC: 3 MG/DL (ref 6–20)
BUN/CREAT SERPL: 5.4 (ref 7–25)
CALCIUM SPEC-SCNC: 8.6 MG/DL (ref 8.6–10.5)
CHLORIDE SERPL-SCNC: 100 MMOL/L (ref 98–107)
CLARITY UR: CLEAR
CO2 SERPL-SCNC: 24 MMOL/L (ref 22–29)
COLOR UR: YELLOW
CREAT SERPL-MCNC: 0.56 MG/DL (ref 0.57–1)
DEPRECATED RDW RBC AUTO: 40.9 FL (ref 37–54)
EGFRCR SERPLBLD CKD-EPI 2021: 115.6 ML/MIN/1.73
EOSINOPHIL # BLD AUTO: 0.08 10*3/MM3 (ref 0–0.4)
EOSINOPHIL NFR BLD AUTO: 0.9 % (ref 0.3–6.2)
ERYTHROCYTE [DISTWIDTH] IN BLOOD BY AUTOMATED COUNT: 14.2 % (ref 12.3–15.4)
GLOBULIN UR ELPH-MCNC: 3.1 GM/DL
GLUCOSE SERPL-MCNC: 93 MG/DL (ref 65–99)
GLUCOSE UR STRIP-MCNC: NEGATIVE MG/DL
HCT VFR BLD AUTO: 34.7 % (ref 34–46.6)
HGB BLD-MCNC: 11 G/DL (ref 12–15.9)
HGB UR QL STRIP.AUTO: NEGATIVE
HYALINE CASTS UR QL AUTO: ABNORMAL /LPF
IMM GRANULOCYTES # BLD AUTO: 0.02 10*3/MM3 (ref 0–0.05)
IMM GRANULOCYTES NFR BLD AUTO: 0.2 % (ref 0–0.5)
KETONES UR QL STRIP: NEGATIVE
LEUKOCYTE ESTERASE UR QL STRIP.AUTO: ABNORMAL
LIPASE SERPL-CCNC: 9 U/L (ref 13–60)
LYMPHOCYTES # BLD AUTO: 1.79 10*3/MM3 (ref 0.7–3.1)
LYMPHOCYTES NFR BLD AUTO: 19.9 % (ref 19.6–45.3)
MCH RBC QN AUTO: 25.2 PG (ref 26.6–33)
MCHC RBC AUTO-ENTMCNC: 31.7 G/DL (ref 31.5–35.7)
MCV RBC AUTO: 79.4 FL (ref 79–97)
MONOCYTES # BLD AUTO: 0.65 10*3/MM3 (ref 0.1–0.9)
MONOCYTES NFR BLD AUTO: 7.2 % (ref 5–12)
NEUTROPHILS NFR BLD AUTO: 6.43 10*3/MM3 (ref 1.7–7)
NEUTROPHILS NFR BLD AUTO: 71.5 % (ref 42.7–76)
NITRITE UR QL STRIP: NEGATIVE
NRBC BLD AUTO-RTO: 0 /100 WBC (ref 0–0.2)
PH UR STRIP.AUTO: 7 [PH] (ref 5–8)
PLATELET # BLD AUTO: 470 10*3/MM3 (ref 140–450)
PMV BLD AUTO: 9.4 FL (ref 6–12)
POTASSIUM SERPL-SCNC: 3.2 MMOL/L (ref 3.5–5.2)
PROT SERPL-MCNC: 6.6 G/DL (ref 6–8.5)
PROT UR QL STRIP: NEGATIVE
RBC # BLD AUTO: 4.37 10*6/MM3 (ref 3.77–5.28)
RBC # UR STRIP: ABNORMAL /HPF
REF LAB TEST METHOD: ABNORMAL
SODIUM SERPL-SCNC: 135 MMOL/L (ref 136–145)
SP GR UR STRIP: 1.01 (ref 1–1.03)
SQUAMOUS #/AREA URNS HPF: ABNORMAL /HPF
UROBILINOGEN UR QL STRIP: ABNORMAL
WBC # UR STRIP: ABNORMAL /HPF
WBC NRBC COR # BLD AUTO: 9 10*3/MM3 (ref 3.4–10.8)

## 2024-09-15 PROCEDURE — 25510000001 IOPAMIDOL 61 % SOLUTION: Performed by: EMERGENCY MEDICINE

## 2024-09-15 PROCEDURE — 99285 EMERGENCY DEPT VISIT HI MDM: CPT

## 2024-09-15 PROCEDURE — G0378 HOSPITAL OBSERVATION PER HR: HCPCS

## 2024-09-15 PROCEDURE — 25010000002 CEFTRIAXONE PER 250 MG: Performed by: INTERNAL MEDICINE

## 2024-09-15 PROCEDURE — 96374 THER/PROPH/DIAG INJ IV PUSH: CPT

## 2024-09-15 PROCEDURE — 25010000002 ONDANSETRON PER 1 MG: Performed by: EMERGENCY MEDICINE

## 2024-09-15 PROCEDURE — 87040 BLOOD CULTURE FOR BACTERIA: CPT | Performed by: INTERNAL MEDICINE

## 2024-09-15 PROCEDURE — 96376 TX/PRO/DX INJ SAME DRUG ADON: CPT

## 2024-09-15 PROCEDURE — 80053 COMPREHEN METABOLIC PANEL: CPT | Performed by: STUDENT IN AN ORGANIZED HEALTH CARE EDUCATION/TRAINING PROGRAM

## 2024-09-15 PROCEDURE — 25010000002 ONDANSETRON PER 1 MG: Performed by: INTERNAL MEDICINE

## 2024-09-15 PROCEDURE — 96375 TX/PRO/DX INJ NEW DRUG ADDON: CPT

## 2024-09-15 PROCEDURE — 85025 COMPLETE CBC W/AUTO DIFF WBC: CPT | Performed by: STUDENT IN AN ORGANIZED HEALTH CARE EDUCATION/TRAINING PROGRAM

## 2024-09-15 PROCEDURE — 25810000003 LACTATED RINGERS SOLUTION: Performed by: STUDENT IN AN ORGANIZED HEALTH CARE EDUCATION/TRAINING PROGRAM

## 2024-09-15 PROCEDURE — 74177 CT ABD & PELVIS W/CONTRAST: CPT

## 2024-09-15 PROCEDURE — 25010000002 ONDANSETRON PER 1 MG: Performed by: STUDENT IN AN ORGANIZED HEALTH CARE EDUCATION/TRAINING PROGRAM

## 2024-09-15 PROCEDURE — 25010000002 METRONIDAZOLE 500 MG/100ML SOLUTION: Performed by: INTERNAL MEDICINE

## 2024-09-15 PROCEDURE — 83605 ASSAY OF LACTIC ACID: CPT | Performed by: INTERNAL MEDICINE

## 2024-09-15 PROCEDURE — 81001 URINALYSIS AUTO W/SCOPE: CPT | Performed by: STUDENT IN AN ORGANIZED HEALTH CARE EDUCATION/TRAINING PROGRAM

## 2024-09-15 PROCEDURE — 83690 ASSAY OF LIPASE: CPT | Performed by: STUDENT IN AN ORGANIZED HEALTH CARE EDUCATION/TRAINING PROGRAM

## 2024-09-15 RX ORDER — METRONIDAZOLE 500 MG/100ML
500 INJECTION, SOLUTION INTRAVENOUS EVERY 8 HOURS
Status: DISCONTINUED | OUTPATIENT
Start: 2024-09-15 | End: 2024-09-16

## 2024-09-15 RX ORDER — ONDANSETRON 2 MG/ML
4 INJECTION INTRAMUSCULAR; INTRAVENOUS ONCE
Status: COMPLETED | OUTPATIENT
Start: 2024-09-15 | End: 2024-09-15

## 2024-09-15 RX ORDER — IOPAMIDOL 612 MG/ML
100 INJECTION, SOLUTION INTRAVASCULAR
Status: COMPLETED | OUTPATIENT
Start: 2024-09-15 | End: 2024-09-15

## 2024-09-15 RX ORDER — ONDANSETRON 2 MG/ML
4 INJECTION INTRAMUSCULAR; INTRAVENOUS EVERY 6 HOURS PRN
Status: DISCONTINUED | OUTPATIENT
Start: 2024-09-15 | End: 2024-09-17 | Stop reason: HOSPADM

## 2024-09-15 RX ORDER — LACOSAMIDE 100 MG/1
200 TABLET ORAL EVERY 12 HOURS
Status: DISCONTINUED | OUTPATIENT
Start: 2024-09-15 | End: 2024-09-17 | Stop reason: HOSPADM

## 2024-09-15 RX ORDER — AMLODIPINE BESYLATE 5 MG/1
5 TABLET ORAL DAILY
Status: DISCONTINUED | OUTPATIENT
Start: 2024-09-15 | End: 2024-09-17 | Stop reason: HOSPADM

## 2024-09-15 RX ORDER — ACETAMINOPHEN 160 MG/5ML
650 SOLUTION ORAL EVERY 4 HOURS PRN
Status: DISCONTINUED | OUTPATIENT
Start: 2024-09-15 | End: 2024-09-17 | Stop reason: HOSPADM

## 2024-09-15 RX ORDER — POTASSIUM CHLORIDE 750 MG/1
40 TABLET, FILM COATED, EXTENDED RELEASE ORAL ONCE
Status: COMPLETED | OUTPATIENT
Start: 2024-09-15 | End: 2024-09-15

## 2024-09-15 RX ORDER — ONDANSETRON 4 MG/1
4 TABLET, ORALLY DISINTEGRATING ORAL EVERY 6 HOURS PRN
Status: DISCONTINUED | OUTPATIENT
Start: 2024-09-15 | End: 2024-09-17 | Stop reason: HOSPADM

## 2024-09-15 RX ORDER — ATORVASTATIN CALCIUM 20 MG/1
40 TABLET, FILM COATED ORAL DAILY
Status: DISCONTINUED | OUTPATIENT
Start: 2024-09-15 | End: 2024-09-16

## 2024-09-15 RX ORDER — ACETAMINOPHEN 650 MG/1
650 SUPPOSITORY RECTAL EVERY 4 HOURS PRN
Status: DISCONTINUED | OUTPATIENT
Start: 2024-09-15 | End: 2024-09-17 | Stop reason: HOSPADM

## 2024-09-15 RX ORDER — ACETAMINOPHEN 325 MG/1
650 TABLET ORAL EVERY 4 HOURS PRN
Status: DISCONTINUED | OUTPATIENT
Start: 2024-09-15 | End: 2024-09-17 | Stop reason: HOSPADM

## 2024-09-15 RX ORDER — METOPROLOL SUCCINATE 25 MG/1
25 TABLET, EXTENDED RELEASE ORAL DAILY
Status: DISCONTINUED | OUTPATIENT
Start: 2024-09-15 | End: 2024-09-17 | Stop reason: HOSPADM

## 2024-09-15 RX ADMIN — ONDANSETRON 4 MG: 2 INJECTION, SOLUTION INTRAMUSCULAR; INTRAVENOUS at 15:13

## 2024-09-15 RX ADMIN — METRONIDAZOLE 500 MG: 500 INJECTION, SOLUTION INTRAVENOUS at 21:50

## 2024-09-15 RX ADMIN — POTASSIUM CHLORIDE 40 MEQ: 750 TABLET, EXTENDED RELEASE ORAL at 18:17

## 2024-09-15 RX ADMIN — AMLODIPINE BESYLATE 5 MG: 5 TABLET ORAL at 21:48

## 2024-09-15 RX ADMIN — AMITRIPTYLINE HYDROCHLORIDE 25 MG: 25 TABLET, FILM COATED ORAL at 21:48

## 2024-09-15 RX ADMIN — ATORVASTATIN CALCIUM 40 MG: 20 TABLET, FILM COATED ORAL at 20:52

## 2024-09-15 RX ADMIN — ONDANSETRON 4 MG: 2 INJECTION, SOLUTION INTRAMUSCULAR; INTRAVENOUS at 17:30

## 2024-09-15 RX ADMIN — IOPAMIDOL 85 ML: 612 INJECTION, SOLUTION INTRAVENOUS at 16:28

## 2024-09-15 RX ADMIN — LACOSAMIDE 200 MG: 100 TABLET, FILM COATED ORAL at 20:52

## 2024-09-15 RX ADMIN — CEFTRIAXONE 2000 MG: 2 INJECTION, POWDER, FOR SOLUTION INTRAMUSCULAR; INTRAVENOUS at 21:46

## 2024-09-15 RX ADMIN — ONDANSETRON 4 MG: 2 INJECTION, SOLUTION INTRAMUSCULAR; INTRAVENOUS at 23:48

## 2024-09-15 RX ADMIN — SODIUM CHLORIDE, POTASSIUM CHLORIDE, SODIUM LACTATE AND CALCIUM CHLORIDE 1000 ML: 600; 310; 30; 20 INJECTION, SOLUTION INTRAVENOUS at 15:13

## 2024-09-15 RX ADMIN — METOPROLOL SUCCINATE 25 MG: 25 TABLET, EXTENDED RELEASE ORAL at 21:47

## 2024-09-16 PROBLEM — A07.1 GIARDIASIS: Status: ACTIVE | Noted: 2024-09-16

## 2024-09-16 PROBLEM — K62.5 BLOOD PER RECTUM: Status: ACTIVE | Noted: 2024-09-16

## 2024-09-16 PROBLEM — Z86.73 HISTORY OF STROKE: Status: ACTIVE | Noted: 2024-09-16

## 2024-09-16 PROBLEM — A04.72 C. DIFFICILE COLITIS: Status: ACTIVE | Noted: 2024-09-16

## 2024-09-16 LAB
ADV 40+41 DNA STL QL NAA+NON-PROBE: NOT DETECTED
ANION GAP SERPL CALCULATED.3IONS-SCNC: 12 MMOL/L (ref 5–15)
ASTRO TYP 1-8 RNA STL QL NAA+NON-PROBE: NOT DETECTED
BUN SERPL-MCNC: 4 MG/DL (ref 6–20)
BUN/CREAT SERPL: 6.5 (ref 7–25)
C CAYETANENSIS DNA STL QL NAA+NON-PROBE: NOT DETECTED
C COLI+JEJ+UPSA DNA STL QL NAA+NON-PROBE: NOT DETECTED
C DIFF GDH + TOXINS A+B STL QL IA.RAPID: POSITIVE
C DIFF TOX GENS STL QL NAA+PROBE: POSITIVE
CALCIUM SPEC-SCNC: 8.4 MG/DL (ref 8.6–10.5)
CHLORIDE SERPL-SCNC: 103 MMOL/L (ref 98–107)
CO2 SERPL-SCNC: 21 MMOL/L (ref 22–29)
CREAT SERPL-MCNC: 0.62 MG/DL (ref 0.57–1)
CRYPTOSP DNA STL QL NAA+NON-PROBE: NOT DETECTED
D-LACTATE SERPL-SCNC: 1.7 MMOL/L (ref 0.5–2)
DEPRECATED RDW RBC AUTO: 40.9 FL (ref 37–54)
E HISTOLYT DNA STL QL NAA+NON-PROBE: NOT DETECTED
EAEC PAA PLAS AGGR+AATA ST NAA+NON-PRB: NOT DETECTED
EC STX1+STX2 GENES STL QL NAA+NON-PROBE: NOT DETECTED
EGFRCR SERPLBLD CKD-EPI 2021: 112.8 ML/MIN/1.73
EPEC EAE GENE STL QL NAA+NON-PROBE: NOT DETECTED
ERYTHROCYTE [DISTWIDTH] IN BLOOD BY AUTOMATED COUNT: 14.3 % (ref 12.3–15.4)
ETEC LTA+ST1A+ST1B TOX ST NAA+NON-PROBE: NOT DETECTED
G LAMBLIA DNA STL QL NAA+NON-PROBE: DETECTED
GLUCOSE SERPL-MCNC: 101 MG/DL (ref 65–99)
HCT VFR BLD AUTO: 30.8 % (ref 34–46.6)
HGB BLD-MCNC: 9.7 G/DL (ref 12–15.9)
MAGNESIUM SERPL-MCNC: 2.4 MG/DL (ref 1.6–2.6)
MCH RBC QN AUTO: 25.1 PG (ref 26.6–33)
MCHC RBC AUTO-ENTMCNC: 31.5 G/DL (ref 31.5–35.7)
MCV RBC AUTO: 79.6 FL (ref 79–97)
NOROVIRUS GI+II RNA STL QL NAA+NON-PROBE: NOT DETECTED
P SHIGELLOIDES DNA STL QL NAA+NON-PROBE: NOT DETECTED
PLATELET # BLD AUTO: 432 10*3/MM3 (ref 140–450)
PMV BLD AUTO: 9.7 FL (ref 6–12)
POTASSIUM SERPL-SCNC: 3.5 MMOL/L (ref 3.5–5.2)
RBC # BLD AUTO: 3.87 10*6/MM3 (ref 3.77–5.28)
RVA RNA STL QL NAA+NON-PROBE: NOT DETECTED
S ENT+BONG DNA STL QL NAA+NON-PROBE: NOT DETECTED
SAPO I+II+IV+V RNA STL QL NAA+NON-PROBE: NOT DETECTED
SHIGELLA SP+EIEC IPAH ST NAA+NON-PROBE: NOT DETECTED
SODIUM SERPL-SCNC: 136 MMOL/L (ref 136–145)
V CHOL+PARA+VUL DNA STL QL NAA+NON-PROBE: NOT DETECTED
V CHOLERAE DNA STL QL NAA+NON-PROBE: NOT DETECTED
WBC NRBC COR # BLD AUTO: 9.61 10*3/MM3 (ref 3.4–10.8)
Y ENTEROCOL DNA STL QL NAA+NON-PROBE: NOT DETECTED

## 2024-09-16 PROCEDURE — 80048 BASIC METABOLIC PNL TOTAL CA: CPT | Performed by: INTERNAL MEDICINE

## 2024-09-16 PROCEDURE — 83735 ASSAY OF MAGNESIUM: CPT | Performed by: INTERNAL MEDICINE

## 2024-09-16 PROCEDURE — 87449 NOS EACH ORGANISM AG IA: CPT | Performed by: EMERGENCY MEDICINE

## 2024-09-16 PROCEDURE — 85027 COMPLETE CBC AUTOMATED: CPT | Performed by: INTERNAL MEDICINE

## 2024-09-16 PROCEDURE — 25010000002 ONDANSETRON PER 1 MG: Performed by: INTERNAL MEDICINE

## 2024-09-16 PROCEDURE — G0378 HOSPITAL OBSERVATION PER HR: HCPCS

## 2024-09-16 PROCEDURE — 36415 COLL VENOUS BLD VENIPUNCTURE: CPT | Performed by: INTERNAL MEDICINE

## 2024-09-16 PROCEDURE — 25010000002 METRONIDAZOLE 500 MG/100ML SOLUTION: Performed by: INTERNAL MEDICINE

## 2024-09-16 PROCEDURE — 87493 C DIFF AMPLIFIED PROBE: CPT | Performed by: EMERGENCY MEDICINE

## 2024-09-16 PROCEDURE — 96376 TX/PRO/DX INJ SAME DRUG ADON: CPT

## 2024-09-16 PROCEDURE — 87507 IADNA-DNA/RNA PROBE TQ 12-25: CPT | Performed by: EMERGENCY MEDICINE

## 2024-09-16 RX ORDER — ATORVASTATIN CALCIUM 20 MG/1
40 TABLET, FILM COATED ORAL DAILY
Status: DISCONTINUED | OUTPATIENT
Start: 2024-09-17 | End: 2024-09-17 | Stop reason: HOSPADM

## 2024-09-16 RX ORDER — VANCOMYCIN HYDROCHLORIDE 125 MG/1
125 CAPSULE ORAL EVERY 6 HOURS SCHEDULED
Status: DISCONTINUED | OUTPATIENT
Start: 2024-09-16 | End: 2024-09-17 | Stop reason: HOSPADM

## 2024-09-16 RX ORDER — METRONIDAZOLE 500 MG/1
500 TABLET ORAL EVERY 12 HOURS SCHEDULED
Status: DISCONTINUED | OUTPATIENT
Start: 2024-09-16 | End: 2024-09-17 | Stop reason: HOSPADM

## 2024-09-16 RX ADMIN — METRONIDAZOLE 500 MG: 500 TABLET, FILM COATED ORAL at 11:35

## 2024-09-16 RX ADMIN — ONDANSETRON 4 MG: 2 INJECTION, SOLUTION INTRAMUSCULAR; INTRAVENOUS at 23:07

## 2024-09-16 RX ADMIN — VANCOMYCIN HYDROCHLORIDE 125 MG: 125 CAPSULE ORAL at 11:35

## 2024-09-16 RX ADMIN — VANCOMYCIN HYDROCHLORIDE 125 MG: 125 CAPSULE ORAL at 18:26

## 2024-09-16 RX ADMIN — APIXABAN 5 MG: 5 TABLET, FILM COATED ORAL at 20:33

## 2024-09-16 RX ADMIN — AMLODIPINE BESYLATE 5 MG: 5 TABLET ORAL at 09:03

## 2024-09-16 RX ADMIN — METOPROLOL SUCCINATE 25 MG: 25 TABLET, EXTENDED RELEASE ORAL at 09:03

## 2024-09-16 RX ADMIN — AMITRIPTYLINE HYDROCHLORIDE 25 MG: 25 TABLET, FILM COATED ORAL at 21:23

## 2024-09-16 RX ADMIN — METRONIDAZOLE 500 MG: 500 INJECTION, SOLUTION INTRAVENOUS at 04:40

## 2024-09-16 RX ADMIN — ACETAMINOPHEN 325MG 650 MG: 325 TABLET ORAL at 15:55

## 2024-09-16 RX ADMIN — ACETAMINOPHEN 325MG 650 MG: 325 TABLET ORAL at 04:38

## 2024-09-16 RX ADMIN — ATORVASTATIN CALCIUM 40 MG: 20 TABLET, FILM COATED ORAL at 09:03

## 2024-09-16 RX ADMIN — METRONIDAZOLE 500 MG: 500 TABLET, FILM COATED ORAL at 20:33

## 2024-09-16 RX ADMIN — LACOSAMIDE 200 MG: 100 TABLET, FILM COATED ORAL at 09:03

## 2024-09-16 RX ADMIN — ONDANSETRON 4 MG: 2 INJECTION, SOLUTION INTRAMUSCULAR; INTRAVENOUS at 15:55

## 2024-09-16 RX ADMIN — LACOSAMIDE 200 MG: 100 TABLET, FILM COATED ORAL at 20:33

## 2024-09-17 ENCOUNTER — READMISSION MANAGEMENT (OUTPATIENT)
Dept: CALL CENTER | Facility: HOSPITAL | Age: 44
End: 2024-09-17
Payer: MEDICARE

## 2024-09-17 ENCOUNTER — TELEPHONE (OUTPATIENT)
Dept: GASTROENTEROLOGY | Facility: CLINIC | Age: 44
End: 2024-09-17
Payer: MEDICARE

## 2024-09-17 VITALS
TEMPERATURE: 97.5 F | BODY MASS INDEX: 28.96 KG/M2 | OXYGEN SATURATION: 97 % | WEIGHT: 174 LBS | DIASTOLIC BLOOD PRESSURE: 80 MMHG | HEART RATE: 95 BPM | SYSTOLIC BLOOD PRESSURE: 118 MMHG | RESPIRATION RATE: 18 BRPM

## 2024-09-17 LAB
BASOPHILS # BLD AUTO: 0.02 10*3/MM3 (ref 0–0.2)
BASOPHILS NFR BLD AUTO: 0.3 % (ref 0–1.5)
DEPRECATED RDW RBC AUTO: 41.1 FL (ref 37–54)
EOSINOPHIL # BLD AUTO: 0.16 10*3/MM3 (ref 0–0.4)
EOSINOPHIL NFR BLD AUTO: 2 % (ref 0.3–6.2)
ERYTHROCYTE [DISTWIDTH] IN BLOOD BY AUTOMATED COUNT: 14.1 % (ref 12.3–15.4)
HCT VFR BLD AUTO: 31.2 % (ref 34–46.6)
HGB BLD-MCNC: 9.8 G/DL (ref 12–15.9)
IMM GRANULOCYTES # BLD AUTO: 0.02 10*3/MM3 (ref 0–0.05)
IMM GRANULOCYTES NFR BLD AUTO: 0.3 % (ref 0–0.5)
LYMPHOCYTES # BLD AUTO: 1.49 10*3/MM3 (ref 0.7–3.1)
LYMPHOCYTES NFR BLD AUTO: 19 % (ref 19.6–45.3)
MCH RBC QN AUTO: 25.3 PG (ref 26.6–33)
MCHC RBC AUTO-ENTMCNC: 31.4 G/DL (ref 31.5–35.7)
MCV RBC AUTO: 80.6 FL (ref 79–97)
MONOCYTES # BLD AUTO: 0.45 10*3/MM3 (ref 0.1–0.9)
MONOCYTES NFR BLD AUTO: 5.7 % (ref 5–12)
NEUTROPHILS NFR BLD AUTO: 5.72 10*3/MM3 (ref 1.7–7)
NEUTROPHILS NFR BLD AUTO: 72.7 % (ref 42.7–76)
NRBC BLD AUTO-RTO: 0 /100 WBC (ref 0–0.2)
PLATELET # BLD AUTO: 417 10*3/MM3 (ref 140–450)
PMV BLD AUTO: 9.7 FL (ref 6–12)
RBC # BLD AUTO: 3.87 10*6/MM3 (ref 3.77–5.28)
WBC NRBC COR # BLD AUTO: 7.86 10*3/MM3 (ref 3.4–10.8)

## 2024-09-17 PROCEDURE — 63710000001 ONDANSETRON ODT 4 MG TABLET DISPERSIBLE: Performed by: INTERNAL MEDICINE

## 2024-09-17 PROCEDURE — 85025 COMPLETE CBC W/AUTO DIFF WBC: CPT

## 2024-09-17 PROCEDURE — G0378 HOSPITAL OBSERVATION PER HR: HCPCS

## 2024-09-17 RX ORDER — METRONIDAZOLE 500 MG/1
500 TABLET ORAL EVERY 12 HOURS SCHEDULED
Qty: 11 TABLET | Refills: 0 | Status: SHIPPED | OUTPATIENT
Start: 2024-09-17 | End: 2024-09-23

## 2024-09-17 RX ORDER — VANCOMYCIN HYDROCHLORIDE 125 MG/1
125 CAPSULE ORAL EVERY 6 HOURS SCHEDULED
Qty: 36 CAPSULE | Refills: 0 | Status: SHIPPED | OUTPATIENT
Start: 2024-09-17 | End: 2024-09-26

## 2024-09-17 RX ORDER — ONDANSETRON 4 MG/1
4 TABLET, ORALLY DISINTEGRATING ORAL EVERY 6 HOURS PRN
Qty: 15 TABLET | Refills: 0 | Status: SHIPPED | OUTPATIENT
Start: 2024-09-17

## 2024-09-17 RX ADMIN — METOPROLOL SUCCINATE 25 MG: 25 TABLET, EXTENDED RELEASE ORAL at 09:49

## 2024-09-17 RX ADMIN — AMLODIPINE BESYLATE 5 MG: 5 TABLET ORAL at 09:49

## 2024-09-17 RX ADMIN — METRONIDAZOLE 500 MG: 500 TABLET, FILM COATED ORAL at 09:49

## 2024-09-17 RX ADMIN — VANCOMYCIN HYDROCHLORIDE 125 MG: 125 CAPSULE ORAL at 09:50

## 2024-09-17 RX ADMIN — ONDANSETRON 4 MG: 4 TABLET, ORALLY DISINTEGRATING ORAL at 09:50

## 2024-09-17 RX ADMIN — LACOSAMIDE 200 MG: 100 TABLET, FILM COATED ORAL at 09:49

## 2024-09-17 RX ADMIN — VANCOMYCIN HYDROCHLORIDE 125 MG: 125 CAPSULE ORAL at 00:41

## 2024-09-17 RX ADMIN — VANCOMYCIN HYDROCHLORIDE 125 MG: 125 CAPSULE ORAL at 13:33

## 2024-09-17 RX ADMIN — APIXABAN 5 MG: 5 TABLET, FILM COATED ORAL at 09:50

## 2024-09-18 ENCOUNTER — TELEPHONE (OUTPATIENT)
Dept: NEUROLOGY | Facility: CLINIC | Age: 44
End: 2024-09-18
Payer: MEDICARE

## 2024-09-18 ENCOUNTER — TRANSITIONAL CARE MANAGEMENT TELEPHONE ENCOUNTER (OUTPATIENT)
Dept: CALL CENTER | Facility: HOSPITAL | Age: 44
End: 2024-09-18
Payer: MEDICARE

## 2024-09-20 ENCOUNTER — OFFICE VISIT (OUTPATIENT)
Dept: INTERNAL MEDICINE | Age: 44
End: 2024-09-20
Payer: MEDICARE

## 2024-09-20 VITALS
OXYGEN SATURATION: 97 % | SYSTOLIC BLOOD PRESSURE: 130 MMHG | HEIGHT: 65 IN | HEART RATE: 101 BPM | DIASTOLIC BLOOD PRESSURE: 80 MMHG | WEIGHT: 175 LBS | TEMPERATURE: 97.2 F | BODY MASS INDEX: 29.16 KG/M2 | RESPIRATION RATE: 16 BRPM

## 2024-09-20 DIAGNOSIS — Z86.19 HISTORY OF CLOSTRIDIOIDES DIFFICILE INFECTION: Primary | ICD-10-CM

## 2024-09-20 DIAGNOSIS — Z86.39 HISTORY OF HYPOKALEMIA: ICD-10-CM

## 2024-09-20 DIAGNOSIS — D50.9 MICROCYTIC ANEMIA: ICD-10-CM

## 2024-09-20 DIAGNOSIS — Z86.19 HISTORY OF GIARDIA INFECTION: ICD-10-CM

## 2024-09-20 LAB
ALBUMIN SERPL-MCNC: 3.7 G/DL (ref 3.5–5.2)
ALBUMIN/GLOB SERPL: 1.4 G/DL
ALP SERPL-CCNC: 175 U/L (ref 39–117)
ALT SERPL-CCNC: 8 U/L (ref 1–33)
AST SERPL-CCNC: 13 U/L (ref 1–32)
BASOPHILS # BLD AUTO: 0.03 10*3/MM3 (ref 0–0.2)
BASOPHILS NFR BLD AUTO: 0.3 % (ref 0–1.5)
BILIRUB SERPL-MCNC: <0.2 MG/DL (ref 0–1.2)
BUN SERPL-MCNC: 4 MG/DL (ref 6–20)
BUN/CREAT SERPL: 7 (ref 7–25)
CALCIUM SERPL-MCNC: 8.6 MG/DL (ref 8.6–10.5)
CHLORIDE SERPL-SCNC: 100 MMOL/L (ref 98–107)
CO2 SERPL-SCNC: 24.5 MMOL/L (ref 22–29)
CREAT SERPL-MCNC: 0.57 MG/DL (ref 0.57–1)
EGFRCR SERPLBLD CKD-EPI 2021: 115.1 ML/MIN/1.73
EOSINOPHIL # BLD AUTO: 0.08 10*3/MM3 (ref 0–0.4)
EOSINOPHIL NFR BLD AUTO: 0.8 % (ref 0.3–6.2)
ERYTHROCYTE [DISTWIDTH] IN BLOOD BY AUTOMATED COUNT: 14.3 % (ref 12.3–15.4)
FERRITIN SERPL-MCNC: 19.3 NG/ML (ref 13–150)
GLOBULIN SER CALC-MCNC: 2.6 GM/DL
GLUCOSE SERPL-MCNC: 91 MG/DL (ref 65–99)
HCT VFR BLD AUTO: 33.8 % (ref 34–46.6)
HGB BLD-MCNC: 10.9 G/DL (ref 12–15.9)
IMM GRANULOCYTES # BLD AUTO: 0.04 10*3/MM3 (ref 0–0.05)
IMM GRANULOCYTES NFR BLD AUTO: 0.4 % (ref 0–0.5)
IRON SATN MFR SERPL: 6 % (ref 20–50)
IRON SERPL-MCNC: 25 MCG/DL (ref 37–145)
LYMPHOCYTES # BLD AUTO: 1.59 10*3/MM3 (ref 0.7–3.1)
LYMPHOCYTES NFR BLD AUTO: 16.7 % (ref 19.6–45.3)
MCH RBC QN AUTO: 25.5 PG (ref 26.6–33)
MCHC RBC AUTO-ENTMCNC: 32.2 G/DL (ref 31.5–35.7)
MCV RBC AUTO: 79.2 FL (ref 79–97)
MONOCYTES # BLD AUTO: 0.67 10*3/MM3 (ref 0.1–0.9)
MONOCYTES NFR BLD AUTO: 7 % (ref 5–12)
NEUTROPHILS # BLD AUTO: 7.1 10*3/MM3 (ref 1.7–7)
NEUTROPHILS NFR BLD AUTO: 74.8 % (ref 42.7–76)
NRBC BLD AUTO-RTO: 0 /100 WBC (ref 0–0.2)
PLATELET # BLD AUTO: 464 10*3/MM3 (ref 140–450)
POTASSIUM SERPL-SCNC: 3.9 MMOL/L (ref 3.5–5.2)
PROT SERPL-MCNC: 6.3 G/DL (ref 6–8.5)
RBC # BLD AUTO: 4.27 10*6/MM3 (ref 3.77–5.28)
SODIUM SERPL-SCNC: 136 MMOL/L (ref 136–145)
TIBC SERPL-MCNC: 405 MCG/DL
UIBC SERPL-MCNC: 380 MCG/DL (ref 112–346)
WBC # BLD AUTO: 9.51 10*3/MM3 (ref 3.4–10.8)

## 2024-09-20 PROCEDURE — 99495 TRANSJ CARE MGMT MOD F2F 14D: CPT

## 2024-09-20 PROCEDURE — 3075F SYST BP GE 130 - 139MM HG: CPT

## 2024-09-20 PROCEDURE — 1160F RVW MEDS BY RX/DR IN RCRD: CPT

## 2024-09-20 PROCEDURE — 1159F MED LIST DOCD IN RCRD: CPT

## 2024-09-20 PROCEDURE — 1125F AMNT PAIN NOTED PAIN PRSNT: CPT

## 2024-09-20 PROCEDURE — 3079F DIAST BP 80-89 MM HG: CPT

## 2024-09-20 PROCEDURE — 1111F DSCHRG MED/CURRENT MED MERGE: CPT

## 2024-09-21 LAB
BACTERIA SPEC AEROBE CULT: NORMAL
BACTERIA SPEC AEROBE CULT: NORMAL

## 2024-09-24 ENCOUNTER — TELEPHONE (OUTPATIENT)
Dept: NEUROLOGY | Facility: CLINIC | Age: 44
End: 2024-09-24

## 2024-09-24 ENCOUNTER — TELEPHONE (OUTPATIENT)
Dept: INTERNAL MEDICINE | Age: 44
End: 2024-09-24
Payer: MEDICARE

## 2024-09-24 NOTE — TELEPHONE ENCOUNTER
Caller: Alexandra Xiao    Relationship: Self    Best call back number: [phone] (home)     What was the call regarding: PATIENT CALLED AND STATED THAT SHE HAD 2 MORE SEIZURES YESTERDAY. IN ADDITION TO WHAT IS MENTION ENCOUNTER.     SHE IS HOPING TO SPEAK TO MEDICAL ABOUT WHAT SHE NEEDS TO DO TO HELP THEM STOP.     PLEASE ADVISE  THANK YOU

## 2024-09-26 ENCOUNTER — TELEPHONE (OUTPATIENT)
Dept: NEUROLOGY | Facility: CLINIC | Age: 44
End: 2024-09-26

## 2024-09-26 DIAGNOSIS — G40.309 GENERALIZED NONCONVULSIVE EPILEPSY: ICD-10-CM

## 2024-09-26 NOTE — TELEPHONE ENCOUNTER
Caller: Alexandra Xiao    Relationship: Self    Best call back number: [phone] (home)     What is the best time to reach you: ANYTIME    Who are you requesting to speak with (clinical staff, provider,  specific staff member): UNSURE    Do you know the name of the person who called: UNSURE    What was the call regarding: PATIENT STATED SHE DID NOT GET TO HER PHONE IN TIME AND ASKS WHOEVER CALLED TO CALL HER BACK    I have reviewed discharge instructions with the patient.  The patient verbalized understanding.    Patient left ED via Discharge Method: ambulatory to Home with self.    Opportunity for questions and clarification provided.       Patient given 2 scripts.         To continue your aftercare when you leave the hospital, you may receive an automated call from our care team to check in on how you are doing.  This is a free service and part of our promise to provide the best care and service to meet your aftercare needs.” If you have questions, or wish to unsubscribe from this service please call [phone].  Thank you for Choosing our [facility] Emergency Department.

## 2024-09-27 RX ORDER — LACOSAMIDE 200 MG/1
1 TABLET, FILM COATED ORAL EVERY 12 HOURS
Qty: 60 TABLET | Refills: 1 | Status: SHIPPED | OUTPATIENT
Start: 2024-09-27

## 2024-09-30 ENCOUNTER — OFFICE VISIT (OUTPATIENT)
Dept: NEUROLOGY | Facility: CLINIC | Age: 44
End: 2024-09-30
Payer: MEDICARE

## 2024-09-30 VITALS
BODY MASS INDEX: 28.99 KG/M2 | SYSTOLIC BLOOD PRESSURE: 124 MMHG | WEIGHT: 174 LBS | OXYGEN SATURATION: 97 % | HEART RATE: 104 BPM | DIASTOLIC BLOOD PRESSURE: 86 MMHG | HEIGHT: 65 IN

## 2024-09-30 DIAGNOSIS — Z96.89 STATUS POST VNS (VAGUS NERVE STIMULATOR) PLACEMENT: ICD-10-CM

## 2024-09-30 DIAGNOSIS — G40.309 GENERALIZED NONCONVULSIVE EPILEPSY: Primary | ICD-10-CM

## 2024-09-30 DIAGNOSIS — G40.919 BREAKTHROUGH SEIZURE: ICD-10-CM

## 2024-09-30 DIAGNOSIS — I63.50 CEREBRAL ARTERY OCCLUSION WITH CEREBRAL INFARCTION: ICD-10-CM

## 2024-09-30 PROCEDURE — 1159F MED LIST DOCD IN RCRD: CPT | Performed by: PSYCHIATRY & NEUROLOGY

## 2024-09-30 PROCEDURE — 1160F RVW MEDS BY RX/DR IN RCRD: CPT | Performed by: PSYCHIATRY & NEUROLOGY

## 2024-09-30 PROCEDURE — 3074F SYST BP LT 130 MM HG: CPT | Performed by: PSYCHIATRY & NEUROLOGY

## 2024-09-30 PROCEDURE — 3079F DIAST BP 80-89 MM HG: CPT | Performed by: PSYCHIATRY & NEUROLOGY

## 2024-09-30 PROCEDURE — 99215 OFFICE O/P EST HI 40 MIN: CPT | Performed by: PSYCHIATRY & NEUROLOGY

## 2024-09-30 PROCEDURE — 95970 ALYS NPGT W/O PRGRMG: CPT | Performed by: PSYCHIATRY & NEUROLOGY

## 2024-09-30 RX ORDER — CENOBAMATE 100 MG/1
100 TABLET, FILM COATED ORAL DAILY
Qty: 30 TABLET | Refills: 4 | Status: SHIPPED | OUTPATIENT
Start: 2024-09-30 | End: 2024-10-30

## 2024-09-30 NOTE — PROGRESS NOTES
Chief Complaint   Patient presents with    Generalized nonconvulsive epilepsy    VNS       Patient ID: Alexandra Xiao is a 43 y.o. female.    HPI: I have had the pleasure of seeing your patient today.  As you may know she is a 43-year-old female here for the management of seizures.  She apparently was prescribed Flagyl for C. difficile recently.  She did note a significant uptick in seizures over the past week or so.  She has been having absence seizures which is atypical for her.  These are staring spells associated with some myoclonic jerking movement.  She is unresponsive during the spell.  She does not lose bowel or bladder.  However she has also had 2 breakthrough generalized tonic-clonic seizures since last follow-up.  These are almost always associated with loss of urine for her.  No loss of bowel.  Generally speaking she will have some confusion however she is able to reorient after several minutes.  Currently she is on Aptiom, Vimpat and Xcopri.  The latter being the most recent addition.  She does have the vagus nerve stimulator and has had issues with stimulation.  She has gotten into the habit of turning it off when she feels the stimulation.    The following portions of the patient's history were reviewed and updated as appropriate: allergies, current medications, past family history, past medical history, past social history, past surgical history and problem list.    Review of Systems   Constitutional:  Positive for activity change and fatigue.   Neurological:  Positive for dizziness and seizures (9/7/24). Negative for tremors, syncope, facial asymmetry, speech difficulty, weakness, light-headedness, numbness and headaches.   Psychiatric/Behavioral:  Positive for agitation, confusion, decreased concentration, dysphoric mood and sleep disturbance. Negative for behavioral problems, hallucinations, self-injury and suicidal ideas. The patient is nervous/anxious. The patient is not hyperactive.       I  have reviewed the review of systems above performed by my medical assistant.      Vitals:    24 1130   BP: 124/86   Pulse: 104   SpO2: 97%       Neurologic Exam     Mental Status   Oriented to person, place, and time.   Concentration: normal.   Level of consciousness: alert  Knowledge: consistent with education (No deficits found.).     Cranial Nerves     CN II   Visual fields full to confrontation.     CN III, IV, VI   Pupils are equal, round, and reactive to light.  Extraocular motions are normal.   CN III: no CN III palsy  CN VI: no CN VI palsy    CN V   Facial sensation intact.     CN VII   Facial expression full, symmetric.     CN VIII   CN VIII normal.     CN IX, X   CN IX normal.   CN X normal.     CN XI   CN XI normal.     CN XII   CN XII normal.     Motor Exam     Strength   Right neck flexion: 5/5  Left neck flexion: 5/5  Right neck extension: 5/5  Left neck extension: 5/5  Right deltoid: 5/5  Left deltoid: 5/5  Right biceps: 5/5  Left biceps: 5/5  Right triceps: 5/5  Left triceps: 5/5  Right wrist flexion: 5/5  Left wrist flexion: 5/5  Right wrist extension: 5/5  Left wrist extension: 5/5  Right interossei: 5/5  Left interossei: 5/5  Right abdominals: 5/5  Left abdominals: 5/5  Right iliopsoas: 5/5  Left iliopsoas: 5/5  Right quadriceps: 5/5  Left quadriceps: 5/5  Right hamstrin/5  Left hamstrin/5  Right glutei: 5/5  Left glutei: 5/5  Right anterior tibial: 5/5  Left anterior tibial: 5/5  Right posterior tibial: 5/5  Left posterior tibial: 5/5  Right peroneal: 5/5  Left peroneal: 5/5  Right gastroc: 5/5  Left gastroc: 5/5    Sensory Exam   Light touch normal.   Vibration normal.     Gait, Coordination, and Reflexes     Gait  Gait: normal    Reflexes   Right brachioradialis: 2+  Left brachioradialis: 2+  Right biceps: 2+  Left biceps: 2+  Right triceps: 2+  Left triceps: 2+  Right patellar: 2+  Left patellar: 2+  Right achilles: 2+  Left achilles: 2+  Right : 2+  Left : 2+Station is  normal.       Physical Exam  Vitals reviewed.   Constitutional:       Appearance: She is well-developed.   HENT:      Head: Normocephalic and atraumatic.   Eyes:      Extraocular Movements: EOM normal.      Pupils: Pupils are equal, round, and reactive to light.   Cardiovascular:      Rate and Rhythm: Normal rate and regular rhythm.   Pulmonary:      Breath sounds: Normal breath sounds.   Musculoskeletal:         General: Normal range of motion.   Skin:     General: Skin is warm.   Neurological:      Mental Status: She is oriented to person, place, and time.      Gait: Gait is intact.      Deep Tendon Reflexes:      Reflex Scores:       Tricep reflexes are 2+ on the right side and 2+ on the left side.       Bicep reflexes are 2+ on the right side and 2+ on the left side.       Brachioradialis reflexes are 2+ on the right side and 2+ on the left side.       Patellar reflexes are 2+ on the right side and 2+ on the left side.       Achilles reflexes are 2+ on the right side and 2+ on the left side.        Procedures: Vagus nerve stimulator interrogation and diagnostics  Indication: History of seizures  Report:  Normal mode settings are as follows: Output current is set to 1.625 mA, frequency is 30 Hz, pulse width is 250 µs, on x30 seconds, off time is 5 minutes and duty cycle is 10%.  Auto stimulation mode settings are as follows: Output current is set to 1.875 mA, pulse width is 250 µs and on time 60 seconds.  Magnet mode settings are as follows: Output current is set to 2.75 mA, pulse width is 250 µs and on time is 60 seconds.  Setting changes: No setting changes were made today.  Diagnostics: Battery is at %.  Output is 1.625 mA.  Impedance looks good.  Total time spent: 20 minutes    Assessment/Plan: Given the breakthrough seizure activity we are going to increase the Xcopri to 100 mg daily.   Flagyl certainly will lower the seizure threshold.  She has been off of it for about a week however continues to have  these breakthrough absence seizure's which are atypical for her.  We will schedule an MRI of her brain with and without contrast.  She does have a history of stroke therefore it would be good to visualize the  brain.  We did interrogate her vagus nerve stimulator today.  Diagnostics were performed.  No setting changes were made.  Return to clinic in 3 months or sooner if needed.  A total of 45 minutes was spent face-to-face with the patient today.  Of that greater than 50% of this time was spent discussing signs and symptoms of stroke, seizures, patient education, plan of care and prognosis.         Diagnoses and all orders for this visit:    1. Generalized nonconvulsive epilepsy (Primary)  -     Cenobamate (Xcopri) 100 MG tablet; Take 100 mg by mouth Daily for 30 days.  Dispense: 30 tablet; Refill: 4  -     MRI Brain With & Without Contrast; Future    2. Cerebral artery occlusion with cerebral infarction    3. Status post VNS (vagus nerve stimulator) placement    4. Breakthrough seizure  -     Cenobamate (Xcopri) 100 MG tablet; Take 100 mg by mouth Daily for 30 days.  Dispense: 30 tablet; Refill: 4  -     MRI Brain With & Without Contrast; Future           Cb James II, MD

## 2024-10-08 ENCOUNTER — OFFICE VISIT (OUTPATIENT)
Dept: GASTROENTEROLOGY | Facility: CLINIC | Age: 44
End: 2024-10-08
Payer: MEDICARE

## 2024-10-08 ENCOUNTER — TELEPHONE (OUTPATIENT)
Dept: GASTROENTEROLOGY | Facility: CLINIC | Age: 44
End: 2024-10-08
Payer: MEDICARE

## 2024-10-08 VITALS
WEIGHT: 176.4 LBS | OXYGEN SATURATION: 93 % | BODY MASS INDEX: 29.39 KG/M2 | DIASTOLIC BLOOD PRESSURE: 83 MMHG | HEIGHT: 65 IN | SYSTOLIC BLOOD PRESSURE: 120 MMHG | HEART RATE: 111 BPM

## 2024-10-08 DIAGNOSIS — A04.72 COLITIS, CLOSTRIDIUM DIFFICILE: Primary | ICD-10-CM

## 2024-10-08 DIAGNOSIS — Z12.11 SCREENING FOR COLORECTAL CANCER: ICD-10-CM

## 2024-10-08 DIAGNOSIS — Z12.12 SCREENING FOR COLORECTAL CANCER: ICD-10-CM

## 2024-10-08 NOTE — TELEPHONE ENCOUNTER
DR HERNANDEZ YOU SAW PT IN OFFICE REQUESTED SHE HAVE COLONOSCOPY ALL YOUR HOP DAYS ARE USED SHE IS NOT ELIGIBLE TO GO TO [facility]-PLEASE ADVISE,I DID ALSO PLACE HER ON CANCELLATION LIST SO IF THERE IS A CANCELLATION,OK FOR HUB TO RELAY

## 2024-10-08 NOTE — PROGRESS NOTES
Chief Complaint   Patient presents with    Ulcerative Colitis    Hospital Follow Up Visit        Alexandra Xiao is a  44 y.o. female here for a follow up visit for infectious colitis    HPI this 44-year-old female patient of KIRSTIE Pan presents in follow-up since seen in the hospital setting back in September.  She had been admitted at that time for 4-week history of diarrhea subsequent to a hysterectomy and abscess drainage procedure at which time she was treated with amoxicillin.  She had stool studies obtained during this recent hospital stay that revealed positive findings for Giardia lamblia as well as C. difficile.  She was treated with vancomycin and metronidazole and has shown marked improvement of her symptoms although the stools are still of a loose nature and usually 3/day which is her normal frequency.  We talked about follow-up colonoscopic examination both on the basis of this recent episode of colitis and also for screening purposes.  She is amenable to this.    Past Medical History:   Diagnosis Date    Abnormal bleeding in menstrual cycle     COPD (chronic obstructive pulmonary disease)     Depression     Epilepsy     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,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    Headache, tension-type     Heavy menstrual bleeding     WITH CLOTS    Hyperlipidemia     Hypertension     Memory loss     Migraine     Seizures     Status post placement of implantable loop recorder     Stroke     MARCH AND APRIL, 2022       Current Outpatient Medications   Medication Sig Dispense Refill    amitriptyline (ELAVIL) 25 MG tablet TAKE 1 TABLET BY MOUTH EVERY NIGHT 30 tablet 3    amLODIPine (NORVASC) 5 MG tablet TAKE 1 TABLET BY MOUTH DAILY 90 tablet 1    apixaban (ELIQUIS) 5 MG tablet tablet Take 1 tablet by mouth 2 (Two) Times a Day. 180 tablet 2    aspirin 81 MG EC tablet Take 1 tablet by mouth Daily. 30 tablet 0    atorvastatin (LIPITOR) 40 MG tablet TAKE 1 TABLET BY MOUTH EVERY DAY 60 tablet 2    Cenobamate (Xcopri) 100 MG  tablet Take 100 mg by mouth Daily for 30 days. 30 tablet 4    Eslicarbazepine Acetate (Aptiom) 400 MG tablet Take 1 tablet by mouth Daily. Take with 800mg for total dose 1200mg daily 90 tablet 1    Eslicarbazepine Acetate (Aptiom) 800 MG tablet tablet Take 1 tablet by mouth Daily. Take with 400mg for total dose 1200mg daily. 90 tablet 1    metoprolol succinate XL (TOPROL-XL) 25 MG 24 hr tablet TAKE 1 TABLET BY MOUTH DAILY 90 tablet 0    ondansetron ODT (ZOFRAN-ODT) 4 MG disintegrating tablet Take 1 tablet by mouth Every 6 (Six) Hours As Needed for Nausea or Vomiting. 15 tablet 0    Vimpat 200 MG tablet TAKE 1 TABLET BY MOUTH EVERY 12 HOURS 60 tablet 1    docusate sodium 100 MG capsule Take 1 capsule by mouth. (Patient not taking: Reported on 2024)       No current facility-administered medications for this visit.       PRN Meds:.    No Known Allergies    Social History     Socioeconomic History    Marital status: Single    Number of children: 2   Tobacco Use    Smoking status: Former     Current packs/day: 0.00     Average packs/day: 1 pack/day for 29.0 years (29.0 ttl pk-yrs)     Types: Cigarettes     Start date:      Quit date:      Years since quittin.7    Smokeless tobacco: Never    Tobacco comments:     Quit 2022   Vaping Use    Vaping status: Every Day    Substances: Nicotine, Flavoring    Devices: Disposable, 6% NICOTINE   Substance and Sexual Activity    Alcohol use: Not Currently    Drug use: No    Sexual activity: Yes     Partners: Male     Birth control/protection: Tubal ligation       Family History   Problem Relation Age of Onset    Breast cancer Mother 50         age 60 METS    Arthritis Mother     Arthritis Father     Diabetes Father     Heart disease Father     Hypertension Father     Heart attack Father     Hypertension Brother     Heart attack Paternal Grandfather     Ovarian cancer Neg Hx     Uterine cancer Neg Hx     Colon cancer Neg Hx     Deep vein thrombosis Neg Hx      Pulmonary embolism Neg Hx     Malig Hyperthermia Neg Hx        Review of Systems   Constitutional:  Negative for activity change, appetite change, fatigue and unexpected weight change.   HENT:  Negative for congestion, facial swelling, sore throat, trouble swallowing and voice change.    Eyes:  Negative for photophobia and visual disturbance.   Respiratory:  Negative for cough and choking.    Cardiovascular:  Negative for chest pain.   Gastrointestinal:  Positive for diarrhea. Negative for abdominal distention, abdominal pain, anal bleeding, blood in stool, constipation, nausea, rectal pain and vomiting.   Endocrine: Negative for polyphagia.   Musculoskeletal:  Negative for arthralgias, gait problem and joint swelling.   Skin:  Negative for color change, pallor and rash.   Allergic/Immunologic: Negative for food allergies.   Neurological:  Negative for speech difficulty and headaches.   Hematological:  Does not bruise/bleed easily.   Psychiatric/Behavioral:  Negative for agitation, confusion and sleep disturbance.        Vitals:    10/08/24 1334   BP: 120/83   Pulse: 111   SpO2: 93%       Physical Exam  Constitutional:       Appearance: She is well-developed.   HENT:      Head: Normocephalic.   Eyes:      Conjunctiva/sclera: Conjunctivae normal.   Cardiovascular:      Rate and Rhythm: Normal rate and regular rhythm.   Pulmonary:      Breath sounds: Normal breath sounds.   Abdominal:      General: Bowel sounds are normal.      Palpations: Abdomen is soft.   Musculoskeletal:         General: Normal range of motion.      Cervical back: Normal range of motion.   Skin:     General: Skin is warm and dry.   Neurological:      Mental Status: She is alert and oriented to person, place, and time.   Psychiatric:         Behavior: Behavior normal.         ASSESSMENT   #1 infectious colitis: GI panel by PCR revealed Giardia lamblia and testing for C. difficile was positive.  #2 screening evaluation for colorectal  cancer      PLAN  Schedule colonoscopy  Consider fiber supplement as needed to address stool consistency      ICD-10-CM ICD-9-CM   1. Colitis, Clostridium difficile  A04.72 008.45   2. Screening for colorectal cancer  Z12.11 V76.51    Z12.12 V76.41

## 2024-10-09 ENCOUNTER — TELEPHONE (OUTPATIENT)
Dept: GASTROENTEROLOGY | Facility: CLINIC | Age: 44
End: 2024-10-09
Payer: MEDICARE

## 2024-10-09 PROBLEM — Z12.12 SCREENING FOR COLORECTAL CANCER: Status: ACTIVE | Noted: 2024-10-08

## 2024-10-09 PROBLEM — Z12.11 SCREENING FOR COLORECTAL CANCER: Status: ACTIVE | Noted: 2024-10-08

## 2024-10-09 PROBLEM — A04.72 COLITIS, CLOSTRIDIUM DIFFICILE: Status: ACTIVE | Noted: 2024-10-08

## 2024-10-09 NOTE — TELEPHONE ENCOUNTER
TASIA DANGELO IN SCHEDULING PT SCHEDULED 11/07/2024 ARRIVING AT 1:00PM,M[facility]AL[facility] /EGD PREP INFORMATION UPLOADED TO MY CHART AND MAILED TO ADDRESS VERIFIED PT VERBALIZES UNDERSTANDING TO HOLD ON ELIQUIS ALSO HIGHLIGHTED ON PREP INFO SHEET.OK FOR HUB TO RELAY

## 2024-10-09 NOTE — TELEPHONE ENCOUNTER
Message sent to KIERAN Griggs NP for approval for pt to hold Eliquis for 2 days prior to upcoming c/s on 11/07/2024 with Dr White.

## 2024-10-09 NOTE — TELEPHONE ENCOUNTER
PT SCHEDULED 11/07/2024 PT ON ELIQUIS,PT VERBALIZES UNDERSTANDING TO HOLD HIGHLIGHTED ON INFORMATION SHEET AS WELL.OK FOR HUB TO RELAY

## 2024-10-09 NOTE — TELEPHONE ENCOUNTER
This is not scheduled until November.  She had a stroke earlier this year.  I will defer to Dr. Lester upon her return.

## 2024-10-21 DIAGNOSIS — I10 PRIMARY HYPERTENSION: ICD-10-CM

## 2024-10-21 RX ORDER — METOPROLOL SUCCINATE 25 MG/1
25 TABLET, EXTENDED RELEASE ORAL DAILY
Qty: 90 TABLET | Refills: 1 | Status: SHIPPED | OUTPATIENT
Start: 2024-10-21

## 2024-10-22 ENCOUNTER — TELEPHONE (OUTPATIENT)
Dept: GASTROENTEROLOGY | Facility: CLINIC | Age: 44
End: 2024-10-22
Payer: MEDICARE

## 2024-10-22 NOTE — TELEPHONE ENCOUNTER
TASIA DANGELO IN SCHEDULING PT SCHEDULED TO ARRIVE  AM ,TASIA PT PT AGREEABLE TO EARLIER ARRIVAL.OK FOR HUB TO RELAY

## 2024-10-24 ENCOUNTER — OFFICE VISIT (OUTPATIENT)
Dept: INTERNAL MEDICINE | Age: 44
End: 2024-10-24
Payer: MEDICARE

## 2024-10-24 VITALS
RESPIRATION RATE: 18 BRPM | HEIGHT: 65 IN | TEMPERATURE: 97.7 F | DIASTOLIC BLOOD PRESSURE: 72 MMHG | WEIGHT: 176 LBS | OXYGEN SATURATION: 99 % | HEART RATE: 84 BPM | BODY MASS INDEX: 29.32 KG/M2 | SYSTOLIC BLOOD PRESSURE: 115 MMHG

## 2024-10-24 DIAGNOSIS — I10 PRIMARY HYPERTENSION: Primary | ICD-10-CM

## 2024-10-24 DIAGNOSIS — H53.2 DOUBLE VISION WITH BOTH EYES OPEN: ICD-10-CM

## 2024-10-24 DIAGNOSIS — Z90.710 HISTORY OF HYSTERECTOMY: ICD-10-CM

## 2024-10-24 DIAGNOSIS — Z01.419 ROUTINE GYNECOLOGICAL EXAMINATION: ICD-10-CM

## 2024-10-24 DIAGNOSIS — H53.9 VISION CHANGES: ICD-10-CM

## 2024-10-24 DIAGNOSIS — Z23 ENCOUNTER FOR IMMUNIZATION: ICD-10-CM

## 2024-10-24 PROCEDURE — 3078F DIAST BP <80 MM HG: CPT

## 2024-10-24 PROCEDURE — 1125F AMNT PAIN NOTED PAIN PRSNT: CPT

## 2024-10-24 PROCEDURE — 90656 IIV3 VACC NO PRSV 0.5 ML IM: CPT

## 2024-10-24 PROCEDURE — G0008 ADMIN INFLUENZA VIRUS VAC: HCPCS

## 2024-10-24 PROCEDURE — 1159F MED LIST DOCD IN RCRD: CPT

## 2024-10-24 PROCEDURE — 3074F SYST BP LT 130 MM HG: CPT

## 2024-10-24 PROCEDURE — 1160F RVW MEDS BY RX/DR IN RCRD: CPT

## 2024-10-24 PROCEDURE — 99214 OFFICE O/P EST MOD 30 MIN: CPT

## 2024-10-24 NOTE — LETTER
[facility]  Vaccine Consent Form    Patient Name:  Alexandra Xiao  Patient :  1980     Vaccine(s) Ordered    Fluzone >6mos ([id number])        Screening Checklist  The following questions should be completed prior to vaccination. If you answer “yes” to any question, it does not necessarily mean you should not be vaccinated. It just means we may need to clarify or ask more questions. If a question is unclear, please ask your healthcare provider to explain it.    Yes No   Any fever or moderate to severe illness today (mild illness and/or antibiotic treatment are not contraindications)?     Do you have a history of a serious reaction to any previous vaccinations, such as anaphylaxis, encephalopathy within 7 days, Guillain-Montrose syndrome within 6 weeks, seizure?     Have you received any live vaccine(s) (e.g MMR, NESTOR) or any other vaccines in the last month (to ensure duplicate doses aren't given)?     Do you have an anaphylactic allergy to latex (DTaP, DTaP-IPV, Hep A, Hep B, MenB, RV, Td, Tdap), baker’s yeast (Hep B, HPV), polysorbates (RSV, nirsevimab, PCV 20, Rotavirrus, Tdap, Shingrix), or gelatin (NESTOR, MMR)?     Do you have an anaphylactic allergy to neomycin (Rabies, NESTOR, MMR, IPV, Hep A), polymyxin B (IPV), or streptomycin (IPV)?      Any cancer, leukemia, AIDS, or other immune system disorder? (NESTOR, MMR, RV)     Do you have a parent, brother, or sister with an immune system problem (if immune competence of vaccine recipient clinically verified, can proceed)? (MMR, NESTOR)     Any recent steroid treatments for >2 weeks, chemotherapy, or radiation treatment? (NESTOR, MMR)     Have you received antibody-containing blood transfusions or IVIG in the past 11 months (recommended interval is dependent on product)? (MMR, NESTOR)     Have you taken antiviral drugs (acyclovir, famciclovir, valacyclovir for NESTOR) in the last 24 or 48 hours, respectively?      Are you pregnant or planning to become pregnant within 1  "month? (NESTOR, MMR, HPV, IPV, MenB, Abrexvy; For Hep B- refer to Engerix-B; For RSV - Abrysvo is indicated for 32-36 weeks of pregnancy from September to January)     For infants, have you ever been told your child has had intussusception or a medical emergency involving obstruction of the intestine (Rotavirus)? If not for an infant, can skip this question.         *Ordering Physicians/APC should be consulted if \"yes\" is checked by the patient or guardian above.  I have received, read, and understand the Vaccine Information Statement (VIS) for each vaccine ordered.  I have considered my or my child's health status as well as the health status of my close contacts.  I have taken the opportunity to discuss my vaccine questions with my or my child's health care provider.   I have requested that the ordered vaccine(s) be given to me or my child.  I understand the benefits and risks of the vaccines.  I understand that I should remain in the clinic for 15 minutes after receiving the vaccine(s).  _________________________________________________________  Signature of Patient or Parent/Legal Guardian ____________________  Date     "

## 2024-10-24 NOTE — PROGRESS NOTES
"    I N T E R N A L  M E D I C I N E  Annalee Huddleston, APRN    ENCOUNTER DATE:  10/24/2024    Alexandra M Green / 44 y.o. / female      CHIEF COMPLAINT / REASON FOR OFFICE VISIT     Hypertension      ASSESSMENT & PLAN     Diagnoses and all orders for this visit:    1. Primary hypertension (Primary)  Overview:  Continue amlodipine 5 mg daily and Toprol XL 25 mg daily.    Orders:  -     CBC & Differential  -     Comprehensive Metabolic Panel    2. Routine gynecological examination  -     Ambulatory Referral to Obstetrics / Gynecology    3. History of hysterectomy  -     Ambulatory Referral to Obstetrics / Gynecology    4. Double vision with both eyes open  -     Ambulatory Referral to Ophthalmology    5. Vision changes  -     Ambulatory Referral to Ophthalmology    6. Encounter for immunization  -     Fluzone >6mos ([id number])         SUMMARY/DISCUSSION  Continue to monitor BP at home to ensure it is averaging < 130/80.  She is requesting referral to female OBGYN provider for routine follow up after hysterectomy.  Placed.  For her double vision, discussed extreme importance of follow up with optometry office for vision exam, as well as to call neurology office to discuss further.  She should follow up with her upcoming brain MRI as scheduled.  We discussed signs/ symptoms of stroke for which she would need to visit ER.      BMI is >= 25 and <30. (Overweight) The following options were offered after discussion;: exercise counseling/recommendations and nutrition counseling/recommendations    Next Appointment with me: 4/21/2025    Return in about 4 months (around 2/24/2025) for Recheck.      VITAL SIGNS     Visit Vitals  /72   Pulse 84   Temp 97.7 °F (36.5 °C) (Temporal)   Resp 18   Ht 165.1 cm (65\")   Wt 79.8 kg (176 lb)   LMP 06/04/2022 (Exact Date)   SpO2 99%   BMI 29.29 kg/m²             Wt Readings from Last 3 Encounters:   10/24/24 79.8 kg (176 lb)   10/08/24 80 kg (176 lb 6.4 oz)   09/30/24 78.9 kg (174 lb) "     Body mass index is 29.29 kg/m².        MEDICATIONS AT THE TIME OF OFFICE VISIT     Current Outpatient Medications on File Prior to Visit   Medication Sig Dispense Refill    amitriptyline (ELAVIL) 25 MG tablet TAKE 1 TABLET BY MOUTH EVERY NIGHT 30 tablet 3    amLODIPine (NORVASC) 5 MG tablet TAKE 1 TABLET BY MOUTH DAILY 90 tablet 1    apixaban (ELIQUIS) 5 MG tablet tablet Take 1 tablet by mouth 2 (Two) Times a Day. 180 tablet 2    aspirin 81 MG EC tablet Take 1 tablet by mouth Daily. 30 tablet 0    atorvastatin (LIPITOR) 40 MG tablet TAKE 1 TABLET BY MOUTH EVERY DAY 60 tablet 2    Cenobamate (Xcopri) 100 MG tablet Take 100 mg by mouth Daily for 30 days. 30 tablet 4    Eslicarbazepine Acetate (Aptiom) 400 MG tablet Take 1 tablet by mouth Daily. Take with 800mg for total dose 1200mg daily 90 tablet 1    Eslicarbazepine Acetate (Aptiom) 800 MG tablet tablet Take 1 tablet by mouth Daily. Take with 400mg for total dose 1200mg daily. 90 tablet 1    metoprolol succinate XL (TOPROL-XL) 25 MG 24 hr tablet TAKE 1 TABLET BY MOUTH DAILY 90 tablet 1    ondansetron ODT (ZOFRAN-ODT) 4 MG disintegrating tablet Take 1 tablet by mouth Every 6 (Six) Hours As Needed for Nausea or Vomiting. 15 tablet 0    Vimpat 200 MG tablet TAKE 1 TABLET BY MOUTH EVERY 12 HOURS 60 tablet 1    [DISCONTINUED] docusate sodium 100 MG capsule Take 1 capsule by mouth. (Patient not taking: Reported on 10/24/2024)       No current facility-administered medications on file prior to visit.        HISTORY OF PRESENT ILLNESS     Now recovered after completing antibiotic therapy for C diff, Giardia.  Scheduled for colonoscopy with Dr. White on November 7, 2024.  September 2024 CBC H&H 10.9/33.8.  Unable to tolerate oral iron supplementation.       HTN: BP is well controlled on Toprol XL 25 mg daily, amlodipine 5 mg daily.  BP at home is averaging 110s/70s without any symptoms of hypotension.  Followed by cardiology with next appointment with Fatoumata Richardson  KIRSTIE on July 14, 2025.      TULIO: Wearing CPAP and tolerating well.        HLD: Well controlled on atorvastatin 40 mg daily with April 2024 lipid panel with LDL of 58; triglycerides 93.  Scheduled with cardiology on July 14, 2025.     Followed by neurology, Dr. James, on January 24, 2025 for seizure management with history of stroke.  On Aptiom 1200 mg daily, Vimpat 200 mg BID, Xcopri 100 mg daily, amitriptyline 25 mg nightly.  Remains on aspirin 81 mg daily, Eliquis 5 mg BID.      Today, she tells me that for the last 3 months, has been experiencing transient episodes of double vision with both eyes open.  Episodes last for a total of about 1 hour, occurring mostly in the afternoons.  No accompanying headache, numbness, tingling, weakness.       Patient Care Team:  Annalee Huddleston APRN as PCP - General (Family Medicine)  Cristofer Chamorro MD as Consulting Physician (Cardiology)  Cb James II, MD as Consulting Physician (Neurology)  Ernesto Mendosa MD as Consulting Physician (Obstetrics and Gynecology)  Reza Lester MD as Cardiologist (Cardiology)  Annalee Huddleston APRN as Referring Physician (Family Medicine)  Marija Peña MD as Consulting Physician (Hematology and Oncology)    REVIEW OF SYSTEMS     Review of Systems   Constitutional:  Negative for chills, fever and unexpected weight change.   Eyes:  Positive for visual disturbance.   Respiratory:  Negative for cough, chest tightness and shortness of breath.    Cardiovascular:  Negative for chest pain, palpitations and leg swelling.   Neurological:  Negative for dizziness, weakness, light-headedness and headaches.   Psychiatric/Behavioral:  The patient is not nervous/anxious.           PHYSICAL EXAMINATION     Physical Exam  Vitals reviewed.   Constitutional:       General: She is not in acute distress.     Appearance: Normal appearance. She is not ill-appearing, toxic-appearing or diaphoretic.   HENT:      Head: Normocephalic and atraumatic.   Eyes:       Extraocular Movements: Extraocular movements intact.      Pupils: Pupils are equal, round, and reactive to light.   Cardiovascular:      Rate and Rhythm: Normal rate and regular rhythm.      Heart sounds: Normal heart sounds.   Pulmonary:      Effort: Pulmonary effort is normal.      Breath sounds: Normal breath sounds.   Musculoskeletal:      Right lower leg: No edema.      Left lower leg: No edema.   Neurological:      Mental Status: She is alert and oriented to person, place, and time. Mental status is at baseline.   Psychiatric:         Mood and Affect: Mood normal.         Behavior: Behavior normal.         Thought Content: Thought content normal.         Judgment: Judgment normal.           REVIEWED DATA     Labs:           Imaging:            Medical Tests:           Summary of old records / correspondence / consultant report:           Request outside records:

## 2024-10-25 DIAGNOSIS — E55.9 VITAMIN D DEFICIENCY: ICD-10-CM

## 2024-10-25 DIAGNOSIS — R74.8 ELEVATED ALKALINE PHOSPHATASE LEVEL: Primary | ICD-10-CM

## 2024-10-25 DIAGNOSIS — I10 PRIMARY HYPERTENSION: ICD-10-CM

## 2024-10-25 LAB
ALBUMIN SERPL-MCNC: 3.8 G/DL (ref 3.5–5.2)
ALBUMIN/GLOB SERPL: 1.1 G/DL
ALP SERPL-CCNC: 253 U/L (ref 39–117)
ALT SERPL-CCNC: 13 U/L (ref 1–33)
AST SERPL-CCNC: 21 U/L (ref 1–32)
BASOPHILS # BLD AUTO: 0.03 10*3/MM3 (ref 0–0.2)
BASOPHILS NFR BLD AUTO: 0.3 % (ref 0–1.5)
BILIRUB SERPL-MCNC: <0.2 MG/DL (ref 0–1.2)
BUN SERPL-MCNC: 4 MG/DL (ref 6–20)
BUN/CREAT SERPL: 5.9 (ref 7–25)
CALCIUM SERPL-MCNC: 8.8 MG/DL (ref 8.6–10.5)
CHLORIDE SERPL-SCNC: 98 MMOL/L (ref 98–107)
CO2 SERPL-SCNC: 22.2 MMOL/L (ref 22–29)
CREAT SERPL-MCNC: 0.68 MG/DL (ref 0.57–1)
EGFRCR SERPLBLD CKD-EPI 2021: 109.6 ML/MIN/1.73
EOSINOPHIL # BLD AUTO: 0.13 10*3/MM3 (ref 0–0.4)
EOSINOPHIL NFR BLD AUTO: 1.2 % (ref 0.3–6.2)
ERYTHROCYTE [DISTWIDTH] IN BLOOD BY AUTOMATED COUNT: 14.3 % (ref 12.3–15.4)
GLOBULIN SER CALC-MCNC: 3.5 GM/DL
GLUCOSE SERPL-MCNC: 89 MG/DL (ref 65–99)
HCT VFR BLD AUTO: 36 % (ref 34–46.6)
HGB BLD-MCNC: 10.6 G/DL (ref 12–15.9)
IMM GRANULOCYTES # BLD AUTO: 0.04 10*3/MM3 (ref 0–0.05)
IMM GRANULOCYTES NFR BLD AUTO: 0.4 % (ref 0–0.5)
LYMPHOCYTES # BLD AUTO: 1.97 10*3/MM3 (ref 0.7–3.1)
LYMPHOCYTES NFR BLD AUTO: 18.1 % (ref 19.6–45.3)
MCH RBC QN AUTO: 22.9 PG (ref 26.6–33)
MCHC RBC AUTO-ENTMCNC: 29.4 G/DL (ref 31.5–35.7)
MCV RBC AUTO: 77.8 FL (ref 79–97)
MONOCYTES # BLD AUTO: 0.52 10*3/MM3 (ref 0.1–0.9)
MONOCYTES NFR BLD AUTO: 4.8 % (ref 5–12)
NEUTROPHILS # BLD AUTO: 8.19 10*3/MM3 (ref 1.7–7)
NEUTROPHILS NFR BLD AUTO: 75.2 % (ref 42.7–76)
NRBC BLD AUTO-RTO: 0 /100 WBC (ref 0–0.2)
PLATELET # BLD AUTO: 468 10*3/MM3 (ref 140–450)
POTASSIUM SERPL-SCNC: 4.1 MMOL/L (ref 3.5–5.2)
PROT SERPL-MCNC: 7.3 G/DL (ref 6–8.5)
RBC # BLD AUTO: 4.63 10*6/MM3 (ref 3.77–5.28)
SODIUM SERPL-SCNC: 134 MMOL/L (ref 136–145)
WBC # BLD AUTO: 10.88 10*3/MM3 (ref 3.4–10.8)

## 2024-10-25 RX ORDER — AMLODIPINE BESYLATE 5 MG/1
5 TABLET ORAL DAILY
Qty: 90 TABLET | Refills: 1 | Status: SHIPPED | OUTPATIENT
Start: 2024-10-25

## 2024-11-06 DIAGNOSIS — E55.9 VITAMIN D DEFICIENCY: Primary | ICD-10-CM

## 2024-11-06 RX ORDER — CENOBAMATE 100 MG/1
100 TABLET, FILM COATED ORAL DAILY
COMMUNITY
Start: 2024-11-01

## 2024-11-06 RX ORDER — ERGOCALCIFEROL 1.25 MG/1
50000 CAPSULE, LIQUID FILLED ORAL WEEKLY
Qty: 8 CAPSULE | Refills: 0 | Status: SHIPPED | OUTPATIENT
Start: 2024-11-06 | End: 2024-12-26

## 2024-11-07 ENCOUNTER — HOSPITAL ENCOUNTER (OUTPATIENT)
Facility: HOSPITAL | Age: 44
Setting detail: HOSPITAL OUTPATIENT SURGERY
Discharge: HOME | End: 2024-11-07
Attending: INTERNAL MEDICINE | Admitting: INTERNAL MEDICINE
Payer: MEDICARE

## 2024-11-07 ENCOUNTER — ANESTHESIA (OUTPATIENT)
Dept: GASTROENTEROLOGY | Facility: HOSPITAL | Age: 44
End: 2024-11-07
Payer: MEDICARE

## 2024-11-07 ENCOUNTER — ANESTHESIA EVENT (OUTPATIENT)
Dept: GASTROENTEROLOGY | Facility: HOSPITAL | Age: 44
End: 2024-11-07
Payer: MEDICARE

## 2024-11-07 VITALS
RESPIRATION RATE: 16 BRPM | HEIGHT: 65 IN | OXYGEN SATURATION: 98 % | BODY MASS INDEX: 29.16 KG/M2 | HEART RATE: 73 BPM | DIASTOLIC BLOOD PRESSURE: 80 MMHG | SYSTOLIC BLOOD PRESSURE: 127 MMHG | WEIGHT: 175 LBS

## 2024-11-07 PROCEDURE — 25010000002 LIDOCAINE 2% SOLUTION: Performed by: NURSE ANESTHETIST, CERTIFIED REGISTERED

## 2024-11-07 PROCEDURE — S0260 H&P FOR SURGERY: HCPCS | Performed by: INTERNAL MEDICINE

## 2024-11-07 PROCEDURE — 25810000003 LACTATED RINGERS PER 1000 ML: Performed by: INTERNAL MEDICINE

## 2024-11-07 PROCEDURE — 25010000002 PROPOFOL 200 MG/20ML EMULSION: Performed by: NURSE ANESTHETIST, CERTIFIED REGISTERED

## 2024-11-07 PROCEDURE — G0105 COLORECTAL SCRN; HI RISK IND: HCPCS | Performed by: INTERNAL MEDICINE

## 2024-11-07 PROCEDURE — 25010000002 PROPOFOL 1000 MG/100ML EMULSION: Performed by: NURSE ANESTHETIST, CERTIFIED REGISTERED

## 2024-11-07 RX ORDER — LIDOCAINE HYDROCHLORIDE 20 MG/ML
INJECTION, SOLUTION INFILTRATION; PERINEURAL AS NEEDED
Status: DISCONTINUED | OUTPATIENT
Start: 2024-11-07 | End: 2024-11-07 | Stop reason: SURG

## 2024-11-07 RX ORDER — SODIUM CHLORIDE, SODIUM LACTATE, POTASSIUM CHLORIDE, CALCIUM CHLORIDE 600; 310; 30; 20 MG/100ML; MG/100ML; MG/100ML; MG/100ML
30 INJECTION, SOLUTION INTRAVENOUS CONTINUOUS
Status: DISCONTINUED | OUTPATIENT
Start: 2024-11-07 | End: 2024-11-07 | Stop reason: HOSPADM

## 2024-11-07 RX ORDER — PROPOFOL 10 MG/ML
INJECTION, EMULSION INTRAVENOUS CONTINUOUS PRN
Status: DISCONTINUED | OUTPATIENT
Start: 2024-11-07 | End: 2024-11-07 | Stop reason: SURG

## 2024-11-07 RX ORDER — PROPOFOL 10 MG/ML
INJECTION, EMULSION INTRAVENOUS AS NEEDED
Status: DISCONTINUED | OUTPATIENT
Start: 2024-11-07 | End: 2024-11-07 | Stop reason: SURG

## 2024-11-07 RX ADMIN — SODIUM CHLORIDE, SODIUM LACTATE, POTASSIUM CHLORIDE, CALCIUM CHLORIDE 30 ML/HR: 20; 30; 600; 310 INJECTION, SOLUTION INTRAVENOUS at 10:59

## 2024-11-07 RX ADMIN — PROPOFOL INJECTABLE EMULSION 100 MG: 10 INJECTION, EMULSION INTRAVENOUS at 11:15

## 2024-11-07 RX ADMIN — LIDOCAINE HYDROCHLORIDE 80 MG: 20 INJECTION, SOLUTION INFILTRATION; PERINEURAL at 11:15

## 2024-11-07 RX ADMIN — PROPOFOL 180 MCG/KG/MIN: 10 INJECTION, EMULSION INTRAVENOUS at 11:15

## 2024-11-07 NOTE — ANESTHESIA POSTPROCEDURE EVALUATION
Patient: Alexandra Xiao    Procedure Summary       Date: 11/07/24 Room / Location:  [facility]    Anesthesia Start: 1108 Anesthesia Stop: 1133    Procedure: COLONOSCOPY into cecum and terminal ileum Diagnosis:       Diverticulosis      Tortuous colon      Hemorrhoids      (Colitis, Clostridium difficile [A04.72])      (Screening for colorectal cancer [Z12.11, Z12.12])    Surgeons: Matt White MD Provider: Miguel Ramirez MD    Anesthesia Type: MAC ASA Status: 3            Anesthesia Type: MAC    Vitals  Vitals Value Taken Time   /80 11/07/24 1152   Temp     Pulse 73 11/07/24 1152   Resp 16 11/07/24 1152   SpO2 98 % 11/07/24 1152           Post Anesthesia Care and Evaluation    Patient location during evaluation: PACU  Patient participation: complete - patient participated  Level of consciousness: awake and alert  Pain management: adequate    Airway patency: patent  Anesthetic complications: No anesthetic complications    Cardiovascular status: acceptable  Respiratory status: acceptable  Hydration status: acceptable    Comments: --------------------            11/07/24               1152     --------------------   BP:       127/80     Pulse:      73       Resp:       16       SpO2:      98%      --------------------

## 2024-11-07 NOTE — DISCHARGE INSTRUCTIONS
For the next 24 hours patient needs to be with a responsible adult.    For 24 hours DO NOT drive, operate machinery, appliances, drink alcohol, make important decisions or sign legal documents.    Start with a light or bland diet if you are feeling sick to your stomach otherwise advance to regular diet as tolerated.    Follow recommendations on procedure report if provided by your doctor.    Call Dr White for problems [phone]    Problems may include but not limited to: large amounts of bleeding, trouble breathing, repeated vomiting, severe unrelieved pain, fever or chills.

## 2024-11-07 NOTE — H&P
[facility]  Pre Procedure History & Physical    Chief Complaint:   Screening for colorectal cancer, history of colitis    Subjective     HPI:   This 44-year-old female presents the endoscopy suite for colonoscopic examination.  This is a screening assessment for colorectal cancer.  She also has a prior history of infectious colitis that was treated.    Past Medical History:   Past Medical History:   Diagnosis Date    Abnormal bleeding in menstrual cycle     C. 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    Headache, tension-type     Heavy menstrual bleeding     WITH CLOTS    Hyperlipidemia     Hypertension     Memory loss     Migraine     Seizures     Status post placement of implantable loop recorder     Stroke     MARCH AND        Past Surgical History:  Past Surgical History:   Procedure Laterality Date    BREAST BIOPSY      D & C HYSTEROSCOPY ENDOMETRIAL ABLATION N/A 06/10/2022    Procedure: DILATATION AND CURETTAGE HYSTEROSCOPY NOVASURE ENDOMETRIAL ABLATION;  Surgeon: Ernesto Mendosa MD;  Location: [facility];  Service: Obstetrics/Gynecology;  Laterality: N/A;    FOOT SURGERY      pins in left foot     HYSTERECTOMY  2024    INSERT / REPLACE / REMOVE PACEMAKER  2022    Dr. Cristofer Chamorro, LOOP RECORDER    OTHER SURGICAL HISTORY      VNS plate 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    NOÉ      2022    TUBAL ABDOMINAL LIGATION      VAGUS NERVE STIMULATOR IMPLANTATION         Family History:  Family History   Problem Relation Age of Onset    Breast cancer Mother 50         age 60 METS    Arthritis Mother     Arthritis Father     Diabetes Father     Heart disease Father     Hypertension Father     Heart attack Father     Hypertension Brother     Heart attack Paternal Grandfather     Ovarian cancer Neg Hx     Uterine cancer Neg Hx      Colon cancer Neg Hx     Deep vein thrombosis Neg Hx     Pulmonary embolism Neg Hx     Malig Hyperthermia Neg Hx        Social History:   reports that she quit smoking about 2 years ago. Her smoking use included cigarettes. She started smoking about 31 years ago. She has a 29 pack-year smoking history. She has never used smokeless tobacco. She reports that she does not currently use alcohol. She reports that she does not use drugs.    Medications:   Medications Prior to Admission   Medication Sig Dispense Refill Last Dose/Taking    amitriptyline (ELAVIL) 25 MG tablet TAKE 1 TABLET BY MOUTH EVERY NIGHT 30 tablet 3 Taking    amLODIPine (NORVASC) 5 MG tablet TAKE 1 TABLET BY MOUTH DAILY 90 tablet 1 Taking    apixaban (ELIQUIS) 5 MG tablet tablet Take 1 tablet by mouth 2 (Two) Times a Day. 180 tablet 2 11/5/2024    aspirin 81 MG EC tablet Take 1 tablet by mouth Daily. 30 tablet 0 Taking    atorvastatin (LIPITOR) 40 MG tablet TAKE 1 TABLET BY MOUTH EVERY DAY 60 tablet 2 Taking    Eslicarbazepine Acetate (Aptiom) 400 MG tablet Take 1 tablet by mouth Daily. Take with 800mg for total dose 1200mg daily 90 tablet 1 Taking    Eslicarbazepine Acetate (Aptiom) 800 MG tablet tablet Take 1 tablet by mouth Daily. Take with 400mg for total dose 1200mg daily. 90 tablet 1 Taking    metoprolol succinate XL (TOPROL-XL) 25 MG 24 hr tablet TAKE 1 TABLET BY MOUTH DAILY 90 tablet 1 Taking    ondansetron ODT (ZOFRAN-ODT) 4 MG disintegrating tablet Take 1 tablet by mouth Every 6 (Six) Hours As Needed for Nausea or Vomiting. 15 tablet 0 Taking As Needed    Vimpat 200 MG tablet TAKE 1 TABLET BY MOUTH EVERY 12 HOURS 60 tablet 1 Taking    Xcopri 100 MG tablet Take 100 mg by mouth Daily.   Taking    vitamin D (ERGOCALCIFEROL) 1.25 MG (33585 UT) capsule capsule Take 1 capsule by mouth 1 (One) Time Per Week for 8 doses. 8 capsule 0        Allergies:  Patient has no known allergies.    ROS:    Pertinent items are noted in HPI, all other systems  reviewed and negative     Objective     Last menstrual period 06/04/2022, not currently breastfeeding.    Physical Exam   Constitutional: Pt is oriented to person, place, and time and well-developed, well-nourished, and in no distress.   Mouth/Throat: Oropharynx is clear and moist.   Neck: Normal range of motion.   Cardiovascular: Normal rate, regular rhythm and normal heart sounds.    Pulmonary/Chest: Effort normal and breath sounds normal.   Abdominal: Soft. Nontender  Skin: Skin is warm and dry.   Psychiatric: Mood, memory, affect and judgment normal.     Assessment & Plan     Diagnosis:  Screening for colorectal cancer  History of infectious colitis    Anticipated Surgical Procedure:  Colonoscopy    The risks, benefits, and alternatives of this procedure have been discussed with the patient or the responsible party- the patient understands and agrees to proceed.

## 2024-11-07 NOTE — ANESTHESIA PREPROCEDURE EVALUATION
Anesthesia Evaluation     Patient summary reviewed and Nursing notes reviewed                Airway   Mallampati: III  Dental    (+) upper dentures    Pulmonary    (+) a smoker Former, COPD,sleep apnea on CPAP  Cardiovascular     ECG reviewed  PT is on anticoagulation therapy  Patient on routine beta blocker  Rhythm: regular  Rate: normal    (+) hypertension, hyperlipidemia      Neuro/Psych  (+) seizures, CVA, headaches, psychiatric history Depression and Anxiety  GI/Hepatic/Renal/Endo    (+) obesity, GI bleeding     Musculoskeletal (-) negative ROS    Abdominal    Substance History - negative use     OB/GYN negative ob/gyn ROS         Other                    Anesthesia Plan    ASA 3     MAC     (Embolic stroke history and seizures (without AFib or PFO in situ)  BMI  Smoking history    I have reviewed the patient's history with the patient. I have explained the risks of anesthesia including but not limited to dental damage, corneal abrasion, nerve injury, MI, stroke, and death. Questions asked and answered. Anesthetic plan discussed with patient and team as indicated. Patient expressed understanding of the above.    )  intravenous induction     Anesthetic plan, risks, benefits, and alternatives have been provided, discussed and informed consent has been obtained with: patient.    CODE STATUS:

## 2024-11-11 ENCOUNTER — TELEPHONE (OUTPATIENT)
Dept: NEUROLOGY | Facility: CLINIC | Age: 44
End: 2024-11-11
Payer: MEDICARE

## 2024-11-11 NOTE — TELEPHONE ENCOUNTER
Patient is scheduled 11/12 @ 6:45 pm, she also has a VNS. There won't be a provider that is able to turn the VNS off and on. Patient called to see what she should do.

## 2024-11-11 NOTE — TELEPHONE ENCOUNTER
Caller: Alexandra Xiao    Relationship: Self    Best call back number: [phone]    What is the best time to reach you:     Who are you requesting to speak with (clinical staff, provider,  specific staff member): DR MORENO    Do you know the name of the person who called:     What was the call regarding:  PLEASE SEE EARLIER ENCOUNTER FROM TODAY.    PT IS SCHEDULED FOR MRI TOMORROW AT 6:45 PM AND SHE HAS A VNS. SHE WAS TOLD BY RADIOLOGY THAT THEY DON'T HAVE ANYONE TO TURN THE VNS OFF.    IS THIS SOMETHING DR MORENO CAN DO FOR THE PT AND IF HE CAN TURN IT OFF WHO WOULD TURN IT BACK ON?    DOES THE PT NEED TO RESCHEDULE THE MRI?    PLEASE CALL THE PT BACK TO DISCUSS WHAT NEEDS TO BE DONE.

## 2024-11-12 NOTE — TELEPHONE ENCOUNTER
Provider: ROSALVA MORENO MD    Caller: Alexandra Xiao    Relationship to Patient: Self    Phone Number: [phone]    Reason for Call CALLING REGARDING THE MRI FOR TODAY (11-12-24) AT 6:30 PM.  STATED SHE IS WANTING TO KNOW WHAT PROVIDER RECOMMENDS SINCE THEY ARE UNABLE TO TURN OFF THE VNS DEVICE FOR THE APPT.      PLEASE CALL & ADVISE

## 2024-11-12 NOTE — TELEPHONE ENCOUNTER
Lvm & MyChart message for pt to let her know per provider, she will need to r/s the appt for when there is someone there to turn the VNS on & off.

## 2024-11-17 PROCEDURE — 93298 REM INTERROG DEV EVAL SCRMS: CPT | Performed by: INTERNAL MEDICINE

## 2024-11-20 ENCOUNTER — EXTERNAL PBMM DATA (OUTPATIENT)
Dept: PHARMACY | Facility: OTHER | Age: 44
End: 2024-11-20
Payer: MEDICARE

## 2024-11-25 ENCOUNTER — HOSPITAL ENCOUNTER (OUTPATIENT)
Dept: MRI IMAGING | Facility: HOSPITAL | Age: 44
Discharge: HOME | End: 2024-11-25
Admitting: PSYCHIATRY & NEUROLOGY
Payer: MEDICARE

## 2024-11-25 DIAGNOSIS — G40.309 GENERALIZED NONCONVULSIVE EPILEPSY: ICD-10-CM

## 2024-11-25 DIAGNOSIS — G40.919 BREAKTHROUGH SEIZURE: ICD-10-CM

## 2024-11-25 PROCEDURE — 25510000002 GADOBENATE DIMEGLUMINE 529 MG/ML SOLUTION: Performed by: PSYCHIATRY & NEUROLOGY

## 2024-11-25 PROCEDURE — A9577 INJ MULTIHANCE: HCPCS | Performed by: PSYCHIATRY & NEUROLOGY

## 2024-11-25 PROCEDURE — 70553 MRI BRAIN STEM W/O & W/DYE: CPT

## 2024-11-25 RX ADMIN — GADOBENATE DIMEGLUMINE 16 ML: 529 INJECTION, SOLUTION INTRAVENOUS at 10:53

## 2024-11-26 DIAGNOSIS — Z96.89 STATUS POST VNS (VAGUS NERVE STIMULATOR) PLACEMENT: ICD-10-CM

## 2024-11-26 DIAGNOSIS — G40.309 GENERALIZED NONCONVULSIVE EPILEPSY: ICD-10-CM

## 2024-11-27 RX ORDER — LACOSAMIDE 200 MG/1
1 TABLET, FILM COATED ORAL EVERY 12 HOURS
Qty: 60 TABLET | Refills: 1 | Status: SHIPPED | OUTPATIENT
Start: 2024-11-27

## 2024-11-27 RX ORDER — ESLICARBAZEPINE ACETATE 800 MG/1
TABLET ORAL
Qty: 30 TABLET | Refills: 3 | Status: SHIPPED | OUTPATIENT
Start: 2024-11-27

## 2024-12-24 ENCOUNTER — POP HEALTH PHARMACY (OUTPATIENT)
Dept: PHARMACY | Facility: OTHER | Age: 44
End: 2024-12-24
Payer: MEDICARE

## 2024-12-26 DIAGNOSIS — G40.309 GENERALIZED NONCONVULSIVE EPILEPSY: ICD-10-CM

## 2024-12-26 RX ORDER — LACOSAMIDE 200 MG/1
1 TABLET, FILM COATED ORAL EVERY 12 HOURS
Qty: 60 TABLET | Refills: 0 | Status: SHIPPED | OUTPATIENT
Start: 2024-12-26

## 2024-12-31 DIAGNOSIS — E55.9 VITAMIN D DEFICIENCY: ICD-10-CM

## 2025-01-02 RX ORDER — ERGOCALCIFEROL 1.25 MG/1
50000 CAPSULE, LIQUID FILLED ORAL WEEKLY
Qty: 8 CAPSULE | Refills: 0 | OUTPATIENT
Start: 2025-01-02

## 2025-01-24 ENCOUNTER — OFFICE VISIT (OUTPATIENT)
Dept: NEUROLOGY | Facility: CLINIC | Age: 45
End: 2025-01-24
Payer: MEDICARE

## 2025-01-24 VITALS
BODY MASS INDEX: 29.16 KG/M2 | OXYGEN SATURATION: 96 % | SYSTOLIC BLOOD PRESSURE: 132 MMHG | DIASTOLIC BLOOD PRESSURE: 90 MMHG | HEART RATE: 95 BPM | WEIGHT: 175 LBS | HEIGHT: 65 IN

## 2025-01-24 DIAGNOSIS — Z96.89 STATUS POST VNS (VAGUS NERVE STIMULATOR) PLACEMENT: ICD-10-CM

## 2025-01-24 DIAGNOSIS — G40.919 BREAKTHROUGH SEIZURE: ICD-10-CM

## 2025-01-24 DIAGNOSIS — G40.309 GENERALIZED NONCONVULSIVE EPILEPSY: Primary | ICD-10-CM

## 2025-01-24 NOTE — PROGRESS NOTES
Chief Complaint   Patient presents with    Generalized nonconvulsive epilepsy       Patient ID: Alexandra Xiao is a 44 y.o. female.    HPI: I had the pleasure of seeing your patient again today.  As you may know she is a 44-year-old female here for the management of generalized epilepsy her last seizure was in early January of this year.  She did have 4 seizures on [place].  Each of them lasted about 1 to 2 minutes.  She did have postictal confusion.  No bowel or bladder loss.  She does have the vagus nerve stimulator.  Unfortunately she uses the magnet to turn it off quite a bit.  We have had multiple discussions about that in the past.  She still takes Aptiom, Vimpat and Xcopri.  She states that the Xcopri can sometimes cause her to have double vision after she takes it in the morning for a couple of hours.    The following portions of the patient's history were reviewed and updated as appropriate: allergies, current medications, past family history, past medical history, past social history, past surgical history and problem list.    Review of Systems   Eyes:  Positive for visual disturbance.   Neurological:  Positive for seizures (2nd week of Jan. 2025), speech difficulty (slightly) and numbness. Negative for dizziness, tremors, syncope, facial asymmetry, weakness, light-headedness and headaches.   Psychiatric/Behavioral:  Positive for confusion (occasionally). Negative for agitation, behavioral problems, decreased concentration, dysphoric mood, hallucinations, self-injury, sleep disturbance and suicidal ideas. The patient is not nervous/anxious and is not hyperactive.       I have reviewed the review of systems above performed by my medical assistant.      Vitals:    01/24/25 1433   BP: 132/90   Pulse: 95   SpO2: 96%       Neurological Exam  Mental Status  Awake, alert and oriented to person, place and time. Recent and remote memory are intact. Speech is normal. Language is fluent with no aphasia. Attention  and concentration are normal. Fund of knowledge is appropriate for level of education.    Cranial Nerves  CN I: Sense of smell is normal.  CN II: Visual acuity is normal.  CN III, IV, VI: Extraocular movements intact bilaterally. Pupils equal round and reactive to light bilaterally.  CN V: Facial sensation is normal.  CN VII: Full and symmetric facial movement.  CN XI: Shoulder shrug strength is normal.  CN XII: Tongue midline without atrophy or fasciculations.    Motor  Normal muscle bulk throughout. No fasciculations present. Normal muscle tone. No abnormal involuntary movements. No pronator drift.                                             Right                     Left  Rhomboids                            5                          5  Infraspinatus                          5                          5  Supraspinatus                       5                          5  Deltoid                                   5                          5   Biceps                                   5                          5  Brachioradialis                      5                          5   Triceps                                  5                          5   Pronator                                5                          5   Supinator                              5                           5   Wrist flexor                            5                          5   Wrist extensor                       5                          5   Finger flexor                          5                          5   Finger extensor                     5                          5   Interossei                              5                          5   Abductor pollicis brevis         5                          5   Flexor pollicis brevis             5                          5   Opponens pollicis                 5                          5  Extensor digitorum               5                          5  Abductor digiti minimi            5                          5   Abdominal                            5                          5  Glutei                                    5                          5  Hip abductor                         5                          5  Hip adductor                         5                          5   Iliopsoas                               5                          5   Quadriceps                           5                          5   Hamstring                             5                          5   Gastrocnemius                     5                           5   Anterior tibialis                      5                          5   Posterior tibialis                    5                          5   Peroneal                               5                          5  Ankle dorsiflexor                   5                          5  Ankle plantar flexor              5                           5  Extensor hallucis longus      5                           5    Sensory  Sensation is intact to light touch, pinprick, vibration and proprioception in all four extremities.    Reflexes  Deep tendon reflexes are 2+ and symmetric in all four extremities.    Right pathological reflexes: Richard's absent.  Left pathological reflexes: Richard's absent.    Coordination    Finger-to-nose, rapid alternating movements and heel-to-shin normal bilaterally without dysmetria.    Gait  Normal casual, toe, heel and tandem gait.       Physical Exam  Vitals reviewed.   Constitutional:       Appearance: She is well-developed.   HENT:      Head: Normocephalic and atraumatic.   Eyes:      Extraocular Movements: Extraocular movements intact.      Pupils: Pupils are equal, round, and reactive to light.   Cardiovascular:      Rate and Rhythm: Normal rate and regular rhythm.   Pulmonary:      Breath sounds: Normal breath sounds.   Musculoskeletal:         General: Normal range of motion.   Skin:     General: Skin is warm.   Neurological:       Coordination: Coordination is intact.      Deep Tendon Reflexes: Reflexes are normal and symmetric.   Psychiatric:         Speech: Speech normal.         Procedures: Vagus nerve stimulator interrogation and diagnostics  Indication: History of seizures  Report:  Normal mode settings are as follows: Output current is set to 1.625 mA, frequency is 30 Hz, pulse width is 250 µs, on x30 seconds, off time is 5 minutes and duty cycle is 10%.  Auto stimulation mode settings are as follows: Output current is set to 1.875 mA, pulse width is 250 µs and on time 60 seconds.  Magnet mode settings are as follows: Output current is set to 2.75 mA, pulse width is 250 µs and on time is 60 seconds.  Setting changes: No setting changes were made today.  Diagnostics: Battery is at %.  Output is 1.625 mA.  Impedance looks good.  Total time spent: 20 minutes    Assessment/Plan: We did talk to her about how best to use the vagus nerve stimulator.  It will likely help her if she is not consistently using the magnet to turn it off.  She states understanding and is going to do better about leaving the magnet off the vagus nerve stimulator.  We are going to continue her medications as scheduled.  She will try to be more compliant with sleep.  We will see her back in 4 months or sooner if needed.  A total of 45 minutes was spent face-to-face with the patient today.  Of that greater than 50% of this time was spent discussing signs and symptoms of seizure disorder, breakthrough seizure, patient education, plan of care and prognosis.         Diagnoses and all orders for this visit:    1. Generalized nonconvulsive epilepsy (Primary)    2. Status post VNS (vagus nerve stimulator) placement    3. Breakthrough seizure           Cb James II, MD

## 2025-01-25 DIAGNOSIS — G40.309 GENERALIZED NONCONVULSIVE EPILEPSY: ICD-10-CM

## 2025-01-27 RX ORDER — LACOSAMIDE 200 MG/1
1 TABLET, FILM COATED ORAL EVERY 12 HOURS
Qty: 60 TABLET | Refills: 1 | Status: SHIPPED | OUTPATIENT
Start: 2025-01-27

## 2025-01-29 NOTE — TELEPHONE ENCOUNTER
Caller: ELIE  Relationship to Patient: SELF  Phone Number: [phone]    Reason for Call: PT GOT MRI COMPLETED AND SHE ALSO HAD A NOÉ PREFORMED THIS MORNING, SHE STATES DEPENDING ON THE RESULTS THEY MAY WANT TO IMPLANT A HEART MONITOR IN HER CHEST, SHE IS VERY CONCERNED WITH HER VNS WHAT THIS WOULD EFFECT ON HER AND WOULD LIKE TO GET HIS OPINION ON THE SITUATION AS WELL AS ASK HIM A FEW OTHER QUESTIONS. PLEASE RETURN PT'S CALL AS SHE IS CURRENTLY ADMITTED TO [facility]   
Provider:  DR MORENO   Caller:  DIONTE   Relationship to Patient:  PT  SPOUSE     Phone Number: [phone]  Reason for Call:  SPOUSE CALLING VERY UPSET STATES PT IS CURRENTLY IN [facility] AS  PT HAS SUSPECTED STROKE  CALLER STATES THEY HAVE REQUESTED AN MRI HOWEVER MD HAS ADVISED THEY DO NOT HAVE THE CORRECT MACHINE FOR THIS AS PT HAS VNS PLATE .   WANTS TO DISCUSS WITH DR MORENO  ABOUT THIS A AS MAY NEED TO HAVE PT TRANSFERRED TO DIFFERENT HOSP . CALLER WANTS TO HAVE CALLBACK PLEASE  ASAP   POT IS HAVING DECLINE CURRENTLY  NEEDS TO HAVE MRI IF POSSIBLE   
Spoke to patient.  Likely complicated migraine versus stroke.  Patient in the hospital now receiving care.  
Detail Level: Generalized

## 2025-02-04 ENCOUNTER — TELEPHONE (OUTPATIENT)
Dept: OBSTETRICS AND GYNECOLOGY | Facility: CLINIC | Age: 45
End: 2025-02-04
Payer: MEDICARE

## 2025-02-04 NOTE — TELEPHONE ENCOUNTER
Pt TASIA FERGUSON, requesting to switch providers, from Dr. Mendosa to other doctor in the office.  Did not specify the reason.  Does not have a preference on who else she sees.    Please advise if you are able to accept pts transfer.     Thanks,   Gisella    Pt # [phone]

## 2025-02-05 NOTE — TELEPHONE ENCOUNTER
Pt TASIA FERGUSON, requesting to switch providers, from Dr. Mendosa to other doctor in the office.  Did not specify the reason.  Does not have a preference on who else she sees.     Please advise if you are able to accept pts transfer.      Thanks,   Gisella     Pt # [phone]

## 2025-02-05 NOTE — TELEPHONE ENCOUNTER
Pt TASIA FERGUSON, requesting to switch providers, from Dr. Mendosa to other doctor in the office.  Did not specify the reason.  Does not have a preference on who else she sees.     Please advise if you are able to accept pts transfer.      Thanks,   Gisella     Pt # [phone]

## 2025-02-10 NOTE — TELEPHONE ENCOUNTER
PHYLLIS notified, pt would need to stay w/Dr. Mendosa, but is welcome to schedule with our APRN's if she would like.  [facility] will inform pt.

## 2025-02-12 ENCOUNTER — TELEPHONE (OUTPATIENT)
Dept: NEUROLOGY | Facility: CLINIC | Age: 45
End: 2025-02-12
Payer: MEDICARE

## 2025-02-12 NOTE — TELEPHONE ENCOUNTER
Provider: JOSH    Caller: Alexandra Xiao    Relationship to Patient: Self    Pharmacy: [facility]    Phone Number: [phone]    Reason for Call: PT HAS JUST SWITCHED INSURANCE TO [employer].  SHE JUST DID THIS YESTERDAY SO SHE DOESN'T HAVE HER CARD OR ANY INFO REGARDING THIS.  ALL OF HER MEDICATIONS WERE ON THE FORMULARY EXCEPT FOR VIMPAT.  SHE WAS TOLD SHE NEEDED TO CONTACT HER PROVIDER FOR A FORMULARY REQUEST FORM.      PLEASE REVIEW AND ADVISE     THANK YOU

## 2025-02-14 NOTE — TELEPHONE ENCOUNTER
"Per pharmacist \"new insurance in the [facility] pharmacy system, it may not be active yet. But just from trying to pull up the [employer] formulary website, it looks like lacosamide would be a covered product. The brand Vimpat likely would not but patient may have to switch from brand to generic if currently taking brand.\"  "

## 2025-02-19 RX ORDER — APIXABAN 5 MG/1
5 TABLET, FILM COATED ORAL 2 TIMES DAILY
Qty: 180 TABLET | Refills: 2 | Status: SHIPPED | OUTPATIENT
Start: 2025-02-19

## 2025-02-20 RX ORDER — ATORVASTATIN CALCIUM 40 MG/1
40 TABLET, FILM COATED ORAL DAILY
Qty: 60 TABLET | Refills: 2 | OUTPATIENT
Start: 2025-02-20

## 2025-02-20 RX ORDER — ATORVASTATIN CALCIUM 40 MG/1
40 TABLET, FILM COATED ORAL DAILY
Qty: 60 TABLET | Refills: 3 | Status: SHIPPED | OUTPATIENT
Start: 2025-02-20

## 2025-02-25 ENCOUNTER — OFFICE VISIT (OUTPATIENT)
Dept: INTERNAL MEDICINE | Age: 45
End: 2025-02-25
Payer: MEDICARE

## 2025-02-25 VITALS
BODY MASS INDEX: 28.99 KG/M2 | OXYGEN SATURATION: 99 % | SYSTOLIC BLOOD PRESSURE: 124 MMHG | TEMPERATURE: 97.6 F | HEART RATE: 78 BPM | HEIGHT: 65 IN | DIASTOLIC BLOOD PRESSURE: 82 MMHG | WEIGHT: 174 LBS

## 2025-02-25 DIAGNOSIS — E55.9 VITAMIN D DEFICIENCY: ICD-10-CM

## 2025-02-25 DIAGNOSIS — D50.9 IRON DEFICIENCY ANEMIA, UNSPECIFIED IRON DEFICIENCY ANEMIA TYPE: ICD-10-CM

## 2025-02-25 DIAGNOSIS — E78.5 HYPERLIPIDEMIA, UNSPECIFIED HYPERLIPIDEMIA TYPE: ICD-10-CM

## 2025-02-25 DIAGNOSIS — I10 PRIMARY HYPERTENSION: Primary | ICD-10-CM

## 2025-02-25 PROCEDURE — 3079F DIAST BP 80-89 MM HG: CPT

## 2025-02-25 PROCEDURE — G2211 COMPLEX E/M VISIT ADD ON: HCPCS

## 2025-02-25 PROCEDURE — 1159F MED LIST DOCD IN RCRD: CPT

## 2025-02-25 PROCEDURE — 1126F AMNT PAIN NOTED NONE PRSNT: CPT

## 2025-02-25 PROCEDURE — 1160F RVW MEDS BY RX/DR IN RCRD: CPT

## 2025-02-25 PROCEDURE — 99214 OFFICE O/P EST MOD 30 MIN: CPT

## 2025-02-25 PROCEDURE — 3074F SYST BP LT 130 MM HG: CPT

## 2025-02-25 NOTE — PROGRESS NOTES
"    I N T E R N A L  M E D I C I N E  Annalee Huddleston, APRN    ENCOUNTER DATE:  02/25/2025    Alexandra CAMILA Xiao / 44 y.o. / female      CHIEF COMPLAINT / REASON FOR OFFICE VISIT     Hypertension and Hyperlipidemia      ASSESSMENT & PLAN     Diagnoses and all orders for this visit:    1. Primary hypertension (Primary)  Overview:  Continue amlodipine 5 mg daily and Toprol XL 25 mg daily.    Orders:  -     CBC & Differential  -     Comprehensive Metabolic Panel    2. Hyperlipidemia, unspecified hyperlipidemia type  -     Comprehensive Metabolic Panel  -     Lipid Panel With / Chol / HDL Ratio    3. Iron deficiency anemia, unspecified iron deficiency anemia type  -     CBC & Differential    4. Vitamin D deficiency  -     Vitamin D,25-Hydroxy         SUMMARY/DISCUSSION  Encouraged to monitor BP at home for goal of < 130/80.  Recommend low fat diet, regular exercise, continue atorvastatin 40 mg daily as prescribed.  Encouraged to follow up with neurology as scheduled.       Next Appointment with me: 4/21/2025    Return in about 4 months (around 6/25/2025) for Medicare Wellness.      VITAL SIGNS     Visit Vitals  /82   Pulse 78   Temp 97.6 °F (36.4 °C)   Ht 165.1 cm (65\")   Wt 78.9 kg (174 lb)   LMP 06/04/2022 (Exact Date)   SpO2 99%   BMI 28.96 kg/m²             Wt Readings from Last 3 Encounters:   02/25/25 78.9 kg (174 lb)   01/24/25 79.4 kg (175 lb)   11/07/24 79.4 kg (175 lb)     Body mass index is 28.96 kg/m².        MEDICATIONS AT THE TIME OF OFFICE VISIT     Current Outpatient Medications on File Prior to Visit   Medication Sig Dispense Refill    amitriptyline (ELAVIL) 25 MG tablet TAKE 1 TABLET BY MOUTH EVERY NIGHT 30 tablet 3    amLODIPine (NORVASC) 5 MG tablet TAKE 1 TABLET BY MOUTH DAILY 90 tablet 1    aspirin 81 MG EC tablet Take 1 tablet by mouth Daily. 30 tablet 0    atorvastatin (LIPITOR) 40 MG tablet TAKE 1 TABLET BY MOUTH EVERY DAY 60 tablet 3    Eliquis 5 MG tablet tablet TAKE 1 TABLET BY MOUTH TWICE " DAILY 180 tablet 2    Eslicarbazepine Acetate (Aptiom) 400 MG tablet Take 1 tablet by mouth Daily. Take with 800mg for total dose 1200mg daily 90 tablet 1    Eslicarbazepine Acetate (Aptiom) 800 MG tablet tablet TAKE 1 TABLET BY MOUTH DAILY. TAKE 400MG FOR TOTAL DOSE 1200MG DAILY 30 tablet 3    metoprolol succinate XL (TOPROL-XL) 25 MG 24 hr tablet TAKE 1 TABLET BY MOUTH DAILY 90 tablet 1    Vimpat 200 MG tablet TAKE 1 TABLET BY MOUTH EVERY 12 HOURS 60 tablet 1    Xcopri 100 MG tablet Take 100 mg by mouth Daily.      [DISCONTINUED] ondansetron ODT (ZOFRAN-ODT) 4 MG disintegrating tablet Take 1 tablet by mouth Every 6 (Six) Hours As Needed for Nausea or Vomiting. (Patient not taking: Reported on 2/25/2025) 15 tablet 0     No current facility-administered medications on file prior to visit.        HISTORY OF PRESENT ILLNESS     Last seen October 2024.  Accompanied to today's appointment by her .      Feels well; no problems or concerns.    Updated colonoscopy with Dr. White in November 2024; recall 5 years.  October 2024 CBC with iron deficiency anemia H&H 10.6/36.0.  Unable to tolerate oral iron supplementation.       HTN: BP is well controlled at today's visit on Toprol XL 25 mg daily, amlodipine 5 mg daily.  Not monitoring BP at home.  Followed by cardiology with next appointment with KIRSTIE Foote on July 14, 2025.       TULIO: Not currently wearing CPAP because she is waiting on new nose pillow; does plan to resume use once she receives shipment.       HLD: Well controlled on atorvastatin 40 mg daily with April 2024 lipid panel with LDL of 58; triglycerides 93.       Followed by neurology, Dr. James, for seizure management with history of stroke.  Next appointment is May 27, 2025.  On Aptiom 1200 mg daily, Vimpat 200 mg BID, Xcopri 100 mg daily, amitriptyline 25 mg nightly.  Remains on aspirin 81 mg daily, Eliquis 5 mg BID without signs of bleeding.  Last seizure was around Christmas 2024 due to  forgetting medication.  Reports increased compliance with medication.      Vitamin D Deficiency: November 2024 Vitamin D 7.4.  She has completed Vitamin D prescription dosing; has not yet started daily supplementation.      Patient Care Team:  Annalee Huddleston APRN as PCP - General (Family Medicine)  Cristofer Chamorro MD as Consulting Physician (Cardiology)  Cb James II, MD as Consulting Physician (Neurology)  Ernesto Mendosa MD as Consulting Physician (Obstetrics and Gynecology)  Reza Lester MD as Cardiologist (Cardiology)  Annalee Huddleston APRN as Referring Physician (Family Medicine)  Marija Peña MD as Consulting Physician (Hematology and Oncology)    REVIEW OF SYSTEMS     Review of Systems   Constitutional:  Negative for chills, fever and unexpected weight change.   Respiratory:  Negative for cough, chest tightness and shortness of breath.    Cardiovascular:  Negative for chest pain, palpitations and leg swelling.   Neurological:  Negative for dizziness, weakness, light-headedness and headaches.   Psychiatric/Behavioral:  The patient is not nervous/anxious.           PHYSICAL EXAMINATION     Physical Exam  Vitals reviewed.   Constitutional:       General: She is not in acute distress.     Appearance: Normal appearance. She is not ill-appearing, toxic-appearing or diaphoretic.   HENT:      Head: Normocephalic and atraumatic.   Cardiovascular:      Rate and Rhythm: Normal rate and regular rhythm.      Heart sounds: Normal heart sounds.   Pulmonary:      Effort: Pulmonary effort is normal.      Breath sounds: Normal breath sounds.   Musculoskeletal:      Right lower leg: No edema.      Left lower leg: No edema.   Neurological:      Mental Status: She is alert and oriented to person, place, and time. Mental status is at baseline.   Psychiatric:         Mood and Affect: Mood normal.         Behavior: Behavior normal.         Thought Content: Thought content normal.         Judgment: Judgment normal.            REVIEWED DATA     Labs:           Imaging:            Medical Tests:           Summary of old records / correspondence / consultant report:           Request outside records:

## 2025-02-26 ENCOUNTER — TELEPHONE (OUTPATIENT)
Dept: NEUROLOGY | Facility: CLINIC | Age: 45
End: 2025-02-26

## 2025-02-26 LAB
25(OH)D3+25(OH)D2 SERPL-MCNC: 18.7 NG/ML (ref 30–100)
ALBUMIN SERPL-MCNC: 4 G/DL (ref 3.5–5.2)
ALBUMIN/GLOB SERPL: 1.2 G/DL
ALP SERPL-CCNC: 285 U/L (ref 39–117)
ALT SERPL-CCNC: 22 U/L (ref 1–33)
AST SERPL-CCNC: 30 U/L (ref 1–32)
BILIRUB SERPL-MCNC: <0.2 MG/DL (ref 0–1.2)
BUN SERPL-MCNC: 4 MG/DL (ref 6–20)
BUN/CREAT SERPL: 7.4 (ref 7–25)
CALCIUM SERPL-MCNC: 9.8 MG/DL (ref 8.6–10.5)
CHLORIDE SERPL-SCNC: 97 MMOL/L (ref 98–107)
CHOLEST SERPL-MCNC: 153 MG/DL (ref 0–200)
CHOLEST/HDLC SERPL: 2.83 {RATIO}
CO2 SERPL-SCNC: 25.9 MMOL/L (ref 22–29)
CREAT SERPL-MCNC: 0.54 MG/DL (ref 0.57–1)
DIFFERENTIAL COMMENT: ABNORMAL
EGFRCR SERPLBLD CKD-EPI 2021: 115.9 ML/MIN/1.73
EOSINOPHIL # BLD MANUAL: 0.17 10*3/MM3 (ref 0–0.4)
EOSINOPHIL NFR BLD MANUAL: 2.1 % (ref 0.3–6.2)
ERYTHROCYTE [DISTWIDTH] IN BLOOD BY AUTOMATED COUNT: 16.1 % (ref 12.3–15.4)
GLOBULIN SER CALC-MCNC: 3.4 GM/DL
GLUCOSE SERPL-MCNC: 90 MG/DL (ref 65–99)
HCT VFR BLD AUTO: 37.2 % (ref 34–46.6)
HDLC SERPL-MCNC: 54 MG/DL (ref 40–60)
HGB BLD-MCNC: 12 G/DL (ref 12–15.9)
LDLC SERPL CALC-MCNC: 81 MG/DL (ref 0–100)
LYMPHOCYTES # BLD MANUAL: 1 10*3/MM3 (ref 0.7–3.1)
LYMPHOCYTES NFR BLD MANUAL: 12.6 % (ref 19.6–45.3)
MCH RBC QN AUTO: 24.1 PG (ref 26.6–33)
MCHC RBC AUTO-ENTMCNC: 32.3 G/DL (ref 31.5–35.7)
MCV RBC AUTO: 74.7 FL (ref 79–97)
MONOCYTES # BLD MANUAL: 0.5 10*3/MM3 (ref 0.1–0.9)
MONOCYTES NFR BLD MANUAL: 6.3 % (ref 5–12)
NEUTROPHILS # BLD MANUAL: 6.28 10*3/MM3 (ref 1.7–7)
NEUTROPHILS NFR BLD MANUAL: 78.9 % (ref 42.7–76)
NRBC BLD AUTO-RTO: 0 /100 WBC (ref 0–0.2)
PLATELET # BLD AUTO: 402 10*3/MM3 (ref 140–450)
PLATELET BLD QL SMEAR: ABNORMAL
POTASSIUM SERPL-SCNC: 4.4 MMOL/L (ref 3.5–5.2)
PROT SERPL-MCNC: 7.4 G/DL (ref 6–8.5)
RBC # BLD AUTO: 4.98 10*6/MM3 (ref 3.77–5.28)
RBC MORPH BLD: ABNORMAL
SODIUM SERPL-SCNC: 134 MMOL/L (ref 136–145)
TRIGL SERPL-MCNC: 98 MG/DL (ref 0–150)
VLDLC SERPL CALC-MCNC: 18 MG/DL (ref 5–40)
WBC # BLD AUTO: 7.96 10*3/MM3 (ref 3.4–10.8)

## 2025-02-26 NOTE — TELEPHONE ENCOUNTER
Caller: Alexandra Xiao    Relationship: Self    Best call back number:   Telephone Information:   Mobile [phone]         Who are you requesting to speak with (clinical staff, provider,  specific staff member): CLINICAL    What was the call regarding: PT WAS CALLING TO CHECK ON THE STATUS OF GETTING HER PROVIDER FILL OUT A FORMULARY REQUEST FORM FOR HER VIMPAT. I DID PUT THE NEW NEW INSURANCE INFO IN THE CHART BUT WILL NOT BE ACTIVE UNTIL 3-1-25. PLEASE REVIEW AND ADVISE

## 2025-02-28 RX ORDER — ATORVASTATIN CALCIUM 40 MG/1
40 TABLET, FILM COATED ORAL DAILY
Qty: 60 TABLET | Refills: 0 | Status: SHIPPED | OUTPATIENT
Start: 2025-02-28

## 2025-03-10 ENCOUNTER — TELEPHONE (OUTPATIENT)
Dept: NEUROLOGY | Facility: CLINIC | Age: 45
End: 2025-03-10
Payer: MEDICARE

## 2025-03-10 DIAGNOSIS — G40.309 GENERALIZED NONCONVULSIVE EPILEPSY: Primary | ICD-10-CM

## 2025-03-10 NOTE — TELEPHONE ENCOUNTER
Caller: Alexandra Xiao    Relationship: Self    Best call back number: [phone] (home)     What was the call regarding: THE PATIENT'S INS IS NOW ACTIVE BUT THEY NEED AN OFFICIAL FORMULARY SENT IN FOR HER VIMPAT.     PLEASE REVIEW  THANK YOU

## 2025-03-11 RX ORDER — CENOBAMATE 100 MG/1
TABLET, FILM COATED ORAL DAILY
Qty: 30 TABLET | Refills: 5 | Status: SHIPPED | OUTPATIENT
Start: 2025-03-11

## 2025-03-11 NOTE — TELEPHONE ENCOUNTER
I'm sorry, I'm not 100% sure. You could start with a prior authorization. If you wanted to know the plan's exact/specific process for brand meds where generic is available, you or patient may need to call the prescription insurance plan directly.

## 2025-03-11 NOTE — TELEPHONE ENCOUNTER
"Hey! I just checked what comes up from her insurance plan in our pharmacy system and if lacosamide is covered. The generic lacosamide is covered and should be a $0 copay. The brand Vimpat goes through as $0 right now but is listed as a \"transition benefit\" possibly meaning patient can only get an initial 30-day supply before needing a PA on file. Brand Vimpat is otherwise listed as excluded from formulary.  "

## 2025-03-17 NOTE — TELEPHONE ENCOUNTER
Caller: CUBA WITH [employer]    Relationship:     Best call back number: [phone]    What is the best time to reach you: ANY    Who are you requesting to speak with (clinical staff, provider,  specific staff member): ANY    What was the call regarding: CALLED TO CHECK STATUS OF VIMPAT SCRIPT / PRIOR AUTH. THANK YOU.

## 2025-03-21 DIAGNOSIS — Z96.89 STATUS POST VNS (VAGUS NERVE STIMULATOR) PLACEMENT: ICD-10-CM

## 2025-03-21 DIAGNOSIS — G40.309 GENERALIZED NONCONVULSIVE EPILEPSY: ICD-10-CM

## 2025-03-21 RX ORDER — ESLICARBAZEPINE ACETATE 800 MG/1
TABLET ORAL
Qty: 30 TABLET | Refills: 2 | Status: SHIPPED | OUTPATIENT
Start: 2025-03-21

## 2025-03-30 ENCOUNTER — APPOINTMENT (OUTPATIENT)
Dept: NEUROLOGY | Facility: HOSPITAL | Age: 45
End: 2025-03-30
Payer: MEDICARE

## 2025-03-30 ENCOUNTER — APPOINTMENT (OUTPATIENT)
Dept: CT IMAGING | Facility: HOSPITAL | Age: 45
End: 2025-03-30
Payer: MEDICARE

## 2025-03-30 ENCOUNTER — APPOINTMENT (OUTPATIENT)
Dept: GENERAL RADIOLOGY | Facility: HOSPITAL | Age: 45
End: 2025-03-30
Payer: MEDICARE

## 2025-03-30 ENCOUNTER — HOSPITAL ENCOUNTER (INPATIENT)
Facility: HOSPITAL | Age: 45
LOS: 2 days | Discharge: HOME | End: 2025-04-03
Attending: EMERGENCY MEDICINE | Admitting: STUDENT IN AN ORGANIZED HEALTH CARE EDUCATION/TRAINING PROGRAM
Payer: MEDICARE

## 2025-03-30 DIAGNOSIS — R29.90 STROKE-LIKE SYMPTOMS: Primary | ICD-10-CM

## 2025-03-30 DIAGNOSIS — R29.898 LEFT LEG WEAKNESS: ICD-10-CM

## 2025-03-30 DIAGNOSIS — R20.2 PARESTHESIA OF LEFT ARM AND LEG: ICD-10-CM

## 2025-03-30 DIAGNOSIS — Z79.01 CHRONIC ANTICOAGULATION: ICD-10-CM

## 2025-03-30 LAB
ABO GROUP BLD: NORMAL
ALBUMIN SERPL-MCNC: 4 G/DL (ref 3.5–5.2)
ALBUMIN/GLOB SERPL: 1.1 G/DL
ALP SERPL-CCNC: 273 U/L (ref 39–117)
ALT SERPL W P-5'-P-CCNC: 22 U/L (ref 1–33)
ANION GAP SERPL CALCULATED.3IONS-SCNC: 11.7 MMOL/L (ref 5–15)
APTT PPP: 29.1 SECONDS (ref 22.7–35.4)
AST SERPL-CCNC: 23 U/L (ref 1–32)
BASOPHILS # BLD AUTO: 0.03 10*3/MM3 (ref 0–0.2)
BASOPHILS NFR BLD AUTO: 0.4 % (ref 0–1.5)
BILIRUB SERPL-MCNC: <0.2 MG/DL (ref 0–1.2)
BLD GP AB SCN SERPL QL: NEGATIVE
BUN SERPL-MCNC: 6 MG/DL (ref 6–20)
BUN/CREAT SERPL: 9.1 (ref 7–25)
CALCIUM SPEC-SCNC: 8.7 MG/DL (ref 8.6–10.5)
CHLORIDE SERPL-SCNC: 100 MMOL/L (ref 98–107)
CO2 SERPL-SCNC: 22.3 MMOL/L (ref 22–29)
CREAT SERPL-MCNC: 0.66 MG/DL (ref 0.57–1)
DEPRECATED RDW RBC AUTO: 43.8 FL (ref 37–54)
EGFRCR SERPLBLD CKD-EPI 2021: 110.4 ML/MIN/1.73
EOSINOPHIL # BLD AUTO: 0.1 10*3/MM3 (ref 0–0.4)
EOSINOPHIL NFR BLD AUTO: 1.2 % (ref 0.3–6.2)
ERYTHROCYTE [DISTWIDTH] IN BLOOD BY AUTOMATED COUNT: 15.7 % (ref 12.3–15.4)
GLOBULIN UR ELPH-MCNC: 3.5 GM/DL
GLUCOSE BLDC GLUCOMTR-MCNC: 123 MG/DL (ref 70–130)
GLUCOSE BLDC GLUCOMTR-MCNC: 88 MG/DL (ref 70–130)
GLUCOSE SERPL-MCNC: 88 MG/DL (ref 65–99)
HCT VFR BLD AUTO: 37.6 % (ref 34–46.6)
HGB BLD-MCNC: 11.9 G/DL (ref 12–15.9)
HOLD SPECIMEN: NORMAL
HOLD SPECIMEN: NORMAL
IMM GRANULOCYTES # BLD AUTO: 0.02 10*3/MM3 (ref 0–0.05)
IMM GRANULOCYTES NFR BLD AUTO: 0.2 % (ref 0–0.5)
INR PPP: 1.1 (ref 0.9–1.1)
LYMPHOCYTES # BLD AUTO: 1.93 10*3/MM3 (ref 0.7–3.1)
LYMPHOCYTES NFR BLD AUTO: 23.5 % (ref 19.6–45.3)
MCH RBC QN AUTO: 24.6 PG (ref 26.6–33)
MCHC RBC AUTO-ENTMCNC: 31.6 G/DL (ref 31.5–35.7)
MCV RBC AUTO: 77.8 FL (ref 79–97)
MONOCYTES # BLD AUTO: 0.61 10*3/MM3 (ref 0.1–0.9)
MONOCYTES NFR BLD AUTO: 7.4 % (ref 5–12)
NEUTROPHILS NFR BLD AUTO: 5.52 10*3/MM3 (ref 1.7–7)
NEUTROPHILS NFR BLD AUTO: 67.3 % (ref 42.7–76)
NRBC BLD AUTO-RTO: 0 /100 WBC (ref 0–0.2)
PLATELET # BLD AUTO: 354 10*3/MM3 (ref 140–450)
PMV BLD AUTO: 9.6 FL (ref 6–12)
POTASSIUM SERPL-SCNC: 3.8 MMOL/L (ref 3.5–5.2)
PROT SERPL-MCNC: 7.5 G/DL (ref 6–8.5)
PROTHROMBIN TIME: 14.2 SECONDS (ref 11.7–14.2)
QT INTERVAL: 397 MS
QTC INTERVAL: 465 MS
RBC # BLD AUTO: 4.83 10*6/MM3 (ref 3.77–5.28)
RH BLD: POSITIVE
SODIUM SERPL-SCNC: 134 MMOL/L (ref 136–145)
T&S EXPIRATION DATE: NORMAL
WBC NRBC COR # BLD AUTO: 8.21 10*3/MM3 (ref 3.4–10.8)
WHOLE BLOOD HOLD COAG: NORMAL
WHOLE BLOOD HOLD SPECIMEN: NORMAL

## 2025-03-30 PROCEDURE — 82948 REAGENT STRIP/BLOOD GLUCOSE: CPT

## 2025-03-30 PROCEDURE — 71045 X-RAY EXAM CHEST 1 VIEW: CPT

## 2025-03-30 PROCEDURE — G0378 HOSPITAL OBSERVATION PER HR: HCPCS

## 2025-03-30 PROCEDURE — 93005 ELECTROCARDIOGRAM TRACING: CPT | Performed by: EMERGENCY MEDICINE

## 2025-03-30 PROCEDURE — 25510000001 IOPAMIDOL PER 1 ML: Performed by: EMERGENCY MEDICINE

## 2025-03-30 PROCEDURE — 70498 CT ANGIOGRAPHY NECK: CPT

## 2025-03-30 PROCEDURE — 86901 BLOOD TYPING SEROLOGIC RH(D): CPT | Performed by: EMERGENCY MEDICINE

## 2025-03-30 PROCEDURE — 85730 THROMBOPLASTIN TIME PARTIAL: CPT | Performed by: EMERGENCY MEDICINE

## 2025-03-30 PROCEDURE — 95816 EEG AWAKE AND DROWSY: CPT | Performed by: STUDENT IN AN ORGANIZED HEALTH CARE EDUCATION/TRAINING PROGRAM

## 2025-03-30 PROCEDURE — 4A03X5D MEASUREMENT OF ARTERIAL FLOW, INTRACRANIAL, EXTERNAL APPROACH: ICD-10-PCS | Performed by: EMERGENCY MEDICINE

## 2025-03-30 PROCEDURE — 86900 BLOOD TYPING SEROLOGIC ABO: CPT | Performed by: EMERGENCY MEDICINE

## 2025-03-30 PROCEDURE — 99291 CRITICAL CARE FIRST HOUR: CPT

## 2025-03-30 PROCEDURE — 80053 COMPREHEN METABOLIC PANEL: CPT | Performed by: EMERGENCY MEDICINE

## 2025-03-30 PROCEDURE — 99214 OFFICE O/P EST MOD 30 MIN: CPT | Performed by: PSYCHIATRY & NEUROLOGY

## 2025-03-30 PROCEDURE — 85025 COMPLETE CBC W/AUTO DIFF WBC: CPT | Performed by: EMERGENCY MEDICINE

## 2025-03-30 PROCEDURE — C9254 INJECTION, LACOSAMIDE: HCPCS | Performed by: STUDENT IN AN ORGANIZED HEALTH CARE EDUCATION/TRAINING PROGRAM

## 2025-03-30 PROCEDURE — 95816 EEG AWAKE AND DROWSY: CPT

## 2025-03-30 PROCEDURE — 25010000002 LACOSAMIDE 200 MG/20ML SOLUTION: Performed by: STUDENT IN AN ORGANIZED HEALTH CARE EDUCATION/TRAINING PROGRAM

## 2025-03-30 PROCEDURE — 85610 PROTHROMBIN TIME: CPT | Performed by: EMERGENCY MEDICINE

## 2025-03-30 PROCEDURE — 25810000003 SODIUM CHLORIDE 0.9 % SOLUTION: Performed by: STUDENT IN AN ORGANIZED HEALTH CARE EDUCATION/TRAINING PROGRAM

## 2025-03-30 PROCEDURE — 93010 ELECTROCARDIOGRAM REPORT: CPT | Performed by: INTERNAL MEDICINE

## 2025-03-30 PROCEDURE — 92610 EVALUATE SWALLOWING FUNCTION: CPT

## 2025-03-30 PROCEDURE — 82607 VITAMIN B-12: CPT | Performed by: NURSE PRACTITIONER

## 2025-03-30 PROCEDURE — 86850 RBC ANTIBODY SCREEN: CPT | Performed by: EMERGENCY MEDICINE

## 2025-03-30 PROCEDURE — 70496 CT ANGIOGRAPHY HEAD: CPT

## 2025-03-30 RX ORDER — SODIUM CHLORIDE 9 MG/ML
75 INJECTION, SOLUTION INTRAVENOUS CONTINUOUS
Status: DISCONTINUED | OUTPATIENT
Start: 2025-03-30 | End: 2025-03-31

## 2025-03-30 RX ORDER — BISACODYL 10 MG
10 SUPPOSITORY, RECTAL RECTAL DAILY PRN
Status: DISCONTINUED | OUTPATIENT
Start: 2025-03-30 | End: 2025-04-03 | Stop reason: HOSPADM

## 2025-03-30 RX ORDER — ONDANSETRON 2 MG/ML
4 INJECTION INTRAMUSCULAR; INTRAVENOUS EVERY 6 HOURS PRN
Status: DISCONTINUED | OUTPATIENT
Start: 2025-03-30 | End: 2025-04-03 | Stop reason: HOSPADM

## 2025-03-30 RX ORDER — ASPIRIN 325 MG
325 TABLET ORAL DAILY
Status: DISCONTINUED | OUTPATIENT
Start: 2025-03-31 | End: 2025-04-01

## 2025-03-30 RX ORDER — IOPAMIDOL 755 MG/ML
100 INJECTION, SOLUTION INTRAVASCULAR
Status: COMPLETED | OUTPATIENT
Start: 2025-03-30 | End: 2025-03-30

## 2025-03-30 RX ORDER — ATORVASTATIN CALCIUM 80 MG/1
80 TABLET, FILM COATED ORAL NIGHTLY
Status: DISCONTINUED | OUTPATIENT
Start: 2025-03-30 | End: 2025-04-03 | Stop reason: HOSPADM

## 2025-03-30 RX ORDER — ASPIRIN 300 MG/1
300 SUPPOSITORY RECTAL DAILY
Status: DISCONTINUED | OUTPATIENT
Start: 2025-03-31 | End: 2025-04-01

## 2025-03-30 RX ORDER — ACETAMINOPHEN 325 MG/1
650 TABLET ORAL EVERY 4 HOURS PRN
Status: DISCONTINUED | OUTPATIENT
Start: 2025-03-30 | End: 2025-04-03 | Stop reason: HOSPADM

## 2025-03-30 RX ORDER — ACETAMINOPHEN 650 MG/1
650 SUPPOSITORY RECTAL EVERY 4 HOURS PRN
Status: DISCONTINUED | OUTPATIENT
Start: 2025-03-30 | End: 2025-04-03 | Stop reason: HOSPADM

## 2025-03-30 RX ORDER — LACOSAMIDE 10 MG/ML
200 INJECTION, SOLUTION INTRAVENOUS EVERY 12 HOURS
Status: DISCONTINUED | OUTPATIENT
Start: 2025-03-30 | End: 2025-04-03 | Stop reason: HOSPADM

## 2025-03-30 RX ORDER — ASPIRIN 300 MG/1
300 SUPPOSITORY RECTAL DAILY
Status: DISCONTINUED | OUTPATIENT
Start: 2025-03-30 | End: 2025-03-30

## 2025-03-30 RX ORDER — ASPIRIN 325 MG
325 TABLET ORAL DAILY
Status: DISCONTINUED | OUTPATIENT
Start: 2025-03-30 | End: 2025-03-30

## 2025-03-30 RX ORDER — ASPIRIN 300 MG/1
300 SUPPOSITORY RECTAL ONCE
Status: COMPLETED | OUTPATIENT
Start: 2025-03-30 | End: 2025-03-30

## 2025-03-30 RX ORDER — ASPIRIN 325 MG
325 TABLET ORAL ONCE
Status: DISCONTINUED | OUTPATIENT
Start: 2025-03-30 | End: 2025-03-30

## 2025-03-30 RX ORDER — SODIUM CHLORIDE 0.9 % (FLUSH) 0.9 %
10 SYRINGE (ML) INJECTION AS NEEDED
Status: DISCONTINUED | OUTPATIENT
Start: 2025-03-30 | End: 2025-04-03 | Stop reason: HOSPADM

## 2025-03-30 RX ADMIN — ASPIRIN 300 MG: 300 SUPPOSITORY RECTAL at 12:35

## 2025-03-30 RX ADMIN — AMITRIPTYLINE HYDROCHLORIDE 25 MG: 25 TABLET, FILM COATED ORAL at 21:21

## 2025-03-30 RX ADMIN — SODIUM CHLORIDE 75 ML/HR: 9 INJECTION, SOLUTION INTRAVENOUS at 15:47

## 2025-03-30 RX ADMIN — ATORVASTATIN CALCIUM 80 MG: 80 TABLET, FILM COATED ORAL at 21:21

## 2025-03-30 RX ADMIN — ACETAMINOPHEN 650 MG: 325 TABLET, FILM COATED ORAL at 21:21

## 2025-03-30 RX ADMIN — LACOSAMIDE 200 MG: 10 INJECTION INTRAVENOUS at 15:47

## 2025-03-30 RX ADMIN — IOPAMIDOL 95 ML: 755 INJECTION, SOLUTION INTRAVENOUS at 10:51

## 2025-03-30 NOTE — CONSULTS
Neurology Consult Note  Consult Date: 3/30/2025  Referring MD: Franck Martinez MD  Reason for Consult I have been asked to see the patient in neurological consultation to render advice and opinion regarding left sided weakness     Alexandra Xiao is a 44 y.o. female with a past medical history of hypertension, hyperlipidemia, obesity, migraines, COPD, epilepsy, and prior strokes with residual right-sided weakness, presenting for evaluation of new left-sided symptoms.  Last known well the night prior to admission.  Patient reports awakening with left-sided symptoms and eventually presented to the ER for evaluation.  CT head negative for acute process but does redemonstrate areas of encephalomalacia at the left frontal and left occipital regions.  CTA of the head and neck with no flow-limiting stenosis or large vessel occlusion.  Blood pressures in the 160s upon my arrival.  Patient denies any significant headache.  No recent infectious symptoms or injuries.  Fiancé at the bedside notes that overnight patient seemed to be convulsing and short of breath during her sleep, but he does not feel that this appears similar to her prior seizures.  Patient does has a history of sleep apnea but does not use her CPAP as she is reportedly awaiting for an accessory piece.    Past Medical/Surgical Hx:  Past Medical History:   Diagnosis Date    Abnormal bleeding in menstrual cycle     C. 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    Headache, tension-type     Heavy menstrual bleeding     WITH CLOTS    Hyperlipidemia     Hypertension     Memory loss     Migraine     Seizures     Status post placement of implantable loop recorder     Stroke     MARCH AND APRIL, 2022     Past Surgical History:   Procedure Laterality Date    BREAST BIOPSY      COLONOSCOPY N/A 11/7/2024    Procedure: COLONOSCOPY into cecum and terminal ileum;   Surgeon: Matt White MD;  Location: [facility] ENDOSCOPY;  Service: Gastroenterology;  Laterality: N/A;  Pre: Screening, history of colitis   Post: Diverticulosis and Hemorrhoids    D & C HYSTEROSCOPY ENDOMETRIAL ABLATION N/A 06/10/2022    Procedure: DILATATION AND CURETTAGE HYSTEROSCOPY NOVASURE ENDOMETRIAL ABLATION;  Surgeon: Ernesto Mendosa MD;  Location: [facility] MAIN OR;  Service: Obstetrics/Gynecology;  Laterality: N/A;    FOOT SURGERY      pins in left foot     HYSTERECTOMY  07/16/2024    INSERT / REPLACE / REMOVE PACEMAKER  04/01/2022    Dr. Cristofer Chamorro, LOOP RECORDER    OTHER SURGICAL HISTORY      VNS plate 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    NOÉ      04/2022    TUBAL ABDOMINAL LIGATION      VAGUS NERVE STIMULATOR IMPLANTATION       Medications On Admission  Medications Prior to Admission   Medication Sig Dispense Refill Last Dose/Taking    amitriptyline (ELAVIL) 25 MG tablet TAKE 1 TABLET BY MOUTH EVERY NIGHT 30 tablet 5 3/29/2025 Evening    amLODIPine (NORVASC) 5 MG tablet TAKE 1 TABLET BY MOUTH DAILY 90 tablet 1 3/29/2025 Evening    aspirin 81 MG EC tablet Take 1 tablet by mouth Daily. 30 tablet 0 3/29/2025 Morning    atorvastatin (LIPITOR) 40 MG tablet TAKE 1 TABLET BY MOUTH EVERY DAY 60 tablet 0 3/29/2025 Evening    Eliquis 5 MG tablet tablet TAKE 1 TABLET BY MOUTH TWICE DAILY 180 tablet 2 3/29/2025 at 10:00 PM    Eslicarbazepine Acetate (Aptiom) 400 MG tablet Take 1 tablet by mouth Daily. Take with 800mg for total dose 1200mg daily 90 tablet 1 3/29/2025 Evening    Eslicarbazepine Acetate (Aptiom) 800 MG tablet tablet TAKE 1 TABLET BY MOUTH DAILY. TAKE 400MG FOR TOTAL DOSE 1200MG DAILY 30 tablet 2 3/29/2025 Evening    metoprolol succinate XL (TOPROL-XL) 25 MG 24 hr tablet TAKE 1 TABLET BY MOUTH DAILY 90 tablet 1 3/29/2025 Noon    Vimpat 200 MG tablet TAKE 1 TABLET BY MOUTH EVERY 12 HOURS 60 tablet 1 3/29/2025    Xcopri 100 MG tablet TAKE 1 TABLET BY MOUTH DAILY 30 tablet 5  "3/29/2025     Allergies:  No Known Allergies  Social Hx:  Social History     Socioeconomic History    Marital status: Single    Number of children: 2   Tobacco Use    Smoking status: Former     Current packs/day: 0.00     Average packs/day: 1 pack/day for 29.0 years (29.0 ttl pk-yrs)     Types: Cigarettes     Start date:      Quit date:      Years since quitting: 3.2    Smokeless tobacco: Never    Tobacco comments:     Quit 2022   Vaping Use    Vaping status: Some Days    Substances: Nicotine, Flavoring    Devices: Disposable, 6% NICOTINE   Substance and Sexual Activity    Alcohol use: Not Currently    Drug use: No    Sexual activity: Yes     Partners: Male     Birth control/protection: Tubal ligation     Family Hx:  Family History   Problem Relation Age of Onset    Breast cancer Mother 50         age 60 METS    Arthritis Mother     Arthritis Father     Diabetes Father     Heart disease Father     Hypertension Father     Heart attack Father     Hypertension Brother     Heart attack Paternal Grandfather     Ovarian cancer Neg Hx     Uterine cancer Neg Hx     Colon cancer Neg Hx     Deep vein thrombosis Neg Hx     Pulmonary embolism Neg Hx     Malig Hyperthermia Neg Hx      Review of Systems as per HPI  Exam  /88 (BP Location: Left arm, Patient Position: Lying)   Pulse 76   Temp 97.7 °F (36.5 °C) (Oral)   Resp 16   Ht 165.1 cm (65\")   Wt 82.6 kg (182 lb 1.6 oz)   LMP 2022 (Exact Date)   SpO2 100%   BMI 30.30 kg/m²     Current Facility-Administered Medications:     acetaminophen (TYLENOL) tablet 650 mg, 650 mg, Oral, Q4H PRN **OR** acetaminophen (TYLENOL) suppository 650 mg, 650 mg, Rectal, Q4H PRN, Franck Martinez MD    amitriptyline (ELAVIL) tablet 25 mg, 25 mg, Oral, Nightly, Franck Martinez MD    [Held by provider] apixaban (ELIQUIS) tablet 5 mg, 5 mg, Oral, BID, Franck Martinez MD    [START ON 3/31/2025] aspirin tablet 325 mg, 325 mg, Oral, Daily **OR** [START ON 3/31/2025] " aspirin suppository 300 mg, 300 mg, Rectal, Daily, Franck Martinez MD    atorvastatin (LIPITOR) tablet 80 mg, 80 mg, Oral, Nightly, Franck Martinez MD    bisacodyl (DULCOLAX) suppository 10 mg, 10 mg, Rectal, Daily PRN, Franck Martinez MD    lacosamide (VIMPAT) injection 200 mg, 200 mg, Intravenous, Q12H, Franck Martinez MD    ondansetron (ZOFRAN) injection 4 mg, 4 mg, Intravenous, Q6H PRN, Franck Martinez MD    sodium chloride 0.9 % flush 10 mL, 10 mL, Intravenous, PRN, Dennis Rahman MD    sodium chloride 0.9 % infusion, 75 mL/hr, Intravenous, Continuous, Franck Martinez MD    PRN meds    acetaminophen **OR** acetaminophen    bisacodyl    ondansetron    sodium chloride    No current facility-administered medications on file prior to encounter.     Current Outpatient Medications on File Prior to Encounter   Medication Sig    amitriptyline (ELAVIL) 25 MG tablet TAKE 1 TABLET BY MOUTH EVERY NIGHT    amLODIPine (NORVASC) 5 MG tablet TAKE 1 TABLET BY MOUTH DAILY    aspirin 81 MG EC tablet Take 1 tablet by mouth Daily.    atorvastatin (LIPITOR) 40 MG tablet TAKE 1 TABLET BY MOUTH EVERY DAY    Eliquis 5 MG tablet tablet TAKE 1 TABLET BY MOUTH TWICE DAILY    Eslicarbazepine Acetate (Aptiom) 400 MG tablet Take 1 tablet by mouth Daily. Take with 800mg for total dose 1200mg daily    Eslicarbazepine Acetate (Aptiom) 800 MG tablet tablet TAKE 1 TABLET BY MOUTH DAILY. TAKE 400MG FOR TOTAL DOSE 1200MG DAILY    metoprolol succinate XL (TOPROL-XL) 25 MG 24 hr tablet TAKE 1 TABLET BY MOUTH DAILY    Vimpat 200 MG tablet TAKE 1 TABLET BY MOUTH EVERY 12 HOURS    Xcopri 100 MG tablet TAKE 1 TABLET BY MOUTH DAILY       General appearance: NAD, alert and cooperative, well groomed  HEENT: Normocephalic, atraumatic  COR: RRR on the monitor  Resp: Even and unlabored on room air  Extremities: No obvious edema.  Skin: warm, dry    NIHSS      Level Of Consciousness 0   0=Alert; keenly responsive 1=Not alert, but arousable by minor stimulation  2=Not alert, requires repeated stimulation 3=Responds only with reflex movements      LOC Questions to Month and age 1  0=Answers both questions correctly 1=Answers one question correctly 2=Answers neither question correctly      LOC Commands -Open/Close eyes -Open/close  0   0=Performs both tasks correctly 1=Performs one task correctly 2=Performs neighter task correctly      Best Gaze 0   0=Normal 1=Partial gaze palsy 2=Forced deviation, or total gaze paresis      Visual 0   0=No visual loss 1=Partial hemianopia 2=Complete hemianopia 3=Bilateral hemianopia (blind including cortical blindness)      Facial Palsy 2  0=Normal symmetrical movement 1=Minor paralysis (asymmetry) 2=Partial paralysis (lower facde) 3=Complete paralysis (upper and lower face)      Motor:   Right Arm 0  0=No drift, limb holds posture for full 10 seconds 1=Drift, limb holds posture, no drift to bed 2=Some antigravity effort, cannot maintain posture, drifts to bed 3=No effort against gravity, limb falls 4=No movement    Left Arm 0  0=No drift, limb holds posture for full 10 seconds 1=Drift, limb holds posture, no drift to bed 2=Some antigravity effort, cannot maintain posture, drifts to bed 3=No effort against gravity, limb falls 4=No movement    Right Leg 0  0=No drift, limb holds posture for full 10 seconds 1=Drift, limb holds posture, no drift to bed 2=Some antigravity effort, cannot maintain posture, drifts to bed 3=No effort against gravity, limb falls 4=No movement    Left Leg  1  0=No drift, limb holds posture for full 10 seconds 1=Drift, limb holds posture, no drift to bed 2=Some antigravity effort, cannot maintain posture, drifts to bed 3=No effort against gravity, limb falls 4=No movement      Limb Ataxia 0   0=Absent 1=Present in one limb 2=Present in two limbs      Sensory 1  1=Normal 2=Mild to moderate sensory loss 3=Severe to total sensory loss      Best Language 1  0=No aphasia, normal 1=Mild to moderate aphasia 2=Mute, global  aphasia 3=Multe, global aphasia      Dysarthria 1  0=Normal 1=Mild to moderate 2=Severe, unintelligible or mute/anarthric      Extinction/Neglect 0   0=No abnormality 1=Extinction to bilateral simultaneous stimulation 2=Profound neglect      Total  7    Laboratory results:  Lab Results   Component Value Date    GLUCOSE 88 03/30/2025    CALCIUM 8.7 03/30/2025     (L) 03/30/2025    K 3.8 03/30/2025    CO2 22.3 03/30/2025     03/30/2025    BUN 6 03/30/2025    CREATININE 0.66 03/30/2025    BCR 9.1 03/30/2025    ANIONGAP 11.7 03/30/2025     Lab Results   Component Value Date    WBC 8.21 03/30/2025    HGB 11.9 (L) 03/30/2025    HCT 37.6 03/30/2025    MCV 77.8 (L) 03/30/2025     03/30/2025     Lab Results   Component Value Date    CHOL 134 04/09/2024    CHOL 135 03/06/2023    CHOL 110 04/27/2022     Lab Results   Component Value Date    HDL 54 02/25/2025    HDL 59 04/09/2024    HDL 66 (H) 03/06/2023     Lab Results   Component Value Date    LDL 81 02/25/2025    LDL 58 04/09/2024    LDL 55 03/06/2023     Lab Results   Component Value Date    TRIG 98 02/25/2025    TRIG 93 04/09/2024    TRIG 66 03/06/2023     Lab Results   Component Value Date    HGBA1C 5.4 07/31/2024     Lab Results   Component Value Date    INR 1.10 03/30/2025    INR 1.6 07/30/2024    INR 1.05 04/08/2024    PROTIME 14.2 03/30/2025    PROTIME 18.7 (H) 07/30/2024    PROTIME 13.9 04/08/2024     Lab Results   Component Value Date    AKAQYFAB07 361 06/19/2024     Lab Results   Component Value Date    TSH 1.790 04/11/2023     Pain Management Panel  More data may exist         Latest Ref Rng & Units 4/8/2024 3/26/2022   Pain Management Panel   Barbiturates Screen, Urine Negative Negative  NEG       Benzodiazepine Screen, Urine Negative Negative  NEG       Cocaine Screen, Urine Negative Negative  NEG       Fentanyl, Urine Negative Negative  -   Methadone Screen , Urine Negative Negative  NEG          Details          This result is from an  external source.              Brief Urine Lab Results  (Last result in the past 365 days)        Color   Clarity   Blood   Leuk Est   Nitrite   Protein   CREAT   Urine HCG        09/15/24 1637 Yellow   Clear   Negative   Moderate (2+)   Negative   Negative                   Lab review: I personally reviewed all labs as documented above.    Imaging review:   CT Angiogram Head w AI Analysis of LVO  Result Date: 3/30/2025   1. Noncontrast head CT demonstrates encephalomalacia in the left anterior superior frontal lobe and left occipital lobe, consistent with prior infarcts. No intraparenchymal hemorrhage. 2. No large vessel occlusion seen.  This report was finalized on 3/30/2025 11:22 AM by Dr. Matt Carpenter M.D on Workstation: [id number]      CT Angiogram Neck  Result Date: 3/30/2025   1. Noncontrast head CT demonstrates encephalomalacia in the left anterior superior frontal lobe and left occipital lobe, consistent with prior infarcts. No intraparenchymal hemorrhage. 2. No large vessel occlusion seen.  This report was finalized on 3/30/2025 11:22 AM by Dr. Matt Carpenter M.D on Workstation: [id number]      XR Chest 1 View  Result Date: 3/30/2025  FINDINGS AND IMPRESSION: Left-sided stimulator is present with lead coursing in the left neck and out of the field-of-view superiorly. There is also presumed loop recorder.  Lungs are hypoinflated. No pulmonary consolidation, pleural effusion or pneumothorax is seen. Cardiac silhouette within normal limits for size.  This report was finalized on 3/30/2025 11:19 AM by Dr. Julian Rush M.D on Workstation: [id number]      Results for orders placed during the hospital encounter of 04/08/24    Adult Transesophageal Echo (NOÉ) W/ Cont if Necessary Per Protocol    Interpretation Summary    There is focal thickening at the mid to distal anterior mitral valve leaflet. There is mild prolapse of the A2 segment of the anterior mitral valve leaflet.    There is moderate to  severe mitral regurgitation which originates from multiple jets, including a prominent posteriorly directed jet    There are moderate plaques in the descending thoracic aorta. There is a focal moderate plaque without mobile components in the central aortic arch    Left ventricular systolic function is normal. Left ventricular ejection fraction appears to be 61 - 65%.    Left ventricular diastolic function is consistent with (grade I) impaired relaxation.    Normal right ventricular cavity size and systolic function noted.    Multiple agitated saline studies were negative, including with abdominal thrusts. There was no PFO noted on the agitated saline studies, or by color Doppler assessment    No evidence of a left atrial appendage thrombus was present.    There is a small (<1cm) pericardial effusion adjacent to the right atrium and right ventricle. There is no evidence of cardiac tamponade.      I personally reviewed images and agree with radiology report.    Diagnosis: Suspect new right-sided stroke, presumably something in the subcortical region given lack of cortical findings on exam.  Patient reports compliance with her Eliquis she does admit to not using her CPAP.  There is also question of seizure-like activity overnight with some shortness of breath.  Further workup is warranted.    PLAN:   --Neurochecks and vitals per unit  --Will continue with chewable aspirin 81 mg daily for now  --Will hold off on home Eliquis for now until MRI brain is obtained  --MRI of the brain without gadolinium.  Patient will need a spinal cord stimulator turned off tomorrow.  Will seek assistance from outpatient neurology's office  --Please allow blood pressures to autoregulate for now  --Continue with high intensity statin  --Stroke restratification labs  --Routine EEG  --Continue with home Vimpat 200 mg every 12 hours  --Seizure precautions  --Toxic metabolic workup per primary  Stroke Education  SKYLER/SCDs  PT/OT/ST  Therapies as  written. CCP for discharge planning. Call RRT for any acute neurological changes. We will continue to follow and advise.    Stat CT head for any acute neurologic change    Javier Carrillo MD  Neurohospitalist

## 2025-03-30 NOTE — ED PROVIDER NOTES
EMERGENCY DEPARTMENT ENCOUNTER    Room Number:  09/09  PCP: Annalee Huddleston APRN  Historian: Patient, significant other    I initially evaluated the patient at 10:30 AM    HPI:  Chief Complaint: Left facial droop, left arm/leg weakness  A complete HPI/ROS/PMH/PSH/SH/FH are unobtainable due to: Nothing  Context: Alexandra Xiao is a 44 y.o. female with a medical history of epilepsy, migraines, stroke, COPD, hypertension, hyperlipidemia, chronic anticoagulation who presents to the ED c/o acute left facial droop and left arm/leg weakness.  She was last known normal at around 2 AM.  She woke up around 8:00 this morning and had left facial droop.  Her left arm and left leg felt somewhat weak.  She initially had a headache but this resolved after she took some Tylenol.  She denies vision changes, chest pain, dizziness, or slurred speech.  She takes Eliquis and her last dose was last night.            PAST MEDICAL HISTORY  Active Ambulatory Problems     Diagnosis Date Noted    Sebaceous cyst of breast, right 11/06/2018    Abnormal uterine bleeding 03/23/2022    Microcytic anemia 04/27/2022    Transaminitis 04/27/2022    Epilepsy 04/27/2022    Major depressive disorder 01/01/2014    Left-sided weakness 03/05/2023    Anemia     Hyponatremia     HTN (hypertension)     History of stroke 03/06/2023    Vitamin D deficiency 03/09/2023    B12 deficiency 03/09/2023    Observed sleep apnea 11/30/2023    Snoring 11/30/2023    Excessive daytime sleepiness 11/30/2023    Class 1 obesity 11/30/2023    Cerebrovascular accident (CVA) due to embolism of left middle cerebral artery 04/08/2024    S/P placement of VNS (vagus nerve stimulation) device 04/08/2024    Abnormal urinalysis 04/08/2024    Vaping nicotine dependence, tobacco product     Implantable loop recorder present     Preoperative clearance 07/12/2024    Leukocytosis 07/12/2024    Sodium (Na) deficiency 07/12/2024    Colitis 09/15/2024    History of stroke 09/16/2024    C.  difficile colitis 2024    Giardiasis 2024    Blood per rectum 2024    Colitis, Clostridium difficile 10/08/2024    Screening for colorectal cancer 10/08/2024     Resolved Ambulatory Problems     Diagnosis Date Noted    Acute CVA (cerebrovascular accident) 2022    Tobacco abuse 2022    UTI (urinary tract infection) 2024     Past Medical History:   Diagnosis Date    Abnormal bleeding in menstrual cycle     COPD (chronic obstructive pulmonary disease)     CPAP     Depression     Headache, tension-type     Heavy menstrual bleeding     Hyperlipidemia     Hypertension     Memory loss     Migraine     Seizures     Status post placement of implantable loop recorder     Stroke          PAST SURGICAL HISTORY  Past Surgical History:   Procedure Laterality Date    BREAST BIOPSY      COLONOSCOPY N/A 2024    Procedure: COLONOSCOPY into cecum and terminal ileum;  Surgeon: Matt White MD;  Location: [facility] ENDOSCOPY;  Service: Gastroenterology;  Laterality: N/A;  Pre: Screening, history of colitis   Post: Diverticulosis and Hemorrhoids    D & C HYSTEROSCOPY ENDOMETRIAL ABLATION N/A 06/10/2022    Procedure: DILATATION AND CURETTAGE HYSTEROSCOPY NOVASURE ENDOMETRIAL ABLATION;  Surgeon: Ernesto Mendosa MD;  Location: [facility] MAIN OR;  Service: Obstetrics/Gynecology;  Laterality: N/A;    FOOT SURGERY      pins in left foot     HYSTERECTOMY  2024    INSERT / REPLACE / REMOVE PACEMAKER  2022    Dr. Cristofer Chamorro, LOOP RECORDER    OTHER SURGICAL HISTORY      VNS plate 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    NOÉ      2022    TUBAL ABDOMINAL LIGATION      VAGUS NERVE STIMULATOR IMPLANTATION           FAMILY HISTORY  Family History   Problem Relation Age of Onset    Breast cancer Mother 50         age 60 METS    Arthritis Mother     Arthritis Father     Diabetes Father     Heart disease Father     Hypertension Father     Heart attack Father      Hypertension Brother     Heart attack Paternal Grandfather     Ovarian cancer Neg Hx     Uterine cancer Neg Hx     Colon cancer Neg Hx     Deep vein thrombosis Neg Hx     Pulmonary embolism Neg Hx     Malig Hyperthermia Neg Hx          SOCIAL HISTORY  Social History     Socioeconomic History    Marital status: Single    Number of children: 2   Tobacco Use    Smoking status: Former     Current packs/day: 0.00     Average packs/day: 1 pack/day for 29.0 years (29.0 ttl pk-yrs)     Types: Cigarettes     Start date: 1993     Quit date: 2022     Years since quitting: 3.2    Smokeless tobacco: Never    Tobacco comments:     Quit 03/2022   Vaping Use    Vaping status: Some Days    Substances: Nicotine, Flavoring    Devices: Disposable, 6% NICOTINE   Substance and Sexual Activity    Alcohol use: Not Currently    Drug use: No    Sexual activity: Yes     Partners: Male     Birth control/protection: Tubal ligation         ALLERGIES  Patient has no known allergies.    REVIEW OF SYSTEMS  Review of Systems  Included in HPI  All systems reviewed and negative except for those discussed in HPI.      PHYSICAL EXAM  ED Triage Vitals [03/30/25 1027]   Temp Heart Rate Resp BP SpO2   97.5 °F (36.4 °C) 91 16 -- 98 %      Temp src Heart Rate Source Patient Position BP Location FiO2 (%)   -- -- -- -- --       Physical Exam      GENERAL: Awake and alert.  Nontoxic-appearing female.  Appears older than stated age.  Oriented to self, place but not to month.    HENT: NCAT, nares patent  EYES: PERRL, EOMI  CV: regular rhythm, normal rate  RESPIRATORY: normal effort, clear to auscultation bilaterally  ABDOMEN: soft, nontender  MUSCULOSKELETAL: Extremities are nontender   NEURO: Speech is nondysarthric.  No expressive or receptive aphasia.  There is mild left facial droop.  There is normal strength in both upper extremities and the right lower extremity.  There is mild drift in the left lower extremity.  There is decreased light touch sensation in  the left face, left arm, and left leg.  There is normal extinction testing.  There is normal heel-to-shin and finger-nose testing bilaterally.  Visual fields are normal upon confrontation.  NIHSS is 4  PSYCH:  calm, cooperative  SKIN: warm, dry    Vital signs and nursing notes reviewed.    Interval: baseline  1a. Level of Consciousness: 0-->Alert, keenly responsive  1b. LOC Questions: 1-->Answers one question correctly  1c. LOC Commands: 0-->Performs both tasks correctly  2. Best Gaze: 0-->Normal  3. Visual: 0-->No visual loss  4. Facial Palsy: 1-->Minor paralysis (flattened nasolabial fold, asymmetry on smiling)  5a. Motor Arm, Left: 0-->No drift, limb holds 90 (or 45) degrees for full 10 secs  5b. Motor Arm, Right: 0-->No drift, limb holds 90 (or 45) degrees for full 10 secs  6a. Motor Leg, Left: 1-->Drift, leg falls by the end of the 5-sec period but does not hit bed  6b. Motor Leg, Right: 0-->No drift, leg holds 30 degree position for full 5 secs  7. Limb Ataxia: 0-->Absent  8. Sensory: 1-->Mild-to-moderate sensory loss, patient feels pinprick is less sharp or is dull on the affected side, or there is a loss of superficial pain with pinprick, but patient is aware of being touched  9. Best Language: 0-->No aphasia, normal  10. Dysarthria: 0-->Normal  11. Extinction and Inattention (formerly Neglect): 0-->No abnormality    Total (NIH Stroke Scale): 4        LAB RESULTS  Recent Results (from the past 24 hours)   POC Glucose Once    Collection Time: 03/30/25 10:32 AM    Specimen: Blood   Result Value Ref Range    Glucose 88 70 - 130 mg/dL   Comprehensive Metabolic Panel    Collection Time: 03/30/25 10:41 AM    Specimen: Blood   Result Value Ref Range    Glucose 88 65 - 99 mg/dL    BUN 6 6 - 20 mg/dL    Creatinine 0.66 0.57 - 1.00 mg/dL    Sodium 134 (L) 136 - 145 mmol/L    Potassium 3.8 3.5 - 5.2 mmol/L    Chloride 100 98 - 107 mmol/L    CO2 22.3 22.0 - 29.0 mmol/L    Calcium 8.7 8.6 - 10.5 mg/dL    Total Protein  7.5 6.0 - 8.5 g/dL    Albumin 4.0 3.5 - 5.2 g/dL    ALT (SGPT) 22 1 - 33 U/L    AST (SGOT) 23 1 - 32 U/L    Alkaline Phosphatase 273 (H) 39 - 117 U/L    Total Bilirubin <0.2 0.0 - 1.2 mg/dL    Globulin 3.5 gm/dL    A/G Ratio 1.1 g/dL    BUN/Creatinine Ratio 9.1 7.0 - 25.0    Anion Gap 11.7 5.0 - 15.0 mmol/L    eGFR 110.4 >60.0 mL/min/1.73   Protime-INR    Collection Time: 03/30/25 10:41 AM    Specimen: Blood   Result Value Ref Range    Protime 14.2 11.7 - 14.2 Seconds    INR 1.10 0.90 - 1.10   aPTT    Collection Time: 03/30/25 10:41 AM    Specimen: Blood   Result Value Ref Range    PTT 29.1 22.7 - 35.4 seconds   Type & Screen    Collection Time: 03/30/25 10:41 AM    Specimen: Blood   Result Value Ref Range    ABO Type O     RH type Positive     Antibody Screen Negative     T&S Expiration Date 4/2/2025 11:59:59 PM    Green Top (Gel)    Collection Time: 03/30/25 10:41 AM   Result Value Ref Range    Extra Tube Hold for add-ons.    Lavender Top    Collection Time: 03/30/25 10:41 AM   Result Value Ref Range    Extra Tube hold for add-on    Gold Top - SST    Collection Time: 03/30/25 10:41 AM   Result Value Ref Range    Extra Tube Hold for add-ons.    Light Blue Top    Collection Time: 03/30/25 10:41 AM   Result Value Ref Range    Extra Tube Hold for add-ons.    CBC Auto Differential    Collection Time: 03/30/25 10:41 AM    Specimen: Blood   Result Value Ref Range    WBC 8.21 3.40 - 10.80 10*3/mm3    RBC 4.83 3.77 - 5.28 10*6/mm3    Hemoglobin 11.9 (L) 12.0 - 15.9 g/dL    Hematocrit 37.6 34.0 - 46.6 %    MCV 77.8 (L) 79.0 - 97.0 fL    MCH 24.6 (L) 26.6 - 33.0 pg    MCHC 31.6 31.5 - 35.7 g/dL    RDW 15.7 (H) 12.3 - 15.4 %    RDW-SD 43.8 37.0 - 54.0 fl    MPV 9.6 6.0 - 12.0 fL    Platelets 354 140 - 450 10*3/mm3    Neutrophil % 67.3 42.7 - 76.0 %    Lymphocyte % 23.5 19.6 - 45.3 %    Monocyte % 7.4 5.0 - 12.0 %    Eosinophil % 1.2 0.3 - 6.2 %    Basophil % 0.4 0.0 - 1.5 %    Immature Grans % 0.2 0.0 - 0.5 %    Neutrophils,  Absolute 5.52 1.70 - 7.00 10*3/mm3    Lymphocytes, Absolute 1.93 0.70 - 3.10 10*3/mm3    Monocytes, Absolute 0.61 0.10 - 0.90 10*3/mm3    Eosinophils, Absolute 0.10 0.00 - 0.40 10*3/mm3    Basophils, Absolute 0.03 0.00 - 0.20 10*3/mm3    Immature Grans, Absolute 0.02 0.00 - 0.05 10*3/mm3    nRBC 0.0 0.0 - 0.2 /100 WBC   ECG 12 Lead Stroke Evaluation    Collection Time: 03/30/25 10:42 AM   Result Value Ref Range    QT Interval 397 ms    QTC Interval 465 ms       Ordered the above labs and reviewed the results.        RADIOLOGY  CT Angiogram Head w AI Analysis of LVO  CT Angiogram Head w AI Analysis of LVO, CT Angiogram Neck  Result Date: 3/30/2025  CT ANGIOGRAM HEAD W AI ANALYSIS OF LVO-, CT ANGIOGRAM NECK-  INDICATION: Stroke follow-up. Not a TNK candidate.  COMPARISON: CTA head and neck and CT head April 8, 2024  TECHNIQUE: Noncontrast head CT. CTA head and neck with IV contrast. Coronal and sagittal reformats. Three dimensional reconstructions. Radiation dose reduction techniques were utilized, including automated exposure control and exposure modulation based on body size.  FINDINGS:  Noncontrast head CT: No intraparenchymal hemorrhage. Small area of hypoattenuation and volume loss seen within the left anterosuperior frontal lobe, consistent with encephalomalacia from prior infarct. Small area of hypoattenuation and volume loss seen within the left occipital lobe, consistent with encephalomalacia from prior infarct. No mass effect or midline shift. No hydrocephalus. No intraventricular hemorrhage. No extra-axial hemorrhage or fluid collection. No fracture or bone lesion. Patent mastoid air cells and middle ears. Patent paranasal sinuses.  CTA NECK: Right common and internal carotid artery are patent, without a stenosis. Left common and internal carotid artery are patent, without a stenosis. Venous contamination obscures the proximal right vertebral artery. Visualized portions of the vertebral arteries are patent,  without stenosis seen. No dissection.  CTA HEAD: Right internal carotid, middle and anterior cerebral arteries appear patent. Left internal carotid, middle and anterior cerebral arteries appear patent. Bilateral vertebral arteries, posterior inferior cerebellar arteries, basilar artery, superior cerebellar arteries and posterior cerebral arteries appear patent. No aneurysm identified. Dural sinuses appear patent.  Other: Airways wall thickening and some emphysematous changes seen in the upper lungs. Small thyroid nodules.       1. Noncontrast head CT demonstrates encephalomalacia in the left anterior superior frontal lobe and left occipital lobe, consistent with prior infarcts. No intraparenchymal hemorrhage. 2. No large vessel occlusion seen.  This report was finalized on 3/30/2025 11:22 AM by Dr. Matt Carpenter M.D on Workstation: [id number]      XR Chest 1 View  Result Date: 3/30/2025  Portable chest radiograph  HISTORY: Stroke  TECHNIQUE: Single AP portable radiograph of the chest  COMPARISON: Gastrograph 3/5/2023      FINDINGS AND IMPRESSION: Left-sided stimulator is present with lead coursing in the left neck and out of the field-of-view superiorly. There is also presumed loop recorder.  Lungs are hypoinflated. No pulmonary consolidation, pleural effusion or pneumothorax is seen. Cardiac silhouette within normal limits for size.  This report was finalized on 3/30/2025 11:19 AM by Dr. Julian Rush M.D on Workstation: [id number]        Ordered the above noted radiological studies. Reviewed by me in PACS.            PROCEDURES  Critical Care    Performed by: Dennis Rahman MD  Authorized by: Dennis Rahman MD    Critical care provider statement:     Critical care time (minutes):  32    Critical care time was exclusive of:  Separately billable procedures and treating other patients    Critical care was necessary to treat or prevent imminent or life-threatening deterioration of the following  conditions:  CNS failure or compromise    Critical care was time spent personally by me on the following activities:  Development of treatment plan with patient or surrogate, discussions with consultants, discussions with primary provider, evaluation of patient's response to treatment, examination of patient, obtaining history from patient or surrogate, ordering and performing treatments and interventions, ordering and review of laboratory studies, ordering and review of radiographic studies, pulse oximetry, re-evaluation of patient's condition and review of old charts    I assumed direction of critical care for this patient from another provider in my specialty: no      Care discussed with: admitting provider            OUTPATIENT MEDICATION MANAGEMENT:  Current Facility-Administered Medications Ordered in Epic   Medication Dose Route Frequency Provider Last Rate Last Admin    acetaminophen (TYLENOL) tablet 650 mg  650 mg Oral Q4H PRN Franck Martinez MD        Or    acetaminophen (TYLENOL) suppository 650 mg  650 mg Rectal Q4H PRN Franck Martinez MD        amitriptyline (ELAVIL) tablet 25 mg  25 mg Oral Nightly Franck Martinez MD        [Held by provider] apixaban (ELIQUIS) tablet 5 mg  5 mg Oral BID Franck Martinez MD        [START ON 3/31/2025] aspirin tablet 325 mg  325 mg Oral Daily Franck Martinez MD        Or    [START ON 3/31/2025] aspirin suppository 300 mg  300 mg Rectal Daily Franck Martinez MD        atorvastatin (LIPITOR) tablet 80 mg  80 mg Oral Nightly Franck Martinez MD        bisacodyl (DULCOLAX) suppository 10 mg  10 mg Rectal Daily PRN Franck Martinez MD        lacosamide (VIMPAT) injection 200 mg  200 mg Intravenous Q12H Franck Martinez MD        ondansetron (ZOFRAN) injection 4 mg  4 mg Intravenous Q6H PRN Franck Martinez MD        sodium chloride 0.9 % flush 10 mL  10 mL Intravenous PRN Dennis Rahman MD        sodium chloride 0.9 % infusion  75 mL/hr Intravenous Continuous Franck Martinez MD          Current Outpatient Medications Ordered in Epic   Medication Sig Dispense Refill    amitriptyline (ELAVIL) 25 MG tablet TAKE 1 TABLET BY MOUTH EVERY NIGHT 30 tablet 5    amLODIPine (NORVASC) 5 MG tablet TAKE 1 TABLET BY MOUTH DAILY 90 tablet 1    aspirin 81 MG EC tablet Take 1 tablet by mouth Daily. 30 tablet 0    atorvastatin (LIPITOR) 40 MG tablet TAKE 1 TABLET BY MOUTH EVERY DAY 60 tablet 0    Eliquis 5 MG tablet tablet TAKE 1 TABLET BY MOUTH TWICE DAILY 180 tablet 2    Eslicarbazepine Acetate (Aptiom) 400 MG tablet Take 1 tablet by mouth Daily. Take with 800mg for total dose 1200mg daily 90 tablet 1    Eslicarbazepine Acetate (Aptiom) 800 MG tablet tablet TAKE 1 TABLET BY MOUTH DAILY. TAKE 400MG FOR TOTAL DOSE 1200MG DAILY 30 tablet 2    metoprolol succinate XL (TOPROL-XL) 25 MG 24 hr tablet TAKE 1 TABLET BY MOUTH DAILY 90 tablet 1    Vimpat 200 MG tablet TAKE 1 TABLET BY MOUTH EVERY 12 HOURS 60 tablet 1    Xcopri 100 MG tablet TAKE 1 TABLET BY MOUTH DAILY 30 tablet 5           MEDICATIONS GIVEN IN ER  Medications   sodium chloride 0.9 % flush 10 mL (has no administration in time range)   sodium chloride 0.9 % infusion (has no administration in time range)   acetaminophen (TYLENOL) tablet 650 mg (has no administration in time range)     Or   acetaminophen (TYLENOL) suppository 650 mg (has no administration in time range)   atorvastatin (LIPITOR) tablet 80 mg (has no administration in time range)   ondansetron (ZOFRAN) injection 4 mg (has no administration in time range)   bisacodyl (DULCOLAX) suppository 10 mg (has no administration in time range)   aspirin tablet 325 mg (has no administration in time range)     Or   aspirin suppository 300 mg (has no administration in time range)   amitriptyline (ELAVIL) tablet 25 mg (has no administration in time range)   apixaban (ELIQUIS) tablet 5 mg ( Oral Dose Auto Held 4/7/25 2100)   lacosamide (VIMPAT) injection 200 mg (has no administration in time range)   iopamidol  (ISOVUE-370) 76 % injection 100 mL (95 mL Intravenous Given by Other 3/30/25 1051)   aspirin suppository 300 mg (300 mg Rectal Given 3/30/25 1235)                   MEDICAL DECISION MAKING, PROGRESS, and CONSULTS    All labs have been independently reviewed by me.  All radiology studies have been reviewed by me and I have also reviewed the radiology report.   EKG's independently viewed and interpreted by me.  Discussion below represents my analysis of pertinent findings related to patient's condition, differential diagnosis, treatment plan and final disposition.      Additional sources:    - Discussed/ obtained information from independent historians:  Significant other at bedside    - External (non-ED) record review: Patient was admitted here in April 2024 for an acute CVA due to embolism of the left middle cerebral artery.  MRI showed an acute left MCA infarct.  She was seen by neurology, cardiology, and hematology.  NOÉ did not show any evidence of PFO or thrombus.  She was started on aspirin and Eliquis.  Loop recorder was interrogated and did not show any evidence of atrial fibrillation.    -Prescription drug monitoring program review:     N/A    - Chronic or social conditions impacting patient care (Social Determinants of Health): None          Orders placed during this visit:  Orders Placed This Encounter   Procedures    Critical Care    CT Angiogram Head w AI Analysis of LVO    CT Angiogram Neck    XR Chest 1 View    MRI Brain Without Contrast    [place] Draw    Comprehensive Metabolic Panel    Protime-INR    aPTT    CBC Auto Differential    CBC (No Diff)    Comprehensive Metabolic Panel    Hemoglobin A1c    Lipid Panel    TSH    NPO Diet NPO Type: Strict NPO    Initiate Department's Acute Stroke Process (Team D, Code 19, etc.)    Perform NIH Stroke Scale    Measure Actual Weight    Notify Provider    Notify Provider for SBP Greater Than 140 for Hemorrhagic Stroke Patient    Undress and Gown    Vital Signs     No Hypotonic Fluids    Nursing Dysphagia Screening (Complete Prior to Giving anything PO)    Vital Signs    Continuous Pulse Oximetry    Maintain IV Access    Telemetry - Place Orders & Notify Provider of Results When Patient Experiences Acute Chest Pain, Dysrhythmia or Respiratory Distress    Notify physician of changes in level of consciousness, worsening of stroke symptoms, acute headache or severe nausea and vomiting or any of the following vital sign parameters:    Nursing Dysphagia Screening    RN to Place Order SLP Consult - Eval & Treat Choosing Reason of RN Dysphagia Screen Failed    Nurse to Call MD or Nutrition Services for Diet if Patient Passes Dysphagia Screen    Intake and Output    Neuro Checks    NIHSS Assessment    Order CT Head Without Contrast for Neurological Decline    Provide Stroke Education Material    Tobacco Cessation Education    Activity - Strict Bed Rest    Place Sequential Compression Device    Maintain Sequential Compression Device    Device Interrogation. Company to contact? Other (See comment) (loop recorder)    Neuro Checks    Code Status and Medical Interventions: CPR (Attempt to Resuscitate); Full Support    Inpatient Neurology Consult Stroke    Inpatient Neurology Consult Stroke    LHA (on-call MD unless specified) Details    Notify Stroke Coordinator    Consult to Case Management     Inpatient Diabetes Educator Consult    OT Consult: Eval & Treat    PT Consult: Eval & Treat as tolerated    Oxygen Therapy-    Patient May Use Home CPAP / BIPAP For Sleep or As Needed    SLP Consult: Eval & Treat RN Dysphagia Screen Failed    POC Glucose Once    POC Glucose Once    POC Glucose Q6H    ECG 12 Lead Stroke Evaluation    ECG 12 Lead Rhythm Change    Adult transthoracic echo complete    Type & Screen    Insert Large-Bore Peripheral IV - RIGHT AC Preferred    Initiate Observation Status    Fall Precautions    CBC & Differential    Green Top (Gel)    Lavender Top    Gold  Top - SST    Light Blue Top         Additional orders considered but not ordered:          Differential diagnosis includes, but is not limited to:    CVA, TIA, migraine, seizure      Independent interpretation of labs, radiology studies, and discussions with consultants:  ED Course as of 03/30/25 1403   Sun Mar 30, 2025   1040 Case discussed with Dr. House, stroke neurologist.  Pertinent history and exam findings were discussed with her.  She recommends obtaining a stat CTA head and neck but not CT perfusion.  Patient is not a thrombolytic candidate as she takes Eliquis. [WH]   1041 Team D protocol initiated [WH]   1047 EKG personally interpreted by me at 10:46 AM.  My personal interpretation is:          EKG time: 10:42 AM  Rhythm/Rate: Sinus rhythm, rate 82  P waves and NJ: Normal  QRS, axis: Normal axis, early transition  ST and T waves: Normal    Interpreted Contemporaneously by me, independently viewed  EKG is changed compared to prior EKG done on 4/8/2024.  Rate was 105 at that time.  There are no new ischemic changes   [WH]   1100 Case discussed with Dr. Carrillo, stroke neurologist.  He is taking over the case from Dr. House.  He will come see the patient in the ED [WH]   1136 Glucose: 88 [WH]   1136 BUN: 6 [WH]   1136 Creatinine: 0.66 [WH]   1136 Sodium(!): 134 [WH]   1136 INR: 1.10 [WH]   1136 WBC: 8.21 [WH]   1136 Hemoglobin(!): 11.9 [WH]   1136 Chest personally interpreted by me.  My personal interpretation is: Heart size is normal.  Lungs are clear.  No pleural effusion. [WH]   1136 Per the radiologist, CTA of the head/neck shows encephalomalacia in the left anterior superior frontal lobe and left occipital lobe consistent with prior infarct.  There is no acute hemorrhage or large vessel occlusion [WH]   1210 Dr. Carrillo has evaluated the patient here in the ED.  He would like her admitted to the hospitalist.  He would like for her to have a full dose aspirin. [WH]   1234 Case discussed with   Juan, hospitalist, and he agrees to admit the patient to a monitored bed.  Pertinent history, exam findings, test results, ED course, and diagnoses were discussed with him. []   1250 MDM: Patient presented to the ED with acute strokelike symptoms.  Initial stroke score was 4.  She was not a thrombolytic candidate because she is on Eliquis.  Case was discussed with the stroke neurologist.  CTA head/neck was negative acute.  Labs were basically unremarkable.  She was given an aspirin suppository.  She will be admitted to the hospitalist.  Critical care was performed on this patient. []      ED Course User Index  [] Dennis Rahman MD         COMPLEXITY OF CARE  The patient requires admission.      DIAGNOSIS  Final diagnoses:   Stroke-like symptoms   Left leg weakness   Paresthesia of left arm and leg   Chronic anticoagulation         DISPOSITION  ADMISSION    Discussed treatment plan and reason for admission with pt/family and admitting physician.  Pt/family voiced understanding of the plan for admission for further testing/treatment as needed.               Latest Documented Vital Signs:  AS OF 14:03 EDT VITALS:    BP - (!) 159/105  HR - 74  TEMP - 97.5 °F (36.4 °C)  O2 SATS - 97%            --    Please note that portions of this were completed with a voice recognition program.       Note Disclaimer: At [facility], we believe that sharing information builds trust and better relationships. You are receiving this note because you are receiving care at [facility] or recently visited. It is possible you will see health information before a provider has talked with you about it. This kind of information can be easy to misunderstand. To help you fully understand what it means for your health, we urge you to discuss this note with your provider.             Dennis Rahman MD  03/30/25 [id number]

## 2025-03-30 NOTE — ED NOTES
..Nursing report ED to floor  Alexandra Xiao  44 y.o.  female    HPI :  HPI  Stated Reason for Visit: left sided facial droop, left sided weakness  History Obtained From: patient    Chief Complaint  Chief Complaint   Patient presents with    Facial Droop    Extremity Weakness       Admitting doctor:   Franck Martinez MD    Admitting diagnosis:   There were no encounter diagnoses.    Code status:   Current Code Status       Date Active Code Status Order ID Comments User Context       Prior            Allergies:   Patient has no known allergies.    Isolation:   No active isolations    Intake and Output  No intake or output data in the 24 hours ending 03/30/25 1244    Weight:       03/30/25  1040   Weight: 82.3 kg (181 lb 7 oz)       Most recent vitals:   Vitals:    03/30/25 1027 03/30/25 1040 03/30/25 1211   BP:  164/95 (!) 159/105   BP Location:  Right arm    Pulse: 91  74   Resp: 16     Temp: 97.5 °F (36.4 °C)     SpO2: 98%  97%   Weight:  82.3 kg (181 lb 7 oz)        Active LDAs/IV Access:   Lines, Drains & Airways       Active LDAs       Name Placement date Placement time Site Days    Peripheral IV 03/30/25 1031 Anterior;Proximal;Right Forearm 03/30/25  1031  Forearm  less than 1                    Labs (abnormal labs have a star):   Labs Reviewed   COMPREHENSIVE METABOLIC PANEL - Abnormal; Notable for the following components:       Result Value    Sodium 134 (*)     Alkaline Phosphatase 273 (*)     All other components within normal limits    Narrative:     GFR Categories in Chronic Kidney Disease (CKD)      GFR Category          GFR (mL/min/1.73)    Interpretation  G1                     90 or greater         Normal or high (1)  G2                      60-89                Mild decrease (1)  G3a                   45-59                Mild to moderate decrease  G3b                   30-44                Moderate to severe decrease  G4                    15-29                Severe decrease  G5                     14 or less           Kidney failure          (1)In the absence of evidence of kidney disease, neither GFR category G1 or G2 fulfill the criteria for CKD.    eGFR calculation 2021 CKD-EPI creatinine equation, which does not include race as a factor   CBC WITH AUTO DIFFERENTIAL - Abnormal; Notable for the following components:    Hemoglobin 11.9 (*)     MCV 77.8 (*)     MCH 24.6 (*)     RDW 15.7 (*)     All other components within normal limits   PROTIME-INR - Normal   APTT - Normal   POCT GLUCOSE FINGERSTICK - Normal   RAINBOW DRAW    Narrative:     The following orders were created for panel order [place] Draw.  Procedure                               Abnormality         Status                     ---------                               -----------         ------                     Green Top (Gel)[[id number]]                                  Final result               Lavender Top[[id number]]                                     Final result               Gold Top - SST[[id number]]                                   Final result               Light Blue Top[[id number]]                                   Final result                 Please view results for these tests on the individual orders.   POCT GLUCOSE FINGERSTICK   POCT GLUCOSE FINGERSTICK   POCT GLUCOSE FINGERSTICK   POCT GLUCOSE FINGERSTICK   POCT GLUCOSE FINGERSTICK   TYPE AND SCREEN   CBC AND DIFFERENTIAL    Narrative:     The following orders were created for panel order CBC & Differential.  Procedure                               Abnormality         Status                     ---------                               -----------         ------                     CBC Auto Differential[[id number]]        Abnormal            Final result                 Please view results for these tests on the individual orders.   GREEN TOP   LAVENDER TOP   GOLD TOP - SST   LIGHT BLUE TOP       EKG:   ECG 12 Lead Stroke Evaluation   Preliminary Result   HEART RATE=82  bpm   RR  Wanljecc=812  ms   AK Anipajss=629  ms   P Horizontal Axis=-3  deg   P Front Axis=19  deg   QRSD Interval=87  ms   QT Cummzxha=680  ms   QRcM=172  ms   QRS Axis=14  deg   T Wave Axis=51  deg   - OTHERWISE NORMAL ECG -   Sinus rhythm   Abnormal R-wave progression, early transition   Date and Time of Study:2025-03-30 10:42:40          Meds given in ED:   Medications   sodium chloride 0.9 % flush 10 mL (has no administration in time range)   sodium chloride 0.9 % infusion (has no administration in time range)   acetaminophen (TYLENOL) tablet 650 mg (has no administration in time range)     Or   acetaminophen (TYLENOL) suppository 650 mg (has no administration in time range)   atorvastatin (LIPITOR) tablet 80 mg (has no administration in time range)   ondansetron (ZOFRAN) injection 4 mg (has no administration in time range)   bisacodyl (DULCOLAX) suppository 10 mg (has no administration in time range)   aspirin tablet 325 mg (has no administration in time range)     Or   aspirin suppository 300 mg (has no administration in time range)   iopamidol (ISOVUE-370) 76 % injection 100 mL (95 mL Intravenous Given by Other 3/30/25 1051)   aspirin suppository 300 mg (300 mg Rectal Given 3/30/25 1235)       Imaging results:  CT Angiogram Head w AI Analysis of LVO  Result Date: 3/30/2025   1. Noncontrast head CT demonstrates encephalomalacia in the left anterior superior frontal lobe and left occipital lobe, consistent with prior infarcts. No intraparenchymal hemorrhage. 2. No large vessel occlusion seen.  This report was finalized on 3/30/2025 11:22 AM by Dr. Matt Carpenter M.D on Workstation: [id number]      CT Angiogram Neck  Result Date: 3/30/2025   1. Noncontrast head CT demonstrates encephalomalacia in the left anterior superior frontal lobe and left occipital lobe, consistent with prior infarcts. No intraparenchymal hemorrhage. 2. No large vessel occlusion seen.  This report was finalized on 3/30/2025 11:22 AM by   "Matt Carpenter M.D on Workstation: [id number]      XR Chest 1 View  Result Date: 3/30/2025  FINDINGS AND IMPRESSION: Left-sided stimulator is present with lead coursing in the left neck and out of the field-of-view superiorly. There is also presumed loop recorder.  Lungs are hypoinflated. No pulmonary consolidation, pleural effusion or pneumothorax is seen. Cardiac silhouette within normal limits for size.  This report was finalized on 3/30/2025 11:19 AM by Dr. Julian Rush M.D on Workstation: [id number]        Ambulatory status:   - up with stand by assist    Social issues:   Social History     Socioeconomic History    Marital status: Single    Number of children: 2   Tobacco Use    Smoking status: Former     Current packs/day: 0.00     Average packs/day: 1 pack/day for 29.0 years (29.0 ttl pk-yrs)     Types: Cigarettes     Start date: 1993     Quit date: 2022     Years since quitting: 3.2    Smokeless tobacco: Never    Tobacco comments:     Quit 03/2022   Vaping Use    Vaping status: Some Days    Substances: Nicotine, Flavoring    Devices: Disposable, 6% NICOTINE   Substance and Sexual Activity    Alcohol use: Not Currently    Drug use: No    Sexual activity: Yes     Partners: Male     Birth control/protection: Tubal ligation       Peripheral Neurovascular  Peripheral Neurovascular (Adult)  Peripheral Neurovascular WDL: WDL, all    Neuro Cognitive  Neuro Cognitive (Adult)  Cognitive/Neuro/Behavioral WDL: .WDL except, arousability, level of consciousness, orientation, motor response, speech, mood/behavior, all  Level of Consciousness: Alert  Arousal Level: opens eyes spontaneously  Orientation: oriented x 4, person, place, time, situation  Speech: spontaneous, logical, clear  Mood/Behavior: calm, cooperative, behavior appropriate to situation  Last Known Well Date: 03/30/25  Last Known Well Time: (S) 0230 (familly stated around 0230 pt was \"locked up\" tight and SOB, possible seisure activity, pt recoovered, " went back to bed and woke up at 0830 with a facial droop and L sided weakness and numbness)  Additional Documentation: NIH Stroke Scale (group)  Motor Response  Motor Response: general motor response  General Motor Response: purposeful motor response  [place] Coma Scale  Best Eye Response: 4-->(E4) spontaneous  Best Motor Response: 6-->(M6) obeys commands  Best Verbal Response: 5-->(V5) oriented  [place] Coma Scale Score: 15  NIH Stroke Scale  Interval: baseline  1a. Level of Consciousness: 0-->Alert, keenly responsive  1b. LOC Questions: 1-->Answers one question correctly  1c. LOC Commands: 0-->Performs both tasks correctly  2. Best Gaze: 0-->Normal  3. Visual: 0-->No visual loss  4. Facial Palsy: 1-->Minor paralysis (flattened nasolabial fold, asymmetry on smiling)  5a. Motor Arm, Left: 0-->No drift, limb holds 90 (or 45) degrees for full 10 secs  5b. Motor Arm, Right: 0-->No drift, limb holds 90 (or 45) degrees for full 10 secs  6a. Motor Leg, Left: 1-->Drift, leg falls by the end of the 5-sec period but does not hit bed  6b. Motor Leg, Right: 0-->No drift, leg holds 30 degree position for full 5 secs  7. Limb Ataxia: 0-->Absent  8. Sensory: 1-->Mild-to-moderate sensory loss, patient feels pinprick is less sharp or is dull on the affected side, or there is a loss of superficial pain with pinprick, but patient is aware of being touched  9. Best Language: 0-->No aphasia, normal  10. Dysarthria: 0-->Normal  11. Extinction and Inattention (formerly Neglect): 0-->No abnormality  Total (NIH Stroke Scale): 4    Learning  Learning Assessment  Learning Readiness and Ability: no barriers identified  Education Provided  Person Taught: patient, spouse  Teaching Method: verbal instruction  Teaching Focus: symptom/problem overview, self-management, medical device/equipment use, diet modification, diagnostic test    Respiratory  Respiratory  Airway WDL: WDL  Respiratory WDL  Respiratory WDL: WDL, all  Rhythm/Pattern,  Respiratory: no shortness of breath reported, pattern regular, unlabored, depth regular  Expansion/Accessory Muscles/Retractions: no use of accessory muscles, expansion symmetric, no retractions    Abdominal Pain  Safety Interventions  Safety Precautions/Falls Reduction: family at bedside    Pain Assessments  Pain (Adult)  (0-10) Pain Rating: Rest: 0  (0-10) Pain Rating: Activity: 0    NIH Stroke Scale  NIH Stroke Scale  Interval: baseline  1a. Level of Consciousness: 0-->Alert, keenly responsive  1b. LOC Questions: 1-->Answers one question correctly  1c. LOC Commands: 0-->Performs both tasks correctly  2. Best Gaze: 0-->Normal  3. Visual: 0-->No visual loss  4. Facial Palsy: 1-->Minor paralysis (flattened nasolabial fold, asymmetry on smiling)  5a. Motor Arm, Left: 0-->No drift, limb holds 90 (or 45) degrees for full 10 secs  5b. Motor Arm, Right: 0-->No drift, limb holds 90 (or 45) degrees for full 10 secs  6a. Motor Leg, Left: 1-->Drift, leg falls by the end of the 5-sec period but does not hit bed  6b. Motor Leg, Right: 0-->No drift, leg holds 30 degree position for full 5 secs  7. Limb Ataxia: 0-->Absent  8. Sensory: 1-->Mild-to-moderate sensory loss, patient feels pinprick is less sharp or is dull on the affected side, or there is a loss of superficial pain with pinprick, but patient is aware of being touched  9. Best Language: 0-->No aphasia, normal  10. Dysarthria: 0-->Normal  11. Extinction and Inattention (formerly Neglect): 0-->No abnormality  Total (NIH Stroke Scale): 4    Marilin Cotton RN  03/30/25 12:44 EDT

## 2025-03-30 NOTE — H&P
Patient Name:  Alexandra Xiao  YOB: 1980  MRN:  [id number]  Admit Date:  3/30/2025  Patient Care Team:  Annalee Huddleston APRN as PCP - General (Family Medicine)  Cristofer Chamorro MD as Consulting Physician (Cardiology)  Cb James II, MD as Consulting Physician (Neurology)  Enresto Mendosa MD as Consulting Physician (Obstetrics and Gynecology)  Reza Lester MD as Cardiologist (Cardiology)  Annalee Huddleston APRN as Referring Physician (Family Medicine)  Marija Peña MD as Consulting Physician (Hematology and Oncology)      Subjective   History Present Illness     Chief Complaint   Patient presents with    Facial Droop    Extremity Weakness       Ms. Xiao is a 44 y.o. woman with epilepsy, hypertension, hyperlipidemia, TULIO, history of recurrent embolic CVA of unclear origin with no residual deficits chronically anticoagulated with eliquis who presents with left sided facial droop and left arm/leg weakness which she first noticed upon waking this morning. Last known normal was at 2 AM and she hasn't missed any doses of eliquis recently so she wasn't a TNK candidate. She reports decreased sensation on the left as well.     History of Present Illness  Review of Systems   Constitutional:  Negative for chills, diaphoresis and fever.   HENT:  Negative for postnasal drip and rhinorrhea.    Eyes: Negative.    Respiratory:  Negative for cough and shortness of breath.    Cardiovascular:  Negative for chest pain and palpitations.   Gastrointestinal:  Negative for abdominal pain, constipation, diarrhea, nausea and vomiting.   Endocrine: Negative.    Genitourinary:  Negative for dysuria, frequency and urgency.   Musculoskeletal:  Negative for arthralgias and myalgias.   Skin:  Negative for rash and wound.   Allergic/Immunologic: Negative.    Neurological:  Positive for facial asymmetry, weakness and headaches.   Hematological: Negative.    Psychiatric/Behavioral:  Negative for confusion and  decreased concentration.         Personal History     Past Medical History:   Diagnosis Date    Abnormal bleeding in menstrual cycle     C. 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    Headache, tension-type     Heavy menstrual bleeding     WITH CLOTS    Hyperlipidemia     Hypertension     Memory loss     Migraine     Seizures     Status post placement of implantable loop recorder     Stroke     MARCH AND      Past Surgical History:   Procedure Laterality Date    BREAST BIOPSY      COLONOSCOPY N/A 2024    Procedure: COLONOSCOPY into cecum and terminal ileum;  Surgeon: Matt White MD;  Location: [facility];  Service: Gastroenterology;  Laterality: N/A;  Pre: Screening, history of colitis   Post: Diverticulosis and Hemorrhoids    D & C HYSTEROSCOPY ENDOMETRIAL ABLATION N/A 06/10/2022    Procedure: DILATATION AND CURETTAGE HYSTEROSCOPY NOVASURE ENDOMETRIAL ABLATION;  Surgeon: Ernesto Mendosa MD;  Location: [facility] OR;  Service: Obstetrics/Gynecology;  Laterality: N/A;    FOOT SURGERY      pins in left foot     HYSTERECTOMY  2024    INSERT / REPLACE / REMOVE PACEMAKER  2022    Dr. Cristofer Chamorro, LOOP RECORDER    OTHER SURGICAL HISTORY      VNS plate 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    NOÉ      2022    TUBAL ABDOMINAL LIGATION      VAGUS NERVE STIMULATOR IMPLANTATION       Family History   Problem Relation Age of Onset    Breast cancer Mother 50         age 60 METS    Arthritis Mother     Arthritis Father     Diabetes Father     Heart disease Father     Hypertension Father     Heart attack Father     Hypertension Brother     Heart attack Paternal Grandfather     Ovarian cancer Neg Hx     Uterine cancer Neg Hx     Colon cancer Neg Hx     Deep vein thrombosis Neg Hx     Pulmonary embolism Neg Hx     Malig Hyperthermia Neg Hx      Social  History     Tobacco Use    Smoking status: Former     Current packs/day: 0.00     Average packs/day: 1 pack/day for 29.0 years (29.0 ttl pk-yrs)     Types: Cigarettes     Start date: 1993     Quit date: 2022     Years since quitting: 3.2    Smokeless tobacco: Never    Tobacco comments:     Quit 03/2022   Vaping Use    Vaping status: Some Days    Substances: Nicotine, Flavoring    Devices: Disposable, 6% NICOTINE   Substance Use Topics    Alcohol use: Not Currently    Drug use: No     No current facility-administered medications on file prior to encounter.     Current Outpatient Medications on File Prior to Encounter   Medication Sig Dispense Refill    amitriptyline (ELAVIL) 25 MG tablet TAKE 1 TABLET BY MOUTH EVERY NIGHT 30 tablet 5    amLODIPine (NORVASC) 5 MG tablet TAKE 1 TABLET BY MOUTH DAILY 90 tablet 1    aspirin 81 MG EC tablet Take 1 tablet by mouth Daily. 30 tablet 0    atorvastatin (LIPITOR) 40 MG tablet TAKE 1 TABLET BY MOUTH EVERY DAY 60 tablet 0    Eliquis 5 MG tablet tablet TAKE 1 TABLET BY MOUTH TWICE DAILY 180 tablet 2    Eslicarbazepine Acetate (Aptiom) 400 MG tablet Take 1 tablet by mouth Daily. Take with 800mg for total dose 1200mg daily 90 tablet 1    Eslicarbazepine Acetate (Aptiom) 800 MG tablet tablet TAKE 1 TABLET BY MOUTH DAILY. TAKE 400MG FOR TOTAL DOSE 1200MG DAILY 30 tablet 2    metoprolol succinate XL (TOPROL-XL) 25 MG 24 hr tablet TAKE 1 TABLET BY MOUTH DAILY 90 tablet 1    Vimpat 200 MG tablet TAKE 1 TABLET BY MOUTH EVERY 12 HOURS 60 tablet 1    Xcopri 100 MG tablet TAKE 1 TABLET BY MOUTH DAILY 30 tablet 5     No Known Allergies    Objective    Objective     Vital Signs  Temp:  [97.5 °F (36.4 °C)] 97.5 °F (36.4 °C)  Heart Rate:  [74-91] 74  Resp:  [16] 16  BP: (159-164)/() 159/105  SpO2:  [97 %-98 %] 97 %  on   ;   Device (Oxygen Therapy): room air  Body mass index is 30.19 kg/m².    Physical Exam  Vitals and nursing note reviewed.   Constitutional:       General: She is not in  acute distress.     Appearance: She is obese. She is not toxic-appearing.   HENT:      Head: Normocephalic and atraumatic.      Mouth/Throat:      Mouth: Mucous membranes are moist.      Pharynx: Oropharynx is clear.   Eyes:      Extraocular Movements: Extraocular movements intact.      Conjunctiva/sclera: Conjunctivae normal.      Pupils: Pupils are equal, round, and reactive to light.   Cardiovascular:      Rate and Rhythm: Normal rate and regular rhythm.      Heart sounds: Normal heart sounds.   Pulmonary:      Effort: Pulmonary effort is normal.      Breath sounds: Normal breath sounds.   Abdominal:      General: Bowel sounds are normal. There is no distension.      Palpations: Abdomen is soft.      Tenderness: There is no abdominal tenderness. There is no guarding or rebound.   Musculoskeletal:         General: No tenderness.      Cervical back: Normal range of motion and neck supple.      Right lower leg: No edema.      Left lower leg: No edema.   Skin:     General: Skin is warm and dry.      Findings: No erythema or rash.   Neurological:      Mental Status: She is alert and oriented to person, place, and time.      Cranial Nerves: Cranial nerve deficit present.      Sensory: Sensory deficit present.      Motor: Weakness present.      Coordination: Coordination abnormal.      Comments: Decrease in left facial sensation and left sided sensation in her arm and leg  Left sided weakness and left sided facial droop   Psychiatric:         Mood and Affect: Mood normal.         Behavior: Behavior normal.         Results Review:  I reviewed the patient's new clinical results.  I reviewed the patient's new imaging results and agree with the interpretation.  I reviewed the patient's other test results and agree with the interpretation  I personally viewed and interpreted the patient's EKG/Telemetry data  Discussed with ED provider.    Lab Results (last 24 hours)       Procedure Component Value Units Date/Time    POC  Glucose Once [[id number]]  (Normal) Collected: 03/30/25 1032    Specimen: Blood Updated: 03/30/25 1033     Glucose 88 mg/dL     CBC & Differential [[id number]]  (Abnormal) Collected: 03/30/25 1041    Specimen: Blood Updated: 03/30/25 1056    Narrative:      The following orders were created for panel order CBC & Differential.  Procedure                               Abnormality         Status                     ---------                               -----------         ------                     CBC Auto Differential[[id number]]        Abnormal            Final result                 Please view results for these tests on the individual orders.    Comprehensive Metabolic Panel [[id number]]  (Abnormal) Collected: 03/30/25 1041    Specimen: Blood Updated: 03/30/25 1117     Glucose 88 mg/dL      BUN 6 mg/dL      Creatinine 0.66 mg/dL      Sodium 134 mmol/L      Potassium 3.8 mmol/L      Chloride 100 mmol/L      CO2 22.3 mmol/L      Calcium 8.7 mg/dL      Total Protein 7.5 g/dL      Albumin 4.0 g/dL      ALT (SGPT) 22 U/L      AST (SGOT) 23 U/L      Alkaline Phosphatase 273 U/L      Total Bilirubin <0.2 mg/dL      Globulin 3.5 gm/dL      A/G Ratio 1.1 g/dL      BUN/Creatinine Ratio 9.1     Anion Gap 11.7 mmol/L      eGFR 110.4 mL/min/1.73     Narrative:      GFR Categories in Chronic Kidney Disease (CKD)      GFR Category          GFR (mL/min/1.73)    Interpretation  G1                     90 or greater         Normal or high (1)  G2                      60-89                Mild decrease (1)  G3a                   45-59                Mild to moderate decrease  G3b                   30-44                Moderate to severe decrease  G4                    15-29                Severe decrease  G5                    14 or less           Kidney failure          (1)In the absence of evidence of kidney disease, neither GFR category G1 or G2 fulfill the criteria for CKD.    eGFR calculation 2021 CKD-EPI creatinine equation,  which does not include race as a factor    Protime-INR [[id number]]  (Normal) Collected: 03/30/25 1041    Specimen: Blood Updated: 03/30/25 1106     Protime 14.2 Seconds      INR 1.10    aPTT [[id number]]  (Normal) Collected: 03/30/25 1041    Specimen: Blood Updated: 03/30/25 1106     PTT 29.1 seconds     CBC Auto Differential [[id number]]  (Abnormal) Collected: 03/30/25 1041    Specimen: Blood Updated: 03/30/25 1056     WBC 8.21 10*3/mm3      RBC 4.83 10*6/mm3      Hemoglobin 11.9 g/dL      Hematocrit 37.6 %      MCV 77.8 fL      MCH 24.6 pg      MCHC 31.6 g/dL      RDW 15.7 %      RDW-SD 43.8 fl      MPV 9.6 fL      Platelets 354 10*3/mm3      Neutrophil % 67.3 %      Lymphocyte % 23.5 %      Monocyte % 7.4 %      Eosinophil % 1.2 %      Basophil % 0.4 %      Immature Grans % 0.2 %      Neutrophils, Absolute 5.52 10*3/mm3      Lymphocytes, Absolute 1.93 10*3/mm3      Monocytes, Absolute 0.61 10*3/mm3      Eosinophils, Absolute 0.10 10*3/mm3      Basophils, Absolute 0.03 10*3/mm3      Immature Grans, Absolute 0.02 10*3/mm3      nRBC 0.0 /100 WBC             Imaging Results (Last 24 Hours)       Procedure Component Value Units Date/Time    CT Angiogram Head w AI Analysis of LVO [[id number]] Collected: 03/30/25 1106     Updated: 03/30/25 1125    Narrative:      CT ANGIOGRAM HEAD W AI ANALYSIS OF LVO-, CT ANGIOGRAM NECK-     INDICATION: Stroke follow-up. Not a TNK candidate.     COMPARISON: CTA head and neck and CT head April 8, 2024     TECHNIQUE:  Noncontrast head CT. CTA head and neck with IV contrast. Coronal and  sagittal reformats. Three dimensional reconstructions. Radiation dose  reduction techniques were utilized, including automated exposure control  and exposure modulation based on body size.     FINDINGS:      Noncontrast head CT: No intraparenchymal hemorrhage. Small area of  hypoattenuation and volume loss seen within the left anterosuperior  frontal lobe, consistent with encephalomalacia from prior infarct.  Small  area of hypoattenuation and volume loss seen within the left occipital  lobe, consistent with encephalomalacia from prior infarct. No mass  effect or midline shift. No hydrocephalus. No intraventricular  hemorrhage. No extra-axial hemorrhage or fluid collection. No fracture  or bone lesion. Patent mastoid air cells and middle ears. Patent  paranasal sinuses.     CTA NECK: Right common and internal carotid artery are patent, without a  stenosis. Left common and internal carotid artery are patent, without a  stenosis. Venous contamination obscures the proximal right vertebral  artery. Visualized portions of the vertebral arteries are patent,  without stenosis seen. No dissection.     CTA HEAD: Right internal carotid, middle and anterior cerebral arteries  appear patent. Left internal carotid, middle and anterior cerebral  arteries appear patent. Bilateral vertebral arteries, posterior inferior  cerebellar arteries, basilar artery, superior cerebellar arteries and  posterior cerebral arteries appear patent. No aneurysm identified. Dural  sinuses appear patent.     Other: Airways wall thickening and some emphysematous changes seen in  the upper lungs. Small thyroid nodules.       Impression:         1. Noncontrast head CT demonstrates encephalomalacia in the left  anterior superior frontal lobe and left occipital lobe, consistent with  prior infarcts. No intraparenchymal hemorrhage.  2. No large vessel occlusion seen.     This report was finalized on 3/30/2025 11:22 AM by Dr. Matt Carpenter M.D on Workstation: [id number]       CT Angiogram Neck [[id number]] Collected: 03/30/25 1106     Updated: 03/30/25 1125    Narrative:      CT ANGIOGRAM HEAD W AI ANALYSIS OF LVO-, CT ANGIOGRAM NECK-     INDICATION: Stroke follow-up. Not a TNK candidate.     COMPARISON: CTA head and neck and CT head April 8, 2024     TECHNIQUE:  Noncontrast head CT. CTA head and neck with IV contrast. Coronal and  sagittal reformats. Three  dimensional reconstructions. Radiation dose  reduction techniques were utilized, including automated exposure control  and exposure modulation based on body size.     FINDINGS:      Noncontrast head CT: No intraparenchymal hemorrhage. Small area of  hypoattenuation and volume loss seen within the left anterosuperior  frontal lobe, consistent with encephalomalacia from prior infarct. Small  area of hypoattenuation and volume loss seen within the left occipital  lobe, consistent with encephalomalacia from prior infarct. No mass  effect or midline shift. No hydrocephalus. No intraventricular  hemorrhage. No extra-axial hemorrhage or fluid collection. No fracture  or bone lesion. Patent mastoid air cells and middle ears. Patent  paranasal sinuses.     CTA NECK: Right common and internal carotid artery are patent, without a  stenosis. Left common and internal carotid artery are patent, without a  stenosis. Venous contamination obscures the proximal right vertebral  artery. Visualized portions of the vertebral arteries are patent,  without stenosis seen. No dissection.     CTA HEAD: Right internal carotid, middle and anterior cerebral arteries  appear patent. Left internal carotid, middle and anterior cerebral  arteries appear patent. Bilateral vertebral arteries, posterior inferior  cerebellar arteries, basilar artery, superior cerebellar arteries and  posterior cerebral arteries appear patent. No aneurysm identified. Dural  sinuses appear patent.     Other: Airways wall thickening and some emphysematous changes seen in  the upper lungs. Small thyroid nodules.       Impression:         1. Noncontrast head CT demonstrates encephalomalacia in the left  anterior superior frontal lobe and left occipital lobe, consistent with  prior infarcts. No intraparenchymal hemorrhage.  2. No large vessel occlusion seen.     This report was finalized on 3/30/2025 11:22 AM by Dr. Matt Carpenter M.D on Workstation: [id number]       XR  Chest 1 View [[id number]] Collected: 03/30/25 1114     Updated: 03/30/25 1122    Narrative:      Portable chest radiograph     HISTORY: Stroke     TECHNIQUE: Single AP portable radiograph of the chest     COMPARISON: Gastrograph 3/5/2023       Impression:      FINDINGS AND IMPRESSION:  Left-sided stimulator is present with lead coursing in the left neck and  out of the field-of-view superiorly. There is also presumed loop  recorder.     Lungs are hypoinflated. No pulmonary consolidation, pleural effusion or  pneumothorax is seen. Cardiac silhouette within normal limits for size.     This report was finalized on 3/30/2025 11:19 AM by Dr. Julian Rush M.D on Workstation: [id number]               Results for orders placed during the hospital encounter of 04/08/24    Adult Transesophageal Echo (NOÉ) W/ Cont if Necessary Per Protocol    Interpretation Summary    There is focal thickening at the mid to distal anterior mitral valve leaflet. There is mild prolapse of the A2 segment of the anterior mitral valve leaflet.    There is moderate to severe mitral regurgitation which originates from multiple jets, including a prominent posteriorly directed jet    There are moderate plaques in the descending thoracic aorta. There is a focal moderate plaque without mobile components in the central aortic arch    Left ventricular systolic function is normal. Left ventricular ejection fraction appears to be 61 - 65%.    Left ventricular diastolic function is consistent with (grade I) impaired relaxation.    Normal right ventricular cavity size and systolic function noted.    Multiple agitated saline studies were negative, including with abdominal thrusts. There was no PFO noted on the agitated saline studies, or by color Doppler assessment    No evidence of a left atrial appendage thrombus was present.    There is a small (<1cm) pericardial effusion adjacent to the right atrium and right ventricle. There is no evidence of cardiac  tamponade.      ECG 12 Lead Stroke Evaluation   Preliminary Result   HEART RATE=82  bpm   RR Fticqafu=593  ms   HI Nnkhntrf=124  ms   P Horizontal Axis=-3  deg   P Front Axis=19  deg   QRSD Interval=87  ms   QT Jzselffp=013  ms   UGyG=553  ms   QRS Axis=14  deg   T Wave Axis=51  deg   - OTHERWISE NORMAL ECG -   Sinus rhythm   Abnormal R-wave progression, early transition   Date and Time of Study:2025-03-30 10:42:40           Assessment/Plan     Active Hospital Problems    Diagnosis  POA    **Stroke-like symptoms [R29.90]  Yes    S/P placement of VNS (vagus nerve stimulation) device [Z96.89]  Not Applicable    Observed sleep apnea [G47.30]  Yes    History of stroke [Z86.73]  Not Applicable    HTN (hypertension) [I10]  Yes    Epilepsy [G40.909]  Yes      Resolved Hospital Problems   No resolved problems to display.       Ms. Xiao is a 44 y.o.  woman with epilepsy, hypertension, hyperlipidemia, TULIO, history of recurrent embolic CVA of unclear origin with no residual deficits chronically anticoagulated with eliquis who presents with left sided facial droop and left arm/leg weakness    Left sided weakness/facial droop-stroke vs. Godfrey paralysis. No LVO or obstructive stenosis on head CT. MRI brain is pending, though her stimulator will need to be turned off before this can be performed. I'll update her echocardiogram and see if we can interrogate her loop recorder. An EEG is pending as well.   Hypertension-permissive hypertension while ruling out stroke  Hyperlipidemia-statin  Epilepsy s/p VNS-change vimpat to an IV formulation. Xcorpi and Aptiom are not on formulary. Discussed with Dr. Carrillo and I will defer her AEDs to him.  History of recurrent embolic CVA-hold eliquis acutely until we have MRI results  TULIO-pap if available  I discussed the patient's findings and my recommendations with patient, consulting provider, and ED provider.    VTE Prophylaxis - SCDs.  Code Status - Full code.       Franck Martinez  MD [facility]  03/30/25  12:55 EDT

## 2025-03-30 NOTE — THERAPY EVALUATION
Acute Care - Speech Language Pathology   Swallow Initial Evaluation [facility]     Patient Name: Alexandra Xiao  : 1980  MRN: [id number]  Today's Date: 3/30/2025               Admit Date: 3/30/2025    Visit Dx:     ICD-10-CM ICD-9-CM   1. Stroke-like symptoms  R29.90 781.99   2. Left leg weakness  R29.898 729.89   3. Paresthesia of left arm and leg  R20.2 782.0   4. Chronic anticoagulation  Z79.01 V58.61     Patient Active Problem List   Diagnosis    Sebaceous cyst of breast, right    Abnormal uterine bleeding    Microcytic anemia    Transaminitis    Epilepsy    Major depressive disorder    Left-sided weakness    Anemia    Hyponatremia    HTN (hypertension)    History of stroke    Vitamin D deficiency    B12 deficiency    Observed sleep apnea    Snoring    Excessive daytime sleepiness    Class 1 obesity    Cerebrovascular accident (CVA) due to embolism of left middle cerebral artery    S/P placement of VNS (vagus nerve stimulation) device    Abnormal urinalysis    Vaping nicotine dependence, tobacco product    Implantable loop recorder present    Preoperative clearance    Leukocytosis    Sodium (Na) deficiency    Colitis    History of stroke    C. difficile colitis    Giardiasis    Blood per rectum    Colitis, Clostridium difficile    Screening for colorectal cancer    Stroke-like symptoms     Past Medical History:   Diagnosis Date    Abnormal bleeding in menstrual cycle     C. 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    Headache, tension-type     Heavy menstrual bleeding     WITH CLOTS    Hyperlipidemia     Hypertension     Memory loss     Migraine     Seizures     Status post placement of implantable loop recorder     Stroke     MARCH AND      Past Surgical History:   Procedure Laterality Date    BREAST BIOPSY      COLONOSCOPY N/A 2024    Procedure: COLONOSCOPY into cecum and  terminal ileum;  Surgeon: Matt White MD;  Location: [facility] ENDOSCOPY;  Service: Gastroenterology;  Laterality: N/A;  Pre: Screening, history of colitis   Post: Diverticulosis and Hemorrhoids    D & C HYSTEROSCOPY ENDOMETRIAL ABLATION N/A 06/10/2022    Procedure: DILATATION AND CURETTAGE HYSTEROSCOPY NOVASURE ENDOMETRIAL ABLATION;  Surgeon: Ernesto Mendosa MD;  Location: [facility] MAIN OR;  Service: Obstetrics/Gynecology;  Laterality: N/A;    FOOT SURGERY      pins in left foot     HYSTERECTOMY  07/16/2024    INSERT / REPLACE / REMOVE PACEMAKER  04/01/2022    Dr. Cristofer Chamorro, LOOP RECORDER    OTHER SURGICAL HISTORY      VNS plate 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    NOÉ      04/2022    TUBAL ABDOMINAL LIGATION      VAGUS NERVE STIMULATOR IMPLANTATION         SLP Recommendation and Plan  SLP Swallowing Diagnosis: swallow WFL/no suspected pharyngeal impairment (03/30/25 1300)  SLP Diet Recommendation: regular textures, thin liquids (03/30/25 1300)  Recommended Precautions and Strategies: upright posture during/after eating, small bites of food and sips of liquid, assist with feeding, general aspiration precautions (03/30/25 1300)  SLP Rec. for Method of Medication Administration: meds whole, with thin liquids, as tolerated (03/30/25 1300)     Monitor for Signs of Aspiration: yes, notify SLP if any concerns (03/30/25 1300)  Recommended Diagnostics: SLE/Cog/Motor Speech Evaluation (03/30/25 1300)     Anticipated Discharge Disposition (SLP): anticipate therapy at next level of care (03/30/25 1300)     Therapy Frequency (Swallow): evaluation only (03/30/25 1300)     Oral Care Recommendations: Oral Care BID/PRN (03/30/25 1300)                                        Outcome Evaluation: Clinical bedside swallow evaluation completed. Spouse present for eval.        Cognition: oriented to self, place, VIANEY, month, situation. Disoriented to year.     Expression: WFL for purposes of eval. Mild  dysarthria    Voice: WFL      Cough:  WFL      Oral mechanism examination: Edentulous. Pt with slight L side droop and tongue deviation. Pt reports reduced sensation on L cheek.      Objective: Pt able to self feed. Consistencies trialed include thin liquid via cup and straw, puree, soft to chew (drained and mixed consistency) and regular dry solid. Pt completed trials without difficulty with small bites, slow rate, adequate mastication, timely swallow, and no oral residue post swallow..     Assessment / Plan: no s/s of dysphagia at this time. Pt's dentition may impact chewing of regular solids, however at this time recommend regular solids to increase PO intake with possible downgrade if it pt is unable to masticate regular solids.      Recommendations: Recommend regular solids with thin liquids. Recommend upright position, small bites, slow rate of intake. Meds whole with thin liquids as tolerated. General swallow precautions. Recommend SLE/Cog eval.      SWALLOW EVALUATION (Last 72 Hours)       SLP Adult Swallow Evaluation       Row Name 03/30/25 1300       Rehab Evaluation    Document Type evaluation  -HT    Subjective Information pain  -HT    Patient Observations cooperative;alert;agree to therapy  -HT    Patient Effort good  -HT       General Information    Patient Profile Reviewed yes  -HT    Pertinent History Of Current Problem 43 yo F with PMH of multiple strokes with resulting cognitive and expressive/receptive language deficits, multiple strokes, Epilepsy w VNS, COPD, HTN. HLD, rheumatic mitral valve changes, uterine mass with hysterectomy. Pt presents to hospital with L facial droop. Imaging pending.  -HT    Current Method of Nutrition NPO  -HT    Precautions/Limitations, Vision WFL;for purposes of eval  -HT    Precautions/Limitations, Hearing WFL;for purposes of eval  -HT    Prior Level of Function-Communication cognitive-linguistic impairment  -HT    Prior Level of Function-Swallowing no diet  consistency restrictions  -HT    Plans/Goals Discussed with patient and family  -HT    Barriers to Rehab none identified  -HT    Patient's Goals for Discharge return home  -HT       Pain    Pretreatment Pain Rating 0/10 - no pain  -HT    Posttreatment Pain Rating 0/10 - no pain  -HT       Oral Motor Structure and Function    Dentition Assessment edentulous  -HT    Secretion Management WNL/WFL  -HT    Mucosal Quality moist, healthy  -HT    Volitional Swallow WFL  -HT    Volitional Cough WFL  -HT       Oral Musculature and Cranial Nerve Assessment    Oral Motor General Assessment lingual impairment;generalized oral motor weakness  -HT    Oral Labial or Buccal Impairment, Detail, Cranial Nerve VII (Facial): left labial droop  -HT    Lingual Impairment, Detail. Cranial Nerves IX, XII (Glossopharyngeal and Hypoglossal) reduced strength left  -HT       General Eating/Swallowing Observations    Respiratory Support Currently in Use room air  -HT    Eating/Swallowing Skills self-fed  -HT    Positioning During Eating upright in bed  -HT    Utensils Used spoon;cup;straw  -HT    Consistencies Trialed thin liquids;pureed;soft to chew textures;mixed consistency;regular textures  -HT       Clinical Swallow Eval    Clinical Swallow Evaluation Summary Clinical bedside swallow evaluation completed. Spouse present for eval.        Cognition: oriented to self, place, VIANEY, month, situation. Disoriented to year.     Expression: WFL for purposes of eval. Mild dysarthria    Voice: WFL      Cough:  WFL      Oral mechanism examination: Edentulous. Pt with slight L side droop and tongue deviation. Pt reports reduced sensation on L cheek.      Objective: Pt able to self feed. Consistencies trialed include thin liquid via cup and straw, puree, soft to chew (drained and mixed consistency) and regular dry solid. Pt completed trials without difficulty with small bites, slow rate, adequate mastication, timely swallow, and no oral residue post  swallow..     Assessment / Plan: no s/s of dysphagia at this time. Pt's dentition may impact chewing of regular solids, however at this time recommend regular solids to increase PO intake with possible downgrade if it pt is unable to masticate regular solids.      Recommendations: Recommend regular solids with thin liquids. Recommend upright position, small bites, slow rate of intake. Meds whole with thin liquids as tolerated. General swallow precautions. Recommend SLE/Cog eval.   -       SLP Evaluation Clinical Impression    SLP Swallowing Diagnosis swallow WFL/no suspected pharyngeal impairment  -    Functional Impact no impact on function  -       SLP Treatment Clinical Impressions    Care Plan Review evaluation/treatment results reviewed  -       Recommendations    Therapy Frequency (Swallow) evaluation only  -    SLP Diet Recommendation regular textures;thin liquids  -    Recommended Diagnostics SLE/Cog/Motor Speech Evaluation  -    Recommended Precautions and Strategies upright posture during/after eating;small bites of food and sips of liquid;assist with feeding;general aspiration precautions  -    Oral Care Recommendations Oral Care BID/PRN  -    SLP Rec. for Method of Medication Administration meds whole;with thin liquids;as tolerated  -    Monitor for Signs of Aspiration yes;notify SLP if any concerns  -    Anticipated Discharge Disposition (SLP) anticipate therapy at next level of care  -              User Key  (r) = Recorded By, (t) = Taken By, (c) = Cosigned By      Initials Name Effective Dates     Lauren Bedoya SLP 09/14/23 -                     EDUCATION  The patient has been educated in the following areas:   Dysphagia (Swallowing Impairment).                Time Calculation:    Time Calculation- SLP       Row Name 03/30/25 8093             Time Calculation- SLP    SLP Start Time 1530  -      SLP Received On 03/30/25  -         Untimed Charges    [id number] Eval Oral  Pharyng Swallow Minutes 60  -HT         Total Minutes    Untimed Charges Total Minutes 60  -HT       Total Minutes 60  -HT                User Key  (r) = Recorded By, (t) = Taken By, (c) = Cosigned By      Initials Name Provider Type    Lauren Levy SLP Speech and Language Pathologist                    Therapy Charges for Today       Code Description Service Date Service Provider Modifiers Qty    [id number] HC ST EVAL ORAL PHARYNG SWALLOW 4 3/30/2025 Lauren Bedoya SLP GN 1                 GERARD Bennett  3/30/2025

## 2025-03-30 NOTE — PLAN OF CARE
Goal Outcome Evaluation:              Outcome Evaluation: Clinical bedside swallow evaluation completed. Spouse present for eval.        Cognition: oriented to self, place, VIANEY, month, situation. Disoriented to year.     Expression: WFL for purposes of eval. Mild dysarthria    Voice: WFL      Cough:  WFL      Oral mechanism examination: Edentulous. Pt with slight L side droop and tongue deviation. Pt reports reduced sensation on L cheek.      Objective: Pt able to self feed. Consistencies trialed include thin liquid via cup and straw, puree, soft to chew (drained and mixed consistency) and regular dry solid. Pt completed trials without difficulty with small bites, slow rate, adequate mastication, timely swallow, and no oral residue post swallow..     Assessment / Plan: no s/s of dysphagia at this time. Pt's dentition may impact chewing of regular solids, however at this time recommend regular solids to increase PO intake with possible downgrade if it pt is unable to masticate regular solids.      Recommendations: Recommend regular solids with thin liquids. Recommend upright position, small bites, slow rate of intake. Meds whole with thin liquids as tolerated. General swallow precautions. Recommend SLE/Cog eval.     Anticipated Discharge Disposition (SLP): anticipate therapy at next level of care          SLP Swallowing Diagnosis: swallow WFL/no suspected pharyngeal impairment (03/30/25 1300)

## 2025-03-30 NOTE — ED NOTES
Pt arrives for left sided arm weakness and left sided facial droop  Pt has history of stroke  Pt takes eliquis   Last known normal was 0200

## 2025-03-30 NOTE — PLAN OF CARE
Problem: Adult Inpatient Plan of Care  Goal: Plan of Care Review  Outcome: Progressing  Flowsheets (Taken 3/30/2025 5186)  Progress: improving  Plan of Care Reviewed With: patient   Goal Outcome Evaluation:  Plan of Care Reviewed With: patient        Progress: improving

## 2025-03-31 ENCOUNTER — TELEPHONE (OUTPATIENT)
Dept: NEUROLOGY | Facility: CLINIC | Age: 45
End: 2025-03-31
Payer: MEDICARE

## 2025-03-31 ENCOUNTER — APPOINTMENT (OUTPATIENT)
Dept: CARDIOLOGY | Facility: HOSPITAL | Age: 45
End: 2025-03-31
Payer: MEDICARE

## 2025-03-31 LAB
ALBUMIN SERPL-MCNC: 3.3 G/DL (ref 3.5–5.2)
ALBUMIN/GLOB SERPL: 1 G/DL
ALP SERPL-CCNC: 219 U/L (ref 39–117)
ALT SERPL W P-5'-P-CCNC: 18 U/L (ref 1–33)
ANION GAP SERPL CALCULATED.3IONS-SCNC: 10.4 MMOL/L (ref 5–15)
AORTIC DIMENSIONLESS INDEX: 0.97 (DI)
ASCENDING AORTA: 2.6 CM
AST SERPL-CCNC: 21 U/L (ref 1–32)
AV MEAN PRESS GRAD SYS DOP V1V2: 2.7 MMHG
AV VMAX SYS DOP: 108.6 CM/SEC
BH CV ECHO MEAS - AO MAX PG: 4.7 MMHG
BH CV ECHO MEAS - AO ROOT DIAM: 2.7 CM
BH CV ECHO MEAS - AO V2 VTI: 21.7 CM
BH CV ECHO MEAS - AVA(I,D): 2.6 CM2
BH CV ECHO MEAS - EDV(CUBED): 71.9 ML
BH CV ECHO MEAS - EDV(MOD-SP2): 88 ML
BH CV ECHO MEAS - EDV(MOD-SP4): 100 ML
BH CV ECHO MEAS - EF(MOD-SP2): 70.5 %
BH CV ECHO MEAS - EF(MOD-SP4): 69 %
BH CV ECHO MEAS - ESV(CUBED): 18.4 ML
BH CV ECHO MEAS - ESV(MOD-SP2): 26 ML
BH CV ECHO MEAS - ESV(MOD-SP4): 31 ML
BH CV ECHO MEAS - FS: 36.5 %
BH CV ECHO MEAS - IVS/LVPW: 1.02 CM
BH CV ECHO MEAS - IVSD: 1 CM
BH CV ECHO MEAS - LAT PEAK E' VEL: 8.2 CM/SEC
BH CV ECHO MEAS - LV MASS(C)D: 132.7 GRAMS
BH CV ECHO MEAS - LV MAX PG: 3.7 MMHG
BH CV ECHO MEAS - LV MEAN PG: 2.08 MMHG
BH CV ECHO MEAS - LV V1 MAX: 96.4 CM/SEC
BH CV ECHO MEAS - LV V1 VTI: 21.1 CM
BH CV ECHO MEAS - LVIDD: 4.2 CM
BH CV ECHO MEAS - LVIDS: 2.6 CM
BH CV ECHO MEAS - LVOT AREA: 2.7 CM2
BH CV ECHO MEAS - LVOT DIAM: 1.86 CM
BH CV ECHO MEAS - LVPWD: 0.98 CM
BH CV ECHO MEAS - MED PEAK E' VEL: 8.2 CM/SEC
BH CV ECHO MEAS - MR MAX PG: 102.2 MMHG
BH CV ECHO MEAS - MR MAX VEL: 505.4 CM/SEC
BH CV ECHO MEAS - MV A DUR: 0.11 SEC
BH CV ECHO MEAS - MV A MAX VEL: 129.5 CM/SEC
BH CV ECHO MEAS - MV DEC SLOPE: 845.2 CM/SEC2
BH CV ECHO MEAS - MV DEC TIME: 0.24 SEC
BH CV ECHO MEAS - MV E MAX VEL: 95.7 CM/SEC
BH CV ECHO MEAS - MV E/A: 0.74
BH CV ECHO MEAS - MV MAX PG: 10.5 MMHG
BH CV ECHO MEAS - MV MEAN PG: 5.1 MMHG
BH CV ECHO MEAS - MV P1/2T: 52.8 MSEC
BH CV ECHO MEAS - MV V2 VTI: 35.3 CM
BH CV ECHO MEAS - MVA(P1/2T): 4.2 CM2
BH CV ECHO MEAS - MVA(VTI): 1.62 CM2
BH CV ECHO MEAS - PA ACC TIME: 0.08 SEC
BH CV ECHO MEAS - PA V2 MAX: 105.5 CM/SEC
BH CV ECHO MEAS - PULM A REVS DUR: 0.13 SEC
BH CV ECHO MEAS - PULM A REVS VEL: 27.3 CM/SEC
BH CV ECHO MEAS - PULM DIAS VEL: 32.7 CM/SEC
BH CV ECHO MEAS - PULM S/D: 0.7
BH CV ECHO MEAS - PULM SYS VEL: 23 CM/SEC
BH CV ECHO MEAS - RV MAX PG: 3.8 MMHG
BH CV ECHO MEAS - RV V1 MAX: 98 CM/SEC
BH CV ECHO MEAS - RV V1 VTI: 19.7 CM
BH CV ECHO MEAS - SV(LVOT): 57.2 ML
BH CV ECHO MEAS - SV(MOD-SP2): 62 ML
BH CV ECHO MEAS - SV(MOD-SP4): 69 ML
BH CV ECHO MEASUREMENTS AVERAGE E/E' RATIO: 11.67
BH CV ECHO SHUNT ASSESSMENT PERFORMED (HIDDEN SCRIPTING): 1
BH CV XLRA - RV BASE: 2.31 CM
BH CV XLRA - RV LENGTH: 6.3 CM
BH CV XLRA - RV MID: 2.7 CM
BH CV XLRA - TDI S': 12 CM/SEC
BILIRUB SERPL-MCNC: <0.2 MG/DL (ref 0–1.2)
BUN SERPL-MCNC: 8 MG/DL (ref 6–20)
BUN/CREAT SERPL: 13.3 (ref 7–25)
CALCIUM SPEC-SCNC: 8.5 MG/DL (ref 8.6–10.5)
CHLORIDE SERPL-SCNC: 103 MMOL/L (ref 98–107)
CHOLEST SERPL-MCNC: 131 MG/DL (ref 0–200)
CO2 SERPL-SCNC: 20.6 MMOL/L (ref 22–29)
CREAT SERPL-MCNC: 0.6 MG/DL (ref 0.57–1)
DEPRECATED RDW RBC AUTO: 44.8 FL (ref 37–54)
EGFRCR SERPLBLD CKD-EPI 2021: 113 ML/MIN/1.73
ERYTHROCYTE [DISTWIDTH] IN BLOOD BY AUTOMATED COUNT: 16 % (ref 12.3–15.4)
GLOBULIN UR ELPH-MCNC: 3.3 GM/DL
GLUCOSE SERPL-MCNC: 105 MG/DL (ref 65–99)
HBA1C MFR BLD: 5.3 % (ref 4.8–5.6)
HCT VFR BLD AUTO: 32.6 % (ref 34–46.6)
HDLC SERPL-MCNC: 50 MG/DL (ref 40–60)
HGB BLD-MCNC: 10.7 G/DL (ref 12–15.9)
LDLC SERPL CALC-MCNC: 66 MG/DL (ref 0–100)
LDLC/HDLC SERPL: 1.32 {RATIO}
LEFT ATRIUM VOLUME INDEX: 15.6 ML/M2
LV EF BIPLANE MOD: 69.9 %
MCH RBC QN AUTO: 25.2 PG (ref 26.6–33)
MCHC RBC AUTO-ENTMCNC: 32.8 G/DL (ref 31.5–35.7)
MCV RBC AUTO: 76.9 FL (ref 79–97)
PLATELET # BLD AUTO: 305 10*3/MM3 (ref 140–450)
PMV BLD AUTO: 9.4 FL (ref 6–12)
POTASSIUM SERPL-SCNC: 3.5 MMOL/L (ref 3.5–5.2)
PROT SERPL-MCNC: 6.6 G/DL (ref 6–8.5)
RBC # BLD AUTO: 4.24 10*6/MM3 (ref 3.77–5.28)
SINUS: 2.5 CM
SODIUM SERPL-SCNC: 134 MMOL/L (ref 136–145)
STJ: 1.91 CM
TRIGL SERPL-MCNC: 75 MG/DL (ref 0–150)
TSH SERPL DL<=0.05 MIU/L-ACNC: 3.56 UIU/ML (ref 0.27–4.2)
VLDLC SERPL-MCNC: 15 MG/DL (ref 5–40)
WBC NRBC COR # BLD AUTO: 6.75 10*3/MM3 (ref 3.4–10.8)

## 2025-03-31 PROCEDURE — G0378 HOSPITAL OBSERVATION PER HR: HCPCS

## 2025-03-31 PROCEDURE — 80061 LIPID PANEL: CPT | Performed by: STUDENT IN AN ORGANIZED HEALTH CARE EDUCATION/TRAINING PROGRAM

## 2025-03-31 PROCEDURE — 25810000003 SODIUM CHLORIDE 0.9 % SOLUTION: Performed by: STUDENT IN AN ORGANIZED HEALTH CARE EDUCATION/TRAINING PROGRAM

## 2025-03-31 PROCEDURE — 85027 COMPLETE CBC AUTOMATED: CPT | Performed by: STUDENT IN AN ORGANIZED HEALTH CARE EDUCATION/TRAINING PROGRAM

## 2025-03-31 PROCEDURE — 93010 ELECTROCARDIOGRAM REPORT: CPT | Performed by: INTERNAL MEDICINE

## 2025-03-31 PROCEDURE — 83036 HEMOGLOBIN GLYCOSYLATED A1C: CPT | Performed by: STUDENT IN AN ORGANIZED HEALTH CARE EDUCATION/TRAINING PROGRAM

## 2025-03-31 PROCEDURE — 25010000002 LACOSAMIDE 200 MG/20ML SOLUTION: Performed by: STUDENT IN AN ORGANIZED HEALTH CARE EDUCATION/TRAINING PROGRAM

## 2025-03-31 PROCEDURE — 25510000001 PERFLUTREN 6.52 MG/ML SUSPENSION 2 ML VIAL: Performed by: STUDENT IN AN ORGANIZED HEALTH CARE EDUCATION/TRAINING PROGRAM

## 2025-03-31 PROCEDURE — 93306 TTE W/DOPPLER COMPLETE: CPT

## 2025-03-31 PROCEDURE — C9254 INJECTION, LACOSAMIDE: HCPCS | Performed by: STUDENT IN AN ORGANIZED HEALTH CARE EDUCATION/TRAINING PROGRAM

## 2025-03-31 PROCEDURE — 97166 OT EVAL MOD COMPLEX 45 MIN: CPT

## 2025-03-31 PROCEDURE — 84443 ASSAY THYROID STIM HORMONE: CPT | Performed by: STUDENT IN AN ORGANIZED HEALTH CARE EDUCATION/TRAINING PROGRAM

## 2025-03-31 PROCEDURE — 93005 ELECTROCARDIOGRAM TRACING: CPT | Performed by: NURSE PRACTITIONER

## 2025-03-31 PROCEDURE — 93306 TTE W/DOPPLER COMPLETE: CPT | Performed by: INTERNAL MEDICINE

## 2025-03-31 PROCEDURE — 80053 COMPREHEN METABOLIC PANEL: CPT | Performed by: STUDENT IN AN ORGANIZED HEALTH CARE EDUCATION/TRAINING PROGRAM

## 2025-03-31 PROCEDURE — 94799 UNLISTED PULMONARY SVC/PX: CPT

## 2025-03-31 PROCEDURE — 25810000003 SODIUM CHLORIDE 0.9 % SOLUTION: Performed by: NURSE PRACTITIONER

## 2025-03-31 RX ORDER — SODIUM CHLORIDE 9 MG/ML
100 INJECTION, SOLUTION INTRAVENOUS CONTINUOUS
Status: DISCONTINUED | OUTPATIENT
Start: 2025-03-31 | End: 2025-03-31

## 2025-03-31 RX ORDER — SODIUM CHLORIDE 9 MG/ML
100 INJECTION, SOLUTION INTRAVENOUS CONTINUOUS
Status: DISCONTINUED | OUTPATIENT
Start: 2025-03-31 | End: 2025-04-02

## 2025-03-31 RX ADMIN — SODIUM CHLORIDE 75 ML/HR: 9 INJECTION, SOLUTION INTRAVENOUS at 02:51

## 2025-03-31 RX ADMIN — PERFLUTREN 2 ML: 6.52 INJECTION, SUSPENSION INTRAVENOUS at 14:28

## 2025-03-31 RX ADMIN — ASPIRIN 325 MG ORAL TABLET 325 MG: 325 PILL ORAL at 09:38

## 2025-03-31 RX ADMIN — SODIUM CHLORIDE 100 ML/HR: 9 INJECTION, SOLUTION INTRAVENOUS at 23:39

## 2025-03-31 RX ADMIN — LACOSAMIDE 200 MG: 10 INJECTION INTRAVENOUS at 02:43

## 2025-03-31 RX ADMIN — SODIUM CHLORIDE 75 ML/HR: 9 INJECTION, SOLUTION INTRAVENOUS at 15:32

## 2025-03-31 RX ADMIN — ACETAMINOPHEN 650 MG: 325 TABLET, FILM COATED ORAL at 23:27

## 2025-03-31 RX ADMIN — LACOSAMIDE 200 MG: 10 INJECTION INTRAVENOUS at 13:10

## 2025-03-31 RX ADMIN — ATORVASTATIN CALCIUM 80 MG: 80 TABLET, FILM COATED ORAL at 20:31

## 2025-03-31 RX ADMIN — ACETAMINOPHEN 650 MG: 325 TABLET, FILM COATED ORAL at 09:39

## 2025-03-31 RX ADMIN — AMITRIPTYLINE HYDROCHLORIDE 25 MG: 25 TABLET, FILM COATED ORAL at 20:31

## 2025-03-31 NOTE — PROGRESS NOTES
Name: Alexandra Xiao ADMIT: 3/30/2025   : 1980  PCP: Annalee Huddleston APRN    MRN: [id number] LOS: 0 days   AGE/SEX: 44 y.o. female  ROOM: [facility]     Subjective   Subjective     No events overnight. No new complaints. Her neurologic symptoms haven't really changed. We've not been able to figure out how to deactivate her VNS and so we've not been able to obtain an MRI. EEG did show epileptiform discharges. Echocardiogram was unremarkable. Discussed with the patient, her , and Dr. Carrillo        Objective   Objective   Vital Signs  Temp:  [97.5 °F (36.4 °C)-98.2 °F (36.8 °C)] 98.1 °F (36.7 °C)  Heart Rate:  [] 80  Resp:  [16-20] 18  BP: ()/(65-88) 117/75  SpO2:  [96 %-100 %] 96 %  on   ;   Device (Oxygen Therapy): room air  Body mass index is 30.12 kg/m².  Physical Exam  Constitutional:       General: She is not in acute distress.     Appearance: She is not toxic-appearing.   Cardiovascular:      Rate and Rhythm: Normal rate and regular rhythm.      Heart sounds: Normal heart sounds.   Pulmonary:      Effort: Pulmonary effort is normal.      Breath sounds: Normal breath sounds.   Abdominal:      General: Bowel sounds are normal.      Palpations: Abdomen is soft.   Musculoskeletal:         General: No tenderness.      Right lower leg: No edema.      Left lower leg: No edema.   Neurological:      Mental Status: She is alert.      Sensory: Sensory deficit (reduced sensation on the left) present.      Motor: Weakness (left facial droop, left sided weakness) present.   Psychiatric:         Mood and Affect: Mood normal.         Behavior: Behavior normal.         Results Review     I reviewed the patient's new clinical results.  Results from last 7 days   Lab Units 25  0516 25  1041   WBC 10*3/mm3 6.75 8.21   HEMOGLOBIN g/dL 10.7* 11.9*   PLATELETS 10*3/mm3 305 354     Results from last 7 days   Lab Units 25  0516 25  1041   SODIUM mmol/L 134* 134*   POTASSIUM mmol/L  3.5 3.8   CHLORIDE mmol/L 103 100   CO2 mmol/L 20.6* 22.3   BUN mg/dL 8 6   CREATININE mg/dL 0.60 0.66   GLUCOSE mg/dL 105* 88   Estimated Creatinine Clearance: 125.1 mL/min (by C-G formula based on SCr of 0.6 mg/dL).  Results from last 7 days   Lab Units 03/31/25  0516 03/30/25  1041   ALBUMIN g/dL 3.3* 4.0   BILIRUBIN mg/dL <0.2 <0.2   ALK PHOS U/L 219* 273*   AST (SGOT) U/L 21 23   ALT (SGPT) U/L 18 22     Results from last 7 days   Lab Units 03/31/25  0516 03/30/25  1041   CALCIUM mg/dL 8.5* 8.7   ALBUMIN g/dL 3.3* 4.0       COVID19   Date Value Ref Range Status   06/08/2022 Not Detected Not Detected - Ref. Range Final   04/26/2022 Not Detected Not Detected - Ref. Range Final     Hemoglobin A1C   Date/Time Value Ref Range Status   03/31/2025 0516 5.30 4.80 - 5.60 % Final     Glucose   Date/Time Value Ref Range Status   03/30/2025 1753 123 70 - 130 mg/dL Final   03/30/2025 1032 88 70 - 130 mg/dL Final       Adult transthoracic echo complete    Left ventricular systolic function is normal. Calculated left   ventricular EF = 69.9% Normal left ventricular cavity size noted. Left   ventricular wall thickness is consistent with mild concentric hypertrophy.   All left ventricular wall segments contract normally. Left ventricular   diastolic function is consistent with (grade I) impaired relaxation.    Normal left atrial cavity size noted. Saline test results are negative.    There is severe, bileaflet mitral valve thickening present. The   posterior mitral valve leaflet has decreased excursion Moderate mitral   valve regurgitation is present with a posteriorly-directed jet noted. Mild   mitral valve stenosis is present. The mean gradient is 5 mmHg with a HR of   84bpm    Scheduled Medications  amitriptyline, 25 mg, Oral, Nightly  [Held by provider] apixaban, 5 mg, Oral, BID  aspirin, 325 mg, Oral, Daily   Or  aspirin, 300 mg, Rectal, Daily  atorvastatin, 80 mg, Oral, Nightly  Lacosamide, 200 mg, Intravenous,  Q12H    Infusions  sodium chloride, 75 mL/hr, Last Rate: 75 mL/hr (03/31/25 1532)    Diet  Diet: Regular/House; Texture: Regular (IDDSI 7); Fluid Consistency: Thin (IDDSI 0)       Assessment/Plan     Active Hospital Problems    Diagnosis  POA    **Stroke-like symptoms [R29.90]  Yes    S/P placement of VNS (vagus nerve stimulation) device [Z96.89]  Not Applicable    Observed sleep apnea [G47.30]  Yes    History of stroke [Z86.73]  Not Applicable    HTN (hypertension) [I10]  Yes    Epilepsy [G40.909]  Yes      Resolved Hospital Problems   No resolved problems to display.       44 y.o. female admitted with Stroke-like symptoms.    Ms. Xiao is a 44 y.o. 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 Though at this point with her symptoms not yet significantly improved, stroke seems much more likely  She has been compliant with her eliquis, but this is held acutely given the likely CVA  No LVO or obstructive stenosis on head CT.   Echocardiogram showed a normal EF, no thrombus, negative bubble study  EEG did show epileptiform discharges, though she does have a fairly significant seizure disorder at baseline  MRI brain is pending, but we're unable to obtain this until her VNS is turned off and so far we haven't been able to figure out how. Neurology is considering just repeating a head CT  Will consult cardiology to interrogate her loop recorder  She has previously had a negative thrombophilia workup. Will ask hematology to consult to see if it should be repeated  It seems likely that we'll need to change her anticoagulation to pradaxa or warfarin since she had this stroke while compliant with eliquis  Hypertension-bp is well controlled with home metoprolol and  amlodipine held  Hyperlipidemia-statin  Epilepsy s/p VNS-continue IV vimpat. Xcorpi and Aptiom are not on formulary.   History of recurrent embolic CVA-hold eliquis acutely until we have MRI results  TULIO-pap if available  SCDs for DVT prophylaxis.  Full code.  Discussed with patient, family, nursing staff, and consulting provider.  Anticipate discharge  TBD  timing yet to be determined.      Franck Martinez MD  [facility]  03/31/25  16:47 EDT

## 2025-03-31 NOTE — SIGNIFICANT NOTE
Attempted to see patient for speech language evaluation however pt off the floor.    03/31/25 5675   OTHER   Discipline speech language pathologist

## 2025-03-31 NOTE — PLAN OF CARE
"Goal Outcome Evaluation:  Plan of Care Reviewed With: patient           Outcome Evaluation: Pt is a 44 y.o female admitted to [facility] on 3/30 with L arm weakness and L facial droop. She has hx of strokes with residual R sided weakness, word finding difficulties, speech difficulties (mild) cognitive deficits with ability to read and write. Pt has hx of HTN, HLD, obesity, COPD, epilepsy. Pt is using RUE without difficulty for her ADLs. She has mild LUE weakness and coordination deficits with dysmetria with finger to nose. Pt feels \"unsteady\" with her balance at present but does well getting to the bathroom with SV assistance at most. She doesnt require any AD. Continued OT recommended while admitted to address mild acute L sided deficits and higher level balance for ADLs. Limited session today due to onset of HA and RN notified. [facility] to address new onset neurological changes in her LUE to progress her back to baseline. Unsure if pt would be able to attend at present. Her is unable to drive due to eplipsy and spouse works. I did discuss OT providing her a coordination and strengthening HEP next visit.    Anticipated Discharge Disposition (OT): home with outpatient therapy services                        "

## 2025-03-31 NOTE — PLAN OF CARE
Goal Outcome Evaluation:  Awaiting MRI ; Patient has loop recorder MD notified  to remove so patient can complete MRI.  NIH 5. VSS  Tylenol given x 1 for HA with good results.  Case management following for discharge needs.

## 2025-03-31 NOTE — THERAPY EVALUATION
Patient Name: Alexandra Xiao  : 1980    MRN: [id number]                              Today's Date: 3/31/2025       Admit Date: 3/30/2025    Visit Dx:     ICD-10-CM ICD-9-CM   1. Stroke-like symptoms  R29.90 781.99   2. Left leg weakness  R29.898 729.89   3. Paresthesia of left arm and leg  R20.2 782.0   4. Chronic anticoagulation  Z79.01 V58.61     Patient Active Problem List   Diagnosis    Sebaceous cyst of breast, right    Abnormal uterine bleeding    Microcytic anemia    Transaminitis    Epilepsy    Major depressive disorder    Left-sided weakness    Anemia    Hyponatremia    HTN (hypertension)    History of stroke    Vitamin D deficiency    B12 deficiency    Observed sleep apnea    Snoring    Excessive daytime sleepiness    Class 1 obesity    Cerebrovascular accident (CVA) due to embolism of left middle cerebral artery    S/P placement of VNS (vagus nerve stimulation) device    Abnormal urinalysis    Vaping nicotine dependence, tobacco product    Implantable loop recorder present    Preoperative clearance    Leukocytosis    Sodium (Na) deficiency    Colitis    History of stroke    C. difficile colitis    Giardiasis    Blood per rectum    Colitis, Clostridium difficile    Screening for colorectal cancer    Stroke-like symptoms     Past Medical History:   Diagnosis Date    Abnormal bleeding in menstrual cycle     C. 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    Headache, tension-type     Heavy menstrual bleeding     WITH CLOTS    Hyperlipidemia     Hypertension     Memory loss     Migraine     Seizures     Status post placement of implantable loop recorder     Stroke     MARCH AND      Past Surgical History:   Procedure Laterality Date    BREAST BIOPSY      COLONOSCOPY N/A 2024    Procedure: COLONOSCOPY into cecum and terminal ileum;  Surgeon: Matt White MD;  Location:   [facility];  Service: Gastroenterology;  Laterality: N/A;  Pre: Screening, history of colitis   Post: Diverticulosis and Hemorrhoids    D & C HYSTEROSCOPY ENDOMETRIAL ABLATION N/A 06/10/2022    Procedure: DILATATION AND CURETTAGE HYSTEROSCOPY NOVASURE ENDOMETRIAL ABLATION;  Surgeon: Ernesto Mendosa MD;  Location: [facility] MAIN OR;  Service: Obstetrics/Gynecology;  Laterality: N/A;    FOOT SURGERY      pins in left foot     HYSTERECTOMY  07/16/2024    INSERT / REPLACE / REMOVE PACEMAKER  04/01/2022    Dr. Cristofer Chamorro, LOOP RECORDER    OTHER SURGICAL HISTORY      VNS plate 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    NOÉ      04/2022    TUBAL ABDOMINAL LIGATION      VAGUS NERVE STIMULATOR IMPLANTATION        General Information       Row Name 03/31/25 1307          OT Time and Intention    Document Type evaluation  -SM     Mode of Treatment occupational therapy;individual therapy  -SM     Patient Effort good  -SM       Row Name 03/31/25 1307          General Information    Patient Profile Reviewed yes  -SM     Prior Level of Function independent:;ADL's;all household mobility  doesnt drive or work  -SM     Existing Precautions/Restrictions fall  -SM       Row Name 03/31/25 1307          Occupational Profile    Environmental Supports and Barriers (Occupational Profile) pt does not use AD or DME at home  -SM       Row Name 03/31/25 1307          Living Environment    Current Living Arrangements home  -SM     People in Home spouse  -SM       Row Name 03/31/25 1307          Stairs Within Home, Primary    Stairs, Within Home, Primary trailer home with several steps to enter  -SM     Number of Stairs, Within Home, Primary none  -SM       Row Name 03/31/25 1307          Cognition    Orientation Status (Cognition) oriented x 4  -SM       Row Name 03/31/25 1307          Safety Issues/Impairments Affecting Functional Mobility    Impairments Affecting Function (Mobility) coordination;strength  -SM               User  "Key  (r) = Recorded By, (t) = Taken By, (c) = Cosigned By      Initials Name Provider Type    Kaylan Nielsen OT Occupational Therapist                     Mobility/ADL's       Row Name 03/31/25 1308          Bed Mobility    Bed Mobility supine-sit;sit-supine  -     Supine-Sit Cloud (Bed Mobility) modified independence  -     Sit-Supine Cloud (Bed Mobility) modified independence  -[facility] Name 03/31/25 1308          Transfers    Transfers sit-stand transfer;toilet transfer  -[facility] Name 03/31/25 1308          Sit-Stand Transfer    Sit-Stand Cloud (Transfers) supervision  -[facility] Name 03/31/25 1308          Toilet Transfer    Cloud Level (Toilet Transfer) supervision  -[facility] Name 03/31/25 1308          Functional Mobility    Functional Mobility- Ind. Level supervision required  -[facility] Name 03/31/25 1308          Activities of Daily Living    BADL Assessment/Intervention lower body dressing;toileting  -[facility] Name 03/31/25 1308          Lower Body Dressing Assessment/Training    Cloud Level (Lower Body Dressing) don;socks;supervision  -[facility] Name 03/31/25 1308          Toileting Assessment/Training    Cloud Level (Toileting) supervision  -               User Key  (r) = Recorded By, (t) = Taken By, (c) = Cosigned By      Initials Name Provider Type    Kaylan Nielsen OT Occupational Therapist                   Obj/Interventions       [facility] Name 03/31/25 1309          Sensory Assessment (Somatosensory)    Sensory Assessment (Somatosensory) UE sensation intact  -[facility] Name 03/31/25 1309          Vision Assessment/Intervention    Vision Assessment Comment pt reports intermitten double vision for months \"I thought it was my medicine\"  -       Row Name 03/31/25 1309          Range of Motion Comprehensive    General Range of Motion bilateral upper extremity ROM WNL  -       Row Name 03/31/25 1309          Strength " Comprehensive (MMT)    Comment, General Manual Muscle Testing (MMT) Assessment RUE 4/5, LUE 3+/5 grossly  -       Row Name 03/31/25 1309          Motor Skills    Motor Skills coordination  -     Coordination right;upper extremity;fine motor deficit;left;minimal impairment;finger to nose  -       Row Name 03/31/25 1309          Balance    Balance Assessment sitting static balance;standing static balance;standing dynamic balance  -SM     Static Sitting Balance independent  -SM     Static Standing Balance independent;supervision  -SM     Dynamic Standing Balance supervision  -SM     Position/Device Used, Standing Balance unsupported  -SM               User Key  (r) = Recorded By, (t) = Taken By, (c) = Cosigned By      Initials Name Provider Type    SM Kaylan Salmeron OT Occupational Therapist                   Goals/Plan       Row Name 03/31/25 1315          Bathing Goal 1 (OT)    Activity/Device (Bathing Goal 1, OT) bathing skills, all  -SM     [place] Level/Cues Needed (Bathing Goal 1, OT) independent  -SM     Time Frame (Bathing Goal 1, OT) short term goal (STG);by discharge  -SM     Progress/Outcomes (Bathing Goal 1, OT) goal ongoing  -       Row Name 03/31/25 1315          Strength Goal 1 (OT)    Strength Goal 1 (OT) Pt to be independent with LUE strengthening HEP.  -SM     Time Frame (Strength Goal 1, OT) short term goal (STG);by discharge  -SM     Progress/Outcome (Strength Goal 1, OT) goal ongoing  -       Row Name 03/31/25 1315          Problem Specific Goal 1 (OT)    Problem Specific Goal 1 (OT) Pt to be independent with LUE GMC/FMC HEP.  -SM     Time Frame (Problem Specific Goal 1, OT) short term goal (STG);by discharge  -SM     Progress/Outcome (Problem Specific Goal 1, OT) goal ongoing  -       Row Name 03/31/25 1315          Therapy Assessment/Plan (OT)    Planned Therapy Interventions (OT) functional balance retraining;neuromuscular control/coordination retraining;patient/caregiver  "education/training;strengthening exercise;occupation/activity based interventions  -               User Key  (r) = Recorded By, (t) = Taken By, (c) = Cosigned By      Initials Name Provider Type    Kaylan Nielsen, ZARIA Occupational Therapist                   Clinical Impression       Row Name 03/31/25 1310          Pain Assessment    Pretreatment Pain Rating 6/10  -     Posttreatment Pain Rating 6/10  -     Pain Location head  -     Pain Side/Orientation left  -     Pain Management Interventions nursing notified  -     Pre/Posttreatment Pain Comment \"just started\"  -       Row Name 03/31/25 1310          Plan of Care Review    Plan of Care Reviewed With patient  -     Outcome Evaluation Pt is a 44 y.o female admitted to [facility] on 3/30 with L arm weakness and L facial droop. She has hx of strokes with residual R sided weakness, word finding difficulties, speech difficulties (mild) cognitive deficits with ability to read and write. Pt has hx of HTN, HLD, obesity, COPD, epilepsy. Pt is using RUE without difficulty for her ADLs. She has mild LUE weakness and coordination deficits with dysmetria with finger to nose. Pt feels \"unsteady\" with her balance at present but does well getting to the bathroom with SV assistance at most. She doesnt require any AD. Continued OT recommended while admitted to address mild acute L sided deficits and higher level balance for ADLs. Limited session today due to onset of HA and RN notified. I discussed OP OT at [place] to continue to address new onset neurological changes in her LUE to progress her back to baseline. Unsure if pt would be able to attend at present. Her is unable to drive due to eplipsy and spouse works. I did discuss OT providing her a coordination and strengthening HEP next visit.  -       Row Name 03/31/25 1310          Therapy Assessment/Plan (OT)    Rehab Potential (OT) good  -     Therapy Frequency (OT) 3 times/wk  -       Row Name 03/31/25 1310    "       Therapy Plan Review/Discharge Plan (OT)    Anticipated Discharge Disposition (OT) home with outpatient therapy services  -       Row Name 03/31/25 1310          Positioning and Restraints    Pre-Treatment Position in bed  -SM     Post Treatment Position bed  -SM     In Bed fowlers;call light within reach;notified nsg;encouraged to call for assist;exit alarm on  -SM               User Key  (r) = Recorded By, (t) = Taken By, (c) = Cosigned By      Initials Name Provider Type    Kaylan Nielsen, ZARIA Occupational Therapist                   Outcome Measures       Row Name 03/31/25 1316          How much help from another is currently needed...    Putting on and taking off regular lower body clothing? 3  -SM     Bathing (including washing, rinsing, and drying) 3  -SM     Toileting (which includes using toilet bed pan or urinal) 3  -SM     Putting on and taking off regular upper body clothing 3  -SM     Taking care of personal grooming (such as brushing teeth) 4  -SM     Eating meals 4  -SM     AM-PAC 6 Clicks Score (OT) 20  -SM       Row Name 03/31/25 0539          How much help from another person do you currently need...    Turning from your back to your side while in flat bed without using bedrails? 1  -MS     Moving from lying on back to sitting on the side of a flat bed without bedrails? 1  -MS     Moving to and from a bed to a chair (including a wheelchair)? 1  -MS     Standing up from a chair using your arms (e.g., wheelchair, bedside chair)? 1  -MS     Climbing 3-5 steps with a railing? 1  -MS     To walk in hospital room? 1  -MS     AM-PAC 6 Clicks Score (PT) 6  -MS       Row Name 03/31/25 1316          Modified [place] Scale    Modified [place] Scale 2 - Slight disability.  Unable to carry out all previous activities but able to look after own affairs without assistance.  -       Row Name 03/31/25 1316          Functional Assessment    Outcome Measure Options AM-PAC 6 Clicks Daily Activity  "(OT);Modified [place]  -               User Key  (r) = Recorded By, (t) = Taken By, (c) = Cosigned By      Initials Name Provider Type    Alma Rocha, RN Registered Nurse     Kaylan Salmeron OT Occupational Therapist                    Occupational Therapy Education       Title: PT OT SLP Therapies (In Progress)       Topic: Occupational Therapy (In Progress)       Point: ADL training (Done)       Learning Progress Summary            Patient Acceptance, E, VU by  at 3/31/2025 1317    Comment: OT goals, POC                                      User Key       Initials Effective Dates Name Provider Type Discipline     04/02/20 -  Kaylan Salmeron OT Occupational Therapist OT                  OT Recommendation and Plan  Planned Therapy Interventions (OT): functional balance retraining, neuromuscular control/coordination retraining, patient/caregiver education/training, strengthening exercise, occupation/activity based interventions  Therapy Frequency (OT): 3 times/wk  Plan of Care Review  Plan of Care Reviewed With: patient  Outcome Evaluation: Pt is a 44 y.o female admitted to [facility] on 3/30 with L arm weakness and L facial droop. She has hx of strokes with residual R sided weakness, word finding difficulties, speech difficulties (mild) cognitive deficits with ability to read and write. Pt has hx of HTN, HLD, obesity, COPD, epilepsy. Pt is using RUE without difficulty for her ADLs. She has mild LUE weakness and coordination deficits with dysmetria with finger to nose. Pt feels \"unsteady\" with her balance at present but does well getting to the bathroom with SV assistance at most. She doesnt require any AD. Continued OT recommended while admitted to address mild acute L sided deficits and higher level balance for ADLs. Limited session today due to onset of HA and RN notified. I discussed OP OT at [place] to continue to address new onset neurological changes in her LUE to progress her back to baseline. Unsure if " pt would be able to attend at present. Her is unable to drive due to eplipsy and spouse works. I did discuss OT providing her a coordination and strengthening HEP next visit.     Time Calculation:   Evaluation Complexity (OT)  Review Occupational Profile/Medical/Therapy History Complexity: expanded/moderate complexity  Assessment, Occupational Performance/Identification of Deficit Complexity: 3-5 performance deficits  Clinical Decision Making Complexity (OT): detailed assessment/moderate complexity  Overall Complexity of Evaluation (OT): moderate complexity     Time Calculation- OT       Row Name 03/31/25 1317             Time Calculation- OT    OT Start Time 0925  -      OT Stop Time 0937  -SM      OT Time Calculation (min) 12 min  -SM      OT Received On 03/31/25  -      OT - Next Appointment 04/01/25  -SM      OT Goal Re-Cert Due Date 04/14/25  -SM         Untimed Charges    OT Eval/Re-eval Minutes 12  -SM         Total Minutes    Untimed Charges Total Minutes 12  -SM       Total Minutes 12  -SM                User Key  (r) = Recorded By, (t) = Taken By, (c) = Cosigned By      Initials Name Provider Type     Kaylan Salmeron OT Occupational Therapist                  Therapy Charges for Today       Code Description Service Date Service Provider Modifiers Qty    39851019480 HC OT EVAL MOD COMPLEXITY 3 3/31/2025 Kaylan Salmeron OT GO 1                 Kaylan Salmeron OT  3/31/2025

## 2025-03-31 NOTE — SIGNIFICANT NOTE
03/31/25 1458   OTHER   Discipline physical therapist   Rehab Time/Intention   Session Not Performed patient unavailable for evaluation  (ISSA for Cardiology at time of PT attempt this afternoon. PT will plan to follow up 4/1.)   Recommendation   PT - Next Appointment 04/01/25

## 2025-04-01 ENCOUNTER — APPOINTMENT (OUTPATIENT)
Dept: GENERAL RADIOLOGY | Facility: HOSPITAL | Age: 45
End: 2025-04-01
Payer: MEDICARE

## 2025-04-01 ENCOUNTER — APPOINTMENT (OUTPATIENT)
Dept: NEUROLOGY | Facility: HOSPITAL | Age: 45
End: 2025-04-01
Payer: MEDICARE

## 2025-04-01 ENCOUNTER — APPOINTMENT (OUTPATIENT)
Dept: CT IMAGING | Facility: HOSPITAL | Age: 45
End: 2025-04-01
Payer: MEDICARE

## 2025-04-01 ENCOUNTER — APPOINTMENT (OUTPATIENT)
Dept: MRI IMAGING | Facility: HOSPITAL | Age: 45
End: 2025-04-01
Payer: MEDICARE

## 2025-04-01 DIAGNOSIS — Z96.89 STATUS POST VNS (VAGUS NERVE STIMULATOR) PLACEMENT: ICD-10-CM

## 2025-04-01 DIAGNOSIS — G40.309 GENERALIZED NONCONVULSIVE EPILEPSY: ICD-10-CM

## 2025-04-01 LAB
ANION GAP SERPL CALCULATED.3IONS-SCNC: 7.4 MMOL/L (ref 5–15)
BUN SERPL-MCNC: 6 MG/DL (ref 6–20)
BUN/CREAT SERPL: 10.9 (ref 7–25)
CALCIUM SPEC-SCNC: 8.6 MG/DL (ref 8.6–10.5)
CHLORIDE SERPL-SCNC: 103 MMOL/L (ref 98–107)
CO2 SERPL-SCNC: 19.6 MMOL/L (ref 22–29)
CREAT SERPL-MCNC: 0.55 MG/DL (ref 0.57–1)
DEPRECATED RDW RBC AUTO: 44.2 FL (ref 37–54)
EGFRCR SERPLBLD CKD-EPI 2021: 115.4 ML/MIN/1.73
ERYTHROCYTE [DISTWIDTH] IN BLOOD BY AUTOMATED COUNT: 16 % (ref 12.3–15.4)
GEN 5 1HR TROPONIN T REFLEX: 10 NG/L
GLUCOSE SERPL-MCNC: 95 MG/DL (ref 65–99)
HCT VFR BLD AUTO: 35.1 % (ref 34–46.6)
HGB BLD-MCNC: 11.1 G/DL (ref 12–15.9)
MCH RBC QN AUTO: 24.3 PG (ref 26.6–33)
MCHC RBC AUTO-ENTMCNC: 31.6 G/DL (ref 31.5–35.7)
MCV RBC AUTO: 76.8 FL (ref 79–97)
PLATELET # BLD AUTO: 331 10*3/MM3 (ref 140–450)
PMV BLD AUTO: 9.6 FL (ref 6–12)
POTASSIUM SERPL-SCNC: 4 MMOL/L (ref 3.5–5.2)
QT INTERVAL: 267 MS
QTC INTERVAL: 392 MS
RBC # BLD AUTO: 4.57 10*6/MM3 (ref 3.77–5.28)
SODIUM SERPL-SCNC: 130 MMOL/L (ref 136–145)
TROPONIN T NUMERIC DELTA: 0 NG/L
TROPONIN T SERPL HS-MCNC: 10 NG/L
VIT B12 BLD-MCNC: 274 PG/ML (ref 211–946)
WBC NRBC COR # BLD AUTO: 11.07 10*3/MM3 (ref 3.4–10.8)

## 2025-04-01 PROCEDURE — 25010000002 LACOSAMIDE 200 MG/20ML SOLUTION: Performed by: STUDENT IN AN ORGANIZED HEALTH CARE EDUCATION/TRAINING PROGRAM

## 2025-04-01 PROCEDURE — 85027 COMPLETE CBC AUTOMATED: CPT | Performed by: STUDENT IN AN ORGANIZED HEALTH CARE EDUCATION/TRAINING PROGRAM

## 2025-04-01 PROCEDURE — 85670 THROMBIN TIME PLASMA: CPT | Performed by: INTERNAL MEDICINE

## 2025-04-01 PROCEDURE — 85705 THROMBOPLASTIN INHIBITION: CPT | Performed by: INTERNAL MEDICINE

## 2025-04-01 PROCEDURE — 70450 CT HEAD/BRAIN W/O DYE: CPT

## 2025-04-01 PROCEDURE — 86147 CARDIOLIPIN ANTIBODY EA IG: CPT | Performed by: INTERNAL MEDICINE

## 2025-04-01 PROCEDURE — 95713 VEEG 2-12 HR CONT MNTR: CPT

## 2025-04-01 PROCEDURE — 25010000002 LORAZEPAM PER 2 MG: Performed by: STUDENT IN AN ORGANIZED HEALTH CARE EDUCATION/TRAINING PROGRAM

## 2025-04-01 PROCEDURE — 99222 1ST HOSP IP/OBS MODERATE 55: CPT | Performed by: INTERNAL MEDICINE

## 2025-04-01 PROCEDURE — 93010 ELECTROCARDIOGRAM REPORT: CPT | Performed by: STUDENT IN AN ORGANIZED HEALTH CARE EDUCATION/TRAINING PROGRAM

## 2025-04-01 PROCEDURE — 86146 BETA-2 GLYCOPROTEIN ANTIBODY: CPT | Performed by: INTERNAL MEDICINE

## 2025-04-01 PROCEDURE — 71045 X-RAY EXAM CHEST 1 VIEW: CPT

## 2025-04-01 PROCEDURE — 85732 THROMBOPLASTIN TIME PARTIAL: CPT | Performed by: INTERNAL MEDICINE

## 2025-04-01 PROCEDURE — C9254 INJECTION, LACOSAMIDE: HCPCS | Performed by: STUDENT IN AN ORGANIZED HEALTH CARE EDUCATION/TRAINING PROGRAM

## 2025-04-01 PROCEDURE — 84484 ASSAY OF TROPONIN QUANT: CPT | Performed by: STUDENT IN AN ORGANIZED HEALTH CARE EDUCATION/TRAINING PROGRAM

## 2025-04-01 PROCEDURE — 25010000002 ONDANSETRON PER 1 MG: Performed by: STUDENT IN AN ORGANIZED HEALTH CARE EDUCATION/TRAINING PROGRAM

## 2025-04-01 PROCEDURE — 93005 ELECTROCARDIOGRAM TRACING: CPT | Performed by: STUDENT IN AN ORGANIZED HEALTH CARE EDUCATION/TRAINING PROGRAM

## 2025-04-01 PROCEDURE — 70553 MRI BRAIN STEM W/O & W/DYE: CPT

## 2025-04-01 PROCEDURE — 25010000002 DIPHENHYDRAMINE PER 50 MG: Performed by: STUDENT IN AN ORGANIZED HEALTH CARE EDUCATION/TRAINING PROGRAM

## 2025-04-01 PROCEDURE — 25010000002 PROCHLORPERAZINE 10 MG/2ML SOLUTION: Performed by: STUDENT IN AN ORGANIZED HEALTH CARE EDUCATION/TRAINING PROGRAM

## 2025-04-01 PROCEDURE — 95819 EEG AWAKE AND ASLEEP: CPT

## 2025-04-01 PROCEDURE — 80048 BASIC METABOLIC PNL TOTAL CA: CPT | Performed by: STUDENT IN AN ORGANIZED HEALTH CARE EDUCATION/TRAINING PROGRAM

## 2025-04-01 PROCEDURE — 86148 ANTI-PHOSPHOLIPID ANTIBODY: CPT | Performed by: INTERNAL MEDICINE

## 2025-04-01 PROCEDURE — 99233 SBSQ HOSP IP/OBS HIGH 50: CPT | Performed by: NURSE PRACTITIONER

## 2025-04-01 PROCEDURE — 95816 EEG AWAKE AND DROWSY: CPT | Performed by: PSYCHIATRY & NEUROLOGY

## 2025-04-01 PROCEDURE — 85613 RUSSELL VIPER VENOM DILUTED: CPT | Performed by: INTERNAL MEDICINE

## 2025-04-01 PROCEDURE — 25510000002 GADOBENATE DIMEGLUMINE 529 MG/ML SOLUTION: Performed by: STUDENT IN AN ORGANIZED HEALTH CARE EDUCATION/TRAINING PROGRAM

## 2025-04-01 PROCEDURE — A9577 INJ MULTIHANCE: HCPCS | Performed by: STUDENT IN AN ORGANIZED HEALTH CARE EDUCATION/TRAINING PROGRAM

## 2025-04-01 RX ORDER — ASPIRIN 81 MG/1
81 TABLET, CHEWABLE ORAL DAILY
Status: DISCONTINUED | OUTPATIENT
Start: 2025-04-02 | End: 2025-04-03 | Stop reason: HOSPADM

## 2025-04-01 RX ORDER — LORAZEPAM 2 MG/ML
1 INJECTION INTRAMUSCULAR EVERY 4 HOURS PRN
Status: DISCONTINUED | OUTPATIENT
Start: 2025-04-01 | End: 2025-04-01

## 2025-04-01 RX ORDER — PROCHLORPERAZINE EDISYLATE 5 MG/ML
5 INJECTION INTRAMUSCULAR; INTRAVENOUS ONCE
Status: COMPLETED | OUTPATIENT
Start: 2025-04-01 | End: 2025-04-01

## 2025-04-01 RX ORDER — ALUMINA, MAGNESIA, AND SIMETHICONE 2400; 2400; 240 MG/30ML; MG/30ML; MG/30ML
15 SUSPENSION ORAL ONCE
Status: COMPLETED | OUTPATIENT
Start: 2025-04-01 | End: 2025-04-01

## 2025-04-01 RX ORDER — ESLICARBAZEPINE ACETATE 400 MG/1
1 TABLET ORAL DAILY
Qty: 90 TABLET | Refills: 1 | Status: SHIPPED | OUTPATIENT
Start: 2025-04-01

## 2025-04-01 RX ORDER — CENOBAMATE 100 MG/1
100 TABLET, FILM COATED ORAL DAILY
Qty: 30 TABLET | Refills: 5 | Status: SHIPPED | OUTPATIENT
Start: 2025-04-01

## 2025-04-01 RX ORDER — LORAZEPAM 2 MG/ML
2 INJECTION INTRAMUSCULAR
Status: DISCONTINUED | OUTPATIENT
Start: 2025-04-01 | End: 2025-04-03 | Stop reason: HOSPADM

## 2025-04-01 RX ORDER — PANTOPRAZOLE SODIUM 40 MG/1
40 TABLET, DELAYED RELEASE ORAL
Status: DISCONTINUED | OUTPATIENT
Start: 2025-04-01 | End: 2025-04-03 | Stop reason: HOSPADM

## 2025-04-01 RX ORDER — ESLICARBAZEPINE ACETATE 800 MG/1
800 TABLET ORAL DAILY
Qty: 90 TABLET | Refills: 1 | Status: SHIPPED | OUTPATIENT
Start: 2025-04-01

## 2025-04-01 RX ORDER — DIPHENHYDRAMINE HYDROCHLORIDE 50 MG/ML
25 INJECTION, SOLUTION INTRAMUSCULAR; INTRAVENOUS ONCE
Status: COMPLETED | OUTPATIENT
Start: 2025-04-01 | End: 2025-04-01

## 2025-04-01 RX ADMIN — LORAZEPAM 2 MG: 2 INJECTION INTRAMUSCULAR; INTRAVENOUS at 12:58

## 2025-04-01 RX ADMIN — ASPIRIN 325 MG ORAL TABLET 325 MG: 325 PILL ORAL at 09:03

## 2025-04-01 RX ADMIN — ACETAMINOPHEN 650 MG: 325 TABLET, FILM COATED ORAL at 20:26

## 2025-04-01 RX ADMIN — LACOSAMIDE 200 MG: 10 INJECTION INTRAVENOUS at 00:38

## 2025-04-01 RX ADMIN — LACOSAMIDE 200 MG: 10 INJECTION INTRAVENOUS at 12:48

## 2025-04-01 RX ADMIN — ATORVASTATIN CALCIUM 80 MG: 80 TABLET, FILM COATED ORAL at 20:26

## 2025-04-01 RX ADMIN — DIPHENHYDRAMINE HYDROCHLORIDE 25 MG: 50 INJECTION, SOLUTION INTRAMUSCULAR; INTRAVENOUS at 16:41

## 2025-04-01 RX ADMIN — GADOBENATE DIMEGLUMINE 17 ML: 529 INJECTION, SOLUTION INTRAVENOUS at 14:58

## 2025-04-01 RX ADMIN — APIXABAN 5 MG: 5 TABLET, FILM COATED ORAL at 20:26

## 2025-04-01 RX ADMIN — ALUMINUM HYDROXIDE, MAGNESIUM HYDROXIDE, AND DIMETHICONE 15 ML: 400; 400; 40 SUSPENSION ORAL at 16:50

## 2025-04-01 RX ADMIN — ACETAMINOPHEN 650 MG: 325 TABLET, FILM COATED ORAL at 04:17

## 2025-04-01 RX ADMIN — PROCHLORPERAZINE EDISYLATE 5 MG: 5 INJECTION INTRAMUSCULAR; INTRAVENOUS at 16:41

## 2025-04-01 RX ADMIN — AMITRIPTYLINE HYDROCHLORIDE 25 MG: 25 TABLET, FILM COATED ORAL at 20:26

## 2025-04-01 RX ADMIN — ONDANSETRON 4 MG: 2 INJECTION, SOLUTION INTRAMUSCULAR; INTRAVENOUS at 20:26

## 2025-04-01 NOTE — PROGRESS NOTES
Name: Alexandra Xiao ADMIT: 3/30/2025   : 1980  PCP: Annalee Huddleston APRN    MRN: [id number] LOS: 0 days   AGE/SEX: 44 y.o. female  ROOM: [facility]     Subjective   Subjective     She had several seizures overnight. These are likely breakthrough in the setting of missing her home non-formulary medications which have been brought in and restarted. She is describing a left temporal migraine to me currently. Her VNS was able to be turned off and she underwent her brain MRI and the results are pending. I am told that her loop recorder was interrogated this morning, but I do not see documentation of the results. I am told that there was no afib. She feels like her strength is a little bit better        Objective   Objective   Vital Signs  Temp:  [97.7 °F (36.5 °C)-98.4 °F (36.9 °C)] 98.2 °F (36.8 °C)  Heart Rate:  [] 117  Resp:  [18-20] 18  BP: (115-141)/(66-88) 133/83  SpO2:  [99 %-100 %] 100 %  on  Flow (L/min) (Oxygen Therapy):  [2] 2;   Device (Oxygen Therapy): room air  Body mass index is 30.12 kg/m².  Physical Exam  Constitutional:       General: She is not in acute distress.     Appearance: She is not toxic-appearing.   Cardiovascular:      Rate and Rhythm: Normal rate and regular rhythm.      Heart sounds: Normal heart sounds.   Pulmonary:      Effort: Pulmonary effort is normal.      Breath sounds: Normal breath sounds.   Abdominal:      General: Bowel sounds are normal.      Palpations: Abdomen is soft.   Musculoskeletal:         General: No tenderness.      Right lower leg: No edema.      Left lower leg: No edema.   Neurological:      Mental Status: She is alert.      Sensory: Sensory deficit (reduced sensation on the left) present.      Motor: Weakness (left facial droop, left sided weakness) present.   Psychiatric:         Mood and Affect: Mood normal.         Behavior: Behavior normal.         Results Review     I reviewed the patient's new clinical results.  Results from last 7 days   Lab  Units 04/01/25  0636 03/31/25  0516 03/30/25  1041   WBC 10*3/mm3 11.07* 6.75 8.21   HEMOGLOBIN g/dL 11.1* 10.7* 11.9*   PLATELETS 10*3/mm3 331 305 354     Results from last 7 days   Lab Units 04/01/25  0636 03/31/25  0516 03/30/25  1041   SODIUM mmol/L 130* 134* 134*   POTASSIUM mmol/L 4.0 3.5 3.8   CHLORIDE mmol/L 103 103 100   CO2 mmol/L 19.6* 20.6* 22.3   BUN mg/dL 6 8 6   CREATININE mg/dL 0.55* 0.60 0.66   GLUCOSE mg/dL 95 105* 88   Estimated Creatinine Clearance: 136.4 mL/min (A) (by C-G formula based on SCr of 0.55 mg/dL (L)).  Results from last 7 days   Lab Units 03/31/25  0516 03/30/25  1041   ALBUMIN g/dL 3.3* 4.0   BILIRUBIN mg/dL <0.2 <0.2   ALK PHOS U/L 219* 273*   AST (SGOT) U/L 21 23   ALT (SGPT) U/L 18 22     Results from last 7 days   Lab Units 04/01/25  0636 03/31/25  0516 03/30/25  1041   CALCIUM mg/dL 8.6 8.5* 8.7   ALBUMIN g/dL  --  3.3* 4.0       COVID19   Date Value Ref Range Status   06/08/2022 Not Detected Not Detected - Ref. Range Final   04/26/2022 Not Detected Not Detected - Ref. Range Final     Hemoglobin A1C   Date/Time Value Ref Range Status   03/31/2025 0516 5.30 4.80 - 5.60 % Final     Glucose   Date/Time Value Ref Range Status   03/30/2025 1753 123 70 - 130 mg/dL Final   03/30/2025 1032 88 70 - 130 mg/dL Final       MRI Brain With & Without Contrast  Narrative: MRI BRAIN W WO CONTRAST-     HISTORY:  r/o stroke; R29.90-Unspecified symptoms and signs involving  the nervous system; R29.898-Other symptoms and signs involving the  musculoskeletal system; R20.2-Paresthesia of skin; Z79.01-Long term  (current) use of anticoagulants     COMPARISON: CT head 4/1/2025 and MRI brain 11/25/2024     TECHNIQUE: A MRI examination of the brain was performed utilizing  sagittal T1, axial diffusion, T1, T2, T2 FLAIR, coronal T2 as well as  sagittal, axial and coronal T1 postcontrast weighted sequences. The  study is hampered in that only the T/R coil was able to be utilized.  Moving patient protocol  had to be utilized.     FINDINGS: There is no evidence of restricted diffusion to suggest acute  infarction. The brain ventricles are symmetrical. There is expected flow  void in the basilar artery and in the distal aspect of the internal  carotid arteries bilaterally. A small remote infarct involving the left  occipital lobe inferiorly and a remote infarct involving the left  frontal lobe superolaterally is appreciated, present previously. Also  present previously is a small cortical infarct involving the right  middle frontal gyrus superolaterally and small remote infarct involving  the cerebral hemispheres bilaterally. There was no evidence of abnormal  enhancement after contrast administration.     Impression: The study is hampered by patient motion. There is no  evidence of acute infarction. Remote left frontal and left occipital  infarcts are appreciated. The left frontal infarct was acute on the MRI  examination of 4/9/2024. The remote left occipital infarct was present  previously and is where the small remote cerebellar infarcts bilaterally  and the small remote infarct involving the right middle frontal gyrus  superolaterally.     This report was finalized on 4/1/2025 3:45 PM by Dr. Crow Norman M.D  on Workstation: [id number]     CT Head Without Contrast  CT HEAD WO CONTRAST-     HISTORY:  evaluate stability; R29.90-Unspecified symptoms and signs  involving the nervous system; R29.898-Other symptoms and signs involving  the musculoskeletal system; R20.2-Paresthesia of skin; Z79.01-Long term  (current) use of anticoagulants     COMPARISON: CT head 3/30/2025 and MRI brain 11/25/2024     FINDINGS: The brain and ventricles are symmetrical. There is no active  hemorrhage, hydrocephalus or of acute infarction. Mild vascular  calcification and small vessel ischemic disease is noted. A remote  infarct involving the left frontal lobe laterally as well as a remote  infarct involving the inferior aspect of the  left occipital lobe is  appreciated, present previously. Further evaluation could be performed  with a MRI examination of the brain as indicated.                 Radiation dose reduction techniques were utilized, including automated  exposure modulation based on body size.     This report was finalized on 4/1/2025 10:32 AM by Dr. Crow Norman M.D  on Workstation: [id number]       Scheduled Medications  amitriptyline, 25 mg, Oral, Nightly  apixaban, 5 mg, Oral, BID  [START ON 4/2/2025] aspirin, 81 mg, Oral, Daily  atorvastatin, 80 mg, Oral, Nightly  Cenobamate, 100 mg, Oral, Daily  diphenhydrAMINE, 25 mg, Intravenous, Once  Eslicarbazepine Acetate, 400 mg, Oral, Daily  Lacosamide, 200 mg, Intravenous, Q12H  Non-Formulary / Patient Supplied Medication, 800 mg, Oral, Nightly  prochlorperazine, 5 mg, Intravenous, Once    Infusions  sodium chloride, 100 mL/hr, Last Rate: 100 mL/hr (03/31/25 6606)    Diet  Diet: Regular/House; Texture: Regular (IDDSI 7); Fluid Consistency: Thin (IDDSI 0)       Assessment/Plan     Active Hospital Problems    Diagnosis  POA    **Stroke-like symptoms [R29.90]  Yes    S/P placement of VNS (vagus nerve stimulation) device [Z96.89]  Not Applicable    Observed sleep apnea [G47.30]  Yes    History of stroke [Z86.73]  Not Applicable    HTN (hypertension) [I10]  Yes    Epilepsy [G40.909]  Yes      Resolved Hospital Problems   No resolved problems to display.       44 y.o. female admitted with Stroke-like symptoms.    Ms. Xiao is a 44 y.o. 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 Though at this point with her symptoms not yet significantly improved, stroke seems much more likely  She had been compliant with her eliquis prior to  admission. Neurology has restarted this today.  No LVO or obstructive stenosis on head CT.   Echocardiogram showed a normal EF, no thrombus, negative bubble study  EEG did show epileptiform discharges, though she does have a fairly significant seizure disorder at baseline  MRI brain is was performed this afternoon and the results are pending  Her loop recorder has been interrogated and there was no afib/aflutter recorded.  Hematology was consulted and they're repeating her thrombophilia workup, which has been negative in the past  Epilepsy s/p VNS with breakthrough seizures-the break through seizures are due to not receiving her xcorpi and aptiom which were not on formulary. She has brought in her home medications and these have been restarted. I have made available PRN ativan IV should she have any further seizures  Migraine headache-try a migraine cocktail   Hypertension-bp is well controlled with home metoprolol and amlodipine held  Hyperlipidemia-statin  History of recurrent embolic CVA-eliquis as above  TULIO-pap if available  SCDs for DVT prophylaxis.  Full code.  Discussed with patient, nursing staff, and consulting provider.  Anticipate discharge  TBD  timing yet to be determined.    Addendum: brain MRI was negative for acute cva. Discussed with KIRSTIE Lama. 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     Addendum #2: called back to bedside because the patient was having chest discomfort. She doesn't remember seeing me about an hour ago. She tells me that she has had chest discomfort since last night (she did not mention this to me when I saw her earlier). She says that the pain has been continuous, substernal, both pressure likely and sharp, non-modifiable, non-radiating. She tells me that she occasionally gets acid reflux. We'll try a GI cocktail, check a troponin, and EKG. She is already anticoagulated with eliquis and she is on a baby  aspirin. Of note, she no longer has a facial droop or left sided weakness. 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    I also discussed her care with her  at bedside who is very upset that she didn't receive her xcorpi and aptiom yesterday. I sincerely apologized.    Addendum #3: she got quick relief from the GI cocktail and her EKG and chest xray are normal. Troponin is still pending and I'll follow this up, but I'm also going to start a daily ppi.    Franck Martinez MD  [facility]  04/01/25  16:06 EDT

## 2025-04-01 NOTE — CODE DOCUMENTATION
Primary nurses stated  that while Pt up to restroom she had a 90 sec witnessed seizure.  Helped pt back to bed, pt vitals taken.  Primary nurse states she was waiting for call back from MD and meds from pharmacy.  Primary nurse to also call consulted Neuro team.  Pt remains on unit.

## 2025-04-01 NOTE — TELEPHONE ENCOUNTER
Called Yadiel back, let him know that ideally the attending would turn off the VNS right before the pt goes down to radiology. Per Dr Durant, they will need to bring the pt back up immediately after the imaging so that the VNS can be switched back on. I also informed Yadiel that pt is on the list to be seen by a neurologist. He verbally acknowledges.

## 2025-04-01 NOTE — NURSING NOTE
Patient's heart rate increased 130s. This RN walking in on patient having seizure witnessed for 45seconds. She was medicated with scheduled dose of Vimpat 200mg IV. Post ictal for patient this time she was lethargic and di not verbally answers questions.  Neurology on call was paged twice by charge RN no call back received.

## 2025-04-01 NOTE — SIGNIFICANT NOTE
04/01/25 7017   OTHER   Discipline physical therapist   Rehab Time/Intention   Session Not Performed other (see comments)  (Checked on patient multiple times this date. RN requested to hold AM. Upon checking with nurse regarding appropriateness for PM, patient had just had another seizure. Notified RN PT will plan to hold today and follow up tomorrow.)   Recommendation   PT - Next Appointment 04/02/25

## 2025-04-01 NOTE — SIGNIFICANT NOTE
Pt had another seizure this afternoon. SLP to hold communication evaluation this date. Will follow up as appropriate. MRI results still pending.

## 2025-04-01 NOTE — PLAN OF CARE
Goal Outcome Evaluation:  Plan of Care Reviewed With: patient, spouse        Progress: declining        Patient medicated with Vimpat 200mg IV after two episodes of seizures. Patient's spouse does not agree with just one med onboard for treatment.Patient has head and chest pain aching non radiating; medicated with Tylenol 650mg PO twice with no relief. Attempts to contact neurology futile same reported to charge RN.

## 2025-04-01 NOTE — PROGRESS NOTES
DOS: 2025  NAME: Alexandra Xiao   : 1980  PCP: Annalee Huddleston APRN    Chief Complaint   Patient presents with    Facial Droop    Extremity Weakness        Stroke    Subjective: Pt seen in follow up, however the problem is new to me.  Patient lying in bed with her boyfriend on speaker phone.  States she feels sore from having her seizures last night.  After further questioning the patient is actually on 3 AEDs at home and 2 of them were not continued here as they are not on formulary.  The patient never had her Aptiom or Xcorpi sent to pharmacy to be verified so that she could continue them here with her Vimpat.  She reports being compliant to these daily prior to her being brought to the hospital.  Boyfriend also states that the patient has been noncompliant with her CPAP over the last month because they are waiting a to be delivered.    Objective:  Vital signs:      Vitals:    25 0415 25 0752 25 1143 25 1315   BP: 123/85 115/66 133/83    BP Location: Left arm Left arm Left arm    Patient Position: Lying Lying Lying    Pulse: 97   117   Resp: 18 18 18    Temp: 97.7 °F (36.5 °C) 98.1 °F (36.7 °C) 98.2 °F (36.8 °C)    TempSrc: Oral Oral Oral    SpO2: 100%      Weight:       Height:           Current Facility-Administered Medications:     acetaminophen (TYLENOL) tablet 650 mg, 650 mg, Oral, Q4H PRN, 650 mg at 25 **OR** acetaminophen (TYLENOL) suppository 650 mg, 650 mg, Rectal, Q4H PRN, Franck Martinez MD    amitriptyline (ELAVIL) tablet 25 mg, 25 mg, Oral, Nightly, Franck Martinez MD, 25 mg at 25    apixaban (ELIQUIS) tablet 5 mg, 5 mg, Oral, BID, Yari Lomas APRN    [START ON 2025] aspirin chewable tablet 81 mg, 81 mg, Oral, Daily, Yari Lomas APRN    atorvastatin (LIPITOR) tablet 80 mg, 80 mg, Oral, Nightly, Franck Martinez MD, 80 mg at 25    bisacodyl (DULCOLAX) suppository 10 mg, 10 mg, Rectal, Daily PRN, Franck Martinez MD     Cenobamate tablet 100 mg, 100 mg, Oral, Daily, Juwan Carrillo MD, 100 mg at 04/01/25 1356    Eslicarbazepine Acetate tablet 400 mg, 400 mg, Oral, Daily, Juwan Carrillo MD, 400 mg at 04/01/25 1356    lacosamide (VIMPAT) injection 200 mg, 200 mg, Intravenous, Q12H, Franck Martinez MD, 200 mg at 04/01/25 1248    LORazepam (ATIVAN) injection 2 mg, 2 mg, Intravenous, Q5 Min PRN, Franck Martinez MD, 2 mg at 04/01/25 1258    Non-Formulary / Patient Supplied Medication, 800 mg, Oral, Nightly, Juwan Carrillo MD    ondansetron (ZOFRAN) injection 4 mg, 4 mg, Intravenous, Q6H PRN, Franck Martinez MD    sodium chloride 0.9 % flush 10 mL, 10 mL, Intravenous, PRN, Dennis Rahman MD    sodium chloride 0.9 % infusion, 100 mL/hr, Intravenous, Continuous, Zakia Garcia, APRN, Last Rate: 100 mL/hr at 03/31/25 2339, 100 mL/hr at 03/31/25 2339    PRN meds    acetaminophen **OR** acetaminophen    bisacodyl    LORazepam    ondansetron    sodium chloride    No current facility-administered medications on file prior to encounter.     Current Outpatient Medications on File Prior to Encounter   Medication Sig    amitriptyline (ELAVIL) 25 MG tablet TAKE 1 TABLET BY MOUTH EVERY NIGHT    amLODIPine (NORVASC) 5 MG tablet TAKE 1 TABLET BY MOUTH DAILY    aspirin 81 MG EC tablet Take 1 tablet by mouth Daily.    atorvastatin (LIPITOR) 40 MG tablet TAKE 1 TABLET BY MOUTH EVERY DAY    Eliquis 5 MG tablet tablet TAKE 1 TABLET BY MOUTH TWICE DAILY    Eslicarbazepine Acetate (Aptiom) 400 MG tablet Take 1 tablet by mouth Daily. Take with 800mg for total dose 1200mg daily    Eslicarbazepine Acetate (Aptiom) 800 MG tablet tablet TAKE 1 TABLET BY MOUTH DAILY. TAKE 400MG FOR TOTAL DOSE 1200MG DAILY    metoprolol succinate XL (TOPROL-XL) 25 MG 24 hr tablet TAKE 1 TABLET BY MOUTH DAILY    Vimpat 200 MG tablet TAKE 1 TABLET BY MOUTH EVERY 12 HOURS    Xcopri 100 MG tablet TAKE 1 TABLET BY MOUTH DAILY       General appearance: Obese female,  NAD, alert and cooperative, edentulous  HEENT: Normocephalic, atraumatic    Neurological:   MS: oriented x3, normal attention/concentration, language intact, no neglect  CN: visual fields full, EOMI, facial sensation equal, no facial droop, shoulder shrug equal, tongue midline  Motor: 4/5 LLE, 5/5 RUE/LUE/RLE  Sensory: light touch sensation intact in all 4 ext.  Coordination: Normal finger to nose test  Gait and station: deferred   Rapid alternating movements: normal finger to thumb tap    Laboratory results:  Lab Results   Component Value Date    TSH 3.560 03/31/2025     Lab Results   Component Value Date    HGBA1C 5.30 03/31/2025     Lab Results   Component Value Date    [id number] 361 06/19/2024     Lab Results   Component Value Date    CHOL 131 03/31/2025    CHOL 134 04/09/2024    CHOL 135 03/06/2023    CHLPL 153 02/25/2025     Lab Results   Component Value Date    TRIG 75 03/31/2025    TRIG 98 02/25/2025    TRIG 93 04/09/2024     Lab Results   Component Value Date    HDL 50 03/31/2025    HDL 54 02/25/2025    HDL 59 04/09/2024     Lab Results   Component Value Date    LDL 66 03/31/2025    LDL 81 02/25/2025    LDL 58 04/09/2024     Lab Results   Component Value Date    WBC 11.07 (H) 04/01/2025    HGB 11.1 (L) 04/01/2025    HCT 35.1 04/01/2025    MCV 76.8 (L) 04/01/2025     04/01/2025     Lab Results   Component Value Date    GLUCOSE 95 04/01/2025    BUN 6 04/01/2025    CREATININE 0.55 (L) 04/01/2025    BCR 10.9 04/01/2025    K 4.0 04/01/2025    CO2 19.6 (L) 04/01/2025    CALCIUM 8.6 04/01/2025    ALBUMIN 3.3 (L) 03/31/2025    AST 21 03/31/2025    ALT 18 03/31/2025     Lab Results   Component Value Date    PTT 29.1 03/30/2025     Lab Results   Component Value Date    INR 1.10 03/30/2025    INR 1.6 07/30/2024    INR 1.05 04/08/2024    PROTIME 14.2 03/30/2025    PROTIME 18.7 (H) 07/30/2024    PROTIME 13.9 04/08/2024     Brief Urine Lab Results  (Last result in the past 365 days)        Color   Clarity   Blood    Leuk Est   Nitrite   Protein   CREAT   Urine HCG        09/15/24 1637 Yellow   Clear   Negative   Moderate (2+)   Negative   Negative                   Review and interpretation of imaging:  CT Angiogram Head w AI Analysis of LVO  Result Date: 3/30/2025   1. Noncontrast head CT demonstrates encephalomalacia in the left anterior superior frontal lobe and left occipital lobe, consistent with prior infarcts. No intraparenchymal hemorrhage. 2. No large vessel occlusion seen.  This report was finalized on 3/30/2025 11:22 AM by Dr. Matt Carpenter M.D on Workstation: [id number]      CT Angiogram Neck  Result Date: 3/30/2025   1. Noncontrast head CT demonstrates encephalomalacia in the left anterior superior frontal lobe and left occipital lobe, consistent with prior infarcts. No intraparenchymal hemorrhage. 2. No large vessel occlusion seen.  This report was finalized on 3/30/2025 11:22 AM by Dr. Matt Carpenter M.D on Workstation: [id number]      XR Chest 1 View  Result Date: 3/30/2025  FINDINGS AND IMPRESSION: Left-sided stimulator is present with lead coursing in the left neck and out of the field-of-view superiorly. There is also presumed loop recorder.  Lungs are hypoinflated. No pulmonary consolidation, pleural effusion or pneumothorax is seen. Cardiac silhouette within normal limits for size.  This report was finalized on 3/30/2025 11:19 AM by Dr. Julian Rush M.D on Workstation: [id number]      Results for orders placed during the hospital encounter of 03/30/25    Adult transthoracic echo complete    Interpretation Summary    Left ventricular systolic function is normal. Calculated left ventricular EF = 69.9% Normal left ventricular cavity size noted. Left ventricular wall thickness is consistent with mild concentric hypertrophy. All left ventricular wall segments contract normally. Left ventricular diastolic function is consistent with (grade I) impaired relaxation.    Normal left atrial cavity size  noted. Saline test results are negative.    There is severe, bileaflet mitral valve thickening present. The posterior mitral valve leaflet has decreased excursion Moderate mitral valve regurgitation is present with a posteriorly-directed jet noted. Mild mitral valve stenosis is present. The mean gradient is 5 mmHg with a HR of 84bpm      Impression/Assessment:  This is a 44-year-old female with past medical history of recurrent embolic strokes with an unknown etiology after undergoing multiple extensive workup including hypercoagulable testing multiple times with negative results, NOÉ, CTA's, and loop recorder placement, obstructive sleep apnea, epilepsy on 3 AEDs and status post VNS, hyperlipidemia, hypertension, migraine headache, COPD, current vape user who presented to the hospital on 3/30/2025 with complaints of left arm weakness and left-sided facial droop.    BP on arrival 164/95.  She underwent a CTA of her head and neck that did not reveal any evidence of acute LVO or flow-limiting stenosis.  Chronic left frontal and left occipital lobe infarcts noted.  Initially MRI was recommended however due to her VNS and inability to turn off this was held off.  Routine EEG was obtained and revealed likely focal hypersynchrony and a predilection to focal onset seizures originating in the left frontal or bifrontal head regions.  No electrographic seizures were recorded.  She had a 2D echo that was unremarkable for source of stroke.  Due to the hold-up on MRI repeat CT head was done this morning and appears stable.  No evidence of A-fib on her EKG.  She had her loop recorder interrogated today, per nurse she was not told that there was evidence of A-fib.  Still awaiting final report.    Additional work up to date:  2D Echo: Normal LA cavity size, saline test negative, EF 69.9%, all LV wall segments contract normally, no AV stenosis, moderate MVR present, severe bileaflet mitral valve thickening  present.    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 She has not been getting the Xcorpi or Aptiom here as we do not keep this on formulary. She has had the medications here but these were never checked into pharmacy to verify. She has not had these since Sunday. I have asked the RN to get this verified and give her doses now. This is likely the reason she had her breakthrough seizures last night and this afternoon. We will plan on hooking her up to cEEG monitoring after she gets back from her MRI.  - I was able to get ahold of the VNS rep. They were able to come today and turn off her VNS so that she can get her contrasted MRI brain done today. They plan on coming back tomorrow to turn this back on and verify settings.   - Her boyfriend tells me that she has been without her CPAP for the last month due to awaiting a nose piece to come in. We did discuss that this being untreated puts her at a high risk for recurrent stroke and may have been the source of prior strokes as this was untreated back then as she has had multiple extensive work-ups that have been unrevealing for source of stroke in the past. Reportedly her loop recorder was interrogated today and per RN there was no mention of afib detection. I have not actually seen documentation for this.   - She also reports that she currently vapes, extensive education on quitting and risk of stroke if she does not.  - Hematology is currently involved. For now we would recommend she continue her Eliquis and I have restarted this today as her  repeat head CT this morning looks stable. She is also on ASA 81mg. She reports no missed doses of Eliquis recently.   - Check B12 level   - Lipitor 80mg, LDL 66  Neurochecks per stroke protocol  Normalize BP  Stroke Education  SKYLER/SCDs  PT/OT/ST  Therapies as written. CCP for discharge planning. Call RRT for any acute neurological changes.   She has f/u scheduled with Dr. James in our office on 5/27.  We will continue to follow and advise.    Case discussed with patient, boyfriend via speaker phone, RN, VNS rep, and Dr. Carrillo, and he agrees with plan above.     KIRSTIE Marx

## 2025-04-01 NOTE — TELEPHONE ENCOUNTER
Spoke with Yadiel and informed him that the Neurologist on call would be able to turn on and off the pt's VNS. Prior to calling Yadiel, I checked with both Dr Durant & Jaimee THACKER that there is someone at the hospital that would be able to perform this task. Both stated that an attending can.   I let Yadiel know that I would call him back and find out if the attending will need to be called before the pt goes to radiology or when she is in radiology. He verbally acknowledged.

## 2025-04-01 NOTE — PAYOR COMM NOTE
"Elie Chanel (44 y.o. Female)     ATTN: NURSE REVIEWER  RE: INITIAL INPT AUTH CLINICALS   REF: [id number]  PLS REPLY TO HUDSON AYOUB [phone] OR FAX# [phone]      Date of Birth   1980    Social Security Number       Address   [address]  [place] [address]    Home Phone   [phone]    MRN   [id number]       [facility]    Marital Status   Single                            Admission Date   3/30/2025    Admission Type   Emergency    Admitting Provider   Franck Martinez MD    Attending Provider   Franck Martinez MD    Department, Room/Bed   [address], P598/1       Discharge Date       Discharge Disposition       Discharge Destination                                 Attending Provider: Franck Martinez MD    Allergies: No Known Allergies    Isolation: None   Infection: None   Code Status: CPR    Ht: 165 cm (64.96\")   Wt: 82 kg (180 lb 12.4 oz)    Admission Cmt: None   Principal Problem: Stroke-like symptoms [R29.90]                   Active Insurance as of 3/30/2025       Primary Coverage       Payor Plan Insurance Group Employer/Plan Group    HUMANA MEDICARE REPLACEMENT HUMANA MEDICARE ADVANTAGE [facility] [id number]       Payor Plan Address Payor Plan Phone Number Payor Plan Fax Number Effective Dates    [address] [address] [phone]  3/1/2025 - None Entered    [facility] [id number]         Subscriber Name Subscriber Birth Date Member ID       ELIE CHANEL 1980 [id number]                     Emergency Contacts        (Rel.) Home Phone Work Phone Mobile Phone    gloria palomino (Significant Other) -- -- [phone]    ABBY CHANEL (Daughter) -- -- [phone]                 History & Physical        Franck Martinez MD at 03/30/25 1255              Patient Name:  Elie Chnael  YOB: 1980  MRN:  [id number]  Admit Date:  3/30/2025  Patient Care Team:  Annalee Huddleston APRN as PCP - General (Family Medicine)  Cristofer Chamorro, " MD as Consulting Physician (Cardiology)  Cb James II, MD as Consulting Physician (Neurology)  Ernesto Mendosa MD as Consulting Physician (Obstetrics and Gynecology)  Reza Lester MD as Cardiologist (Cardiology)  Annalee Huddleston APRN as Referring Physician (Family Medicine)  Marija Peña MD as Consulting Physician (Hematology and Oncology)      Subjective  History Present Illness     Chief Complaint   Patient presents with    Facial Droop    Extremity Weakness       Ms. Xiao is a 44 y.o. woman with epilepsy, hypertension, hyperlipidemia, TULIO, history of recurrent embolic CVA of unclear origin with no residual deficits chronically anticoagulated with eliquis who presents with left sided facial droop and left arm/leg weakness which she first noticed upon waking this morning. Last known normal was at 2 AM and she hasn't missed any doses of eliquis recently so she wasn't a TNK candidate. She reports decreased sensation on the left as well.     History of Present Illness  Review of Systems   Constitutional:  Negative for chills, diaphoresis and fever.   HENT:  Negative for postnasal drip and rhinorrhea.    Eyes: Negative.    Respiratory:  Negative for cough and shortness of breath.    Cardiovascular:  Negative for chest pain and palpitations.   Gastrointestinal:  Negative for abdominal pain, constipation, diarrhea, nausea and vomiting.   Endocrine: Negative.    Genitourinary:  Negative for dysuria, frequency and urgency.   Musculoskeletal:  Negative for arthralgias and myalgias.   Skin:  Negative for rash and wound.   Allergic/Immunologic: Negative.    Neurological:  Positive for facial asymmetry, weakness and headaches.   Hematological: Negative.    Psychiatric/Behavioral:  Negative for confusion and decreased concentration.         Personal History     Past Medical History:   Diagnosis Date    Abnormal bleeding in menstrual cycle     C. 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    Headache, tension-type     Heavy menstrual bleeding     WITH CLOTS    Hyperlipidemia     Hypertension     Memory loss     Migraine     Seizures     Status post placement of implantable loop recorder     Stroke     MARCH AND      Past Surgical History:   Procedure Laterality Date    BREAST BIOPSY      COLONOSCOPY N/A 2024    Procedure: COLONOSCOPY into cecum and terminal ileum;  Surgeon: Matt White MD;  Location: [facility] ENDOSCOPY;  Service: Gastroenterology;  Laterality: N/A;  Pre: Screening, history of colitis   Post: Diverticulosis and Hemorrhoids    D & C HYSTEROSCOPY ENDOMETRIAL ABLATION N/A 06/10/2022    Procedure: DILATATION AND CURETTAGE HYSTEROSCOPY NOVASURE ENDOMETRIAL ABLATION;  Surgeon: Ernesto Mendosa MD;  Location: [facility] MAIN OR;  Service: Obstetrics/Gynecology;  Laterality: N/A;    FOOT SURGERY      pins in left foot     HYSTERECTOMY  2024    INSERT / REPLACE / REMOVE PACEMAKER  2022    Dr. Cristofer Chamorro, LOOP RECORDER    OTHER SURGICAL HISTORY      VNS plate 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    NOÉ      2022    TUBAL ABDOMINAL LIGATION      VAGUS NERVE STIMULATOR IMPLANTATION       Family History   Problem Relation Age of Onset    Breast cancer Mother 50         age 60 METS    Arthritis Mother     Arthritis Father     Diabetes Father     Heart disease Father     Hypertension Father     Heart attack Father     Hypertension Brother     Heart attack Paternal Grandfather     Ovarian cancer Neg Hx     Uterine cancer Neg Hx     Colon cancer Neg Hx     Deep vein thrombosis Neg Hx     Pulmonary embolism Neg Hx     Malig Hyperthermia Neg Hx      Social History     Tobacco Use    Smoking status: Former     Current packs/day: 0.00     Average packs/day: 1 pack/day for 29.0 years (29.0 ttl pk-yrs)     Types: Cigarettes     Start date:      Quit date:       Years since quitting: 3.2    Smokeless tobacco: Never    Tobacco comments:     Quit 03/2022   Vaping Use    Vaping status: Some Days    Substances: Nicotine, Flavoring    Devices: Disposable, 6% NICOTINE   Substance Use Topics    Alcohol use: Not Currently    Drug use: No     No current facility-administered medications on file prior to encounter.     Current Outpatient Medications on File Prior to Encounter   Medication Sig Dispense Refill    amitriptyline (ELAVIL) 25 MG tablet TAKE 1 TABLET BY MOUTH EVERY NIGHT 30 tablet 5    amLODIPine (NORVASC) 5 MG tablet TAKE 1 TABLET BY MOUTH DAILY 90 tablet 1    aspirin 81 MG EC tablet Take 1 tablet by mouth Daily. 30 tablet 0    atorvastatin (LIPITOR) 40 MG tablet TAKE 1 TABLET BY MOUTH EVERY DAY 60 tablet 0    Eliquis 5 MG tablet tablet TAKE 1 TABLET BY MOUTH TWICE DAILY 180 tablet 2    Eslicarbazepine Acetate (Aptiom) 400 MG tablet Take 1 tablet by mouth Daily. Take with 800mg for total dose 1200mg daily 90 tablet 1    Eslicarbazepine Acetate (Aptiom) 800 MG tablet tablet TAKE 1 TABLET BY MOUTH DAILY. TAKE 400MG FOR TOTAL DOSE 1200MG DAILY 30 tablet 2    metoprolol succinate XL (TOPROL-XL) 25 MG 24 hr tablet TAKE 1 TABLET BY MOUTH DAILY 90 tablet 1    Vimpat 200 MG tablet TAKE 1 TABLET BY MOUTH EVERY 12 HOURS 60 tablet 1    Xcopri 100 MG tablet TAKE 1 TABLET BY MOUTH DAILY 30 tablet 5     No Known Allergies    Objective   Objective     Vital Signs  Temp:  [97.5 °F (36.4 °C)] 97.5 °F (36.4 °C)  Heart Rate:  [74-91] 74  Resp:  [16] 16  BP: (159-164)/() 159/105  SpO2:  [97 %-98 %] 97 %  on   ;   Device (Oxygen Therapy): room air  Body mass index is 30.19 kg/m².    Physical Exam  Vitals and nursing note reviewed.   Constitutional:       General: She is not in acute distress.     Appearance: She is obese. She is not toxic-appearing.   HENT:      Head: Normocephalic and atraumatic.      Mouth/Throat:      Mouth: Mucous membranes are moist.      Pharynx: Oropharynx is  clear.   Eyes:      Extraocular Movements: Extraocular movements intact.      Conjunctiva/sclera: Conjunctivae normal.      Pupils: Pupils are equal, round, and reactive to light.   Cardiovascular:      Rate and Rhythm: Normal rate and regular rhythm.      Heart sounds: Normal heart sounds.   Pulmonary:      Effort: Pulmonary effort is normal.      Breath sounds: Normal breath sounds.   Abdominal:      General: Bowel sounds are normal. There is no distension.      Palpations: Abdomen is soft.      Tenderness: There is no abdominal tenderness. There is no guarding or rebound.   Musculoskeletal:         General: No tenderness.      Cervical back: Normal range of motion and neck supple.      Right lower leg: No edema.      Left lower leg: No edema.   Skin:     General: Skin is warm and dry.      Findings: No erythema or rash.   Neurological:      Mental Status: She is alert and oriented to person, place, and time.      Cranial Nerves: Cranial nerve deficit present.      Sensory: Sensory deficit present.      Motor: Weakness present.      Coordination: Coordination abnormal.      Comments: Decrease in left facial sensation and left sided sensation in her arm and leg  Left sided weakness and left sided facial droop   Psychiatric:         Mood and Affect: Mood normal.         Behavior: Behavior normal.         Results Review:  I reviewed the patient's new clinical results.  I reviewed the patient's new imaging results and agree with the interpretation.  I reviewed the patient's other test results and agree with the interpretation  I personally viewed and interpreted the patient's EKG/Telemetry data  Discussed with ED provider.    Lab Results (last 24 hours)       Procedure Component Value Units Date/Time    POC Glucose Once [[id number]]  (Normal) Collected: 03/30/25 1032    Specimen: Blood Updated: 03/30/25 1033     Glucose 88 mg/dL     CBC & Differential [[id number]]  (Abnormal) Collected: 03/30/25 1041    Specimen: Blood  Updated: 03/30/25 1056    Narrative:      The following orders were created for panel order CBC & Differential.  Procedure                               Abnormality         Status                     ---------                               -----------         ------                     CBC Auto Differential[[id number]]        Abnormal            Final result                 Please view results for these tests on the individual orders.    Comprehensive Metabolic Panel [[id number]]  (Abnormal) Collected: 03/30/25 1041    Specimen: Blood Updated: 03/30/25 1117     Glucose 88 mg/dL      BUN 6 mg/dL      Creatinine 0.66 mg/dL      Sodium 134 mmol/L      Potassium 3.8 mmol/L      Chloride 100 mmol/L      CO2 22.3 mmol/L      Calcium 8.7 mg/dL      Total Protein 7.5 g/dL      Albumin 4.0 g/dL      ALT (SGPT) 22 U/L      AST (SGOT) 23 U/L      Alkaline Phosphatase 273 U/L      Total Bilirubin <0.2 mg/dL      Globulin 3.5 gm/dL      A/G Ratio 1.1 g/dL      BUN/Creatinine Ratio 9.1     Anion Gap 11.7 mmol/L      eGFR 110.4 mL/min/1.73     Narrative:      GFR Categories in Chronic Kidney Disease (CKD)      GFR Category          GFR (mL/min/1.73)    Interpretation  G1                     90 or greater         Normal or high (1)  G2                      60-89                Mild decrease (1)  G3a                   45-59                Mild to moderate decrease  G3b                   30-44                Moderate to severe decrease  G4                    15-29                Severe decrease  G5                    14 or less           Kidney failure          (1)In the absence of evidence of kidney disease, neither GFR category G1 or G2 fulfill the criteria for CKD.    eGFR calculation 2021 CKD-EPI creatinine equation, which does not include race as a factor    Protime-INR [[id number]]  (Normal) Collected: 03/30/25 1041    Specimen: Blood Updated: 03/30/25 1106     Protime 14.2 Seconds      INR 1.10    aPTT [[id number]]  (Normal)  Collected: 03/30/25 1041    Specimen: Blood Updated: 03/30/25 1106     PTT 29.1 seconds     CBC Auto Differential [[id number]]  (Abnormal) Collected: 03/30/25 1041    Specimen: Blood Updated: 03/30/25 1056     WBC 8.21 10*3/mm3      RBC 4.83 10*6/mm3      Hemoglobin 11.9 g/dL      Hematocrit 37.6 %      MCV 77.8 fL      MCH 24.6 pg      MCHC 31.6 g/dL      RDW 15.7 %      RDW-SD 43.8 fl      MPV 9.6 fL      Platelets 354 10*3/mm3      Neutrophil % 67.3 %      Lymphocyte % 23.5 %      Monocyte % 7.4 %      Eosinophil % 1.2 %      Basophil % 0.4 %      Immature Grans % 0.2 %      Neutrophils, Absolute 5.52 10*3/mm3      Lymphocytes, Absolute 1.93 10*3/mm3      Monocytes, Absolute 0.61 10*3/mm3      Eosinophils, Absolute 0.10 10*3/mm3      Basophils, Absolute 0.03 10*3/mm3      Immature Grans, Absolute 0.02 10*3/mm3      nRBC 0.0 /100 WBC             Imaging Results (Last 24 Hours)       Procedure Component Value Units Date/Time    CT Angiogram Head w AI Analysis of LVO [[id number]] Collected: 03/30/25 1106     Updated: 03/30/25 1125    Narrative:      CT ANGIOGRAM HEAD W AI ANALYSIS OF LVO-, CT ANGIOGRAM NECK-     INDICATION: Stroke follow-up. Not a TNK candidate.     COMPARISON: CTA head and neck and CT head April 8, 2024     TECHNIQUE:  Noncontrast head CT. CTA head and neck with IV contrast. Coronal and  sagittal reformats. Three dimensional reconstructions. Radiation dose  reduction techniques were utilized, including automated exposure control  and exposure modulation based on body size.     FINDINGS:      Noncontrast head CT: No intraparenchymal hemorrhage. Small area of  hypoattenuation and volume loss seen within the left anterosuperior  frontal lobe, consistent with encephalomalacia from prior infarct. Small  area of hypoattenuation and volume loss seen within the left occipital  lobe, consistent with encephalomalacia from prior infarct. No mass  effect or midline shift. No hydrocephalus. No  intraventricular  hemorrhage. No extra-axial hemorrhage or fluid collection. No fracture  or bone lesion. Patent mastoid air cells and middle ears. Patent  paranasal sinuses.     CTA NECK: Right common and internal carotid artery are patent, without a  stenosis. Left common and internal carotid artery are patent, without a  stenosis. Venous contamination obscures the proximal right vertebral  artery. Visualized portions of the vertebral arteries are patent,  without stenosis seen. No dissection.     CTA HEAD: Right internal carotid, middle and anterior cerebral arteries  appear patent. Left internal carotid, middle and anterior cerebral  arteries appear patent. Bilateral vertebral arteries, posterior inferior  cerebellar arteries, basilar artery, superior cerebellar arteries and  posterior cerebral arteries appear patent. No aneurysm identified. Dural  sinuses appear patent.     Other: Airways wall thickening and some emphysematous changes seen in  the upper lungs. Small thyroid nodules.       Impression:         1. Noncontrast head CT demonstrates encephalomalacia in the left  anterior superior frontal lobe and left occipital lobe, consistent with  prior infarcts. No intraparenchymal hemorrhage.  2. No large vessel occlusion seen.     This report was finalized on 3/30/2025 11:22 AM by Dr. Matt Carpenter M.D on Workstation: [id number]       CT Angiogram Neck [[id number]] Collected: 03/30/25 1106     Updated: 03/30/25 1125    Narrative:      CT ANGIOGRAM HEAD W AI ANALYSIS OF LVO-, CT ANGIOGRAM NECK-     INDICATION: Stroke follow-up. Not a TNK candidate.     COMPARISON: CTA head and neck and CT head April 8, 2024     TECHNIQUE:  Noncontrast head CT. CTA head and neck with IV contrast. Coronal and  sagittal reformats. Three dimensional reconstructions. Radiation dose  reduction techniques were utilized, including automated exposure control  and exposure modulation based on body size.     FINDINGS:      Noncontrast  head CT: No intraparenchymal hemorrhage. Small area of  hypoattenuation and volume loss seen within the left anterosuperior  frontal lobe, consistent with encephalomalacia from prior infarct. Small  area of hypoattenuation and volume loss seen within the left occipital  lobe, consistent with encephalomalacia from prior infarct. No mass  effect or midline shift. No hydrocephalus. No intraventricular  hemorrhage. No extra-axial hemorrhage or fluid collection. No fracture  or bone lesion. Patent mastoid air cells and middle ears. Patent  paranasal sinuses.     CTA NECK: Right common and internal carotid artery are patent, without a  stenosis. Left common and internal carotid artery are patent, without a  stenosis. Venous contamination obscures the proximal right vertebral  artery. Visualized portions of the vertebral arteries are patent,  without stenosis seen. No dissection.     CTA HEAD: Right internal carotid, middle and anterior cerebral arteries  appear patent. Left internal carotid, middle and anterior cerebral  arteries appear patent. Bilateral vertebral arteries, posterior inferior  cerebellar arteries, basilar artery, superior cerebellar arteries and  posterior cerebral arteries appear patent. No aneurysm identified. Dural  sinuses appear patent.     Other: Airways wall thickening and some emphysematous changes seen in  the upper lungs. Small thyroid nodules.       Impression:         1. Noncontrast head CT demonstrates encephalomalacia in the left  anterior superior frontal lobe and left occipital lobe, consistent with  prior infarcts. No intraparenchymal hemorrhage.  2. No large vessel occlusion seen.     This report was finalized on 3/30/2025 11:22 AM by Dr. Matt Carpenter M.D on Workstation: [id number]       XR Chest 1 View [[id number]] Collected: 03/30/25 1114     Updated: 03/30/25 1122    Narrative:      Portable chest radiograph     HISTORY: Stroke     TECHNIQUE: Single AP portable radiograph of the  chest     COMPARISON: Gastrograph 3/5/2023       Impression:      FINDINGS AND IMPRESSION:  Left-sided stimulator is present with lead coursing in the left neck and  out of the field-of-view superiorly. There is also presumed loop  recorder.     Lungs are hypoinflated. No pulmonary consolidation, pleural effusion or  pneumothorax is seen. Cardiac silhouette within normal limits for size.     This report was finalized on 3/30/2025 11:19 AM by Dr. Julian Rush M.D on Workstation: [id number]               Results for orders placed during the hospital encounter of 04/08/24    Adult Transesophageal Echo (NOÉ) W/ Cont if Necessary Per Protocol    Interpretation Summary    There is focal thickening at the mid to distal anterior mitral valve leaflet. There is mild prolapse of the A2 segment of the anterior mitral valve leaflet.    There is moderate to severe mitral regurgitation which originates from multiple jets, including a prominent posteriorly directed jet    There are moderate plaques in the descending thoracic aorta. There is a focal moderate plaque without mobile components in the central aortic arch    Left ventricular systolic function is normal. Left ventricular ejection fraction appears to be 61 - 65%.    Left ventricular diastolic function is consistent with (grade I) impaired relaxation.    Normal right ventricular cavity size and systolic function noted.    Multiple agitated saline studies were negative, including with abdominal thrusts. There was no PFO noted on the agitated saline studies, or by color Doppler assessment    No evidence of a left atrial appendage thrombus was present.    There is a small (<1cm) pericardial effusion adjacent to the right atrium and right ventricle. There is no evidence of cardiac tamponade.      ECG 12 Lead Stroke Evaluation   Preliminary Result   HEART RATE=82  bpm   RR Kqapujde=413  ms   NY Oxeibqxz=155  ms   P Horizontal Axis=-3  deg   P Front Axis=19  deg   QRSD  Interval=87  ms   QT Fcppctbh=708  ms   HSuY=347  ms   QRS Axis=14  deg   T Wave Axis=51  deg   - OTHERWISE NORMAL ECG -   Sinus rhythm   Abnormal R-wave progression, early transition   Date and Time of Study:2025-03-30 10:42:40           Assessment/Plan     Active Hospital Problems    Diagnosis  POA    **Stroke-like symptoms [R29.90]  Yes    S/P placement of VNS (vagus nerve stimulation) device [Z96.89]  Not Applicable    Observed sleep apnea [G47.30]  Yes    History of stroke [Z86.73]  Not Applicable    HTN (hypertension) [I10]  Yes    Epilepsy [G40.909]  Yes      Resolved Hospital Problems   No resolved problems to display.       Ms. Xiao is a 44 y.o.  woman with epilepsy, hypertension, hyperlipidemia, TULIO, history of recurrent embolic CVA of unclear origin with no residual deficits chronically anticoagulated with eliquis who presents with left sided facial droop and left arm/leg weakness    Left sided weakness/facial droop-stroke vs. Godfrey paralysis. No LVO or obstructive stenosis on head CT. MRI brain is pending, though her stimulator will need to be turned off before this can be performed. I'll update her echocardiogram and see if we can interrogate her loop recorder. An EEG is pending as well.   Hypertension-permissive hypertension while ruling out stroke  Hyperlipidemia-statin  Epilepsy s/p VNS-change vimpat to an IV formulation. Xcorpi and Aptiom are not on formulary. Discussed with Dr. Carrillo and I will defer her AEDs to him.  History of recurrent embolic CVA-hold eliquis acutely until we have MRI results  TULIO-pap if available  I discussed the patient's findings and my recommendations with patient, consulting provider, and ED provider.    VTE Prophylaxis - SCDs.  Code Status - Full code.       Franck Martinez MD  [facility]  03/30/25  12:55 EDT     Electronically signed by Franck Martinez MD at 03/30/25 1403       Facility-Administered Medications as of 4/1/2025   Medication Dose  Route Frequency Provider Last Rate Last Admin    acetaminophen (TYLENOL) tablet 650 mg  650 mg Oral Q4H PRN Franck Martinez MD   650 mg at 04/01/25 0417    Or    acetaminophen (TYLENOL) suppository 650 mg  650 mg Rectal Q4H PRN Franck Martinez MD        amitriptyline (ELAVIL) tablet 25 mg  25 mg Oral Nightly Franck Martinez MD   25 mg at 03/31/25 2031    [Held by provider] apixaban (ELIQUIS) tablet 5 mg  5 mg Oral BID Franck Martinez MD        [COMPLETED] aspirin suppository 300 mg  300 mg Rectal Once Dennis Rahman MD   300 mg at 03/30/25 1235    aspirin tablet 325 mg  325 mg Oral Daily Franck Martinez MD   325 mg at 04/01/25 0903    Or    aspirin suppository 300 mg  300 mg Rectal Daily Franck Martinez MD        atorvastatin (LIPITOR) tablet 80 mg  80 mg Oral Nightly Franck Martinez MD   80 mg at 03/31/25 2031    bisacodyl (DULCOLAX) suppository 10 mg  10 mg Rectal Daily PRN Franck Martinez MD        [COMPLETED] iopamidol (ISOVUE-370) 76 % injection 100 mL  100 mL Intravenous Once in imaging Dennis Rahman MD   95 mL at 03/30/25 1051    lacosamide (VIMPAT) injection 200 mg  200 mg Intravenous Q12H Franck Martinez MD   200 mg at 04/01/25 0038    LORazepam (ATIVAN) injection 2 mg  2 mg Intravenous Q5 Min PRN Franck Martinez MD        ondansetron (ZOFRAN) injection 4 mg  4 mg Intravenous Q6H PRN Franck Martinez MD        [COMPLETED] perflutren (DEFINITY) 8.476 mg in Sodium Chloride (PF) 0.9 % 10 mL injection  2 mL Intravenous Once in imaging Franck Martinez MD   2 mL at 03/31/25 1428    sodium chloride 0.9 % flush 10 mL  10 mL Intravenous PRN Dennis Rahman MD        sodium chloride 0.9 % infusion  100 mL/hr Intravenous Continuous Zakia Garcia APRN 100 mL/hr at 03/31/25 2339 100 mL/hr at 03/31/25 2339     Lab Results (last 24 hours)       Procedure Component Value Units Date/Time    CBC (No Diff) [[id number]]  (Abnormal) Collected: 04/01/25 0636    Specimen: Blood Updated: 04/01/25 0711     WBC 11.07  10*3/mm3      RBC 4.57 10*6/mm3      Hemoglobin 11.1 g/dL      Hematocrit 35.1 %      MCV 76.8 fL      MCH 24.3 pg      MCHC 31.6 g/dL      RDW 16.0 %      RDW-SD 44.2 fl      MPV 9.6 fL      Platelets 331 10*3/mm3     Basic Metabolic Panel [[id number]]  (Abnormal) Collected: 04/01/25 0636    Specimen: Blood Updated: 04/01/25 0711     Glucose 95 mg/dL      BUN 6 mg/dL      Creatinine 0.55 mg/dL      Sodium 130 mmol/L      Potassium 4.0 mmol/L      Chloride 103 mmol/L      CO2 19.6 mmol/L      Calcium 8.6 mg/dL      BUN/Creatinine Ratio 10.9     Anion Gap 7.4 mmol/L      eGFR 115.4 mL/min/1.73     Narrative:      GFR Categories in Chronic Kidney Disease (CKD)      GFR Category          GFR (mL/min/1.73)    Interpretation  G1                     90 or greater         Normal or high (1)  G2                      60-89                Mild decrease (1)  G3a                   45-59                Mild to moderate decrease  G3b                   30-44                Moderate to severe decrease  G4                    15-29                Severe decrease  G5                    14 or less           Kidney failure          (1)In the absence of evidence of kidney disease, neither GFR category G1 or G2 fulfill the criteria for CKD.    eGFR calculation 2021 CKD-EPI creatinine equation, which does not include race as a factor          Imaging Results (Last 24 Hours)       Procedure Component Value Units Date/Time    CT Head Without Contrast [[id number]] Collected: 04/01/25 1028     Updated: 04/01/25 1036    Narrative:      CT HEAD WO CONTRAST-     HISTORY:  evaluate stability; R29.90-Unspecified symptoms and signs  involving the nervous system; R29.898-Other symptoms and signs involving  the musculoskeletal system; R20.2-Paresthesia of skin; Z79.01-Long term  (current) use of anticoagulants     COMPARISON: CT head 3/30/2025 and MRI brain 11/25/2024     FINDINGS: The brain and ventricles are symmetrical. There is no  active  hemorrhage, hydrocephalus or of acute infarction. Mild vascular  calcification and small vessel ischemic disease is noted. A remote  infarct involving the left frontal lobe laterally as well as a remote  infarct involving the inferior aspect of the left occipital lobe is  appreciated, present previously. Further evaluation could be performed  with a MRI examination of the brain as indicated.                 Radiation dose reduction techniques were utilized, including automated  exposure modulation based on body size.     This report was finalized on 4/1/2025 10:32 AM by Dr. Crow Norman M.D  on Workstation: [id number]             ECG/EMG Results (last 24 hours)       Procedure Component Value Units Date/Time    Adult transthoracic echo complete [[id number]] Resulted: 03/31/25 1459     Updated: 03/31/25 1459     EF(MOD-bp) 69.9 %      LVIDd 4.2 cm      LVIDs 2.6 cm      IVSd 1.00 cm      LVPWd 0.98 cm      FS 36.5 %      IVS/LVPW 1.02 cm      ESV(cubed) 18.4 ml      EDV(cubed) 71.9 ml      LV mass(C)d 132.7 grams      LVOT area 2.7 cm2      LVOT diam 1.86 cm      EDV(MOD-sp2) 88.0 ml      EDV(MOD-sp4) 100.0 ml      ESV(MOD-sp2) 26.0 ml      ESV(MOD-sp4) 31.0 ml      SV(MOD-sp2) 62.0 ml      SV(MOD-sp4) 69.0 ml      EF(MOD-sp2) 70.5 %      EF(MOD-sp4) 69.0 %      MV E max russ 95.7 cm/sec      MV A max russ 129.5 cm/sec      MV dec time 0.24 sec      MV E/A 0.74     Pulm A Revs Dur 0.13 sec      MV A dur 0.11 sec      LA ESV Index (BP) 15.6 ml/m2      Med Peak E' Russ 8.2 cm/sec      Lat Peak E' Russ 8.2 cm/sec      Avg E/e' ratio 11.67     SV(LVOT) 57.2 ml      RV Base 2.31 cm      RV Mid 2.7 cm      RV Length 6.3 cm      RV S' 12.0 cm/sec      Pulm Sys Russ 23.0 cm/sec      Pulm Santiago Russ 32.7 cm/sec      Pulm S/D 0.70     Pulm A Revs Russ 27.3 cm/sec      LV V1 max 96.4 cm/sec      LV V1 max PG 3.7 mmHg      LV V1 mean PG 2.08 mmHg      LV V1 VTI 21.1 cm      Ao pk russ 108.6 cm/sec      Ao max PG 4.7 mmHg       Ao mean PG 2.7 mmHg      Ao V2 VTI 21.7 cm      ALICE(I,D) 2.6 cm2      Dimensionless Index 0.97 (DI)      MV max PG 10.5 mmHg      MV mean PG 5.1 mmHg      MV V2 VTI 35.3 cm      MV P1/2t 52.8 msec      MVA(P1/2t) 4.2 cm2      MVA(VTI) 1.62 cm2      MV dec slope 845.2 cm/sec2      MR max thuan 505.4 cm/sec      MR max .2 mmHg      RV V1 max PG 3.8 mmHg      RV V1 max 98.0 cm/sec      RV V1 VTI 19.7 cm      PA V2 max 105.5 cm/sec      PA acc time 0.08 sec      Ao root diam 2.7 cm      Sinus 2.5 cm      STJ 1.91 cm      Ascending aorta 2.6 cm      BH CV ECHO SHUNT ASSESSMENT PERFORMED (HIDDEN SCRIPTING) 1    Narrative:        Left ventricular systolic function is normal. Calculated left   ventricular EF = 69.9% Normal left ventricular cavity size noted. Left   ventricular wall thickness is consistent with mild concentric hypertrophy.   All left ventricular wall segments contract normally. Left ventricular   diastolic function is consistent with (grade I) impaired relaxation.    Normal left atrial cavity size noted. Saline test results are negative.    There is severe, bileaflet mitral valve thickening present. The   posterior mitral valve leaflet has decreased excursion Moderate mitral   valve regurgitation is present with a posteriorly-directed jet noted. Mild   mitral valve stenosis is present. The mean gradient is 5 mmHg with a HR of   84bpm      ECG 12 Lead Tachycardia [[id number]] Collected: 03/31/25 2227     Updated: 03/31/25 2229     QT Interval 267 ms      QTC Interval 392 ms     Narrative:      HEART QNWN=874  bpm  RR Kiekfaep=898  ms  KY Uqxculjl=751  ms  P Horizontal Axis=-2  deg  P Front Axis=38  deg  QRSD Interval=84  ms  QT Vapmzuwh=361  ms  OTbM=586  ms  QRS Axis=9  deg  T Wave Axis=224  deg  - ABNORMAL ECG -  Sinus tachycardia  Abnormal R-wave progression, early transition  Nonspecific repol abnormality, diffuse leads  Date and Time of Study:2025-03-31 22:27:40    Telemetry Scan [[id number]] Resulted:  25     Updated: 25          Orders (last 24 hrs)        Start     Ordered    25 1145  Inpatient Admission  Once         25 1145    25 1140  LORazepam (ATIVAN) injection 2 mg  Every 5 Minutes PRN         25 1140    25 1111  MRI Brain With & Without Contrast  1 Time Imaging         25 1111    25 0900  CT Head Without Contrast  1 Time Imaging         25 1736    25 0600  CBC (No Diff)  Morning Draw         25 1659    25 0600  Basic Metabolic Panel  Morning Draw         25 1659    25 0348  LORazepam (ATIVAN) injection 1 mg  Every 4 Hours PRN,   Status:  Discontinued         25 0348    25 2330  sodium chloride 0.9 % infusion  Continuous         25 2233    25 2330  sodium chloride 0.9 % infusion  Continuous,   Status:  Discontinued         25 2234    25 2220  ECG 12 Lead Tachycardia  STAT         25 2220    25 221  Bed Rest  Until Discontinued        Comments: Strict    25 2218    25 2219  Please chart a full set of vitals  Nursing Communication  Once        Comments: Please chart a full set of vitals    25 2218    25 221  ECG 12 Lead Tachycardia  STAT,   Status:  Canceled         25 2217    25 1648  Hematology & Oncology Inpatient Consult  Once        Specialty:  Hematology and Oncology  Provider:  Patricio Mcwilliams II, MD    25 1647    25 1613  Inpatient Cardiology Consult  STAT        Specialty:  Cardiology  Provider:  Reza Lester MD    25 1612    25 1515  perflutren (DEFINITY) 8.476 mg in Sodium Chloride (PF) 0.9 % 10 mL injection  Once in Imaging         25 1428    25 1318  OT Plan of Care Cert / Re-Cert  Once        Comments: Occupational Therapy Plan of Care  Initial Certification  Certification Period: 3/31/2025 - 2025    Patient Name: Alexandra Xiao  : 1980    (R29.90) Stroke-like  "symptoms  (primary encounter diagnosis)  (R29.898) Left leg weakness  (R20.2) Paresthesia of left arm and leg  (Z79.01) Chronic anticoagulation                Assessment  OT Assessment  Rehab Potential (OT): good         OT Rehab Goals     Row Name 03/31/25 9523             Bathing Goal 1 (OT)    Activity/Device (Bathing Goal 1, OT) bathing skills, all  -SM      [place]co Level/Cues Needed (Bathing Goal 1, OT) independent  -SM      Time Frame (Bathing Goal 1, OT) short term goal (STG);by discharge  -SM        Progress/Outcomes (Bathing Goal 1, OT) goal ongoing  -SM         Strength Goal 1 (OT)    Strength Goal 1 (OT) Pt to be independent with LUE strengthening HEP.    -SM      Time Frame (Strength Goal 1, OT) short term goal (STG);by discharge  -SM        Progress/Outcome (Strength Goal 1, OT) goal ongoing  -SM         Problem Specific Goal 1 (OT)    Problem Specific Goal 1 (OT) Pt to be independent with LUE [facility] HEP.    -SM      Time Frame (Problem Specific Goal 1, OT) short term goal (STG);by   discharge  -SM      Progress/Outcome (Problem Specific Goal 1, OT) goal ongoing  -SM            User Key  (r) = Recorded By, (t) = Taken By, (c) = Cosigned By    Initials Name Provider Type Discipline    Kaylan Nielsen OT Occupational Therapist OT                    Plan    OT Plan  Therapy Frequency (OT): 3 times/wk  Outcome Evaluation: Pt is a 44 y.o female admitted [facility]30 with L arm weakness and L facial droop. She has hx of strokes with residual R sided weakness, word finding difficulties, speech difficulties (mild) cognitive deficits with ability to read and write. Pt has hx of HTN, HLD, obesity, COPD, epilepsy. Pt is using RUE without difficulty for her ADLs. She has mild LUE weakness and coordination deficits with dysmetria with finger to nose. Pt feels \"unsteady\" with her balance at present but does well getting to the bathroom with SV assistance at most. She doesnt require any AD. Continued OT " "recommended while admitted to address mild acute L sided deficits and higher level balance for ADLs. Limited session today due to onset of HA and RN notified. I discussed OP OT at [place] to continue to address new onset neurological changes in her LUE to progress her back to baseline. Unsure if pt would be able to attend at present. Her is unable to drive due to eplipsy and spouse works. I did discuss OT providing her a coordination and strengthening HEP next visit.      Kaylan Salmeron, OT  3/31/2025        By cosigning this order, either electronically or physically, I certify that the therapy services are furnished while this patient is under my care, the services outline above are required by this patient, and will be reviewed every 90 days.        M.D.:__________________________________________ Date: ______________    03/31/25 1317    03/31/25 0900  aspirin tablet 325 mg  Daily        Placed in \"Or\" Linked Group    03/30/25 1243    03/31/25 0900  aspirin suppository 300 mg  Daily        Placed in \"Or\" Linked Group    03/30/25 1243    03/30/25 2100  atorvastatin (LIPITOR) tablet 80 mg  Nightly         03/30/25 1242    03/30/25 2100  amitriptyline (ELAVIL) tablet 25 mg  Nightly         03/30/25 1255    03/30/25 1800  POC Glucose Q6H  Every 6 Hours,   Status:  Canceled      Comments: May Discontinue After 2 Consecutive Readings Less Than 140Notify Provider if 2 Readings Greater Than 140      03/30/25 1242    03/30/25 1600  Intake and Output  Every 4 Hours       03/30/25 1242    03/30/25 1600  Neuro Checks  Every 4 Hours      Comments: On arrival and handoff, increase frequency as clinically indicated or for thrombolytic administration, otherwise Q2 hours. Documentation of arrival assessment and any neuro change required.    03/30/25 1400    03/30/25 1311  [Held by provider]  apixaban (ELIQUIS) tablet 5 mg  2 Times Daily        (On hold since Sun 3/30/2025 at 1255 until manually unheld; held by Franck Martinez MDHold " "Reason: Other (Comment Required)Hold Comment: Concern for acute CVA)    25 1255    25 1311  lacosamide (VIMPAT) injection 200 mg  Every 12 Hours         25 1255    25 1258  sodium chloride 0.9 % infusion  Continuous,   Status:  Discontinued         25 1242    25 1242  NIHSS Assessment  Every Shift      Comments: Turn off all sedation medications prior to performing assessment. Assessment to be performed upon admission, transfer to another unit, discharge, and with neurological decline. If NIHSS change is greater than or equal to 4 and/or neurological decline is noted notify physician.    25 1242    25 1241  acetaminophen (TYLENOL) tablet 650 mg  Every 4 Hours PRN        Placed in \"Or\" Linked Group    25 1242    25 1241  acetaminophen (TYLENOL) suppository 650 mg  Every 4 Hours PRN        Placed in \"Or\" Linked Group    25 1242    25 1241  ondansetron (ZOFRAN) injection 4 mg  Every 6 Hours PRN         25 1242    25 1241  bisacodyl (DULCOLAX) suppository 10 mg  Daily PRN         25 1242    25 1041  sodium chloride 0.9 % flush 10 mL  As Needed         25 1041    Unscheduled  Order CT Head Without Contrast for Neurological Decline  As Needed       25 1242    Unscheduled  ECG 12 Lead Rhythm Change  As Needed       25 1242                  Operative/Procedure Notes (last 24 hours)  Notes from 25 1214 through 25 1214   No notes of this type exist for this encounter.          Physician Progress Notes (last 24 hours)        Franck Martinez MD at 25 1647              Name: Alexandra Xiao ADMIT: 3/30/2025   : 1980  PCP: Annalee Huddleston APRN    MRN: [id number] LOS: 0 days   AGE/SEX: 44 y.o. female  ROOM: [facility]     Subjective   Subjective     No events overnight. No new complaints. Her neurologic symptoms haven't really changed. We've not been able to figure out how to deactivate her VNS and " so we've not been able to obtain an MRI. EEG did show epileptiform discharges. Echocardiogram was unremarkable. Discussed with the patient, her , and Dr. Carrillo       Objective   Objective   Vital Signs  Temp:  [97.5 °F (36.4 °C)-98.2 °F (36.8 °C)] 98.1 °F (36.7 °C)  Heart Rate:  [] 80  Resp:  [16-20] 18  BP: ()/(65-88) 117/75  SpO2:  [96 %-100 %] 96 %  on   ;   Device (Oxygen Therapy): room air  Body mass index is 30.12 kg/m².  Physical Exam  Constitutional:       General: She is not in acute distress.     Appearance: She is not toxic-appearing.   Cardiovascular:      Rate and Rhythm: Normal rate and regular rhythm.      Heart sounds: Normal heart sounds.   Pulmonary:      Effort: Pulmonary effort is normal.      Breath sounds: Normal breath sounds.   Abdominal:      General: Bowel sounds are normal.      Palpations: Abdomen is soft.   Musculoskeletal:         General: No tenderness.      Right lower leg: No edema.      Left lower leg: No edema.   Neurological:      Mental Status: She is alert.      Sensory: Sensory deficit (reduced sensation on the left) present.      Motor: Weakness (left facial droop, left sided weakness) present.   Psychiatric:         Mood and Affect: Mood normal.         Behavior: Behavior normal.         Results Review     I reviewed the patient's new clinical results.  Results from last 7 days   Lab Units 03/31/25  0516 03/30/25  1041   WBC 10*3/mm3 6.75 8.21   HEMOGLOBIN g/dL 10.7* 11.9*   PLATELETS 10*3/mm3 305 354     Results from last 7 days   Lab Units 03/31/25  0516 03/30/25  1041   SODIUM mmol/L 134* 134*   POTASSIUM mmol/L 3.5 3.8   CHLORIDE mmol/L 103 100   CO2 mmol/L 20.6* 22.3   BUN mg/dL 8 6   CREATININE mg/dL 0.60 0.66   GLUCOSE mg/dL 105* 88   Estimated Creatinine Clearance: 125.1 mL/min (by C-G formula based on SCr of 0.6 mg/dL).  Results from last 7 days   Lab Units 03/31/25  0516 03/30/25  1041   ALBUMIN g/dL 3.3* 4.0   BILIRUBIN mg/dL <0.2 <0.2   ALK  PHOS U/L 219* 273*   AST (SGOT) U/L 21 23   ALT (SGPT) U/L 18 22     Results from last 7 days   Lab Units 03/31/25  0516 03/30/25  1041   CALCIUM mg/dL 8.5* 8.7   ALBUMIN g/dL 3.3* 4.0       COVID19   Date Value Ref Range Status   06/08/2022 Not Detected Not Detected - Ref. Range Final   04/26/2022 Not Detected Not Detected - Ref. Range Final     Hemoglobin A1C   Date/Time Value Ref Range Status   03/31/2025 0516 5.30 4.80 - 5.60 % Final     Glucose   Date/Time Value Ref Range Status   03/30/2025 1753 123 70 - 130 mg/dL Final   03/30/2025 1032 88 70 - 130 mg/dL Final       Adult transthoracic echo complete    Left ventricular systolic function is normal. Calculated left   ventricular EF = 69.9% Normal left ventricular cavity size noted. Left   ventricular wall thickness is consistent with mild concentric hypertrophy.   All left ventricular wall segments contract normally. Left ventricular   diastolic function is consistent with (grade I) impaired relaxation.    Normal left atrial cavity size noted. Saline test results are negative.    There is severe, bileaflet mitral valve thickening present. The   posterior mitral valve leaflet has decreased excursion Moderate mitral   valve regurgitation is present with a posteriorly-directed jet noted. Mild   mitral valve stenosis is present. The mean gradient is 5 mmHg with a HR of   84bpm    Scheduled Medications  amitriptyline, 25 mg, Oral, Nightly  [Held by provider] apixaban, 5 mg, Oral, BID  aspirin, 325 mg, Oral, Daily   Or  aspirin, 300 mg, Rectal, Daily  atorvastatin, 80 mg, Oral, Nightly  Lacosamide, 200 mg, Intravenous, Q12H    Infusions  sodium chloride, 75 mL/hr, Last Rate: 75 mL/hr (03/31/25 1532)    Diet  Diet: Regular/House; Texture: Regular (IDDSI 7); Fluid Consistency: Thin (IDDSI 0)      Assessment/Plan     Active Hospital Problems    Diagnosis  POA    **Stroke-like symptoms [R29.90]  Yes    S/P placement of VNS (vagus nerve stimulation) device [Z96.89]  Not  Applicable    Observed sleep apnea [G47.30]  Yes    History of stroke [Z86.73]  Not Applicable    HTN (hypertension) [I10]  Yes    Epilepsy [G40.909]  Yes      Resolved Hospital Problems   No resolved problems to display.       44 y.o. female admitted with Stroke-like symptoms.    Ms. Xiao is a 44 y.o. 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 Though at this point with her symptoms not yet significantly improved, stroke seems much more likely  She has been compliant with her eliquis, but this is held acutely given the likely CVA  No LVO or obstructive stenosis on head CT.   Echocardiogram showed a normal EF, no thrombus, negative bubble study  EEG did show epileptiform discharges, though she does have a fairly significant seizure disorder at baseline  MRI brain is pending, but we're unable to obtain this until her VNS is turned off and so far we haven't been able to figure out how. Neurology is considering just repeating a head CT  Will consult cardiology to interrogate her loop recorder  She has previously had a negative thrombophilia workup. Will ask hematology to consult to see if it should be repeated  It seems likely that we'll need to change her anticoagulation to pradaxa or warfarin since she had this stroke while compliant with eliquis  Hypertension-bp is well controlled with home metoprolol and amlodipine held  Hyperlipidemia-statin  Epilepsy s/p VNS-continue IV vimpat. Xcorpi and Aptiom are not on formulary.   History of recurrent embolic CVA-hold eliquis acutely until we have MRI results  TULIO-pap if available  SCDs for DVT prophylaxis.  Full code.  Discussed with patient, family, nursing staff, and consulting provider.  Anticipate discharge  TBD   timing yet to be determined.      Franck Martinez MD  [facility]  03/31/25  16:47 EDT             Electronically signed by Franck Martinez MD at 03/31/25 1658       Consult Notes (last 24 hours)  Notes from 03/31/25 1214 through 04/01/25 1214   No notes of this type exist for this encounter.

## 2025-04-01 NOTE — NURSING NOTE
"Patient went tachycardic with rate increasing to 172bpm and then sustained 140-150s. Call placed to [facility] to get treatment elevated heart rate.   Upon entering the room to inform patient she was attempting to void bladder and bowels. While attempting to do so patient became verbally non responsive with vision and head pivoting to the right followed by trunk of body. Patient extremities stiffened and she started seizing on toilet. Assistance was called for and three staff responded. RRT contacted after it was noted no PRN medication on board.   Patient seized for a minute and half.  She was lifted and placed in bed. O2 therapy commenced of 2L via nasal cannula.   Patient in post ictal phase swinging arms violently attempting to leave bed.  RRT team responded and was no able to intervene only instructed to contact Neurology on call.  A APRN returned page and was informed of current events. She ordered STAT 12lead EKG and stated she would contact neurology service.  Patient'  presented by bedside after receiving alert that patient was seizing on phone from patient watch. He was extremely upset that she has seized and enquired about medications given this evening. He was informed that she only had been medicated with Vimpat 200mg IV at 1310pm and would be given another dose at 1:11am. He became more enraged and complained \"that is negligence on the neurologist part, I want to talk to them because they need to explain why she did not get her seizure medications\".  Patient's  informed hat staff RN was awaiting further instruction from team on -call to proceed further.  "

## 2025-04-01 NOTE — CONSULTS
REASON FOR CONSULTATION: Recurrent strokes, previous negative hypercoagulable workup, ongoing anticoagulation  Provide an opinion on any further workup or treatment                              RECORDS OBTAINED:  Records of the patients history including those obtained from the referring provider were reviewed and summarized in detail.    HISTORY OF PRESENT ILLNESS:  The patient is a 44 y.o. year old female who is here for an opinion about the above issue.    History of Present Illness     The patient is a 44-year-old male who we have seen previously in June 2020 for with a history of tobacco use, ongoing vaping, COPD, hypertension, hyperlipidemia and a previous stroke in 2022.    She had been seen by Dr. Grissom in April 2022 after admission to [facility] in March 2022 CVA, CTA of neck and head 3/26 twice 22 unremarkable, discharged on aspirin and statin and undergoing a hypercoagulable workup that was negative.    She was seen again 4/9/2024 recognizing a seizure disorder on  antiseizure medications.  Unfortunately had a stroke in March 2023 and presented to ER/8/24 with altered speech.  CT scan of the head revealed a subacute left MCA territory infarct compared to March 2023.  There was concern for central cardioembolic source and we went on to repeat similar studies-lupus anticoagulant, beta-2 glycoprotein antibodies and anticardiolipin antibodies are again negative.    The patient was seen 6/27/2024 on anticoagulation with aspirin and Eliquis but presenting with abdominal pain and found to have evidence of a potential splenic infarct as well as an indeterminate mass in the uterus.  Further testing included negative factor V Leiden mutation, negative prothrombin gene mutation.    The patient's uterine mass was assessed by gynecologic oncology undergoing hysterectomy 7/16/2024.  Pathology revealed extensive adenomyosis particular involving the myometrium.    Follow-up with neurology 11/25/2024 with MRI  demonstrating encephalomalacia involving left frontal lobe laterally corresponding area of acute infarction present in April 2024, evidence of remote left occipital and cerebellar infarcts.  She had been reviewed by neurology for her epilepsy and continued medications for breakthrough seizures    The patient was now readmitted 3/30/2024 with left-sided weakness and facial droop which she noted upon awakening.  Imaging including CTA of the head and neck revealed again encephalomalacia, no large vessel occlusion seen.  Patient seen by neurology with concerns of new right-sided stroke.  Again the patient had been compliant with Eliquis and CPAP.    Interval history:  4/1/2025  T98.2, pulse 97, respirations 18, /83  Discussed issues with patient's about potential acquired abnormalities that should be rechecked during her hospitalization.  She has been compliant with Eliquis that was the day before she was admitted.  H&H 11.1 and 35.1, white count 02005, platelet count 3 31,000, BUN/creatinine of 6 and 0.55      Past Medical History:   Diagnosis Date    Abnormal bleeding in menstrual cycle     C. 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    Headache, tension-type     Heavy menstrual bleeding     WITH CLOTS    Hyperlipidemia     Hypertension     Memory loss     Migraine     Seizures     Status post placement of implantable loop recorder     Stroke     MARCH AND APRIL, 2022        Past Surgical History:   Procedure Laterality Date    BREAST BIOPSY      COLONOSCOPY N/A 11/7/2024    Procedure: COLONOSCOPY into cecum and terminal ileum;  Surgeon: Matt White MD;  Location: [facility] ENDOSCOPY;  Service: Gastroenterology;  Laterality: N/A;  Pre: Screening, history of colitis   Post: Diverticulosis and Hemorrhoids    D & C HYSTEROSCOPY ENDOMETRIAL ABLATION N/A 06/10/2022    Procedure: DILATATION AND CURETTAGE  HYSTEROSCOPY NOVASURE ENDOMETRIAL ABLATION;  Surgeon: Ernesto Mendosa MD;  Location: [facility] MAIN OR;  Service: Obstetrics/Gynecology;  Laterality: N/A;    FOOT SURGERY      pins in left foot     HYSTERECTOMY  07/16/2024    INSERT / REPLACE / REMOVE PACEMAKER  04/01/2022    Dr. Cristofer Chamorro, LOOP RECORDER    OTHER SURGICAL HISTORY      VNS plate 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    NOÉ      04/2022    TUBAL ABDOMINAL LIGATION      VAGUS NERVE STIMULATOR IMPLANTATION          No current facility-administered medications on file prior to encounter.     Current Outpatient Medications on File Prior to Encounter   Medication Sig Dispense Refill    amitriptyline (ELAVIL) 25 MG tablet TAKE 1 TABLET BY MOUTH EVERY NIGHT 30 tablet 5    amLODIPine (NORVASC) 5 MG tablet TAKE 1 TABLET BY MOUTH DAILY 90 tablet 1    aspirin 81 MG EC tablet Take 1 tablet by mouth Daily. 30 tablet 0    atorvastatin (LIPITOR) 40 MG tablet TAKE 1 TABLET BY MOUTH EVERY DAY 60 tablet 0    Eliquis 5 MG tablet tablet TAKE 1 TABLET BY MOUTH TWICE DAILY 180 tablet 2    Eslicarbazepine Acetate (Aptiom) 400 MG tablet Take 1 tablet by mouth Daily. Take with 800mg for total dose 1200mg daily 90 tablet 1    Eslicarbazepine Acetate (Aptiom) 800 MG tablet tablet TAKE 1 TABLET BY MOUTH DAILY. TAKE 400MG FOR TOTAL DOSE 1200MG DAILY 30 tablet 2    metoprolol succinate XL (TOPROL-XL) 25 MG 24 hr tablet TAKE 1 TABLET BY MOUTH DAILY 90 tablet 1    Vimpat 200 MG tablet TAKE 1 TABLET BY MOUTH EVERY 12 HOURS 60 tablet 1    Xcopri 100 MG tablet TAKE 1 TABLET BY MOUTH DAILY 30 tablet 5        ALLERGIES:  No Known Allergies     Social History     Socioeconomic History    Marital status: Single    Number of children: 2   Tobacco Use    Smoking status: Former     Current packs/day: 0.00     Average packs/day: 1 pack/day for 29.0 years (29.0 ttl pk-yrs)     Types: Cigarettes     Start date: 1993     Quit date: 2022     Years since quitting: 3.2    Smokeless  tobacco: Never    Tobacco comments:     Quit 2022   Vaping Use    Vaping status: Some Days    Substances: Nicotine, Flavoring    Devices: Disposable, 6% NICOTINE   Substance and Sexual Activity    Alcohol use: Not Currently    Drug use: No    Sexual activity: Yes     Partners: Male     Birth control/protection: Tubal ligation        Family History   Problem Relation Age of Onset    Breast cancer Mother 50         age 60 METS    Arthritis Mother     Arthritis Father     Diabetes Father     Heart disease Father     Hypertension Father     Heart attack Father     Hypertension Brother     Heart attack Paternal Grandfather     Ovarian cancer Neg Hx     Uterine cancer Neg Hx     Colon cancer Neg Hx     Deep vein thrombosis Neg Hx     Pulmonary embolism Neg Hx     Malig Hyperthermia Neg Hx         Review of Systems   See history of present illness  Objective     Vitals:    25 2215 25 2310 25 0415 25 0752   BP:  141/87 123/85 115/66   BP Location:  Left arm Left arm Left arm   Patient Position:  Lying Lying Lying   Pulse: (!) 148 (!) 123 97    Resp:  18 18 18   Temp:  97.7 °F (36.5 °C) 97.7 °F (36.5 °C) 98.1 °F (36.7 °C)   TempSrc:  Oral Oral Oral   SpO2: 100% 100% 100%    Weight:       Height:              No data to display                Physical Exam  Constitutional:       Appearance: Normal appearance. She is normal weight.   HENT:      Head: Normocephalic and atraumatic.      Nose: Nose normal.      Mouth/Throat:      Mouth: Mucous membranes are moist.      Pharynx: Oropharynx is clear.   Eyes:      Extraocular Movements: Extraocular movements intact.      Conjunctiva/sclera: Conjunctivae normal.      Pupils: Pupils are equal, round, and reactive to light.   Cardiovascular:      Rate and Rhythm: Normal rate and regular rhythm.      Pulses: Normal pulses.      Heart sounds: Normal heart sounds.   Pulmonary:      Effort: Pulmonary effort is normal.      Breath sounds: Normal breath  sounds.   Abdominal:      General: Bowel sounds are normal.      Palpations: Abdomen is soft.   Musculoskeletal:         General: Normal range of motion.      Cervical back: Normal range of motion and neck supple.   Skin:     General: Skin is warm and dry.   Neurological:      Motor: Weakness (Left-sided weakness left upper and lower extremity) present.      Comments: Left facial weakness, droop   Psychiatric:      Comments: Flat affect           RECENT LABS:  Hematology WBC   Date Value Ref Range Status   04/01/2025 11.07 (H) 3.40 - 10.80 10*3/mm3 Final     RBC   Date Value Ref Range Status   04/01/2025 4.57 3.77 - 5.28 10*6/mm3 Final     Hemoglobin   Date Value Ref Range Status   04/01/2025 11.1 (L) 12.0 - 15.9 g/dL Final     Hematocrit   Date Value Ref Range Status   04/01/2025 35.1 34.0 - 46.6 % Final     Platelets   Date Value Ref Range Status   04/01/2025 331 140 - 450 10*3/mm3 Final          Assessment & Plan     *History of repetitive stroke episodes  Hypercoagulable workup negative repetitively patient  Compliant with Eliquis-taken the night before she was admitted 3/30/2025  Echo with normal EF, no thrombus negative bubble study  Neurology assessment ongoing currently  Plan to reassess for potential acquired thrombophilia.  She has been off Eliquis long enough to adequately assess this.    *Epilepsy  S/p VNS  Vimpat continue-IV  Recent seizures noted

## 2025-04-02 LAB
ANION GAP SERPL CALCULATED.3IONS-SCNC: 11 MMOL/L (ref 5–15)
APTT SCREEN TO CONFIRM RATIO: 1.08 RATIO (ref 0–1.34)
B2 GLYCOPROT1 IGA SER-ACNC: <9 GPI IGA UNITS (ref 0–25)
B2 GLYCOPROT1 IGG SER-ACNC: <9 GPI IGG UNITS (ref 0–20)
B2 GLYCOPROT1 IGM SER-ACNC: <9 GPI IGM UNITS (ref 0–32)
BUN SERPL-MCNC: 11 MG/DL (ref 6–20)
BUN/CREAT SERPL: 16.9 (ref 7–25)
CALCIUM SPEC-SCNC: 8.8 MG/DL (ref 8.6–10.5)
CARDIOLIPIN IGG SER IA-ACNC: <9 GPL U/ML (ref 0–14)
CARDIOLIPIN IGM SER IA-ACNC: <9 MPL U/ML (ref 0–12)
CHLORIDE SERPL-SCNC: 104 MMOL/L (ref 98–107)
CO2 SERPL-SCNC: 21 MMOL/L (ref 22–29)
CONFIRM APTT/NORMAL: 33.6 SEC (ref 0–47.6)
CREAT SERPL-MCNC: 0.65 MG/DL (ref 0.57–1)
DEPRECATED RDW RBC AUTO: 44.7 FL (ref 37–54)
EGFRCR SERPLBLD CKD-EPI 2021: 110.8 ML/MIN/1.73
ERYTHROCYTE [DISTWIDTH] IN BLOOD BY AUTOMATED COUNT: 16 % (ref 12.3–15.4)
GLUCOSE SERPL-MCNC: 87 MG/DL (ref 65–99)
HCT VFR BLD AUTO: 32.9 % (ref 34–46.6)
HGB BLD-MCNC: 10.6 G/DL (ref 12–15.9)
LA 2 SCREEN W REFLEX-IMP: NORMAL
MCH RBC QN AUTO: 24.9 PG (ref 26.6–33)
MCHC RBC AUTO-ENTMCNC: 32.2 G/DL (ref 31.5–35.7)
MCV RBC AUTO: 77.2 FL (ref 79–97)
PLATELET # BLD AUTO: 305 10*3/MM3 (ref 140–450)
PMV BLD AUTO: 9.6 FL (ref 6–12)
POTASSIUM SERPL-SCNC: 3.9 MMOL/L (ref 3.5–5.2)
QT INTERVAL: 400 MS
QTC INTERVAL: 497 MS
RBC # BLD AUTO: 4.26 10*6/MM3 (ref 3.77–5.28)
SCREEN APTT: 21.3 SEC (ref 0–43.5)
SCREEN DRVVT: 39 SEC (ref 0–47)
SODIUM SERPL-SCNC: 136 MMOL/L (ref 136–145)
THROMBIN TIME: 18.1 SEC (ref 0–23)
WBC NRBC COR # BLD AUTO: 7.66 10*3/MM3 (ref 3.4–10.8)

## 2025-04-02 PROCEDURE — 85027 COMPLETE CBC AUTOMATED: CPT | Performed by: STUDENT IN AN ORGANIZED HEALTH CARE EDUCATION/TRAINING PROGRAM

## 2025-04-02 PROCEDURE — 97110 THERAPEUTIC EXERCISES: CPT

## 2025-04-02 PROCEDURE — 97162 PT EVAL MOD COMPLEX 30 MIN: CPT

## 2025-04-02 PROCEDURE — 95718 EEG PHYS/QHP 2-12 HR W/VEEG: CPT | Performed by: STUDENT IN AN ORGANIZED HEALTH CARE EDUCATION/TRAINING PROGRAM

## 2025-04-02 PROCEDURE — 99222 1ST HOSP IP/OBS MODERATE 55: CPT | Performed by: INTERNAL MEDICINE

## 2025-04-02 PROCEDURE — 25010000002 CYANOCOBALAMIN PER 1000 MCG: Performed by: NURSE PRACTITIONER

## 2025-04-02 PROCEDURE — 92523 SPEECH SOUND LANG COMPREHEN: CPT

## 2025-04-02 PROCEDURE — 99232 SBSQ HOSP IP/OBS MODERATE 35: CPT | Performed by: NURSE PRACTITIONER

## 2025-04-02 PROCEDURE — C9254 INJECTION, LACOSAMIDE: HCPCS | Performed by: STUDENT IN AN ORGANIZED HEALTH CARE EDUCATION/TRAINING PROGRAM

## 2025-04-02 PROCEDURE — 99231 SBSQ HOSP IP/OBS SF/LOW 25: CPT | Performed by: INTERNAL MEDICINE

## 2025-04-02 PROCEDURE — 80048 BASIC METABOLIC PNL TOTAL CA: CPT | Performed by: STUDENT IN AN ORGANIZED HEALTH CARE EDUCATION/TRAINING PROGRAM

## 2025-04-02 PROCEDURE — 25010000002 LACOSAMIDE 200 MG/20ML SOLUTION: Performed by: STUDENT IN AN ORGANIZED HEALTH CARE EDUCATION/TRAINING PROGRAM

## 2025-04-02 RX ORDER — CYANOCOBALAMIN 1000 UG/ML
1000 INJECTION, SOLUTION INTRAMUSCULAR; SUBCUTANEOUS DAILY
Status: DISCONTINUED | OUTPATIENT
Start: 2025-04-02 | End: 2025-04-03 | Stop reason: HOSPADM

## 2025-04-02 RX ORDER — RIBOFLAVIN (VITAMIN B2) 100 MG
400 TABLET ORAL DAILY
Status: DISCONTINUED | OUTPATIENT
Start: 2025-04-02 | End: 2025-04-03 | Stop reason: HOSPADM

## 2025-04-02 RX ADMIN — ACETAMINOPHEN 650 MG: 325 TABLET, FILM COATED ORAL at 20:25

## 2025-04-02 RX ADMIN — CYANOCOBALAMIN 1000 MCG: 1000 INJECTION INTRAMUSCULAR; SUBCUTANEOUS at 15:16

## 2025-04-02 RX ADMIN — Medication 400 MG: at 15:15

## 2025-04-02 RX ADMIN — PANTOPRAZOLE SODIUM 40 MG: 40 TABLET, DELAYED RELEASE ORAL at 06:22

## 2025-04-02 RX ADMIN — MAGNESIUM OXIDE TAB 400 MG (240 MG ELEMENTAL MG) 400 MG: 400 (240 MG) TAB at 13:02

## 2025-04-02 RX ADMIN — LACOSAMIDE 200 MG: 10 INJECTION INTRAVENOUS at 03:02

## 2025-04-02 RX ADMIN — ACETAMINOPHEN 650 MG: 325 TABLET, FILM COATED ORAL at 03:05

## 2025-04-02 RX ADMIN — LACOSAMIDE 200 MG: 10 INJECTION INTRAVENOUS at 13:03

## 2025-04-02 RX ADMIN — AMITRIPTYLINE HYDROCHLORIDE 25 MG: 25 TABLET, FILM COATED ORAL at 20:25

## 2025-04-02 RX ADMIN — APIXABAN 5 MG: 5 TABLET, FILM COATED ORAL at 20:25

## 2025-04-02 RX ADMIN — ASPIRIN 81 MG CHEWABLE TABLET 81 MG: 81 TABLET CHEWABLE at 09:40

## 2025-04-02 RX ADMIN — APIXABAN 5 MG: 5 TABLET, FILM COATED ORAL at 09:40

## 2025-04-02 RX ADMIN — ATORVASTATIN CALCIUM 80 MG: 80 TABLET, FILM COATED ORAL at 20:25

## 2025-04-02 NOTE — DISCHARGE INSTRUCTIONS
SEIZURE INSTRUCTIONS:     Recommended to observe all seizure precautions, including, but not limited to no driving until seizure free for more than 3 months- as per State driving regulation / law;   Avoid all high-risk activity,   Take showers instead of baths,   Avoid swimming without observation,   Avoid open heat sources,   Avoid working at heights and   Avoid engaging in all potentially hazardous activities.

## 2025-04-02 NOTE — THERAPY EVALUATION
Acute Care - Speech Language Pathology Initial Evaluation  [facility]     Patient Name: Alexandra Xiao  : 1980  MRN: [id number]  Today's Date: 2025               Admit Date: 3/30/2025     Visit Dx:    ICD-10-CM ICD-9-CM   1. Stroke-like symptoms  R29.90 781.99   2. Left leg weakness  R29.898 729.89   3. Paresthesia of left arm and leg  R20.2 782.0   4. Chronic anticoagulation  Z79.01 V58.61     Patient Active Problem List   Diagnosis    Sebaceous cyst of breast, right    Abnormal uterine bleeding    Microcytic anemia    Transaminitis    Epilepsy    Major depressive disorder    Left-sided weakness    Anemia    Hyponatremia    HTN (hypertension)    History of stroke    Vitamin D deficiency    B12 deficiency    Observed sleep apnea    Snoring    Excessive daytime sleepiness    Class 1 obesity    Cerebrovascular accident (CVA) due to embolism of left middle cerebral artery    S/P placement of VNS (vagus nerve stimulation) device    Abnormal urinalysis    Vaping nicotine dependence, tobacco product    Implantable loop recorder present    Preoperative clearance    Leukocytosis    Sodium (Na) deficiency    Colitis    History of stroke    C. difficile colitis    Giardiasis    Blood per rectum    Colitis, Clostridium difficile    Screening for colorectal cancer    Stroke-like symptoms     Past Medical History:   Diagnosis Date    Abnormal bleeding in menstrual cycle     C. 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    Headache, tension-type     Heavy menstrual bleeding     WITH CLOTS    Hyperlipidemia     Hypertension     Memory loss     Migraine     Seizures     Status post placement of implantable loop recorder     Stroke     MARCH AND      Past Surgical History:   Procedure Laterality Date    BREAST BIOPSY      COLONOSCOPY N/A 2024    Procedure: COLONOSCOPY into cecum and terminal  ileum;  Surgeon: Matt White MD;  Location: [facility] ENDOSCOPY;  Service: Gastroenterology;  Laterality: N/A;  Pre: Screening, history of colitis   Post: Diverticulosis and Hemorrhoids    D & C HYSTEROSCOPY ENDOMETRIAL ABLATION N/A 06/10/2022    Procedure: DILATATION AND CURETTAGE HYSTEROSCOPY NOVASURE ENDOMETRIAL ABLATION;  Surgeon: Ernesto Mendosa MD;  Location: [facility] MAIN OR;  Service: Obstetrics/Gynecology;  Laterality: N/A;    FOOT SURGERY      pins in left foot     HYSTERECTOMY  07/16/2024    INSERT / REPLACE / REMOVE PACEMAKER  04/01/2022    Dr. Cristofer Chamorro, LOOP RECORDER    OTHER SURGICAL HISTORY      VNS plate 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    NOÉ      04/2022    TUBAL ABDOMINAL LIGATION      VAGUS NERVE STIMULATOR IMPLANTATION         SLP Recommendation and Plan  SLP Diagnosis: mild, dysarthria, aphasia (04/02/25 1000)  SLP Diagnosis Comments: Cognitive-communication evaluation complete. Patient presents with mild-moderate cognitive-linguistic impairment and mild aphasia, dysarthria. Cognitive deficits include decreased temporal orientation, moderately complex numerical problem solving, immediate memory, and short-term recall. Unable to fully assess executive functioning skills due to neurology APRN visit, however, pt reports dependent on  for IADLs since previous strokes. Aphasia/dysarthria characterized by slow/halting speech, anomias, decreased intensity, decreased articulation, and slurred speech. Intelligibility judged to >95% to unfamiliar listener in quiet environment. Pt reports speech is not at baseline currently despite deficits from previous stroke. Recommend outpatient speech therapy following hospitalization to improve cognitive communication skills and promote more independence. (04/02/25 1000)           [facility] Criteria for Skilled Therapy Interventions Met: yes (04/02/25 1000)  Anticipated Discharge Disposition (SLP): anticipate therapy at next level of  care (04/02/25 1000)        Therapy Frequency (SLP SLC): PRN (04/02/25 1000)  Predicted Duration Therapy Intervention (Days): until discharge (04/02/25 1000)                             Outcome Evaluation: Cognitive-communication evaluation complete. Patient presents with mild-moderate cognitive-linguistic impairment and mild aphasia, dysarthria. See full report for further details. Recommend outpatient speech therapy following hospitalization to improve cognitive communication skills and promote more independence. (04/02/25 1114)      SLP EVALUATION (Last 72 Hours)       SLP SLC Evaluation       Row Name 04/02/25 1000                   Communication Assessment/Intervention    Document Type evaluation  -CR        Subjective Information no complaints  -CR        Patient Observations alert;cooperative  -CR        Patient Effort good  -CR        Symptoms Noted During/After Treatment none  -CR           General Information    Patient Profile Reviewed yes  -CR        Pertinent History Of Current Problem Left sided weakness and facial droop. No acute infarcts noted on MRI. Hx includes multiple strokes with residual cognitive and receptive/expressive deficits.  -CR        Precautions/Limitations, Vision WFL;for purposes of eval  -CR        Precautions/Limitations, Hearing WFL;for purposes of eval  -CR        Patient Level of Education 12th grade  -CR        Prior Level of Function-Communication receptive language impairment;expressive language impairment;motor speech impairment;cognitive-linguistic impairment  -CR        Plans/Goals Discussed with patient;agreed upon  -CR        Barriers to Rehab previous functional deficit  -CR           Pain    Pretreatment Pain Rating 0/10 - no pain  -CR        Posttreatment Pain Rating 0/10 - no pain  -CR           Comprehension Assessment/Intervention    Comprehension Assessment/Intervention Auditory Comprehension  -CR           Auditory Comprehension Assessment/Intervention     Auditory Comprehension (Communication) WFL  -CR           Expression Assessment/Intervention    Expression Assessment/Intervention verbal expression  -CR           Verbal Expression Assessment/Intervention    Spontaneous/Functional Words mild impairment;delayed responses  -CR        Conversational Discourse/Fluency mild impairment;delayed responses  -CR           Oral Motor Structure and Function    Dentition Assessment edentulous  -CR        Mucosal Quality moist, healthy  -CR           Oral Musculature and Cranial Nerve Assessment    Oral Labial or Buccal Impairment, Detail, Cranial Nerve VII (Facial): left labial droop  -CR        Lingual Impairment, Detail. Cranial Nerves IX, XII (Glossopharyngeal and Hypoglossal) reduced lingual ROM;reduced strength;bilaterally  -CR           Motor Speech Assessment/Intervention    Motor Speech Function mild impairment  -CR        Characteristics Consistent with Dysarthria slow rate;decreased intensity;decreased articulation;slurred speech  -CR           SLP Evaluation Clinical Impressions    SLP Diagnosis mild;dysarthria;aphasia  -CR        SLP Diagnosis Comments Cognitive-communication evaluation complete. Patient presents with mild-moderate cognitive-linguistic impairment and mild aphasia, dysarthria. Cognitive deficits include decreased temporal orientation, moderately complex numerical problem solving, immediate memory, and short-term recall. Unable to fully assess executive functioning skills due to neurology APRN visit, however, pt reports dependent on  for IADLs since previous strokes. Aphasia/dysarthria characterized by slow/halting speech, anomias, decreased intensity, decreased articulation, and slurred speech. Intelligibility judged to >95% to unfamiliar listener in quiet environment. Pt reports speech is not at baseline currently despite deficits from previous stroke. Recommend outpatient speech therapy following hospitalization to improve cognitive  communication skills and promote more independence.  -CR        Rehab Potential/Prognosis good  -CR        SLC Criteria for Skilled Therapy Interventions Met yes  -CR        Functional Impact difficulty communicating in an emergency;difficulty in expressing complex messages;restrictions in personal and social life;difficulty completing home management task;difficulty completing vocational tasks  -CR           Recommendations    Therapy Frequency (SLP SLC) PRN  -CR        Predicted Duration Therapy Intervention (Days) until discharge  -CR        Anticipated Discharge Disposition (SLP) anticipate therapy at next level of care  -CR           Communication Treatment Objective and Progress Goals (SLP)    Verbal Expression Treatment Objectives Verbal Expression Treatment Objectives (Group)  -CR        Motor Speech/Dysarthria Treatment Objectives Motor Speech/Dysarthria Treatment Objectives (Group)  -CR        Cognitive Linguistic Treatment Objectives Cognitive Linguistic Treatment Objectives (Group)  -CR           Verbal Expression Treatment Objectives    Connected Speech Selection connected speech, SLP goal 1  -CR           Connected Speech to Express Thoughts Goal 1 (SLP)    Improve Narrative Discourse to Express Thoughts By Goal 1 (SLP) describing a picture;conversational task on a given topic;80%;with minimal cues (75-90%)  -CR        Time Frame (Connected Speech Goal 1, SLP) by discharge  -CR        Progress/Outcomes (Connected Speech Goal 1, SLP) new goal  -CR           Motor Speech/Dysarthria Treatment Objectives    Articulation Selection articulation, SLP goal 1  -CR           Articulation Goal 1 (SLP)    Improve Articulation Goal 1 (SLP) by over-articulating in connected speech;80%;with minimal cues (75-90%)  -CR        Time Frame (Articulation Goal 1, SLP) by discharge  -CR        Progress/Outcomes (Articulation Goal 1, SLP) new goal  -CR           Cognitive Linguistic Treatment Objectives    Orientation  Selection orientation, SLP goal 1  -CR        Functional Math Skills Selection functional math skills, SLP goal 1  -CR           Orientation Goal 1 (SLP)    Improve Orientation Through Goal 1 (SLP) demonstrating orientation to day;demonstrating orientation to month;demonstrating orientation to year;90%;independently (over 90% accuracy)  -CR        Time Frame (Orientation Goal 1, SLP) by discharge  -CR        Progress/Outcomes (Orientation Goal 1, SLP) new goal  -CR           Functional Math Skills Goal 1 (SLP)    Improve Functional Math Skills Through Goal 1 (SLP) complete moderately complex math problems;complete functional math task;80%;with minimal cues (75-90%)  -CR        Time Frame (Functional Math Skills Goal 1, SLP) by discharge  -CR        Progress/Outcomes (Functional Math Skills Goal 1, SLP) new goal  -CR                  User Key  (r) = Recorded By, (t) = Taken By, (c) = Cosigned By      Initials Name Effective Dates    CR Lalitha Ewing, GERARD 12/03/24 -                        EDUCATION  The patient has been educated in the following areas:     Cognitive Impairment Communication Impairment.           SLP GOALS       Row Name 04/02/25 1000             Connected Speech to Express Thoughts Goal 1 (SLP)    Improve Narrative Discourse to Express Thoughts By Goal 1 (SLP) describing a picture;conversational task on a given topic;80%;with minimal cues (75-90%)  -CR      Time Frame (Connected Speech Goal 1, SLP) by discharge  -CR      Progress/Outcomes (Connected Speech Goal 1, SLP) new goal  -CR         Articulation Goal 1 (SLP)    Improve Articulation Goal 1 (SLP) by over-articulating in connected speech;80%;with minimal cues (75-90%)  -CR      Time Frame (Articulation Goal 1, SLP) by discharge  -CR      Progress/Outcomes (Articulation Goal 1, SLP) new goal  -CR         Orientation Goal 1 (SLP)    Improve Orientation Through Goal 1 (SLP) demonstrating orientation to day;demonstrating orientation to  month;demonstrating orientation to year;90%;independently (over 90% accuracy)  -CR      Time Frame (Orientation Goal 1, SLP) by discharge  -CR      Progress/Outcomes (Orientation Goal 1, SLP) new goal  -CR         Functional Math Skills Goal 1 (SLP)    Improve Functional Math Skills Through Goal 1 (SLP) complete moderately complex math problems;complete functional math task;80%;with minimal cues (75-90%)  -CR      Time Frame (Functional Math Skills Goal 1, SLP) by discharge  -CR      Progress/Outcomes (Functional Math Skills Goal 1, SLP) new goal  -CR                User Key  (r) = Recorded By, (t) = Taken By, (c) = Cosigned By      Initials Name Provider Type    Lalitha Maldonado SLP Speech and Language Pathologist                              Time Calculation:      Time Calculation- SLP       Row Name 04/02/25 1115             Time Calculation- SLP    SLP Start Time 0800  -CR      SLP Received On 04/02/25  -CR         Untimed Charges    86308-YE Eval Speech and Production w/ Language Minutes 75  -CR         Total Minutes    Untimed Charges Total Minutes 75  -CR       Total Minutes 75  -CR                User Key  (r) = Recorded By, (t) = Taken By, (c) = Cosigned By      Initials Name Provider Type    Lalitha Maldonado SLP Speech and Language Pathologist                    Therapy Charges for Today       Code Description Service Date Service Provider Modifiers Qty    [id number] [facility] EVAL SPEECH AND PROD W LANG  5 4/2/2025 Lalitha Ewing SLP GN 1                       GERARD Garcia  4/2/2025

## 2025-04-02 NOTE — PROGRESS NOTES
REASON FOR CONSULTATION: Recurrent strokes, previous negative hypercoagulable workup, ongoing anticoagulation  Provide an opinion on any further workup or treatment                                HISTORY OF PRESENT ILLNESS:  The patient is a 44-year-old male who we have seen previously in June 2020 for with a history of tobacco use, ongoing vaping, COPD, hypertension, hyperlipidemia and a previous stroke in 2022.     She had been seen by Dr. Grissom in April 2022 after admission to [facility] in March 2022 CVA, CTA of neck and head 3/26 twice 22 unremarkable, discharged on aspirin and statin and undergoing a hypercoagulable workup that was negative.     She was seen again 4/9/2024 recognizing a seizure disorder on  antiseizure medications.  Unfortunately had a stroke in March 2023 and presented to ER/8/24 with altered speech.  CT scan of the head revealed a subacute left MCA territory infarct compared to March 2023.  There was concern for central cardioembolic source and we went on to repeat similar studies-lupus anticoagulant, beta-2 glycoprotein antibodies and anticardiolipin antibodies are again negative.     The patient was seen 6/27/2024 on anticoagulation with aspirin and Eliquis but presenting with abdominal pain and found to have evidence of a potential splenic infarct as well as an indeterminate mass in the uterus.  Further testing included negative factor V Leiden mutation, negative prothrombin gene mutation.     The patient's uterine mass was assessed by gynecologic oncology undergoing hysterectomy 7/16/2024.  Pathology revealed extensive adenomyosis particular involving the myometrium.     Follow-up with neurology 11/25/2024 with MRI demonstrating encephalomalacia involving left frontal lobe laterally corresponding area of acute infarction present in April 2024, evidence of remote left occipital and cerebellar infarcts.  She had been reviewed by neurology for her epilepsy and continued medications for  breakthrough seizures     The patient was now readmitted 3/30/2024 with left-sided weakness and facial droop which she noted upon awakening.  Imaging including CTA of the head and neck revealed again encephalomalacia, no large vessel occlusion seen.  Patient seen by neurology with concerns of new right-sided stroke.  Again the patient had been compliant with Eliquis and CPAP.     Interval history:  4/1/2025  T98.2, pulse 97, respirations 18, /83  Discussed issues with patient's about potential acquired abnormalities that should be rechecked during her hospitalization.  She has been compliant with Eliquis that was the day before she was admitted.  H&H 11.1 and 35.1, white count 96424, platelet count 3 31,000, BUN/creatinine of 6 and 0.55    4/2/2025  T98.6, pulse 92, respirations 18, /80 SpO2 98%  Now transporting for EEG  H&H 10.6 and 32.9, white count is 7610, platelet count of 305,000  Hypercoagulable studies pending        Past Medical History, Past Surgical History, Social History, Family History have been reviewed and are without significant changes except as mentioned.    Review of Systems   A comprehensive 14 point review of systems was performed and was negative except as mentioned.    Medications:  The current medication list was reviewed in the EMR    ALLERGIES:  No Known Allergies    Objective      Vitals:    04/02/25 0121 04/02/25 0356 04/02/25 0758 04/02/25 1136   BP: 108/77 109/78 123/86 134/80   BP Location: Right arm Right arm Left arm Left arm   Patient Position: Lying Lying Lying Lying   Pulse: 99 104 83 92   Resp: 18 18 18 18   Temp: 98.8 °F (37.1 °C) 97.9 °F (36.6 °C) 98.2 °F (36.8 °C) 98.6 °F (37 °C)   TempSrc: Oral Oral Oral Oral   SpO2: 97% 98%     Weight:       Height:              No data to display                Physical Exam    Constitutional:       Appearance: Normal appearance. She is normal weight.   HENT:      Head: Normocephalic and atraumatic.      Nose: Nose normal.       Mouth/Throat:      Mouth: Mucous membranes are moist.      Pharynx: Oropharynx is clear.   Eyes:      Extraocular Movements: Extraocular movements intact.      Conjunctiva/sclera: Conjunctivae normal.      Pupils: Pupils are equal, round, and reactive to light.   Cardiovascular:      Rate and Rhythm: Normal rate and regular rhythm.      Pulses: Normal pulses.      Heart sounds: Normal heart sounds.   Pulmonary:      Effort: Pulmonary effort is normal.      Breath sounds: Normal breath sounds.   Abdominal:      General: Bowel sounds are normal.      Palpations: Abdomen is soft.   Musculoskeletal:         General: Normal range of motion.      Cervical back: Normal range of motion and neck supple.   Skin:     General: Skin is warm and dry.   Neurological:      Motor: Weakness (Left-sided weakness left upper and lower extremity) present.      Comments: Left facial weakness, droop   Psychiatric:      Comments: Flat affect          RECENT LABS:  Hematology WBC   Date Value Ref Range Status   04/02/2025 7.66 3.40 - 10.80 10*3/mm3 Final     RBC   Date Value Ref Range Status   04/02/2025 4.26 3.77 - 5.28 10*6/mm3 Final     Hemoglobin   Date Value Ref Range Status   04/02/2025 10.6 (L) 12.0 - 15.9 g/dL Final     Hematocrit   Date Value Ref Range Status   04/02/2025 32.9 (L) 34.0 - 46.6 % Final     Platelets   Date Value Ref Range Status   04/02/2025 305 140 - 450 10*3/mm3 Final              Assessment & Plan   History of repetitive stroke episodes  Hypercoagulable workup negative repetitively  Patient compliant with Eliquis-taken the night before she was admitted 3/30/2025  Echo with normal EF, no thrombus negative bubble study  Neurology assessment ongoing currently  Plan to reassess for potential acquired thrombophilia.  She has been off Eliquis long enough to adequately assess this-currently pending beta-2 glycoprotein antibodies, anticardiolipin antibodies, phosphatidylserine antibodies and lupus anticoagulant      *Epilepsy  S/p VNS  Vimpat continue-IV  Recent seizures note  EEG follow-up                  4/2/2025      CC:

## 2025-04-02 NOTE — PLAN OF CARE
Problem: Adult Inpatient Plan of Care  Goal: Plan of Care Review  Outcome: Progressing  Flowsheets  Taken 4/2/2025 0629  Progress: no change  Taken 4/1/2025 0528  Plan of Care Reviewed With:   patient   spouse  Goal: Patient-Specific Goal (Individualized)  Outcome: Progressing  Goal: Absence of Hospital-Acquired Illness or Injury  Outcome: Progressing  Intervention: Identify and Manage Fall Risk  Recent Flowsheet Documentation  Taken 4/2/2025 0000 by Erlinda Valera RN  Safety Promotion/Fall Prevention:   activity supervised   clutter free environment maintained   fall prevention program maintained   muscle strengthening facilitated   nonskid shoes/slippers when out of bed   room organization consistent   safety round/check completed  Taken 4/1/2025 2200 by Erlinda Valera RN  Safety Promotion/Fall Prevention:   activity supervised   clutter free environment maintained   fall prevention program maintained   muscle strengthening facilitated   nonskid shoes/slippers when out of bed   room organization consistent   safety round/check completed  Taken 4/1/2025 2000 by Erlinda Valera RN  Safety Promotion/Fall Prevention:   activity supervised   clutter free environment maintained   fall prevention program maintained   muscle strengthening facilitated   nonskid shoes/slippers when out of bed   room organization consistent   safety round/check completed  Intervention: Prevent Skin Injury  Recent Flowsheet Documentation  Taken 4/2/2025 0000 by Erlinda Valera RN  Body Position: position changed independently  Skin Protection:   transparent dressing maintained   incontinence pads utilized  Taken 4/1/2025 2200 by Erlinda Valera RN  Body Position: position changed independently  Taken 4/1/2025 2000 by Erlinda Valera RN  Body Position: position changed independently  Skin Protection:   transparent dressing maintained   incontinence pads utilized  Intervention: Prevent and Manage VTE (Venous Thromboembolism) Risk  Recent Flowsheet  Documentation  Taken 4/2/2025 0000 by Erlinda Valera RN  VTE Prevention/Management:   bilateral   SCDs (sequential compression devices) on  Taken 4/1/2025 2000 by Erlinda Valera RN  VTE Prevention/Management:   bilateral   SCDs (sequential compression devices) on  Intervention: Prevent Infection  Recent Flowsheet Documentation  Taken 4/2/2025 0000 by Erlinda Valera RN  Infection Prevention:   hand hygiene promoted   rest/sleep promoted   single patient room provided  Taken 4/1/2025 2200 by Erlinda Valera RN  Infection Prevention:   hand hygiene promoted   rest/sleep promoted   single patient room provided  Taken 4/1/2025 2000 by Erlinda Valera RN  Infection Prevention:   hand hygiene promoted   rest/sleep promoted   single patient room provided  Goal: Optimal Comfort and Wellbeing  Outcome: Progressing  Intervention: Provide Person-Centered Care  Recent Flowsheet Documentation  Taken 4/2/2025 0000 by Erlinda Valera RN  Trust Relationship/Rapport:   care explained   choices provided   questions answered   reassurance provided   thoughts/feelings acknowledged  Taken 4/1/2025 2000 by Erlinda Valera RN  Trust Relationship/Rapport:   care explained   choices provided   questions answered   reassurance provided   thoughts/feelings acknowledged  Goal: Readiness for Transition of Care  Outcome: Progressing     Problem: Skin Injury Risk Increased  Goal: Skin Health and Integrity  Outcome: Progressing  Intervention: Optimize Skin Protection  Recent Flowsheet Documentation  Taken 4/2/2025 0000 by Erlinda Valera RN  Activity Management:   activity minimized   bedrest  Pressure Reduction Techniques:   weight shift assistance provided   frequent weight shift encouraged  Head of Bed (HOB) Positioning: HOB at 30 degrees  Pressure Reduction Devices: pressure-redistributing mattress utilized  Skin Protection:   transparent dressing maintained   incontinence pads utilized  Taken 4/1/2025 2200 by Erlinda Valera RN  Activity Management:    activity minimized   bedrest  Head of Bed (HOB) Positioning: HOB at 30 degrees  Taken 4/1/2025 2000 by Erlinda Valera RN  Activity Management:   activity minimized   bedrest  Pressure Reduction Techniques:   weight shift assistance provided   frequent weight shift encouraged  Head of Bed (HOB) Positioning: HOB at 30 degrees  Pressure Reduction Devices: pressure-redistributing mattress utilized  Skin Protection:   transparent dressing maintained   incontinence pads utilized     Problem: Fall Injury Risk  Goal: Absence of Fall and Fall-Related Injury  Outcome: Progressing  Intervention: Identify and Manage Contributors  Recent Flowsheet Documentation  Taken 4/2/2025 0000 by Erlinda Valera RN  Medication Review/Management: medications reviewed  Taken 4/1/2025 2200 by Erlinda Valera RN  Medication Review/Management: medications reviewed  Taken 4/1/2025 2000 by Erlinda Valera RN  Medication Review/Management: medications reviewed  Intervention: Promote Injury-Free Environment  Recent Flowsheet Documentation  Taken 4/2/2025 0000 by Erlinda Valera RN  Safety Promotion/Fall Prevention:   activity supervised   clutter free environment maintained   fall prevention program maintained   muscle strengthening facilitated   nonskid shoes/slippers when out of bed   room organization consistent   safety round/check completed  Taken 4/1/2025 2200 by Erlinda Valera RN  Safety Promotion/Fall Prevention:   activity supervised   clutter free environment maintained   fall prevention program maintained   muscle strengthening facilitated   nonskid shoes/slippers when out of bed   room organization consistent   safety round/check completed  Taken 4/1/2025 2000 by Erlinda Valera RN  Safety Promotion/Fall Prevention:   activity supervised   clutter free environment maintained   fall prevention program maintained   muscle strengthening facilitated   nonskid shoes/slippers when out of bed   room organization consistent   safety round/check completed    Goal Outcome Evaluation:  Plan of Care Reviewed With: patient, spouse        Progress: no change     Patient had no reported seizures through the shift. Patient was tearful and apologetic after learning she had five seizures yesterday she had no recollection the past 24 hours

## 2025-04-02 NOTE — PROGRESS NOTES
Name: Alexandra Xiao ADMIT: 3/30/2025   : 1980  PCP: Annalee Huddleston APRN    MRN: [id number] LOS: 1 days   AGE/SEX: 44 y.o. female  ROOM: [facility]     Subjective   Subjective   Chief Complaint   Patient presents with    Facial Droop    Extremity Weakness     Slept well.  Had migraine the other day which is improved today.  She was reporting chest discomfort.  Left-sided with radiation when she moves her arm to her arm.  She was not reporting radiation to her back or neck.  She did have some shortness of breath with it.  No diaphoresis.  Not reporting nausea.  She reports that this has been ongoing since prior to admission.  Her boyfriend on the phone interrupted her a few times when she was telling me the history so I moved her speaker phone out of the way and spoke to the patient.     Objective   Objective   Vital Signs  Temp:  [97.9 °F (36.6 °C)-98.8 °F (37.1 °C)] 98.6 °F (37 °C)  Heart Rate:  [] 92  Resp:  [18] 18  BP: (108-138)/(77-86) 134/80  SpO2:  [96 %-98 %] 98 %  on   ;   Device (Oxygen Therapy): room air  Body mass index is 30.12 kg/m².    Physical Exam  Vitals and nursing note reviewed.   Constitutional:       General: She is not in acute distress.     Appearance: She is not diaphoretic.   Cardiovascular:      Rate and Rhythm: Normal rate and regular rhythm.   Pulmonary:      Effort: Pulmonary effort is normal.      Breath sounds: No wheezing.   Abdominal:      General: There is no distension.      Palpations: Abdomen is soft.      Tenderness: There is no abdominal tenderness.   Musculoskeletal:         General: No tenderness.      Right lower leg: No edema.      Left lower leg: No edema.   Neurological:      Mental Status: She is alert.      Sensory: Sensory deficit (left sided) present.      Motor: Weakness (left sided) present.   Psychiatric:         Mood and Affect: Mood normal.         Behavior: Behavior normal.       Results Review  I reviewed the patient's new clinical  results.    Results from last 7 days   Lab Units 04/02/25  0542 04/01/25  0636 03/31/25  0516 03/30/25  1041   WBC 10*3/mm3 7.66 11.07* 6.75 8.21   HEMOGLOBIN g/dL 10.6* 11.1* 10.7* 11.9*   PLATELETS 10*3/mm3 305 331 305 354     Results from last 7 days   Lab Units 04/02/25  0542 04/01/25  0636 03/31/25  0516 03/30/25  1041   SODIUM mmol/L 136 130* 134* 134*   POTASSIUM mmol/L 3.9 4.0 3.5 3.8   CHLORIDE mmol/L 104 103 103 100   CO2 mmol/L 21.0* 19.6* 20.6* 22.3   BUN mg/dL 11 6 8 6   CREATININE mg/dL 0.65 0.55* 0.60 0.66   GLUCOSE mg/dL 87 95 105* 88   EGFR mL/min/1.73 110.8 115.4 113.0 110.4     Results from last 7 days   Lab Units 03/31/25  0516 03/30/25  1041   ALBUMIN g/dL 3.3* 4.0   BILIRUBIN mg/dL <0.2 <0.2   ALK PHOS U/L 219* 273*   AST (SGOT) U/L 21 23   ALT (SGPT) U/L 18 22     Results from last 7 days   Lab Units 04/02/25  0542 04/01/25  0636 03/31/25  0516 03/30/25  1041   CALCIUM mg/dL 8.8 8.6 8.5* 8.7   ALBUMIN g/dL  --   --  3.3* 4.0         Hemoglobin A1C   Date/Time Value Ref Range Status   03/31/2025 0516 5.30 4.80 - 5.60 % Final     Glucose   Date/Time Value Ref Range Status   03/30/2025 1753 123 70 - 130 mg/dL Final       XR Chest 1 View  Result Date: 4/1/2025  1. No acute process.   This report was finalized on 4/1/2025 5:14 PM by Dr. Rivera Wayne M.D on Workstation: [id number]      MRI Brain With & Without Contrast  Result Date: 4/1/2025  The study is hampered by patient motion. There is no evidence of acute infarction. Remote left frontal and left occipital infarcts are appreciated. The left frontal infarct was acute on the MRI examination of 4/9/2024. The remote left occipital infarct was present previously and is where the small remote cerebellar infarcts bilaterally and the small remote infarct involving the right middle frontal gyrus superolaterally.  This report was finalized on 4/1/2025 3:45 PM by Dr. Crow Norman M.D on Workstation: [id number]        I have personally  reviewed all medications:  Scheduled Medications  amitriptyline, 25 mg, Oral, Nightly  apixaban, 5 mg, Oral, BID  aspirin, 81 mg, Oral, Daily  atorvastatin, 80 mg, Oral, Nightly  Cenobamate, 100 mg, Oral, Nightly  cyanocobalamin, 1,000 mcg, Intramuscular, Daily  Eslicarbazepine Acetate, 800 mg, Oral, Nightly  Eslicarbazepine Acetate, 400 mg, Oral, Daily  Lacosamide, 200 mg, Intravenous, Q12H  magnesium oxide, 400 mg, Oral, Daily  pantoprazole, 40 mg, Oral, Q AM  Vitamin B-2, 400 mg, Oral, Daily      Infusions  sodium chloride, 100 mL/hr, Last Rate: 100 mL/hr (03/31/25 1829)      Diet  Diet: Regular/House; Texture: Regular (IDDSI 7); Fluid Consistency: Thin (IDDSI 0)       Assessment/Plan     Active Hospital Problems    Diagnosis  POA    **Stroke-like symptoms [R29.90]  Yes    S/P placement of VNS (vagus nerve stimulation) device [Z96.89]  Not Applicable    Observed sleep apnea [G47.30]  Yes    History of stroke [Z86.73]  Not Applicable    HTN (hypertension) [I10]  Yes    Epilepsy [G40.909]  Yes      Resolved Hospital Problems   No resolved problems to display.       44 y.o. female admitted with Stroke-like symptoms.    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  History of recurrent embolic CVA.  No new stroke on MRI.  Has undergone EEG due to seizures.  Neurology evaluated.  Magnesium oxide and riboflavin planned in addition to her Elavil.  Outpatient follow-up with neurology, Dr. James, is planned.  Continue ASA Eliquis and statin.  Epilepsy with seizure: History of vagal nerve stimulator.  She had missed home antiepileptics contributing to convulsions here.  Continue AED regimen and outpatient follow-up is planned.  B12 deficiency: IM followed by oral replacement.  Repeat level in 2 to 3 months.  Chest pain: Potential musculoskeletal or GI component but has enough symptoms that I do feel cardiology review would be worthwhile.  Troponin level is reassuring.  Continuing the aspirin and  statin as above.  Have placed a cardiology consult.  GERD: PPI  TULIO: Needs outpatient follow-up  PPX: AC as above  Disposition: Home/1 to 2 days    Discussed with Ms. Lomas    Expected Discharge Date: 4/3/2025; Expected Discharge Time:      Jayesh Kc MD  [facility]  04/02/25  13:36 EDT    Dictated portions of note using Dragon dictation software.  Copied text in this note has been reviewed by me and remains accurate as of 04/02/25

## 2025-04-02 NOTE — PLAN OF CARE
Goal Outcome Evaluation:  Plan of Care Reviewed With: patient, spouse           Outcome Evaluation: Pt is a 43 y/o F who presents to [facility] with L arm weakness and L facial droop. Pt lives in a trailer with spouse with 8 MALIKA. At baseline, pt is (I) with functional mobility. Pt achieved EOB with SBA and stood wiht no AD. Pt had some c/o of L chest discomfort and RN was notified. Pt ambulated 35 feet CGA with no AD and no LOB. Per spouse, pt is not at baseline with functional mobility an usuaally ambulates faster. Discussed with pt and family the importance of an active lifestyle when at home and they were in agreement. Pt would further benefit from skilled IP PT to address functional mobility deficits. Anticipate d/c to home with assist and possibly OP PT pendign progress and when medically cleared. Will continue to follow.    Anticipated Discharge Disposition (PT): home with assist, home

## 2025-04-02 NOTE — SIGNIFICANT NOTE
04/02/25 1418   OTHER   Discipline occupational therapist   Rehab Time/Intention   Session Not Performed patient unavailable for treatment  (pt on EEG, increases seziure activity since OT eval. Will f/u when medically appropriate.)   Recommendation   OT - Next Appointment 04/03/25

## 2025-04-02 NOTE — PLAN OF CARE
Goal Outcome Evaluation:              Outcome Evaluation: Cognitive-communication evaluation complete. Patient presents with mild-moderate cognitive-linguistic impairment and mild aphasia, dysarthria. See full report for further details. Recommend outpatient speech therapy following hospitalization to improve cognitive communication skills and promote more independence.    Anticipated Discharge Disposition (SLP): anticipate therapy at next level of care

## 2025-04-02 NOTE — NURSING NOTE
A few at home meds were brought in by the patient upon admission. Medications sent home today with significant other Yadiel Beryl as patient requests. Medications being sent: Amlodipine Besylate 5mg-41 tablets, Metoprolol ER Succinate 25mg-20 tablets, Eliquis 5mg--44 tabs, Atorvastatin 40mg-27 tabs.

## 2025-04-02 NOTE — CONSULTS
Date of Consultation: 25    Referral Provider: Franck Martinez MD     Reason for Consultation: Chest pain.    Encounter Provider: Tom Hannon MD    Group of Service: [facility]     Patient Name: Alexandra Xiao    :1980    Chief complaint: Facial droop and weakness.    History of Present Illness:      This is a very pleasant 44-year-old female who follows with Dr. Lester and Dr. Dumont in our group.  She has a history of recurrent strokes x 2 in  and again in , and is on Eliquis.  She also has a history of a loop recorder, which was placed in  given the recurrent strokes.    She had a NOÉ in 2022 that showed no evidence of intracardiac shunt or thrombus. There was also a loop recorder that was placed in . Last year, when she followed up with Dr. Lester, there had been evidence of atrial arrhythmias. An echocardiogram at that time showed normal LV systolic function along with rheumatic changes of the mitral valve with moderate mitral regurgitation.  She also had a generator change of her vagus nerve stimulator on 2024.    The patient presents emergency department complaining of acute left-sided facial droop and left arm and leg weakness.  She has been treated for possible Godfrey's paralysis.  She has not been found to have acute CVA or stroke..    She has been having chest discomfort since early this morning.  It is predominantly left-sided and and into her left shoulder.  It actually gets worse when she sits up, and better when she lies back.  There is a pleuritic component, and is described as sharp.    Echocardiogram 3/31/2025    Left ventricular systolic function is normal. Calculated left ventricular EF = 69.9% Normal left ventricular cavity size noted. Left ventricular wall thickness is consistent with mild concentric hypertrophy. All left ventricular wall segments contract normally. Left ventricular diastolic function is consistent with (grade I)  impaired relaxation.    Normal left atrial cavity size noted. Saline test results are negative.    There is severe, bileaflet mitral valve thickening present. The posterior mitral valve leaflet has decreased excursion Moderate mitral valve regurgitation is present with a posteriorly-directed jet noted. Mild mitral valve stenosis is present. The mean gradient is 5 mmHg with a HR of 84bpm      Past Medical History:   Diagnosis Date    Abnormal bleeding in menstrual cycle     C. 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    Headache, tension-type     Heavy menstrual bleeding     WITH CLOTS    Hyperlipidemia     Hypertension     Memory loss     Migraine     Seizures     Status post placement of implantable loop recorder     Stroke     MARCH AND APRIL, 2022         Past Surgical History:   Procedure Laterality Date    BREAST BIOPSY      COLONOSCOPY N/A 11/7/2024    Procedure: COLONOSCOPY into cecum and terminal ileum;  Surgeon: Matt White MD;  Location: [facility] ENDOSCOPY;  Service: Gastroenterology;  Laterality: N/A;  Pre: Screening, history of colitis   Post: Diverticulosis and Hemorrhoids    D & C HYSTEROSCOPY ENDOMETRIAL ABLATION N/A 06/10/2022    Procedure: DILATATION AND CURETTAGE HYSTEROSCOPY NOVASURE ENDOMETRIAL ABLATION;  Surgeon: Ernesto Mendosa MD;  Location: [facility] MAIN OR;  Service: Obstetrics/Gynecology;  Laterality: N/A;    FOOT SURGERY      pins in left foot     HYSTERECTOMY  07/16/2024    INSERT / REPLACE / REMOVE PACEMAKER  04/01/2022    Dr. Cristofer Chamorro, LOOP RECORDER    OTHER SURGICAL HISTORY      VNS plate 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    NOÉ      04/2022    TUBAL ABDOMINAL LIGATION      VAGUS NERVE STIMULATOR IMPLANTATION           No Known Allergies      No current facility-administered medications on file prior to encounter.     Current Outpatient Medications  on File Prior to Encounter   Medication Sig Dispense Refill    amitriptyline (ELAVIL) 25 MG tablet TAKE 1 TABLET BY MOUTH EVERY NIGHT 30 tablet 5    amLODIPine (NORVASC) 5 MG tablet TAKE 1 TABLET BY MOUTH DAILY 90 tablet 1    aspirin 81 MG EC tablet Take 1 tablet by mouth Daily. 30 tablet 0    atorvastatin (LIPITOR) 40 MG tablet TAKE 1 TABLET BY MOUTH EVERY DAY 60 tablet 0    Eliquis 5 MG tablet tablet TAKE 1 TABLET BY MOUTH TWICE DAILY 180 tablet 2    metoprolol succinate XL (TOPROL-XL) 25 MG 24 hr tablet TAKE 1 TABLET BY MOUTH DAILY 90 tablet 1    Vimpat 200 MG tablet TAKE 1 TABLET BY MOUTH EVERY 12 HOURS 60 tablet 1         Social History     Socioeconomic History    Marital status: Single    Number of children: 2   Tobacco Use    Smoking status: Former     Current packs/day: 0.00     Average packs/day: 1 pack/day for 29.0 years (29.0 ttl pk-yrs)     Types: Cigarettes     Start date:      Quit date:      Years since quitting: 3.2    Smokeless tobacco: Never    Tobacco comments:     Quit 2022   Vaping Use    Vaping status: Some Days    Substances: Nicotine, Flavoring    Devices: Disposable, 6% NICOTINE   Substance and Sexual Activity    Alcohol use: Not Currently    Drug use: No    Sexual activity: Yes     Partners: Male     Birth control/protection: Tubal ligation         Family History   Problem Relation Age of Onset    Breast cancer Mother 50         age 60 METS    Arthritis Mother     Arthritis Father     Diabetes Father     Heart disease Father     Hypertension Father     Heart attack Father     Hypertension Brother     Heart attack Paternal Grandfather     Ovarian cancer Neg Hx     Uterine cancer Neg Hx     Colon cancer Neg Hx     Deep vein thrombosis Neg Hx     Pulmonary embolism Neg Hx     Malig Hyperthermia Neg Hx        REVIEW OF SYSTEMS:   Pertinent positives are noted in HPI above.  Otherwise, all other systems were reviewed, and are negative.     Objective:     Vitals:    25  "0121 04/02/25 0356 04/02/25 0758 04/02/25 1136   BP: 108/77 109/78 123/86 134/80   BP Location: Right arm Right arm Left arm Left arm   Patient Position: Lying Lying Lying Lying   Pulse: 99 104 83 92   Resp: 18 18 18 18   Temp: 98.8 °F (37.1 °C) 97.9 °F (36.6 °C) 98.2 °F (36.8 °C) 98.6 °F (37 °C)   TempSrc: Oral Oral Oral Oral   SpO2: 97% 98%     Weight:       Height:         Body mass index is 30.12 kg/m².  Flowsheet Rows      Flowsheet Row First Filed Value   Admission Height 165.1 cm (65\") Documented at 03/30/2025 1500   Admission Weight 82.3 kg (181 lb 7 oz) Documented at 03/30/2025 1040             General:    No acute distress, alert and oriented x4, pleasant                   Head:    Normocephalic, atraumatic.   Eyes:          Conjunctivae and sclerae normal, no icterus.   Throat:   No oral lesions, no thrush, oral mucosa moist.    Neck:   Supple, trachea midline.   Lungs:     Clear to auscultation bilaterally     Heart:    Regular rhythm and normal rate.  No murmurs, gallops, or rubs noted.   Abdomen:     Soft, non-tender, non-distended, positive bowel sounds.    Extremities:   No clubbing, cyanosis, or edema.     Pulses:   Pulses palpable and equal bilaterally.    Skin:   No bleeding or rash.   Neuro:   Left-sided weakness.     Psychiatric:   Normal mood and affect.                 Lab Review:                Results from last 7 days   Lab Units 04/02/25  0542   SODIUM mmol/L 136   POTASSIUM mmol/L 3.9   CHLORIDE mmol/L 104   CO2 mmol/L 21.0*   BUN mg/dL 11   CREATININE mg/dL 0.65   GLUCOSE mg/dL 87   CALCIUM mg/dL 8.8     Results from last 7 days   Lab Units 04/01/25  1756 04/01/25  1700   HSTROP T ng/L 10 10     Results from last 7 days   Lab Units 04/02/25  0542   WBC 10*3/mm3 7.66   HEMOGLOBIN g/dL 10.6*   HEMATOCRIT % 32.9*   PLATELETS 10*3/mm3 305     Results from last 7 days   Lab Units 03/30/25  1041   INR  1.10   APTT seconds 29.1     Results from last 7 days   Lab Units 03/31/25  0516 "   CHOLESTEROL mg/dL 131         Results from last 7 days   Lab Units 03/31/25  0516   CHOLESTEROL mg/dL 131   TRIGLYCERIDES mg/dL 75   HDL CHOL mg/dL 50   LDL CHOL mg/dL 66       EKG (reviewed by me personally):            Assessment:   1.  Left-sided hemiparesis (Godfrey's paralysis versus hemiplegic migraine)  2.  History of stroke x 2 in 2022 and again in 2024  3.  Epilepsy with vagus nerve stimulator (generator change out on 4/5/2024)  4.  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  Status post [employer] loop recorder placement in 2022  6.  Chest pain, atypical  7.  Former tobacco use, quit in 2022  8.  Hypertension    Plan:       The patient's chest discomfort is very atypical.  It is positional in nature, sharp, and there is a mild pleuritic component as well.  Her EKG does not show any ischemic changes.  Her troponin has been negative x 2, which is reassuring.  She just had an echocardiogram on 3/31/2025 which showed normal wall motion and normal ejection fraction.  She has known rheumatic changes of her mitral valve, but only moderate mitral regurgitation and mild mitral stenosis.  Positional component would point to a more musculoskeletal cause.  I would continue her on aspirin and statin therapy.  However, I do not feel that she needs a stress test at this point.  We will continue to follow and assess her on a daily basis.    Thank you very much for this consult.    Brandon Hannon MD

## 2025-04-02 NOTE — PROGRESS NOTES
DOS: 2025  NAME: Alexandra Xiao   : 1980  PCP: Annalee Huddleston APRN    Chief Complaint   Patient presents with    Facial Droop    Extremity Weakness        Subjective: No acute events overnight.  No seizure activity.  Slept good.  Continuous was hooked up for about 11 hours.  She did develop a migraine headache yesterday and was treated successfully with a cocktail.  Complaining of some chest discomfort today.  Spoke with boyfriend via speaker phone per patient's request.  Speech therapy at bedside and stated recs for outpatient follow-up to improve cognitive communication skills.    Objective:  Vital signs:      Vitals:    25 0121 25 0356 25 0758 25 1136   BP: 108/77 109/78 123/86 134/80   BP Location: Right arm Right arm Left arm Left arm   Patient Position: Lying Lying Lying Lying   Pulse: 99 104 83 92   Resp: 18 18 18 18   Temp: 98.8 °F (37.1 °C) 97.9 °F (36.6 °C) 98.2 °F (36.8 °C) 98.6 °F (37 °C)   TempSrc: Oral Oral Oral Oral   SpO2: 97% 98%     Weight:       Height:           Current Facility-Administered Medications:     acetaminophen (TYLENOL) tablet 650 mg, 650 mg, Oral, Q4H PRN, 650 mg at 25 0305 **OR** acetaminophen (TYLENOL) suppository 650 mg, 650 mg, Rectal, Q4H PRN, Franck Martinez MD    amitriptyline (ELAVIL) tablet 25 mg, 25 mg, Oral, Nightly, Franck Martinez MD, 25 mg at 25    apixaban (ELIQUIS) tablet 5 mg, 5 mg, Oral, BID, Yari Lomas APRN, 5 mg at 25    aspirin chewable tablet 81 mg, 81 mg, Oral, Daily, Yari Lomas APRN, 81 mg at 25    atorvastatin (LIPITOR) tablet 80 mg, 80 mg, Oral, Nightly, Franck Martinez MD, 80 mg at 25    bisacodyl (DULCOLAX) suppository 10 mg, 10 mg, Rectal, Daily PRN, Franck Martinez MD    Cenobamate tablet 100 mg, 100 mg, Oral, Nightly, Juwan Carrillo MD    Eslicarbazepine Acetate tablet 400 mg, 400 mg, Oral, Daily, Juwan Carrillo MD, 400 mg at 25 9683     lacosamide (VIMPAT) injection 200 mg, 200 mg, Intravenous, Q12H, Franck Martinez MD, 200 mg at 04/02/25 0302    LORazepam (ATIVAN) injection 2 mg, 2 mg, Intravenous, Q5 Min PRN, Franck Martinez MD, 2 mg at 04/01/25 1258    Non-Formulary / Patient Supplied Medication, 1,200 mg, Oral, Nightly, Juwan Carrillo MD    ondansetron (ZOFRAN) injection 4 mg, 4 mg, Intravenous, Q6H PRN, Franck Martinez MD, 4 mg at 04/01/25 2026    pantoprazole (PROTONIX) EC tablet 40 mg, 40 mg, Oral, Q AM, Franck Martinez MD, 40 mg at 04/02/25 0622    sodium chloride 0.9 % flush 10 mL, 10 mL, Intravenous, PRN, Dennis Rahman MD    sodium chloride 0.9 % infusion, 100 mL/hr, Intravenous, Continuous, Zakia Garcia, APRN, Last Rate: 100 mL/hr at 03/31/25 2339, 100 mL/hr at 03/31/25 2339    PRN meds    acetaminophen **OR** acetaminophen    bisacodyl    LORazepam    ondansetron    sodium chloride    No current facility-administered medications on file prior to encounter.     Current Outpatient Medications on File Prior to Encounter   Medication Sig    amitriptyline (ELAVIL) 25 MG tablet TAKE 1 TABLET BY MOUTH EVERY NIGHT    amLODIPine (NORVASC) 5 MG tablet TAKE 1 TABLET BY MOUTH DAILY    aspirin 81 MG EC tablet Take 1 tablet by mouth Daily.    atorvastatin (LIPITOR) 40 MG tablet TAKE 1 TABLET BY MOUTH EVERY DAY    Eliquis 5 MG tablet tablet TAKE 1 TABLET BY MOUTH TWICE DAILY    metoprolol succinate XL (TOPROL-XL) 25 MG 24 hr tablet TAKE 1 TABLET BY MOUTH DAILY    Vimpat 200 MG tablet TAKE 1 TABLET BY MOUTH EVERY 12 HOURS       General appearance: Obese female, NAD, alert and cooperative, edentulous  HEENT: Normocephalic, atraumatic     Neurological:   MS: oriented x3, language intact, no neglect  CN: visual fields full, EOMI, facial sensation equal, very subtle left facial droop- was baseline from prior stroke, shoulder shrug equal, tongue midline  Motor: 5/5 all 4 extremities, give away weakness of the LLE  Sensory: light touch  sensation intact in all 4 ext.  Coordination: Normal finger to nose test  Gait and station: deferred     Laboratory results:  Lab Results   Component Value Date    TSH 3.560 03/31/2025     Lab Results   Component Value Date    HGBA1C 5.30 03/31/2025     Lab Results   Component Value Date    [id number] 274 03/30/2025     Lab Results   Component Value Date    CHOL 131 03/31/2025    CHOL 134 04/09/2024    CHOL 135 03/06/2023    CHLPL 153 02/25/2025     Lab Results   Component Value Date    TRIG 75 03/31/2025    TRIG 98 02/25/2025    TRIG 93 04/09/2024     Lab Results   Component Value Date    HDL 50 03/31/2025    HDL 54 02/25/2025    HDL 59 04/09/2024     Lab Results   Component Value Date    LDL 66 03/31/2025    LDL 81 02/25/2025    LDL 58 04/09/2024     Lab Results   Component Value Date    WBC 7.66 04/02/2025    HGB 10.6 (L) 04/02/2025    HCT 32.9 (L) 04/02/2025    MCV 77.2 (L) 04/02/2025     04/02/2025     Lab Results   Component Value Date    GLUCOSE 87 04/02/2025    BUN 11 04/02/2025    CREATININE 0.65 04/02/2025    BCR 16.9 04/02/2025    K 3.9 04/02/2025    CO2 21.0 (L) 04/02/2025    CALCIUM 8.8 04/02/2025    ALBUMIN 3.3 (L) 03/31/2025    AST 21 03/31/2025    ALT 18 03/31/2025     Lab Results   Component Value Date    PTT 29.1 03/30/2025     Lab Results   Component Value Date    INR 1.10 03/30/2025    INR 1.6 07/30/2024    INR 1.05 04/08/2024    PROTIME 14.2 03/30/2025    PROTIME 18.7 (H) 07/30/2024    PROTIME 13.9 04/08/2024     Brief Urine Lab Results  (Last result in the past 365 days)        Color   Clarity   Blood   Leuk Est   Nitrite   Protein   CREAT   Urine HCG        09/15/24 1637 Yellow   Clear   Negative   Moderate (2+)   Negative   Negative                   Review and interpretation of imaging:  XR Chest 1 View  Result Date: 4/1/2025  1. No acute process.   This report was finalized on 4/1/2025 5:14 PM by Dr. Rivera Wayne M.D on Workstation: [id number]      MRI Brain With & Without  Contrast  Result Date: 4/1/2025  The study is hampered by patient motion. There is no evidence of acute infarction. Remote left frontal and left occipital infarcts are appreciated. The left frontal infarct was acute on the MRI examination of 4/9/2024. The remote left occipital infarct was present previously and is where the small remote cerebellar infarcts bilaterally and the small remote infarct involving the right middle frontal gyrus superolaterally.  This report was finalized on 4/1/2025 3:45 PM by Dr. Crow Norman M.D on Workstation: [id number]      CT Angiogram Head w AI Analysis of LVO  Result Date: 3/30/2025   1. Noncontrast head CT demonstrates encephalomalacia in the left anterior superior frontal lobe and left occipital lobe, consistent with prior infarcts. No intraparenchymal hemorrhage. 2. No large vessel occlusion seen.  This report was finalized on 3/30/2025 11:22 AM by Dr. Matt Carpenter M.D on Workstation: [id number]      CT Angiogram Neck  Result Date: 3/30/2025   1. Noncontrast head CT demonstrates encephalomalacia in the left anterior superior frontal lobe and left occipital lobe, consistent with prior infarcts. No intraparenchymal hemorrhage. 2. No large vessel occlusion seen.  This report was finalized on 3/30/2025 11:22 AM by Dr. Matt Carpenter M.D on Workstation: [id number]      XR Chest 1 View  Result Date: 3/30/2025  FINDINGS AND IMPRESSION: Left-sided stimulator is present with lead coursing in the left neck and out of the field-of-view superiorly. There is also presumed loop recorder.  Lungs are hypoinflated. No pulmonary consolidation, pleural effusion or pneumothorax is seen. Cardiac silhouette within normal limits for size.  This report was finalized on 3/30/2025 11:19 AM by Dr. Julian Rush M.D on Workstation: [id number]      Results for orders placed during the hospital encounter of 03/30/25    Adult transthoracic echo complete    Interpretation Summary    Left  ventricular systolic function is normal. Calculated left ventricular EF = 69.9% Normal left ventricular cavity size noted. Left ventricular wall thickness is consistent with mild concentric hypertrophy. All left ventricular wall segments contract normally. Left ventricular diastolic function is consistent with (grade I) impaired relaxation.    Normal left atrial cavity size noted. Saline test results are negative.    There is severe, bileaflet mitral valve thickening present. The posterior mitral valve leaflet has decreased excursion Moderate mitral valve regurgitation is present with a posteriorly-directed jet noted. Mild mitral valve stenosis is present. The mean gradient is 5 mmHg with a HR of 84bpm      Impression/Assessment:  This is a 44-year-old female with past medical history of recurrent embolic strokes with an unknown etiology after undergoing multiple extensive workup including hypercoagulable testing multiple times with negative results, NOÉ, CTA's, and loop recorder placement, obstructive sleep apnea, epilepsy on 3 AEDs and status post VNS, hyperlipidemia, hypertension, migraine headache, COPD, current vape user who presented to the hospital on 3/30/2025 with complaints of left arm weakness and left-sided facial droop.     BP on arrival 164/95.  She underwent a CTA of her head and neck that did not reveal any evidence of acute LVO or flow-limiting stenosis.  Chronic left frontal and left occipital lobe infarcts noted.  Initially MRI was recommended however due to her VNS and inability to turn off this was held off.  Routine EEG was obtained and revealed likely focal hypersynchrony and a predilection to focal onset seizures originating in the left frontal or bifrontal head regions.  No electrographic seizures were recorded.  She had a 2D echo that was unremarkable for source of stroke.  Her loop recorder was interrogated and was negative for afib/aflutter. MRI was completed and was negative for new  stroke. She had breakthrough seizures 2 days ago and yesterday while hospitalized due to her Xcorpi and Aptiom being missed while inpatient.  These have now been restarted as patient is supplying with her home dose.     Additional work up to date:  2D Echo: Normal LA cavity size, saline test negative, EF 69.9%, all LV wall segments contract normally, no AV stenosis, moderate MVR present, severe bileaflet mitral valve thickening present.     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 Carrillo, no evidence of restricted diffusion to suggest new stroke. Old RMCA and left MCA/PCA strokes noted.   - Her symptoms have nearly resolved today, her facial droop is baseline and it is very subtle. This is likely from her old RMCA strokes.   - She told me that she had a migraine headache the day of symptoms and developed another one yesterday which was aborted with migraine cocktail. This may have been hemiplegic migraine vs Godfrey's paralysis from seizure.  - I would start her on Mg oxide and Riboflavin PO daily in addition to her Elavil. I will defer to Dr. James in our outpatient setting about increasing her Elavil for migraine preventative. Need to be cautious due to her underlying cognitive impairment from her strokes and would likely be better candidate with CGRP inhibitor to avoid sedating side effects.   - ST is recommending outpatient follow up for improvement of cognitive communication skills and promote more independence.   - Again dicussed the importance of maintaining compliance with her CPAP machine and abstaining from all tobacco and vaping products to reduce further risk of stroke in the future.  - Awaiting final read on her cEEG.    - Continue Aptiom 400mg in the am and 1200mg in the pm (home dose)  - Continue Vimpat 200mg BID (home dose)  - Continue Xcopri 100mg daily (home dose)  - Continue Eliquis 5mg BID  - Can resume home dose Lipitor of 40mg PO daily.  - Was on ASA 81mg at home PTA  - Will give her cyanocobalamin IM 1000mcg x3 days and then she should start PO replacement daily. Needs level recheck in 2-3 months.  She has follow up already scheduled with Dr. James in our office next month.  We will follow peripherally to review her continuous EEG.    Case discussed with patient, boyfriend via speaker phone, Dr. Kc, and Dr. Carrillo, and he agrees with plan above.    KIRSTIE Marx

## 2025-04-02 NOTE — THERAPY EVALUATION
Patient Name: Alexandra Xiao  : 1980    MRN: [id number]                              Today's Date: 2025       Admit Date: 3/30/2025    Visit Dx:     ICD-10-CM ICD-9-CM   1. Stroke-like symptoms  R29.90 781.99   2. Left leg weakness  R29.898 729.89   3. Paresthesia of left arm and leg  R20.2 782.0   4. Chronic anticoagulation  Z79.01 V58.61     Patient Active Problem List   Diagnosis    Sebaceous cyst of breast, right    Abnormal uterine bleeding    Microcytic anemia    Transaminitis    Epilepsy    Major depressive disorder    Left-sided weakness    Anemia    Hyponatremia    HTN (hypertension)    History of stroke    Vitamin D deficiency    B12 deficiency    Observed sleep apnea    Snoring    Excessive daytime sleepiness    Class 1 obesity    Cerebrovascular accident (CVA) due to embolism of left middle cerebral artery    S/P placement of VNS (vagus nerve stimulation) device    Abnormal urinalysis    Vaping nicotine dependence, tobacco product    Implantable loop recorder present    Preoperative clearance    Leukocytosis    Sodium (Na) deficiency    Colitis    History of stroke    C. difficile colitis    Giardiasis    Blood per rectum    Colitis, Clostridium difficile    Screening for colorectal cancer    Stroke-like symptoms     Past Medical History:   Diagnosis Date    Abnormal bleeding in menstrual cycle     C. 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    Headache, tension-type     Heavy menstrual bleeding     WITH CLOTS    Hyperlipidemia     Hypertension     Memory loss     Migraine     Seizures     Status post placement of implantable loop recorder     Stroke     MARCH AND      Past Surgical History:   Procedure Laterality Date    BREAST BIOPSY      COLONOSCOPY N/A 2024    Procedure: COLONOSCOPY into cecum and terminal ileum;  Surgeon: Matt White MD;  Location:   [facility];  Service: Gastroenterology;  Laterality: N/A;  Pre: Screening, history of colitis   Post: Diverticulosis and Hemorrhoids    D & C HYSTEROSCOPY ENDOMETRIAL ABLATION N/A 06/10/2022    Procedure: DILATATION AND CURETTAGE HYSTEROSCOPY NOVASURE ENDOMETRIAL ABLATION;  Surgeon: Ernesto Mendosa MD;  Location: [facility] MAIN OR;  Service: Obstetrics/Gynecology;  Laterality: N/A;    FOOT SURGERY      pins in left foot     HYSTERECTOMY  07/16/2024    INSERT / REPLACE / REMOVE PACEMAKER  04/01/2022    Dr. Cristofer Chamorro, LOOP RECORDER    OTHER SURGICAL HISTORY      VNS plate 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    NOÉ      04/2022    TUBAL ABDOMINAL LIGATION      VAGUS NERVE STIMULATOR IMPLANTATION        General Information       Row Name 04/02/25 1456          Physical Therapy Time and Intention    Document Type evaluation  -MS (r) ZT (t) MS (c)     Mode of Treatment individual therapy;physical therapy  -MS (r) ZT (t) MS (c)       Row Name 04/02/25 1456          General Information    Patient Profile Reviewed yes  -MS (r) ZT (t) MS (c)     Prior Level of Function independent:  -MS (r) ZT (t) MS (c)     Existing Precautions/Restrictions fall;seizures  -MS (r) ZT (t) MS (c)     Barriers to Rehab medically complex;previous functional deficit  -MS (r) ZT (t) MS (c)       Row Name 04/02/25 1456          Living Environment    Current Living Arrangements other (see comments)  trailer  -MS (r) ZT (t) MS (c)     People in Home spouse  -MS (r) ZT (t) MS (c)       Row Name 04/02/25 1456          Home Main Entrance    Number of Stairs, Main Entrance eight  -MS (r) ZT (t) MS (c)     Stair Railings, Main Entrance railings on both sides of stairs  -MS (r) ZT (t) MS (c)       Row Name 04/02/25 1456          Stairs Within Home, Primary    Number of Stairs, Within Home, Primary none  -MS (r) ZT (t) MS (c)       Row Name 04/02/25 1456          Cognition    Orientation Status (Cognition) oriented x 4  -MS (r) ZT (t) MS  (c)       Row Name 04/02/25 1456          Safety Issues/Impairments Affecting Functional Mobility    Safety Issues Affecting Function (Mobility) awareness of need for assistance;sequencing abilities;problem-solving;judgment  -MS (r) ZT (t) MS (c)     Impairments Affecting Function (Mobility) endurance/activity tolerance;strength;coordination  -MS (r) ZT (t) MS (c)     Comment, Safety Issues/Impairments (Mobility) Gait belt and non-skid socks donned for safety.  -MS (r) ZT (t) MS (c)               User Key  (r) = Recorded By, (t) = Taken By, (c) = Cosigned By      Initials Name Provider Type    MS MichelConstance, PT Physical Therapist    ZT Hussain Garay, PT Student PT Student                   Mobility       Row Name 04/02/25 1500          Bed Mobility    Bed Mobility supine-sit  -MS (r) ZT (t) MS (c)     Supine-Sit [place] (Bed Mobility) standby assist  -MS (r) ZT (t) MS (c)     Assistive Device (Bed Mobility) head of bed elevated  -MS (r) ZT (t) MS (c)       Row Name 04/02/25 1500          Sit-Stand Transfer    Sit-Stand [place] (Transfers) standby assist  -MS (r) ZT (t) MS (c)     Assistive Device (Sit-Stand Transfers) --  no AD  -MS (r) ZT (t) MS (c)     Comment, (Sit-Stand Transfer) STS x3. Cued for sequencing and eccentric control with sitting.  -MS (r) ZT (t) MS (c)       Row Name 04/02/25 1500          Gait/Stairs (Locomotion)    [place] Level (Gait) contact guard  -MS (r) ZT (t) MS (c)     Assistive Device (Gait) --  no AD  -MS (r) ZT (t) MS (c)     Patient was able to Ambulate yes  -MS (r) ZT (t) MS (c)     Distance in Feet (Gait) 35  -MS (r) ZT (t) MS (c)     Deviations/Abnormal Patterns (Gait) gait speed decreased;ryan decreased;stride length decreased  -MS (r) ZT (t) MS (c)     Comment, (Gait/Stairs) Pt had no LOB with ambulation but performed slowly. Pt c/o double vision and spouse thinks it may be seizure medication.  -MS (r) ZT (t) MS (c)               User Key  (r) = Recorded  By, (t) = Taken By, (c) = Cosigned By      Initials Name Provider Type    Constance Sepulveda MOUNA, PT Physical Therapist    ZT Hussain Garay, PT Student PT Student                   Obj/Interventions       Row Name 04/02/25 1506          Range of Motion Comprehensive    General Range of Motion bilateral lower extremity ROM WFL  -MS (r) ZT (t) MS (c)       Row Name 04/02/25 1506          Strength Comprehensive (MMT)    Comment, General Manual Muscle Testing (MMT) Assessment RLE 5/5, LLE 4/5 grossly  -MS (r) ZT (t) MS (c)       Row Name 04/02/25 1506          Balance    Balance Assessment sitting static balance;sitting dynamic balance;standing static balance;standing dynamic balance  -MS (r) ZT (t) MS (c)     Static Sitting Balance standby assist  -MS (r) ZT (t) MS (c)     Dynamic Sitting Balance standby assist  -MS (r) ZT (t) MS (c)     Position, Sitting Balance sitting edge of bed  -MS (r) ZT (t) MS (c)     Static Standing Balance standby assist  -MS (r) ZT (t) MS (c)     Dynamic Standing Balance contact guard  -MS (r) ZT (t) MS (c)     Position/Device Used, Standing Balance unsupported  -MS (r) ZT (t) MS (c)     Comment, Balance No LOB with ambulation.  -MS (r) ZT (t) MS (c)               User Key  (r) = Recorded By, (t) = Taken By, (c) = Cosigned By      Initials Name Provider Type    MS Michel Constance A, PT Physical Therapist    Hussain Bacon, PT Student PT Student                   Goals/Plan       Row Name 04/02/25 1518          Bed Mobility Goal 1 (PT)    Activity/Assistive Device (Bed Mobility Goal 1, PT) bed mobility activities, all  -MS (r) ZT (t) MS (c)     [place] Level/Cues Needed (Bed Mobility Goal 1, PT) independent  -MS (r) ZT (t) MS (c)     Time Frame (Bed Mobility Goal 1, PT) long term goal (LTG);1 week  -MS (r) ZT (t) MS (c)       Row Name 04/02/25 1518          Transfer Goal 1 (PT)    Activity/Assistive Device (Transfer Goal 1, PT) sit-to-stand/stand-to-sit  -MS (r) ZT (t) MS (c)      [place] Level/Cues Needed (Transfer Goal 1, PT) independent  -MS (r) ZT (t) MS (c)     Time Frame (Transfer Goal 1, PT) long term goal (LTG);1 week  -MS (r) ZT (t) MS (c)       Row Name 04/02/25 1518          Gait Training Goal 1 (PT)    Activity/Assistive Device (Gait Training Goal 1, PT) gait (walking locomotion)  -MS (r) ZT (t) MS (c)     [place] Level (Gait Training Goal 1, PT) independent  -MS (r) ZT (t) MS (c)     Distance (Gait Training Goal 1, PT) 100 feet  -MS (r) ZT (t) MS (c)     Time Frame (Gait Training Goal 1, PT) long term goal (LTG);1 week  -MS (r) ZT (t) MS (c)               User Key  (r) = Recorded By, (t) = Taken By, (c) = Cosigned By      Initials Name Provider Type    Constance Sepulveda, PT Physical Therapist    ZT Hussain Garay, PT Student PT Student                   Clinical Impression       Row Name 04/02/25 1501          Pain    Pretreatment Pain Rating 0/10 - no pain  -MS (r) ZT (t) MS (c)     Posttreatment Pain Rating 0/10 - no pain  -MS (r) ZT (t) MS (c)     Pre/Posttreatment Pain Comment No pain but c/o L chest discomfort  -MS (r) ZT (t) MS (c)       Row Name 04/02/25 1501          Plan of Care Review    Plan of Care Reviewed With patient;spouse  -MS (r) ZT (t) MS (c)     Outcome Evaluation Pt is a 43 y/o F who presents to [facility] with L arm weakness and L facial droop. Pt lives in a trailer with spouse with 8 MALIKA. At baseline, pt is (I) with functional mobility. Pt achieved EOB with SBA and stood wiht no AD. Pt had some c/o of L chest discomfort and RN was notified. Pt ambulated 35 feet CGA with no AD and no LOB. Per spouse, pt is not at baseline with functional mobility an usuaally ambulates faster. Discussed with pt and family the importance of an active lifestyle when at home and they were in agreement. Pt would further benefit from skilled IP PT to address functional mobility deficits. Anticipate d/c to home with assist and possibly OP PT pendign progress and when  medically cleared. Will continue to follow.  -MS (r) ZT (t) MS (c)       Row Name 04/02/25 1501          Therapy Assessment/Plan (PT)    Rehab Potential (PT) good  -MS (r) ZT (t) MS (c)     Criteria for Skilled Interventions Met (PT) yes;meets criteria  -MS (r) ZT (t) MS (c)     Therapy Frequency (PT) 3 times/wk  -MS (r) ZT (t) MS (c)       Row Name 04/02/25 1501          Positioning and Restraints    Pre-Treatment Position in bed  -MS (r) ZT (t) MS (c)     Post Treatment Position chair  -MS (r) ZT (t) MS (c)     In Chair notified nsg;with family/caregiver;exit alarm on;encouraged to call for assist;call light within reach;sitting  -MS (r) ZT (t) MS (c)               User Key  (r) = Recorded By, (t) = Taken By, (c) = Cosigned By      Initials Name Provider Type    Constance Sepulveda, PT Physical Therapist    ZT Hussain Garay, PT Student PT Student                   Outcome Measures       Row Name 04/02/25 9381          How much help from another person do you currently need...    Turning from your back to your side while in flat bed without using bedrails? 4  -MS (r) ZT (t) MS (c)     Moving from lying on back to sitting on the side of a flat bed without bedrails? 4  -MS (r) ZT (t) MS (c)     Moving to and from a bed to a chair (including a wheelchair)? 3  -MS (r) ZT (t) MS (c)     Standing up from a chair using your arms (e.g., wheelchair, bedside chair)? 3  -MS (r) ZT (t) MS (c)     Climbing 3-5 steps with a railing? 2  -MS (r) ZT (t) MS (c)     To walk in hospital room? 3  -MS (r) ZT (t) MS (c)     AM-PAC 6 Clicks Score (PT) 19  -MS (r) ZT (t)     Highest Level of Mobility Goal 6 --> Walk 10 steps or more  -MS (r) ZT (t)       Row Name 04/02/25 1518          Modified Hunt Scale    Modified [place] Scale 3 - Moderate disability.  Requiring some help, but able to walk without assistance.  -MS (r) ZT (t) MS (c)       Row Name 04/02/25 1518          Functional Assessment    Outcome Measure Options AM-PAC 6  Clicks Basic Mobility (PT);Modified [place]  -MS (r) ZT (t) MS (c)               User Key  (r) = Recorded By, (t) = Taken By, (c) = Cosigned By      Initials Name Provider Type    Constance Sepulveda MOUNA, PT Physical Therapist    ZT Hussain Garay, PT Student PT Student                                 Physical Therapy Education       Title: PT OT SLP Therapies (Done)       Topic: Physical Therapy (Done)       Point: Mobility training (Done)       Learning Progress Summary            Patient Acceptance, E, DU by ZT at 4/2/2025 1519                      Point: Body mechanics (Done)       Learning Progress Summary            Patient Acceptance, E, DU by ZT at 4/2/2025 1519                                      User Key       Initials Effective Dates Name Provider Type Discipline    ZT 01/29/25 -  Hussain Garay, PT Student PT Student PT                  PT Recommendation and Plan     Outcome Evaluation: Pt is a 45 y/o F who presents to [facility] with L arm weakness and L facial droop. Pt lives in a trailer with spouse with 8 MALIKA. At baseline, pt is (I) with functional mobility. Pt achieved EOB with SBA and stood wiht no AD. Pt had some c/o of L chest discomfort and RN was notified. Pt ambulated 35 feet CGA with no AD and no LOB. Per spouse, pt is not at baseline with functional mobility an usuaally ambulates faster. Discussed with pt and family the importance of an active lifestyle when at home and they were in agreement. Pt would further benefit from skilled IP PT to address functional mobility deficits. Anticipate d/c to home with assist and possibly OP PT pendign progress and when medically cleared. Will continue to follow.     Time Calculation:         PT Charges       Row Name 04/02/25 1520             Time Calculation    Start Time 1334  -MS (r) ZT (t) MS (c)      Stop Time 1356  -MS (r) ZT (t) MS (c)      Time Calculation (min) 22 min  -MS (r) ZT (t)      PT Received On 04/02/25  -MS (r) ZT (t) MS (c)      PT - Next  Appointment 04/04/25  -MS (r) ZT (t) MS (c)      PT Goal Re-Cert Due Date 04/09/25  -MS (r) ZT (t) MS (c)                User Key  (r) = Recorded By, (t) = Taken By, (c) = Cosigned By      Initials Name Provider Type    Constance Sepulveda MOUNA, PT Physical Therapist    Hussain Bacon, PT Student PT Student                      PT G-Codes  Outcome Measure Options: AM-PAC 6 Clicks Basic Mobility (PT), Modified [place]  AM-PAC 6 Clicks Score (PT): 19  AM-PAC 6 Clicks Score (OT): 20  Modified [place] Scale: 3 - Moderate disability.  Requiring some help, but able to walk without assistance.  PT Discharge Summary  Anticipated Discharge Disposition (PT): home with assist, home    Hussain Garay, PT Student  4/2/2025

## 2025-04-03 ENCOUNTER — READMISSION MANAGEMENT (OUTPATIENT)
Dept: CALL CENTER | Facility: HOSPITAL | Age: 45
End: 2025-04-03
Payer: MEDICARE

## 2025-04-03 VITALS
HEIGHT: 65 IN | BODY MASS INDEX: 30.12 KG/M2 | RESPIRATION RATE: 20 BRPM | OXYGEN SATURATION: 97 % | DIASTOLIC BLOOD PRESSURE: 81 MMHG | TEMPERATURE: 98.2 F | HEART RATE: 97 BPM | WEIGHT: 180.78 LBS | SYSTOLIC BLOOD PRESSURE: 118 MMHG

## 2025-04-03 DIAGNOSIS — G40.309 GENERALIZED NONCONVULSIVE EPILEPSY: ICD-10-CM

## 2025-04-03 LAB
ALBUMIN SERPL-MCNC: 3.5 G/DL (ref 3.5–5.2)
ANION GAP SERPL CALCULATED.3IONS-SCNC: 10.4 MMOL/L (ref 5–15)
BUN SERPL-MCNC: 7 MG/DL (ref 6–20)
BUN/CREAT SERPL: 12.1 (ref 7–25)
CALCIUM SPEC-SCNC: 8.5 MG/DL (ref 8.6–10.5)
CHLORIDE SERPL-SCNC: 102 MMOL/L (ref 98–107)
CO2 SERPL-SCNC: 20.6 MMOL/L (ref 22–29)
CREAT SERPL-MCNC: 0.58 MG/DL (ref 0.57–1)
DEPRECATED RDW RBC AUTO: 45.6 FL (ref 37–54)
EGFRCR SERPLBLD CKD-EPI 2021: 113.9 ML/MIN/1.73
ERYTHROCYTE [DISTWIDTH] IN BLOOD BY AUTOMATED COUNT: 16 % (ref 12.3–15.4)
FERRITIN SERPL-MCNC: 26.6 NG/ML (ref 13–150)
FOLATE SERPL-MCNC: 3.44 NG/ML (ref 4.78–24.2)
GLUCOSE SERPL-MCNC: 82 MG/DL (ref 65–99)
HCT VFR BLD AUTO: 32.5 % (ref 34–46.6)
HCYS SERPL-MCNC: 8.4 UMOL/L (ref 0–15)
HGB BLD-MCNC: 10.5 G/DL (ref 12–15.9)
IRON 24H UR-MRATE: 31 MCG/DL (ref 37–145)
IRON SATN MFR SERPL: 8 % (ref 20–50)
MAGNESIUM SERPL-MCNC: 1.9 MG/DL (ref 1.6–2.6)
MCH RBC QN AUTO: 25.1 PG (ref 26.6–33)
MCHC RBC AUTO-ENTMCNC: 32.3 G/DL (ref 31.5–35.7)
MCV RBC AUTO: 77.8 FL (ref 79–97)
PHOSPHATE SERPL-MCNC: 3 MG/DL (ref 2.5–4.5)
PLATELET # BLD AUTO: 294 10*3/MM3 (ref 140–450)
PMV BLD AUTO: 9.4 FL (ref 6–12)
POTASSIUM SERPL-SCNC: 3.5 MMOL/L (ref 3.5–5.2)
RBC # BLD AUTO: 4.18 10*6/MM3 (ref 3.77–5.28)
SODIUM SERPL-SCNC: 133 MMOL/L (ref 136–145)
TIBC SERPL-MCNC: 401 MCG/DL (ref 298–536)
TRANSFERRIN SERPL-MCNC: 269 MG/DL (ref 200–360)
WBC NRBC COR # BLD AUTO: 7.28 10*3/MM3 (ref 3.4–10.8)

## 2025-04-03 PROCEDURE — C9254 INJECTION, LACOSAMIDE: HCPCS | Performed by: STUDENT IN AN ORGANIZED HEALTH CARE EDUCATION/TRAINING PROGRAM

## 2025-04-03 PROCEDURE — 83921 ORGANIC ACID SINGLE QUANT: CPT | Performed by: INTERNAL MEDICINE

## 2025-04-03 PROCEDURE — 82746 ASSAY OF FOLIC ACID SERUM: CPT | Performed by: INTERNAL MEDICINE

## 2025-04-03 PROCEDURE — 83540 ASSAY OF IRON: CPT | Performed by: INTERNAL MEDICINE

## 2025-04-03 PROCEDURE — 85027 COMPLETE CBC AUTOMATED: CPT | Performed by: INTERNAL MEDICINE

## 2025-04-03 PROCEDURE — 80069 RENAL FUNCTION PANEL: CPT | Performed by: INTERNAL MEDICINE

## 2025-04-03 PROCEDURE — 83735 ASSAY OF MAGNESIUM: CPT | Performed by: INTERNAL MEDICINE

## 2025-04-03 PROCEDURE — 82728 ASSAY OF FERRITIN: CPT | Performed by: INTERNAL MEDICINE

## 2025-04-03 PROCEDURE — 97112 NEUROMUSCULAR REEDUCATION: CPT

## 2025-04-03 PROCEDURE — 99232 SBSQ HOSP IP/OBS MODERATE 35: CPT

## 2025-04-03 PROCEDURE — 97535 SELF CARE MNGMENT TRAINING: CPT

## 2025-04-03 PROCEDURE — 25010000002 CYANOCOBALAMIN PER 1000 MCG: Performed by: NURSE PRACTITIONER

## 2025-04-03 PROCEDURE — 84466 ASSAY OF TRANSFERRIN: CPT | Performed by: INTERNAL MEDICINE

## 2025-04-03 PROCEDURE — 99231 SBSQ HOSP IP/OBS SF/LOW 25: CPT | Performed by: INTERNAL MEDICINE

## 2025-04-03 PROCEDURE — 83090 ASSAY OF HOMOCYSTEINE: CPT | Performed by: INTERNAL MEDICINE

## 2025-04-03 PROCEDURE — 25010000002 LACOSAMIDE 200 MG/20ML SOLUTION: Performed by: STUDENT IN AN ORGANIZED HEALTH CARE EDUCATION/TRAINING PROGRAM

## 2025-04-03 RX ORDER — FOLIC ACID 1 MG/1
1 TABLET ORAL DAILY
Qty: 30 TABLET | Refills: 0 | Status: SHIPPED | OUTPATIENT
Start: 2025-04-03

## 2025-04-03 RX ORDER — RIBOFLAVIN (VITAMIN B2) 100 MG
400 TABLET ORAL DAILY
Qty: 120 EACH | Refills: 0 | Status: SHIPPED | OUTPATIENT
Start: 2025-04-03

## 2025-04-03 RX ORDER — FERROUS GLUCONATE 324(38)MG
324 TABLET ORAL
Qty: 30 TABLET | Refills: 0 | Status: SHIPPED | OUTPATIENT
Start: 2025-04-03

## 2025-04-03 RX ORDER — PANTOPRAZOLE SODIUM 40 MG/1
40 TABLET, DELAYED RELEASE ORAL DAILY
Qty: 30 TABLET | Refills: 0 | Status: SHIPPED | OUTPATIENT
Start: 2025-04-03

## 2025-04-03 RX ORDER — LANOLIN ALCOHOL/MO/W.PET/CERES
1000 CREAM (GRAM) TOPICAL DAILY
Qty: 30 TABLET | Refills: 0 | Status: SHIPPED | OUTPATIENT
Start: 2025-04-03

## 2025-04-03 RX ADMIN — LACOSAMIDE 200 MG: 10 INJECTION INTRAVENOUS at 01:33

## 2025-04-03 RX ADMIN — ACETAMINOPHEN 650 MG: 325 TABLET, FILM COATED ORAL at 04:40

## 2025-04-03 RX ADMIN — LACOSAMIDE 200 MG: 10 INJECTION INTRAVENOUS at 12:23

## 2025-04-03 RX ADMIN — PANTOPRAZOLE SODIUM 40 MG: 40 TABLET, DELAYED RELEASE ORAL at 06:07

## 2025-04-03 RX ADMIN — ASPIRIN 81 MG CHEWABLE TABLET 81 MG: 81 TABLET CHEWABLE at 09:43

## 2025-04-03 RX ADMIN — CYANOCOBALAMIN 1000 MCG: 1000 INJECTION INTRAMUSCULAR; SUBCUTANEOUS at 09:47

## 2025-04-03 RX ADMIN — APIXABAN 5 MG: 5 TABLET, FILM COATED ORAL at 09:43

## 2025-04-03 RX ADMIN — MAGNESIUM OXIDE TAB 400 MG (240 MG ELEMENTAL MG) 400 MG: 400 (240 MG) TAB at 09:42

## 2025-04-03 RX ADMIN — Medication 400 MG: at 09:43

## 2025-04-03 NOTE — DISCHARGE SUMMARY
Date of Admission: 3/30/2025  Date of Discharge:  4/3/2025  Primary Care Physician: Annalee Huddleston APRN     Discharge Diagnosis:  Active Hospital Problems    Diagnosis  POA    **Stroke-like symptoms [R29.90]  Yes    S/P placement of VNS (vagus nerve stimulation) device [Z96.89]  Not Applicable    Observed sleep apnea [G47.30]  Yes    History of stroke [Z86.73]  Not Applicable    HTN (hypertension) [I10]  Yes    Epilepsy [G40.909]  Yes      Resolved Hospital Problems   No resolved problems to display.       Presenting Problem/History of Present Illness from H&P:  Chronic anticoagulation [Z79.01]  Left leg weakness [R29.898]  Paresthesia of left arm and leg [R20.2]  Stroke-like symptoms [R29.90]     Ms. Xiao is a 44 y.o. woman with epilepsy, hypertension, hyperlipidemia, TULIO, history of recurrent embolic CVA of unclear origin with no residual deficits chronically anticoagulated with eliquis who presents with left sided facial droop and left arm/leg weakness which she first noticed upon waking this morning. Last known normal was at 2 AM and she hasn't missed any doses of eliquis recently so she wasn't a TNK candidate. She reports decreased sensation on the left as well.     Hospital Course:  The patient is a 44 y.o. female who presented with left-sided hemiparesis.  She was admitted and was evaluated by neurology oncology and cardiology services.  She either had Godfrey's paralysis or hemiplegic migraine.  She does have a history of recurrent embolic CVA.  There is no new stroke on MRI.  She underwent EEG due to seizures.  Neurology recommended addition of magnesium and riboflavin to her regimen for migraine.  She had missed some of her home antiepileptics contributing to this seizure here.  She needs to continue her home AED regimen at discharge and will follow-up with neurology in clinic.    Receiving B12 replacement for deficiency.  Folate is low as well.  Have ordered replacement for that.  MMA and homocystine  are pending.  Repeat level in 2 to 3 months was recommended.    Iron saturation is low at 8% with a ferritin of 26.  There were no overt GI losses here.  Will order iron at discharge.    She had atypical chest pain.  No acute ischemic evaluation was needed and her symptoms improved.  This was either musculoskeletal or GI in origin.  She is going to continue the Protonix at discharge.    Exam Today:  Constitutional:       General: She is not in acute distress.     Appearance: She is not diaphoretic.   Cardiovascular:      Rate and Rhythm: Normal rate and regular rhythm.   Pulmonary:      Effort: Pulmonary effort is normal.      Breath sounds: No wheezing.   Abdominal:      General: There is no distension.      Palpations: Abdomen is soft.      Tenderness: There is no abdominal tenderness.   Musculoskeletal:         General: No tenderness.      Right lower leg: No edema.      Left lower leg: No edema.   Neurological:      Mental Status: She is alert.      Sensory: Sensory deficit (left sided) present.   Psychiatric:         Mood and Affect: Mood normal.         Behavior: Behavior normal.     Results:  EEG Continuous Monitoring With Video  Addendum Date: 4/2/2025  Correction: end time was 0610  and Date  of onset  was  4/1/2025    Result Date: 4/2/2025  Table formatting from the original result was not included. Date of onset: April 2, 2025 Date of offset: April 2, 2025 Indication: Recurrent seizure activity  Technical description:  This is a 21 channel digital EEG recording that began on 1855 and ended on 1610. Background:  Up to 9 Hz alpha activity is noted over the posterior head regions that is symmetric, well formed and reactive to eye closure.  The patient enters the drowsy state and sleeps during the record.  Sleep activities are symmetric and contain sleep spindles, and K complexes.  Hyperventilation was not performed and photic stimulation was not  performed.  There are no marked continuous asymmetries between  the two hemispheres.  No interictal epileptiform activity is present. The EKG monitor shows a heart rate that varies within a range of 90  to 120 bpm. Clinical Interpretation:  This EEG recording is normal.  No potentially epileptogenic activity, seizure activity, or focal slowing is present.    EEG  Result Date: 4/2/2025  Table formatting from the original result was not included. EEG Report          # Indication: Left-sided weakness History: 44-year-old woman with history of embolic strokes who presents with left-sided weakness.  The patient has history of epilepsy.  Study includes time locked video Medical diagnoses: History of left MCA embolic stroke, migraines, memory loss, COPD, hypertension, hyperlipidemia Current Facility-Administered Medications Medication Dose Route Frequency Provider Last Rate Last Admin  acetaminophen (TYLENOL) tablet 650 mg  650 mg Oral Q4H PRN Franck Martinez MD   650 mg at 04/02/25 0305  Or  acetaminophen (TYLENOL) suppository 650 mg  650 mg Rectal Q4H PRN Franck Martinez MD      amitriptyline (ELAVIL) tablet 25 mg  25 mg Oral Nightly Franck Martinez MD   25 mg at 04/01/25 2026  apixaban (ELIQUIS) tablet 5 mg  5 mg Oral BID Yari Lomas APRN   5 mg at 04/01/25 2026  aspirin chewable tablet 81 mg  81 mg Oral Daily Yari Lomas APRN      atorvastatin (LIPITOR) tablet 80 mg  80 mg Oral Nightly Franck Martinez MD   80 mg at 04/01/25 2026  bisacodyl (DULCOLAX) suppository 10 mg  10 mg Rectal Daily PRN Franck Martinez MD      Cenobamate tablet 100 mg  100 mg Oral Nightly Juwan Carrillo MD      Eslicarbazepine Acetate tablet 400 mg  400 mg Oral Daily Juwan Carrillo MD   400 mg at 04/01/25 1356  lacosamide (VIMPAT) injection 200 mg  200 mg Intravenous Q12H Franck Martinez MD   200 mg at 04/02/25 0302  LORazepam (ATIVAN) injection 2 mg  2 mg Intravenous Q5 Min PRN Franck Martinez MD   2 mg at 04/01/25 1258  Non-Formulary / Patient Supplied Medication  1,200 mg Oral Nightly  Juwan Carrillo MD      ondansetron (ZOFRAN) injection 4 mg  4 mg Intravenous Q6H PRN Franck Martinez MD   4 mg at 04/01/25 2026  pantoprazole (PROTONIX) EC tablet 40 mg  40 mg Oral Q AM Franck Martinez MD   40 mg at 04/02/25 0622  sodium chloride 0.9 % flush 10 mL  10 mL Intravenous PRN Dennis Rahman MD      sodium chloride 0.9 % infusion  100 mL/hr Intravenous Continuous Zakia Garcia APRN 100 mL/hr at 03/31/25 2339 100 mL/hr at 03/31/25 2339 Time of study: 21 minutes 42 seconds Technical summary: The 10-20 system was used for electrode placement  Background: The background rhythm was composed of low amplitude 9 Hz poorly regulated poorly sustained activity.  There does seem to be some reactivity.  There is superimposed low amplitude diffuse beta activity more noticeable after the patient was given a sedate of medication.  Sleep: The patient became drowsy noted by attenuation of the background with diffusely slower activities intervening.  There were occasional vertex sharp transients without sleep spindles  Hyperventilation: Not obtained  Photic stimulation: Not obtained  EKG: Sinus rhythm in the 90s  Video: No unusual involuntary movements were seen on the video clips reviewed Impression: Normal EEG during wakefulness and drowsiness.  No focal or epileptiform activities were seen. Dictated utilizing Dragon dictation.      XR Chest 1 View  Result Date: 4/1/2025  XR CHEST 1 VW-4/1/2025  HISTORY: Chest pain.  Heart size is within normal limits. Lungs appear free of acute infiltrates. Loop recorder overlies the left hemithorax. Small electronic device with its lead coursing into the base of the left neck is again seen. The tip of the lead is not included in the field-of-view. Bony structures appear unremarkable.      1. No acute process.   This report was finalized on 4/1/2025 5:14 PM by Dr. Rivera Wayne M.D on Workstation: [id number]      MRI Brain With & Without Contrast  Result Date:  4/1/2025  MRI BRAIN W WO CONTRAST-  HISTORY:  r/o stroke; R29.90-Unspecified symptoms and signs involving the nervous system; R29.898-Other symptoms and signs involving the musculoskeletal system; R20.2-Paresthesia of skin; Z79.01-Long term (current) use of anticoagulants  COMPARISON: CT head 4/1/2025 and MRI brain 11/25/2024  TECHNIQUE: A MRI examination of the brain was performed utilizing sagittal T1, axial diffusion, T1, T2, T2 FLAIR, coronal T2 as well as sagittal, axial and coronal T1 postcontrast weighted sequences. The study is hampered in that only the T/R coil was able to be utilized. Moving patient protocol had to be utilized.  FINDINGS: There is no evidence of restricted diffusion to suggest acute infarction. The brain ventricles are symmetrical. There is expected flow void in the basilar artery and in the distal aspect of the internal carotid arteries bilaterally. A small remote infarct involving the left occipital lobe inferiorly and a remote infarct involving the left frontal lobe superolaterally is appreciated, present previously. Also present previously is a small cortical infarct involving the right middle frontal gyrus superolaterally and small remote infarct involving the cerebral hemispheres bilaterally. There was no evidence of abnormal enhancement after contrast administration.      The study is hampered by patient motion. There is no evidence of acute infarction. Remote left frontal and left occipital infarcts are appreciated. The left frontal infarct was acute on the MRI examination of 4/9/2024. The remote left occipital infarct was present previously and is where the small remote cerebellar infarcts bilaterally and the small remote infarct involving the right middle frontal gyrus superolaterally.  This report was finalized on 4/1/2025 3:45 PM by Dr. Crow Norman M.D on Workstation: [id number]      CT Head Without Contrast  Result Date: 4/1/2025  CT HEAD WO CONTRAST-  HISTORY:  evaluate  stability; R29.90-Unspecified symptoms and signs involving the nervous system; R29.898-Other symptoms and signs involving the musculoskeletal system; R20.2-Paresthesia of skin; Z79.01-Long term (current) use of anticoagulants  COMPARISON: CT head 3/30/2025 and MRI brain 11/25/2024  FINDINGS: The brain and ventricles are symmetrical. There is no active hemorrhage, hydrocephalus or of acute infarction. Mild vascular calcification and small vessel ischemic disease is noted. A remote infarct involving the left frontal lobe laterally as well as a remote infarct involving the inferior aspect of the left occipital lobe is appreciated, present previously. Further evaluation could be performed with a MRI examination of the brain as indicated.      Radiation dose reduction techniques were utilized, including automated exposure modulation based on body size.  This report was finalized on 4/1/2025 10:32 AM by Dr. Crow Norman M.D on Workstation: [id number]      EEG  Result Date: 3/30/2025  Table formatting from the original result was not included. Indication: seizure  Date of onset: 3/30/25 at 1636 Date of offset: 3/30/25 at 1702  Technical description:  This is a 21-channel digital EEG recording with time-locked video and single-channel electrocardiogram. Electrodes are placed according to the 10 to 20 International System.  EEG Description:  Up to 9 Hz alpha activity is present over the posterior head regions that is symmetric, well formed and reactive to eye closure.  The patient is   awake  during the study.  Hyperventilation was not performed and photic stimulation was performed.   There are intermittent left frontal interictal epileptiform discharges, cannot rule out bifrontal  involvement as well.  No electrographic seizures are present.  The EKG monitor shows a heart rate that varies within normal limits.  Impression/Clinical Correlation:  This routine EEG recording is abnormal due to the presence of left frontal,  cannot rule out  bifrontal, interictal epileptiform discharges.  The results of this study likely indicate focal hypersynchrony and a predilection to focal onset seizures originating in the left frontal or bifrontal head regions.  Alternatively, these discharges may be fragmented generalized discharges due to the patient's history of primary generalized epilepsy with generalized discharges seen on previous EEGs. It is difficult to localize  based  on  this  EEG  alone.  No electrographic seizures are present during this recording.  Careful clinical and radiographic correlation is advised.         CT Angiogram Head w AI Analysis of LVO  Result Date: 3/30/2025  CT ANGIOGRAM HEAD W AI ANALYSIS OF LVO-, CT ANGIOGRAM NECK-  INDICATION: Stroke follow-up. Not a TNK candidate.  COMPARISON: CTA head and neck and CT head April 8, 2024  TECHNIQUE: Noncontrast head CT. CTA head and neck with IV contrast. Coronal and sagittal reformats. Three dimensional reconstructions. Radiation dose reduction techniques were utilized, including automated exposure control and exposure modulation based on body size.  FINDINGS:  Noncontrast head CT: No intraparenchymal hemorrhage. Small area of hypoattenuation and volume loss seen within the left anterosuperior frontal lobe, consistent with encephalomalacia from prior infarct. Small area of hypoattenuation and volume loss seen within the left occipital lobe, consistent with encephalomalacia from prior infarct. No mass effect or midline shift. No hydrocephalus. No intraventricular hemorrhage. No extra-axial hemorrhage or fluid collection. No fracture or bone lesion. Patent mastoid air cells and middle ears. Patent paranasal sinuses.  CTA NECK: Right common and internal carotid artery are patent, without a stenosis. Left common and internal carotid artery are patent, without a stenosis. Venous contamination obscures the proximal right vertebral artery. Visualized portions of the vertebral arteries  are patent, without stenosis seen. No dissection.  CTA HEAD: Right internal carotid, middle and anterior cerebral arteries appear patent. Left internal carotid, middle and anterior cerebral arteries appear patent. Bilateral vertebral arteries, posterior inferior cerebellar arteries, basilar artery, superior cerebellar arteries and posterior cerebral arteries appear patent. No aneurysm identified. Dural sinuses appear patent.  Other: Airways wall thickening and some emphysematous changes seen in the upper lungs. Small thyroid nodules.       1. Noncontrast head CT demonstrates encephalomalacia in the left anterior superior frontal lobe and left occipital lobe, consistent with prior infarcts. No intraparenchymal hemorrhage. 2. No large vessel occlusion seen.  This report was finalized on 3/30/2025 11:22 AM by Dr. Matt Carpenter M.D on Workstation: [id number]      CT Angiogram Neck  Result Date: 3/30/2025  CT ANGIOGRAM HEAD W AI ANALYSIS OF LVO-, CT ANGIOGRAM NECK-  INDICATION: Stroke follow-up. Not a TNK candidate.  COMPARISON: CTA head and neck and CT head April 8, 2024  TECHNIQUE: Noncontrast head CT. CTA head and neck with IV contrast. Coronal and sagittal reformats. Three dimensional reconstructions. Radiation dose reduction techniques were utilized, including automated exposure control and exposure modulation based on body size.  FINDINGS:  Noncontrast head CT: No intraparenchymal hemorrhage. Small area of hypoattenuation and volume loss seen within the left anterosuperior frontal lobe, consistent with encephalomalacia from prior infarct. Small area of hypoattenuation and volume loss seen within the left occipital lobe, consistent with encephalomalacia from prior infarct. No mass effect or midline shift. No hydrocephalus. No intraventricular hemorrhage. No extra-axial hemorrhage or fluid collection. No fracture or bone lesion. Patent mastoid air cells and middle ears. Patent paranasal sinuses.  CTA NECK: Right  common and internal carotid artery are patent, without a stenosis. Left common and internal carotid artery are patent, without a stenosis. Venous contamination obscures the proximal right vertebral artery. Visualized portions of the vertebral arteries are patent, without stenosis seen. No dissection.  CTA HEAD: Right internal carotid, middle and anterior cerebral arteries appear patent. Left internal carotid, middle and anterior cerebral arteries appear patent. Bilateral vertebral arteries, posterior inferior cerebellar arteries, basilar artery, superior cerebellar arteries and posterior cerebral arteries appear patent. No aneurysm identified. Dural sinuses appear patent.  Other: Airways wall thickening and some emphysematous changes seen in the upper lungs. Small thyroid nodules.       1. Noncontrast head CT demonstrates encephalomalacia in the left anterior superior frontal lobe and left occipital lobe, consistent with prior infarcts. No intraparenchymal hemorrhage. 2. No large vessel occlusion seen.  This report was finalized on 3/30/2025 11:22 AM by Dr. Matt Carpenter M.D on Workstation: [id number]      XR Chest 1 View  Result Date: 3/30/2025  Portable chest radiograph  HISTORY: Stroke  TECHNIQUE: Single AP portable radiograph of the chest  COMPARISON: Gastrograph 3/5/2023      FINDINGS AND IMPRESSION: Left-sided stimulator is present with lead coursing in the left neck and out of the field-of-view superiorly. There is also presumed loop recorder.  Lungs are hypoinflated. No pulmonary consolidation, pleural effusion or pneumothorax is seen. Cardiac silhouette within normal limits for size.  This report was finalized on 3/30/2025 11:19 AM by Dr. Julian Rush M.D on Workstation: [id number]         TTE    Left ventricular systolic function is normal. Calculated left ventricular EF = 69.9% Normal left ventricular cavity size noted. Left ventricular wall thickness is consistent with mild concentric hypertrophy.  All left ventricular wall segments contract normally. Left ventricular diastolic function is consistent with (grade I) impaired relaxation.    Normal left atrial cavity size noted. Saline test results are negative.    There is severe, bileaflet mitral valve thickening present. The posterior mitral valve leaflet has decreased excursion Moderate mitral valve regurgitation is present with a posteriorly-directed jet noted. Mild mitral valve stenosis is present. The mean gradient is 5 mmHg with a HR of 84bpm    Procedures Performed:         Consults:   Consults       Date and Time Order Name Status Description    4/2/2025  9:57 AM Inpatient Cardiology Consult Completed     3/31/2025  4:47 PM Hematology & Oncology Inpatient Consult Completed     3/31/2025  4:12 PM Inpatient Cardiology Consult      3/30/2025 12:10 PM [facility] (on-call MD unless specified) Details      3/30/2025 10:41 AM Inpatient Neurology Consult Stroke Completed     3/30/2025 10:41 AM Inpatient Neurology Consult Stroke Completed              Discharge Disposition:  Home or Self Care    Discharge Medications:     Discharge Medications        New Medications        Instructions Start Date   ferrous gluconate 324 MG tablet  Commonly known as: FERGON   324 mg, Oral, Daily With Breakfast      folic acid 1 MG tablet  Commonly known as: FOLVITE   1 mg, Oral, Daily      magnesium oxide 400 tablet tablet  Commonly known as: MAG-OX   400 mg, Oral, Daily      pantoprazole 40 MG EC tablet  Commonly known as: PROTONIX   40 mg, Oral, Daily      vitamin B-12 1000 MCG tablet  Commonly known as: CYANOCOBALAMIN   1,000 mcg, Oral, Daily      Vitamin B-2 100 MG tablet tablet  Commonly known as: RIBOFLAVIN   400 mg, Oral, Daily             Changes to Medications        Instructions Start Date   Aptiom 400 MG tablet  Generic drug: Eslicarbazepine Acetate  What changed: additional instructions   400 mg, Oral, Daily, Take with 800 mg tablet for total dose 1,200 mg daily      Aptiom  800 MG tablet tablet  Generic drug: Eslicarbazepine Acetate  What changed: See the new instructions.   800 mg, Oral, Daily, Take with 400 mg tablet for total dose of 1,200 mg daily.      Xcopri 100 MG tablet  Generic drug: Cenobamate  What changed: how much to take   100 mg, Oral, Daily             Continue These Medications        Instructions Start Date   amitriptyline 25 MG tablet  Commonly known as: ELAVIL   25 mg, Oral, Nightly      aspirin 81 MG EC tablet   81 mg, Oral, Daily      atorvastatin 40 MG tablet  Commonly known as: LIPITOR   40 mg, Oral, Daily      Eliquis 5 MG tablet tablet  Generic drug: apixaban   5 mg, Oral, 2 Times Daily      metoprolol succinate XL 25 MG 24 hr tablet  Commonly known as: TOPROL-XL   25 mg, Oral, Daily      Vimpat 200 MG tablet  Generic drug: lacosamide   200 mg, Oral, Every 12 Hours             Stop These Medications      amLODIPine 5 MG tablet  Commonly known as: NORVASC              Discharge Diet:     Activity at Discharge:   Activity Instructions       Activity as Tolerated      Driving Restrictions      Type of Restriction: Driving    Driving Restrictions: No Driving (Time Limited)    Length: Other    Indicate Length of Restriction: No driving until seizure-free for 3 months and cleared by physician            Follow-up Appointments:  Additional Instructions for the Follow-ups that You Need to Schedule       Discharge Follow-up with Specialty: Dr. James, neurology, as scheduled   As directed      Specialty: Dr. James, neurology, as scheduled               Follow-up Information       Annalee Huddleston APRN Follow up.    Specialties: Family Medicine, Nurse Practitioner  Contact information:  [facility] [place] [address]  [phone]                             Test Results Pending at Discharge:  Pending Labs       Order Current Status    Methylmalonic Acid, Serum In process    Phosphatidylserine Antibodies In process             Jayesh Kc,  MD  04/03/25  09:39 EDT    Time Spent on Discharge Activities: >30 minutes    Dictated portions using Dragon dictation software.

## 2025-04-03 NOTE — TELEPHONE ENCOUNTER
Provider: JOSH    Caller: Alexandra Xiao    Relationship to Patient: Self    Pharmacy: [facility]     Phone Number: [phone]    Reason for Call: PT IS CALLING TO GET HER VIMPAT FILLED.  SHE NEEDS TO GET THE BRAND NAME, NOT GENERIC.  PT ONLY HAS 3 PILLS LEFT.      PLEASE REVIEW AND ADVISE     THANK YOU

## 2025-04-03 NOTE — CASE MANAGEMENT/SOCIAL WORK
Case Management Discharge Note      Final Note: Home with family per private auto         Selected Continued Care - Discharged on 4/3/2025 Admission date: 3/30/2025 - Discharge disposition: Home or Self Care      Destination    No services have been selected for the patient.                Durable Medical Equipment    No services have been selected for the patient.                Dialysis/Infusion    No services have been selected for the patient.                Home Medical Care    No services have been selected for the patient.                Therapy    No services have been selected for the patient.                Community Resources    No services have been selected for the patient.                Community & DME    No services have been selected for the patient.                    Transportation Services  Private: Car    Final Discharge Disposition Code: 01 - home or self-care

## 2025-04-03 NOTE — PROGRESS NOTES
LOS: 2 days   Patient Care Team:  Annalee Huddleston APRN as PCP - General (Family Medicine)  Cristofer Chamorro MD as Consulting Physician (Cardiology)  Cb James II, MD as Consulting Physician (Neurology)  Ernesto Mendosa MD as Consulting Physician (Obstetrics and Gynecology)  Reza Lester MD as Cardiologist (Cardiology)  Annalee Huddleston APRN as Referring Physician (Family Medicine)  Marija Peña MD as Consulting Physician (Hematology and Oncology)    Chief Complaint: follow up chest pain, moderate rheumatic mitral regurgitation, mild rheumatic mitral stenosis     Interval History: No acute events overnight. She still has chest discomfort which she reports is unchanged in sensation or intensity.     Vital Signs:  Temp:  [97.7 °F (36.5 °C)-98.8 °F (37.1 °C)] 98.2 °F (36.8 °C)  Heart Rate:  [] 97  Resp:  [18-20] 20  BP: (117-134)/(72-89) 118/81    Intake/Output Summary (Last 24 hours) at 4/3/2025 1113  Last data filed at 4/3/2025 1006  Gross per 24 hour   Intake 220 ml   Output --   Net 220 ml        Physical Exam  Vitals reviewed.   Constitutional:       General: She is not in acute distress.  HENT:      Head: Normocephalic.      Nose: Nose normal.   Eyes:      Extraocular Movements: Extraocular movements intact.      Pupils: Pupils are equal, round, and reactive to light.   Cardiovascular:      Rate and Rhythm: Normal rate and regular rhythm.      Pulses: Normal pulses.      Heart sounds: Normal heart sounds. Heart sounds not distant. No murmur heard.     No friction rub. No gallop. No S3 or S4 sounds.   Pulmonary:      Effort: Pulmonary effort is normal.      Breath sounds: Normal breath sounds.   Abdominal:      General: Abdomen is flat. Bowel sounds are normal.      Palpations: Abdomen is soft.      Tenderness: There is no abdominal tenderness.   Skin:     General: Skin is warm and dry.   Neurological:      General: No focal deficit present.      Mental Status: She is alert and oriented to  person, place, and time. Mental status is at baseline.   Psychiatric:         Mood and Affect: Mood normal.         Behavior: Behavior normal.         Results Review:    Results from last 7 days   Lab Units 04/03/25  0618   SODIUM mmol/L 133*   POTASSIUM mmol/L 3.5   CHLORIDE mmol/L 102   CO2 mmol/L 20.6*   BUN mg/dL 7   CREATININE mg/dL 0.58   GLUCOSE mg/dL 82   CALCIUM mg/dL 8.5*     Results from last 7 days   Lab Units 04/01/25  1756 04/01/25  1700   HSTROP T ng/L 10 10     Results from last 7 days   Lab Units 04/03/25  0618   WBC 10*3/mm3 7.28   HEMOGLOBIN g/dL 10.5*   HEMATOCRIT % 32.5*   PLATELETS 10*3/mm3 294     Results from last 7 days   Lab Units 03/30/25  1041   INR  1.10   APTT seconds 29.1     Results from last 7 days   Lab Units 03/31/25  0516   CHOLESTEROL mg/dL 131     Results from last 7 days   Lab Units 04/03/25  0618   MAGNESIUM mg/dL 1.9     Results from last 7 days   Lab Units 03/31/25  0516   CHOLESTEROL mg/dL 131   TRIGLYCERIDES mg/dL 75   HDL CHOL mg/dL 50   LDL CHOL mg/dL 66       I reviewed the patient's new clinical results.        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   EKG with no ischemic changes.   Troponin negative x 2  Echocardiogram on 3/31/25 showed normal wall motion, normal ejection fraction   Continue aspirin and statin therapy   Former tobacco use  Quit in 2022  Hypertension     Tatiana Dang, KIRSTIE  04/03/25  11:13 EDT

## 2025-04-03 NOTE — CASE MANAGEMENT/SOCIAL WORK
Continued Stay Note  [facility]     Patient Name: Alexandra Xiao  MRN: [id number]  Today's Date: 4/3/2025    Admit Date: 3/30/2025    Plan: Home with assist of family.   Discharge Plan       Row Name 04/03/25 1319       Plan    Plan Home with assist of family.    Patient/Family in Agreement with Plan yes    Plan Comments Spoke with patient at bedside regarding discharge plans/needs. Informed her therapy recommends HH. Patient states she's not interested in HH and prefers outpatient PT. Message sent to MD to obtain order. Provided patient copy of order for outpatient PT. No additional needs noted. Family to transport.....[facility]                   Discharge Codes    No documentation.                 Expected Discharge Date and Time       Expected Discharge Date Expected Discharge Time    Apr 3, 2025               Beryl Crawley RN

## 2025-04-03 NOTE — THERAPY EVALUATION
Patient Name: Alexandra Xiao  : 1980    MRN: [id number]                              Today's Date: 4/3/2025       Admit Date: 3/30/2025    Visit Dx:     ICD-10-CM ICD-9-CM   1. Stroke-like symptoms  R29.90 781.99   2. Left leg weakness  R29.898 729.89   3. Paresthesia of left arm and leg  R20.2 782.0   4. Chronic anticoagulation  Z79.01 V58.61     Patient Active Problem List   Diagnosis    Sebaceous cyst of breast, right    Abnormal uterine bleeding    Microcytic anemia    Transaminitis    Epilepsy    Major depressive disorder    Left-sided weakness    Anemia    Hyponatremia    HTN (hypertension)    History of stroke    Vitamin D deficiency    B12 deficiency    Observed sleep apnea    Snoring    Excessive daytime sleepiness    Class 1 obesity    Cerebrovascular accident (CVA) due to embolism of left middle cerebral artery    S/P placement of VNS (vagus nerve stimulation) device    Abnormal urinalysis    Vaping nicotine dependence, tobacco product    Implantable loop recorder present    Preoperative clearance    Leukocytosis    Sodium (Na) deficiency    Colitis    History of stroke    C. difficile colitis    Giardiasis    Blood per rectum    Colitis, Clostridium difficile    Screening for colorectal cancer    Stroke-like symptoms     Past Medical History:   Diagnosis Date    Abnormal bleeding in menstrual cycle     C. 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    Headache, tension-type     Heavy menstrual bleeding     WITH CLOTS    Hyperlipidemia     Hypertension     Memory loss     Migraine     Seizures     Status post placement of implantable loop recorder     Stroke     MARCH AND      Past Surgical History:   Procedure Laterality Date    BREAST BIOPSY      COLONOSCOPY N/A 2024    Procedure: COLONOSCOPY into cecum and terminal ileum;  Surgeon: Matt White MD;  Location:   [facility];  Service: Gastroenterology;  Laterality: N/A;  Pre: Screening, history of colitis   Post: Diverticulosis and Hemorrhoids    D & C HYSTEROSCOPY ENDOMETRIAL ABLATION N/A 06/10/2022    Procedure: DILATATION AND CURETTAGE HYSTEROSCOPY NOVASURE ENDOMETRIAL ABLATION;  Surgeon: Ernesto Mendosa MD;  Location: [facility] MAIN OR;  Service: Obstetrics/Gynecology;  Laterality: N/A;    FOOT SURGERY      pins in left foot     HYSTERECTOMY  07/16/2024    INSERT / REPLACE / REMOVE PACEMAKER  04/01/2022    Dr. Cristofer Chamorro, LOOP RECORDER    OTHER SURGICAL HISTORY      VNS plate 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    NOÉ      04/2022    TUBAL ABDOMINAL LIGATION      VAGUS NERVE STIMULATOR IMPLANTATION        General Information       Row Name 04/03/25 1005          OT Time and Intention    Document Type therapy note (daily note)  -     Mode of Treatment individual therapy;occupational therapy  -HE     Patient Effort good  -HE     Symptoms Noted During/After Treatment none  -       Row Name 04/03/25 1005          General Information    Existing Precautions/Restrictions fall;seizures  -       Row Name 04/03/25 1005          Cognition    Orientation Status (Cognition) oriented x 4  -       Row Name 04/03/25 1005          Safety Issues/Impairments Affecting Functional Mobility    Impairments Affecting Function (Mobility) coordination;strength  -               User Key  (r) = Recorded By, (t) = Taken By, (c) = Cosigned By      Initials Name Provider Type    Tammy Yang, OT Occupational Therapist                     Mobility/ADL's       Row Name 04/03/25 1007          Bed Mobility    Bed Mobility supine-sit  -     Supine-Sit [place] (Bed Mobility) standby assist  -       Row Name 04/03/25 1007          Transfers    Transfers sit-stand transfer;stand-sit transfer;toilet transfer  -       Row Name 04/03/25 1007          Sit-Stand Transfer    Sit-Stand [place] (Transfers) standby  assist  -       Row Name 04/03/25 1007          Stand-Sit Transfer    Stand-Sit [place] (Transfers) standby assist  -       Row Name 04/03/25 1007          Toilet Transfer    [place] Level (Toilet Transfer) supervision  -HE     Comment, (Toilet Transfer) for safety in restroom  -       Row Name 04/03/25 1007          Functional Mobility    Functional Mobility- Comment close supervision for safety to restroom  -     Patient was able to Ambulate yes  -[facility] Name 04/03/25 1007          Activities of Daily Living    BADL Assessment/Intervention toileting;grooming  -[facility] Name 04/03/25 1007          Toileting Assessment/Training    [place] Level (Toileting) standby assist  -       Row Name 04/03/25 1007          Grooming Assessment/Training    [place] Level (Grooming) supervision;wash face, hands  -HE     Position (Grooming) sink side  -HE               User Key  (r) = Recorded By, (t) = Taken By, (c) = Cosigned By      Initials Name Provider Type     Tammy Lombardo OT Occupational Therapist                   Obj/Interventions       [facility] Name 04/03/25 1009          Sensory Assessment (Somatosensory)    Sensory Assessment pt reports no numbness/tingling to LUE  -[facility] Name 04/03/25 1009          Strength Comprehensive (MMT)    Comment, General Manual Muscle Testing (MMT) Assessment LUE with some weakness compared to RUE  -[facility] Name 04/03/25 1009          Shoulder (Therapeutic Exercise)    Shoulder (Therapeutic Exercise) --  pt trained in seated ther ex with red resistance band to progress general BUE strength and endurance at shoulder, elbow to improve ADLs  -[facility] Name 04/03/25 1009          Motor Skills    Motor Skills coordination  -     Coordination left;dysmetria;finger to nose;fine motor deficit  provided education and training for LUE coordination HEP  -     Therapeutic Exercise shoulder;elbow/forearm;wrist;hand  -[facility] Name 04/03/25  1009          Balance    Comment, Balance SBA - supervision with mobility and transfers for safety  -HE               User Key  (r) = Recorded By, (t) = Taken By, (c) = Cosigned By      Initials Name Provider Type    Tammy Yang OT Occupational Therapist                   Goals/Plan       Row Name 04/03/25 1017          Bathing Goal 1 (OT)    Progress/Outcomes (Bathing Goal 1, OT) goal ongoing  -HE       Row Name 04/03/25 1017          Strength Goal 1 (OT)    Progress/Outcome (Strength Goal 1, OT) goal ongoing  -HE       Row Name 04/03/25 1017          Problem Specific Goal 1 (OT)    Progress/Outcome (Problem Specific Goal 1, OT) goal ongoing  -HE               User Key  (r) = Recorded By, (t) = Taken By, (c) = Cosigned By      Initials Name Provider Type    Tammy Yang OT Occupational Therapist                   Clinical Impression       Row Name 04/03/25 1012          Pain Assessment    Pretreatment Pain Rating 0/10 - no pain  -HE     Posttreatment Pain Rating 0/10 - no pain  -HE       Row Name 04/03/25 1012          Plan of Care Review    Plan of Care Reviewed With patient  -HE     Progress improving  -HE     Outcome Evaluation Pt received supine, agreeable to skilled tx. Pt participates in functional mobility to restroom for toileting and sinkside grooming tasks with SBA-supervision for safety. Pt trained in seated coordination and general BUE strength tasks with emphasis on LUE. Discussed d/c recommendations with pt endorsing continued assist from spouse for more involved ADL/IADL tasks. Pt may benefit from OT services to maximize coordination, strength, balance, activity tolerance and improve home safety given pt's inability to drive 2/2 seizures and spouse working.  -HE       Row Name 04/03/25 1012          Therapy Assessment/Plan (OT)    Rehab Potential (OT) good  -HE     Criteria for Skilled Therapeutic Interventions Met (OT) yes  -HE       Row Name 04/03/25 1012          Therapy Plan  Review/Discharge Plan (OT)    Anticipated Discharge Disposition (OT) home with assist;home with supervision;home with home health;home with outpatient therapy services  -HE       Row Name 04/03/25 1012          Positioning and Restraints    Pre-Treatment Position in bed  -HE     Post Treatment Position bed  -HE     In Chair sitting;call light within reach;exit alarm on;encouraged to call for assist  -HE               User Key  (r) = Recorded By, (t) = Taken By, (c) = Cosigned By      Initials Name Provider Type    Tammy Yang OT Occupational Therapist                   Outcome Measures       Row Name 04/03/25 0200          How much help from another person do you currently need...    Turning from your back to your side while in flat bed without using bedrails? 4  -NB     Moving from lying on back to sitting on the side of a flat bed without bedrails? 4  -NB     Moving to and from a bed to a chair (including a wheelchair)? 3  -NB     Standing up from a chair using your arms (e.g., wheelchair, bedside chair)? 3  -NB     Climbing 3-5 steps with a railing? 3  -NB     To walk in hospital room? 3  -NB     AM-PAC 6 Clicks Score (PT) 20  -NB     Highest Level of Mobility Goal 6 --> Walk 10 steps or more  -NB               User Key  (r) = Recorded By, (t) = Taken By, (c) = Cosigned By      Initials Name Provider Type    Erlinda Wilkerson, RN Registered Nurse                    Occupational Therapy Education       Title: PT OT SLP Therapies (Done)       Topic: Occupational Therapy (Done)       Point: ADL training (Done)       Learning Progress Summary            Patient Acceptance, E,D, VU by  at 4/3/2025 1018    Comment: Pt educated on goals of session, role of OT, d/c recommendations, HEP.    Acceptance, E, VU by  at 3/31/2025 1317    Comment: OT goals, POC                      Point: Home exercise program (Done)       Learning Progress Summary            Patient Acceptance, E,D, VU by  at 4/3/2025 1018     Comment: Pt educated on goals of session, role of OT, d/c recommendations, HEP.                      Point: Precautions (Done)       Learning Progress Summary            Patient Acceptance, E,D, VU by HE at 4/3/2025 1018    Comment: Pt educated on goals of session, role of OT, d/c recommendations, HEP.                      Point: Body mechanics (Done)       Learning Progress Summary            Patient Acceptance, E,D, VU by HE at 4/3/2025 1018    Comment: Pt educated on goals of session, role of OT, d/c recommendations, HEP.                                      User Key       Initials Effective Dates Name Provider Type Discipline     04/02/20 -  Kaylan Salmeron OT Occupational Therapist OT     02/18/25 -  aTmmy Lombardo OT Occupational Therapist OT                  OT Recommendation and Plan     Plan of Care Review  Plan of Care Reviewed With: patient  Progress: improving  Outcome Evaluation: Pt received supine, agreeable to skilled tx. Pt participates in functional mobility to restroom for toileting and sinkside grooming tasks with SBA-supervision for safety. Pt trained in seated coordination and general BUE strength tasks with emphasis on LUE. Discussed d/c recommendations with pt endorsing continued assist from spouse for more involved ADL/IADL tasks. Pt may benefit from HHOT services to maximize coordination, strength, balance, activity tolerance and improve home safety given pt's inability to drive 2/2 seizures and spouse working.     Time Calculation:         Time Calculation- OT       Row Name 04/03/25 1018             Time Calculation- OT    OT Start Time 0916  -      OT Stop Time 0941  -      OT Time Calculation (min) 25 min  -      OT Received On 04/03/25  -      OT - Next Appointment 04/07/25  -         Timed Charges    70180 -  OT Neuromuscular Reeducation Minutes 1  -      27771 - OT Self Care/Mgmt Minutes 1  -         Total Minutes    Timed Charges Total Minutes 2  -       Total  Minutes 2  -HE                User Key  (r) = Recorded By, (t) = Taken By, (c) = Cosigned By      Initials Name Provider Type    HE Tammy Lombardo OT Occupational Therapist                  Therapy Charges for Today       Code Description Service Date Service Provider Modifiers Qty    98271981522 HC OT SELF CARE/MGMT/TRAIN EA 15 MIN 4/3/2025 Tammy Lombardo OT GO 1    [id number]  OT NEUROMUSC RE EDUCATION EA 15 MIN 4/3/2025 Tammy Lombardo OT GO 1                 Tammy Lombardo OT  4/3/2025

## 2025-04-03 NOTE — PLAN OF CARE
Problem: Adult Inpatient Plan of Care  Goal: Plan of Care Review  Outcome: Progressing  Flowsheets (Taken 4/3/2025 0628)  Progress: improving  Goal: Patient-Specific Goal (Individualized)  Outcome: Progressing  Goal: Absence of Hospital-Acquired Illness or Injury  Outcome: Progressing  Intervention: Identify and Manage Fall Risk  Recent Flowsheet Documentation  Taken 4/3/2025 0400 by Erlinda Valera RN  Safety Promotion/Fall Prevention:   activity supervised   clutter free environment maintained   fall prevention program maintained   muscle strengthening facilitated   nonskid shoes/slippers when out of bed   room organization consistent   safety round/check completed  Taken 4/3/2025 0200 by Erlinda Valera RN  Safety Promotion/Fall Prevention:   activity supervised   clutter free environment maintained   fall prevention program maintained   muscle strengthening facilitated   nonskid shoes/slippers when out of bed   room organization consistent   safety round/check completed  Taken 4/3/2025 0000 by Erlinda Valera RN  Safety Promotion/Fall Prevention:   activity supervised   clutter free environment maintained   fall prevention program maintained   muscle strengthening facilitated   nonskid shoes/slippers when out of bed   room organization consistent   safety round/check completed  Taken 4/2/2025 2200 by Erlinda Valera, LILLIAN  Safety Promotion/Fall Prevention:   activity supervised   clutter free environment maintained   fall prevention program maintained   muscle strengthening facilitated   nonskid shoes/slippers when out of bed   room organization consistent   safety round/check completed  Taken 4/2/2025 2000 by Erlinda Valera, RN  Safety Promotion/Fall Prevention:   activity supervised   clutter free environment maintained   fall prevention program maintained   muscle strengthening facilitated   nonskid shoes/slippers when out of bed   room organization consistent   safety round/check completed  Intervention: Prevent Skin  Injury  Recent Flowsheet Documentation  Taken 4/3/2025 0400 by Erlinda Valera RN  Body Position: position changed independently  Taken 4/3/2025 0200 by Erlinda Valera RN  Body Position: position changed independently  Skin Protection:   transparent dressing maintained   incontinence pads utilized  Taken 4/3/2025 0000 by Erlinda Valera RN  Body Position: position changed independently  Taken 4/2/2025 2200 by Erlinda Valera RN  Body Position: position changed independently  Taken 4/2/2025 2000 by Erlinda Valera RN  Body Position: position changed independently  Skin Protection:   transparent dressing maintained   incontinence pads utilized  Intervention: Prevent and Manage VTE (Venous Thromboembolism) Risk  Recent Flowsheet Documentation  Taken 4/3/2025 0200 by Erlinda Valera RN  VTE Prevention/Management:   bilateral   SCDs (sequential compression devices) on  Taken 4/2/2025 2000 by Erlinda Valera RN  VTE Prevention/Management:   bilateral   SCDs (sequential compression devices) on  Intervention: Prevent Infection  Recent Flowsheet Documentation  Taken 4/3/2025 0400 by Erlinda Valera RN  Infection Prevention:   hand hygiene promoted   rest/sleep promoted   single patient room provided  Taken 4/3/2025 0200 by Erlinda Valera RN  Infection Prevention:   hand hygiene promoted   rest/sleep promoted   single patient room provided  Taken 4/3/2025 0000 by Erlinda Valera RN  Infection Prevention:   hand hygiene promoted   rest/sleep promoted   single patient room provided  Taken 4/2/2025 2200 by Erlinda Valera RN  Infection Prevention:   hand hygiene promoted   rest/sleep promoted   single patient room provided  Taken 4/2/2025 2000 by Erlinda Valera RN  Infection Prevention:   hand hygiene promoted   rest/sleep promoted   single patient room provided  Goal: Optimal Comfort and Wellbeing  Outcome: Progressing  Intervention: Provide Person-Centered Care  Recent Flowsheet Documentation  Taken 4/3/2025 0200 by Erlinda Valera RN  Trust  Relationship/Rapport:   care explained   choices provided   questions answered   reassurance provided   thoughts/feelings acknowledged  Taken 4/2/2025 2000 by Erlinda Valera RN  Trust Relationship/Rapport:   care explained   choices provided   questions answered   reassurance provided   thoughts/feelings acknowledged  Goal: Readiness for Transition of Care  Outcome: Progressing     Problem: Skin Injury Risk Increased  Goal: Skin Health and Integrity  Outcome: Progressing  Intervention: Optimize Skin Protection  Recent Flowsheet Documentation  Taken 4/3/2025 0400 by Erlinda Valera RN  Activity Management:   activity minimized   bedrest  Head of Bed (HOB) Positioning: HOB at 30 degrees  Taken 4/3/2025 0200 by Erlinda Valera RN  Activity Management:   activity minimized   bedrest  Pressure Reduction Techniques:   weight shift assistance provided   frequent weight shift encouraged  Head of Bed (HOB) Positioning: HOB at 30 degrees  Pressure Reduction Devices: pressure-redistributing mattress utilized  Skin Protection:   transparent dressing maintained   incontinence pads utilized  Taken 4/3/2025 0000 by Erlinda Valera RN  Activity Management:   activity minimized   bedrest  Head of Bed (HOB) Positioning: HOB at 30 degrees  Taken 4/2/2025 2200 by Erlinda Valera RN  Activity Management:   activity minimized   bedrest  Head of Bed (HOB) Positioning: HOB at 30 degrees  Taken 4/2/2025 2000 by Erlinda Valera RN  Activity Management:   activity minimized   bedrest  Pressure Reduction Techniques:   weight shift assistance provided   frequent weight shift encouraged  Head of Bed (HOB) Positioning: HOB at 30 degrees  Pressure Reduction Devices: pressure-redistributing mattress utilized  Skin Protection:   transparent dressing maintained   incontinence pads utilized     Problem: Fall Injury Risk  Goal: Absence of Fall and Fall-Related Injury  Outcome: Progressing  Intervention: Identify and Manage Contributors  Recent Flowsheet  Documentation  Taken 4/3/2025 0400 by Erlinda Valera RN  Medication Review/Management: medications reviewed  Taken 4/3/2025 0200 by Erlinda Valera RN  Medication Review/Management: medications reviewed  Taken 4/3/2025 0000 by Erlinda Valera RN  Medication Review/Management: medications reviewed  Taken 4/2/2025 2200 by Erlinda Valera RN  Medication Review/Management: medications reviewed  Taken 4/2/2025 2000 by Erlinda Valera RN  Medication Review/Management: medications reviewed  Intervention: Promote Injury-Free Environment  Recent Flowsheet Documentation  Taken 4/3/2025 0400 by Erlinda Valera RN  Safety Promotion/Fall Prevention:   activity supervised   clutter free environment maintained   fall prevention program maintained   muscle strengthening facilitated   nonskid shoes/slippers when out of bed   room organization consistent   safety round/check completed  Taken 4/3/2025 0200 by Erlinda Valera RN  Safety Promotion/Fall Prevention:   activity supervised   clutter free environment maintained   fall prevention program maintained   muscle strengthening facilitated   nonskid shoes/slippers when out of bed   room organization consistent   safety round/check completed  Taken 4/3/2025 0000 by Erlinda Valera RN  Safety Promotion/Fall Prevention:   activity supervised   clutter free environment maintained   fall prevention program maintained   muscle strengthening facilitated   nonskid shoes/slippers when out of bed   room organization consistent   safety round/check completed  Taken 4/2/2025 2200 by Erlinda Valera RN  Safety Promotion/Fall Prevention:   activity supervised   clutter free environment maintained   fall prevention program maintained   muscle strengthening facilitated   nonskid shoes/slippers when out of bed   room organization consistent   safety round/check completed  Taken 4/2/2025 2000 by Erlinda Valera RN  Safety Promotion/Fall Prevention:   activity supervised   clutter free environment maintained   fall  prevention program maintained   muscle strengthening facilitated   nonskid shoes/slippers when out of bed   room organization consistent   safety round/check completed   Goal Outcome Evaluation:  Plan of Care Reviewed With: patient, spouse        Progress: improving     Patient had no seizure activity through shift. She complained on uncontrolled generalized headache medicated with Tylenol 650mg. Aptiom 400mg two bottles are in double key cupboard in med room as same was not able to be accessed after being placed Omni yesterday.

## 2025-04-03 NOTE — PROGRESS NOTES
REASON FOR CONSULTATION: Recurrent strokes, previous negative hypercoagulable workup, ongoing anticoagulation  Provide an opinion on any further workup or treatment                                HISTORY OF PRESENT ILLNESS:  The patient is a 44-year-old male who we have seen previously in June 2020 for with a history of tobacco use, ongoing vaping, COPD, hypertension, hyperlipidemia and a previous stroke in 2022.     She had been seen by Dr. Grissom in April 2022 after admission to [facility] in March 2022 CVA, CTA of neck and head 3/26 twice 22 unremarkable, discharged on aspirin and statin and undergoing a hypercoagulable workup that was negative.     She was seen again 4/9/2024 recognizing a seizure disorder on  antiseizure medications.  Unfortunately had a stroke in March 2023 and presented to ER/8/24 with altered speech.  CT scan of the head revealed a subacute left MCA territory infarct compared to March 2023.  There was concern for central cardioembolic source and we went on to repeat similar studies-lupus anticoagulant, beta-2 glycoprotein antibodies and anticardiolipin antibodies are again negative.     The patient was seen 6/27/2024 on anticoagulation with aspirin and Eliquis but presenting with abdominal pain and found to have evidence of a potential splenic infarct as well as an indeterminate mass in the uterus.  Further testing included negative factor V Leiden mutation, negative prothrombin gene mutation.     The patient's uterine mass was assessed by gynecologic oncology undergoing hysterectomy 7/16/2024.  Pathology revealed extensive adenomyosis particular involving the myometrium.     Follow-up with neurology 11/25/2024 with MRI demonstrating encephalomalacia involving left frontal lobe laterally corresponding area of acute infarction present in April 2024, evidence of remote left occipital and cerebellar infarcts.  She had been reviewed by neurology for her epilepsy and continued medications for  breakthrough seizures     The patient was now readmitted 3/30/2024 with left-sided weakness and facial droop which she noted upon awakening.  Imaging including CTA of the head and neck revealed again encephalomalacia, no large vessel occlusion seen.  Patient seen by neurology with concerns of new right-sided stroke.  Again the patient had been compliant with Eliquis and CPAP.     Interval history:  4/1/2025  T98.2, pulse 97, respirations 18, /83  Discussed issues with patient's about potential acquired abnormalities that should be rechecked during her hospitalization.  She has been compliant with Eliquis that was the day before she was admitted.  H&H 11.1 and 35.1, white count 30371, platelet count 3 31,000, BUN/creatinine of 6 and 0.55    4/2/2025  T98.6, pulse 92, respirations 18, /80 SpO2 98%  Now transporting for EEG  H&H 10.6 and 32.9, white count is 7610, platelet count of 305,000  Hypercoagulable studies pending    4/3/2025  T98.2, pulse 97, respiration 16, /80  Patient to be discharged today-no new stroke identified, EEG due to seizures and addition of magnesium and riboflavin to her regimen also noted.  Follow-up with neurology noted  H&H of 10.5 and 32.5, white count of 70-80, platelet count 2 94,000, being creatinine of 7 and 0.58.  Hypercoagulable studies negative thus far.        Past Medical History, Past Surgical History, Social History, Family History have been reviewed and are without significant changes except as mentioned.    Review of Systems   A comprehensive 14 point review of systems was performed and was negative except as mentioned.    Medications:  The current medication list was reviewed in the EMR    ALLERGIES:  No Known Allergies    Objective      Vitals:    04/02/25 1533 04/02/25 1928 04/02/25 2335 04/03/25 0418   BP: 127/72 117/75 132/89 118/81   BP Location: Left arm Left arm Right arm Right arm   Patient Position: Lying Lying Lying Lying   Pulse: 97 103 91 97    Resp: 18 18 20 20   Temp: 97.7 °F (36.5 °C) 98.4 °F (36.9 °C) 98.8 °F (37.1 °C) 98.2 °F (36.8 °C)   TempSrc: Oral Oral Oral Oral   SpO2: 98% 100% 100% 97%   Weight:       Height:              No data to display                Physical Exam    Constitutional:       Appearance: Normal appearance. She is normal weight.   HENT:      Head: Normocephalic and atraumatic.      Nose: Nose normal.      Mouth/Throat:      Mouth: Mucous membranes are moist.      Pharynx: Oropharynx is clear.   Eyes:      Extraocular Movements: Extraocular movements intact.      Conjunctiva/sclera: Conjunctivae normal.      Pupils: Pupils are equal, round, and reactive to light.   Cardiovascular:      Rate and Rhythm: Normal rate and regular rhythm.      Pulses: Normal pulses.      Heart sounds: Normal heart sounds.   Pulmonary:      Effort: Pulmonary effort is normal.      Breath sounds: Normal breath sounds.   Abdominal:      General: Bowel sounds are normal.      Palpations: Abdomen is soft.   Musculoskeletal:         General: Normal range of motion.      Cervical back: Normal range of motion and neck supple.   Skin:     General: Skin is warm and dry.   Neurological:      Motor: Weakness (Left-sided weakness left upper and lower extremity) present.      Comments: Left facial weakness, droop   Psychiatric:      Comments: Flat affect          RECENT LABS:  Hematology WBC   Date Value Ref Range Status   04/03/2025 7.28 3.40 - 10.80 10*3/mm3 Final     RBC   Date Value Ref Range Status   04/03/2025 4.18 3.77 - 5.28 10*6/mm3 Final     Hemoglobin   Date Value Ref Range Status   04/03/2025 10.5 (L) 12.0 - 15.9 g/dL Final     Hematocrit   Date Value Ref Range Status   04/03/2025 32.5 (L) 34.0 - 46.6 % Final     Platelets   Date Value Ref Range Status   04/03/2025 294 140 - 450 10*3/mm3 Final              Assessment & Plan   History of repetitive stroke episodes  Hypercoagulable workup negative repetitively  Patient compliant with Eliquis-taken the  night before she was admitted 3/30/2025  Echo with normal EF, no thrombus negative bubble study  Neurology assessment ongoing currently  Plan to reassess for potential acquired thrombophilia.  She has been off Eliquis long enough to adequately assess this-currently pending beta-2 glycoprotein antibodies, anticardiolipin antibodies, phosphatidylserine antibodies and lupus anticoagulant  Patient seen/3/25 with discharge planned, [place] beta-2 glycoprotein antibodies, anticardiolipin antibodies negative negative lupus anticoagulant.     *Epilepsy  S/p VNS  Vimpat continue-IV  Recent seizures noted  EEG follow-up with additional magnesium and riboflavin to her regimen for migraine.  She now has follow-up with neurology.      There are no additional recommendations.  Follow-up with neurology planned appropriately.  Please notify if we can be of further assistance            4/3/2025      CC:

## 2025-04-03 NOTE — OUTREACH NOTE
Prep Survey      Flowsheet Row Responses   [facility] patient discharged from? [place]   Is LACE score < 7 ? No   Eligibility [facility]   Date of Admission 03/30/25   Date of Discharge 04/03/25   Discharge diagnosis Stroke-like symptoms   Does the patient have one of the following disease processes/diagnoses(primary or secondary)? Other   Prep survey completed? Yes            Rupali MAY - Registered Nurse          
No

## 2025-04-03 NOTE — PLAN OF CARE
Goal Outcome Evaluation:  Plan of Care Reviewed With: patient        Progress: improving  Outcome Evaluation: Pt received supine, agreeable to skilled tx. Pt participates in functional mobility to restroom for toileting and sinkside grooming tasks with SBA-supervision for safety. Pt trained in seated coordination and general BUE strength tasks with emphasis on LUE. Discussed d/c recommendations with pt endorsing continued assist from spouse for more involved ADL/IADL tasks. Pt may benefit from HHOT services to maximize coordination, strength, balance, activity tolerance and improve home safety given pt's inability to drive 2/2 seizures and spouse working.    Anticipated Discharge Disposition (OT): home with assist, home with supervision, home with home health, home with outpatient therapy services

## 2025-04-04 ENCOUNTER — TRANSITIONAL CARE MANAGEMENT TELEPHONE ENCOUNTER (OUTPATIENT)
Dept: CALL CENTER | Facility: HOSPITAL | Age: 45
End: 2025-04-04
Payer: MEDICARE

## 2025-04-04 NOTE — OUTREACH NOTE
Call Center TCM Note      Flowsheet Row Responses   [facility] patient discharged from? [place]   Does the patient have one of the following disease processes/diagnoses(primary or secondary)? Other   TCM attempt successful? Yes   Call start time 1038   Call end time 1043   Discharge diagnosis Stroke-like symptoms   Meds reviewed with patient/caregiver? Yes   Is the patient having any side effects they believe may be caused by any medication additions or changes? No   Does the patient have all medications ordered at discharge? Yes   Is the patient taking all medications as directed (includes completed medication regime)? Yes   Comments HOSP DC FU appt 4/7/25 330 pm.   Does the patient have an appointment with their PCP within 7-14 days of discharge? Yes   Has home health visited the patient within 72 hours of discharge? N/A   Psychosocial issues? No   Did the patient receive a copy of their discharge instructions? Yes   Nursing interventions Reviewed instructions with patient   What is the patient's perception of their health status since discharge? Improving   Is the patient/caregiver able to teach back signs and symptoms related to disease process for when to call PCP? Yes   Is the patient/caregiver able to teach back signs and symptoms related to disease process for when to call 911? Yes   Is the patient/caregiver able to teach back the hierarchy of who to call/visit for symptoms/problems? PCP, Specialist, Home health nurse, Urgent Care, ED, 911 Yes   TCM call completed? Yes   Wrap up additional comments JADE Cotto reports Pt is improving. He is concerned about the Norvasc being stopped. he wants Annalee to know about it. Pt is keeping a log of BP and will share at appt on Monday   Call end time 1043            JUAN A OLIVARES - Registered Nurse    4/4/2025, 10:45 EDT

## 2025-04-05 ENCOUNTER — HOSPITAL ENCOUNTER (EMERGENCY)
Facility: HOSPITAL | Age: 45
Discharge: HOME | End: 2025-04-06
Attending: EMERGENCY MEDICINE | Admitting: EMERGENCY MEDICINE
Payer: MEDICARE

## 2025-04-05 ENCOUNTER — TELEPHONE (OUTPATIENT)
Dept: INTERNAL MEDICINE | Age: 45
End: 2025-04-05
Payer: MEDICARE

## 2025-04-05 ENCOUNTER — APPOINTMENT (OUTPATIENT)
Dept: GENERAL RADIOLOGY | Facility: HOSPITAL | Age: 45
End: 2025-04-05
Payer: MEDICARE

## 2025-04-05 DIAGNOSIS — R07.89 ATYPICAL CHEST PAIN: Primary | ICD-10-CM

## 2025-04-05 LAB
ALBUMIN SERPL-MCNC: 4 G/DL (ref 3.5–5.2)
ALBUMIN/GLOB SERPL: 1.1 G/DL
ALP SERPL-CCNC: 242 U/L (ref 39–117)
ALT SERPL W P-5'-P-CCNC: 22 U/L (ref 1–33)
ANION GAP SERPL CALCULATED.3IONS-SCNC: 12.9 MMOL/L (ref 5–15)
AST SERPL-CCNC: 25 U/L (ref 1–32)
BASOPHILS # BLD AUTO: 0.03 10*3/MM3 (ref 0–0.2)
BASOPHILS NFR BLD AUTO: 0.3 % (ref 0–1.5)
BILIRUB SERPL-MCNC: <0.2 MG/DL (ref 0–1.2)
BUN SERPL-MCNC: 5 MG/DL (ref 6–20)
BUN/CREAT SERPL: 8.5 (ref 7–25)
CALCIUM SPEC-SCNC: 9.7 MG/DL (ref 8.6–10.5)
CHLORIDE SERPL-SCNC: 102 MMOL/L (ref 98–107)
CO2 SERPL-SCNC: 22.1 MMOL/L (ref 22–29)
CREAT SERPL-MCNC: 0.59 MG/DL (ref 0.57–1)
DEPRECATED RDW RBC AUTO: 44.7 FL (ref 37–54)
EGFRCR SERPLBLD CKD-EPI 2021: 113.4 ML/MIN/1.73
EOSINOPHIL # BLD AUTO: 0.07 10*3/MM3 (ref 0–0.4)
EOSINOPHIL NFR BLD AUTO: 0.6 % (ref 0.3–6.2)
ERYTHROCYTE [DISTWIDTH] IN BLOOD BY AUTOMATED COUNT: 15.9 % (ref 12.3–15.4)
GEN 5 1HR TROPONIN T REFLEX: 7 NG/L
GLOBULIN UR ELPH-MCNC: 3.8 GM/DL
GLUCOSE SERPL-MCNC: 123 MG/DL (ref 65–99)
HCT VFR BLD AUTO: 36.9 % (ref 34–46.6)
HGB BLD-MCNC: 12.1 G/DL (ref 12–15.9)
HOLD SPECIMEN: NORMAL
HOLD SPECIMEN: NORMAL
IMM GRANULOCYTES # BLD AUTO: 0.03 10*3/MM3 (ref 0–0.05)
IMM GRANULOCYTES NFR BLD AUTO: 0.3 % (ref 0–0.5)
LYMPHOCYTES # BLD AUTO: 1.89 10*3/MM3 (ref 0.7–3.1)
LYMPHOCYTES NFR BLD AUTO: 16.5 % (ref 19.6–45.3)
MCH RBC QN AUTO: 25.2 PG (ref 26.6–33)
MCHC RBC AUTO-ENTMCNC: 32.8 G/DL (ref 31.5–35.7)
MCV RBC AUTO: 76.9 FL (ref 79–97)
MONOCYTES # BLD AUTO: 0.59 10*3/MM3 (ref 0.1–0.9)
MONOCYTES NFR BLD AUTO: 5.1 % (ref 5–12)
NEUTROPHILS NFR BLD AUTO: 77.2 % (ref 42.7–76)
NEUTROPHILS NFR BLD AUTO: 8.87 10*3/MM3 (ref 1.7–7)
NRBC BLD AUTO-RTO: 0 /100 WBC (ref 0–0.2)
PLATELET # BLD AUTO: 387 10*3/MM3 (ref 140–450)
PMV BLD AUTO: 9.5 FL (ref 6–12)
POTASSIUM SERPL-SCNC: 3.5 MMOL/L (ref 3.5–5.2)
PROT SERPL-MCNC: 7.8 G/DL (ref 6–8.5)
RBC # BLD AUTO: 4.8 10*6/MM3 (ref 3.77–5.28)
SODIUM SERPL-SCNC: 137 MMOL/L (ref 136–145)
TROPONIN T NUMERIC DELTA: -3 NG/L
TROPONIN T SERPL HS-MCNC: 10 NG/L
WBC NRBC COR # BLD AUTO: 11.48 10*3/MM3 (ref 3.4–10.8)
WHOLE BLOOD HOLD COAG: NORMAL
WHOLE BLOOD HOLD SPECIMEN: NORMAL

## 2025-04-05 PROCEDURE — 84484 ASSAY OF TROPONIN QUANT: CPT

## 2025-04-05 PROCEDURE — 36415 COLL VENOUS BLD VENIPUNCTURE: CPT

## 2025-04-05 PROCEDURE — 93005 ELECTROCARDIOGRAM TRACING: CPT | Performed by: EMERGENCY MEDICINE

## 2025-04-05 PROCEDURE — 93005 ELECTROCARDIOGRAM TRACING: CPT

## 2025-04-05 PROCEDURE — 80053 COMPREHEN METABOLIC PANEL: CPT

## 2025-04-05 PROCEDURE — 84484 ASSAY OF TROPONIN QUANT: CPT | Performed by: EMERGENCY MEDICINE

## 2025-04-05 PROCEDURE — 93010 ELECTROCARDIOGRAM REPORT: CPT | Performed by: INTERNAL MEDICINE

## 2025-04-05 PROCEDURE — 96374 THER/PROPH/DIAG INJ IV PUSH: CPT

## 2025-04-05 PROCEDURE — 71045 X-RAY EXAM CHEST 1 VIEW: CPT

## 2025-04-05 PROCEDURE — 25010000002 ONDANSETRON PER 1 MG: Performed by: EMERGENCY MEDICINE

## 2025-04-05 PROCEDURE — 99285 EMERGENCY DEPT VISIT HI MDM: CPT

## 2025-04-05 PROCEDURE — 85025 COMPLETE CBC W/AUTO DIFF WBC: CPT

## 2025-04-05 RX ORDER — ONDANSETRON 2 MG/ML
4 INJECTION INTRAMUSCULAR; INTRAVENOUS ONCE
Status: COMPLETED | OUTPATIENT
Start: 2025-04-05 | End: 2025-04-05

## 2025-04-05 RX ORDER — ACETAMINOPHEN 500 MG
1000 TABLET ORAL ONCE
Status: COMPLETED | OUTPATIENT
Start: 2025-04-05 | End: 2025-04-05

## 2025-04-05 RX ORDER — SODIUM CHLORIDE 0.9 % (FLUSH) 0.9 %
10 SYRINGE (ML) INJECTION AS NEEDED
Status: DISCONTINUED | OUTPATIENT
Start: 2025-04-05 | End: 2025-04-06 | Stop reason: HOSPADM

## 2025-04-05 RX ORDER — ASPIRIN 325 MG
325 TABLET ORAL ONCE
Status: COMPLETED | OUTPATIENT
Start: 2025-04-05 | End: 2025-04-05

## 2025-04-05 RX ORDER — METHOCARBAMOL 750 MG/1
750 TABLET, FILM COATED ORAL ONCE
Status: COMPLETED | OUTPATIENT
Start: 2025-04-05 | End: 2025-04-05

## 2025-04-05 RX ORDER — LIDOCAINE 4 G/G
1 PATCH TOPICAL ONCE
Status: DISCONTINUED | OUTPATIENT
Start: 2025-04-05 | End: 2025-04-06 | Stop reason: HOSPADM

## 2025-04-05 RX ADMIN — LIDOCAINE PAIN RELIEF 1 PATCH: 560 PATCH TOPICAL at 23:30

## 2025-04-05 RX ADMIN — ASPIRIN 325 MG ORAL TABLET 325 MG: 325 PILL ORAL at 23:12

## 2025-04-05 RX ADMIN — ACETAMINOPHEN 1000 MG: 500 TABLET, FILM COATED ORAL at 23:29

## 2025-04-05 RX ADMIN — METHOCARBAMOL TABLETS 750 MG: 750 TABLET, COATED ORAL at 23:29

## 2025-04-05 RX ADMIN — ONDANSETRON 4 MG: 2 INJECTION, SOLUTION INTRAMUSCULAR; INTRAVENOUS at 23:37

## 2025-04-05 NOTE — TELEPHONE ENCOUNTER
"[facility] AFTER HOURS CALL 7:08 PM for 23 Minutes:  Received a call from the patient's partner, Yadiel Cordoba who states that both he and his partner are concerned that she has been experiencing continued left-sided chest pain/pressure that is associated with a deep pain that radiates into her left armpit. He states the chest pain had begun prior to discharge from [facility] 4/03/25, but that the armpit pain is new. No current SOB, but mild tachycardia.     They also report \"bad headaches' that they feel is secondary to elevated blood pressure readings following medication changes at discharge. Home Blood pressures recorded at 131/95 (on 4/4/25), 141/110 (this AM), 150/107 (early this afternoon), and 160/112 (within the last half hour). They were instructed to first restart their Metoprolol dosing, and only add back Amlodipine, if Metoprolol not sufficient on its own.     Lastly also reported increase in seizure activity on this past Monday & Tuesday due to medication changes, but they have gotten better since her seizure medication.     Encouraged Yadiel to take Alexandra back into the ER immediately to be evaluated for MI vs Pulmonary Embolism. They agreed with the plan.   "

## 2025-04-06 ENCOUNTER — APPOINTMENT (OUTPATIENT)
Dept: CT IMAGING | Facility: HOSPITAL | Age: 45
End: 2025-04-06
Payer: MEDICARE

## 2025-04-06 VITALS
SYSTOLIC BLOOD PRESSURE: 121 MMHG | DIASTOLIC BLOOD PRESSURE: 73 MMHG | HEIGHT: 65 IN | OXYGEN SATURATION: 98 % | BODY MASS INDEX: 29.32 KG/M2 | HEART RATE: 86 BPM | WEIGHT: 176 LBS | RESPIRATION RATE: 18 BRPM | TEMPERATURE: 99.3 F

## 2025-04-06 LAB
QT INTERVAL: 335 MS
QT INTERVAL: 382 MS
QTC INTERVAL: 443 MS
QTC INTERVAL: 545 MS

## 2025-04-06 PROCEDURE — 71275 CT ANGIOGRAPHY CHEST: CPT

## 2025-04-06 PROCEDURE — 25510000001 IOPAMIDOL PER 1 ML: Performed by: EMERGENCY MEDICINE

## 2025-04-06 RX ORDER — METHOCARBAMOL 500 MG/1
500 TABLET, FILM COATED ORAL 3 TIMES DAILY PRN
Qty: 20 TABLET | Refills: 0 | Status: SHIPPED | OUTPATIENT
Start: 2025-04-06

## 2025-04-06 RX ORDER — IOPAMIDOL 755 MG/ML
100 INJECTION, SOLUTION INTRAVASCULAR
Status: COMPLETED | OUTPATIENT
Start: 2025-04-06 | End: 2025-04-06

## 2025-04-06 RX ORDER — LIDOCAINE 50 MG/G
1 PATCH TOPICAL EVERY 24 HOURS
Qty: 15 EACH | Refills: 0 | Status: SHIPPED | OUTPATIENT
Start: 2025-04-06

## 2025-04-06 RX ADMIN — IOPAMIDOL 100 ML: 755 INJECTION, SOLUTION INTRAVENOUS at 02:16

## 2025-04-06 NOTE — ED PROVIDER NOTES
EMERGENCY DEPARTMENT ENCOUNTER    History  Chief Complaint   Patient presents with    Shortness of Breath    Chest Pain       History provided by: Patient and Spouse    HPI:  Context: Alexandra Xiao is a 44 y.o. female with a medical history of previous CVA, hypercoagulable state on Eliquis, TULIO, hypertension, hyperlipidemia, severe epilepsy who presents to the ED c/o acute chest pain.  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  She developed some chest pain on Tuesday.  She thought it was going to get better and maybe it was a pulled muscle from her seizures, but it seems to have gotten worse.  It is left-sided, radiates to the armpit and she has some tingling in her fingers on the left hand.  No recent fever, cough or congestion.  She denies any lower extremity swelling, shortness of breath.      Past Medical History:  Active Ambulatory Problems     Diagnosis Date Noted    Sebaceous cyst of breast, right 11/06/2018    Abnormal uterine bleeding 03/23/2022    Microcytic anemia 04/27/2022    Transaminitis 04/27/2022    Epilepsy 04/27/2022    Major depressive disorder 01/01/2014    Left-sided weakness 03/05/2023    Anemia     Hyponatremia     HTN (hypertension)     History of stroke 03/06/2023    Vitamin D deficiency 03/09/2023    B12 deficiency 03/09/2023    Observed sleep apnea 11/30/2023    Snoring 11/30/2023    Excessive daytime sleepiness 11/30/2023    Class 1 obesity 11/30/2023    Cerebrovascular accident (CVA) due to embolism of left middle cerebral artery 04/08/2024    S/P placement of VNS (vagus nerve stimulation) device 04/08/2024    Abnormal urinalysis 04/08/2024    Vaping nicotine dependence, tobacco product     Implantable loop recorder present     Preoperative clearance 07/12/2024    Leukocytosis 07/12/2024    Sodium (Na) deficiency 07/12/2024    Colitis 09/15/2024    History of stroke 09/16/2024    C. difficile colitis 09/16/2024     Giardiasis 2024    Blood per rectum 2024    Colitis, Clostridium difficile 10/08/2024    Screening for colorectal cancer 10/08/2024    Stroke-like symptoms 2025     Resolved Ambulatory Problems     Diagnosis Date Noted    Acute CVA (cerebrovascular accident) 2022    Tobacco abuse 2022    UTI (urinary tract infection) 2024     Past Medical History:   Diagnosis Date    Abnormal bleeding in menstrual cycle     COPD (chronic obstructive pulmonary disease)     CPAP     Depression     Headache, tension-type     Heavy menstrual bleeding     Hyperlipidemia     Hypertension     Memory loss     Migraine     Seizures     Status post placement of implantable loop recorder     Stroke        Past Surgical History:  Past Surgical History:   Procedure Laterality Date    BREAST BIOPSY      COLONOSCOPY N/A 2024    Procedure: COLONOSCOPY into cecum and terminal ileum;  Surgeon: Matt White MD;  Location: [facility] ENDOSCOPY;  Service: Gastroenterology;  Laterality: N/A;  Pre: Screening, history of colitis   Post: Diverticulosis and Hemorrhoids    D & C HYSTEROSCOPY ENDOMETRIAL ABLATION N/A 06/10/2022    Procedure: DILATATION AND CURETTAGE HYSTEROSCOPY NOVASURE ENDOMETRIAL ABLATION;  Surgeon: Ernesto Mendosa MD;  Location: [facility] MAIN OR;  Service: Obstetrics/Gynecology;  Laterality: N/A;    FOOT SURGERY      pins in left foot     HYSTERECTOMY  2024    INSERT / REPLACE / REMOVE PACEMAKER  2022    Dr. Cristofer Chamorro, LOOP RECORDER    OTHER SURGICAL HISTORY      VNS plate 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    NOÉ      2022    TUBAL ABDOMINAL LIGATION      VAGUS NERVE STIMULATOR IMPLANTATION           Family History:  Family History   Problem Relation Age of Onset    Breast cancer Mother 50         age 60 METS    Arthritis Mother     Arthritis Father     Diabetes Father     Heart disease Father     Hypertension Father     Heart attack Father      Hypertension Brother     Heart attack Paternal Grandfather     Ovarian cancer Neg Hx     Uterine cancer Neg Hx     Colon cancer Neg Hx     Deep vein thrombosis Neg Hx     Pulmonary embolism Neg Hx     Malig Hyperthermia Neg Hx          Social History:  Social History     Socioeconomic History    Marital status: Single    Number of children: 2   Tobacco Use    Smoking status: Former     Current packs/day: 0.00     Average packs/day: 1 pack/day for 29.0 years (29.0 ttl pk-yrs)     Types: Cigarettes     Start date: 1993     Quit date: 2022     Years since quitting: 3.2    Smokeless tobacco: Never    Tobacco comments:     Quit 03/2022   Vaping Use    Vaping status: Every Day    Substances: Nicotine, Flavoring    Devices: Disposable, 6% NICOTINE   Substance and Sexual Activity    Alcohol use: Not Currently    Drug use: No    Sexual activity: Yes     Partners: Male     Birth control/protection: Tubal ligation         Allergies:  Patient has no known allergies.        Physical Exam  ED Triage Vitals [04/05/25 2142]   Temp Heart Rate Resp BP SpO2   99.3 °F (37.4 °C) (!) 147 18 (!) 166/109 98 %      Temp src Heart Rate Source Patient Position BP Location FiO2 (%)   -- -- -- -- --     Physical Exam  Constitutional:       Appearance: Normal appearance.   HENT:      Head: Normocephalic and atraumatic.   Eyes:      Pupils: Pupils are equal, round, and reactive to light.   Cardiovascular:      Rate and Rhythm: Regular rhythm. Tachycardia present.      Heart sounds: Murmur heard.   Abdominal:      Tenderness: There is no abdominal tenderness. There is no guarding or rebound.   Skin:     General: Skin is warm.   Neurological:      Mental Status: She is alert and oriented to person, place, and time.         Medications Given in ER:   Medications   sodium chloride 0.9 % flush 10 mL (has no administration in time range)   Lidocaine 4 % 1 patch (1 patch Transdermal Medication Applied 4/5/25 8370)   aspirin tablet 325 mg (325 mg Oral  Given 4/5/25 2312)   acetaminophen (TYLENOL) tablet 1,000 mg (1,000 mg Oral Given 4/5/25 2329)   methocarbamol (ROBAXIN) tablet 750 mg (750 mg Oral Given 4/5/25 2329)   ondansetron (ZOFRAN) injection 4 mg (4 mg Intravenous Given 4/5/25 2337)   iopamidol (ISOVUE-370) 76 % injection 100 mL (100 mL Intravenous Given 4/6/25 0216)         Orders Placed:  Orders Placed This Encounter   Procedures    XR Chest 1 View    CT Angiogram Chest    [place] Draw    Comprehensive Metabolic Panel    High Sensitivity Troponin T    CBC Auto Differential    High Sensitivity Troponin T 1Hr    Ambulatory Referral to Cardiology    NPO Diet NPO Type: Strict NPO    Undress & Gown    Continuous Pulse Oximetry    Oxygen Therapy- Nasal Cannula; Titrate 1-6 LPM Per SpO2; 90 - 95%    ECG 12 Lead ED Triage Standing Order; Chest Pain    ECG 12 Lead ED Triage Standing Order; Chest Pain    ECG 12 Lead Chest Pain    Insert Peripheral IV    CBC & Differential    Green Top (Gel)    Lavender Top    Gold Top - SST    Light Blue Top         Outpatient Medication Management:   Current Facility-Administered Medications Ordered in Epic   Medication Dose Route Frequency Provider Last Rate Last Admin    Lidocaine 4 % 1 patch  1 patch Transdermal Once Marilin Dickinson MD   1 patch at 04/05/25 2330    sodium chloride 0.9 % flush 10 mL  10 mL Intravenous PRN Marilin Dickinson MD         Current Outpatient Medications Ordered in Epic   Medication Sig Dispense Refill    amitriptyline (ELAVIL) 25 MG tablet TAKE 1 TABLET BY MOUTH EVERY NIGHT 30 tablet 5    aspirin 81 MG EC tablet Take 1 tablet by mouth Daily. 30 tablet 0    atorvastatin (LIPITOR) 40 MG tablet TAKE 1 TABLET BY MOUTH EVERY DAY 60 tablet 0    Cenobamate (Xcopri) 100 MG tablet Take 100 mg by mouth Daily. 30 tablet 5    Eliquis 5 MG tablet tablet TAKE 1 TABLET BY MOUTH TWICE DAILY 180 tablet 2    Eslicarbazepine Acetate (Aptiom) 400 MG tablet Take 1 tablet by mouth Daily. Take with 800 mg tablet for total  dose 1,200 mg daily 90 tablet 1    Eslicarbazepine Acetate (Aptiom) 800 MG tablet tablet Take 1 tablet by mouth Daily. Take with 400 mg tablet for total dose of 1,200 mg daily. 90 tablet 1    ferrous gluconate (FERGON) 324 MG tablet Take 1 tablet by mouth Daily With Breakfast. 30 tablet 0    folic acid (FOLVITE) 1 MG tablet Take 1 tablet by mouth Daily. 30 tablet 0    lidocaine (LIDODERM) 5 % Place 1 patch on the skin as directed by provider Daily. Remove & Discard patch within 12 hours or as directed by MD 15 each 0    magnesium oxide (MAG-OX) 400 tablet tablet Take 1 tablet by mouth Daily. 30 each 0    methocarbamol (ROBAXIN) 500 MG tablet Take 1 tablet by mouth 3 (Three) Times a Day As Needed for Muscle Spasms. 20 tablet 0    metoprolol succinate XL (TOPROL-XL) 25 MG 24 hr tablet TAKE 1 TABLET BY MOUTH DAILY 90 tablet 1    pantoprazole (PROTONIX) 40 MG EC tablet Take 1 tablet by mouth Daily. 30 tablet 0    Vimpat 200 MG tablet TAKE 1 TABLET BY MOUTH EVERY 12 HOURS 60 tablet 1    vitamin B-12 (CYANOCOBALAMIN) 1000 MCG tablet Take 1 tablet by mouth Daily. 30 tablet 0    Vitamin B-2 (RIBOFLAVIN) 100 MG tablet tablet Take 4 tablets by mouth Daily. 120 each 0           Medical Decision Making:  All labs have been independently interpreted by me.  All radiology studies have been reviewed by me. All EKG's have been independently viewed and interpreted by me.  Discussion below represents my analysis of pertinent findings related to patient's condition, differential diagnosis, treatment plan and final disposition.    Differential Diagnosis includes but not limited to: ACS, PE, pulmonary edema, pleural effusion, pneumothorax, pneumonia, musculoskeletal pain    Independent Historian Required Due To: Contributes additional history    Review of prior external notes (non-ED) -and- Review of prior external test results outside of this encounter:  I reviewed the discharge summary from hospitalization 3/30/2025.  She presented  with strokelike symptoms after a seizure and was evaluated by neurology, oncology, cardiology and etiology thought to be Godfrey's paralysis or hemiplegic migraine.  She was evaluated by cardiology for atypical chest pain.  It was thought to be atypical in nature per cardiology and she had negative troponins x 2 and EKG without any acute ischemic changes.  An echocardiogram was done on 3/31/2025 without any wall motion abnormalities.    Labs Results:  Recent Results (from the past 24 hours)   ECG 12 Lead ED Triage Standing Order; Chest Pain    Collection Time: 04/05/25  9:50 PM   Result Value Ref Range    QT Interval 382 ms    QTC Interval 545 ms   Comprehensive Metabolic Panel    Collection Time: 04/05/25  9:57 PM    Specimen: Blood   Result Value Ref Range    Glucose 123 (H) 65 - 99 mg/dL    BUN 5 (L) 6 - 20 mg/dL    Creatinine 0.59 0.57 - 1.00 mg/dL    Sodium 137 136 - 145 mmol/L    Potassium 3.5 3.5 - 5.2 mmol/L    Chloride 102 98 - 107 mmol/L    CO2 22.1 22.0 - 29.0 mmol/L    Calcium 9.7 8.6 - 10.5 mg/dL    Total Protein 7.8 6.0 - 8.5 g/dL    Albumin 4.0 3.5 - 5.2 g/dL    ALT (SGPT) 22 1 - 33 U/L    AST (SGOT) 25 1 - 32 U/L    Alkaline Phosphatase 242 (H) 39 - 117 U/L    Total Bilirubin <0.2 0.0 - 1.2 mg/dL    Globulin 3.8 gm/dL    A/G Ratio 1.1 g/dL    BUN/Creatinine Ratio 8.5 7.0 - 25.0    Anion Gap 12.9 5.0 - 15.0 mmol/L    eGFR 113.4 >60.0 mL/min/1.73   High Sensitivity Troponin T    Collection Time: 04/05/25  9:57 PM    Specimen: Blood   Result Value Ref Range    HS Troponin T 10 <14 ng/L   Green Top (Gel)    Collection Time: 04/05/25  9:57 PM   Result Value Ref Range    Extra Tube Hold for add-ons.    Lavender Top    Collection Time: 04/05/25  9:57 PM   Result Value Ref Range    Extra Tube hold for add-on    Gold Top - SST    Collection Time: 04/05/25  9:57 PM   Result Value Ref Range    Extra Tube Hold for add-ons.    Light Blue Top    Collection Time: 04/05/25  9:57 PM   Result Value Ref Range    Extra  Tube Hold for add-ons.    CBC Auto Differential    Collection Time: 04/05/25  9:57 PM    Specimen: Blood   Result Value Ref Range    WBC 11.48 (H) 3.40 - 10.80 10*3/mm3    RBC 4.80 3.77 - 5.28 10*6/mm3    Hemoglobin 12.1 12.0 - 15.9 g/dL    Hematocrit 36.9 34.0 - 46.6 %    MCV 76.9 (L) 79.0 - 97.0 fL    MCH 25.2 (L) 26.6 - 33.0 pg    MCHC 32.8 31.5 - 35.7 g/dL    RDW 15.9 (H) 12.3 - 15.4 %    RDW-SD 44.7 37.0 - 54.0 fl    MPV 9.5 6.0 - 12.0 fL    Platelets 387 140 - 450 10*3/mm3    Neutrophil % 77.2 (H) 42.7 - 76.0 %    Lymphocyte % 16.5 (L) 19.6 - 45.3 %    Monocyte % 5.1 5.0 - 12.0 %    Eosinophil % 0.6 0.3 - 6.2 %    Basophil % 0.3 0.0 - 1.5 %    Immature Grans % 0.3 0.0 - 0.5 %    Neutrophils, Absolute 8.87 (H) 1.70 - 7.00 10*3/mm3    Lymphocytes, Absolute 1.89 0.70 - 3.10 10*3/mm3    Monocytes, Absolute 0.59 0.10 - 0.90 10*3/mm3    Eosinophils, Absolute 0.07 0.00 - 0.40 10*3/mm3    Basophils, Absolute 0.03 0.00 - 0.20 10*3/mm3    Immature Grans, Absolute 0.03 0.00 - 0.05 10*3/mm3    nRBC 0.0 0.0 - 0.2 /100 WBC   High Sensitivity Troponin T 1Hr    Collection Time: 04/05/25 11:12 PM    Specimen: Arm, Right; Blood   Result Value Ref Range    HS Troponin T 7 <14 ng/L    Troponin T Numeric Delta -3 Abnormal if >/=3 ng/L   ECG 12 Lead Chest Pain    Collection Time: 04/05/25 11:36 PM   Result Value Ref Range    QT Interval 335 ms    QTC Interval 443 ms     Lab Comments:  My independent interpretation of the above labs: Benign      Radiology:  CT Angiogram Chest  Result Date: 4/6/2025  Patient: ELIE CHANEL  Time Out: 03:15 Exam(s): CTA CHEST EXAM:   CT Angiography Chest With Intravenous Contrast CLINICAL HISTORY:     chest pain, tachycardia. TECHNIQUE:   Axial computed tomographic angiography images of the chest with intravenous contrast.  CTDI is 11.98  mGy and DLP is 190 mGy-cm.  This CT exam was performed according to the principle of ALARA (As Low As Reasonably Achievable) by using one or more of the following  dose reduction techniques: automated exposure control, adjustment of the mA and or kV according to patient size, and or use of iterative reconstruction technique.   3D and MIP reconstructed images were created and reviewed. CONTRAST:   95 mL  COMPARISON:   No relevant prior studies available. FINDINGS:   Pulmonary arteries:  No evidence for pulmonary embolism.   Aorta:  The unenhanced thoracic aorta is normal in caliber.  No thoracic aortic aneurysm.   Lungs:  No focal airspace consolidation.  Incidental paraseptal bullous disease in the medial right upper lobe.  Minimal curvilinear presumed atelectatic changes in the posterior lower lobes.   Pleural space:  Unremarkable.  No significant effusion.  No pneumothorax.   Heart:  The cardiac chambers are normal in caliber.  Coronary artery calcification suggested.  Detailed evaluation limited by pulsation artifact.   Bones joints:  No acute fracture.  No dislocation.   Soft tissues:  Unremarkable.   Lymph nodes:  Unremarkable.  No enlarged lymph nodes.   Tubes, lines and devices:  A spinal stimulator generator overlies the left pectoralis muscle with the leads extending to the left cervical region.  A implanted cardiac monitoring device is noted. IMPRESSION:     1.  No evidence for pulmonary embolism. 2.  No focal airspace consolidation.  Incidental paraseptal bullous disease in the medial right upper lobe.  Minimal curvilinear presumed atelectatic changes in the posterior lower lobes.  No pleural effusion or pneumothorax.     Electronically signed by Wyatt Washington MD on 04-06-25 at 0315    XR Chest 1 View  Result Date: 4/5/2025  CXR ONE VIEW  HISTORY: Chest Pain Triage Protocol  COMPARISON: None  TECHNIQUE: single portable AP       Stimulator device and loop recorder in place.  The heart size is within normal limits.  The lungs are normally aerated. There is no pleural effusion or pneumothorax.    This report was finalized on 4/5/2025 10:53 PM by Dr. Luke Bettencourt,  M.D on Workstation: [id number]      Radiology Comments:  I ordered the above imaging and reviewed the results.    My independent interpretation of the cxr: No acute process    EKG Interpreted by me: Sinus tachycardia, nonspecific diffuse T wave changes, there is some baseline artifact, repeat EKG 1136 demonstrates sinus tachycardia, rate 105, QTc 443    (Social Determinants of Health): None    Rationale:  This is an overall well-appearing 44-year-old female who is presenting with complaints of chest pain.  She overall looks well.  She presents afebrile with unremarkable vital signs.  Her cardiopulmonary exam is at her baseline.    She was evaluated with an EKG.  This did not demonstrate any acute cardiopulmonary ischemic changes.  She has not had 2 times negative troponin levels.  This in the setting of a low risk heart score.  Will discuss next next steps in management with her.    Clinical Scores:         HEART Score: 3   Shared Decision Making  I discussed the findings with the patient/patient representative who is in agreement with the treatment plan and the final disposition.  Risks and benefits of discharge and/or observation/admission were discussed: Yes                       Given her tachycardia and recent hospitalization, this makes her at least moderate risk for pulmonary embolism.  She was therefore evaluated with a CT pulmonary angiogram, which did not demonstrate any evidence of VTE.    Chest x-ray did not demonstrate any other acute cardiopulmonary process as emergent cause of symptoms including pneumomediastinum, widened mediastinum, acute infiltrate or pneumothorax.      Progress Notes:  3:41 AM EDT: I saw and reevaluated the patient.  She is feeling much better.  I discussed her workup and that we could keep her in the hospital for observation for further cardiac consultation and workup if recommended.  She would prefer to be discharged.  I will place a consult to cardiology for further  outpatient evaluation.  She if she has recurrent symptoms, she will be asked to return to the emergency department for reevaluation.  Will treat as musculoskeletal pain given the antecedent history.  I spoke with the patient about her ED work up, diagnosis and the plan for discharge. I discussed the importance of following up with her PCP and cardiology. I instructed her to return to the Emergency Room for new or worsening symptoms. All of the pt's questions were answered. The patient is stable at time of discharge.  I did discuss the incidental finding of the bullous changes on her chest CT.  She is going to follow-up with her PCP and try and work on decreasing vaping use.      Complexity of Care:  Admission was considered but after careful review of the patient's presentation, physical examination, diagnostic results, and response to treatment the patient may be safely discharged with outpatient follow-up.    Diagnosis:  Final diagnoses:   Atypical chest pain       Follow Up:  Reza Lester MD  [address]  [place] [address]  [phone]    Call in 2 days  For reevaluation    Annalee Huddleston APRN  [address]  [place] [address]  [phone]    Call in 2 days  For reevaluation    [facility] EMERGENCY DEPARTMENT  [address] [id number] [phone]    As needed, If symptoms worsen      Rx:    Current Discharge Medication List        START taking these medications    Details   lidocaine (LIDODERM) 5 % Place 1 patch on the skin as directed by provider Daily. Remove & Discard patch within 12 hours or as directed by MD  Qty: 15 each, Refills: 0      methocarbamol (ROBAXIN) 500 MG tablet Take 1 tablet by mouth 3 (Three) Times a Day As Needed for Muscle Spasms.  Qty: 20 tablet, Refills: 0                 Parts of this note may be an electronic transcription/translation of spoken language to printed text using the Dragon dictation  system        Provider Note Signed by:     Marilin Dickinson MD  04/06/25 0720

## 2025-04-07 ENCOUNTER — OFFICE VISIT (OUTPATIENT)
Dept: INTERNAL MEDICINE | Age: 45
End: 2025-04-07
Payer: MEDICARE

## 2025-04-07 VITALS
OXYGEN SATURATION: 99 % | WEIGHT: 176 LBS | HEART RATE: 94 BPM | HEIGHT: 65 IN | DIASTOLIC BLOOD PRESSURE: 88 MMHG | SYSTOLIC BLOOD PRESSURE: 120 MMHG | TEMPERATURE: 97.9 F | BODY MASS INDEX: 29.32 KG/M2

## 2025-04-07 DIAGNOSIS — D50.9 IRON DEFICIENCY ANEMIA, UNSPECIFIED IRON DEFICIENCY ANEMIA TYPE: ICD-10-CM

## 2025-04-07 DIAGNOSIS — R29.90 STROKE-LIKE SYMPTOMS: Primary | ICD-10-CM

## 2025-04-07 DIAGNOSIS — I10 PRIMARY HYPERTENSION: ICD-10-CM

## 2025-04-07 DIAGNOSIS — R07.89 LEFT-SIDED CHEST WALL PAIN: ICD-10-CM

## 2025-04-07 LAB — METHYLMALONATE SERPL-SCNC: 64 NMOL/L (ref 0–378)

## 2025-04-07 RX ORDER — AMLODIPINE BESYLATE 5 MG/1
5 TABLET ORAL DAILY
COMMUNITY
End: 2025-04-07

## 2025-04-07 RX ORDER — AMLODIPINE BESYLATE 5 MG/1
5 TABLET ORAL DAILY
COMMUNITY

## 2025-04-07 RX ORDER — LACOSAMIDE 200 MG/1
1 TABLET, FILM COATED ORAL EVERY 12 HOURS
Qty: 60 TABLET | Refills: 1 | Status: SHIPPED | OUTPATIENT
Start: 2025-04-07

## 2025-04-07 NOTE — PROGRESS NOTES
"    I N T E R N A L  M E D I C I N E  Annalee Huddleston, APRN       ENCOUNTER DATE:  04/07/2025    Alexandradavid Xiao / 44 y.o. / female        CC:   (Transitional Care Follow Up Visit)  atypical chest pain (TCM. PT states she had 7 seizers on 3/31/25. PT went back to the ER on 4/6/2025. Pt is still having chest pain. PT states she has pain when taking deep breaths and movement.)        Within 48 business hours after discharge our office contacted him via telephone to coordinate his care and needs.      I reviewed and discussed the details of that call along with the discharge summary, hospital problems, inpatient lab results, inpatient diagnostic studies, and consultation reports with the patient.         4/3/2025     4:03 PM   Date of TCM Phone Call   [facility]   Date of Admission 3/30/2025   Date of Discharge 4/3/2025       Risk for Readmission (LACE) Score: 8 (4/3/2025  6:00 AM)            VITALS    Visit Vitals  /88   Pulse 94   Temp 97.9 °F (36.6 °C)   Ht 165.1 cm (65\")   Wt 79.8 kg (176 lb)   LMP 06/04/2022 (Exact Date)   SpO2 99%   BMI 29.29 kg/m²       BP Readings from Last 3 Encounters:   04/07/25 120/88   04/06/25 121/73   04/03/25 118/81     Wt Readings from Last 3 Encounters:   04/07/25 79.8 kg (176 lb)   04/05/25 79.8 kg (176 lb)   03/31/25 82 kg (180 lb 12.4 oz)      Body mass index is 29.29 kg/m².    HPI:     Date of admission/discharge: As noted above in CC  Hospital: [facility]   Principle Dx: Stroke-like symptoms  Secondary Dx: S/P placement of VNS device, TULIO, history of stroke, HTN, Epilepsy  History prior to hospitalization: Presented to ER with acute left sided facial droop and left arm leg weakness.  Remains anticoagulated on Eliquis.    Evaluation/Treatment: Admitted and elevated by neurology, oncology and cardiology.  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  During admission, missed dosing of home antiepileptics, resulting in seizure " during hospital admission.  After seizure, developed atypical left sided chest pain.  Troponins normal.  Thought to be either musculoskeletal or GI in origin.   Course: Denies any recurrent seizures since hospital admission.  Left sided facial droop and weakness have improved.  Today, she is feeling well with the exception of ongoing left sided chest wall pain.  Pain radiates to left axilla.  Pain exacerbated by left arm raise.  Hurts worse with deep breathes.   suspects patient may have injured herself during seizure while in hospital - states seizure was not witnessed.  Patient reports she has had prior similar symptoms following a seizure in the past.  She returned to ER on April 5, 2025 for evaluation of these ongoing symptoms.  April 6, 2025 CT Angiogram Chest negative for pulmonary embolism.  Incidental finding of paraseptal bullous disease - she is working on quitting smoking.  Discharged with muscle relaxer, lidocaine pain patches which she reports are helping.      Reports compliance with vitamin supplements: magnesium, riboflavin, B12 injections, folate. Unable to tolerate oral iron supplementation due to GI side effects.  She was formerly seen by hematology, Dr. Peña, in June 2024, and she plans to schedule follow up.  Reports she will see neurology office on April 9, 2025.  Also scheduled with cardiology, Dr. Lester, on April 9, 2025.      Recent MMA normal, homocysteine normal.      For unclear reasons, her amlodipine prescription was stopped at time of discharge.  However, patient noted hypertensive readings at home after hospital admission and made the decision to resume. BP at home is averaging 120s/80.      Patient Care Team:  Annalee Huddleston APRN as PCP - General (Family Medicine)  Cristofer Chamorro MD as Consulting Physician (Cardiology)  Cb James II, MD as Consulting Physician (Neurology)  Ernesto Mendosa MD as Consulting Physician (Obstetrics and Gynecology)  Reza Lester MD  as Cardiologist (Cardiology)  Annalee Huddleston APRN as Referring Physician (Family Medicine)  Marija Peña MD as Consulting Physician (Hematology and Oncology)  ____________________________________________________________________    ASSESSMENT & PLAN:    1. Stroke-like symptoms    2. Iron deficiency anemia, unspecified iron deficiency anemia type    3. Primary hypertension    4. Left-sided chest wall pain      No orders of the defined types were placed in this encounter.      Summary/Discussion:  Ensure close follow up with neurology office.  Phone number provided to hematology office (Dr. Peña) and encouraged to schedule given ongoing iron deficiency anemia and poor tolerate to PO iron. Plan to repeat MMA and homocystine labs in June 2025.    BP well controlled on metoprolol, amlodipine.  Amlodipine was recently stopped after hospital admission for unclear reasons.  Recommend she continue as prescribed as BP appears to be well controlled.  Left sided chest wall symptoms improving with muscle relaxer and lidocaine pain patches.  Continue same.  Follow up with cardiology as scheduled.    Return for Next scheduled follow up.    ____________________________________________________________________    REVIEW OF SYSTEMS    Review of Systems   Constitutional:  Negative for chills, fever and unexpected weight change.   Respiratory:  Negative for cough, chest tightness and shortness of breath.    Cardiovascular:  Negative for chest pain, palpitations and leg swelling.   Musculoskeletal:         +Left chest wall pain   Neurological:  Negative for dizziness, weakness, light-headedness and headaches.   Psychiatric/Behavioral:  The patient is not nervous/anxious.          PHYSICAL EXAMINATION    Physical Exam  Vitals reviewed.   Constitutional:       General: She is not in acute distress.     Appearance: Normal appearance. She is not ill-appearing, toxic-appearing or diaphoretic.   HENT:      Head: Normocephalic and atraumatic.    Cardiovascular:      Rate and Rhythm: Normal rate and regular rhythm.      Heart sounds: Normal heart sounds.   Pulmonary:      Effort: Pulmonary effort is normal.      Breath sounds: Normal breath sounds. No wheezing, rhonchi or rales.   Musculoskeletal:      Comments: Tenderness to palpation to left sided chest wall   Skin:     General: Skin is warm and dry.      Findings: No rash.   Neurological:      Mental Status: She is alert and oriented to person, place, and time. Mental status is at baseline.   Psychiatric:         Mood and Affect: Mood normal.         Behavior: Behavior normal.         Thought Content: Thought content normal.         Judgment: Judgment normal.           REVIEWED DATA:    Labs:   Lab Results   Component Value Date     04/05/2025    K 3.5 04/05/2025    CALCIUM 9.7 04/05/2025    AST 25 04/05/2025    ALT 22 04/05/2025    BUN 5 (L) 04/05/2025    CREATININE 0.59 04/05/2025    CREATININE 0.58 04/03/2025    CREATININE 0.65 04/02/2025       Lab Results   Component Value Date    WBC 11.48 (H) 04/05/2025    HGB 12.1 04/05/2025    HGB 10.5 (L) 04/03/2025    HGB 10.6 (L) 04/02/2025     04/05/2025       Lab Results   Component Value Date    GLUCOSEU Negative 09/15/2024    BLOODU Negative 09/15/2024    NITRITEU Negative 09/15/2024    LEUKOCYTESUR Moderate (2+) (A) 09/15/2024       Imaging:   CT Angiogram Chest  Result Date: 4/6/2025  Narrative: Patient: ELIE CHANEL  Time Out: 03:15 Exam(s): CTA CHEST EXAM:   CT Angiography Chest With Intravenous Contrast CLINICAL HISTORY:     chest pain, tachycardia. TECHNIQUE:   Axial computed tomographic angiography images of the chest with intravenous contrast.  CTDI is 11.98  mGy and DLP is 190 mGy-cm.  This CT exam was performed according to the principle of ALARA (As Low As Reasonably Achievable) by using one or more of the following dose reduction techniques: automated exposure control, adjustment of the mA and or kV according to patient size,  and or use of iterative reconstruction technique.   3D and MIP reconstructed images were created and reviewed. CONTRAST:   95 mL  COMPARISON:   No relevant prior studies available. FINDINGS:   Pulmonary arteries:  No evidence for pulmonary embolism.   Aorta:  The unenhanced thoracic aorta is normal in caliber.  No thoracic aortic aneurysm.   Lungs:  No focal airspace consolidation.  Incidental paraseptal bullous disease in the medial right upper lobe.  Minimal curvilinear presumed atelectatic changes in the posterior lower lobes.   Pleural space:  Unremarkable.  No significant effusion.  No pneumothorax.   Heart:  The cardiac chambers are normal in caliber.  Coronary artery calcification suggested.  Detailed evaluation limited by pulsation artifact.   Bones joints:  No acute fracture.  No dislocation.   Soft tissues:  Unremarkable.   Lymph nodes:  Unremarkable.  No enlarged lymph nodes.   Tubes, lines and devices:  A spinal stimulator generator overlies the left pectoralis muscle with the leads extending to the left cervical region.  A implanted cardiac monitoring device is noted. IMPRESSION:     1.  No evidence for pulmonary embolism. 2.  No focal airspace consolidation.  Incidental paraseptal bullous disease in the medial right upper lobe.  Minimal curvilinear presumed atelectatic changes in the posterior lower lobes.  No pleural effusion or pneumothorax.     Impression: Electronically signed by Wyatt Washington MD on 04-06-25 at 0315    XR Chest 1 View  Result Date: 4/5/2025  Narrative: CXR ONE VIEW  HISTORY: Chest Pain Triage Protocol  COMPARISON: None  TECHNIQUE: single portable AP      Impression:  Stimulator device and loop recorder in place.  The heart size is within normal limits.  The lungs are normally aerated. There is no pleural effusion or pneumothorax.    This report was finalized on 4/5/2025 10:53 PM by Dr. Luke Bettencourt M.D on Workstation: [id number]      EEG Continuous Monitoring With  Video  Addendum Date: 4/2/2025  Addendum: Correction: end time was 0610  and Date  of onset  was  4/1/2025    Result Date: 4/2/2025  Narrative: Table formatting from the original result was not included. Date of onset: April 2, 2025 Date of offset: April 2, 2025 Indication: Recurrent seizure activity  Technical description:  This is a 21 channel digital EEG recording that began on 1855 and ended on 1610. Background:  Up to 9 Hz alpha activity is noted over the posterior head regions that is symmetric, well formed and reactive to eye closure.  The patient enters the drowsy state and sleeps during the record.  Sleep activities are symmetric and contain sleep spindles, and K complexes.  Hyperventilation was not performed and photic stimulation was not  performed.  There are no marked continuous asymmetries between the two hemispheres.  No interictal epileptiform activity is present. The EKG monitor shows a heart rate that varies within a range of 90  to 120 bpm. Clinical Interpretation:  This EEG recording is normal.  No potentially epileptogenic activity, seizure activity, or focal slowing is present.    EEG  Result Date: 4/2/2025  Narrative: Table formatting from the original result was not included. EEG Report          # Indication: Left-sided weakness History: 44-year-old woman with history of embolic strokes who presents with left-sided weakness.  The patient has history of epilepsy.  Study includes time locked video Medical diagnoses: History of left MCA embolic stroke, migraines, memory loss, COPD, hypertension, hyperlipidemia Current Facility-Administered Medications Medication Dose Route Frequency Provider Last Rate Last Admin  acetaminophen (TYLENOL) tablet 650 mg  650 mg Oral Q4H PRN Franck Martinez MD   650 mg at 04/02/25 0305  Or  acetaminophen (TYLENOL) suppository 650 mg  650 mg Rectal Q4H PRN Franck Martinez MD      amitriptyline (ELAVIL) tablet 25 mg  25 mg Oral Nightly Franck Martinez MD   25 mg at  04/01/25 2026  apixaban (ELIQUIS) tablet 5 mg  5 mg Oral BID Yari Lomas APRN   5 mg at 04/01/25 2026  aspirin chewable tablet 81 mg  81 mg Oral Daily Yari Lomas APRN      atorvastatin (LIPITOR) tablet 80 mg  80 mg Oral Nightly Franck Martinez MD   80 mg at 04/01/25 2026  bisacodyl (DULCOLAX) suppository 10 mg  10 mg Rectal Daily PRN Franck Martinez MD      Cenobamate tablet 100 mg  100 mg Oral Nightly Juwan Carrillo MD      Eslicarbazepine Acetate tablet 400 mg  400 mg Oral Daily Juwan Carrillo MD   400 mg at 04/01/25 1356  lacosamide (VIMPAT) injection 200 mg  200 mg Intravenous Q12H Franck Martinez MD   200 mg at 04/02/25 0302  LORazepam (ATIVAN) injection 2 mg  2 mg Intravenous Q5 Min PRN Franck Martinez MD   2 mg at 04/01/25 1258  Non-Formulary / Patient Supplied Medication  1,200 mg Oral Nightly Juwan Carrillo MD      ondansetron (ZOFRAN) injection 4 mg  4 mg Intravenous Q6H PRN Franck Martinez MD   4 mg at 04/01/25 2026  pantoprazole (PROTONIX) EC tablet 40 mg  40 mg Oral Q AM Franck Martinez MD   40 mg at 04/02/25 0622  sodium chloride 0.9 % flush 10 mL  10 mL Intravenous PRN Dennis Rahman MD      sodium chloride 0.9 % infusion  100 mL/hr Intravenous Continuous Zaika Garcia APRN 100 mL/hr at 03/31/25 2339 100 mL/hr at 03/31/25 2339 Time of study: 21 minutes 42 seconds Technical summary: The 10-20 system was used for electrode placement  Background: The background rhythm was composed of low amplitude 9 Hz poorly regulated poorly sustained activity.  There does seem to be some reactivity.  There is superimposed low amplitude diffuse beta activity more noticeable after the patient was given a sedate of medication.  Sleep: The patient became drowsy noted by attenuation of the background with diffusely slower activities intervening.  There were occasional vertex sharp transients without sleep spindles  Hyperventilation: Not obtained  Photic stimulation: Not obtained  EKG: Sinus  rhythm in the 90s  Video: No unusual involuntary movements were seen on the video clips reviewed Impression: Normal EEG during wakefulness and drowsiness.  No focal or epileptiform activities were seen. Dictated utilizing Dragon dictation.      XR Chest 1 View  Result Date: 4/1/2025  Narrative: XR CHEST 1 VW-4/1/2025  HISTORY: Chest pain.  Heart size is within normal limits. Lungs appear free of acute infiltrates. Loop recorder overlies the left hemithorax. Small electronic device with its lead coursing into the base of the left neck is again seen. The tip of the lead is not included in the field-of-view. Bony structures appear unremarkable.      Impression: 1. No acute process.   This report was finalized on 4/1/2025 5:14 PM by Dr. Rivera Wayne M.D on Workstation: [id number]      MRI Brain With & Without Contrast  Result Date: 4/1/2025  Narrative: MRI BRAIN W WO CONTRAST-  HISTORY:  r/o stroke; R29.90-Unspecified symptoms and signs involving the nervous system; R29.898-Other symptoms and signs involving the musculoskeletal system; R20.2-Paresthesia of skin; Z79.01-Long term (current) use of anticoagulants  COMPARISON: CT head 4/1/2025 and MRI brain 11/25/2024  TECHNIQUE: A MRI examination of the brain was performed utilizing sagittal T1, axial diffusion, T1, T2, T2 FLAIR, coronal T2 as well as sagittal, axial and coronal T1 postcontrast weighted sequences. The study is hampered in that only the T/R coil was able to be utilized. Moving patient protocol had to be utilized.  FINDINGS: There is no evidence of restricted diffusion to suggest acute infarction. The brain ventricles are symmetrical. There is expected flow void in the basilar artery and in the distal aspect of the internal carotid arteries bilaterally. A small remote infarct involving the left occipital lobe inferiorly and a remote infarct involving the left frontal lobe superolaterally is appreciated, present previously. Also present previously is  a small cortical infarct involving the right middle frontal gyrus superolaterally and small remote infarct involving the cerebral hemispheres bilaterally. There was no evidence of abnormal enhancement after contrast administration.      Impression: The study is hampered by patient motion. There is no evidence of acute infarction. Remote left frontal and left occipital infarcts are appreciated. The left frontal infarct was acute on the MRI examination of 4/9/2024. The remote left occipital infarct was present previously and is where the small remote cerebellar infarcts bilaterally and the small remote infarct involving the right middle frontal gyrus superolaterally.  This report was finalized on 4/1/2025 3:45 PM by Dr. Crow Norman M.D on Workstation: [id number]      CT Head Without Contrast  Result Date: 4/1/2025  Narrative: CT HEAD WO CONTRAST-  HISTORY:  evaluate stability; R29.90-Unspecified symptoms and signs involving the nervous system; R29.898-Other symptoms and signs involving the musculoskeletal system; R20.2-Paresthesia of skin; Z79.01-Long term (current) use of anticoagulants  COMPARISON: CT head 3/30/2025 and MRI brain 11/25/2024  FINDINGS: The brain and ventricles are symmetrical. There is no active hemorrhage, hydrocephalus or of acute infarction. Mild vascular calcification and small vessel ischemic disease is noted. A remote infarct involving the left frontal lobe laterally as well as a remote infarct involving the inferior aspect of the left occipital lobe is appreciated, present previously. Further evaluation could be performed with a MRI examination of the brain as indicated.      Radiation dose reduction techniques were utilized, including automated exposure modulation based on body size.  This report was finalized on 4/1/2025 10:32 AM by Dr. Crow Nomran M.D on Workstation: [id number]      Adult transthoracic echo complete  Result Date: 3/31/2025  Narrative:   Left ventricular systolic  function is normal. Calculated left ventricular EF = 69.9% Normal left ventricular cavity size noted. Left ventricular wall thickness is consistent with mild concentric hypertrophy. All left ventricular wall segments contract normally. Left ventricular diastolic function is consistent with (grade I) impaired relaxation.   Normal left atrial cavity size noted. Saline test results are negative.   There is severe, bileaflet mitral valve thickening present. The posterior mitral valve leaflet has decreased excursion Moderate mitral valve regurgitation is present with a posteriorly-directed jet noted. Mild mitral valve stenosis is present. The mean gradient is 5 mmHg with a HR of 84bpm     EEG  Result Date: 3/30/2025  Narrative: Table formatting from the original result was not included. Indication: seizure  Date of onset: 3/30/25 at 1636 Date of offset: 3/30/25 at 1702  Technical description:  This is a 21-channel digital EEG recording with time-locked video and single-channel electrocardiogram. Electrodes are placed according to the 10 to 20 International System.  EEG Description:  Up to 9 Hz alpha activity is present over the posterior head regions that is symmetric, well formed and reactive to eye closure.  The patient is   awake  during the study.  Hyperventilation was not performed and photic stimulation was performed.   There are intermittent left frontal interictal epileptiform discharges, cannot rule out bifrontal  involvement as well.  No electrographic seizures are present.  The EKG monitor shows a heart rate that varies within normal limits.  Impression/Clinical Correlation:  This routine EEG recording is abnormal due to the presence of left frontal, cannot rule out  bifrontal, interictal epileptiform discharges.  The results of this study likely indicate focal hypersynchrony and a predilection to focal onset seizures originating in the left frontal or bifrontal head regions.  Alternatively, these discharges  may be fragmented generalized discharges due to the patient's history of primary generalized epilepsy with generalized discharges seen on previous EEGs. It is difficult to localize  based  on  this  EEG  alone.  No electrographic seizures are present during this recording.  Careful clinical and radiographic correlation is advised.         CT Angiogram Head w AI Analysis of LVO  CT Angiogram Head w AI Analysis of LVO, CT Angiogram Neck  Result Date: 3/30/2025  Narrative: CT ANGIOGRAM HEAD W AI ANALYSIS OF LVO-, CT ANGIOGRAM NECK-  INDICATION: Stroke follow-up. Not a TNK candidate.  COMPARISON: CTA head and neck and CT head April 8, 2024  TECHNIQUE: Noncontrast head CT. CTA head and neck with IV contrast. Coronal and sagittal reformats. Three dimensional reconstructions. Radiation dose reduction techniques were utilized, including automated exposure control and exposure modulation based on body size.  FINDINGS:  Noncontrast head CT: No intraparenchymal hemorrhage. Small area of hypoattenuation and volume loss seen within the left anterosuperior frontal lobe, consistent with encephalomalacia from prior infarct. Small area of hypoattenuation and volume loss seen within the left occipital lobe, consistent with encephalomalacia from prior infarct. No mass effect or midline shift. No hydrocephalus. No intraventricular hemorrhage. No extra-axial hemorrhage or fluid collection. No fracture or bone lesion. Patent mastoid air cells and middle ears. Patent paranasal sinuses.  CTA NECK: Right common and internal carotid artery are patent, without a stenosis. Left common and internal carotid artery are patent, without a stenosis. Venous contamination obscures the proximal right vertebral artery. Visualized portions of the vertebral arteries are patent, without stenosis seen. No dissection.  CTA HEAD: Right internal carotid, middle and anterior cerebral arteries appear patent. Left internal carotid, middle and anterior cerebral  arteries appear patent. Bilateral vertebral arteries, posterior inferior cerebellar arteries, basilar artery, superior cerebellar arteries and posterior cerebral arteries appear patent. No aneurysm identified. Dural sinuses appear patent.  Other: Airways wall thickening and some emphysematous changes seen in the upper lungs. Small thyroid nodules.      Impression:  1. Noncontrast head CT demonstrates encephalomalacia in the left anterior superior frontal lobe and left occipital lobe, consistent with prior infarcts. No intraparenchymal hemorrhage. 2. No large vessel occlusion seen.  This report was finalized on 3/30/2025 11:22 AM by Dr. Matt Carpenter M.D on Workstation: [id number]      XR Chest 1 View  Result Date: 3/30/2025  Narrative: Portable chest radiograph  HISTORY: Stroke  TECHNIQUE: Single AP portable radiograph of the chest  COMPARISON: Gastrograph 3/5/2023      Impression: FINDINGS AND IMPRESSION: Left-sided stimulator is present with lead coursing in the left neck and out of the field-of-view superiorly. There is also presumed loop recorder.  Lungs are hypoinflated. No pulmonary consolidation, pleural effusion or pneumothorax is seen. Cardiac silhouette within normal limits for size.  This report was finalized on 3/30/2025 11:19 AM by Dr. Julian Rush M.D on Workstation: [id number]         Medical Tests:        Summary of old records / correspondence / consultant report:   DC summary re: issues addressed on HPI    Request outside records:         MEDICATIONS   Current Outpatient Medications   Medication Sig Dispense Refill    amitriptyline (ELAVIL) 25 MG tablet TAKE 1 TABLET BY MOUTH EVERY NIGHT 30 tablet 5    amLODIPine (NORVASC) 5 MG tablet Take 1 tablet by mouth Daily.      aspirin 81 MG EC tablet Take 1 tablet by mouth Daily. 30 tablet 0    atorvastatin (LIPITOR) 40 MG tablet TAKE 1 TABLET BY MOUTH EVERY DAY 60 tablet 0    Cenobamate (Xcopri) 100 MG tablet Take 100 mg by mouth Daily. 30 tablet 5     Eliquis 5 MG tablet tablet TAKE 1 TABLET BY MOUTH TWICE DAILY 180 tablet 2    Eslicarbazepine Acetate (Aptiom) 400 MG tablet Take 1 tablet by mouth Daily. Take with 800 mg tablet for total dose 1,200 mg daily 90 tablet 1    Eslicarbazepine Acetate (Aptiom) 800 MG tablet tablet Take 1 tablet by mouth Daily. Take with 400 mg tablet for total dose of 1,200 mg daily. 90 tablet 1    folic acid (FOLVITE) 1 MG tablet Take 1 tablet by mouth Daily. 30 tablet 0    lidocaine (LIDODERM) 5 % Place 1 patch on the skin as directed by provider Daily. Remove & Discard patch within 12 hours or as directed by MD 15 each 0    magnesium oxide (MAG-OX) 400 tablet tablet Take 1 tablet by mouth Daily. 30 each 0    methocarbamol (ROBAXIN) 500 MG tablet Take 1 tablet by mouth 3 (Three) Times a Day As Needed for Muscle Spasms. 20 tablet 0    metoprolol succinate XL (TOPROL-XL) 25 MG 24 hr tablet TAKE 1 TABLET BY MOUTH DAILY 90 tablet 1    pantoprazole (PROTONIX) 40 MG EC tablet Take 1 tablet by mouth Daily. 30 tablet 0    vitamin B-12 (CYANOCOBALAMIN) 1000 MCG tablet Take 1 tablet by mouth Daily. 30 tablet 0    Vitamin B-2 (RIBOFLAVIN) 100 MG tablet tablet Take 4 tablets by mouth Daily. 120 each 0    ferrous gluconate (FERGON) 324 MG tablet Take 1 tablet by mouth Daily With Breakfast. 30 tablet 0    Vimpat 200 MG tablet TAKE 1 TABLET BY MOUTH EVERY 12 HOURS. 60 tablet 1     No current facility-administered medications for this visit.       Current outpatient and discharge medications have been reconciled for the patient.  Reviewed by: KIRSTIE Pan         @MSK@

## 2025-04-07 NOTE — PROGRESS NOTES
RM:________     PCP: Annalee Huddleston APRN    : 1980  AGE: 44 y.o.  EST PATIENT           Wt Readings from Last 3 Encounters:   25 79.8 kg (176 lb)   25 82 kg (180 lb 12.4 oz)   25 78.9 kg (174 lb)           CP______  SOA_______ DIZZINESS _____ FATIGUE ______  PALPS ______    WT: ____________ BP: __________L __________R HR______    ALLERGIES:Patient has no known allergies.      Social History     Tobacco Use    Smoking status: Former     Current packs/day: 0.00     Average packs/day: 1 pack/day for 29.0 years (29.0 ttl pk-yrs)     Types: Cigarettes     Start date:      Quit date:      Years since quitting: 3.2    Smokeless tobacco: Never    Tobacco comments:     Quit 2022   Vaping Use    Vaping status: Every Day    Substances: Nicotine, Flavoring    Devices: Disposable, 6% NICOTINE   Substance Use Topics    Alcohol use: Not Currently    Drug use: No      normal...

## 2025-04-08 LAB
PS IGA SER-ACNC: <1 APS UNITS (ref 0–19)
PS IGG SER-ACNC: <9 UNITS (ref 0–30)
PS IGM SER-ACNC: 10 UNITS (ref 0–30)

## 2025-04-09 ENCOUNTER — OFFICE VISIT (OUTPATIENT)
Dept: CARDIOLOGY | Age: 45
End: 2025-04-09
Payer: MEDICARE

## 2025-04-09 VITALS
HEART RATE: 97 BPM | HEIGHT: 65 IN | WEIGHT: 173.4 LBS | DIASTOLIC BLOOD PRESSURE: 82 MMHG | BODY MASS INDEX: 28.89 KG/M2 | SYSTOLIC BLOOD PRESSURE: 124 MMHG

## 2025-04-09 DIAGNOSIS — R07.89 ATYPICAL CHEST PAIN: Primary | ICD-10-CM

## 2025-04-09 DIAGNOSIS — Z95.818 IMPLANTABLE LOOP RECORDER PRESENT: ICD-10-CM

## 2025-04-09 DIAGNOSIS — I10 PRIMARY HYPERTENSION: ICD-10-CM

## 2025-04-09 DIAGNOSIS — Z86.73 HISTORY OF STROKE: ICD-10-CM

## 2025-04-09 DIAGNOSIS — I05.9 RHEUMATIC MITRAL VALVE DISEASE: ICD-10-CM

## 2025-04-09 PROBLEM — K62.5 BLOOD PER RECTUM: Status: RESOLVED | Noted: 2024-09-16 | Resolved: 2025-04-09

## 2025-04-09 PROBLEM — Z01.818 PREOPERATIVE CLEARANCE: Status: RESOLVED | Noted: 2024-07-12 | Resolved: 2025-04-09

## 2025-04-09 PROBLEM — Z12.12 SCREENING FOR COLORECTAL CANCER: Status: RESOLVED | Noted: 2024-10-08 | Resolved: 2025-04-09

## 2025-04-09 PROBLEM — A04.72 COLITIS, CLOSTRIDIUM DIFFICILE: Status: RESOLVED | Noted: 2024-10-08 | Resolved: 2025-04-09

## 2025-04-09 PROBLEM — A04.72 C. DIFFICILE COLITIS: Status: RESOLVED | Noted: 2024-09-16 | Resolved: 2025-04-09

## 2025-04-09 PROBLEM — R82.90 ABNORMAL URINALYSIS: Status: RESOLVED | Noted: 2024-04-08 | Resolved: 2025-04-09

## 2025-04-09 PROBLEM — E87.1: Status: RESOLVED | Noted: 2024-07-12 | Resolved: 2025-04-09

## 2025-04-09 PROBLEM — Z12.11 SCREENING FOR COLORECTAL CANCER: Status: RESOLVED | Noted: 2024-10-08 | Resolved: 2025-04-09

## 2025-04-09 PROBLEM — N60.81 SEBACEOUS CYST OF BREAST, RIGHT: Status: RESOLVED | Noted: 2018-11-06 | Resolved: 2025-04-09

## 2025-04-09 PROBLEM — D72.829 LEUKOCYTOSIS: Status: RESOLVED | Noted: 2024-07-12 | Resolved: 2025-04-09

## 2025-04-09 PROBLEM — A07.1 GIARDIASIS: Status: RESOLVED | Noted: 2024-09-16 | Resolved: 2025-04-09

## 2025-04-09 NOTE — PROGRESS NOTES
Date of Office Visit: 2025  Encounter Provider: Reza Lester MD  Place of Service: [facility] CARDIOLOGY  Patient Name: Alexandra Xiao  :1980    Chief Complaint   Patient presents with    Cerebrovascular accident (CVA) due to embolism of left midd      HPI:     Ms. Xiao is 44 y.o. and presents today in follow up. I have reviewed prior notes and there are no changes except for any new updates described below. I have also reviewed any information entered into the medical record by the patient or by ancillary staff.     She has a history of strokes; she had a NOÉ at [facility] in 2022.  I personally reviewed those images.  There was no evidence of intracardiac shunt or thrombus.  The mitral valve was noted to be abnormal, although I would have called the mitral regurgitation moderate.  She had an echo in 2023 that I reviewed.  She has normal left ventricular systolic function.  She has rheumatic changes of the mitral valve with moderate mitral regurgitation.  A loop recorder was placed in ; it has shown no evidence of atrial arrhythmias.    She presented in 2024 with an acute left MCA stroke. Loop interrogation was normal. Another NOÉ was performed; there was no evidence of shunt. There was mild A2 prolapse and moderately severe MR. Aortic plaque was noted in the arch. Because of the recurrent nature of her stroke, neurology recommended she start OAC in addition to aspirin. She's on apixaban.     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 There was a mix up with her AEDs and she suffered several terrible seizures while hospitalized. Since then, she's had left chest wall pain that is severe with movement. It's very pleuritic.      Past Medical History:   Diagnosis Date    B12 deficiency 2023    C. 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    Headache, tension-type     Hyperlipidemia     Hypertension     Memory loss     Menometrorrhagia     Migraine     TULIO on CPAP     Rheumatic mitral valve disease 04/09/2025    Seizures     Status post placement of implantable loop recorder     Stroke     MARCH AND APRIL, 2022     Past Surgical History:   Procedure Laterality Date    BREAST BIOPSY      COLONOSCOPY N/A 11/7/2024    Procedure: COLONOSCOPY into cecum and terminal ileum;  Surgeon: Matt White MD;  Location: [facility] ENDOSCOPY;  Service: Gastroenterology;  Laterality: N/A;  Pre: Screening, history of colitis   Post: Diverticulosis and Hemorrhoids    D & C HYSTEROSCOPY ENDOMETRIAL ABLATION N/A 06/10/2022    Procedure: DILATATION AND CURETTAGE HYSTEROSCOPY NOVASURE ENDOMETRIAL ABLATION;  Surgeon: Ernesto Mendosa MD;  Location: [facility] MAIN OR;  Service: Obstetrics/Gynecology;  Laterality: N/A;    FOOT SURGERY      pins in left foot     HYSTERECTOMY  07/16/2024    INSERT / REPLACE / REMOVE PACEMAKER  04/01/2022    Dr. Cristofer Chamorro, LOOP RECORDER    OTHER SURGICAL HISTORY      VNS plate 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    NOÉ      04/2022    TUBAL ABDOMINAL LIGATION      VAGUS NERVE STIMULATOR IMPLANTATION         Social History     Socioeconomic History    Marital status: Single    Number of children: 2   Tobacco Use    Smoking status: Former     Current packs/day: 0.00     Average packs/day: 1 pack/day for 29.0 years (29.0 ttl pk-yrs)     Types: Cigarettes     Start date: 1993     Quit date: 2022     Years since quitting: 3.2     Passive exposure: Never    Smokeless tobacco: Never    Tobacco comments:     Quit 03/2022   Vaping Use    Vaping status: Every Day    Substances: Nicotine, Flavoring    Devices: Disposable, 6% NICOTINE   Substance and Sexual Activity    Alcohol use: Not Currently    Drug use: No    Sexual activity: Yes     Partners: Male     Birth  control/protection: Tubal ligation       Family History   Problem Relation Age of Onset    Breast cancer Mother 50         age 60 METS    Arthritis Mother     Arthritis Father     Diabetes Father     Heart disease Father     Hypertension Father     Heart attack Father     Hypertension Brother     Heart attack Paternal Grandfather     Ovarian cancer Neg Hx     Uterine cancer Neg Hx     Colon cancer Neg Hx     Deep vein thrombosis Neg Hx     Pulmonary embolism Neg Hx     Malig Hyperthermia Neg Hx        Review of Systems   Constitutional: Positive for malaise/fatigue.   Cardiovascular:  Positive for chest pain.   Neurological:  Positive for difficulty with concentration and seizures.   Psychiatric/Behavioral:  Positive for memory loss.        No Known Allergies      Current Outpatient Medications:     amitriptyline (ELAVIL) 25 MG tablet, TAKE 1 TABLET BY MOUTH EVERY NIGHT, Disp: 30 tablet, Rfl: 5    amLODIPine (NORVASC) 5 MG tablet, Take 1 tablet by mouth Daily., Disp: , Rfl:     aspirin 81 MG EC tablet, Take 1 tablet by mouth Daily., Disp: 30 tablet, Rfl: 0    atorvastatin (LIPITOR) 40 MG tablet, TAKE 1 TABLET BY MOUTH EVERY DAY, Disp: 60 tablet, Rfl: 0    Cenobamate (Xcopri) 100 MG tablet, Take 100 mg by mouth Daily., Disp: 30 tablet, Rfl: 5    Eliquis 5 MG tablet tablet, TAKE 1 TABLET BY MOUTH TWICE DAILY, Disp: 180 tablet, Rfl: 2    Eslicarbazepine Acetate (Aptiom) 400 MG tablet, Take 1 tablet by mouth Daily. Take with 800 mg tablet for total dose 1,200 mg daily, Disp: 90 tablet, Rfl: 1    Eslicarbazepine Acetate (Aptiom) 800 MG tablet tablet, Take 1 tablet by mouth Daily. Take with 400 mg tablet for total dose of 1,200 mg daily., Disp: 90 tablet, Rfl: 1    folic acid (FOLVITE) 1 MG tablet, Take 1 tablet by mouth Daily., Disp: 30 tablet, Rfl: 0    lidocaine (LIDODERM) 5 %, Place 1 patch on the skin as directed by provider Daily. Remove & Discard patch within 12 hours or as directed by MD, Disp: 15 each,  "Rfl: 0    magnesium oxide (MAG-OX) 400 tablet tablet, Take 1 tablet by mouth Daily., Disp: 30 each, Rfl: 0    methocarbamol (ROBAXIN) 500 MG tablet, Take 1 tablet by mouth 3 (Three) Times a Day As Needed for Muscle Spasms., Disp: 20 tablet, Rfl: 0    metoprolol succinate XL (TOPROL-XL) 25 MG 24 hr tablet, TAKE 1 TABLET BY MOUTH DAILY, Disp: 90 tablet, Rfl: 1    pantoprazole (PROTONIX) 40 MG EC tablet, Take 1 tablet by mouth Daily., Disp: 30 tablet, Rfl: 0    Vimpat 200 MG tablet, TAKE 1 TABLET BY MOUTH EVERY 12 HOURS., Disp: 60 tablet, Rfl: 1    vitamin B-12 (CYANOCOBALAMIN) 1000 MCG tablet, Take 1 tablet by mouth Daily., Disp: 30 tablet, Rfl: 0    Vitamin B-2 (RIBOFLAVIN) 100 MG tablet tablet, Take 4 tablets by mouth Daily., Disp: 120 each, Rfl: 0    ferrous gluconate (FERGON) 324 MG tablet, Take 1 tablet by mouth Daily With Breakfast. (Patient not taking: Reported on 4/9/2025), Disp: 30 tablet, Rfl: 0      Objective:     Vitals:    04/09/25 1349   BP: 124/82   BP Location: Right arm   Pulse: 97   Weight: 78.7 kg (173 lb 6.4 oz)   Height: 165.1 cm (65\")         Body mass index is 28.86 kg/m².    Vitals reviewed.   Constitutional:       Appearance: Well-developed.      Comments: She looks like she doesn't feel well   Eyes:      Conjunctiva/sclera: Conjunctivae normal.   HENT:      Head: Normocephalic.      Nose: Nose normal.    Mouth/Throat:      Pharynx: Oropharynx is clear.   Neck:      Thyroid: Thyroid normal.      Vascular: No JVD. JVD normal.      Lymphadenopathy: No cervical adenopathy.   Pulmonary:      Effort: Pulmonary effort is normal.      Breath sounds: Normal breath sounds.   Cardiovascular:      Normal rate. Regular rhythm.      Comments: Exquisite TTP along left anterior chest near the edge of the sternum  Pulses:     Intact distal pulses.   Edema:     Peripheral edema absent.   Abdominal:      Palpations: Abdomen is soft.      Tenderness: There is no abdominal tenderness.   Musculoskeletal: Normal " range of motion.      Cervical back: Normal range of motion. Skin:     General: Skin is warm and dry.   Neurological:      General: No focal deficit present.      Mental Status: Oriented to person, place, and time.      Cranial Nerves: No cranial nerve deficit.   Psychiatric:         Behavior: Behavior normal.         Thought Content: Thought content normal.         Judgment: Judgment normal.         Procedures EKG --   I have personally reviewed EKG on 04/24/2024 and my interpretation of the tracing is as follows: NSR, early transition, NSST abnormality, no change      Assessment:       Diagnosis Plan   1. Atypical chest pain        2. History of stroke        3. Implantable loop recorder present        4. Primary hypertension        5. Rheumatic mitral valve disease               Plan:       1. Her pain is musculoskeletal, likely from her several recent seizures. She's had a few chest x-rays and no rib fractures were mentioned. She can't really take NSAIDS due to her blood thinners. Tramadol is definitely contraindicated due to her seizures. I asked her to take acetaminophen 500mg 4x/day, to use a heating pad, and to use lidocaine patches. I'll reach out to her PCP to see if there are other options.     2/3. She has had strokes in the past and different vascular territories.  A loop recorder has been in place since 2022 and she has not had any atrial arrhythmias.  However, she does have rheumatic changes of her mitral valve and left atrial dilation.  She was noted to have aortic arch atherosclerosis on a NOÉ.  Because of recurrent events while on antiplatelet therapy, she has been empirically placed on oral anticoagulation. She will remain on atorvastatin.    4.  Her blood pressure is well-controlled.    5.  She has rheumatic mitral valve changes but she also has mild prolapse of the A2 segment. An echo in March 2025 showed moderate MR and mild-moderate MS. We'll recheck this in 2026.     Sincerely,       Reza BACK  MD Trevon

## 2025-04-15 ENCOUNTER — READMISSION MANAGEMENT (OUTPATIENT)
Dept: CALL CENTER | Facility: HOSPITAL | Age: 45
End: 2025-04-15
Payer: MEDICARE

## 2025-04-18 ENCOUNTER — READMISSION MANAGEMENT (OUTPATIENT)
Dept: CALL CENTER | Facility: HOSPITAL | Age: 45
End: 2025-04-18
Payer: MEDICARE

## 2025-04-18 NOTE — OUTREACH NOTE
Medical Week 2 Survey      Flowsheet Row Responses   [facility] patient discharged from? [place]   Does the patient have one of the following disease processes/diagnoses(primary or secondary)? Other   Week 2 attempt successful? No   Unsuccessful attempts Attempt 2            Gianna Mcdonough Registered Nurse

## 2025-04-20 DIAGNOSIS — I10 PRIMARY HYPERTENSION: ICD-10-CM

## 2025-04-20 RX ORDER — AMLODIPINE BESYLATE 5 MG/1
5 TABLET ORAL DAILY
Qty: 90 TABLET | Refills: 0 | Status: SHIPPED | OUTPATIENT
Start: 2025-04-20

## 2025-04-20 RX ORDER — METOPROLOL SUCCINATE 25 MG/1
25 TABLET, EXTENDED RELEASE ORAL DAILY
Qty: 90 TABLET | Refills: 0 | Status: SHIPPED | OUTPATIENT
Start: 2025-04-20

## 2025-04-24 ENCOUNTER — OFFICE VISIT (OUTPATIENT)
Dept: SLEEP MEDICINE | Facility: HOSPITAL | Age: 45
End: 2025-04-24
Payer: MEDICARE

## 2025-04-24 VITALS
HEIGHT: 65 IN | BODY MASS INDEX: 29.66 KG/M2 | SYSTOLIC BLOOD PRESSURE: 122 MMHG | OXYGEN SATURATION: 96 % | DIASTOLIC BLOOD PRESSURE: 77 MMHG | HEART RATE: 97 BPM | WEIGHT: 178 LBS

## 2025-04-24 DIAGNOSIS — G47.33 OSA ON CPAP: Primary | ICD-10-CM

## 2025-04-24 DIAGNOSIS — G40.909 NONINTRACTABLE EPILEPSY WITHOUT STATUS EPILEPTICUS, UNSPECIFIED EPILEPSY TYPE: ICD-10-CM

## 2025-04-24 DIAGNOSIS — I10 PRIMARY HYPERTENSION: ICD-10-CM

## 2025-04-24 PROBLEM — R06.83 SNORING: Status: RESOLVED | Noted: 2023-11-30 | Resolved: 2025-04-24

## 2025-04-24 PROCEDURE — 3074F SYST BP LT 130 MM HG: CPT | Performed by: INTERNAL MEDICINE

## 2025-04-24 PROCEDURE — G0463 HOSPITAL OUTPT CLINIC VISIT: HCPCS

## 2025-04-24 PROCEDURE — 99213 OFFICE O/P EST LOW 20 MIN: CPT | Performed by: INTERNAL MEDICINE

## 2025-04-24 PROCEDURE — 1159F MED LIST DOCD IN RCRD: CPT | Performed by: INTERNAL MEDICINE

## 2025-04-24 PROCEDURE — 1160F RVW MEDS BY RX/DR IN RCRD: CPT | Performed by: INTERNAL MEDICINE

## 2025-04-24 PROCEDURE — 3078F DIAST BP <80 MM HG: CPT | Performed by: INTERNAL MEDICINE

## 2025-04-24 NOTE — PROGRESS NOTES
"  [facility]  Sleep Medicine   [address]  [address]  [place] [address]  Phone   Fax       SLEEP CLINIC FOLLOW UP PROGRESS NOTE.    Alexandra Xiao  [id number]   1980  44 y.o.  female      PCP: Annalee Huddleston APRN      Date of visit: 4/24/2025    Chief Complaint   Patient presents with    Sleep Apnea       HPI:  This is a 44 y.o. years old patient is here for the management of obstructive sleep apnea.  Sleep apnea is mild in severity with a AHI of 6/h and RDI of 13/hr. Patient is using positive airway pressure therapy with auto CPAP and the symptoms of sleep apnea have improved significantly on the therapy. Normally patient goes to bed at 12 midnight and wakes up at 10 AM .  The patient wakes up 4 time(s) during the night and has no problem going back to sleep.  Feels refreshed after waking up.  She also has a history of seizures for which she is taking medications and recent seizure was about 3 weeks ago.  She needs supplies she uses a nasal pillows and uses prescription as her DME company    Medications and allergies are reviewed by me and documented in the encounter.     SOCIAL (habits pertaining to sleep medicine)  History tobacco use:No   History of alcohol use: 0 per week  Caffeine use: 1 soda    REVIEW OF SYSTEMS:   Pertaining positive symptoms are:  Cameron Sleepiness Scale :Total score: 8   Nasal congestion      PHYSICAL EXAMINATION:  CONSTITUTIONAL:  Vitals:    04/24/25 1500   BP: 122/77   Pulse: 97   SpO2: 96%   Weight: 80.7 kg (178 lb)   Height: 165.1 cm (65\")    Body mass index is 29.62 kg/m².   NOSE: nasal passages are clear, No deformities noted   RESP SYSTEM: Not in any respiratory distress, no chest deformities noted,   CARDIOVASULAR: No edema noted  NEURO: Oriented x 3, gait normal,  Mood and affect appeared appropriate      Data reviewed:  The Smart card downloaded on 4/24/2025 has been reviewed independently by me for compliance and " discussed the data with the patient.   Not using the CPAP due to lack of supplies  Mask type: Nasal pillow  DME: [employer]        ASSESSMENT AND PLAN:  Obstructive sleep apnea ( G 47.33).  Patient needs supplies.  I have sent a order to the [employer] prescription to get her supplies and patient needs to restart using the CPAP and see me back for compliance.  The device is benefiting the patient and the device is medically necessary.  Without proper control of sleep apnea and good compliance there is a increased risk for hypertension, diabetes mellitus and nonrestorative sleep with hypersomnia which can increase risk for motor vehicle accidents.  Untreated sleep apnea is also a risk factor for development of atrial fibrillation, pulmonary hypertension, insulin resistance and stroke. The patient is also instructed to get the supplies from the [employer] and and change them on a regular basis.  A prescription for supplies has been sent to the [employer].  I have also discussed the good sleep hygiene habits and adequate amount of sleep needed for good health.  Stray of seizures  Hypertension.    Return in about 1 year (around 4/24/2026) for with smart card down load. . Patient's questions were answered.    4/24/2025  Marcello Pleitez MD  Sleep Medicine.  Medical Director,   [facility] [place] sleep centers.

## 2025-05-01 ENCOUNTER — OFFICE VISIT (OUTPATIENT)
Dept: GASTROENTEROLOGY | Facility: CLINIC | Age: 45
End: 2025-05-01
Payer: MEDICARE

## 2025-05-01 ENCOUNTER — READMISSION MANAGEMENT (OUTPATIENT)
Dept: CALL CENTER | Facility: HOSPITAL | Age: 45
End: 2025-05-01
Payer: MEDICARE

## 2025-05-01 VITALS
BODY MASS INDEX: 29.54 KG/M2 | SYSTOLIC BLOOD PRESSURE: 133 MMHG | HEART RATE: 84 BPM | HEIGHT: 65 IN | WEIGHT: 177.3 LBS | TEMPERATURE: 97.5 F | DIASTOLIC BLOOD PRESSURE: 82 MMHG

## 2025-05-01 DIAGNOSIS — R74.8 ELEVATED ALKALINE PHOSPHATASE LEVEL: Primary | ICD-10-CM

## 2025-05-01 DIAGNOSIS — I10 ESSENTIAL (PRIMARY) HYPERTENSION: ICD-10-CM

## 2025-05-01 DIAGNOSIS — K76.0 HEPATIC STEATOSIS: ICD-10-CM

## 2025-05-01 DIAGNOSIS — R10.31 RIGHT LOWER QUADRANT PAIN: ICD-10-CM

## 2025-05-01 PROCEDURE — 3079F DIAST BP 80-89 MM HG: CPT | Performed by: PHYSICIAN ASSISTANT

## 2025-05-01 PROCEDURE — 99214 OFFICE O/P EST MOD 30 MIN: CPT | Performed by: PHYSICIAN ASSISTANT

## 2025-05-01 PROCEDURE — 3075F SYST BP GE 130 - 139MM HG: CPT | Performed by: PHYSICIAN ASSISTANT

## 2025-05-01 PROCEDURE — 1159F MED LIST DOCD IN RCRD: CPT | Performed by: PHYSICIAN ASSISTANT

## 2025-05-01 PROCEDURE — 1160F RVW MEDS BY RX/DR IN RCRD: CPT | Performed by: PHYSICIAN ASSISTANT

## 2025-05-01 NOTE — PROGRESS NOTES
Chief Complaint  elevated lab results and Constipation    Subjective          History Of Present Illness:    Alexandra Xiao is a  44 y.o. female patient of Dr. White with a history of TULIO, CVA on Eliquis, hypertension, epilepsy, hyperlipidemia who presents as a new patient for evaluation of    Patient has had an elevated alkaline phosphatase dating back to 2022.  Recent  on 4/5/2025.  ALT, AST, T. bili all normal.    CT abdomen pelvis 9/15/2024 with mild to moderate fatty infiltration of the liver.  Normal appearance of the gallbladder.    Patient does not currently drink alcohol.  She did have a period where she drank alcohol intermittently for 2 years but this was many years ago.  She has gained weight in the last year due to various medical issues.  She denies excess Tylenol use.  She denies a family history of liver disease.  She has no history of hepatitis.  She is on some antiepileptics including Aptiom but liver elevation regarding these medications is typically seen in ALT.  Does endorse some right upper quadrant abdominal pain.  No jaundice or pruritus.  She reports occasional nausea but no vomiting.  She does endorse some constipation and uses Dulcolax as needed.  No black or bloody stools.  No abnormal weight loss.    Liver ultrasound 6/28/2024  Diffusely increased hepatic echotexture with portions of the liver  difficult to penetrate and visualize, consistent with fatty  infiltration. Antegrade flow within the main portal vein demonstrated on  color Doppler and spectral analysis. No focal liver lesion seen.    Gallbladder is normal, no biliary duct dilatation CBD is 4 mm. No free  fluid within the right upper quadrant. Remainder is unremarkable.    Additional data reviewed:  Colonoscopy 11/7/24 -thrombosed external hemorrhoids, diverticulosis of the sigmoid and descending colon, tortuous colon, normal TI, nonbleeding internal hemorrhoids.  Recall 5 years.    Objective   Vital Signs:   BP  "133/82   Pulse 84   Temp 97.5 °F (36.4 °C)   Ht 165.1 cm (65\")   Wt 80.4 kg (177 lb 4.8 oz)   BMI 29.50 kg/m²       Physical Exam  Vitals reviewed.   Constitutional:       General: She is not in acute distress.     Appearance: Normal appearance. She is not ill-appearing.   HENT:      Head: Normocephalic and atraumatic.      Nose: Nose normal.      Mouth/Throat:      Pharynx: Oropharynx is clear.   Eyes:      Conjunctiva/sclera: Conjunctivae normal.   Pulmonary:      Effort: Pulmonary effort is normal.   Abdominal:      General: There is no distension.      Palpations: Abdomen is soft. There is no mass.      Tenderness: There is no abdominal tenderness.   Musculoskeletal:         General: No swelling. Normal range of motion.      Cervical back: Normal range of motion.   Skin:     General: Skin is warm and dry.      Findings: No bruising or rash.   Neurological:      General: No focal deficit present.      Mental Status: She is alert and oriented to person, place, and time.      Motor: No weakness.      Gait: Gait normal.   Psychiatric:         Mood and Affect: Mood normal.          Result Review :   The following data was reviewed by: Denise Laws PA-C on 05/01/2025:  CMP          4/2/2025    05:42 4/3/2025    06:18 4/5/2025    21:57   CMP   Glucose 87  82  123    BUN 11  7  5    Creatinine 0.65  0.58  0.59    EGFR 110.8  113.9  113.4    Sodium 136  133  137    Potassium 3.9  3.5  3.5    Chloride 104  102  102    Calcium 8.8  8.5  9.7    Total Protein   7.8    Albumin  3.5  4.0    Globulin   3.8    Total Bilirubin   <0.2    Alkaline Phosphatase   242    AST (SGOT)   25    ALT (SGPT)   22    Albumin/Globulin Ratio   1.1    BUN/Creatinine Ratio 16.9  12.1  8.5    Anion Gap 11.0  10.4  12.9      CBC          4/2/2025    05:42 4/3/2025    06:18 4/5/2025    21:57   CBC   WBC 7.66  7.28  11.48    RBC 4.26  4.18  4.80    Hemoglobin 10.6  10.5  12.1    Hematocrit 32.9  32.5  36.9    MCV 77.2  77.8  76.9    MCH " 24.9  25.1  25.2    [facility] 32.2  32.3  32.8    RDW 16.0  16.0  15.9    Platelets 305  294  387            Assessment and Plan    Diagnoses and all orders for this visit:    1. Elevated alkaline phosphatase level (Primary)  -     US Liver; Future  -     Hepatitis Panel, Acute  -     Anti-Smooth Muscle Antibody Titer  -     Mitochondrial Antibodies, M2  -     Ceruloplasmin  -     Alpha - 1 - Antitrypsin  -     Gamma GT  -     YOUSIF  -     SHAH Fibrosure Plus  -     Alkaline Phosphatase, Isoenzymes    2. Hepatic steatosis  -     US Liver; Future  -     Hepatitis Panel, Acute  -     Anti-Smooth Muscle Antibody Titer  -     Mitochondrial Antibodies, M2  -     Ceruloplasmin  -     Alpha - 1 - Antitrypsin  -     Gamma GT  -     YOUSIF  -     SHAH Fibrosure Plus    3. Right lower quadrant pain  -     Hepatitis Panel, Acute    4. Essential (primary) hypertension  -     SHAH Fibrosure Plus       Check serologic workup, acute hepatitis panel, alkaline phosphatase isoenzymes   She does have a history of hepatic steatosis on prior abdominal imaging.  Check Shah FibroSure.  Her ALP is slightly increased which could be due to patient's weight gain but would recommend checking an updated liver ultrasound  Further recommendations to be made based on the findings of her blood work and liver ultrasound.    Follow Up   Return in about 3 months (around 8/1/2025) for Denise Guadarrama PA-C.    Dragon dictation used throughout this note.            Denise Guadarrama PA-C   [facility]  [address] [place] [address]  Office: [phone]

## 2025-05-01 NOTE — OUTREACH NOTE
Medical Week 3 Survey      Flowsheet Row Responses   [facility] patient discharged from? [place]   Does the patient have one of the following disease processes/diagnoses(primary or secondary)? Other   Week 3 attempt successful? Yes   Call start time 1808   Call end time 1809   Discharge diagnosis Stroke-like symptoms   Person spoke with today (if not patient) and relationship pt   Meds reviewed with patient/caregiver? Yes   Is the patient having any side effects they believe may be caused by any medication additions or changes? No   Does the patient have all medications ordered at discharge? Yes   Is the patient taking all medications as directed (includes completed medication regime)? Yes   Does the patient have a primary care provider?  Yes   Does the patient have an appointment with their PCP within 7 days of discharge? Yes   Has the patient kept scheduled appointments due by today? Yes   Psychosocial issues? No   What is the patient's perception of their health status since discharge? Improving   Is the patient/caregiver able to teach back signs and symptoms related to disease process for when to call PCP? Yes   Is the patient/caregiver able to teach back signs and symptoms related to disease process for when to call 911? Yes   Is the patient/caregiver able to teach back the hierarchy of who to call/visit for symptoms/problems? PCP, Specialist, Home health nurse, Urgent Care, ED, 911 Yes   Week 3 Call Completed? Yes   Graduated Yes   Is the patient interested in additional calls from an ambulatory ? No   Would this patient benefit from a Referral to Amb Social Work? No   Wrap up additional comments Pt reports she continues to have some chest pain/SOB. Pt has followed up with PCP keeping PCP informed.   Call end time 1809            Gianna DOYLE - Registered Nurse

## 2025-05-02 LAB
A1AT SERPL-MCNC: 122 MG/DL (ref 101–187)
ALP BONE CFR SERPL: 23 % (ref 14–68)
ALP INTEST CFR SERPL: 1 % (ref 0–18)
ALP LIVER CFR SERPL: 76 % (ref 18–85)
ALP SERPL-CCNC: 246 IU/L (ref 44–121)
ANA SER QL: NEGATIVE
CERULOPLASMIN SERPL-MCNC: 43.8 MG/DL (ref 19–39)
GGT SERPL-CCNC: 107 IU/L (ref 0–60)
HAV IGM SERPL QL IA: NEGATIVE
HBV CORE IGM SERPL QL IA: NEGATIVE
HBV SURFACE AG SERPL QL IA: NEGATIVE
HCV AB SERPL QL IA: NON REACTIVE
HCV AB SERPL QL IA: NORMAL
MITOCHONDRIA M2 IGG SER-ACNC: 114.5 UNITS (ref 0–20)
SMA IGG SER-ACNC: 5 UNITS (ref 0–19)

## 2025-05-06 ENCOUNTER — DOCUMENTATION (OUTPATIENT)
Dept: GASTROENTEROLOGY | Facility: CLINIC | Age: 45
End: 2025-05-06
Payer: MEDICARE

## 2025-05-06 DIAGNOSIS — K74.3 PRIMARY BILIARY CHOLANGITIS: Primary | ICD-10-CM

## 2025-05-06 LAB
A2 MACROGLOB SERPL-MCNC: 265 MG/DL (ref 110–276)
ALT SERPL W P-5'-P-CCNC: 22 IU/L (ref 0–40)
APO A-I SERPL-MCNC: 166 MG/DL (ref 116–209)
AST SERPL W P-5'-P-CCNC: 25 IU/L (ref 0–40)
BILIRUB SERPL-MCNC: <0.1 MG/DL (ref 0–1.2)
CHOLEST SERPL-MCNC: 168 MG/DL (ref 100–199)
FIBROSIS SCORING:: ABNORMAL
FIBROSIS STAGE SERPL QL: ABNORMAL
GGT SERPL-CCNC: 110 IU/L (ref 0–60)
GLUCOSE SERPL-MCNC: 86 MG/DL (ref 70–99)
HAPTOGLOB SERPL-MCNC: 204 MG/DL (ref 42–296)
LABORATORY COMMENT REPORT: ABNORMAL
LIVER FIBR SCORE SERPL CALC.FIBROSURE: 0.09 (ref 0–0.21)
LIVER STEATOSIS GRADE SERPL QL: ABNORMAL
LIVER STEATOSIS SCORE SERPL: 0.42 (ref 0–0.4)
NASH GRADE SERPL QL: ABNORMAL
NASH INTERPRETATION SERPL-IMP: ABNORMAL
NASH SCORE SERPL: 0.3 (ref 0–0.25)
NASH SCORING: ABNORMAL
STEATOSIS SCORING: ABNORMAL
TEST PERFORMANCE INFO SPEC: ABNORMAL
TEST PERFORMANCE INFO SPEC: ABNORMAL
TRIGL SERPL-MCNC: 120 MG/DL (ref 0–149)

## 2025-05-06 RX ORDER — URSODIOL 300 MG/1
600 CAPSULE ORAL 2 TIMES DAILY
Qty: 360 CAPSULE | Refills: 3 | Status: SHIPPED | OUTPATIENT
Start: 2025-05-06

## 2025-05-06 NOTE — PROGRESS NOTES
Called patient to discuss lab findings. We discussed she has a lab called a anti-mitochondrial antibody that is elevated. In the setting of her elevated ALP, she has a diagnosis of primary biliary cholangitis. We will initiate ursodiol 600 mg twice daily (15 mg/kg/day) and recheck her liver function test in 3 months.  If she has a persistently elevated alkaline phosphatase may need to consider adjunctive therapy.    Proceed with liver ultrasound as planned.    All questions were answered.

## 2025-05-07 DIAGNOSIS — G40.309 GENERALIZED NONCONVULSIVE EPILEPSY: ICD-10-CM

## 2025-05-07 RX ORDER — ATORVASTATIN CALCIUM 40 MG/1
40 TABLET, FILM COATED ORAL DAILY
Qty: 60 TABLET | Refills: 2 | Status: SHIPPED | OUTPATIENT
Start: 2025-05-07

## 2025-05-08 RX ORDER — CENOBAMATE 50 MG/1
1 TABLET, FILM COATED ORAL DAILY
Qty: 30 TABLET | Refills: 2 | Status: SHIPPED | OUTPATIENT
Start: 2025-05-08

## 2025-05-14 ENCOUNTER — HOSPITAL ENCOUNTER (OUTPATIENT)
Dept: ULTRASOUND IMAGING | Facility: HOSPITAL | Age: 45
Discharge: HOME | End: 2025-05-14
Admitting: PHYSICIAN ASSISTANT
Payer: MEDICARE

## 2025-05-14 DIAGNOSIS — R74.8 ELEVATED ALKALINE PHOSPHATASE LEVEL: ICD-10-CM

## 2025-05-14 DIAGNOSIS — K76.0 HEPATIC STEATOSIS: ICD-10-CM

## 2025-05-14 PROCEDURE — 76705 ECHO EXAM OF ABDOMEN: CPT

## 2025-06-04 ENCOUNTER — SPECIALTY PHARMACY (OUTPATIENT)
Dept: NEUROLOGY | Facility: CLINIC | Age: 45
End: 2025-06-04
Payer: MEDICARE

## 2025-06-04 ENCOUNTER — TELEPHONE (OUTPATIENT)
Dept: NEUROLOGY | Facility: CLINIC | Age: 45
End: 2025-06-04

## 2025-06-04 DIAGNOSIS — Z96.89 STATUS POST VNS (VAGUS NERVE STIMULATOR) PLACEMENT: ICD-10-CM

## 2025-06-04 DIAGNOSIS — G40.309 GENERALIZED NONCONVULSIVE EPILEPSY: ICD-10-CM

## 2025-06-04 RX ORDER — ESLICARBAZEPINE ACETATE 400 MG/1
1 TABLET ORAL DAILY
Qty: 90 TABLET | Refills: 1 | Status: SHIPPED | OUTPATIENT
Start: 2025-06-04

## 2025-06-04 RX ORDER — ESLICARBAZEPINE ACETATE 800 MG/1
800 TABLET ORAL DAILY
Qty: 90 TABLET | Refills: 1 | Status: SHIPPED | OUTPATIENT
Start: 2025-06-04

## 2025-06-04 NOTE — TELEPHONE ENCOUNTER
Called and spoke with patient. Prior authorization for brand Aptiom (eslicarbazepine) approved today and prescriptions resent as brand preferred. Patient verbalized understanding and knows to call back with any questions or concerns.

## 2025-06-04 NOTE — TELEPHONE ENCOUNTER
Caller: Alexandra Xiao    Relationship: Self    Best call back number: [phone]     What was the call regarding: THE PT CALLED PREVIOUSLY TO GET A REFILL OF HER Eslicarbazepine Acetate (Aptiom) 800 MG  MG. THE GENERIC WAS SENT IN AND SHE RECEIVED A LETTER FROM HER INS THAT A FORMULARY FORM NEEDS TO BE COMPLETED FOR EVEN THE GENERIC RX.     SHE IS NOT SURE IF THE GENERIC WILL CAUSE PROBLEMS BUT SHE NEEDS THE FORMULARY FORM FILLED OUT AND IF POSSIBLE THE NAME BRAND UPDATED FOR THE SYSTEM.     PLEASE REVIEW  THANK YOU

## 2025-06-16 DIAGNOSIS — G40.309 GENERALIZED NONCONVULSIVE EPILEPSY: ICD-10-CM

## 2025-06-17 ENCOUNTER — OFFICE VISIT (OUTPATIENT)
Dept: INTERNAL MEDICINE | Age: 45
End: 2025-06-17
Payer: MEDICARE

## 2025-06-17 VITALS
DIASTOLIC BLOOD PRESSURE: 86 MMHG | TEMPERATURE: 97.8 F | RESPIRATION RATE: 18 BRPM | SYSTOLIC BLOOD PRESSURE: 145 MMHG | HEART RATE: 100 BPM | BODY MASS INDEX: 29.32 KG/M2 | OXYGEN SATURATION: 98 % | WEIGHT: 176 LBS | HEIGHT: 65 IN

## 2025-06-17 DIAGNOSIS — Z12.31 ENCOUNTER FOR SCREENING MAMMOGRAM FOR MALIGNANT NEOPLASM OF BREAST: ICD-10-CM

## 2025-06-17 DIAGNOSIS — Z00.00 ENCOUNTER FOR ANNUAL WELLNESS VISIT (AWV) IN MEDICARE PATIENT: Primary | ICD-10-CM

## 2025-06-17 DIAGNOSIS — E53.8 VITAMIN B12 DEFICIENCY: ICD-10-CM

## 2025-06-17 DIAGNOSIS — Z01.419 ROUTINE GYNECOLOGICAL EXAMINATION: ICD-10-CM

## 2025-06-17 DIAGNOSIS — E78.5 HYPERLIPIDEMIA, UNSPECIFIED HYPERLIPIDEMIA TYPE: ICD-10-CM

## 2025-06-17 DIAGNOSIS — I10 ESSENTIAL (PRIMARY) HYPERTENSION: ICD-10-CM

## 2025-06-17 DIAGNOSIS — E55.9 VITAMIN D DEFICIENCY: ICD-10-CM

## 2025-06-17 DIAGNOSIS — R73.09 ELEVATED GLUCOSE: ICD-10-CM

## 2025-06-17 LAB
25(OH)D3+25(OH)D2 SERPL-MCNC: 18.7 NG/ML (ref 30–100)
ALBUMIN SERPL-MCNC: 4.3 G/DL (ref 3.5–5.2)
ALBUMIN/GLOB SERPL: 1.2 G/DL
ALP SERPL-CCNC: 261 U/L (ref 39–117)
ALT SERPL-CCNC: 15 U/L (ref 1–33)
APPEARANCE UR: CLEAR
AST SERPL-CCNC: 18 U/L (ref 1–32)
BACTERIA #/AREA URNS HPF: ABNORMAL /HPF
BASOPHILS # BLD AUTO: 0.04 10*3/MM3 (ref 0–0.2)
BASOPHILS NFR BLD AUTO: 0.4 % (ref 0–1.5)
BILIRUB SERPL-MCNC: 0.2 MG/DL (ref 0–1.2)
BILIRUB UR QL STRIP: NEGATIVE
BUN SERPL-MCNC: 4 MG/DL (ref 6–20)
BUN/CREAT SERPL: 6.7 (ref 7–25)
CALCIUM SERPL-MCNC: 9.4 MG/DL (ref 8.6–10.5)
CASTS URNS MICRO: ABNORMAL
CHLORIDE SERPL-SCNC: 99 MMOL/L (ref 98–107)
CO2 SERPL-SCNC: 25.1 MMOL/L (ref 22–29)
COLOR UR: YELLOW
CREAT SERPL-MCNC: 0.6 MG/DL (ref 0.57–1)
EGFRCR SERPLBLD CKD-EPI 2021: 113 ML/MIN/1.73
EOSINOPHIL # BLD AUTO: 0.08 10*3/MM3 (ref 0–0.4)
EOSINOPHIL NFR BLD AUTO: 0.8 % (ref 0.3–6.2)
EPI CELLS #/AREA URNS HPF: ABNORMAL /HPF
ERYTHROCYTE [DISTWIDTH] IN BLOOD BY AUTOMATED COUNT: 14.5 % (ref 12.3–15.4)
GLOBULIN SER CALC-MCNC: 3.5 GM/DL
GLUCOSE SERPL-MCNC: 84 MG/DL (ref 65–99)
GLUCOSE UR QL STRIP: NEGATIVE
HBA1C MFR BLD: 5.6 % (ref 4.8–5.6)
HCT VFR BLD AUTO: 39.3 % (ref 34–46.6)
HGB BLD-MCNC: 12.5 G/DL (ref 12–15.9)
HGB UR QL STRIP: ABNORMAL
IMM GRANULOCYTES # BLD AUTO: 0.03 10*3/MM3 (ref 0–0.05)
IMM GRANULOCYTES NFR BLD AUTO: 0.3 % (ref 0–0.5)
KETONES UR QL STRIP: NEGATIVE
LEUKOCYTE ESTERASE UR QL STRIP: ABNORMAL
LYMPHOCYTES # BLD AUTO: 2.18 10*3/MM3 (ref 0.7–3.1)
LYMPHOCYTES NFR BLD AUTO: 22.8 % (ref 19.6–45.3)
MCH RBC QN AUTO: 25.3 PG (ref 26.6–33)
MCHC RBC AUTO-ENTMCNC: 31.8 G/DL (ref 31.5–35.7)
MCV RBC AUTO: 79.6 FL (ref 79–97)
MONOCYTES # BLD AUTO: 0.55 10*3/MM3 (ref 0.1–0.9)
MONOCYTES NFR BLD AUTO: 5.7 % (ref 5–12)
NEUTROPHILS # BLD AUTO: 6.7 10*3/MM3 (ref 1.7–7)
NEUTROPHILS NFR BLD AUTO: 70 % (ref 42.7–76)
NITRITE UR QL STRIP: NEGATIVE
NRBC BLD AUTO-RTO: 0 /100 WBC (ref 0–0.2)
PH UR STRIP: 6.5 [PH] (ref 5–8)
PLATELET # BLD AUTO: 390 10*3/MM3 (ref 140–450)
POTASSIUM SERPL-SCNC: 4 MMOL/L (ref 3.5–5.2)
PROT SERPL-MCNC: 7.8 G/DL (ref 6–8.5)
PROT UR QL STRIP: NEGATIVE
RBC # BLD AUTO: 4.94 10*6/MM3 (ref 3.77–5.28)
RBC #/AREA URNS HPF: ABNORMAL /HPF
SODIUM SERPL-SCNC: 136 MMOL/L (ref 136–145)
SP GR UR STRIP: 1.01 (ref 1–1.03)
T4 FREE SERPL-MCNC: 0.73 NG/DL (ref 0.92–1.68)
TSH SERPL DL<=0.005 MIU/L-ACNC: 1.65 UIU/ML (ref 0.27–4.2)
UROBILINOGEN UR STRIP-MCNC: ABNORMAL MG/DL
WBC # BLD AUTO: 9.58 10*3/MM3 (ref 3.4–10.8)
WBC #/AREA URNS HPF: ABNORMAL /HPF

## 2025-06-17 RX ORDER — LACOSAMIDE 200 MG/1
1 TABLET, FILM COATED ORAL EVERY 12 HOURS SCHEDULED
Qty: 60 TABLET | Refills: 0 | Status: SHIPPED | OUTPATIENT
Start: 2025-06-17

## 2025-06-17 NOTE — PROGRESS NOTES
I N T E R N A L  M E D I C I N E    KIRSTIE Pan      ENCOUNTER DATE:  06/17/2025    Alexandra Xiao / 44 y.o. / female      MEDICARE ANNUAL WELLNESS VISIT       Chief Complaint:    Chief Complaint   Patient presents with    Medicare Wellness-subsequent         Patient's general assessment of her health since a year ago:     - Compared to one year ago, she feels her physical health is:   IMPROVED    - Compared to one year ago, she feels her mental health is:  IMPROVED    Recent Hospitalization (within past 365 days) (NO unless indicated)  Yes at [facility] in March for Stroke like symptoms      Patient Care Team:    Patient Care Team:  Annalee Huddleston APRN as PCP - General (Family Medicine)  Cristofer Chamorro MD as Consulting Physician (Cardiology)  Cb James II, MD as Consulting Physician (Neurology)  Ernesto Mendosa MD as Consulting Physician (Obstetrics and Gynecology)  Reza Lester MD as Cardiologist (Cardiology)  Annalee Huddleston APRN as Referring Physician (Family Medicine)  Mraija Peña MD as Consulting Physician (Hematology and Oncology)    Allergies:  Patient has no known allergies.    Medications:  Current Outpatient Medications on File Prior to Visit   Medication Sig Dispense Refill    amitriptyline (ELAVIL) 25 MG tablet TAKE 1 TABLET BY MOUTH EVERY NIGHT 30 tablet 5    amLODIPine (NORVASC) 5 MG tablet TAKE 1 TABLET BY MOUTH DAILY 90 tablet 0    Aptiom 400 MG tablet Take 1 tablet by mouth Daily. Take with 800 mg tablet for total dose 1,200 mg daily 90 tablet 1    Aptiom 800 MG tablet tablet Take 1 tablet by mouth Daily. Take with 400 mg tablet for total dose of 1,200 mg daily. 90 tablet 1    aspirin 81 MG EC tablet Take 1 tablet by mouth Daily. 30 tablet 0    atorvastatin (LIPITOR) 40 MG tablet TAKE 1 TABLET BY MOUTH EVERY DAY 60 tablet 2    Cenobamate (Xcopri) 100 MG tablet Take 100 mg by mouth Daily. 30 tablet 5    Eliquis 5 MG tablet tablet TAKE 1 TABLET BY  MOUTH TWICE DAILY 180 tablet 2    folic acid (FOLVITE) 1 MG tablet Take 1 tablet by mouth Daily. 30 tablet 0    magnesium oxide (MAG-OX) 400 tablet tablet Take 1 tablet by mouth Daily. 30 each 0    metoprolol succinate XL (TOPROL-XL) 25 MG 24 hr tablet TAKE 1 TABLET BY MOUTH DAILY 90 tablet 0    pantoprazole (PROTONIX) 40 MG EC tablet Take 1 tablet by mouth Daily. 30 tablet 0    ursodiol (ACTIGALL) 300 MG capsule Take 2 capsules by mouth 2 (Two) Times a Day. 360 capsule 3    Vimpat 200 MG tablet TAKE 1 TABLET BY MOUTH EVERY 12 HOURS. 60 tablet 1    Xcopri 50 MG tablet TAKE 1 TABLET BY MOUTH DAILY 30 tablet 2    vitamin B-12 (CYANOCOBALAMIN) 1000 MCG tablet Take 1 tablet by mouth Daily. 30 tablet 0    Vitamin B-2 (RIBOFLAVIN) 100 MG tablet tablet Take 4 tablets by mouth Daily. 120 each 0     No current facility-administered medications on file prior to visit.          No opioid medication identified on active medication list. I have reviewed chart for other potential  high risk medication/s and harmful drug interactions in the elderly.       No opioid listed on medication list       HPI for other active medical problems:     Last seen April 2025 as hospital follow up visit.      Feels well; no problems or concerns.     Saw Denise ALDRICH PA-C on May 6, 2025.  Diagnosed with primary biliary cholangitis and started on ursodiol.  Next appointment is July 25, 2025.  Updated colonoscopy with Dr. White in November 2024; recall 5 years.  April 2025 CBC without anemia H&H 12.1/36.9.       HTN: Today's BP is mildly elevated on Toprol XL 25 mg daily, amlodipine 5 mg daily.  Checks BP occasionally at home and it is averaging 130s/80.  Followed by cardiology with next appointment with KIRSTIE Foote on July 14, 2025.       TULIO: Wearing CPAP nightly.  Next appointment is April 23, 2026.       HLD: Well controlled on atorvastatin 40 mg daily with March 2025 lipid panel with LDL of 66; triglycerides 75.        Followed by neurology, Dr. James, for seizure management with history of stroke.  Next appointment is July 24, 2025.  On Aptiom 1200 mg daily, Vimpat 200 mg BID, Xcopri 100 mg daily, amitriptyline 25 mg nightly.  Last seizure was about six weeks ago when she started ursodiol - no further seizures since.  Remains on aspirin 81 mg daily, Eliquis 5 mg BID without signs of bleeding.       Vitamin D Deficiency: February 2025 Vitamin D 18.7.  Taking daily Vitamin D OTC supplementation.      Followed by GYN, Dr. Mendosa - requesting referral to new female provider.  Agreeable to update mammogram.      Former smoker; currently vaping.  Not interested in cessation at this time.      HISTORY     PFSH:     The following portions of the patient's history were reviewed and updated as appropriate: Allergies / Current Medications / Past Medical History / Surgical History / Social History / Family History    Problem List:  Patient Active Problem List   Diagnosis    Abnormal uterine bleeding    Microcytic anemia    Transaminitis    Epilepsy    Major depressive disorder    Left-sided weakness    Anemia    Hyponatremia    HTN (hypertension)    History of stroke    Vitamin D deficiency    B12 deficiency    TULIO on CPAP    Excessive daytime sleepiness    Class 1 obesity    Cerebrovascular accident (CVA) due to embolism of left middle cerebral artery    S/P placement of VNS (vagus nerve stimulation) device    Vaping nicotine dependence, tobacco product    Implantable loop recorder present    Colitis    Stroke-like symptoms    Rheumatic mitral valve disease    Generalized nonconvulsive epilepsy       Past Medical History:  Past Medical History:   Diagnosis Date    B12 deficiency 03/09/2023    C. 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    Headache, tension-type     Hyperlipidemia     Hypertension     Memory loss     Menometrorrhagia      Migraine     TULIO on CPAP     Rheumatic mitral valve disease 04/09/2025    Seizures     Status post placement of implantable loop recorder     Stroke     [facility] APRIL, 2022       Past Surgical History:  Past Surgical History:   Procedure Laterality Date    BREAST BIOPSY      COLONOSCOPY N/A 11/7/2024    Procedure: COLONOSCOPY into cecum and terminal ileum;  Surgeon: Matt White MD;  Location: [facility] ENDOSCOPY;  Service: Gastroenterology;  Laterality: N/A;  Pre: Screening, history of colitis   Post: Diverticulosis and Hemorrhoids    D & C HYSTEROSCOPY ENDOMETRIAL ABLATION N/A 06/10/2022    Procedure: DILATATION AND CURETTAGE HYSTEROSCOPY NOVASURE ENDOMETRIAL ABLATION;  Surgeon: Ernesto Mendosa MD;  Location: [facility] MAIN OR;  Service: Obstetrics/Gynecology;  Laterality: N/A;    FOOT SURGERY      pins in left foot     HYSTERECTOMY  07/16/2024    INSERT / REPLACE / REMOVE PACEMAKER  04/01/2022    Dr. Cristofer Chamorro, LOOP RECORDER    OTHER SURGICAL HISTORY      VNS plate 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    NOÉ      04/2022    TUBAL ABDOMINAL LIGATION      VAGUS NERVE STIMULATOR IMPLANTATION         Social History:  Social History     Socioeconomic History    Marital status: Single    Number of children: 2   Tobacco Use    Smoking status: Former     Current packs/day: 0.00     Average packs/day: 1 pack/day for 29.0 years (29.0 ttl pk-yrs)     Types: Cigarettes     Start date: 1993     Quit date: 2022     Years since quitting: 3.4     Passive exposure: Never    Smokeless tobacco: Never    Tobacco comments:     Quit 03/2022   Vaping Use    Vaping status: Every Day    Substances: Nicotine, Flavoring    Devices: Disposable, 6% NICOTINE   Substance and Sexual Activity    Alcohol use: Not Currently    Drug use: No    Sexual activity: Yes     Partners: Male     Birth control/protection: Tubal ligation       Family History:  Family History   Problem Relation Age of Onset    Breast cancer Mother 50     "     age 60 METS    Arthritis Mother     Arthritis Father     Diabetes Father     Heart disease Father     Hypertension Father     Heart attack Father     Dementia Father     Hypertension Brother     Heart attack Paternal Grandfather     Ovarian cancer Neg Hx     Uterine cancer Neg Hx     Colon cancer Neg Hx     Deep vein thrombosis Neg Hx     Pulmonary embolism Neg Hx     Malig Hyperthermia Neg Hx          PATIENT ASSESSMENT     Vitals:  Vitals:    25 1408   BP: 145/86   Pulse: 100   Resp: 18   Temp: 97.8 °F (36.6 °C)   SpO2: 98%   Weight: 79.8 kg (176 lb)   Height: 165.1 cm (65\")       BP Readings from Last 3 Encounters:   25 145/86   25 133/82   25 122/77     Wt Readings from Last 3 Encounters:   25 79.8 kg (176 lb)   25 80.4 kg (177 lb 4.8 oz)   25 80.7 kg (178 lb)      Body mass index is 29.29 kg/m².    [unfilled]        Review of Systems:    Review of Systems   Constitutional:  Negative for chills, fever and unexpected weight change.   Respiratory:  Negative for cough, chest tightness and shortness of breath.    Cardiovascular:  Negative for chest pain, palpitations and leg swelling.   Neurological:  Negative for dizziness, weakness, light-headedness and headaches.   Psychiatric/Behavioral:  The patient is not nervous/anxious.        Physical Exam:    Physical Exam  Vitals reviewed.   Constitutional:       General: She is not in acute distress.     Appearance: Normal appearance. She is not ill-appearing, toxic-appearing or diaphoretic.   HENT:      Head: Normocephalic and atraumatic.      Right Ear: Tympanic membrane, ear canal and external ear normal. There is no impacted cerumen.      Left Ear: Tympanic membrane, ear canal and external ear normal. There is no impacted cerumen.      Nose: Nose normal. No congestion or rhinorrhea.      Mouth/Throat:      Mouth: Mucous membranes are moist.      Pharynx: Oropharynx is clear. No oropharyngeal exudate or posterior " oropharyngeal erythema.   Eyes:      Extraocular Movements: Extraocular movements intact.      Conjunctiva/sclera: Conjunctivae normal.      Pupils: Pupils are equal, round, and reactive to light.   Cardiovascular:      Rate and Rhythm: Normal rate and regular rhythm.      Heart sounds: Normal heart sounds.   Pulmonary:      Effort: Pulmonary effort is normal. No respiratory distress.      Breath sounds: Normal breath sounds.   Abdominal:      General: Bowel sounds are normal.      Palpations: Abdomen is soft.      Tenderness: There is no abdominal tenderness.   Musculoskeletal:         General: Normal range of motion.      Cervical back: Normal range of motion and neck supple.      Right lower leg: No edema.      Left lower leg: No edema.   Lymphadenopathy:      Cervical: No cervical adenopathy.   Skin:     General: Skin is warm and dry.   Neurological:      General: No focal deficit present.      Mental Status: She is alert and oriented to person, place, and time. Mental status is at baseline.   Psychiatric:         Mood and Affect: Mood normal.         Behavior: Behavior normal.         Thought Content: Thought content normal.         Judgment: Judgment normal.         Reviewed Data:    Labs:   Lab Results   Component Value Date     04/05/2025    K 3.5 04/05/2025    CALCIUM 9.7 04/05/2025    AST 25 04/05/2025    ALT 22 04/05/2025    BUN 5 (L) 04/05/2025    CREATININE 0.59 04/05/2025    CREATININE 0.58 04/03/2025    CREATININE 0.65 04/02/2025       Lab Results   Component Value Date    HGBA1C 5.30 03/31/2025    HGBA1C 5.4 07/31/2024    HGBA1C 5.60 04/09/2024       Lab Results   Component Value Date    LDL 66 03/31/2025    LDL 81 02/25/2025    LDL 58 04/09/2024    HDL 50 03/31/2025    TRIG 120 05/01/2025    CHOLHDLRATIO 2.83 02/25/2025       Lab Results   Component Value Date    TSH 3.560 03/31/2025    FREET4 0.89 (L) 04/11/2023          Lab Results   Component Value Date    WBC 11.48 (H) 04/05/2025    HGB  "12.1 04/05/2025    HGB 10.5 (L) 04/03/2025    HGB 10.6 (L) 04/02/2025     04/05/2025                 No results found for: \"PSA\"    Imaging:                Medical Tests:              Summary of old records / correspondence / consultant report:             Request outside records:           SCREENING ASSESSMENT      Screening for Glaucoma:  Previous screening for glaucoma?: Yes, wears glasses, bifocals    Hearing Loss Screen:  Finger Rub Hearing Test (right ear): Passed  Finger Rub Hearing Test (left ear): Passed    Urinary Incontinence Screen:  Episodes of urinary incontinence? : No    Depression Screen:      6/17/2025     2:13 PM   PHQ-2/PHQ-9 Depression Screening   Little interest or pleasure in doing things Not at all   Feeling down, depressed, or hopeless Not at all   How difficult have these problems made it for you to do your work, take care of things at home, or get along with other people? Not difficult at all        PHQ-2: 0 (Not depressed)    PHQ-9: 0 (Negative screening for depression)         FUNCTIONAL, FALL RISK, & COGNITIVE SCREENING (components below):     A) Functional and cognitive status based on patient responses:        6/17/2025     2:17 PM   Functional & Cognitive Status   Do you have difficulty preparing food and eating? No   Do you have difficulty bathing yourself, getting dressed or grooming yourself? No   Do you have difficulty using the toilet? No   Do you have difficulty moving around from place to place? No   Do you have trouble with steps or getting out of a bed or a chair? No   Current Diet Well Balanced Diet   Dental Exam Up to date   Eye Exam Up to date   Exercise (times per week) 2 times per week   Current Exercises Include Walking   Do you need help using the phone?  No   Are you deaf or do you have serious difficulty hearing?  No   Do you need help to go to places out of walking distance? No   Do you need help shopping? No   Do you need help preparing meals?  No   Do you " need help with housework?  No   Do you need help with laundry? No   Do you need help taking your medications? No   Do you need help managing money? No   Do you ever drive or ride in a car without wearing a seat belt? No   Have you felt unusual stress, anger or loneliness in the last month? Yes   Who do you live with? Spouse   If you need help, do you have trouble finding someone available to you? No   Have you been bothered in the last four weeks by sexual problems? No   Do you have difficulty concentrating, remembering or making decisions? Yes       B) Assessment of Fall Risk:    Fall Risk Assessment was completed, and patient is at low risk for falls.    Need for further evaluation of gait, strength, and balance? : NO    Timed Up and Go (TUG): 10 seconds   (>= 12 seconds indicates high risk for falling)    Observable abnormalities included:   Normal balance and gait pattern    C. Assessment of Cognitive Function:    Mini-Cog Test:     1) Registration (3 objects): YES   2) Clock Draw: Passed? : N/A   3) Number of objects recalled: 2 (MA)     Further evaluation required? : No.  Followed by neurology with seizure and stroke history.     **OVERALL ASSESSMENT OF FUNCTIONAL ABILITY**  (Assessment of ability to perform ADL's (showering/bathing, using toilet, dressing, feeding self, moving self around) and IADL's (use telephone, shop, prepare food, housekeep, do laundry, transport independently, take medications independently, and handle finances)    DEGREE OF FUNCTIONAL IMPAIRMENT:   MILD (based on assessment noted above)    ABILITY TO LIVE INDEPENDENTLY:    Capable of living with spouse/partner/family       COUNSELING       A. Identification of Health Risk Factors:    Risk factors include: cardiovascular risk factors  Vaping      B. Age-Appropriate Screening Schedule:  (Refer to the list below for future screening recommendations based on patient's age, sex and/or medical conditions. Orders for these recommended tests  are listed in the plan section. The patient has been provided with a written plan)    Health Maintenance Topics  Health Maintenance   Topic Date Due    Hepatitis B (1 of 3 - 19+ 3-dose series) Never done    MAMMOGRAM  07/18/2025    COVID-19 Vaccine (1 - 2024-25 season) 12/17/2025 (Originally 9/1/2024)    INFLUENZA VACCINE  07/01/2025    LIPID PANEL  03/31/2026    ANNUAL WELLNESS VISIT  06/17/2026    TDAP/TD VACCINES (3 - Td or Tdap) 09/11/2026    COLORECTAL CANCER SCREENING  11/07/2029    HEPATITIS C SCREENING  Completed    Pneumococcal Vaccine 0-49  Completed       Health Maintenance Topics Due or Over-Due  Health Maintenance Due   Topic Date Due    Hepatitis B (1 of 3 - 19+ 3-dose series) Never done    MAMMOGRAM  07/18/2025         C. Advanced Care Planning:    Advance Care Planning   ACP discussion was held with the patient during this visit. Patient has an advance directive in EMR which is still valid.        D. Patient Self-Management and Personalized Health Advice:    She has been provided with PERSONALIZED COUNSELING/INFORMATION (AVS educational information) about:     -- optimizing diet/nutrition plans  weight management  reducing risk for cardiac/vascular events with pre-existing disease      She has been recommended for the following PREVENTATIVE SERVICES which has been performed today, will be ordered today or ordered/performed on upcoming follow-up visit:     LIFESTYLE PREVENTATIVE MEASURES   SMOKING cessation counseling completed  EYE exam for GLAUCOMA screening recommended annually (or follow-up regularly with eye care specialist for active glaucoma history)    CARDIOVASCULAR SCREENING   Has established history of CV disease    CANCER SCREENING   COLORECTAL cancer: Colonoscopy/Cologuard discussed and plan to continue screening  BREAST CANCER screening discussed and mammogram every 1-2 years recommended    MISC SCREENING  DIABETES screening performed (current/reviewed labs/lab  ordered)    VACCINATION/IMMUNIZATION   COVID-19 vaccination discussed/recommended  HEPATITIS B administered/recommended/discussed (59 and younger)      E. Miscellaneous Items: [id number]    Aspirin use counseling: Taking ASA appropriately as indicated    Discussed BMI with her. The BMI is above average; BMI management plan is completed (discussed plans for weight loss)    Reviewed use of high risk medication in the elderly: YES    Reviewed for potential of harmful drug interactions in the elderly: YES        WRAP UP       Assessment & Plan:    1) MEDICARE ANNUAL WELLNESS VISIT    2) OTHER MEDICAL CONDITIONS ADDRESSED TODAY:            Problem List Items Addressed This Visit       Vitamin D deficiency    Relevant Orders    Vitamin D,25-Hydroxy     Other Visit Diagnoses         Encounter for annual wellness visit (AWV) in Medicare patient    -  Primary      Essential (primary) hypertension        Relevant Orders    CBC & Differential    Comprehensive Metabolic Panel    TSH+Free T4    Urinalysis With Microscopic If Indicated (No Culture) - Urine, Clean Catch    Homocysteine      Hyperlipidemia, unspecified hyperlipidemia type          Elevated glucose        Relevant Orders    Comprehensive Metabolic Panel    Hemoglobin A1c      Vitamin B12 deficiency        Relevant Orders    Methylmalonic Acid, Serum    Homocysteine      Encounter for screening mammogram for malignant neoplasm of breast        Relevant Orders    Mammo screening digital tomosynthesis bilateral w CAD      Routine gynecological examination        Relevant Orders    Ambulatory Referral to Obstetrics / Gynecology                      Orders Placed This Encounter   Procedures    Mammo screening digital tomosynthesis bilateral w CAD    Comprehensive Metabolic Panel    Hemoglobin A1c    TSH+Free T4    Urinalysis With Microscopic If Indicated (No Culture) - Urine, Clean Catch    Vitamin D,25-Hydroxy    Methylmalonic Acid, Serum    Homocysteine    Ambulatory  Referral to Obstetrics / Gynecology    CBC & Differential       Discussion / Summary:    Encouraged monitoring BP at home for goal of < 130/80.  Ensure low sodium diet, regular exercise as tolerated.  She is agreeable to send me updated BP readings via [employer] in 1-2 weeks for review.    Follow up with cardiology, neurology, GI and sleep medicine offices as scheduled.    Establish with new GYN.  Mammogram order placed.    Medications as of TODAY:              Current Outpatient Medications   Medication Sig Dispense Refill    amitriptyline (ELAVIL) 25 MG tablet TAKE 1 TABLET BY MOUTH EVERY NIGHT 30 tablet 5    amLODIPine (NORVASC) 5 MG tablet TAKE 1 TABLET BY MOUTH DAILY 90 tablet 0    Aptiom 400 MG tablet Take 1 tablet by mouth Daily. Take with 800 mg tablet for total dose 1,200 mg daily 90 tablet 1    Aptiom 800 MG tablet tablet Take 1 tablet by mouth Daily. Take with 400 mg tablet for total dose of 1,200 mg daily. 90 tablet 1    aspirin 81 MG EC tablet Take 1 tablet by mouth Daily. 30 tablet 0    atorvastatin (LIPITOR) 40 MG tablet TAKE 1 TABLET BY MOUTH EVERY DAY 60 tablet 2    Cenobamate (Xcopri) 100 MG tablet Take 100 mg by mouth Daily. 30 tablet 5    Eliquis 5 MG tablet tablet TAKE 1 TABLET BY MOUTH TWICE DAILY 180 tablet 2    folic acid (FOLVITE) 1 MG tablet Take 1 tablet by mouth Daily. 30 tablet 0    magnesium oxide (MAG-OX) 400 tablet tablet Take 1 tablet by mouth Daily. 30 each 0    metoprolol succinate XL (TOPROL-XL) 25 MG 24 hr tablet TAKE 1 TABLET BY MOUTH DAILY 90 tablet 0    pantoprazole (PROTONIX) 40 MG EC tablet Take 1 tablet by mouth Daily. 30 tablet 0    ursodiol (ACTIGALL) 300 MG capsule Take 2 capsules by mouth 2 (Two) Times a Day. 360 capsule 3    Vimpat 200 MG tablet TAKE 1 TABLET BY MOUTH EVERY 12 HOURS. 60 tablet 1    Xcopri 50 MG tablet TAKE 1 TABLET BY MOUTH DAILY 30 tablet 2    vitamin B-12 (CYANOCOBALAMIN) 1000 MCG tablet Take 1 tablet by mouth Daily. 30 tablet 0    Vitamin B-2  (RIBOFLAVIN) 100 MG tablet tablet Take 4 tablets by mouth Daily. 120 each 0     No current facility-administered medications for this visit.         FOLLOW-UP:            Return for 6 month chronic care, 1 year AWV.              Future Appointments   Date Time Provider Department Center   7/14/2025  3:30 PM Fatoumata Richardson APRN [id number]   7/24/2025  3:00 PM Cb James II, MD [facility]   7/25/2025  2:30 PM Denise Guadarrama PA-C [facility]   12/16/2025  2:00 PM Annalee Huddleston APRN [facility]  [facility]   4/23/2026  3:30 PM Marcello Pleitez MD [facility] None           After Visit Summary (AVS) including the Personalized Prevention  Plan Services (PPPS) was either printed and given to the patient at check-out today and/or sent to [facility] for review.

## 2025-06-18 ENCOUNTER — TELEPHONE (OUTPATIENT)
Dept: OBSTETRICS AND GYNECOLOGY | Age: 45
End: 2025-06-18

## 2025-06-18 DIAGNOSIS — R79.89 ABNORMAL THYROID BLOOD TEST: Primary | ICD-10-CM

## 2025-06-18 NOTE — TELEPHONE ENCOUNTER
Caller: Alexandra Xiao    Relationship to patient: Self    Best call back number: [phone]    Chief complaint: NEEDS MAMMO SCHEDULE, NEW GYN NO IMPLANTS - FAMILIAL HX OF BREAST CANCER    Type of visit: MAMMO NO IMPLANTS    Requested date: PT DID NOT SPECIFY BUT SHE IS SCHDULED FOR 9-11 @ 3:15 FOR ANNUAL (SHE WANTED THE LATEST IN THE DAY B/C SHE DOES NOT DRIVE)     If rescheduling, when is the original appointment:      Additional notes:

## 2025-06-19 ENCOUNTER — TELEPHONE (OUTPATIENT)
Dept: INTERNAL MEDICINE | Age: 45
End: 2025-06-19
Payer: MEDICARE

## 2025-06-19 DIAGNOSIS — Z12.31 SCREENING MAMMOGRAM FOR BREAST CANCER: Primary | ICD-10-CM

## 2025-06-20 ENCOUNTER — TELEPHONE (OUTPATIENT)
Dept: NEUROLOGY | Facility: CLINIC | Age: 45
End: 2025-06-20

## 2025-06-20 LAB
HCYS SERPL-SCNC: 5.6 UMOL/L (ref 0–14.5)
METHYLMALONATE SERPL-SCNC: 115 NMOL/L (ref 0–378)

## 2025-06-20 NOTE — TELEPHONE ENCOUNTER
Caller: Alexandra Xiao AND RADHA     Relationship to patient: Self      Best call back number: [employer]  [phone]    FAX: [phone] ([employer])     Provider: JOSH    Medication PA needed:     Reason for call/Prior Auth:  PRIOR AUTH NEEDED FOR APTIOM 400 MG TABLET - ARNOLA WILL FAX SOMETHING OVER TODAY.

## 2025-06-24 LAB
MDC_IDC_PG_IMPLANT_DTM: NORMAL
MDC_IDC_PG_MFG: NORMAL
MDC_IDC_PG_MODEL: NORMAL
MDC_IDC_PG_SERIAL: NORMAL
MDC_IDC_PG_TYPE: NORMAL
MDC_IDC_SESS_DTM: NORMAL
MDC_IDC_SESS_TYPE: NORMAL

## 2025-07-09 DIAGNOSIS — I10 PRIMARY HYPERTENSION: ICD-10-CM

## 2025-07-09 RX ORDER — AMLODIPINE BESYLATE 5 MG/1
5 TABLET ORAL DAILY
Qty: 30 TABLET | Refills: 0 | Status: SHIPPED | OUTPATIENT
Start: 2025-07-09

## 2025-07-09 RX ORDER — METOPROLOL SUCCINATE 25 MG/1
25 TABLET, EXTENDED RELEASE ORAL DAILY
Qty: 30 TABLET | Refills: 0 | Status: SHIPPED | OUTPATIENT
Start: 2025-07-09

## 2025-07-14 ENCOUNTER — OFFICE VISIT (OUTPATIENT)
Dept: CARDIOLOGY | Age: 45
End: 2025-07-14
Payer: MEDICARE

## 2025-07-14 VITALS
HEIGHT: 65 IN | BODY MASS INDEX: 29.49 KG/M2 | SYSTOLIC BLOOD PRESSURE: 122 MMHG | HEART RATE: 81 BPM | WEIGHT: 177 LBS | DIASTOLIC BLOOD PRESSURE: 76 MMHG | OXYGEN SATURATION: 98 %

## 2025-07-14 DIAGNOSIS — Z95.818 IMPLANTABLE LOOP RECORDER PRESENT: ICD-10-CM

## 2025-07-14 DIAGNOSIS — I05.1 RHEUMATIC MITRAL REGURGITATION: Primary | ICD-10-CM

## 2025-07-14 DIAGNOSIS — E66.811 CLASS 1 OBESITY: ICD-10-CM

## 2025-07-14 DIAGNOSIS — I10 PRIMARY HYPERTENSION: ICD-10-CM

## 2025-07-14 DIAGNOSIS — I05.0 RHEUMATIC MITRAL STENOSIS: ICD-10-CM

## 2025-07-14 DIAGNOSIS — Z86.73 HISTORY OF STROKE: ICD-10-CM

## 2025-07-14 DIAGNOSIS — G47.33 OSA ON CPAP: ICD-10-CM

## 2025-07-14 DIAGNOSIS — G40.309 GENERALIZED NONCONVULSIVE EPILEPSY: ICD-10-CM

## 2025-07-14 DIAGNOSIS — F17.290 VAPING NICOTINE DEPENDENCE, TOBACCO PRODUCT: ICD-10-CM

## 2025-07-14 PROBLEM — R29.90 STROKE-LIKE SYMPTOMS: Status: RESOLVED | Noted: 2025-03-30 | Resolved: 2025-07-14

## 2025-07-14 PROCEDURE — 99214 OFFICE O/P EST MOD 30 MIN: CPT | Performed by: NURSE PRACTITIONER

## 2025-07-14 PROCEDURE — 1159F MED LIST DOCD IN RCRD: CPT | Performed by: NURSE PRACTITIONER

## 2025-07-14 PROCEDURE — 3078F DIAST BP <80 MM HG: CPT | Performed by: NURSE PRACTITIONER

## 2025-07-14 PROCEDURE — 93000 ELECTROCARDIOGRAM COMPLETE: CPT | Performed by: NURSE PRACTITIONER

## 2025-07-14 PROCEDURE — 3074F SYST BP LT 130 MM HG: CPT | Performed by: NURSE PRACTITIONER

## 2025-07-14 PROCEDURE — 1160F RVW MEDS BY RX/DR IN RCRD: CPT | Performed by: NURSE PRACTITIONER

## 2025-07-14 NOTE — PROGRESS NOTES
Date of Office Visit: 2025  Encounter Provider: KIRSTIE Harper  Place of Service: [facility] CARDIOLOGY  Patient Name: Alexandra Xiao  :1980  Primary Cardiologist: Dr. Reza Lester     Chief Complaint   Patient presents with    Annual Exam       HPI: Alexandra Xiao is a 44 y.o. female who presents today for cardiac follow-up visit.  I have reviewed her medical records.    She has known epilepsy, hypertension, hyperlipidemia, obstructive sleep apnea, COPD, and obesity.  She is a former cigarette smoker and vapor.    She has a history of strokes.  In 2022, NOÉ showed normal EF, moderate to severe mitral regurgitation, and no thrombus.  Loop recorder was placed and did not show any atrial arrhythmias.    In 2023, echocardiogram showed normal LVEF, grade 1A diastolic dysfunction, left atrium borderline dilated, and moderate mitral regurgitation.  In 2023, Dr. Lester and felt that she had evidence of rheumatic mitral valvular disease with moderate mitral regurgitation.    In 2024, she was diagnosed with a stroke.  NOÉ showed normal LVEF and moderate to severe mitral regurgitation.  She was noted to have moderate plaque in her aortic arch and descending thoracic aorta.  She was started on aspirin 81 mg daily and apixaban (stroke felt to be embolic).  Baby aspirin was recommended. Loop recorder did not demonstrate atrial fibrillation.  She was recommended to quit vaping.    In 2025, she presented with left-sided hemiparesis.  There was no new stroke on MRI.  Neurology recommended magnesium and riboflavin for treatment of migraine headaches.  She had missed some of her home antiepileptic medication contributing to his seizure.  She did not miss any doses of apixaban.  She had atypical chest pain which was not felt to be ischemic.  Echocardiogram showed LVEF 69%, mild LVH, grade 1 diastolic dysfunction, saline test negative, moderate mitral  regurgitation, and mild mitral stenosis.    She presents today with her  accompanying her.  I reviewed her loop recorder checks since her hospitalization and there has been no documented atrial fibrillation.  She is in normal sinus rhythm today.  Blood pressure and heart rate are normal.  She reports shortness of breath and occasional dizziness.  She denies chest pain, palpitations, edema, syncope, or bleeding.  She reports some memory loss.  She still vaping.      Past Medical History:   Diagnosis Date    B12 deficiency 03/09/2023    C. 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 ENDOSCOPY;  Service: Gastroenterology;  Laterality: N/A;  Pre: Screening, history of colitis   Post: Diverticulosis and Hemorrhoids    D & C HYSTEROSCOPY ENDOMETRIAL ABLATION N/A 06/10/2022    Procedure: DILATATION AND CURETTAGE HYSTEROSCOPY NOVASURE ENDOMETRIAL ABLATION;  Surgeon: Ernesto Mendosa MD;  Location: [facility] OR;  Service: Obstetrics/Gynecology;  Laterality: N/A;    FOOT SURGERY      pins in left foot     HYSTERECTOMY  07/16/2024    INSERT / REPLACE / REMOVE PACEMAKER  04/01/2022    Dr. Cristofre Chamorro, LOOP RECORDER    OTHER SURGICAL HISTORY      VNS plate 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    NOÉ      04/2022    TUBAL ABDOMINAL LIGATION      VAGUS NERVE STIMULATOR IMPLANTATION         Social History     Socioeconomic  History    Marital status: Single    Number of children: 2   Tobacco Use    Smoking status: Former     Current packs/day: 0.00     Average packs/day: 1 pack/day for 29.0 years (29.0 ttl pk-yrs)     Types: Cigarettes     Start date:      Quit date:      Years since quitting: 3.5     Passive exposure: Never    Smokeless tobacco: Never    Tobacco comments:     Quit 2022   Vaping Use    Vaping status: Every Day    Substances: Nicotine, Flavoring    Devices: Disposable, 6% NICOTINE   Substance and Sexual Activity    Alcohol use: Not Currently     Comment: apprx 4 Dr Peppers per day    Drug use: No    Sexual activity: Yes     Partners: Male     Birth control/protection: Tubal ligation       Family History   Problem Relation Age of Onset    Breast cancer Mother 50         age 60 METS    Arthritis Mother     Arthritis Father     Diabetes Father     Heart disease Father     Hypertension Father     Heart attack Father     Dementia Father     Hypertension Brother     Heart attack Paternal Grandfather     Ovarian cancer Neg Hx     Uterine cancer Neg Hx     Colon cancer Neg Hx     Deep vein thrombosis Neg Hx     Pulmonary embolism Neg Hx     Malig Hyperthermia Neg Hx        The following portion of the patient's history were reviewed and updated as appropriate: past medical history, past surgical history, past social history, past family history, allergies, current medications, and problem list.    Review of Systems   Constitutional: Negative.   Cardiovascular:  Positive for dyspnea on exertion.   Respiratory:  Positive for shortness of breath.    Musculoskeletal: Negative.    Neurological:  Positive for dizziness.   Psychiatric/Behavioral:  Positive for memory loss.        No Known Allergies      Current Outpatient Medications:     amitriptyline (ELAVIL) 25 MG tablet, TAKE 1 TABLET BY MOUTH EVERY NIGHT, Disp: 30 tablet, Rfl: 5    amLODIPine (NORVASC) 5 MG tablet, TAKE 1 TABLET BY MOUTH DAILY, Disp: 30 tablet,  "Rfl: 0    apixaban (Eliquis) 5 MG tablet tablet, Take 1 tablet by mouth 2 (Two) Times a Day., Disp: 180 tablet, Rfl: 3    Aptiom 400 MG tablet, Take 1 tablet by mouth Daily. Take with 800 mg tablet for total dose 1,200 mg daily, Disp: 90 tablet, Rfl: 1    Aptiom 800 MG tablet tablet, Take 1 tablet by mouth Daily. Take with 400 mg tablet for total dose of 1,200 mg daily., Disp: 90 tablet, Rfl: 1    aspirin 81 MG EC tablet, Take 1 tablet by mouth Daily., Disp: 30 tablet, Rfl: 0    atorvastatin (LIPITOR) 40 MG tablet, TAKE 1 TABLET BY MOUTH EVERY DAY, Disp: 60 tablet, Rfl: 2    Cenobamate (Xcopri) 100 MG tablet, Take 100 mg by mouth Daily., Disp: 30 tablet, Rfl: 5    folic acid (FOLVITE) 1 MG tablet, Take 1 tablet by mouth Daily., Disp: 30 tablet, Rfl: 0    magnesium oxide (MAG-OX) 400 tablet tablet, Take 1 tablet by mouth Daily., Disp: 30 each, Rfl: 0    metoprolol succinate XL (TOPROL-XL) 25 MG 24 hr tablet, TAKE 1 TABLET BY MOUTH DAILY, Disp: 30 tablet, Rfl: 0    pantoprazole (PROTONIX) 40 MG EC tablet, Take 1 tablet by mouth Daily., Disp: 30 tablet, Rfl: 0    ursodiol (ACTIGALL) 300 MG capsule, Take 2 capsules by mouth 2 (Two) Times a Day., Disp: 360 capsule, Rfl: 3    Vimpat 200 MG tablet, TAKE 1 TABLET BY MOUTH EVERY 12 HOURS, Disp: 60 tablet, Rfl: 0    vitamin B-12 (CYANOCOBALAMIN) 1000 MCG tablet, Take 1 tablet by mouth Daily., Disp: 30 tablet, Rfl: 0    Vitamin B-2 (RIBOFLAVIN) 100 MG tablet tablet, Take 4 tablets by mouth Daily., Disp: 120 each, Rfl: 0    Xcopri 50 MG tablet, TAKE 1 TABLET BY MOUTH DAILY, Disp: 30 tablet, Rfl: 2         Objective:     Vitals:    07/14/25 1533   BP: 122/76   BP Location: Left arm   Patient Position: Sitting   Cuff Size: Adult   Pulse: 81   SpO2: 98%   Weight: 80.3 kg (177 lb)   Height: 165.1 cm (65\")       Body mass index is 29.45 kg/m².    PHYSICAL EXAM:    Vitals Reviewed.   General Appearance: No acute distress, well developed and well nourished.   HENT: No hearing loss " noted.    Respiratory: No signs of respiratory distress. Respiration rhythm and depth normal.  Clear to auscultation.   Cardiovascular:  Jugular Venous Pressure: Normal  Heart Rate and Rhythm: Normal, Heart Sounds: Normal S1 and S2. No S3 or S4 noted.  Murmurs: Grade 2/6 systolic apical murmur.  Lower Extremities: No edema noted.  Musculoskeletal: Normal movement of extremities.  Skin: General appearance normal.    Psychiatric: Patient alert and oriented to person, place, and time. Speech and behavior appropriate. Normal mood and affect.       ECG 12 Lead    Date/Time: 7/14/2025 3:23 PM  Performed by: Fatoumata Richardson APRN    Authorized by: Fatoumata Richardson APRN  Comparison: compared with previous ECG from 4/5/2025  Similar to previous ECG  Rhythm: sinus rhythm  Rate: normal  BPM: 81  Conduction: conduction normal  QRS axis: normal  Other findings: non-specific ST-T wave changes    Clinical impression: non-specific ECG            Assessment:       Diagnosis Plan   1. Rheumatic mitral regurgitation  Adult Transthoracic Echo Complete w/ Color, Spectral and Contrast if Necessary Per Protocol      2. Rheumatic mitral stenosis  Adult Transthoracic Echo Complete w/ Color, Spectral and Contrast if Necessary Per Protocol      3. Implantable loop recorder present        4. Generalized nonconvulsive epilepsy        5. History of stroke        6. Primary hypertension        7. TULIO on CPAP        8. Vaping nicotine dependence, tobacco product        9. Class 1 obesity                 Plan:     1/2.  She has known rheumatic mitral valve disease.  Last echocardiogram March 2025 showed moderate mitral regurgitation and mild mitral stenosis.  Repeat echocardiogram in March 2026.    3.  She has history of multiple strokes.  She has an implantable loop recorder present.  I reviewed her loop recorder tracings and there has been no documented atrial fibrillation.    4.  She has had multiple strokes in the past.  She remains on  aspirin, atorvastatin, and apixaban.  She reports some memory loss.    5.  Known epilepsy and follows with neurology.    6.  Hypertension: Excellent blood pressure today.    7.  Obstructive sleep apnea on CPAP.    8.  We had a long conversation about the importance of abstaining from all nicotine products.    9.  Obesity: Body mass index is 29.45 kg/m².     10.  She will call with any cardiac concerns.  I refilled the apixaban.  Follow-up with Dr. Lester and same-day echocardiogram in March 2026.    As always, it has been a pleasure to participate in your patient's care. Thank you.         Sincerely,         KIRSTIE Foote  [facility] Cardiology      Dictated utilizing Dragon Dictation

## 2025-07-16 DIAGNOSIS — R79.89 ABNORMAL THYROID BLOOD TEST: Primary | ICD-10-CM

## 2025-07-21 DIAGNOSIS — G40.309 GENERALIZED NONCONVULSIVE EPILEPSY: ICD-10-CM

## 2025-07-21 RX ORDER — LACOSAMIDE 200 MG/1
1 TABLET, FILM COATED ORAL EVERY 12 HOURS SCHEDULED
Qty: 60 TABLET | OUTPATIENT
Start: 2025-07-21

## 2025-07-21 RX ORDER — LACOSAMIDE 200 MG/1
200 TABLET ORAL EVERY 12 HOURS SCHEDULED
Qty: 60 TABLET | Refills: 3 | Status: SHIPPED | OUTPATIENT
Start: 2025-07-21 | End: 2025-07-23

## 2025-07-21 NOTE — TELEPHONE ENCOUNTER
Caller: Alexandra Xiao    Relationship: Self    Best call back number: [phone]     What was the call regarding: THE PT WAS TOLD HER RX FOR THE VIMPAT WAS DENIED AND WAS NOT SURE WHY.     SHE STATED SHE HAS THE REST OF TODAY'S AND TOMORROS WORTH OF THE RX LEFT.     PLEASE ADVISE  THANK YOU

## 2025-07-23 ENCOUNTER — TELEPHONE (OUTPATIENT)
Dept: NEUROLOGY | Facility: CLINIC | Age: 45
End: 2025-07-23
Payer: MEDICARE

## 2025-07-23 DIAGNOSIS — G40.309 GENERALIZED NONCONVULSIVE EPILEPSY: Primary | ICD-10-CM

## 2025-07-23 DIAGNOSIS — G40.309 GENERALIZED NONCONVULSIVE EPILEPSY: ICD-10-CM

## 2025-07-23 DIAGNOSIS — G40.919 BREAKTHROUGH SEIZURE: ICD-10-CM

## 2025-07-23 RX ORDER — LACOSAMIDE 200 MG/1
200 TABLET ORAL EVERY 12 HOURS SCHEDULED
Qty: 60 TABLET | Refills: 3 | Status: SHIPPED | OUTPATIENT
Start: 2025-07-23 | End: 2025-07-24

## 2025-07-23 NOTE — TELEPHONE ENCOUNTER
Caller: Alexandra Xiao    Relationship: Self    Best call back number:   Telephone Information:   Mobile [phone]         Who are you requesting to speak with (clinical staff, provider,  specific staff member): CLINICAL    What was the call regarding: PT CALLED BACK TO CHECK ON STATUS OF MEDICATION BEING CALLED IN AS BRAND NAME, SHE STATES SHE HAS NOT BEEN ABLE TO GET HER MEDICATION TODAY AND HER LAST DOSE WAS LAST NIGHT, SO SHE HAS NOT BEEN ABLE TO TAKE TAKE TODAY.     I ADVISED PER RELAY IT HAS BEEN CHANGED AND WAITING FOR PROVIDERS APPROVAL

## 2025-07-23 NOTE — TELEPHONE ENCOUNTER
"Provider: DR MORENO    Caller: Alexandra Xiao    Relationship to Patient: Self    Pharmacy: [id number]    Phone Number: [phone]    Reason for Call: CALLED REGARDING SCRIPT FOR VIMPAT. STATES SHE NEEDS SCRIPT SENT BACK IN AS \"NAME BRAND ONLY.\" SHE IS OUT OF SCRIPT NOW AND PHARMACY CAN ONLY GIVE GENERIC SCRIPT UNTIL UPDATE IS SENT. PLEASE REVIEW & ADVISE WHEN SENT, THANK YOU.  "

## 2025-07-23 NOTE — TELEPHONE ENCOUNTER
"Relay     \"Rx has been changed for brand name only and resent to provider to approve, as it controlled\"                 "

## 2025-07-24 ENCOUNTER — OFFICE VISIT (OUTPATIENT)
Dept: NEUROLOGY | Facility: CLINIC | Age: 45
End: 2025-07-24
Payer: MEDICARE

## 2025-07-24 VITALS
SYSTOLIC BLOOD PRESSURE: 122 MMHG | OXYGEN SATURATION: 96 % | HEIGHT: 65 IN | HEART RATE: 102 BPM | WEIGHT: 175 LBS | DIASTOLIC BLOOD PRESSURE: 84 MMHG | BODY MASS INDEX: 29.16 KG/M2

## 2025-07-24 DIAGNOSIS — G40.919 BREAKTHROUGH SEIZURE: ICD-10-CM

## 2025-07-24 DIAGNOSIS — Z96.89 STATUS POST VNS (VAGUS NERVE STIMULATOR) PLACEMENT: Primary | ICD-10-CM

## 2025-07-24 DIAGNOSIS — G40.309 GENERALIZED NONCONVULSIVE EPILEPSY: ICD-10-CM

## 2025-07-24 RX ORDER — LACOSAMIDE 200 MG/1
200 TABLET ORAL EVERY 12 HOURS SCHEDULED
Qty: 60 TABLET | Refills: 4 | Status: SHIPPED | OUTPATIENT
Start: 2025-07-24

## 2025-07-24 RX ORDER — LACOSAMIDE 200 MG/1
1 TABLET, FILM COATED ORAL EVERY 12 HOURS SCHEDULED
Qty: 60 TABLET | OUTPATIENT
Start: 2025-07-24

## 2025-07-24 NOTE — PROGRESS NOTES
Chief Complaint   Patient presents with    Generalized nonconvulsive epilepsy    VNS       Patient ID: Alexandra Xiao is a 44 y.o. female.    HPI:  I had the pleasure of seeing your patient again today. As you may know she is a 44-year-old female here for the management of generalized epilepsy her last seizure was in May of this year.  Each of them lasted about 1 to 2 minutes. She does have postictal confusion. No bowel or bladder loss. She does have the vagus nerve stimulator. Unfortunately she uses the magnet to turn it off quite a bit. We have had multiple discussions about that in the past. She still takes Aptiom, Vimpat and Xcopri.  She was hospitalized in April for what sounds to be a breakthrough seizure at night.  She had some weakness of her right side.  She was hospitalized and the neurodiagnostic workup was negative for new stroke.  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    The following portions of the patient's history were reviewed and updated as appropriate: allergies, current medications, past family history, past medical history, past social history, past surgical history and problem list.    Review of Systems   Constitutional:  Positive for fatigue.   Neurological:  Positive for seizures (5/9/2025) and speech difficulty (occasional word finding). Negative for dizziness, tremors, syncope, facial asymmetry, weakness, light-headedness, numbness and headaches.   Psychiatric/Behavioral:  Positive for confusion. Negative for agitation, behavioral problems, decreased concentration, dysphoric mood, hallucinations, self-injury, sleep disturbance and suicidal ideas. The patient is not nervous/anxious and is not hyperactive.       I have reviewed the review of systems above performed by my medical assistant.      Vitals:    07/24/25 1513   BP: 122/84   Pulse: 102   SpO2: 96%       Neurological Exam  Mental Status  Awake, alert and oriented to person, place and time. Recent and remote memory are  intact. Speech is normal. Language is fluent with no aphasia. Attention and concentration are normal. Fund of knowledge is appropriate for level of education.    Cranial Nerves  CN I: Sense of smell is normal.  CN II: Visual acuity is normal.  CN III, IV, VI: Extraocular movements intact bilaterally. Pupils equal round and reactive to light bilaterally.  CN V: Facial sensation is normal.  CN VII: Full and symmetric facial movement.  CN XI: Shoulder shrug strength is normal.  CN XII: Tongue midline without atrophy or fasciculations.    Motor  Normal muscle bulk throughout. No fasciculations present. Normal muscle tone. No abnormal involuntary movements. No pronator drift.                                             Right                     Left  Rhomboids                            5                          5  Infraspinatus                          5                          5  Supraspinatus                       5                          5  Deltoid                                   5                          5   Biceps                                   5                          5  Brachioradialis                      5                          5   Triceps                                  5                          5   Pronator                                5                          5   Supinator                              5                           5   Wrist flexor                            5                          5   Wrist extensor                       5                          5   Finger flexor                          5                          5   Finger extensor                     5                          5   Interossei                              5                          5   Abductor pollicis brevis         5                          5   Flexor pollicis brevis             5                          5   Opponens pollicis                 5                          5  Extensor digitorum                5                          5  Abductor digiti minimi           5                          5   Abdominal                            5                          5  Glutei                                    5                          5  Hip abductor                         5                          5  Hip adductor                         5                          5   Iliopsoas                               5                          5   Quadriceps                           5                          5   Hamstring                             5                          5   Gastrocnemius                     5                           5   Anterior tibialis                      5                          5   Posterior tibialis                    5                          5   Peroneal                               5                          5  Ankle dorsiflexor                   5                          5  Ankle plantar flexor              5                           5  Extensor hallucis longus      5                           5    Sensory  Sensation is intact to light touch, pinprick, vibration and proprioception in all four extremities.    Reflexes  Deep tendon reflexes are 2+ and symmetric in all four extremities.    Right pathological reflexes: Richard's absent.  Left pathological reflexes: Richard's absent.    Coordination    Finger-to-nose, rapid alternating movements and heel-to-shin normal bilaterally without dysmetria.    Gait  Normal casual, toe, heel and tandem gait.       Physical Exam  Vitals reviewed.   Constitutional:       Appearance: She is well-developed.   HENT:      Head: Normocephalic and atraumatic.   Eyes:      Extraocular Movements: Extraocular movements intact.      Pupils: Pupils are equal, round, and reactive to light.   Cardiovascular:      Rate and Rhythm: Normal rate and regular rhythm.   Pulmonary:      Breath sounds: Normal breath sounds.   Musculoskeletal:         General:  Normal range of motion.   Skin:     General: Skin is warm.   Neurological:      Coordination: Coordination is intact.      Deep Tendon Reflexes: Reflexes are normal and symmetric.   Psychiatric:         Speech: Speech normal.         Procedures: Vagus nerve stimulator interrogation and diagnostics  Indication: History of seizures  Report:  Normal mode settings are as follows: Output current is set to 1.625 mA, frequency is 30 Hz, pulse width is 250 µs, on x30 seconds, off time is 5 minutes and duty cycle is 10%.  Auto stimulation mode settings are as follows: Output current is set to 1.875 mA, pulse width is 250 µs and on time 60 seconds.  Magnet mode settings are as follows: Output current is set to 2.75 mA, pulse width is 250 µs and on time is 60 seconds.  Setting changes: No setting changes were made today.  Diagnostics: Battery is at %.  Output is 1.625 mA.  Impedance looks good.  Total time spent: 20 minutes    Assessment/Plan: We will continue her current seizure medication regimen.  We talked once again about using her vagus nerve stimulator appropriately.  She mentions that she will try not to use the magnet so much and allow the generator to perform its task.  We will see her back in 4 months.  A total of 30 minutes was spent face-to-face with the patient today.  Of that greater than 50% of this time was spent discussing signs and symptoms of seizure disorder, patient education, plan of care and prognosis.         Diagnoses and all orders for this visit:    1. Status post VNS (vagus nerve stimulator) placement (Primary)    2. Breakthrough seizure    3. Generalized nonconvulsive epilepsy           Cb James II, MD

## 2025-07-25 ENCOUNTER — OFFICE VISIT (OUTPATIENT)
Dept: GASTROENTEROLOGY | Facility: CLINIC | Age: 45
End: 2025-07-25
Payer: MEDICARE

## 2025-07-25 VITALS
DIASTOLIC BLOOD PRESSURE: 97 MMHG | HEART RATE: 106 BPM | WEIGHT: 175.7 LBS | HEIGHT: 64 IN | BODY MASS INDEX: 30 KG/M2 | TEMPERATURE: 97.1 F | SYSTOLIC BLOOD PRESSURE: 150 MMHG

## 2025-07-25 DIAGNOSIS — K74.3 PRIMARY BILIARY CHOLANGITIS: Primary | ICD-10-CM

## 2025-07-25 PROCEDURE — 3080F DIAST BP >= 90 MM HG: CPT | Performed by: PHYSICIAN ASSISTANT

## 2025-07-25 PROCEDURE — 1160F RVW MEDS BY RX/DR IN RCRD: CPT | Performed by: PHYSICIAN ASSISTANT

## 2025-07-25 PROCEDURE — 99214 OFFICE O/P EST MOD 30 MIN: CPT | Performed by: PHYSICIAN ASSISTANT

## 2025-07-25 PROCEDURE — 1159F MED LIST DOCD IN RCRD: CPT | Performed by: PHYSICIAN ASSISTANT

## 2025-07-25 PROCEDURE — 3077F SYST BP >= 140 MM HG: CPT | Performed by: PHYSICIAN ASSISTANT

## 2025-07-25 NOTE — PROGRESS NOTES
"Chief Complaint  Elevated alkaline phosphatase level    Subjective          History Of Present Illness:    Alexandra Xiao is a  44 y.o. female  patient of Dr. White with a history of TULIO, CVA on Eliquis, hypertension, epilepsy, hyperlipidemia who presents as a follow-up for PBC.     Patient has been taking ursodiol as prescribed. No noted issues with the medication therapy.     Patient does endorse some right upper quadrant abdominal pain as before. No jaundice or pruritus. She reports occasional nausea but no vomiting. She does endorse some constipation and uses Dulcolax as needed. No black or bloody stools. No abnormal weight loss.     Additional data reviewed:  CT abdomen pelvis 9/15/2024 with mild to moderate fatty infiltration of the liver. Normal appearance of the gallbladder.     Colonoscopy 11/7/24 -thrombosed external hemorrhoids, diverticulosis of the sigmoid and descending colon, tortuous colon, normal TI, nonbleeding internal hemorrhoids.  Recall 5 years.     Liver ultrasound 6/28/2024:  Diffusely increased hepatic echotexture with portions of the liver  difficult to penetrate and visualize, consistent with fatty  infiltration. Antegrade flow within the main portal vein demonstrated on  color Doppler and spectral analysis. No focal liver lesion seen.     Gallbladder is normal, no biliary duct dilatation CBD is 4 mm. No free  fluid within the right upper quadrant. Remainder is unremarkable.    SHAH Fibrosure Plus (05/01/2025 15:20) - F0, N1, S1    Objective   Vital Signs:   /97   Pulse 106   Temp 97.1 °F (36.2 °C)   Ht 162.6 cm (64\")   Wt 79.7 kg (175 lb 11.2 oz)   BMI 30.16 kg/m²       Physical Exam  Vitals reviewed.   Constitutional:       General: She is not in acute distress.     Appearance: Normal appearance. She is not ill-appearing.   HENT:      Head: Normocephalic and atraumatic.      Nose: Nose normal.      Mouth/Throat:      Pharynx: Oropharynx is clear.   Eyes:      General: No " scleral icterus.     Extraocular Movements: Extraocular movements intact.      Conjunctiva/sclera: Conjunctivae normal.      Pupils: Pupils are equal, round, and reactive to light.   Pulmonary:      Effort: Pulmonary effort is normal.   Musculoskeletal:         General: No swelling. Normal range of motion.      Cervical back: Normal range of motion.   Skin:     General: Skin is warm and dry.      Coloration: Skin is not jaundiced.      Findings: No bruising or rash.   Neurological:      General: No focal deficit present.      Mental Status: She is alert and oriented to person, place, and time.      Motor: No weakness.      Gait: Gait normal.   Psychiatric:         Mood and Affect: Mood normal.          Result Review :   The following data was reviewed by: Denise Laws PA-C on 07/25/2025:  CMP          4/5/2025    21:57 5/1/2025    15:20 6/17/2025    15:03   CMP   Glucose 123   84    BUN 5   4.0    Creatinine 0.59   0.60    EGFR 113.4   113.0    Sodium 137   136    Potassium 3.5   4.0    Chloride 102   99    Calcium 9.7   9.4    Total Protein 7.8   7.8    Albumin 4.0   4.3    Globulin 3.8   3.5    Total Bilirubin <0.2   0.2    Alkaline Phosphatase 242  246  261    AST (SGOT) 25   18    ALT (SGPT) 22   15    Albumin/Globulin Ratio 1.1   1.2    BUN/Creatinine Ratio 8.5   6.7    Anion Gap 12.9        CBC          4/3/2025    06:18 4/5/2025    21:57 6/17/2025    15:03   CBC   WBC 7.28  11.48  9.58    RBC 4.18  4.80  4.94    Hemoglobin 10.5  12.1  12.5    Hematocrit 32.5  36.9  39.3    MCV 77.8  76.9  79.6    MCH 25.1  25.2  25.3    MCHC 32.3  32.8  31.8    RDW 16.0  15.9  14.5    Platelets 294  387  390            Assessment and Plan    Diagnoses and all orders for this visit:    1. Primary biliary cholangitis (Primary)  -     DEXA Bone Density Axial; Future  -     Vitamin D 25 Hydroxy; Future  -     Vitamin A; Future  -     Vitamin E; Future  -     Vitamin K1; Future  -     Comprehensive Metabolic Panel;  Future  -     Protime-INR; Future       Patient with new diagnosis of suspected PBC based on lab pattern and AMA, continue ursodiol  Check DEXA scan  Check fat-soluble vitamin  Check INR  Check liver function test in early September to assess response to ursodiol therapy  May need to consider adding obeticholic acid or PPAR agonist   Consider hepatology referral or liver biopsy if necessary     Follow Up   Return in about 3 months (around 10/25/2025) for Denise Guadarrama PA-C.    Dragon dictation used throughout this note.            Denise Guadarrama PA-C   [facility], [facility]  [address] [place] [address]  Office: [phone]

## 2025-07-31 ENCOUNTER — APPOINTMENT (OUTPATIENT)
Dept: GENERAL RADIOLOGY | Facility: HOSPITAL | Age: 45
End: 2025-07-31
Payer: MEDICARE

## 2025-07-31 ENCOUNTER — APPOINTMENT (OUTPATIENT)
Dept: CT IMAGING | Facility: HOSPITAL | Age: 45
End: 2025-07-31
Payer: MEDICARE

## 2025-07-31 ENCOUNTER — APPOINTMENT (OUTPATIENT)
Dept: CARDIOLOGY | Facility: HOSPITAL | Age: 45
End: 2025-07-31
Payer: MEDICARE

## 2025-07-31 ENCOUNTER — TELEPHONE (OUTPATIENT)
Dept: NEUROLOGY | Facility: CLINIC | Age: 45
End: 2025-07-31
Payer: MEDICARE

## 2025-07-31 ENCOUNTER — HOSPITAL ENCOUNTER (OUTPATIENT)
Facility: HOSPITAL | Age: 45
Setting detail: OBSERVATION
Discharge: HOME | End: 2025-08-01
Attending: EMERGENCY MEDICINE | Admitting: HOSPITALIST
Payer: MEDICARE

## 2025-07-31 ENCOUNTER — TELEPHONE (OUTPATIENT)
Dept: NEUROLOGY | Facility: CLINIC | Age: 45
End: 2025-07-31

## 2025-07-31 DIAGNOSIS — G40.909 NONINTRACTABLE EPILEPSY WITHOUT STATUS EPILEPTICUS, UNSPECIFIED EPILEPSY TYPE: ICD-10-CM

## 2025-07-31 DIAGNOSIS — G40.919 BREAKTHROUGH SEIZURE: Primary | ICD-10-CM

## 2025-07-31 DIAGNOSIS — R94.31 ABNORMAL EKG: ICD-10-CM

## 2025-07-31 LAB
ALBUMIN SERPL-MCNC: 4.1 G/DL (ref 3.5–5.2)
ALBUMIN/GLOB SERPL: 1.1 G/DL
ALP SERPL-CCNC: 204 U/L (ref 39–117)
ALT SERPL W P-5'-P-CCNC: 9 U/L (ref 1–33)
AMPHET+METHAMPHET UR QL: NEGATIVE
AMPHETAMINES UR QL: NEGATIVE
ANION GAP SERPL CALCULATED.3IONS-SCNC: 12 MMOL/L (ref 5–15)
ANION GAP SERPL CALCULATED.3IONS-SCNC: 20 MMOL/L (ref 5–15)
AORTIC DIMENSIONLESS INDEX: 0.89 (DI)
APTT PPP: 23.4 SECONDS (ref 22.7–35.4)
ASCENDING AORTA: 2.8 CM
AST SERPL-CCNC: 14 U/L (ref 1–32)
AV MEAN PRESS GRAD SYS DOP V1V2: 2.6 MMHG
AV VMAX SYS DOP: 108.6 CM/SEC
BACTERIA UR QL AUTO: NORMAL /HPF
BARBITURATES UR QL SCN: NEGATIVE
BASOPHILS # BLD AUTO: 0.04 10*3/MM3 (ref 0–0.2)
BASOPHILS NFR BLD AUTO: 0.3 % (ref 0–1.5)
BENZODIAZ UR QL SCN: NEGATIVE
BH CV ECHO MEAS - ACS: 1.93 CM
BH CV ECHO MEAS - AO MAX PG: 4.7 MMHG
BH CV ECHO MEAS - AO ROOT AREA (BSA CORRECTED): 1.6 CM2
BH CV ECHO MEAS - AO ROOT DIAM: 3 CM
BH CV ECHO MEAS - AO V2 VTI: 20.5 CM
BH CV ECHO MEAS - AVA(I,D): 2.5 CM2
BH CV ECHO MEAS - EDV(CUBED): 84.4 ML
BH CV ECHO MEAS - EDV(MOD-SP2): 74 ML
BH CV ECHO MEAS - EDV(MOD-SP4): 75 ML
BH CV ECHO MEAS - EF(MOD-SP2): 63.5 %
BH CV ECHO MEAS - EF(MOD-SP4): 68 %
BH CV ECHO MEAS - ESV(CUBED): 25.6 ML
BH CV ECHO MEAS - ESV(MOD-SP2): 27 ML
BH CV ECHO MEAS - ESV(MOD-SP4): 24 ML
BH CV ECHO MEAS - FS: 32.8 %
BH CV ECHO MEAS - IVS/LVPW: 1.1 CM
BH CV ECHO MEAS - IVSD: 0.8 CM
BH CV ECHO MEAS - LAT PEAK E' VEL: 8.1 CM/SEC
BH CV ECHO MEAS - LV DIASTOLIC VOL/BSA (35-75): 39 CM2
BH CV ECHO MEAS - LV MASS(C)D: 102.2 GRAMS
BH CV ECHO MEAS - LV MAX PG: 2.7 MMHG
BH CV ECHO MEAS - LV MEAN PG: 1.61 MMHG
BH CV ECHO MEAS - LV SYSTOLIC VOL/BSA (12-30): 12.5 CM2
BH CV ECHO MEAS - LV V1 MAX: 81.5 CM/SEC
BH CV ECHO MEAS - LV V1 VTI: 18.1 CM
BH CV ECHO MEAS - LVIDD: 4.4 CM
BH CV ECHO MEAS - LVIDS: 2.9 CM
BH CV ECHO MEAS - LVOT AREA: 2.8 CM2
BH CV ECHO MEAS - LVOT DIAM: 1.9 CM
BH CV ECHO MEAS - LVPWD: 0.73 CM
BH CV ECHO MEAS - MED PEAK E' VEL: 7.6 CM/SEC
BH CV ECHO MEAS - MV A DUR: 0.12 SEC
BH CV ECHO MEAS - MV A MAX VEL: 121.9 CM/SEC
BH CV ECHO MEAS - MV DEC SLOPE: 488.8 CM/SEC2
BH CV ECHO MEAS - MV DEC TIME: 0.22 SEC
BH CV ECHO MEAS - MV E MAX VEL: 113 CM/SEC
BH CV ECHO MEAS - MV E/A: 0.93
BH CV ECHO MEAS - MV MAX PG: 8.1 MMHG
BH CV ECHO MEAS - MV MEAN PG: 4.7 MMHG
BH CV ECHO MEAS - MV P1/2T: 83.3 MSEC
BH CV ECHO MEAS - MV V2 VTI: 37.4 CM
BH CV ECHO MEAS - MVA(P1/2T): 2.6 CM2
BH CV ECHO MEAS - MVA(VTI): 1.37 CM2
BH CV ECHO MEAS - PA ACC TIME: 0.1 SEC
BH CV ECHO MEAS - RAP SYSTOLE: 3 MMHG
BH CV ECHO MEAS - RV MAX PG: 1.53 MMHG
BH CV ECHO MEAS - RV V1 MAX: 61.8 CM/SEC
BH CV ECHO MEAS - RV V1 VTI: 13.4 CM
BH CV ECHO MEAS - RVSP: 18 MMHG
BH CV ECHO MEAS - SUP REN AO DIAM: 1.4 CM
BH CV ECHO MEAS - SV(LVOT): 51.5 ML
BH CV ECHO MEAS - SV(MOD-SP2): 47 ML
BH CV ECHO MEAS - SV(MOD-SP4): 51 ML
BH CV ECHO MEAS - SVI(LVOT): 26.8 ML/M2
BH CV ECHO MEAS - SVI(MOD-SP2): 24.4 ML/M2
BH CV ECHO MEAS - SVI(MOD-SP4): 26.5 ML/M2
BH CV ECHO MEAS - TAPSE (>1.6): 1.77 CM
BH CV ECHO MEAS - TR MAX PG: 15.9 MMHG
BH CV ECHO MEAS - TR MAX VEL: 199.5 CM/SEC
BH CV ECHO MEASUREMENTS AVERAGE E/E' RATIO: 14.39
BH CV XLRA - RV BASE: 2.48 CM
BH CV XLRA - RV LENGTH: 5.9 CM
BH CV XLRA - RV MID: 2.32 CM
BH CV XLRA - TDI S': 7.8 CM/SEC
BILIRUB SERPL-MCNC: <0.2 MG/DL (ref 0–1.2)
BILIRUB UR QL STRIP: NEGATIVE
BUN SERPL-MCNC: 6 MG/DL (ref 6–20)
BUN SERPL-MCNC: 7 MG/DL (ref 6–20)
BUN/CREAT SERPL: 11.7 (ref 7–25)
BUN/CREAT SERPL: 12.8 (ref 7–25)
BUPRENORPHINE SERPL-MCNC: NEGATIVE NG/ML
CALCIUM SPEC-SCNC: 8.2 MG/DL (ref 8.6–10.5)
CALCIUM SPEC-SCNC: 9.2 MG/DL (ref 8.6–10.5)
CANNABINOIDS SERPL QL: NEGATIVE
CHLORIDE SERPL-SCNC: 101 MMOL/L (ref 98–107)
CHLORIDE SERPL-SCNC: 103 MMOL/L (ref 98–107)
CLARITY UR: CLEAR
CO2 SERPL-SCNC: 14 MMOL/L (ref 22–29)
CO2 SERPL-SCNC: 20 MMOL/L (ref 22–29)
COCAINE UR QL: NEGATIVE
COLOR UR: YELLOW
CREAT SERPL-MCNC: 0.47 MG/DL (ref 0.57–1)
CREAT SERPL-MCNC: 0.6 MG/DL (ref 0.57–1)
DEPRECATED RDW RBC AUTO: 42.4 FL (ref 37–54)
DEPRECATED RDW RBC AUTO: 45 FL (ref 37–54)
EGFRCR SERPLBLD CKD-EPI 2021: 113 ML/MIN/1.73
EGFRCR SERPLBLD CKD-EPI 2021: 119.8 ML/MIN/1.73
EOSINOPHIL # BLD AUTO: 0.05 10*3/MM3 (ref 0–0.4)
EOSINOPHIL NFR BLD AUTO: 0.3 % (ref 0.3–6.2)
ERYTHROCYTE [DISTWIDTH] IN BLOOD BY AUTOMATED COUNT: 14.5 % (ref 12.3–15.4)
ERYTHROCYTE [DISTWIDTH] IN BLOOD BY AUTOMATED COUNT: 15.1 % (ref 12.3–15.4)
ETHANOL BLD-MCNC: <10 MG/DL (ref 0–10)
ETHANOL UR QL: <0.01 %
FENTANYL UR-MCNC: NEGATIVE NG/ML
GEN 5 1HR TROPONIN T REFLEX: 85 NG/L
GLOBULIN UR ELPH-MCNC: 3.7 GM/DL
GLUCOSE BLDC GLUCOMTR-MCNC: 153 MG/DL (ref 65–99)
GLUCOSE SERPL-MCNC: 108 MG/DL (ref 65–99)
GLUCOSE SERPL-MCNC: 162 MG/DL (ref 65–99)
GLUCOSE UR STRIP-MCNC: NEGATIVE MG/DL
HCT VFR BLD AUTO: 32 % (ref 34–46.6)
HCT VFR BLD AUTO: 38.6 % (ref 34–46.6)
HGB BLD-MCNC: 10.6 G/DL (ref 12–15.9)
HGB BLD-MCNC: 12.4 G/DL (ref 12–15.9)
HGB UR QL STRIP.AUTO: ABNORMAL
HYALINE CASTS UR QL AUTO: NORMAL /LPF
IMM GRANULOCYTES # BLD AUTO: 0.14 10*3/MM3 (ref 0–0.05)
IMM GRANULOCYTES NFR BLD AUTO: 0.9 % (ref 0–0.5)
INR PPP: 1.28 (ref 0.9–1.1)
KETONES UR QL STRIP: ABNORMAL
LEFT ATRIUM VOLUME INDEX: 16.3 ML/M2
LEUKOCYTE ESTERASE UR QL STRIP.AUTO: NEGATIVE
LV EF BIPLANE MOD: 66.8 %
LYMPHOCYTES # BLD AUTO: 1.68 10*3/MM3 (ref 0.7–3.1)
LYMPHOCYTES NFR BLD AUTO: 11.2 % (ref 19.6–45.3)
MAGNESIUM SERPL-MCNC: 2.3 MG/DL (ref 1.6–2.6)
MCH RBC QN AUTO: 26.4 PG (ref 26.6–33)
MCH RBC QN AUTO: 26.4 PG (ref 26.6–33)
MCHC RBC AUTO-ENTMCNC: 32.1 G/DL (ref 31.5–35.7)
MCHC RBC AUTO-ENTMCNC: 33.1 G/DL (ref 31.5–35.7)
MCV RBC AUTO: 79.8 FL (ref 79–97)
MCV RBC AUTO: 82.1 FL (ref 79–97)
METHADONE UR QL SCN: NEGATIVE
MONOCYTES # BLD AUTO: 0.59 10*3/MM3 (ref 0.1–0.9)
MONOCYTES NFR BLD AUTO: 3.9 % (ref 5–12)
NEUTROPHILS NFR BLD AUTO: 12.49 10*3/MM3 (ref 1.7–7)
NEUTROPHILS NFR BLD AUTO: 83.4 % (ref 42.7–76)
NITRITE UR QL STRIP: NEGATIVE
NRBC BLD AUTO-RTO: 0 /100 WBC (ref 0–0.2)
OPIATES UR QL: NEGATIVE
OXYCODONE UR QL SCN: NEGATIVE
PCP UR QL SCN: NEGATIVE
PH UR STRIP.AUTO: 5.5 [PH] (ref 5–8)
PHOSPHATE SERPL-MCNC: 2.9 MG/DL (ref 2.5–4.5)
PLATELET # BLD AUTO: 322 10*3/MM3 (ref 140–450)
PLATELET # BLD AUTO: 396 10*3/MM3 (ref 140–450)
PMV BLD AUTO: 9.8 FL (ref 6–12)
PMV BLD AUTO: 9.8 FL (ref 6–12)
POTASSIUM SERPL-SCNC: 3.3 MMOL/L (ref 3.5–5.2)
POTASSIUM SERPL-SCNC: 3.5 MMOL/L (ref 3.5–5.2)
PROT SERPL-MCNC: 7.8 G/DL (ref 6–8.5)
PROT UR QL STRIP: ABNORMAL
PROTHROMBIN TIME: 16 SECONDS (ref 11.7–14.2)
QT INTERVAL: 324 MS
QT INTERVAL: 364 MS
QTC INTERVAL: 458 MS
QTC INTERVAL: 461 MS
RBC # BLD AUTO: 4.01 10*6/MM3 (ref 3.77–5.28)
RBC # BLD AUTO: 4.7 10*6/MM3 (ref 3.77–5.28)
RBC # UR STRIP: NORMAL /HPF
REF LAB TEST METHOD: NORMAL
SINUS: 2.9 CM
SODIUM SERPL-SCNC: 135 MMOL/L (ref 136–145)
SODIUM SERPL-SCNC: 135 MMOL/L (ref 136–145)
SP GR UR STRIP: 1.01 (ref 1–1.03)
SQUAMOUS #/AREA URNS HPF: NORMAL /HPF
STJ: 2.6 CM
TRICYCLICS UR QL SCN: POSITIVE
TROPONIN T % DELTA: 240
TROPONIN T NUMERIC DELTA: 60 NG/L
TROPONIN T SERPL HS-MCNC: 25 NG/L
TROPONIN T SERPL HS-MCNC: 55 NG/L
UROBILINOGEN UR QL STRIP: ABNORMAL
WBC # UR STRIP: NORMAL /HPF
WBC NRBC COR # BLD AUTO: 10.12 10*3/MM3 (ref 3.4–10.8)
WBC NRBC COR # BLD AUTO: 14.99 10*3/MM3 (ref 3.4–10.8)

## 2025-07-31 PROCEDURE — 84484 ASSAY OF TROPONIN QUANT: CPT | Performed by: NURSE PRACTITIONER

## 2025-07-31 PROCEDURE — 93005 ELECTROCARDIOGRAM TRACING: CPT | Performed by: EMERGENCY MEDICINE

## 2025-07-31 PROCEDURE — G0378 HOSPITAL OBSERVATION PER HR: HCPCS

## 2025-07-31 PROCEDURE — 93306 TTE W/DOPPLER COMPLETE: CPT

## 2025-07-31 PROCEDURE — 25810000003 LACTATED RINGERS SOLUTION: Performed by: EMERGENCY MEDICINE

## 2025-07-31 PROCEDURE — P9612 CATHETERIZE FOR URINE SPEC: HCPCS

## 2025-07-31 PROCEDURE — 84484 ASSAY OF TROPONIN QUANT: CPT | Performed by: EMERGENCY MEDICINE

## 2025-07-31 PROCEDURE — 99214 OFFICE O/P EST MOD 30 MIN: CPT | Performed by: INTERNAL MEDICINE

## 2025-07-31 PROCEDURE — 80235 DRUG ASSAY LACOSAMIDE: CPT | Performed by: EMERGENCY MEDICINE

## 2025-07-31 PROCEDURE — 71045 X-RAY EXAM CHEST 1 VIEW: CPT

## 2025-07-31 PROCEDURE — 93010 ELECTROCARDIOGRAM REPORT: CPT | Performed by: INTERNAL MEDICINE

## 2025-07-31 PROCEDURE — 85730 THROMBOPLASTIN TIME PARTIAL: CPT | Performed by: EMERGENCY MEDICINE

## 2025-07-31 PROCEDURE — 83735 ASSAY OF MAGNESIUM: CPT | Performed by: EMERGENCY MEDICINE

## 2025-07-31 PROCEDURE — 99285 EMERGENCY DEPT VISIT HI MDM: CPT

## 2025-07-31 PROCEDURE — 80053 COMPREHEN METABOLIC PANEL: CPT | Performed by: EMERGENCY MEDICINE

## 2025-07-31 PROCEDURE — 84100 ASSAY OF PHOSPHORUS: CPT | Performed by: EMERGENCY MEDICINE

## 2025-07-31 PROCEDURE — 80307 DRUG TEST PRSMV CHEM ANLYZR: CPT | Performed by: EMERGENCY MEDICINE

## 2025-07-31 PROCEDURE — 82077 ASSAY SPEC XCP UR&BREATH IA: CPT | Performed by: EMERGENCY MEDICINE

## 2025-07-31 PROCEDURE — 36415 COLL VENOUS BLD VENIPUNCTURE: CPT

## 2025-07-31 PROCEDURE — 25510000001 PERFLUTREN 6.52 MG/ML SUSPENSION 2 ML VIAL: Performed by: INTERNAL MEDICINE

## 2025-07-31 PROCEDURE — 85027 COMPLETE CBC AUTOMATED: CPT | Performed by: NURSE PRACTITIONER

## 2025-07-31 PROCEDURE — 85610 PROTHROMBIN TIME: CPT | Performed by: EMERGENCY MEDICINE

## 2025-07-31 PROCEDURE — 81001 URINALYSIS AUTO W/SCOPE: CPT | Performed by: EMERGENCY MEDICINE

## 2025-07-31 PROCEDURE — 85025 COMPLETE CBC W/AUTO DIFF WBC: CPT | Performed by: EMERGENCY MEDICINE

## 2025-07-31 PROCEDURE — 82948 REAGENT STRIP/BLOOD GLUCOSE: CPT | Performed by: EMERGENCY MEDICINE

## 2025-07-31 PROCEDURE — 93306 TTE W/DOPPLER COMPLETE: CPT | Performed by: INTERNAL MEDICINE

## 2025-07-31 PROCEDURE — 70450 CT HEAD/BRAIN W/O DYE: CPT

## 2025-07-31 PROCEDURE — 99214 OFFICE O/P EST MOD 30 MIN: CPT | Performed by: NURSE PRACTITIONER

## 2025-07-31 RX ORDER — ASPIRIN 81 MG/1
81 TABLET ORAL DAILY
Status: DISCONTINUED | OUTPATIENT
Start: 2025-07-31 | End: 2025-08-01 | Stop reason: HOSPADM

## 2025-07-31 RX ORDER — URSODIOL 300 MG/1
600 CAPSULE ORAL 2 TIMES DAILY
Status: DISCONTINUED | OUTPATIENT
Start: 2025-07-31 | End: 2025-08-01 | Stop reason: HOSPADM

## 2025-07-31 RX ORDER — ONDANSETRON 4 MG/1
4 TABLET, ORALLY DISINTEGRATING ORAL EVERY 6 HOURS PRN
Status: DISCONTINUED | OUTPATIENT
Start: 2025-07-31 | End: 2025-08-01 | Stop reason: HOSPADM

## 2025-07-31 RX ORDER — LACOSAMIDE 100 MG/1
200 TABLET ORAL EVERY 12 HOURS SCHEDULED
Status: DISCONTINUED | OUTPATIENT
Start: 2025-07-31 | End: 2025-08-01 | Stop reason: HOSPADM

## 2025-07-31 RX ORDER — AMOXICILLIN 250 MG
2 CAPSULE ORAL 2 TIMES DAILY PRN
Status: DISCONTINUED | OUTPATIENT
Start: 2025-07-31 | End: 2025-08-01 | Stop reason: HOSPADM

## 2025-07-31 RX ORDER — BISACODYL 10 MG
10 SUPPOSITORY, RECTAL RECTAL DAILY PRN
Status: DISCONTINUED | OUTPATIENT
Start: 2025-07-31 | End: 2025-08-01 | Stop reason: HOSPADM

## 2025-07-31 RX ORDER — ONDANSETRON 2 MG/ML
4 INJECTION INTRAMUSCULAR; INTRAVENOUS EVERY 6 HOURS PRN
Status: DISCONTINUED | OUTPATIENT
Start: 2025-07-31 | End: 2025-08-01 | Stop reason: HOSPADM

## 2025-07-31 RX ORDER — CALCIUM CARBONATE 500 MG/1
2 TABLET, CHEWABLE ORAL 2 TIMES DAILY PRN
Status: DISCONTINUED | OUTPATIENT
Start: 2025-07-31 | End: 2025-08-01 | Stop reason: HOSPADM

## 2025-07-31 RX ORDER — ACETAMINOPHEN 160 MG/5ML
650 SOLUTION ORAL EVERY 4 HOURS PRN
Status: DISCONTINUED | OUTPATIENT
Start: 2025-07-31 | End: 2025-08-01 | Stop reason: HOSPADM

## 2025-07-31 RX ORDER — AMITRIPTYLINE HYDROCHLORIDE 50 MG/1
25 TABLET ORAL NIGHTLY
Status: DISCONTINUED | OUTPATIENT
Start: 2025-07-31 | End: 2025-08-01 | Stop reason: HOSPADM

## 2025-07-31 RX ORDER — SODIUM CHLORIDE 0.9 % (FLUSH) 0.9 %
10 SYRINGE (ML) INJECTION EVERY 12 HOURS SCHEDULED
Status: DISCONTINUED | OUTPATIENT
Start: 2025-07-31 | End: 2025-08-01 | Stop reason: HOSPADM

## 2025-07-31 RX ORDER — SODIUM CHLORIDE 0.9 % (FLUSH) 0.9 %
10 SYRINGE (ML) INJECTION AS NEEDED
Status: DISCONTINUED | OUTPATIENT
Start: 2025-07-31 | End: 2025-08-01 | Stop reason: HOSPADM

## 2025-07-31 RX ORDER — ACETAMINOPHEN 650 MG/1
650 SUPPOSITORY RECTAL EVERY 4 HOURS PRN
Status: DISCONTINUED | OUTPATIENT
Start: 2025-07-31 | End: 2025-08-01 | Stop reason: HOSPADM

## 2025-07-31 RX ORDER — BUTALBITAL, ACETAMINOPHEN AND CAFFEINE 50; 325; 40 MG/1; MG/1; MG/1
1 TABLET ORAL ONCE
Status: COMPLETED | OUTPATIENT
Start: 2025-07-31 | End: 2025-07-31

## 2025-07-31 RX ORDER — ESLICARBAZEPINE ACETATE 800 MG/1
800 TABLET ORAL DAILY
Status: DISCONTINUED | OUTPATIENT
Start: 2025-07-31 | End: 2025-08-01 | Stop reason: HOSPADM

## 2025-07-31 RX ORDER — BISACODYL 5 MG/1
5 TABLET, DELAYED RELEASE ORAL DAILY PRN
Status: DISCONTINUED | OUTPATIENT
Start: 2025-07-31 | End: 2025-08-01 | Stop reason: HOSPADM

## 2025-07-31 RX ORDER — NITROGLYCERIN 0.4 MG/1
0.4 TABLET SUBLINGUAL
Status: DISCONTINUED | OUTPATIENT
Start: 2025-07-31 | End: 2025-08-01 | Stop reason: HOSPADM

## 2025-07-31 RX ORDER — ACETAMINOPHEN 325 MG/1
650 TABLET ORAL EVERY 4 HOURS PRN
Status: DISCONTINUED | OUTPATIENT
Start: 2025-07-31 | End: 2025-08-01 | Stop reason: HOSPADM

## 2025-07-31 RX ORDER — ATORVASTATIN CALCIUM 20 MG/1
40 TABLET, FILM COATED ORAL DAILY
Status: DISCONTINUED | OUTPATIENT
Start: 2025-07-31 | End: 2025-08-01 | Stop reason: HOSPADM

## 2025-07-31 RX ORDER — DIAZEPAM 10 MG/2ML
5 INJECTION, SOLUTION INTRAMUSCULAR; INTRAVENOUS EVERY 4 HOURS PRN
Status: DISCONTINUED | OUTPATIENT
Start: 2025-07-31 | End: 2025-08-01 | Stop reason: HOSPADM

## 2025-07-31 RX ORDER — AMLODIPINE BESYLATE 5 MG/1
5 TABLET ORAL DAILY
Status: DISCONTINUED | OUTPATIENT
Start: 2025-07-31 | End: 2025-08-01 | Stop reason: HOSPADM

## 2025-07-31 RX ORDER — SODIUM CHLORIDE 9 MG/ML
40 INJECTION, SOLUTION INTRAVENOUS AS NEEDED
Status: DISCONTINUED | OUTPATIENT
Start: 2025-07-31 | End: 2025-08-01 | Stop reason: HOSPADM

## 2025-07-31 RX ORDER — METOPROLOL SUCCINATE 25 MG/1
25 TABLET, EXTENDED RELEASE ORAL DAILY
Status: DISCONTINUED | OUTPATIENT
Start: 2025-07-31 | End: 2025-08-01 | Stop reason: HOSPADM

## 2025-07-31 RX ORDER — POLYETHYLENE GLYCOL 3350 17 G/17G
17 POWDER, FOR SOLUTION ORAL DAILY PRN
Status: DISCONTINUED | OUTPATIENT
Start: 2025-07-31 | End: 2025-08-01 | Stop reason: HOSPADM

## 2025-07-31 RX ORDER — ESLICARBAZEPINE ACETATE 400 MG/1
400 TABLET ORAL DAILY
Status: DISCONTINUED | OUTPATIENT
Start: 2025-07-31 | End: 2025-08-01 | Stop reason: HOSPADM

## 2025-07-31 RX ADMIN — APIXABAN 5 MG: 5 TABLET, FILM COATED ORAL at 08:34

## 2025-07-31 RX ADMIN — APIXABAN 5 MG: 5 TABLET, FILM COATED ORAL at 20:09

## 2025-07-31 RX ADMIN — ASPIRIN 81 MG: 81 TABLET, COATED ORAL at 08:35

## 2025-07-31 RX ADMIN — URSODIOL 600 MG: 300 CAPSULE ORAL at 09:25

## 2025-07-31 RX ADMIN — LACOSAMIDE 200 MG: 100 TABLET, FILM COATED ORAL at 20:08

## 2025-07-31 RX ADMIN — ACETAMINOPHEN 650 MG: 325 TABLET, FILM COATED ORAL at 20:16

## 2025-07-31 RX ADMIN — METOPROLOL SUCCINATE 25 MG: 25 TABLET, EXTENDED RELEASE ORAL at 08:35

## 2025-07-31 RX ADMIN — ESLICARBAZEPINE ACETATE 800 MG: 800 TABLET ORAL at 20:10

## 2025-07-31 RX ADMIN — ACETAMINOPHEN 650 MG: 325 TABLET, FILM COATED ORAL at 06:46

## 2025-07-31 RX ADMIN — Medication 10 ML: at 20:10

## 2025-07-31 RX ADMIN — BUTALBITAL, ACETAMINOPHEN AND CAFFEINE 1 TABLET: 325; 50; 40 TABLET ORAL at 22:06

## 2025-07-31 RX ADMIN — LACOSAMIDE 200 MG: 100 TABLET, FILM COATED ORAL at 08:34

## 2025-07-31 RX ADMIN — AMLODIPINE BESYLATE 5 MG: 5 TABLET ORAL at 08:35

## 2025-07-31 RX ADMIN — ACETAMINOPHEN 650 MG: 325 TABLET, FILM COATED ORAL at 14:24

## 2025-07-31 RX ADMIN — ATORVASTATIN CALCIUM 40 MG: 20 TABLET, FILM COATED ORAL at 08:34

## 2025-07-31 RX ADMIN — Medication 10 ML: at 08:35

## 2025-07-31 RX ADMIN — ESLICARBAZEPINE ACETATE 400 MG: 400 TABLET ORAL at 20:09

## 2025-07-31 RX ADMIN — SODIUM CHLORIDE, POTASSIUM CHLORIDE, SODIUM LACTATE AND CALCIUM CHLORIDE 1000 ML: 600; 310; 30; 20 INJECTION, SOLUTION INTRAVENOUS at 00:52

## 2025-07-31 RX ADMIN — AMITRIPTYLINE HYDROCHLORIDE 25 MG: 50 TABLET, FILM COATED ORAL at 20:09

## 2025-07-31 RX ADMIN — PERFLUTREN 2 ML: 6.52 INJECTION, SUSPENSION INTRAVENOUS at 15:50

## 2025-07-31 RX ADMIN — URSODIOL 600 MG: 300 CAPSULE ORAL at 20:09

## 2025-07-31 NOTE — CASE MANAGEMENT/SOCIAL WORK
Discharge Planning Assessment  [facility]     Patient Name: Alexandra Xiao  MRN: [id number]  Today's Date: 7/31/2025    Admit Date: 7/31/2025    Plan: Home with significant other          Discharge Plan       Row Name 07/31/25 1744       Plan    Plan Home with significant other    Plan Comments S/W pt and her S/O Yadiel at bedside. Pt gave permission to discuss DC planning w/ Yadiel present.  Facesheet info confirmed.  Pt and Yadiel live in a mobile home and Yadiel is able to assist as needed.  Home DME includes a CPAP and vagal nerve stimulator.  Pt does not drive - Yadiel provides transport.  No hx of HH or SNF.  Pt plans to return home upon DC and denies any DC needs at this time.  CCP will continue to follow. ...........Camelia PINTO/ LY Romano RN

## 2025-07-31 NOTE — ED NOTES
"Nursing report ED to floor  Alexandra Xiao  44 y.o.  female    HPI :  HPI  Stated Reason for Visit: Pt arrives to ED via EMS with complaints of mutliple witnessed seizures per EMS. Pt has a hx of strokes and hx of seizures. Last seizure prior to today was on marysol. EMS reports that  said the pt has been taking her medications, as far as he knows. On arrival, pt is tachycardic and withdrawals to pain.  History Obtained From: EMS    Chief Complaint  Chief Complaint   Patient presents with    Seizures       Admitting doctor:   Kumar Perdomo MD    Admitting diagnosis:   The primary encounter diagnosis was Breakthrough seizure. Diagnoses of Abnormal EKG and Nonintractable epilepsy without status epilepticus, unspecified epilepsy type were also pertinent to this visit.    Code status:   Current Code Status       Date Active Code Status Order ID Comments User Context       Prior            Allergies:   Patient has no known allergies.    Isolation:   No active isolations    Intake and Output  No intake or output data in the 24 hours ending 07/31/25 0351    Weight:       07/31/25  0047   Weight: 85 kg (187 lb 4.8 oz)       Most recent vitals:   Vitals:    07/31/25 0050 07/31/25 0316 07/31/25 0319 07/31/25 0330   BP: 116/68  109/72 95/67   BP Location:   Right arm    Patient Position:   Lying    Pulse:   92 92   Resp:   20    Temp: 99.9 °F (37.7 °C)      SpO2:   100% 100%   Weight:       Height:  165.1 cm (65\")         Active LDAs/IV Access:   Lines, Drains & Airways       Active LDAs       Name Placement date Placement time Site Days    Peripheral IV 07/31/25 0044 18 G Anterior;Right Forearm 07/31/25  0044  Forearm  less than 1                    Labs (abnormal labs have a star):   Labs Reviewed   COMPREHENSIVE METABOLIC PANEL - Abnormal; Notable for the following components:       Result Value    Glucose 162 (*)     Sodium 135 (*)     Potassium 3.3 (*)     CO2 14.0 (*)     Alkaline Phosphatase 204 (*)     " Anion Gap 20.0 (*)     All other components within normal limits    Narrative:     GFR Categories in Chronic Kidney Disease (CKD)              GFR Category          GFR (mL/min/1.73)    Interpretation  G1                    90 or greater        Normal or high (1)  G2                    60-89                Mild decrease (1)  G3a                   45-59                Mild to moderate decrease  G3b                   30-44                Moderate to severe decrease  G4                    15-29                Severe decrease  G5                    14 or less           Kidney failure    (1)In the absence of evidence of kidney disease, neither GFR category G1 or G2 fulfill the criteria for CKD.    eGFR calculation 2021 CKD-EPI creatinine equation, which does not include race as a factor   PROTIME-INR - Abnormal; Notable for the following components:    Protime 16.0 (*)     INR 1.28 (*)     All other components within normal limits   URINALYSIS W/ CULTURE IF INDICATED - Abnormal; Notable for the following components:    Ketones, UA Trace (*)     Blood, UA Small (1+) (*)     Protein, UA >=300 mg/dL (3+) (*)     All other components within normal limits    Narrative:     In absence of clinical symptoms, the presence of pyuria, bacteria, and/or nitrites on the urinalysis result does not correlate with infection.   URINE DRUG SCREEN - Abnormal; Notable for the following components:    Tricyclic Antidepressants Screen Positive (*)     All other components within normal limits    Narrative:     Cutoff For Drugs Screened:    Amphetamines               500 ng/ml  Barbiturates               200 ng/ml  Benzodiazepines            150 ng/ml  Cocaine                    150 ng/ml  Methadone                  200 ng/ml  Opiates                    100 ng/ml  Phencyclidine               25 ng/ml  THC                         50 ng/ml  Methamphetamine            500 ng/ml  Tricyclic Antidepressants  300 ng/ml  Oxycodone                  100  ng/ml  Buprenorphine               10 ng/ml    The normal value for all drugs tested is negative. This report includes unconfirmed screening results, with the cutoff values listed, to be used for medical treatment purposes only.  Unconfirmed results must not be used for non-medical purposes such as employment or legal testing.  Clinical consideration should be applied to any drug of abuse test, particularly when unconfirmed results are used.     CBC WITH AUTO DIFFERENTIAL - Abnormal; Notable for the following components:    WBC 14.99 (*)     MCH 26.4 (*)     Neutrophil % 83.4 (*)     Lymphocyte % 11.2 (*)     Monocyte % 3.9 (*)     Immature Grans % 0.9 (*)     Neutrophils, Absolute 12.49 (*)     Immature Grans, Absolute 0.14 (*)     All other components within normal limits   TROPONIN - Abnormal; Notable for the following components:    HS Troponin T 25 (*)     All other components within normal limits    Narrative:     High Sensitive Troponin T Reference Range:  <14.0 ng/L- Negative Female for AMI  <22.0 ng/L- Negative Male for AMI  >=14 - Abnormal Female indicating possible myocardial injury.  >=22 - Abnormal Male indicating possible myocardial injury.   Clinicians would have to utilize clinical acumen, EKG, Troponin, and serial changes to determine if it is an Acute Myocardial Infarction or myocardial injury due to an underlying chronic condition.        POCT GLUCOSE FINGERSTICK - Abnormal; Notable for the following components:    Glucose 153 (*)     All other components within normal limits   APTT - Normal   MAGNESIUM - Normal   PHOSPHORUS - Normal   FENTANYL, URINE - Normal    Narrative:     Negative Threshold:      Fentanyl 5 ng/mL     The normal value for the drug tested is negative. This report includes final unconfirmed screening results to be used for medical treatment purposes only. Unconfirmed results must not be used for non-medical purposes such as employment or legal testing. Clinical consideration  should be applied to any drug of abuse test, particularly when unconfirmed results are used.          ETHANOL    Narrative:     Not for legal purposes.   URINALYSIS, MICROSCOPIC ONLY   LACOSAMIDE BLOOD   HIGH SENSITIVITIY TROPONIN T 1HR   CBC AND DIFFERENTIAL    Narrative:     The following orders were created for panel order CBC & Differential.  Procedure                               Abnormality         Status                     ---------                               -----------         ------                     CBC Auto Differential[[id number]]        Abnormal            Final result                 Please view results for these tests on the individual orders.       EKG:   ECG 12 Lead Drug Monitoring   Preliminary Result   HEART OCSP=297  bpm   RR Vvlogosb=246  ms   UT Cbpvcawf=870  ms   P Horizontal Axis=24  deg   P Front Axis=66  deg   QRSD Interval=91  ms   QT Zyfrvsyv=093  ms   VJsP=594  ms   QRS Axis=39  deg   T Wave Axis=76  deg   - BORDERLINE ECG -   Sinus tachycardia   Consider RVH or PMI w/ secondary repol abnrm   Borderline ST depression, anterolateral leads   Date and Time of Study:2025-07-31 01:01:53          Meds given in ED:   Medications   lactated ringers bolus 1,000 mL (0 mL Intravenous Stopped 7/31/25 0122)       Imaging results:  CT Head Without Contrast  Result Date: 7/31/2025   Stable mild chronic changes, no acute intracranial abnormality.        This report was finalized on 7/31/2025 1:53 AM by Dr. Luke Bettencourt M.D on Workstation: [id number]      XR Chest 1 View  Result Date: 7/31/2025   The heart size is within normal limits.  There is a submaximal inspiratory result. Allowing for that, there is no convincing evidence of infiltrate, effusion or pneumothorax.    This report was finalized on 7/31/2025 1:19 AM by Dr. Luke Bettencourt M.D on Workstation: [id number]        Ambulatory status:   - Assist x1    Social issues:   Social History     Socioeconomic History    Marital status:  Single    Number of children: 2   Tobacco Use    Smoking status: Former     Current packs/day: 0.00     Average packs/day: 1 pack/day for 29.0 years (29.0 ttl pk-yrs)     Types: Cigarettes     Start date: 1993     Quit date: 2022     Years since quitting: 3.5     Passive exposure: Never    Smokeless tobacco: Never    Tobacco comments:     Quit 03/2022   Vaping Use    Vaping status: Every Day    Substances: Nicotine, Flavoring    Devices: Disposable, 6% NICOTINE   Substance and Sexual Activity    Alcohol use: Not Currently     Comment: apprx 4 Dr Peppers per day    Drug use: No    Sexual activity: Yes     Partners: Male     Birth control/protection: Tubal ligation       Peripheral Neurovascular  Peripheral Neurovascular (Adult)  Peripheral Neurovascular WDL: WDL, pulse assessment  Pulse Assessment: radial    Neuro Cognitive  Neuro Cognitive (Adult)  Cognitive/Neuro/Behavioral WDL: .WDL except, orientation  Arousal Level: arouses to pain  Orientation: disoriented to, time    Learning  Learning Assessment  Learning Readiness and Ability: cognitive limitation noted  Education Provided  Person Taught: patient  Teaching Focus: symptom/problem overview  Education Outcome Evaluation: able to teach back, needs reinforcement    Respiratory  Respiratory WDL  Respiratory WDL: WDL, rhythm/pattern  Rhythm/Pattern, Respiratory: depth regular, pattern regular, unlabored, no shortness of breath reported    Abdominal Pain  Safety Interventions  Safety Precautions/Falls Reduction: treatment room near department station, assistive device/personal items within reach, seizure precautions, nonskid shoes/slippers when out of bed, fall reduction program maintained, commode/urinal/bedpan at bedside  All Alarms: alarm(s) activated and audible    Pain Assessments  Pain (Adult)  (0-10) Pain Rating: Rest: 0    NIH Stroke Scale       Iris Mosher RN  07/31/25 03:51 EDT

## 2025-07-31 NOTE — ED PROVIDER NOTES
"EMERGENCY DEPARTMENT ENCOUNTER    History  Chief Complaint   Patient presents with    Seizures       History provided by: EMS     HPI:  Context: Alexandra Xiao is a 44 y.o. female with a medical history of CVA, epilepsy, hypertension  who presents to the ED c/o acute seizure-like activity. All of the history is currently provided by EMS crew as the patient is unable to participate with history.     Patient does have a history of epilepsy.  She sees Dr. James.  She is maintained on Vimpat, Aptiom and Xcopri.  To the best of the EMS crew's knowledge she has not missed any doses.  The crew reports 5 witnessed seizures by family tonight since 11 PM.  They are described as \"grand mall\" and about 30 seconds in duration without return to normal mental status in between.    Past Medical History:  Active Ambulatory Problems     Diagnosis Date Noted    Abnormal uterine bleeding 03/23/2022    Microcytic anemia 04/27/2022    Transaminitis 04/27/2022    Epilepsy 04/27/2022    Major depressive disorder 01/01/2014    Left-sided weakness 03/05/2023    Anemia     Hyponatremia     HTN (hypertension)     History of stroke 03/06/2023    Vitamin D deficiency 03/09/2023    B12 deficiency 03/09/2023    TULIO on CPAP 11/30/2023    Excessive daytime sleepiness 11/30/2023    Class 1 obesity 11/30/2023    Cerebrovascular accident (CVA) due to embolism of left middle cerebral artery 04/08/2024    S/P placement of VNS (vagus nerve stimulation) device 04/08/2024    Vaping nicotine dependence, tobacco product     Implantable loop recorder present     Colitis 09/15/2024    Rheumatic mitral valve disease 04/09/2025    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 difficile colitis 2024    Giardiasis 2024    Blood per rectum 2024    Colitis, Clostridium difficile 10/08/2024    Screening for colorectal cancer 10/08/2024    Stroke-like symptoms 2025     Past Medical History:   Diagnosis Date    COPD (chronic obstructive pulmonary disease)     Depression     Headache, tension-type     Hyperlipidemia     Hypertension     Memory loss     Menometrorrhagia     Migraine     Seizures     Status post placement of implantable loop recorder     Stroke        Past Surgical History:  Past Surgical History:   Procedure Laterality Date    BREAST BIOPSY      COLONOSCOPY N/A 2024    Procedure: COLONOSCOPY into cecum and terminal ileum;  Surgeon: Matt White MD;  Location: [facility] ENDOSCOPY;  Service: Gastroenterology;  Laterality: N/A;  Pre: Screening, history of colitis   Post: Diverticulosis and Hemorrhoids    D & C HYSTEROSCOPY ENDOMETRIAL ABLATION N/A 06/10/2022    Procedure: DILATATION AND CURETTAGE HYSTEROSCOPY NOVASURE ENDOMETRIAL ABLATION;  Surgeon: Ernesto Mendosa MD;  Location: [facility] MAIN OR;  Service: Obstetrics/Gynecology;  Laterality: N/A;    FOOT SURGERY      pins in left foot     HYSTERECTOMY  2024    INSERT / REPLACE / REMOVE PACEMAKER  2022    Dr. Cristofer Chamorro, LOOP RECORDER    OTHER SURGICAL HISTORY      VNS plate 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    NOÉ      2022    TUBAL ABDOMINAL LIGATION      VAGUS NERVE STIMULATOR IMPLANTATION           Family History:  Family History   Problem Relation Age of Onset    Breast cancer Mother 50         age 60 METS    Arthritis Mother     Arthritis Father     Diabetes Father     Heart disease Father     Hypertension Father     Heart attack Father     Dementia Father     Hypertension Brother     Heart attack Paternal Grandfather      Ovarian cancer Neg Hx     Uterine cancer Neg Hx     Colon cancer Neg Hx     Deep vein thrombosis Neg Hx     Pulmonary embolism Neg Hx     Malig Hyperthermia Neg Hx          Social History:  Social History     Socioeconomic History    Marital status: Single    Number of children: 2   Tobacco Use    Smoking status: Former     Current packs/day: 0.00     Average packs/day: 1 pack/day for 29.0 years (29.0 ttl pk-yrs)     Types: Cigarettes     Start date: 1993     Quit date: 2022     Years since quitting: 3.5     Passive exposure: Never    Smokeless tobacco: Never    Tobacco comments:     Quit 03/2022   Vaping Use    Vaping status: Every Day    Substances: Nicotine, Flavoring    Devices: Disposable, 6% NICOTINE   Substance and Sexual Activity    Alcohol use: Not Currently     Comment: apprx 4 Dr Peppers per day    Drug use: No    Sexual activity: Yes     Partners: Male     Birth control/protection: Tubal ligation         Allergies:  Patient has no known allergies.        Physical Exam  ED Triage Vitals   Temp Pulse Resp BP SpO2   -- -- -- -- --      Temp src Heart Rate Source Patient Position BP Location FiO2 (%)   -- -- -- -- --     Physical Exam  Constitutional:       Appearance: She is ill-appearing.   HENT:      Head: Normocephalic and atraumatic.   Eyes:      Conjunctiva/sclera: Conjunctivae normal.      Pupils: Pupils are equal, round, and reactive to light.   Cardiovascular:      Rate and Rhythm: Regular rhythm. Tachycardia present.      Heart sounds: No murmur heard.  Pulmonary:      Effort: Pulmonary effort is normal. No respiratory distress.      Breath sounds: Normal breath sounds.   Abdominal:      Tenderness: There is no abdominal tenderness. There is no guarding or rebound.   Musculoskeletal:      Cervical back: Normal range of motion and neck supple.      Right lower leg: No edema.      Left lower leg: No edema.   Skin:     General: Skin is warm and dry.   Neurological:      Mental Status: She is  lethargic, disoriented and confused.      GCS: GCS eye subscore is 3. GCS verbal subscore is 2. GCS motor subscore is 5.      Cranial Nerves: No facial asymmetry.      Motor: No weakness, tremor or seizure activity.           Medications Given in ER:   Medications   lactated ringers bolus 1,000 mL (has no administration in time range)         Orders Placed:  Orders Placed This Encounter   Procedures    XR Chest 1 View    CT Head Without Contrast    Comprehensive Metabolic Panel    Protime-INR    aPTT    Urinalysis With Culture If Indicated - Urine, Catheter    Ethanol    Urine Drug Screen - Urine, Clean Catch    Magnesium    Phosphorus    CBC Auto Differential    Rectal Temperature    POC Glucose Once    ECG 12 Lead Drug Monitoring    CBC & Differential         Outpatient Medication Management:   Current Facility-Administered Medications Ordered in Epic   Medication Dose Route Frequency Provider Last Rate Last Admin    lactated ringers bolus 1,000 mL  1,000 mL Intravenous Once Marilin Dickinson MD         Current Outpatient Medications Ordered in Epic   Medication Sig Dispense Refill    amitriptyline (ELAVIL) 25 MG tablet TAKE 1 TABLET BY MOUTH EVERY NIGHT 30 tablet 5    amLODIPine (NORVASC) 5 MG tablet TAKE 1 TABLET BY MOUTH DAILY 30 tablet 0    apixaban (Eliquis) 5 MG tablet tablet Take 1 tablet by mouth 2 (Two) Times a Day. 180 tablet 3    Aptiom 400 MG tablet Take 1 tablet by mouth Daily. Take with 800 mg tablet for total dose 1,200 mg daily 90 tablet 1    Aptiom 800 MG tablet tablet Take 1 tablet by mouth Daily. Take with 400 mg tablet for total dose of 1,200 mg daily. 90 tablet 1    aspirin 81 MG EC tablet Take 1 tablet by mouth Daily. 30 tablet 0    atorvastatin (LIPITOR) 40 MG tablet TAKE 1 TABLET BY MOUTH EVERY DAY 60 tablet 2    Cenobamate (Xcopri) 100 MG tablet Take 100 mg by mouth Daily. 30 tablet 5    lacosamide (VIMPAT) 200 MG tablet Take 1 tablet by mouth Every 12 (Twelve) Hours. Name brand 60 tablet  4    metoprolol succinate XL (TOPROL-XL) 25 MG 24 hr tablet TAKE 1 TABLET BY MOUTH DAILY 30 tablet 0    ursodiol (ACTIGALL) 300 MG capsule Take 2 capsules by mouth 2 (Two) Times a Day. 360 capsule 3    Xcopri 50 MG tablet TAKE 1 TABLET BY MOUTH DAILY 30 tablet 2           Medical Decision Making:  All labs have been independently interpreted by me.  All radiology studies have been reviewed by me. All EKGs have been independently viewed and interpreted by me.  Discussion below represents my analysis of pertinent findings related to patient's condition, differential diagnosis, treatment plan and final disposition.    Differential Diagnosis includes but not limited to: Breakthrough seizure, subtherapeutic antiepileptic medication, sleep deprivation, heat related illness, electrolyte disturbance, ICH    Independent Historian Required Due To: Altered mental status    Review of prior external notes (non-ED) -and- Review of prior external test results outside of this encounter: I reviewed the neurology office visit note from 7/24/2025.  Patient is seen for epilepsy.  She does have a VNS stimulator.  She is maintained on Vimpat, Xcopri, Aptiom.  Doses were not changed.    Labs Results:  No results found for this or any previous visit (from the past 24 hours).  Lab Comments:  My independent interpretation of the above labs: Anion gap metabolic acidosis likely secondary to seizure/lactic acidosis, leukocytosis      Radiology:  No Radiology Exams Resulted Within Past 24 Hours  Radiology Comments:  I ordered the above imaging and reviewed the results.    My independent interpretation of the CT head: No ICH    EKG Interpreted by me: Sinus tachycardia, there is some diffuse mild ST segment depression, this may be rate related    (Social Determinants of Health): None    Rationale:  This is a 44-year-old female with a past medical history of seizure disorder and previous stroke on Eliquis who is presenting today secondary to  multiple seizures at home.  At the time of my initial evaluation the patient is tachycardic and appears postictal.     Patient was evaluated with an EKG, this demonstrates some mild ST segment depression which is fairly diffuse.  I suspect this may be secondary to the underlying rate.  I will check a troponin for completeness, but this does sound more likely to be related to a neurologic process rather than acute coronary ischemia.     She was further evaluated with labs.  She has an anion gap metabolic acidosis as expected secondary to seizure disorder and presumed lactic acidosis.  A mild leukocytosis is present, which also may be reactive from seizure activity.  No evidence of infection has this been identified.  She was evaluated with a UA, which does not demonstrate any evident evidence of infection and chest x-ray, which does not demonstrate any evidence of pneumonia.  She has a nontender abdomen, no skin changes.     Patient was evaluated with a head CT.  This did not demonstrate any new changes from previous.    Will watch closely and reevaluate  Clinical Scores:                                   Progress Notes:  2:01 AM EDT: I saw and reevaluated the patient.  She is much more awake and alert.  She reports a normal day today.  No recent fever, illness, being outside in the heat, sleep deprivation.  she has been compliant with her seizure medication regimen.  Still little drowsy, but no cranial nerve deficits, no extremity weakness numbness.  Given the unclear nature of this breakthrough episode in severity, I spoke with the patient about her ED work up and diagnosis. I informed the patient that she will be admitted for further evaluation/treatment. All questions answered.      Consult:  2:38 AM EDT: Discussed case with CHRISTINE Burdick with [facility].  Reviewed history, exam, results and treatments.  Will admit to Dr. Mckinney           Complexity of Care:  The patient requires admission.    Diagnosis:  Final  diagnoses:   Breakthrough seizure   Abnormal EKG   Nonintractable epilepsy without status epilepticus, unspecified epilepsy type         Parts of this note may be an electronic transcription/translation of spoken language to printed text using the Dragon dictation system        Provider Note Signed by:     Marilin Dickinson MD  07/31/25 0238    4:26 AM EDT    Discussed case with Dr. Hannon given the patient's troponin uptrend.  I suspect this is type II NSTEMI in nature given her multiple seizures today.  Her EKG has normalized after her initial with resolution of tachycardia.  She is having no symptoms suggestive of acute coronary syndrome.  Will continue to monitor and cardiology will consult in the morning     Marilin Dickinson MD  07/31/25 [id number]

## 2025-07-31 NOTE — TELEPHONE ENCOUNTER
Pt significant other brought in FMLA ppw.  He is taking care of pt.  Please fax to number on form. Please call pt once completed.  Ppw placed in provider folder.

## 2025-07-31 NOTE — PLAN OF CARE
Pt. transferred to the unit from ED in the morning. Disoriented to time. Seizure precaution. NSR on Tele. RA. VSS. Complained of headache. Pain Meds PRN given. And then relieved pain. See MAR. Call light within reach.     Goal Outcome Evaluation:  Plan of Care Reviewed With: patient  Progress: no change     Problem: Violence Risk or Actual  Goal: Anger and Impulse Control  Outcome: Progressing  Intervention: Minimize Safety Risk  Recent Flowsheet Documentation  Taken 7/31/2025 0646 by Gavino Aguilar RN  Sensory Stimulation Regulation: quiet environment promoted  Enhanced Safety Measures: bed alarm set  Intervention: Promote Self-Control  Recent Flowsheet Documentation  Taken 7/31/2025 0646 by Gavino Aguilar RN  Supportive Measures:   active listening utilized   relaxation techniques promoted  Environmental Support:   calm environment promoted   environmental consistency promoted     Problem: Adult Inpatient Plan of Care  Goal: Plan of Care Review  Outcome: Progressing  Flowsheets (Taken 7/31/2025 0826)  Progress: no change  Plan of Care Reviewed With: patient  Goal: Patient-Specific Goal (Individualized)  Outcome: Progressing  Goal: Absence of Hospital-Acquired Illness or Injury  Outcome: Progressing  Intervention: Identify and Manage Fall Risk  Recent Flowsheet Documentation  Taken 7/31/2025 0646 by Gavino Aguilar RN  Safety Promotion/Fall Prevention:   safety round/check completed   room organization consistent   lighting adjusted   fall prevention program maintained   clutter free environment maintained   activity supervised  Intervention: Prevent Skin Injury  Recent Flowsheet Documentation  Taken 7/31/2025 0646 by Gavino Aguilar RN  Body Position: position changed independently  Skin Protection:   transparent dressing maintained   incontinence pads utilized  Intervention: Prevent Infection  Recent Flowsheet Documentation  Taken 7/31/2025 0646 by Gavino Aguilar RN  Infection Prevention:   rest/sleep promoted   single patient  room provided   hand hygiene promoted  Goal: Optimal Comfort and Wellbeing  Outcome: Progressing  Intervention: Monitor Pain and Promote Comfort  Recent Flowsheet Documentation  Taken 7/31/2025 0646 by Gavino Aguilar RN  Pain Management Interventions:   pain medication given   pillow support provided   position adjusted   relaxation techniques promoted   quiet environment facilitated  Intervention: Provide Person-Centered Care  Recent Flowsheet Documentation  Taken 7/31/2025 0646 by Gavino Aguilar RN  Trust Relationship/Rapport:   care explained   choices provided   emotional support provided   empathic listening provided   questions answered   questions encouraged  Goal: Readiness for Transition of Care  Outcome: Progressing  Intervention: Mutually Develop Transition Plan  Recent Flowsheet Documentation  Taken 7/31/2025 0656 by Gavino Aguilar RN  Transportation Anticipated: family or friend will provide  Patient/Family Anticipated Services at Transition: none  Patient/Family Anticipates Transition to: home with family  Taken 7/31/2025 0652 by Gavino Aguilar RN  Equipment Currently Used at Home: cpap     Problem: Fall Injury Risk  Goal: Absence of Fall and Fall-Related Injury  Outcome: Progressing  Intervention: Identify and Manage Contributors  Recent Flowsheet Documentation  Taken 7/31/2025 0646 by Gavino Aguilar RN  Medication Review/Management: medications reviewed  Intervention: Promote Injury-Free Environment  Recent Flowsheet Documentation  Taken 7/31/2025 0646 by Gavino Aguilar RN  Safety Promotion/Fall Prevention:   safety round/check completed   room organization consistent   lighting adjusted   fall prevention program maintained   clutter free environment maintained   activity supervised

## 2025-07-31 NOTE — CONSULTS
Date of Consultation: 25    Referral Provider: Marilin Dickinson MD     Reason for Consultation: Elevated troponin.    Encounter Provider: Tom Hannon MD    Group of Service: [facility]     Patient Name: Alexandra Xiao    :1980    Chief complaint: Seizures.    History of Present Illness:      This is a 44 year-old female who follows with Dr. Lester and Dr. Dumont in our office. She has a past medical history significant for COPD, depression, epilepsy, hyperlipidemia, hypertension, and history of 2 strokes in . She has a vagus nerve stimulator.  She also had a loop recorder placed in  after her strokes.  She also has rheumatic mitral disease.    The patient presents the emergency department with breakthrough seizures.  She was mildly postictal when I saw her, although was able to communicate.  Neurology is following the patient.  She has had no chest pain.  Her troponin initially was 25.  This went to 85 and then 55.  Her EKG has not shown any EKG significant ischemic changes.  She does have a prolonged QT interval on the EKG, but no arrhythmias thus far.          Past Medical History:   Diagnosis Date    B12 deficiency 2023    C. 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    Headache, tension-type     Hyperlipidemia     Hypertension     Memory loss     Menometrorrhagia     Migraine     TULIO on CPAP     Rheumatic mitral valve disease 2025    Seizures     Status post placement of implantable loop recorder     Stroke     MARCH AND          Past Surgical History:   Procedure Laterality Date    BREAST BIOPSY      COLONOSCOPY N/A 2024    Procedure: COLONOSCOPY into cecum and terminal ileum;  Surgeon: Matt White MD;  Location: [facility] ENDOSCOPY;  Service: Gastroenterology;  Laterality: N/A;  Pre: Screening, history of colitis   Post:  Diverticulosis and Hemorrhoids    D & C HYSTEROSCOPY ENDOMETRIAL ABLATION N/A 06/10/2022    Procedure: DILATATION AND CURETTAGE HYSTEROSCOPY NOVASURE ENDOMETRIAL ABLATION;  Surgeon: Ernesto Mendosa MD;  Location: [facility] OR;  Service: Obstetrics/Gynecology;  Laterality: N/A;    FOOT SURGERY      pins in left foot     HYSTERECTOMY  07/16/2024    INSERT / REPLACE / REMOVE PACEMAKER  04/01/2022    Dr. Cristofer Chamorro, LOOP RECORDER    OTHER SURGICAL HISTORY      VNS plate 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    NOÉ      04/2022    TUBAL ABDOMINAL LIGATION      VAGUS NERVE STIMULATOR IMPLANTATION           No Known Allergies      No current facility-administered medications on file prior to encounter.     Current Outpatient Medications on File Prior to Encounter   Medication Sig Dispense Refill    amitriptyline (ELAVIL) 25 MG tablet TAKE 1 TABLET BY MOUTH EVERY NIGHT 30 tablet 5    amLODIPine (NORVASC) 5 MG tablet TAKE 1 TABLET BY MOUTH DAILY 30 tablet 0    apixaban (Eliquis) 5 MG tablet tablet Take 1 tablet by mouth 2 (Two) Times a Day. 180 tablet 3    Aptiom 400 MG tablet Take 1 tablet by mouth Daily. Take with 800 mg tablet for total dose 1,200 mg daily 90 tablet 1    Aptiom 800 MG tablet tablet Take 1 tablet by mouth Daily. Take with 400 mg tablet for total dose of 1,200 mg daily. 90 tablet 1    aspirin 81 MG EC tablet Take 1 tablet by mouth Daily. 30 tablet 0    atorvastatin (LIPITOR) 40 MG tablet TAKE 1 TABLET BY MOUTH EVERY DAY 60 tablet 2    Cenobamate (Xcopri) 100 MG tablet Take 100 mg by mouth Daily. 30 tablet 5    lacosamide (VIMPAT) 200 MG tablet Take 1 tablet by mouth Every 12 (Twelve) Hours. Name brand 60 tablet 4    metoprolol succinate XL (TOPROL-XL) 25 MG 24 hr tablet TAKE 1 TABLET BY MOUTH DAILY 30 tablet 0    ursodiol (ACTIGALL) 300 MG capsule Take 2 capsules by mouth 2 (Two) Times a Day. 360 capsule 3    Xcopri 50 MG tablet TAKE 1 TABLET BY MOUTH DAILY 30 tablet 2         Social  "History     Socioeconomic History    Marital status: Single    Number of children: 2   Tobacco Use    Smoking status: Former     Current packs/day: 0.00     Average packs/day: 1 pack/day for 29.0 years (29.0 ttl pk-yrs)     Types: Cigarettes     Start date:      Quit date:      Years since quitting: 3.5     Passive exposure: Never    Smokeless tobacco: Never    Tobacco comments:     Quit 2022   Vaping Use    Vaping status: Every Day    Substances: Nicotine, Flavoring    Devices: Disposable, 6% NICOTINE   Substance and Sexual Activity    Alcohol use: Not Currently     Comment: apprx 4 Dr Peppers per day    Drug use: No    Sexual activity: Yes     Partners: Male     Birth control/protection: Tubal ligation         Family History   Problem Relation Age of Onset    Breast cancer Mother 50         age 60 METS    Arthritis Mother     Arthritis Father     Diabetes Father     Heart disease Father     Hypertension Father     Heart attack Father     Dementia Father     Hypertension Brother     Heart attack Paternal Grandfather     Ovarian cancer Neg Hx     Uterine cancer Neg Hx     Colon cancer Neg Hx     Deep vein thrombosis Neg Hx     Pulmonary embolism Neg Hx     Malig Hyperthermia Neg Hx        REVIEW OF SYSTEMS:   Pertinent positives are noted in HPI above.  Otherwise, all other symptoms were reviewed, and are negative.     Objective:     Vitals:    25 0546 25 0655 25 0656 25 1224   BP: 120/77  120/77 113/63   BP Location:   Right arm Right arm   Patient Position:   Lying Lying   Pulse:   93 88   Resp:   20 20   Temp:   99.9 °F (37.7 °C) 98.2 °F (36.8 °C)   TempSrc:   Oral Oral   SpO2:   99% 96%   Weight:  85 kg (187 lb 4.8 oz) 85 kg (187 lb 4.8 oz)    Height:  165.1 cm (65\") 165.1 cm (65\")      Body mass index is 31.17 kg/m².  Flowsheet Rows      Flowsheet Row First Filed Value   Admission Height 165.1 cm (65\") Documented at 2025 0316   Admission Weight 85 kg (187 lb 4.8 " oz) Documented at 07/31/2025 0047             General:    No acute distress, somnolent.                   Head:    Normocephalic, atraumatic.   Eyes:          Conjunctivae and sclerae normal, no icterus.   Throat:   No oral lesions, no thrush, oral mucosa moist.    Neck:   Supple, trachea midline.   Lungs:     Clear to auscultation bilaterally     Heart:    Regular rhythm and normal rate. II/VI SM LLSB.   Abdomen:     Soft, non-tender, non-distended, positive bowel sounds.    Extremities:   Trace edema lower extremities bilaterally.   Pulses:   Pulses palpable and equal bilaterally.    Skin:   No bleeding or rash.   Neuro:   Non-focal.  Moves all extremities well.    Psychiatric:   Somnolent.           Lab Review:                Results from last 7 days   Lab Units 07/31/25  0820   SODIUM mmol/L 135*   POTASSIUM mmol/L 3.5   CHLORIDE mmol/L 103   CO2 mmol/L 20.0*   BUN mg/dL 6.0   CREATININE mg/dL 0.47*   GLUCOSE mg/dL 108*   CALCIUM mg/dL 8.2*     Results from last 7 days   Lab Units 07/31/25  0820 07/31/25  0319 07/31/25  0050   HSTROP T ng/L 55* 85* 25*     Results from last 7 days   Lab Units 07/31/25  0820   WBC 10*3/mm3 10.12   HEMOGLOBIN g/dL 10.6*   HEMATOCRIT % 32.0*   PLATELETS 10*3/mm3 322     Results from last 7 days   Lab Units 07/31/25  0050   INR  1.28*   APTT seconds 23.4         Results from last 7 days   Lab Units 07/31/25  0050   MAGNESIUM mg/dL 2.3           EKG (reviewed by me personally): Normal sinus rhythm, prolonged QT interval.      Assessment:   1.  Epilepsy with vagus nerve stimulator (generator change out on 4/5/2024)  2.  Breakthrough seizures  3.  Elevated troponin, type II NSTEMI secondary to #1  4.  History of stroke x 2 in 2022, and again in 2024  5.  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  Status post [employer] loop recorder placement in 2022  7.  Former tobacco use, quit in 2022  8.  Obstructive sleep apnea, on CPAP  9.   Hypertension    Plan:       I strongly suspect that this is a type II NSTEMI secondary to the seizures.  She has had no chest pain.  She has no ischemic changes on the EKG.  I am simply going to check an echocardiogram to ensure that she does not have any significant new wall motion abnormalities.  Her previous echo on 3/31/2025 showed an ejection fraction of 70%.  She does have rheumatic mitral disease, last noted to have mild mitral stenosis and moderate mitral regurgitation.    As long as her echocardiogram does not show any significant wall motion abnormalities, I do not recommend further cardiac workup at this time.  He is already on aspirin, Toprol-XL, and Lipitor.    Thank you very much for this consult.    Brandon Hannon MD

## 2025-07-31 NOTE — CONSULTS
"Neurology Consult Note  Consult Date: 7/31/2025  Referring MD: Marilin Dickinson MD  Reason for Consult I have been asked to see the patient in neurological consultation to render advice and opinion regarding seizures.     Alexandra Xiao is a 44 y.o. female with past medical history of generalized epilepsy followed by my colleague Dr. Cb James in the outpatient setting s/p VNS also prescribed Aptiom 400 mg in the a.m. and 800 mg in the p.m. (confirmed by Dr. James) however pt states she is taking 1200mg total nightly, Vimpat 200 mg twice daily, and Xcorpi 100 mg daily, obstructive sleep apnea, recurrent cryptogenic embolic strokes on full dose Eliquis, obstructive sleep apnea who presented to the hospital on 7/31/2025 with complaints of suffering seizures at home. She is currently lethargic and unable to give me any history.  I attempted to call her significant other Yadiel Cordoba however it went straight to voicemail.  From review of her ER documentation EMS stated that the family had witnessed 5 seizures.  Unclear duration but all happened within a couple hours of bringing her to the ER and were described as \"grand mal\". They reported that she did not return to her normal mental status in between and was having some difficulty with breathing afterwards.  Unclear if she has missed any recent doses of her AEDs.  Dr. James makes note that the patient frequently uses the magnet to turn her VNS off.  She was counseled about this again during her last office visit on 7/24.  Our service actually saw her back in April when she presented with left-sided hemiparesis.  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  She did have MRI imaging that showed chronic bilateral MCA and left PCA strokes but nothing acute.  EEG was done and showed predilection to focal onset seizures originating in the left frontal or bifrontal head regions.  She did end up " suffering breakthrough seizures during that admission but this was due to the patient not having her home doses of Aptiom and Xcorpi.     Addenda: when evaluated later with Dr. Dickson, pt was much more awake and sitting up eating her lunch. She states she thinks she had a busy day and woke up earlier than what she usually does. She had furniture delivered and a lot of people in and out of her home. She states she talked to her SO and he will be bringing her Aptiom and Xcorpi shortly.     Past Medical/Surgical Hx:  Past Medical History:   Diagnosis Date    B12 deficiency 03/09/2023    C. 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    Headache, tension-type     Hyperlipidemia     Hypertension     Memory loss     Menometrorrhagia     Migraine     TULIO on CPAP     Rheumatic mitral valve disease 04/09/2025    Seizures     Status post placement of implantable loop recorder     Stroke     MARCH AND APRIL, 2022     Past Surgical History:   Procedure Laterality Date    BREAST BIOPSY      COLONOSCOPY N/A 11/7/2024    Procedure: COLONOSCOPY into cecum and terminal ileum;  Surgeon: Matt White MD;  Location: [facility] ENDOSCOPY;  Service: Gastroenterology;  Laterality: N/A;  Pre: Screening, history of colitis   Post: Diverticulosis and Hemorrhoids    D & C HYSTEROSCOPY ENDOMETRIAL ABLATION N/A 06/10/2022    Procedure: DILATATION AND CURETTAGE HYSTEROSCOPY NOVASURE ENDOMETRIAL ABLATION;  Surgeon: Ernesto Mendosa MD;  Location: [facility] MAIN OR;  Service: Obstetrics/Gynecology;  Laterality: N/A;    FOOT SURGERY      pins in left foot     HYSTERECTOMY  07/16/2024    INSERT / REPLACE / REMOVE PACEMAKER  04/01/2022    Dr. Cristofer Chamorro, LOOP RECORDER    OTHER SURGICAL HISTORY      VNS plate 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    NOÉ      04/2022    TUBAL ABDOMINAL LIGATION      VAGUS NERVE STIMULATOR IMPLANTATION        Medications On Admission  Medications Prior to Admission   Medication Sig Dispense Refill Last Dose/Taking    amitriptyline (ELAVIL) 25 MG tablet TAKE 1 TABLET BY MOUTH EVERY NIGHT 30 tablet 5 7/30/2025 Evening    amLODIPine (NORVASC) 5 MG tablet TAKE 1 TABLET BY MOUTH DAILY 30 tablet 0 7/30/2025 Morning    apixaban (Eliquis) 5 MG tablet tablet Take 1 tablet by mouth 2 (Two) Times a Day. 180 tablet 3 7/30/2025 Evening    Aptiom 400 MG tablet Take 1 tablet by mouth Daily. Take with 800 mg tablet for total dose 1,200 mg daily 90 tablet 1 7/30/2025 Morning    Aptiom 800 MG tablet tablet Take 1 tablet by mouth Daily. Take with 400 mg tablet for total dose of 1,200 mg daily. 90 tablet 1 7/30/2025 Morning    aspirin 81 MG EC tablet Take 1 tablet by mouth Daily. 30 tablet 0 7/30/2025 Morning    atorvastatin (LIPITOR) 40 MG tablet TAKE 1 TABLET BY MOUTH EVERY DAY 60 tablet 2 7/30/2025 Morning    Cenobamate (Xcopri) 100 MG tablet Take 100 mg by mouth Daily. 30 tablet 5 7/30/2025 Morning    lacosamide (VIMPAT) 200 MG tablet Take 1 tablet by mouth Every 12 (Twelve) Hours. Name brand 60 tablet 4 7/30/2025 Morning    metoprolol succinate XL (TOPROL-XL) 25 MG 24 hr tablet TAKE 1 TABLET BY MOUTH DAILY 30 tablet 0 7/30/2025 Morning    ursodiol (ACTIGALL) 300 MG capsule Take 2 capsules by mouth 2 (Two) Times a Day. 360 capsule 3     Xcopri 50 MG tablet TAKE 1 TABLET BY MOUTH DAILY 30 tablet 2 Unknown     Allergies:  No Known Allergies  Social Hx:  Social History     Socioeconomic History    Marital status: Single    Number of children: 2   Tobacco Use    Smoking status: Former     Current packs/day: 0.00     Average packs/day: 1 pack/day for 29.0 years (29.0 ttl pk-yrs)     Types: Cigarettes     Start date: 1993     Quit date: 2022     Years since quitting: 3.5     Passive exposure: Never    Smokeless tobacco: Never    Tobacco comments:     Quit 03/2022   Vaping Use    Vaping status: Every Day    Substances: Nicotine, Flavoring  "   Devices: Disposable, 6% NICOTINE   Substance and Sexual Activity    Alcohol use: Not Currently     Comment: apprx 4 Dr Peppers per day    Drug use: No    Sexual activity: Yes     Partners: Male     Birth control/protection: Tubal ligation     Family Hx:  Family History   Problem Relation Age of Onset    Breast cancer Mother 50         age 60 METS    Arthritis Mother     Arthritis Father     Diabetes Father     Heart disease Father     Hypertension Father     Heart attack Father     Dementia Father     Hypertension Brother     Heart attack Paternal Grandfather     Ovarian cancer Neg Hx     Uterine cancer Neg Hx     Colon cancer Neg Hx     Deep vein thrombosis Neg Hx     Pulmonary embolism Neg Hx     Malig Hyperthermia Neg Hx      Review of Systems   Neurological:  Positive for seizures.     Exam  /77 (BP Location: Right arm, Patient Position: Lying)   Pulse 93   Temp 99.9 °F (37.7 °C) (Oral)   Resp 20   Ht 165.1 cm (65\")   Wt 85 kg (187 lb 4.8 oz)   LMP 2022 (Exact Date)   SpO2 99%   Breastfeeding No   BMI 31.17 kg/m²     Current Facility-Administered Medications:     acetaminophen (TYLENOL) tablet 650 mg, 650 mg, Oral, Q4H PRN, 650 mg at 25 0646 **OR** acetaminophen (TYLENOL) 160 MG/5ML oral solution 650 mg, 650 mg, Oral, Q4H PRN **OR** acetaminophen (TYLENOL) suppository 650 mg, 650 mg, Rectal, Q4H PRN, Pauline Sanchez APRN    amitriptyline (ELAVIL) tablet 25 mg, 25 mg, Oral, Nightly, Pauline Sanchez APRN    amLODIPine (NORVASC) tablet 5 mg, 5 mg, Oral, Daily, Pauline Sanchez APRN, 5 mg at 25 0835    apixaban (ELIQUIS) tablet 5 mg, 5 mg, Oral, BID, Pauline Sanchez APRN, 5 mg at 25 0834    aspirin EC tablet 81 mg, 81 mg, Oral, Daily, Pauline Sanchez APRN, 81 mg at 25 0835    atorvastatin (LIPITOR) tablet 40 mg, 40 mg, Oral, Daily, Pauline Sanchez APRN, 40 mg at 25 0834    sennosides-docusate (PERICOLACE) " 8.6-50 MG per tablet 2 tablet, 2 tablet, Oral, BID PRN **AND** polyethylene glycol (MIRALAX) packet 17 g, 17 g, Oral, Daily PRN **AND** bisacodyl (DULCOLAX) EC tablet 5 mg, 5 mg, Oral, Daily PRN **AND** bisacodyl (DULCOLAX) suppository 10 mg, 10 mg, Rectal, Daily PRN, Pauline Sanchez APRN    calcium carbonate (TUMS) chewable tablet 500 mg (200 mg elemental), 2 tablet, Oral, BID PRN, Pauline Sanchez APRN    diazePAM (VALIUM) injection 5 mg, 5 mg, Intravenous, Q4H PRN, Pauline Sanchez APRN    lacosamide (VIMPAT) tablet 200 mg, 200 mg, Oral, Q12H, Pauline Sanchez APRN, 200 mg at 07/31/25 0834    metoprolol succinate XL (TOPROL-XL) 24 hr tablet 25 mg, 25 mg, Oral, Daily, Pauline Sanchez APRN, 25 mg at 07/31/25 0835    nitroglycerin (NITROSTAT) SL tablet 0.4 mg, 0.4 mg, Sublingual, Q5 Min PRN, Pauline Sanchez APRN    ondansetron ODT (ZOFRAN-ODT) disintegrating tablet 4 mg, 4 mg, Oral, Q6H PRN **OR** ondansetron (ZOFRAN) injection 4 mg, 4 mg, Intravenous, Q6H PRN, Pauline Sanchez APRN    sodium chloride 0.9 % flush 10 mL, 10 mL, Intravenous, Q12H, Pauline Sanchez, APRN, 10 mL at 07/31/25 0835    sodium chloride 0.9 % flush 10 mL, 10 mL, Intravenous, PRN, Pauline Sanchez APRN    sodium chloride 0.9 % infusion 40 mL, 40 mL, Intravenous, PRN, Pauline Sanchez APRN    ursodiol (ACTIGALL) capsule 600 mg, 600 mg, Oral, BID, Pauline Sanchez APRN    PRN meds    acetaminophen **OR** acetaminophen **OR** acetaminophen    senna-docusate sodium **AND** polyethylene glycol **AND** bisacodyl **AND** bisacodyl    calcium carbonate    diazePAM    nitroglycerin    ondansetron ODT **OR** ondansetron    sodium chloride    sodium chloride    No current facility-administered medications on file prior to encounter.     Current Outpatient Medications on File Prior to Encounter   Medication Sig    amitriptyline (ELAVIL) 25 MG tablet TAKE 1 TABLET BY MOUTH EVERY  NIGHT    amLODIPine (NORVASC) 5 MG tablet TAKE 1 TABLET BY MOUTH DAILY    apixaban (Eliquis) 5 MG tablet tablet Take 1 tablet by mouth 2 (Two) Times a Day.    Aptiom 400 MG tablet Take 1 tablet by mouth Daily. Take with 800 mg tablet for total dose 1,200 mg daily    Aptiom 800 MG tablet tablet Take 1 tablet by mouth Daily. Take with 400 mg tablet for total dose of 1,200 mg daily.    aspirin 81 MG EC tablet Take 1 tablet by mouth Daily.    atorvastatin (LIPITOR) 40 MG tablet TAKE 1 TABLET BY MOUTH EVERY DAY    Cenobamate (Xcopri) 100 MG tablet Take 100 mg by mouth Daily.    lacosamide (VIMPAT) 200 MG tablet Take 1 tablet by mouth Every 12 (Twelve) Hours. Name brand    metoprolol succinate XL (TOPROL-XL) 25 MG 24 hr tablet TAKE 1 TABLET BY MOUTH DAILY    ursodiol (ACTIGALL) 300 MG capsule Take 2 capsules by mouth 2 (Two) Times a Day.    Xcopri 50 MG tablet TAKE 1 TABLET BY MOUTH DAILY       General appearance: Obese female, edentulous, lethargic  HEENT: Normocephalic, atraumatic    Neurological:   MS: oriented x3, lethargic, language intact, no neglect  CN: visual fields full, EOMI, facial sensation equal, no facial droop, tongue midline  Motor: 5/5 upper extremities, poor attempt of lower extremities     Laboratory results:  Lab Results   Component Value Date    GLUCOSE 108 (H) 07/31/2025    CALCIUM 8.2 (L) 07/31/2025     (L) 07/31/2025    K 3.5 07/31/2025    CO2 20.0 (L) 07/31/2025     07/31/2025    BUN 6.0 07/31/2025    CREATININE 0.47 (L) 07/31/2025    BCR 12.8 07/31/2025    ANIONGAP 12.0 07/31/2025     Lab Results   Component Value Date    WBC 10.12 07/31/2025    HGB 10.6 (L) 07/31/2025    HCT 32.0 (L) 07/31/2025    MCV 79.8 07/31/2025     07/31/2025     Lab Results   Component Value Date    CHOL 131 03/31/2025    CHOL 134 04/09/2024    CHOL 135 03/06/2023     Lab Results   Component Value Date    HDL 50 03/31/2025    HDL 54 02/25/2025    HDL 59 04/09/2024     Lab Results   Component Value Date     LDL 66 03/31/2025    LDL 81 02/25/2025    LDL 58 04/09/2024     Lab Results   Component Value Date    TRIG 120 05/01/2025    TRIG 75 03/31/2025    TRIG 98 02/25/2025     Lab Results   Component Value Date    HGBA1C 5.60 06/17/2025     Lab Results   Component Value Date    INR 1.28 (H) 07/31/2025    INR 1.10 03/30/2025    INR 1.6 07/30/2024    PROTIME 16.0 (H) 07/31/2025    PROTIME 14.2 03/30/2025    PROTIME 18.7 (H) 07/30/2024     Lab Results   Component Value Date    [id number] 274 03/30/2025     Lab Results   Component Value Date    TSH 1.200 07/10/2025     Pain Management Panel  More data exists         Latest Ref Rng & Units 7/31/2025 4/8/2024   Pain Management Panel   Amphetamine, Urine Qual Negative Negative  -   Barbiturates Screen, Urine Negative Negative  Negative    Benzodiazepine Screen, Urine Negative Negative  Negative    Buprenorphine, Screen, Urine Negative Negative  -   Cocaine Screen, Urine Negative Negative  Negative    Fentanyl, Urine Negative Negative  Negative    Methadone Screen , Urine Negative Negative  Negative    Methamphetamine, Ur Negative Negative  -      Brief Urine Lab Results  (Last result in the past 365 days)        Color   Clarity   Blood   Leuk Est   Nitrite   Protein   CREAT   Urine HCG        07/31/25 0057 Yellow   Clear   Small (1+)   Negative   Negative   >=300 mg/dL (3+)                 Mg 2.3  Ethanol <10    Lab review: I personally reviewed all labs as documented above.    Imaging review:   CT Head Without Contrast  Result Date: 7/31/2025   Stable mild chronic changes, no acute intracranial abnormality.        This report was finalized on 7/31/2025 1:53 AM by Dr. Luke Bettencourt M.D on Workstation: [id number]      XR Chest 1 View  Result Date: 7/31/2025   The heart size is within normal limits.  There is a submaximal inspiratory result. Allowing for that, there is no convincing evidence of infiltrate, effusion or pneumothorax.    This report was finalized on 7/31/2025  1:19 AM by Dr. Luke Bettencourt M.D on Workstation: [id number]      I personally reviewed CTH images with Dr. Dickson, and he agrees with radiology report.    Diagnosis:  Breakthrough seizure  Epilepsy status post VNS  Obstructive sleep apnea on CPAP  Chronic strokes on chronic anticoagulation     Comment: Presents with complaints of breakthrough seizures. She thinks maybe she had some sleep disruption and increased stress yesterday. She is back at her baseline today. Would like to get her home Aptiom and Xcorpi here to restart. Vimpat 200mg BID has already been restarted. Her CTH looks okay. Will plan to watch her overnight and if remains stable could be d/c'd home tomorrow.     PLAN:   - Significant other to bring in home Aptiom and Xcorpi as we do not have these on formula. I discussed with Dr. James today, he states the patient is supposed to be taking Aptiom 400mg am and 800mg pm, this dose was previously decreased b/c of pt c/o diplopia, Vimpat 200mg BID, and Xcorpi 100mg daily.   - Her Vimpat has been restarted here. We will await SO to bring in her home meds but if he does not we will need to start her on Trileptal in place of the Aptiom 900mg nightly until she can get her Aptiom. There is no equivalent for the Xcorpi.   Continue Seizure precautions.  Will watch her overnight and make sure she remains seizure free, could be discharged in the morning if remains stable.     Case discussed with patient and Dr. Dickson and he agrees with plan above.    KIRSTIE Marx

## 2025-07-31 NOTE — H&P
Patient Name:  Alexandra Xiao  YOB: 1980  MRN:  [id number]  Admit Date:  7/31/2025  Patient Care Team:  Annalee Huddleston APRN as PCP - General (Family Medicine)  Cb James II, MD as Consulting Physician (Neurology)  Ernesto Mendosa MD as Consulting Physician (Obstetrics and Gynecology)  Reza Lester MD as Cardiologist (Cardiology)  Marija Peña MD as Consulting Physician (Hematology and Oncology)      Subjective   History Present Illness     Chief Complaint   Patient presents with    Seizures     History of Present Illness  Ms. Xiao is a 44 y.o. female with a PMH of epilepsy, s/p Vagus nerve stimulator, anemia, CVA, HTN, depressive disorder,rheumatic mitral valve disease, HLD, HTN, TULIO, COPD, migraines, memory loss, colitis, and obesity that presents to [facility] with complaints of break through seizures. When she initially presented to the emergency room she was noted to be lethargic, possibly postictal.  Eventually she was much more awake, and alert, she reported she has been compliant with her seizure medication regimen, she has had no recent illness or other obvious inciting events leading up to breakthrough seizure activity. She remains lethargic, but does arouse and is able to answer some simple questions.  He has no family currently at bedside, discussed patient's past medical history with her significant other Yadiel Cordoba who states that she was her normal self leading up to seizure activity yesterday, he is unsure how long seizures lasted, states that he initially presented to the emergency room because she was having some difficulty breathing after the seizures.  He is unsure of when her last seizure before yesterday was but feels like it has been at least a couple years.  Does confirm that she is compliant with her medications and has had no new medication changes, diet changes, sleep or activity changes.  She is compliant with CPAP he is agreeable to  bring to the hospital as well as her home medications that are nonformulary.  Her initial evaluation in the BP 95/67 heart rate 92, respiratory rate 20, oxygen saturation 100% on room room air, Tmax 99.9.  High-sensitivity troponin 25, trending upward 85, with a delta of 240, suspect this is reactive to seizure activity, mild hypokalemia potassium 3.3, metabolic acidosis CO2 is 14, anion gap 20, again likely secondary to seizure activity.  Glucose 162, alkaline phosphatase 204, mild elevation in WBCs 14.99, left shift of 83.4.  UA showed trace ketones, 1+ blood, 3+ protein, urine toxicology positive for tricyclic antidepressants which is expected as she is on amitriptyline at at bedtime.  Chest x-ray was unremarkable for any acute cardiopulmonary processes, but noted submaximal inspiratory result.  CT scan of her head showed stable mild chronic changes with no acute intracranial abnormalities.  EKG initially tachycardic heart rate 120, with repolarization abnormality with borderline ST depression in anterior lateral leads.  She currently offers no new complaints states that she is sleepy she was able to tolerate some oral intake.    Review of Systems   Constitutional:  Positive for fatigue. Negative for chills and fever.   HENT:  Negative for congestion and trouble swallowing.    Eyes: Negative.  Negative for visual disturbance.   Respiratory:  Positive for shortness of breath. Negative for cough and chest tightness.         At presentation has since resolved   Cardiovascular:  Negative for chest pain, palpitations and leg swelling.   Gastrointestinal:  Negative for abdominal distention, abdominal pain, constipation, diarrhea, nausea and vomiting.   Endocrine: Negative.    Genitourinary:  Negative for difficulty urinating, dysuria and frequency.   Musculoskeletal: Negative.  Negative for arthralgias.   Skin: Negative.  Negative for rash.   Allergic/Immunologic: Negative.    Neurological:  Positive for seizures.  Negative for dizziness and headaches.   Hematological: Negative.    Psychiatric/Behavioral: Negative.  Negative for confusion.    All other systems reviewed and are negative.       Personal History     Past Medical History:   Diagnosis Date    B12 deficiency 2023    C. 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    Headache, tension-type     Hyperlipidemia     Hypertension     Memory loss     Menometrorrhagia     Migraine     TULIO on CPAP     Rheumatic mitral valve disease 2025    Seizures     Status post placement of implantable loop recorder     Stroke     MARCH AND      Past Surgical History:   Procedure Laterality Date    BREAST BIOPSY      COLONOSCOPY N/A 2024    Procedure: COLONOSCOPY into cecum and terminal ileum;  Surgeon: Matt White MD;  Location: [facility] ENDOSCOPY;  Service: Gastroenterology;  Laterality: N/A;  Pre: Screening, history of colitis   Post: Diverticulosis and Hemorrhoids    D & C HYSTEROSCOPY ENDOMETRIAL ABLATION N/A 06/10/2022    Procedure: DILATATION AND CURETTAGE HYSTEROSCOPY NOVASURE ENDOMETRIAL ABLATION;  Surgeon: Ernesto Mendosa MD;  Location: [facility] MAIN OR;  Service: Obstetrics/Gynecology;  Laterality: N/A;    FOOT SURGERY      pins in left foot     HYSTERECTOMY  2024    INSERT / REPLACE / REMOVE PACEMAKER  2022    Dr. Cristofer Chamorro, LOOP RECORDER    OTHER SURGICAL HISTORY      VNS plate 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    NOÉ      2022    TUBAL ABDOMINAL LIGATION      VAGUS NERVE STIMULATOR IMPLANTATION       Family History   Problem Relation Age of Onset    Breast cancer Mother 50         age 60 METS    Arthritis Mother     Arthritis Father     Diabetes Father     Heart disease Father     Hypertension Father     Heart attack Father     Dementia Father     Hypertension Brother     Heart attack Paternal  Grandfather     Ovarian cancer Neg Hx     Uterine cancer Neg Hx     Colon cancer Neg Hx     Deep vein thrombosis Neg Hx     Pulmonary embolism Neg Hx     Malig Hyperthermia Neg Hx      Social History     Tobacco Use    Smoking status: Former     Current packs/day: 0.00     Average packs/day: 1 pack/day for 29.0 years (29.0 ttl pk-yrs)     Types: Cigarettes     Start date: 1993     Quit date: 2022     Years since quitting: 3.5     Passive exposure: Never    Smokeless tobacco: Never    Tobacco comments:     Quit 03/2022   Vaping Use    Vaping status: Every Day    Substances: Nicotine, Flavoring    Devices: Disposable, 6% NICOTINE   Substance Use Topics    Alcohol use: Not Currently     Comment: apprx 4 Dr Peppers per day    Drug use: No     No current facility-administered medications on file prior to encounter.     Current Outpatient Medications on File Prior to Encounter   Medication Sig Dispense Refill    amitriptyline (ELAVIL) 25 MG tablet TAKE 1 TABLET BY MOUTH EVERY NIGHT 30 tablet 5    amLODIPine (NORVASC) 5 MG tablet TAKE 1 TABLET BY MOUTH DAILY 30 tablet 0    apixaban (Eliquis) 5 MG tablet tablet Take 1 tablet by mouth 2 (Two) Times a Day. 180 tablet 3    Aptiom 400 MG tablet Take 1 tablet by mouth Daily. Take with 800 mg tablet for total dose 1,200 mg daily 90 tablet 1    Aptiom 800 MG tablet tablet Take 1 tablet by mouth Daily. Take with 400 mg tablet for total dose of 1,200 mg daily. 90 tablet 1    aspirin 81 MG EC tablet Take 1 tablet by mouth Daily. 30 tablet 0    atorvastatin (LIPITOR) 40 MG tablet TAKE 1 TABLET BY MOUTH EVERY DAY 60 tablet 2    Cenobamate (Xcopri) 100 MG tablet Take 100 mg by mouth Daily. 30 tablet 5    lacosamide (VIMPAT) 200 MG tablet Take 1 tablet by mouth Every 12 (Twelve) Hours. Name brand 60 tablet 4    metoprolol succinate XL (TOPROL-XL) 25 MG 24 hr tablet TAKE 1 TABLET BY MOUTH DAILY 30 tablet 0    ursodiol (ACTIGALL) 300 MG capsule Take 2 capsules by mouth 2 (Two) Times a Day.  360 capsule 3    Xcopri 50 MG tablet TAKE 1 TABLET BY MOUTH DAILY 30 tablet 2     No Known Allergies    Objective    Objective     Vital Signs  Temp:  [99.9 °F (37.7 °C)] 99.9 °F (37.7 °C)  Heart Rate:  [] 93  Resp:  [20-28] 20  BP: ()/(62-77) 120/77  SpO2:  [91 %-100 %] 99 %  on  Flow (L/min) (Oxygen Therapy):  [2-4] 2;   Device (Oxygen Therapy): nasal cannula  Body mass index is 31.17 kg/m².    Physical Exam  Vitals and nursing note reviewed.   Constitutional:       General: She is sleeping.      Appearance: She is morbidly obese.   HENT:      Head: Atraumatic.      Mouth/Throat:      Mouth: Mucous membranes are dry.   Eyes:      Conjunctiva/sclera: Conjunctivae normal.   Cardiovascular:      Rate and Rhythm: Normal rate and regular rhythm.      Pulses: Normal pulses.      Heart sounds: Normal heart sounds.   Pulmonary:      Effort: Pulmonary effort is normal.      Breath sounds: Examination of the right-lower field reveals decreased breath sounds. Examination of the left-lower field reveals decreased breath sounds. Decreased breath sounds present.   Abdominal:      General: Bowel sounds are normal.      Palpations: Abdomen is soft.   Musculoskeletal:         General: Normal range of motion.   Skin:     General: Skin is warm and dry.      Capillary Refill: Capillary refill takes less than 2 seconds.   Neurological:      General: No focal deficit present.      Mental Status: She is oriented to person, place, and time. She is lethargic.   Psychiatric:         Behavior: Behavior is cooperative.         Cognition and Memory: Cognition is impaired.         Results Review:  I reviewed the patient's new clinical results.  I reviewed the patient's new imaging results and agree with the interpretation.  I reviewed the patient's other test results and agree with the interpretation  I personally viewed and interpreted the patient's EKG/Telemetry data  Discussed with ED provider.    Lab Results (last 24 hours)        Procedure Component Value Units Date/Time    POC Glucose Once [[id number]]  (Abnormal) Collected: 07/31/25 0048    Specimen: Blood Updated: 07/31/25 0050     Glucose 153 mg/dL      Comment: Serial Number: [id number]:  [id number]       CBC & Differential [[id number]]  (Abnormal) Collected: 07/31/25 0050    Specimen: Blood Updated: 07/31/25 0100    Narrative:      The following orders were created for panel order CBC & Differential.  Procedure                               Abnormality         Status                     ---------                               -----------         ------                     CBC Auto Differential[[id number]]        Abnormal            Final result                 Please view results for these tests on the individual orders.    Comprehensive Metabolic Panel [[id number]]  (Abnormal) Collected: 07/31/25 0050    Specimen: Blood Updated: 07/31/25 0120     Glucose 162 mg/dL      BUN 7.0 mg/dL      Creatinine 0.60 mg/dL      Sodium 135 mmol/L      Potassium 3.3 mmol/L      Chloride 101 mmol/L      CO2 14.0 mmol/L      Calcium 9.2 mg/dL      Total Protein 7.8 g/dL      Albumin 4.1 g/dL      ALT (SGPT) 9 U/L      AST (SGOT) 14 U/L      Alkaline Phosphatase 204 U/L      Total Bilirubin <0.2 mg/dL      Globulin 3.7 gm/dL      A/G Ratio 1.1 g/dL      BUN/Creatinine Ratio 11.7     Anion Gap 20.0 mmol/L      eGFR 113.0 mL/min/1.73     Narrative:      GFR Categories in Chronic Kidney Disease (CKD)              GFR Category          GFR (mL/min/1.73)    Interpretation  G1                    90 or greater        Normal or high (1)  G2                    60-89                Mild decrease (1)  G3a                   45-59                Mild to moderate decrease  G3b                   30-44                Moderate to severe decrease  G4                    15-29                Severe decrease  G5                    14 or less           Kidney failure    (1)In the absence of evidence of kidney disease,  neither GFR category G1 or G2 fulfill the criteria for CKD.    eGFR calculation 2021 CKD-EPI creatinine equation, which does not include race as a factor    Protime-INR [[id number]]  (Abnormal) Collected: 07/31/25 0050    Specimen: Blood Updated: 07/31/25 0123     Protime 16.0 Seconds      INR 1.28    aPTT [[id number]]  (Normal) Collected: 07/31/25 0050    Specimen: Blood Updated: 07/31/25 0123     PTT 23.4 seconds     Ethanol [[id number]] Collected: 07/31/25 0050    Specimen: Blood Updated: 07/31/25 0120     Ethanol <10 mg/dL      Ethanol % <0.010 %     Narrative:      Not for legal purposes.    Magnesium [[id number]]  (Normal) Collected: 07/31/25 0050    Specimen: Blood Updated: 07/31/25 0120     Magnesium 2.3 mg/dL     Phosphorus [[id number]]  (Normal) Collected: 07/31/25 0050    Specimen: Blood Updated: 07/31/25 0120     Phosphorus 2.9 mg/dL     CBC Auto Differential [[id number]]  (Abnormal) Collected: 07/31/25 0050    Specimen: Blood Updated: 07/31/25 0100     WBC 14.99 10*3/mm3      RBC 4.70 10*6/mm3      Hemoglobin 12.4 g/dL      Hematocrit 38.6 %      MCV 82.1 fL      MCH 26.4 pg      MCHC 32.1 g/dL      RDW 15.1 %      RDW-SD 45.0 fl      MPV 9.8 fL      Platelets 396 10*3/mm3      Neutrophil % 83.4 %      Lymphocyte % 11.2 %      Monocyte % 3.9 %      Eosinophil % 0.3 %      Basophil % 0.3 %      Immature Grans % 0.9 %      Neutrophils, Absolute 12.49 10*3/mm3      Lymphocytes, Absolute 1.68 10*3/mm3      Monocytes, Absolute 0.59 10*3/mm3      Eosinophils, Absolute 0.05 10*3/mm3      Basophils, Absolute 0.04 10*3/mm3      Immature Grans, Absolute 0.14 10*3/mm3      nRBC 0.0 /100 WBC     High Sensitivity Troponin T [[id number]]  (Abnormal) Collected: 07/31/25 0050    Specimen: Blood Updated: 07/31/25 0242     HS Troponin T 25 ng/L     Narrative:      High Sensitive Troponin T Reference Range:  <14.0 ng/L- Negative Female for AMI  <22.0 ng/L- Negative Male for AMI  >=14 - Abnormal Female indicating possible  myocardial injury.  >=22 - Abnormal Male indicating possible myocardial injury.   Clinicians would have to utilize clinical acumen, EKG, Troponin, and serial changes to determine if it is an Acute Myocardial Infarction or myocardial injury due to an underlying chronic condition.         Urinalysis With Culture If Indicated - Straight Cath [[id number]]  (Abnormal) Collected: 07/31/25 0057    Specimen: Urine from Straight Cath Updated: 07/31/25 0114     Color, UA Yellow     Appearance, UA Clear     pH, UA 5.5     Specific Gravity, UA 1.015     Glucose, UA Negative     Ketones, UA Trace     Bilirubin, UA Negative     Blood, UA Small (1+)     Protein, UA >=300 mg/dL (3+)     Leuk Esterase, UA Negative     Nitrite, UA Negative     Urobilinogen, UA 0.2 E.U./dL    Narrative:      In absence of clinical symptoms, the presence of pyuria, bacteria, and/or nitrites on the urinalysis result does not correlate with infection.    Urine Drug Screen - Straight Cath [[id number]]  (Abnormal) Collected: 07/31/25 0057    Specimen: Urine from Straight Cath Updated: 07/31/25 0118     THC, Screen, Urine Negative     Phencyclidine (PCP), Urine Negative     Cocaine Screen, Urine Negative     Methamphetamine, Ur Negative     Opiate Screen Negative     Amphetamine Screen, Urine Negative     Benzodiazepine Screen, Urine Negative     Tricyclic Antidepressants Screen Positive     Methadone Screen, Urine Negative     Barbiturates Screen, Urine Negative     Oxycodone Screen, Urine Negative     Buprenorphine, Screen, Urine Negative    Narrative:      Cutoff For Drugs Screened:    Amphetamines               500 ng/ml  Barbiturates               200 ng/ml  Benzodiazepines            150 ng/ml  Cocaine                    150 ng/ml  Methadone                  200 ng/ml  Opiates                    100 ng/ml  Phencyclidine               25 ng/ml  THC                         50 ng/ml  Methamphetamine            500 ng/ml  Tricyclic Antidepressants  300  ng/ml  Oxycodone                  100 ng/ml  Buprenorphine               10 ng/ml    The normal value for all drugs tested is negative. This report includes unconfirmed screening results, with the cutoff values listed, to be used for medical treatment purposes only.  Unconfirmed results must not be used for non-medical purposes such as employment or legal testing.  Clinical consideration should be applied to any drug of abuse test, particularly when unconfirmed results are used.      Lacosamide Blood [[id number]] Collected: 07/31/25 0057    Specimen: Blood from Arm, Right Updated: 07/31/25 0104    Fentanyl, Urine - Straight Cath [[id number]]  (Normal) Collected: 07/31/25 0057    Specimen: Urine from Straight Cath Updated: 07/31/25 0130     Fentanyl, Urine Negative    Narrative:      Negative Threshold:      Fentanyl 5 ng/mL     The normal value for the drug tested is negative. This report includes final unconfirmed screening results to be used for medical treatment purposes only. Unconfirmed results must not be used for non-medical purposes such as employment or legal testing. Clinical consideration should be applied to any drug of abuse test, particularly when unconfirmed results are used.           Urinalysis, Microscopic Only - Straight Cath [[id number]] Collected: 07/31/25 0057    Specimen: Urine from Straight Cath Updated: 07/31/25 0114     RBC, UA 0-2 /HPF      WBC, UA 0-2 /HPF      Comment: Urine culture not indicated.        Bacteria, UA None Seen /HPF      Squamous Epithelial Cells, UA 0-2 /HPF      Hyaline Casts, UA 7-12 /LPF      Methodology Automated Microscopy    High Sensitivity Troponin T 1Hr [[id number]]  (Abnormal) Collected: 07/31/25 0319    Specimen: Blood from Arm, Right Updated: 07/31/25 0353     HS Troponin T 85 ng/L      Troponin T Numeric Delta 60 ng/L      Troponin T % Delta 240    Narrative:      High Sensitive Troponin T Reference Range:  <14.0 ng/L- Negative Female for AMI  <22.0 ng/L-  Negative Male for AMI  >=14 - Abnormal Female indicating possible myocardial injury.  >=22 - Abnormal Male indicating possible myocardial injury.   Clinicians would have to utilize clinical acumen, EKG, Troponin, and serial changes to determine if it is an Acute Myocardial Infarction or myocardial injury due to an underlying chronic condition.                 Imaging Results (Last 24 Hours)       Procedure Component Value Units Date/Time    CT Head Without Contrast [[id number]] Collected: 07/31/25 0146     Updated: 07/31/25 0156    Narrative:      HEAD CT WITHOUT CONTRAST     REASON:  Multiple breakthrough seizures      COMPARISON STUDIES: Head CT 4/1/2025.     TECHNIQUE:  Axial images were acquired from the skull base to vertex  without contrast, including multiplanar reformats, per standard  departmental protocol.    Radiation dose reduction techniques were  utilized, including automated exposure control, and exposure modulation  based on body size.     FINDINGS:     There is no CT evidence of acute intracranial hemorrhage, mass, or  infarct. There is no hydrocephalus or extra-axial fluid collection.     Small areas of cortical encephalomalacia in the lateral left frontal  lobe and inferior left occipital cortex are redemonstrated, unchanged.  Brain parenchymal density remains otherwise normal.     Skull base, calvarium, and extracranial soft tissues show no acute  abnormality.       Impression:         Stable mild chronic changes, no acute intracranial abnormality.                       This report was finalized on 7/31/2025 1:53 AM by Dr. Luke Bettencourt M.D on Workstation: [id number]       XR Chest 1 View [[id number]] Collected: 07/31/25 0118     Updated: 07/31/25 0122    Narrative:      CXR ONE VIEW      HISTORY: seizure     COMPARISON: 4/5/2025     TECHNIQUE: single portable AP       Impression:         The heart size is within normal limits.     There is a submaximal inspiratory result. Allowing for that,  there is no  convincing evidence of infiltrate, effusion or pneumothorax.           This report was finalized on 7/31/2025 1:19 AM by Dr. Luke Bettencourt M.D on Workstation: [id number]               Results for orders placed during the hospital encounter of 03/30/25    Adult transthoracic echo complete 03/31/2025  2:59 PM    Interpretation Summary    Left ventricular systolic function is normal. Calculated left ventricular EF = 69.9% Normal left ventricular cavity size noted. Left ventricular wall thickness is consistent with mild concentric hypertrophy. All left ventricular wall segments contract normally. Left ventricular diastolic function is consistent with (grade I) impaired relaxation.    Normal left atrial cavity size noted. Saline test results are negative.    There is severe, bileaflet mitral valve thickening present. The posterior mitral valve leaflet has decreased excursion Moderate mitral valve regurgitation is present with a posteriorly-directed jet noted. Mild mitral valve stenosis is present. The mean gradient is 5 mmHg with a HR of 84bpm      Telemetry Scan   Final Result      Telemetry Scan   Final Result      ECG 12 Lead Altered Mental Status   Final Result   HEART RATE=96  bpm   RR Caawmtoy=713  ms   AK Igsdegkt=549  ms   P Horizontal Axis=-2  deg   P Front Axis=47  deg   QRSD Interval=84  ms   QT Cmvczidg=068  ms   UGjE=862  ms   QRS Axis=10  deg   T Wave Axis=55  deg   - OTHERWISE NORMAL ECG -   Sinus rhythm   RSR' in V1 or V2, right VCD or RVH   No change from prior tracing   Electronically Signed By: Yinka Parra ([facility]) 2025-07-31 08:10:44   Date and Time of Study:2025-07-31 03:58:08      ECG 12 Lead Drug Monitoring   Preliminary Result   HEART JCWT=948  bpm   RR Qdlyacre=934  ms   AK Wlosjcob=710  ms   P Horizontal Axis=24  deg   P Front Axis=66  deg   QRSD Interval=91  ms   QT Bgrtbrrq=178  ms   QHaJ=079  ms   QRS Axis=39  deg   T Wave Axis=76  deg   - BORDERLINE ECG -   Sinus tachycardia    Consider RVH or PMI w/ secondary repol abnrm   Borderline ST depression, anterolateral leads   Date and Time of Study:2025-07-31 01:01:53           Assessment/Plan     Active Hospital Problems    Diagnosis  POA    **Breakthrough seizure [G40.919]  Yes    Rheumatic mitral valve disease [I05.9]  Yes    S/P placement of VNS (vagus nerve stimulation) device [Z96.89]  Not Applicable    TULIO on CPAP [G47.33]  Yes    Vitamin D deficiency [E55.9]  Yes    History of stroke [Z86.73]  Not Applicable    HTN (hypertension) [I10]  Yes    Epilepsy [G40.909]  Yes      Resolved Hospital Problems   No resolved problems to display.         Breakthrough seizure  Epilepsy  S/P placement of VNS (vagus nerve stimulation) device  Continue seizure precaution  Last known seizure possibly 2022 but unsure  He appears to still be postictal but is arousable and able to answer some questions  Family to bring in nonformulary seizure medications  Home Vimpat continued  As needed Ativan for breakthrough seizures  Neurology consulted  CT scan of the head is unremarkable for any acute intracranial abnormalities.  She denies any known inciting issues.  Continue seizure precautions    Rheumatic mitral valve disease  Chronic  Cardiology consulted  Noted elevated troponins-suspect secondary to seizure activity  ASA, Eliquis  Last echocardiogram 3/31/2025-shows severe mitral valve getting, and regurgitation    History of stroke  Eliquis, ASA, Norvasc  No focal deficits exam  Neurochecks per protocol    HTN (hypertension)  Chronic  BP stable at this time  Continue Norvasc    Depression  Chronic  Continue home amitriptyline    Vitamin D deficiency  Chronic  Not on supplement  Will check vitamin D level    TULIO on CPAP  Chronic  Patient family to bring CPAP to bedside  Once available patient may use home CPAP.    Colitis  Chronic   She reports no recent diarrhea or GI issues  Positive history of C. difficile colitis-no recent antibiotics        I discussed  the patient's findings and my recommendations with patient and family.    VTE Prophylaxis - Eliquis (home med).  Code Status - Full code.       KIRSTIE Hurt  [facility]  07/31/25  08:21 EDT

## 2025-08-01 ENCOUNTER — READMISSION MANAGEMENT (OUTPATIENT)
Dept: CALL CENTER | Facility: HOSPITAL | Age: 45
End: 2025-08-01
Payer: MEDICARE

## 2025-08-01 VITALS
HEART RATE: 96 BPM | TEMPERATURE: 97.9 F | HEIGHT: 65 IN | OXYGEN SATURATION: 98 % | BODY MASS INDEX: 31.16 KG/M2 | WEIGHT: 187 LBS | SYSTOLIC BLOOD PRESSURE: 119 MMHG | RESPIRATION RATE: 18 BRPM | DIASTOLIC BLOOD PRESSURE: 74 MMHG

## 2025-08-01 LAB
ALBUMIN SERPL-MCNC: 3.4 G/DL (ref 3.5–5.2)
ALBUMIN/GLOB SERPL: 1.1 G/DL
ALP SERPL-CCNC: 165 U/L (ref 39–117)
ALT SERPL W P-5'-P-CCNC: 8 U/L (ref 1–33)
ANION GAP SERPL CALCULATED.3IONS-SCNC: 11.2 MMOL/L (ref 5–15)
AST SERPL-CCNC: 12 U/L (ref 1–32)
BASOPHILS # BLD AUTO: 0.03 10*3/MM3 (ref 0–0.2)
BASOPHILS NFR BLD AUTO: 0.4 % (ref 0–1.5)
BILIRUB SERPL-MCNC: <0.2 MG/DL (ref 0–1.2)
BUN SERPL-MCNC: 9 MG/DL (ref 6–20)
BUN/CREAT SERPL: 17 (ref 7–25)
CALCIUM SPEC-SCNC: 8.6 MG/DL (ref 8.6–10.5)
CHLORIDE SERPL-SCNC: 104 MMOL/L (ref 98–107)
CO2 SERPL-SCNC: 21.8 MMOL/L (ref 22–29)
CREAT SERPL-MCNC: 0.53 MG/DL (ref 0.57–1)
DEPRECATED RDW RBC AUTO: 45.3 FL (ref 37–54)
EGFRCR SERPLBLD CKD-EPI 2021: 116.4 ML/MIN/1.73
EOSINOPHIL # BLD AUTO: 0.09 10*3/MM3 (ref 0–0.4)
EOSINOPHIL NFR BLD AUTO: 1.2 % (ref 0.3–6.2)
ERYTHROCYTE [DISTWIDTH] IN BLOOD BY AUTOMATED COUNT: 14.8 % (ref 12.3–15.4)
GLOBULIN UR ELPH-MCNC: 3.2 GM/DL
GLUCOSE SERPL-MCNC: 82 MG/DL (ref 65–99)
HCT VFR BLD AUTO: 35.4 % (ref 34–46.6)
HGB BLD-MCNC: 11.2 G/DL (ref 12–15.9)
IMM GRANULOCYTES # BLD AUTO: 0.02 10*3/MM3 (ref 0–0.05)
IMM GRANULOCYTES NFR BLD AUTO: 0.3 % (ref 0–0.5)
LYMPHOCYTES # BLD AUTO: 3.03 10*3/MM3 (ref 0.7–3.1)
LYMPHOCYTES NFR BLD AUTO: 39.6 % (ref 19.6–45.3)
MCH RBC QN AUTO: 26.2 PG (ref 26.6–33)
MCHC RBC AUTO-ENTMCNC: 31.6 G/DL (ref 31.5–35.7)
MCV RBC AUTO: 82.9 FL (ref 79–97)
MONOCYTES # BLD AUTO: 0.56 10*3/MM3 (ref 0.1–0.9)
MONOCYTES NFR BLD AUTO: 7.3 % (ref 5–12)
NEUTROPHILS NFR BLD AUTO: 3.92 10*3/MM3 (ref 1.7–7)
NEUTROPHILS NFR BLD AUTO: 51.2 % (ref 42.7–76)
NRBC BLD AUTO-RTO: 0 /100 WBC (ref 0–0.2)
PLATELET # BLD AUTO: 281 10*3/MM3 (ref 140–450)
PMV BLD AUTO: 10 FL (ref 6–12)
POTASSIUM SERPL-SCNC: 3.6 MMOL/L (ref 3.5–5.2)
PROT SERPL-MCNC: 6.6 G/DL (ref 6–8.5)
RBC # BLD AUTO: 4.27 10*6/MM3 (ref 3.77–5.28)
SODIUM SERPL-SCNC: 137 MMOL/L (ref 136–145)
WBC NRBC COR # BLD AUTO: 7.65 10*3/MM3 (ref 3.4–10.8)

## 2025-08-01 PROCEDURE — 80053 COMPREHEN METABOLIC PANEL: CPT | Performed by: NURSE PRACTITIONER

## 2025-08-01 PROCEDURE — 85025 COMPLETE CBC W/AUTO DIFF WBC: CPT | Performed by: NURSE PRACTITIONER

## 2025-08-01 PROCEDURE — G0378 HOSPITAL OBSERVATION PER HR: HCPCS

## 2025-08-01 PROCEDURE — 99214 OFFICE O/P EST MOD 30 MIN: CPT | Performed by: INTERNAL MEDICINE

## 2025-08-01 PROCEDURE — 99214 OFFICE O/P EST MOD 30 MIN: CPT | Performed by: NURSE PRACTITIONER

## 2025-08-01 RX ADMIN — METOPROLOL SUCCINATE 25 MG: 25 TABLET, EXTENDED RELEASE ORAL at 08:32

## 2025-08-01 RX ADMIN — ATORVASTATIN CALCIUM 40 MG: 20 TABLET, FILM COATED ORAL at 08:32

## 2025-08-01 RX ADMIN — LACOSAMIDE 200 MG: 100 TABLET, FILM COATED ORAL at 08:32

## 2025-08-01 RX ADMIN — Medication 10 ML: at 08:34

## 2025-08-01 RX ADMIN — URSODIOL 600 MG: 300 CAPSULE ORAL at 09:56

## 2025-08-01 RX ADMIN — APIXABAN 5 MG: 5 TABLET, FILM COATED ORAL at 08:32

## 2025-08-01 RX ADMIN — ASPIRIN 81 MG: 81 TABLET, COATED ORAL at 08:33

## 2025-08-01 NOTE — CASE MANAGEMENT/SOCIAL WORK
Case Management Discharge Note      Final Note: Home with significant other to transport.         Selected Continued Care - Discharged on 8/1/2025 Admission date: 7/31/2025 - Discharge disposition: Home or Self Care      Destination    No services have been selected for the patient.                Durable Medical Equipment    No services have been selected for the patient.                Dialysis/Infusion    No services have been selected for the patient.                Home Medical Care    No services have been selected for the patient.                Therapy    No services have been selected for the patient.                Community Resources    No services have been selected for the patient.                Community & DME    No services have been selected for the patient.                    Selected Continued Care - Episodes Includes continued care and service providers with selected services from the active episodes listed below      [facility] - External Fill Episode start date: 6/4/2025   There are no active outsourced providers for this episode.                 Transportation Services  Transportation: Private Transportation  Private: Car    Final Discharge Disposition Code: 01 - home or self-care

## 2025-08-01 NOTE — PROGRESS NOTES
LOS: 0 days   Patient Care Team:  Annalee Huddleston APRN as PCP - General (Family Medicine)  Cb James II, MD as Consulting Physician (Neurology)  Ernesto Mendosa MD as Consulting Physician (Obstetrics and Gynecology)  Reza Lester MD as Cardiologist (Cardiology)  Marija Peña MD as Consulting Physician (Hematology and Oncology)    Chief Complaint: Follow-up type II NSTEMI.    Interval History: She is doing markedly better today.  She is alert and oriented.  She has no chest pain.  No acute events.  No shortness of breath.    Vital Signs:  Temp:  [97.5 °F (36.4 °C)-98.2 °F (36.8 °C)] 97.9 °F (36.6 °C)  Heart Rate:  [79-96] 96  Resp:  [18-20] 18  BP: (107-127)/(63-78) 119/74    Intake/Output Summary (Last 24 hours) at 8/1/2025 1205  Last data filed at 8/1/2025 0831  Gross per 24 hour   Intake 640 ml   Output --   Net 640 ml       Physical Exam:   General Appearance:    No acute distress, alert and oriented x4   Lungs:     Clear to auscultation bilaterally     Heart:    Regular rhythm and normal rate. II/VI SM LLSB.    Abdomen:     Soft, nontender, nondistended.    Extremities:   Trace edema lower extremities bilaterally.      Results Review:    Results from last 7 days   Lab Units 08/01/25  0456   SODIUM mmol/L 137   POTASSIUM mmol/L 3.6   CHLORIDE mmol/L 104   CO2 mmol/L 21.8*   BUN mg/dL 9.0   CREATININE mg/dL 0.53*   GLUCOSE mg/dL 82   CALCIUM mg/dL 8.6     Results from last 7 days   Lab Units 07/31/25  0820 07/31/25  0319 07/31/25  0050   HSTROP T ng/L 55* 85* 25*     Results from last 7 days   Lab Units 08/01/25  0456   WBC 10*3/mm3 7.65   HEMOGLOBIN g/dL 11.2*   HEMATOCRIT % 35.4   PLATELETS 10*3/mm3 281     Results from last 7 days   Lab Units 07/31/25  0050   INR  1.28*   APTT seconds 23.4         Results from last 7 days   Lab Units 07/31/25  0050   MAGNESIUM mg/dL 2.3           I reviewed the patient's new clinical results.        Assessment:  1.  Epilepsy with vagus nerve stimulator  (generator change out on 4/5/2024)  2.  Breakthrough seizures  3.  Elevated troponin, type II NSTEMI secondary to #1  4.  History of stroke x 2 in 2022, and again in 2024  5.  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  Status post [employer] loop recorder placement in 2022  7.  Former tobacco use, quit in 2022  8.  Obstructive sleep apnea, on CPAP  9.  Hypertension    Plan:  - Echocardiogram reviewed.  No wall motion abnormalities.  Ejection fraction intact.  Mild to moderate mitral regurgitation noted.    -This is a type II NSTEMI secondary to breakthrough seizures.  With no EKG changes, no chest pain, and normal wall motion, no further ischemic testing is indicated.    -Continue Eliquis and aspirin.  Continue Lipitor.    -Blood pressure controlled with amlodipine and metoprolol.    -No further cardiac testing is needed currently.  She will continue on her current regimen.  Okay to discharge from a cardiology standpoint.    Tom Hannon MD  08/01/25  12:05 EDT

## 2025-08-01 NOTE — PLAN OF CARE
Problem: Violence Risk or Actual  Goal: Anger and Impulse Control  Intervention: Minimize Safety Risk  Recent Flowsheet Documentation  Taken 7/31/2025 2000 by Tommie Man RN  Sensory Stimulation Regulation: quiet environment promoted  Enhanced Safety Measures: bed alarm set     Problem: Violence Risk or Actual  Goal: Anger and Impulse Control  Intervention: Promote Self-Control  Recent Flowsheet Documentation  Taken 7/31/2025 2000 by Tomime Man RN  Supportive Measures: active listening utilized  Environmental Support: calm environment promoted     Problem: Adult Inpatient Plan of Care  Goal: Patient-Specific Goal (Individualized)  Outcome: Progressing  Flowsheets (Taken 8/1/2025 0358)  Patient/Family-Specific Goals (Include Timeframe): patient will remain free from harm/injury this hospitalization  Individualized Care Needs: safety  Anxieties, Fears or Concerns: none stated     Problem: Adult Inpatient Plan of Care  Goal: Absence of Hospital-Acquired Illness or Injury  Intervention: Identify and Manage Fall Risk  Flowsheets  Taken 8/1/2025 0358  Safety Promotion/Fall Prevention:   activity supervised   assistive device/personal items within reach   clutter free environment maintained   nonskid shoes/slippers when out of bed   room organization consistent   safety round/check completed    Problem: Adult Inpatient Plan of Care  Goal: Absence of Hospital-Acquired Illness or Injury  Intervention: Prevent Infection  Flowsheets  Taken 8/1/2025 0358  Infection Prevention:   hand hygiene promoted   single patient room provided   personal protective equipment utilized   equipment surfaces disinfected  Taken 7/31/2025 2000  Infection Prevention:   hand hygiene promoted   equipment surfaces disinfected   personal protective equipment utilized   rest/sleep promoted   single patient room provided     Problem: Adult Inpatient Plan of Care  Goal: Optimal Comfort and Wellbeing  Intervention: Provide Person-Centered  Care  Flowsheets (Taken 7/31/2025 2000)  Trust Relationship/Rapport:   care explained   choices provided   questions answered   questions encouraged   reassurance provided   thoughts/feelings acknowledged     Problem: Fall Injury Risk  Goal: Absence of Fall and Fall-Related Injury  Intervention: Identify and Manage Contributors  Flowsheets  Taken 8/1/2025 0358  Medication Review/Management: medications reviewed  Self-Care Promotion:   independence encouraged   BADL personal routines maintained   BADL personal objects within reach  Taken 7/31/2025 2000  Medication Review/Management: medications reviewed    Goal Outcome Evaluation:  Plan of Care Reviewed With: patient        Progress: improving

## 2025-08-01 NOTE — PROGRESS NOTES
DOS: 2025  NAME: Alexandra Xiao   : 1980  PCP: Annalee Huddleston APRN    Chief Complaint   Patient presents with    Seizures        Subjective: No acute events overnight.  No seizure activity overnight or this morning.  Was able to speak with her  on the phone and answered all of his questions.    Objective:  Vital signs:   Vitals:    25 [phone] 25 1054   BP: 111/72 107/67 127/78 119/74   BP Location: Right arm Right arm Right arm Right arm   Patient Position: Lying Lying Lying Lying   Pulse: 82 81 90 96   Resp: 18 19 18 18   Temp: 97.5 °F (36.4 °C) 98.1 °F (36.7 °C) 97.9 °F (36.6 °C) 97.9 °F (36.6 °C)   TempSrc: Oral Oral Oral Oral   SpO2: 99% 100% 98% 98%   Weight:       Height:           Current Facility-Administered Medications:     acetaminophen (TYLENOL) tablet 650 mg, 650 mg, Oral, Q4H PRN, 650 mg at 25 **OR** acetaminophen (TYLENOL) 160 MG/5ML oral solution 650 mg, 650 mg, Oral, Q4H PRN **OR** acetaminophen (TYLENOL) suppository 650 mg, 650 mg, Rectal, Q4H PRN, Pauline Sanchez APRN    amitriptyline (ELAVIL) tablet 25 mg, 25 mg, Oral, Nightly, Pauline Sanchez APRN, 25 mg at 25    amLODIPine (NORVASC) tablet 5 mg, 5 mg, Oral, Daily, Pauline Sanchez APRN, 5 mg at 2535    apixaban (ELIQUIS) tablet 5 mg, 5 mg, Oral, BID, Pauline Sanchez APRN, 5 mg at 25 0832    aspirin EC tablet 81 mg, 81 mg, Oral, Daily, Pauline Sanchez APRN, 81 mg at 25 0833    atorvastatin (LIPITOR) tablet 40 mg, 40 mg, Oral, Daily, Pauline Sanchez APRN, 40 mg at 25 0832    sennosides-docusate (PERICOLACE) 8.6-50 MG per tablet 2 tablet, 2 tablet, Oral, BID PRN **AND** polyethylene glycol (MIRALAX) packet 17 g, 17 g, Oral, Daily PRN **AND** bisacodyl (DULCOLAX) EC tablet 5 mg, 5 mg, Oral, Daily PRN **AND** bisacodyl (DULCOLAX) suppository 10 mg, 10 mg, Rectal, Daily PRN, Pauline Sanchez,  KIRSTIE    calcium carbonate (TUMS) chewable tablet 500 mg (200 mg elemental), 2 tablet, Oral, BID PRN, Pauline Sanchez APRN    Cenobamate tablet 100 mg, 100 mg, Oral, Daily, Pauline Sanchez APRN, 100 mg at 07/31/25 2010    diazePAM (VALIUM) injection 5 mg, 5 mg, Intravenous, Q4H PRN, Pauline Sanchez APRN    Eslicarbazepine Acetate (APTIOM) tablet tablet 800 mg, 800 mg, Oral, Daily, Pauline Sanchez APRN, 800 mg at 07/31/25 2010    Eslicarbazepine Acetate tablet 400 mg, 400 mg, Oral, Daily, Pauline Sanchez APRN, 400 mg at 07/31/25 2009    lacosamide (VIMPAT) tablet 200 mg, 200 mg, Oral, Q12H, Pauline Sanchez APRN, 200 mg at 08/01/25 0832    metoprolol succinate XL (TOPROL-XL) 24 hr tablet 25 mg, 25 mg, Oral, Daily, Pauline Sanchez APRN, 25 mg at 08/01/25 0832    nitroglycerin (NITROSTAT) SL tablet 0.4 mg, 0.4 mg, Sublingual, Q5 Min PRN, Pauline Sanchez APRN    ondansetron ODT (ZOFRAN-ODT) disintegrating tablet 4 mg, 4 mg, Oral, Q6H PRN **OR** ondansetron (ZOFRAN) injection 4 mg, 4 mg, Intravenous, Q6H PRN, Pauline Sanchez APRN    sodium chloride 0.9 % flush 10 mL, 10 mL, Intravenous, Q12H, Pauline Sanchez APRN, 10 mL at 08/01/25 0834    sodium chloride 0.9 % flush 10 mL, 10 mL, Intravenous, PRN, Pauline Sanchez APRN    sodium chloride 0.9 % infusion 40 mL, 40 mL, Intravenous, PRN, Pauline Sanchez APRN    ursodiol (ACTIGALL) capsule 600 mg, 600 mg, Oral, BID, Pauline Sanchez APRN, 600 mg at 08/01/25 0956    Current Outpatient Medications:     amitriptyline (ELAVIL) 25 MG tablet, TAKE 1 TABLET BY MOUTH EVERY NIGHT, Disp: 30 tablet, Rfl: 5    amLODIPine (NORVASC) 5 MG tablet, TAKE 1 TABLET BY MOUTH DAILY, Disp: 30 tablet, Rfl: 0    apixaban (Eliquis) 5 MG tablet tablet, Take 1 tablet by mouth 2 (Two) Times a Day., Disp: 180 tablet, Rfl: 3    Aptiom 400 MG tablet, Take 1 tablet by mouth Daily. Take with 800 mg tablet for  total dose 1,200 mg daily, Disp: 90 tablet, Rfl: 1    Aptiom 800 MG tablet tablet, Take 1 tablet by mouth Daily. Take with 400 mg tablet for total dose of 1,200 mg daily., Disp: 90 tablet, Rfl: 1    aspirin 81 MG EC tablet, Take 1 tablet by mouth Daily., Disp: 30 tablet, Rfl: 0    atorvastatin (LIPITOR) 40 MG tablet, TAKE 1 TABLET BY MOUTH EVERY DAY, Disp: 60 tablet, Rfl: 2    Cenobamate (Xcopri) 100 MG tablet, Take 100 mg by mouth Daily., Disp: 30 tablet, Rfl: 5    lacosamide (VIMPAT) 200 MG tablet, Take 1 tablet by mouth Every 12 (Twelve) Hours. Name brand, Disp: 60 tablet, Rfl: 4    metoprolol succinate XL (TOPROL-XL) 25 MG 24 hr tablet, TAKE 1 TABLET BY MOUTH DAILY, Disp: 30 tablet, Rfl: 0    ursodiol (ACTIGALL) 300 MG capsule, Take 2 capsules by mouth 2 (Two) Times a Day., Disp: 360 capsule, Rfl: 3    Xcopri 50 MG tablet, TAKE 1 TABLET BY MOUTH DAILY, Disp: 30 tablet, Rfl: 2    PRN meds    acetaminophen **OR** acetaminophen **OR** acetaminophen    senna-docusate sodium **AND** polyethylene glycol **AND** bisacodyl **AND** bisacodyl    calcium carbonate    diazePAM    nitroglycerin    ondansetron ODT **OR** ondansetron    sodium chloride    sodium chloride    No current facility-administered medications on file prior to encounter.     Current Outpatient Medications on File Prior to Encounter   Medication Sig    amitriptyline (ELAVIL) 25 MG tablet TAKE 1 TABLET BY MOUTH EVERY NIGHT    amLODIPine (NORVASC) 5 MG tablet TAKE 1 TABLET BY MOUTH DAILY    apixaban (Eliquis) 5 MG tablet tablet Take 1 tablet by mouth 2 (Two) Times a Day.    Aptiom 400 MG tablet Take 1 tablet by mouth Daily. Take with 800 mg tablet for total dose 1,200 mg daily    Aptiom 800 MG tablet tablet Take 1 tablet by mouth Daily. Take with 400 mg tablet for total dose of 1,200 mg daily.    aspirin 81 MG EC tablet Take 1 tablet by mouth Daily.    atorvastatin (LIPITOR) 40 MG tablet TAKE 1 TABLET BY MOUTH EVERY DAY    Cenobamate (Xcopri) 100 MG  "tablet Take 100 mg by mouth Daily.    lacosamide (VIMPAT) 200 MG tablet Take 1 tablet by mouth Every 12 (Twelve) Hours. Name brand    metoprolol succinate XL (TOPROL-XL) 25 MG 24 hr tablet TAKE 1 TABLET BY MOUTH DAILY    ursodiol (ACTIGALL) 300 MG capsule Take 2 capsules by mouth 2 (Two) Times a Day.    Xcopri 50 MG tablet TAKE 1 TABLET BY MOUTH DAILY       General appearance: Obese female, edentulous, awake and alert  HEENT: Normocephalic, atraumatic     Neurological:   MS: oriented x3, language intact, no neglect  CN: visual fields full, no facial droop, tongue midline  Motor: 5/5 upper and lower extremities,     Physical exam performed, changes noted    Laboratory results:  Lab Results   Component Value Date    TSH 1.200 07/10/2025     Lab Results   Component Value Date    [id number] 274 03/30/2025     Lab Results   Component Value Date    HGBA1C 5.60 06/17/2025     Lab Results   Component Value Date    GLUCOSE 82 08/01/2025    BUN 9.0 08/01/2025    CREATININE 0.53 (L) 08/01/2025    BCR 17.0 08/01/2025    K 3.6 08/01/2025    CO2 21.8 (L) 08/01/2025    CALCIUM 8.6 08/01/2025    ALBUMIN 3.4 (L) 08/01/2025    AST 12 08/01/2025    ALT 8 08/01/2025     Lab Results   Component Value Date    WBC 7.65 08/01/2025    HGB 11.2 (L) 08/01/2025    HCT 35.4 08/01/2025    MCV 82.9 08/01/2025     08/01/2025     Brief Urine Lab Results  (Last result in the past 365 days)        Color   Clarity   Blood   Leuk Est   Nitrite   Protein   CREAT   Urine HCG        07/31/25 0057 Yellow   Clear   Small (1+)   Negative   Negative   >=300 mg/dL (3+)                 No results found for: \"ACANTHNAEG\", \"AFBCX\", \"BPERTUSSISCX\", \"BLOODCX\"  No results found for: \"BCIDPCR\", \"CXREFLEX\", \"CSFCX\", \"CULTURETIS\"  No results found for: \"CULTURES\", \"HSVCX\", \"URCX\"  No results found for: \"EYECULTURE\", \"GCCX\", \"HSVCULTURE\", \"LABHSV\"  No results found for: \"LEGIONELLA\", \"MRSACX\", \"MUMPSCX\", \"MYCOPLASCX\"  No results found for: \"NOCARDIACX\", " "\"STOOLCX\"  No results found for: \"THROATCX\", \"UNSTIMCULT\", \"URINECX\", \"CULTURE\", \"VZVCULTUR\"  No results found for: \"VIRALCULTU\", \"WOUNDCX\"  Pain Management Panel  More data exists         Latest Ref Rng & Units 7/31/2025 4/8/2024   Pain Management Panel   Amphetamine, Urine Qual Negative Negative  -   Barbiturates Screen, Urine Negative Negative  Negative    Benzodiazepine Screen, Urine Negative Negative  Negative    Buprenorphine, Screen, Urine Negative Negative  -   Cocaine Screen, Urine Negative Negative  Negative    Fentanyl, Urine Negative Negative  Negative    Methadone Screen , Urine Negative Negative  Negative    Methamphetamine, Ur Negative Negative  -       Review and interpretation of imaging:  CT Head Without Contrast  Result Date: 7/31/2025   Stable mild chronic changes, no acute intracranial abnormality.        This report was finalized on 7/31/2025 1:53 AM by Dr. Luke Bettencourt M.D on Workstation: [id number]      XR Chest 1 View  Result Date: 7/31/2025   The heart size is within normal limits.  There is a submaximal inspiratory result. Allowing for that, there is no convincing evidence of infiltrate, effusion or pneumothorax.    This report was finalized on 7/31/2025 1:19 AM by Dr. Luke Bettencourt M.D on Workstation: [id number]      Results for orders placed during the hospital encounter of 07/31/25    Adult Transthoracic Echo Complete W/ Cont if Necessary Per Protocol 07/31/2025  4:03 PM    Interpretation Summary    Left ventricular systolic function is normal. Calculated left ventricular EF = 66.8% Normal left ventricular cavity size and wall thickness noted. All left ventricular wall segments contract normally. Left ventricular diastolic function was normal.    Mild to moderate mitral valve regurgitation is present.      Impression/Assessment:  This is a 44 y.o. female with past medical history of generalized epilepsy followed by my colleague Dr. Cb James in the outpatient setting s/p " VNS also prescribed Aptiom 400 mg in the a.m. and 800 mg in the p.m. (confirmed by Dr. James) however pt states she is taking 1200mg total nightly, Vimpat 200 mg twice daily, and Xcorpi 100 mg daily, obstructive sleep apnea, recurrent cryptogenic embolic strokes on full dose Eliquis, obstructive sleep apnea who presented to the hospital on 7/31/2025 with complaints of suffering seizures at home.  I was able to speak to her  today.  He reports the patient started having seizures around 11 PM.  She seemed to have back-to-back seizures with a basically no recovery time in between.  He noticed afterwards she seemed to be short of breath and appeared cyanotic which was the main reason why he brought her to the ER.  She had previously been counseled about not using her magnet to turn her VNS off.  Her  states that he actually took the magnet from her so that she could not use it as much.  Initially she was postictal on arrival but did improve yesterday afternoon.  Her  brought in her home dose Xcorpi and Aptiom yesterday. Reportedly hasn't missed any doses.    Diagnosis:  Breakthrough seizure  Epilepsy status post VNS  Obstructive sleep apnea on CPAP  Chronic strokes on chronic anticoagulation     Plan:  - Remained stable overnight with no concern for seizure activity.  - Will continue her on her home dose Aptiom, Xcorpi, and Vimpat at discharge.  -  reports she has not been using her magnet as much to turn off her VNS.   - Okay to discharge home from our standpoint.  - Already has appt scheduled with Dr. James in December.  We will sign off and see again per request.    Case discussed with patient,  via speaker phone, Pauline THACKER, and Dr. Durant, and he agrees with plan above.     KIRSTIE Marx

## 2025-08-01 NOTE — OUTREACH NOTE
Prep Survey      Flowsheet Row Responses   [facility] patient discharged from? [facility]   Is LACE score < 7 ? Yes   Eligibility [facility]   Date of Admission 07/31/25   Date of Discharge 08/01/25   Discharge Disposition Home or Self Care   Discharge diagnosis Breakthrough seizure   Does the patient have one of the following disease processes/diagnoses(primary or secondary)? Other   Does the patient have Home health ordered? No   Is there a DME ordered? No   Prep survey completed? Yes            Sherri VEGA - Registered Nurse

## 2025-08-01 NOTE — DISCHARGE SUMMARY
Patient Name: Alexandra Xiao  : 1980  MRN: [id number]    Date of Admission: 2025  Date of Discharge:  2025  Primary Care Physician: Annalee Huddleston APRN      Chief Complaint:   Seizures      Discharge Diagnoses     Active Hospital Problems    Diagnosis  POA    **Breakthrough seizure [G40.919]  Yes    Rheumatic mitral valve disease [I05.9]  Yes    S/P placement of VNS (vagus nerve stimulation) device [Z96.89]  Not Applicable    TULIO on CPAP [G47.33]  Yes    Vitamin D deficiency [E55.9]  Yes    History of stroke [Z86.73]  Not Applicable    HTN (hypertension) [I10]  Yes    Epilepsy [G40.909]  Yes      Resolved Hospital Problems   No resolved problems to display.        Hospital Course     Ms. Xiao is a 44 y.o. female with a history of epilepsy, HTN, CVA, TULIO on CPAP, vagus nerve stimulator and mitral valve disease who presented to [facility] initially complaining of.  Seizure around 11 PM on Wednesday night.  Please see the admitting history and physical for further details.  She was found initially postictal, but as the day progressed she was more awake and was admitted to the hospital for further evaluation and treatment.  She follows with Dr. James outpatient, recently was discovered that she is using a magnet to manipulate vagus stimulator, she advises that they discharged this is not happening any longer as she is really been trying to just deal with the side effects.  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 and with the heat felt that this was because of her breakthrough seizure.  Has been seizure-free overnight has been maintained on her home antiepileptics up to mom, Xcopri, and Vimpat.  Neurology is following, scan head showed no acute intracranial processes, noted chronic changes.  Neurology has cleared her for discharge recommend she continue her home antiepileptic epileptics, follow-up with Dr. James at  her next scheduled appointment.  She was again reminded to refrain from using magnet to turn her vagus stimulator on and off.  She had elevated troponin at time of admission, cardiology has evaluated and felt this was a type II STEMI secondary to her seizure activity.  Echocardiogram showed mitral valve regurgitation.  EKG showed no acute ischemia.  Cardiology has cleared her for discharge with follow-up at her next scheduled appointment, recommended she continue Eliquis, ASA, Lipitor, amlodipine, and metoprolol.  She is medically stable for discharge home today  She has to follow-up with her PCP in 1 week for posthospitalization follow-up, neurology, and cardiology as previously scheduled.  Advised to take all her medications as they have been prescribed.  She has been advised if she was to have return of seizure activity to return back to the emergency room for further evaluation.    Day of Discharge     Subjective:  Much more awake today, is able to provide full information.  She is requesting to be discharged home, she states she feels okay, and offers no new complaints at this time.  States her  has agreed to pick her up from the hospital transport home.    Physical Exam:  Temp:  [97.5 °F (36.4 °C)-98.2 °F (36.8 °C)] 97.9 °F (36.6 °C)  Heart Rate:  [79-96] 96  Resp:  [18-20] 18  BP: (107-127)/(63-78) 119/74  Body mass index is 31.12 kg/m².  Physical Exam  Vitals and nursing note reviewed.   Constitutional:       General: She is not in acute distress.     Appearance: Normal appearance. She is obese.   HENT:      Head: Normocephalic and atraumatic.      Mouth/Throat:      Mouth: Mucous membranes are moist.      Pharynx: No posterior oropharyngeal erythema.   Eyes:      General: No scleral icterus.     Conjunctiva/sclera: Conjunctivae normal.   Neck:      Vascular: No JVD.   Cardiovascular:      Rate and Rhythm: Normal rate and regular rhythm.      Pulses: Normal pulses.      Heart sounds: Normal heart  sounds. No murmur heard.  Pulmonary:      Effort: Pulmonary effort is normal. No respiratory distress.      Breath sounds: Normal breath sounds.   Abdominal:      General: Bowel sounds are normal. There is no distension.      Palpations: Abdomen is soft.      Tenderness: There is no abdominal tenderness.   Musculoskeletal:         General: No swelling or tenderness.      Cervical back: Neck supple.   Skin:     General: Skin is warm and dry.      Capillary Refill: Capillary refill takes less than 2 seconds.      Coloration: Skin is not jaundiced.      Findings: No rash.   Neurological:      General: No focal deficit present.      Mental Status: She is alert and oriented to person, place, and time. Mental status is at baseline.   Psychiatric:         Mood and Affect: Mood normal.         Behavior: Behavior normal.         Consultants     Consult Orders (all) (From admission, onward)       Start     Ordered    07/31/25 0404  Inpatient Neurology Consult General  Once        Specialty:  Neurology  Provider:  Patricio Dickson MD    07/31/25 0405    07/31/25 0400  Cardiology (on-call MD unless specified)  Once        Specialty:  Cardiology  Provider:  Tom Hannon MD    07/31/25 0400    07/31/25 0201  [facility] (on-call MD unless specified) Details  Once        Specialty:  Hospitalist  Provider:  (Not yet assigned)    07/31/25 0200                  Procedures     * Surgery not found *    Imaging Results (All)       Procedure Component Value Units Date/Time    CT Head Without Contrast [[id number]] Collected: 07/31/25 0146     Updated: 07/31/25 0156    Narrative:      HEAD CT WITHOUT CONTRAST     REASON:  Multiple breakthrough seizures      COMPARISON STUDIES: Head CT 4/1/2025.     TECHNIQUE:  Axial images were acquired from the skull base to vertex  without contrast, including multiplanar reformats, per standard  departmental protocol.    Radiation dose reduction techniques were  utilized, including automated  exposure control, and exposure modulation  based on body size.     FINDINGS:     There is no CT evidence of acute intracranial hemorrhage, mass, or  infarct. There is no hydrocephalus or extra-axial fluid collection.     Small areas of cortical encephalomalacia in the lateral left frontal  lobe and inferior left occipital cortex are redemonstrated, unchanged.  Brain parenchymal density remains otherwise normal.     Skull base, calvarium, and extracranial soft tissues show no acute  abnormality.       Impression:         Stable mild chronic changes, no acute intracranial abnormality.                       This report was finalized on 7/31/2025 1:53 AM by Dr. Luke Bettencourt M.D on Workstation: [id number]       XR Chest 1 View [[id number]] Collected: 07/31/25 0118     Updated: 07/31/25 0122    Narrative:      CXR ONE VIEW      HISTORY: seizure     COMPARISON: 4/5/2025     TECHNIQUE: single portable AP       Impression:         The heart size is within normal limits.     There is a submaximal inspiratory result. Allowing for that, there is no  convincing evidence of infiltrate, effusion or pneumothorax.           This report was finalized on 7/31/2025 1:19 AM by Dr. Luke Bettencourt M.D on Workstation: [id number]               Results for orders placed during the hospital encounter of 07/31/25    Adult Transthoracic Echo Complete W/ Cont if Necessary Per Protocol 07/31/2025  4:03 PM    Interpretation Summary    Left ventricular systolic function is normal. Calculated left ventricular EF = 66.8% Normal left ventricular cavity size and wall thickness noted. All left ventricular wall segments contract normally. Left ventricular diastolic function was normal.    Mild to moderate mitral valve regurgitation is present.    Pertinent Labs     Results from last 7 days   Lab Units 08/01/25  0456 07/31/25  0820 07/31/25  0050   WBC 10*3/mm3 7.65 10.12 14.99*   HEMOGLOBIN g/dL 11.2* 10.6* 12.4   PLATELETS 10*3/mm3 281 322  "396     Results from last 7 days   Lab Units 08/01/25  0456 07/31/25  0820 07/31/25  0050   SODIUM mmol/L 137 135* 135*   POTASSIUM mmol/L 3.6 3.5 3.3*   CHLORIDE mmol/L 104 103 101   CO2 mmol/L 21.8* 20.0* 14.0*   BUN mg/dL 9.0 6.0 7.0   CREATININE mg/dL 0.53* 0.47* 0.60   GLUCOSE mg/dL 82 108* 162*   EGFR mL/min/1.73 116.4 119.8 113.0     Results from last 7 days   Lab Units 08/01/25  0456 07/31/25  0050   ALBUMIN g/dL 3.4* 4.1   BILIRUBIN mg/dL <0.2 <0.2   ALK PHOS U/L 165* 204*   AST (SGOT) U/L 12 14   ALT (SGPT) U/L 8 9     Results from last 7 days   Lab Units 08/01/25 0456 07/31/25  0820 07/31/25  0050   CALCIUM mg/dL 8.6 8.2* 9.2   ALBUMIN g/dL 3.4*  --  4.1   MAGNESIUM mg/dL  --   --  2.3   PHOSPHORUS mg/dL  --   --  2.9       Results from last 7 days   Lab Units 07/31/25  0820 07/31/25 0319 07/31/25  0050   HSTROP T ng/L 55* 85* 25*           Invalid input(s): \"LDLCALC\"          Test Results Pending at Discharge     Pending Results       Procedure [Order ID] Specimen - Date/Time    Lacosamide Blood [[id number]] Collected: 07/31/25 0057    Specimen: Blood from Arm, Right Updated: 07/31/25 0104              Discharge Details        Discharge Medications        Continue These Medications        Instructions Start Date   amitriptyline 25 MG tablet  Commonly known as: ELAVIL   25 mg, Oral, Nightly      amLODIPine 5 MG tablet  Commonly known as: NORVASC   5 mg, Oral, Daily      apixaban 5 MG tablet tablet  Commonly known as: Eliquis   5 mg, Oral, 2 Times Daily      Aptiom 400 MG tablet  Generic drug: Eslicarbazepine Acetate   400 mg, Oral, Daily, Take with 800 mg tablet for total dose 1,200 mg daily      Aptiom 800 MG tablet tablet  Generic drug: Eslicarbazepine Acetate   800 mg, Oral, Daily, Take with 400 mg tablet for total dose of 1,200 mg daily.      aspirin 81 MG EC tablet   81 mg, Oral, Daily      atorvastatin 40 MG tablet  Commonly known as: LIPITOR   40 mg, Oral, Daily      lacosamide 200 MG " tablet  Commonly known as: VIMPAT   200 mg, Oral, Every 12 Hours Scheduled, Name brand      metoprolol succinate XL 25 MG 24 hr tablet  Commonly known as: TOPROL-XL   25 mg, Oral, Daily      ursodiol 300 MG capsule  Commonly known as: ACTIGALL   600 mg, Oral, 2 Times Daily      Xcopri 100 MG tablet  Generic drug: Cenobamate   100 mg, Oral, Daily      Xcopri 50 MG tablet  Generic drug: Cenobamate   50 mg, Oral, Daily               No Known Allergies    Discharge Disposition:  Home or Self Care      Discharge Diet:  Diet Order   Procedures    Diet: Regular/House; Fluid Consistency: Thin (IDDSI 0)       Discharge Activity: As tolerated       CODE STATUS:    Code Status and Medical Interventions: CPR (Attempt to Resuscitate); Full Support   Ordered at: 07/31/25 0405     Code Status (Patient has no pulse and is not breathing):    CPR (Attempt to Resuscitate)     Medical Interventions (Patient has pulse or is breathing):    Full Support       Future Appointments   Date Time Provider Department Center   8/4/2025  8:40 AM [facility] PC  [facility]   9/8/2025  8:30 AM LAB GASTRO E[facility] GE EA [facility]   9/11/2025  3:15 PM Esther Callaway APRN [facility] OB  [facility]   9/15/2025  2:30 PM Argelia Yarbrough MD [facility] EN  [facility]   9/17/2025  3:20 PM [facility] MAMMO BH [facility] MA BR None   10/24/2025  2:30 PM Denise Guadarrama PA-C [facility] GE EA [facility]   12/5/2025  4:00 PM Cb James II, MD [facility] N [facility]   12/16/2025  2:00 PM Annalee Huddleston APRN [facility] PC  [facility]   3/9/2026  2:00 PM [facility] ECHO/VAS [facility] ECHO [facility]   3/9/2026  2:45 PM Reza Lester MD [facility] CD [id number] [facility]   4/23/2026  3:30 PM Marcello Pleitez MD [facility] SLP [facility] None      Follow-up Information       Annalee Huddleston APRN Follow up in 1 week(s).    Specialties: Family Medicine, Nurse Practitioner  Why: Post Hospitlization follow up  Contact information:  [address] [id number] [phone]               Cb James II, MD Follow  up.    Specialty: Neurology  Why: As previously scheduled  Contact information:  [address]  [facility] [address]  [place] [id number] [phone]               Reza Lester MD Follow up.    Specialty: Cardiology  Why: As previously scheduled  Contact information:  [address]  [facility] [address]  [place] [id number] [phone]                             Time Spent on Discharge:  Greater than 30 minutes      KIRSTIE Hurt  [facility]  08/01/25  11:34 EDT

## 2025-08-01 NOTE — PLAN OF CARE
Goal Outcome Evaluation:              Outcome Evaluation: discharge home; d/c instructions given

## 2025-08-04 ENCOUNTER — TELEPHONE (OUTPATIENT)
Dept: INTERNAL MEDICINE | Age: 45
End: 2025-08-04
Payer: MEDICARE

## 2025-08-04 ENCOUNTER — TRANSITIONAL CARE MANAGEMENT TELEPHONE ENCOUNTER (OUTPATIENT)
Dept: CALL CENTER | Facility: HOSPITAL | Age: 45
End: 2025-08-04
Payer: MEDICARE

## 2025-08-05 DIAGNOSIS — I10 PRIMARY HYPERTENSION: ICD-10-CM

## 2025-08-05 LAB — LACOSAMIDE SERPL-MCNC: 6.5 UG/ML (ref 5–10)

## 2025-08-05 RX ORDER — METOPROLOL SUCCINATE 25 MG/1
25 TABLET, EXTENDED RELEASE ORAL DAILY
Qty: 30 TABLET | Refills: 0 | Status: SHIPPED | OUTPATIENT
Start: 2025-08-05

## 2025-08-05 RX ORDER — AMLODIPINE BESYLATE 5 MG/1
5 TABLET ORAL DAILY
Qty: 30 TABLET | Refills: 0 | Status: SHIPPED | OUTPATIENT
Start: 2025-08-05

## 2025-08-08 ENCOUNTER — OFFICE VISIT (OUTPATIENT)
Dept: INTERNAL MEDICINE | Age: 45
End: 2025-08-08
Payer: MEDICARE

## 2025-08-08 ENCOUNTER — TELEPHONE (OUTPATIENT)
Age: 45
End: 2025-08-08
Payer: MEDICARE

## 2025-08-08 VITALS
HEART RATE: 100 BPM | WEIGHT: 177 LBS | DIASTOLIC BLOOD PRESSURE: 81 MMHG | SYSTOLIC BLOOD PRESSURE: 130 MMHG | OXYGEN SATURATION: 98 % | BODY MASS INDEX: 29.49 KG/M2 | HEIGHT: 65 IN | RESPIRATION RATE: 16 BRPM | TEMPERATURE: 97.7 F

## 2025-08-08 DIAGNOSIS — R79.89 ABNORMAL THYROID BLOOD TEST: ICD-10-CM

## 2025-08-08 DIAGNOSIS — I10 PRIMARY HYPERTENSION: ICD-10-CM

## 2025-08-08 DIAGNOSIS — G40.309 GENERALIZED NONCONVULSIVE EPILEPSY: Primary | ICD-10-CM

## 2025-08-08 LAB
MDC_IDC_MSMT_BATTERY_STATUS: NORMAL
MDC_IDC_PG_IMPLANT_DTM: NORMAL
MDC_IDC_PG_MFG: NORMAL
MDC_IDC_PG_MODEL: NORMAL
MDC_IDC_PG_SERIAL: NORMAL
MDC_IDC_PG_TYPE: NORMAL
MDC_IDC_SESS_DTM: NORMAL
MDC_IDC_SESS_TYPE: NORMAL
MDC_IDC_STAT_AT_BURDEN_PERCENT: 0

## 2025-08-09 ENCOUNTER — HOSPITAL ENCOUNTER (OUTPATIENT)
Dept: BONE DENSITY | Facility: HOSPITAL | Age: 45
Discharge: HOME | End: 2025-08-09
Payer: MEDICARE

## 2025-08-09 DIAGNOSIS — K74.3 PRIMARY BILIARY CHOLANGITIS: ICD-10-CM

## 2025-08-09 PROCEDURE — 77080 DXA BONE DENSITY AXIAL: CPT

## 2025-08-13 DIAGNOSIS — G40.309 GENERALIZED NONCONVULSIVE EPILEPSY: ICD-10-CM

## 2025-08-14 RX ORDER — CENOBAMATE 100 MG/1
TABLET, FILM COATED ORAL DAILY
Qty: 30 TABLET | Refills: 3 | Status: SHIPPED | OUTPATIENT
Start: 2025-08-14

## 2025-08-19 LAB
MDC_IDC_MSMT_BATTERY_STATUS: NORMAL
MDC_IDC_PG_IMPLANT_DTM: NORMAL
MDC_IDC_PG_MFG: NORMAL
MDC_IDC_PG_MODEL: NORMAL
MDC_IDC_PG_SERIAL: NORMAL
MDC_IDC_PG_TYPE: NORMAL
MDC_IDC_SESS_DTM: NORMAL
MDC_IDC_SESS_TYPE: NORMAL
MDC_IDC_STAT_AT_BURDEN_PERCENT: 0

## (undated) DEVICE — SOL NACL 0.9PCT 1000ML

## (undated) DEVICE — SENSR O2 OXIMAX FNGR A/ 18IN NONSTR

## (undated) DEVICE — LOU D & C HYSTEROSCOPY: Brand: MEDLINE INDUSTRIES, INC.

## (undated) DEVICE — 1000ML,PRESSURE INFUSER W/STOPCOCK: Brand: MEDLINE

## (undated) DEVICE — LN SMPL CO2 SHTRM SD STREAM W/M LUER

## (undated) DEVICE — KT ORCA ORCAPOD DISP STRL

## (undated) DEVICE — GLV SURG BIOGEL LTX PF 7

## (undated) DEVICE — PROB ABL ENDOMTRL NOVASURE/G4 W/SURESND

## (undated) DEVICE — STRAP STIRUP WO/ RNG

## (undated) DEVICE — TUBING, SUCTION, 1/4" X 10', STRAIGHT: Brand: MEDLINE

## (undated) DEVICE — NEEDLE, QUINCKE, 20GX3.5": Brand: MEDLINE

## (undated) DEVICE — CANN O2 ETCO2 FITS ALL CONN CO2 SMPL A/ 7IN DISP LF

## (undated) DEVICE — ADAPT CLN BIOGUARD AIR/H2O DISP

## (undated) DEVICE — ST IRR CYSTO W/SPK 77IN LF